# Patient Record
Sex: MALE | Race: BLACK OR AFRICAN AMERICAN | Employment: OTHER | URBAN - NONMETROPOLITAN AREA
[De-identification: names, ages, dates, MRNs, and addresses within clinical notes are randomized per-mention and may not be internally consistent; named-entity substitution may affect disease eponyms.]

---

## 2020-07-31 ENCOUNTER — VIRTUAL VISIT (OUTPATIENT)
Dept: FAMILY MEDICINE CLINIC | Age: 75
End: 2020-07-31
Payer: MEDICARE

## 2020-07-31 DIAGNOSIS — E11.9 TYPE 2 DIABETES MELLITUS WITHOUT COMPLICATION, WITH LONG-TERM CURRENT USE OF INSULIN (HCC): ICD-10-CM

## 2020-07-31 DIAGNOSIS — I50.22 CHRONIC SYSTOLIC CONGESTIVE HEART FAILURE (HCC): Primary | ICD-10-CM

## 2020-07-31 DIAGNOSIS — Z79.4 TYPE 2 DIABETES MELLITUS WITHOUT COMPLICATION, WITH LONG-TERM CURRENT USE OF INSULIN (HCC): ICD-10-CM

## 2020-07-31 DIAGNOSIS — C67.9 MALIGNANT NEOPLASM OF URINARY BLADDER, UNSPECIFIED SITE (HCC): ICD-10-CM

## 2020-07-31 DIAGNOSIS — N18.30 STAGE 3 CHRONIC KIDNEY DISEASE (HCC): ICD-10-CM

## 2020-07-31 DIAGNOSIS — I10 ESSENTIAL HYPERTENSION: ICD-10-CM

## 2020-07-31 PROCEDURE — 99442 PR PHYS/QHP TELEPHONE EVALUATION 11-20 MIN: CPT | Performed by: FAMILY MEDICINE

## 2020-07-31 NOTE — PROGRESS NOTES
Jade Leija is a 69 y.o. male, evaluated via audio-only technology on 7/31/2020 for Hospital Follow Up (fluid )  . Assessment & Plan:   Edema, congestive heart failure, diabetes, hypertension, malignant neoplasm of the bladder: I do not really know this gentleman. We had a very long discussion today. He has been in the emergency room on several occasions. They have to get fluid off and whenever that means. He has some chronic renal disease he is scheduled to see cardiology I suspect for congestive heart failure and nephrology for chronic renal disease. We did review his emergency room chart. He has all of his medications he was given in the emergency room we will follow-up on an as-needed basis this was a 15-minute visit. The patient was at his home I was in my office. It was telephone to telephone technology. 12  Subjective: The patient is a 72-year-old male I do not remember seeing in our office. He has had no falls or injuries. He says he has been in the emergency room on several occasions. He thinks he has too much blood in his system there was a Tylenol that was wrong he has appointment with cardiology and nephrology in the near future he is having some issues with his kidneys I suspect they are thinking this is heart failure. No chest congestion or cough. He has diabetes he tells me his blood sugars are less than 150 he does not check his blood pressure regularly but I see he is on clonidine and a multitude of other medicines he has had no falls or issues he is not on a calcium channel blocker however. He has been sleeping well. He has been eating well. Nobody at home has been sick. He tells me he has no significant edema today. His gait is normal.  He is not clinically anxious or depressed. Prior to Admission medications    Medication Sig Start Date End Date Taking?  Authorizing Provider   BD SINGLE USE SWABS REGULAR padm  3/19/18  Yes Provider, Historical   TRUE METRIX GLUCOSE TEST STRIP strip  3/1/18  Yes Provider, Historical   NOVOLOG FLEXPEN U-100 INSULIN 100 unit/mL inpn INJECT 8 UNITS SUBCUTANEOUSLY BEFORE MEALS 12/22/17  Yes Provider, Historical   TRUEPLUS LANCETS 33 gauge misc  3/19/18  Yes Provider, Historical   insulin lispro (HUMALOG) 100 unit/mL kwikpen 8 Units. 8/10/10  Yes Provider, Historical   lisinopril (PRINIVIL, ZESTRIL) 10 mg tablet TK 1 T PO  QD 3/22/18  Yes Provider, Historical   BD INSULIN PEN NEEDLE UF MINI 31 gauge x 3/16\" ndle USE TO INJECT TID 3/12/18  Yes Provider, Historical   cloNIDine HCl (CATAPRES) 0.2 mg tablet Take  by mouth two (2) times a day. Yes Provider, Historical   allopurinol (ZYLOPRIM) 100 mg tablet  9/12/16  Yes Provider, Historical   simvastatin (ZOCOR) 40 mg tablet  10/22/16  Yes Provider, Historical   insulin glargine (LANTUS) 100 unit/mL injection by SubCUTAneous route once. Yes Provider, Historical   Insulin Needles, Disposable, (LITE TOUCH INSULIN PEN NEEDLES) 31 X 5/16 \" Ndle by Does Not Apply route. Yes Provider, Historical     Patient Active Problem List   Diagnosis Code    Bladder cancer ([facility] C67.9    Diabetes mellitus ([facility] E11.9    Hypertension I10    Hematuria R31.9    CKD (chronic kidney disease) N18.9    Chronic systolic congestive heart failure (HCC) I50.22       Review of Systems   Constitutional: Negative. HENT: Negative. Eyes: Negative. Respiratory: Positive for shortness of breath. Cardiovascular: Negative. Gastrointestinal: Negative. Genitourinary: Negative. Musculoskeletal: Positive for joint pain. Skin: Negative. Neurological: Negative. Endo/Heme/Allergies: Negative. Has not checked his blood pressure today. No flowsheet data found.      Madeleine Sun, who was evaluated through a patient-initiated, synchronous (real-time) audio only encounter, and/or her healthcare decision maker, is aware that it is a billable service, with coverage as determined by his insurance carrier. He provided verbal consent to proceed: n/a- consent obtained within past 12 months. He has not had a related appointment within my department in the past 7 days or scheduled within the next 24 hours.       Total Time: minutes: 11-20 minutes    Jose Cruz Headley MD

## 2020-07-31 NOTE — PROGRESS NOTES
Jj Aquino presents today for   Chief Complaint   Patient presents with   [facility] Follow Up     fluid        Depression Screening:  3 most recent PHQ Screens 7/31/2020   Little interest or pleasure in doing things Several days   Feeling down, depressed, irritable, or hopeless Not at all   Total Score PHQ 2 1   Trouble falling or staying asleep, or sleeping too much Not at all   Feeling tired or having little energy Several days   Poor appetite, weight loss, or overeating Not at all   Feeling bad about yourself - or that you are a failure or have let yourself or your family down Not at all   Trouble concentrating on things such as school, work, reading, or watching TV Not at all   Moving or speaking so slowly that other people could have noticed; or the opposite being so fidgety that others notice Not at all   Thoughts of being better off dead, or hurting yourself in some way Not at all   PHQ 9 Score 2       Learning Assessment:  No flowsheet data found. Abuse Screening:  No flowsheet data found. Fall Risk  Fall Risk Assessment, last 12 mths 7/31/2020   Able to walk? Yes   Fall in past 12 months? No       Health Maintenance reviewed and discussed and ordered per Provider. Health Maintenance Due   Topic Date Due    Hepatitis C Screening  1945    Foot Exam Q1  09/12/1957    A1C test (Diabetic or Prediabetic)  09/12/1957    MICROALBUMIN Q1  09/12/1957    Eye Exam Retinal or Dilated  09/12/1957    Lipid Screen  09/12/1957    DTaP/Tdap/Td series (1 - Tdap) 09/12/1968    Shingrix Vaccine Age 50> (1 of 2) 09/12/1997    FOBT Q1Y Age 50-75  09/12/1997    GLAUCOMA SCREENING Q2Y  09/12/2012    AAA Screening 73-69 YO Male Smoking Patients  09/12/2012    Medicare Yearly Exam  05/08/2020   . Coordination of Care:  1. Have you been to the ER, urgent care clinic since your last visit? Hospitalized since your last visit? [facility] - fluid 07/30/20    2.  Have you seen or consulted any other health care providers outside of the [address] [place] [address] since your last visit? Include any pap smears or colon screening.  No

## 2020-08-31 RX ORDER — AMLODIPINE BESYLATE 5 MG/1
TABLET ORAL
Qty: 60 TAB | Refills: 3 | Status: SHIPPED | OUTPATIENT
Start: 2020-08-31 | End: 2020-09-13

## 2020-09-09 ENCOUNTER — NURSE TRIAGE (OUTPATIENT)
Dept: NEPHROLOGY | Age: 75
End: 2020-09-09

## 2020-09-09 ENCOUNTER — OFFICE VISIT (OUTPATIENT)
Dept: NEPHROLOGY | Age: 75
End: 2020-09-09
Payer: MEDICARE

## 2020-09-09 VITALS
HEIGHT: 68 IN | DIASTOLIC BLOOD PRESSURE: 64 MMHG | SYSTOLIC BLOOD PRESSURE: 129 MMHG | BODY MASS INDEX: 32.13 KG/M2 | WEIGHT: 212 LBS | HEART RATE: 67 BPM

## 2020-09-09 DIAGNOSIS — I10 ESSENTIAL HYPERTENSION: ICD-10-CM

## 2020-09-09 DIAGNOSIS — N18.30 STAGE 3 CHRONIC KIDNEY DISEASE (HCC): Primary | ICD-10-CM

## 2020-09-09 DIAGNOSIS — I50.22 CHRONIC SYSTOLIC CONGESTIVE HEART FAILURE (HCC): ICD-10-CM

## 2020-09-09 DIAGNOSIS — Z79.4 TYPE 2 DIABETES MELLITUS WITHOUT COMPLICATION, WITH LONG-TERM CURRENT USE OF INSULIN (HCC): Primary | ICD-10-CM

## 2020-09-09 DIAGNOSIS — C67.9 MALIGNANT NEOPLASM OF URINARY BLADDER, UNSPECIFIED SITE (HCC): ICD-10-CM

## 2020-09-09 DIAGNOSIS — E11.9 TYPE 2 DIABETES MELLITUS WITHOUT COMPLICATION, WITH LONG-TERM CURRENT USE OF INSULIN (HCC): Primary | ICD-10-CM

## 2020-09-09 PROCEDURE — 99214 OFFICE O/P EST MOD 30 MIN: CPT | Performed by: INTERNAL MEDICINE

## 2020-09-09 RX ORDER — HYDRALAZINE HYDROCHLORIDE 100 MG/1
100 TABLET, FILM COATED ORAL 3 TIMES DAILY
COMMUNITY
End: 2020-09-13

## 2020-09-09 RX ORDER — LOSARTAN POTASSIUM 50 MG/1
50 TABLET ORAL DAILY
COMMUNITY
End: 2020-09-19

## 2020-09-09 RX ORDER — LANOLIN ALCOHOL/MO/W.PET/CERES
325 CREAM (GRAM) TOPICAL 2 TIMES DAILY
COMMUNITY
End: 2021-02-23 | Stop reason: ALTCHOICE

## 2020-09-09 RX ORDER — MINOXIDIL 2.5 MG/1
TABLET ORAL DAILY
COMMUNITY
End: 2020-09-13

## 2020-09-09 RX ORDER — SODIUM BICARBONATE 650 MG/1
TABLET ORAL 4 TIMES DAILY
COMMUNITY
End: 2020-09-13

## 2020-09-09 RX ORDER — OMEPRAZOLE 40 MG/1
40 CAPSULE, DELAYED RELEASE ORAL DAILY
COMMUNITY
End: 2020-12-14 | Stop reason: SDUPTHER

## 2020-09-09 RX ORDER — MINOXIDIL 2.5 MG/1
2.5 TABLET ORAL 2 TIMES DAILY
COMMUNITY
End: 2020-09-19

## 2020-09-09 RX ORDER — ISOSORBIDE MONONITRATE 60 MG/1
60 TABLET, EXTENDED RELEASE ORAL
COMMUNITY
End: 2020-09-13

## 2020-09-09 NOTE — PROGRESS NOTES
Reason for f/u:  ARON on CKD III/IV      HPI:  The pt was seen in the ER last night for not feeling well. He said feeling weak. No N/V/D. No urinary sx.  No chest complaints.      ROS:  Constitutional   · Constitutional: no fatigue, no fever, no night sweats, no significant weight gain, no significant weight loss     Eyes   · Eyes: no dry eyes, no vision change     ENMT   · Nose: no frequent nosebleeds, no nose/sinus problems   · Mouth/Throat: no dry mouth, no bleeding gums, no sore throat, no teeth problems, no snoring     Cardiovascular   · Cardiovascular: no swelling in the extremities, no chest pain, no arm pain on exertion, no shortness of breath when walking, no known heart murmur, no shortness of breath when lying down, no palpitations     Respiratory   · Respiratory: no shortness of breath, no cough, no wheezing, no coughing up blood     Gastrointestinal   · Gastrointestinal: no nausea, no abdominal pain, no vomiting, normal appetite, no diarrhea, not vomiting blood     Genitourinary   · Genitourinary: no hematuria, no incontinence, no difficulty urinating, no increased frequency     Musculoskeletal   · Musculoskeletal: no back pain, no arthralgias/joint pain, no muscle aches, no muscle weakness     Integumentary   · Skin: no rashes, no jaundice     Neurologic   · Neurologic: no dizziness, no numbness, no loss of consciousness, no weakness, no seizures, no headaches     Psychiatric   · Psych: no depression, no sleep disturbances     Hematologic/Lymphatic   · Hematologic/Lymphatic no swollen glands, no bruising     Allergic/Immunologic   · Allergy/Immunologic: no runny nose, no hives   ROS as noted in the HPI      PE:  Constitutional   · Level of Distress: NAD, no acute illness, no chronic illness   · General Appearance: healthy-appearing, well-nourished, well-developed   · Ambulation: ambulating normally     Eyes   · Pupils: PERRLA   · EOM: EOMI     Cardiovascular   · Heart Auscultation: RRR, normal S1, normal S2, no murmurs, no rubs, no gallop, no click, no KARI     Lungs   · Auscultation: breath sounds normal, good air movement, CTA except as noted, no wheezing, no rales/crackles, no rhonchi     Abdomen   · Inspection and Palpation: soft, non-distended, no tenderness, no CVA tenderness     Musculoskeletal System   · Extremities: no clubbing, no cyanosis, no edema     Skin   · Inspection and Palpation: no rash, no lesions, no ulcer, no induration, no nodule, turgor normal, no jaundice     Neurologic   · Cranial Nerves: grossly intact   · Gait And Station: normal gait, normal station        A/P:    1. 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  -Last Cr is 2.9   -His urinalysis showed bland sediment and has no proteinuria.  -A kidney ultrasound surgically absent left kidney and right kidney with no hydronephrosis. -He was advised to avoid any kind of NSAIDs. -will hold all above potential nephrotoxins  -I will see him back in 6 weeks. 2. History of kidney cancer:  -Status post left total nephrectomy 8 years ago. 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  -He follow-up with oncologist.    3. Hypertension:  -Blood pressure is elevated with systolic .  -had increased his hydralazine 100 mg tid at last visit   -will keep clonidine 0.2 mg tid and metoprolol ER 50 mg daily  -on amlodipine 5 mg daily   -will add losartan 50 mg daily     4. Anemia:  -Hb is 10.9  -No need for procrit. -TSAT 22%, Ferritin 283  -will put on po FeSO4 325 mg bid. 5. Renal osteodystrophy.  -His Ca, phosphorus and vitamin D25 hydroxy levels are within target limits. -has sec HPT   -iPTH 169, will keep on calcitriol 0.25 mcg daily     6. Gouty arthritis:  -His Uric acid is 5.0  -will keep on Allopurinol 200 mg daily.     7. Metabolic acidosis:  -CO2 02-->62-->08  -will keep on NaHCO3 650 mg 2 tabs tid    8. Proteinuria, nephrotic range:   -has 4-5 g/g per day   -likely from diabetic nephropathy   -Hep B/C, C3/C4, SFL ratio and ANCAs are neg   -will put on losartan 50 mg daily

## 2020-09-13 ENCOUNTER — APPOINTMENT (OUTPATIENT)
Dept: NUCLEAR MEDICINE | Age: 75
End: 2020-09-13
Attending: INTERNAL MEDICINE
Payer: MEDICARE

## 2020-09-13 ENCOUNTER — APPOINTMENT (OUTPATIENT)
Dept: GENERAL RADIOLOGY | Age: 75
End: 2020-09-13
Attending: INTERNAL MEDICINE
Payer: MEDICARE

## 2020-09-13 ENCOUNTER — HOSPITAL ENCOUNTER (OUTPATIENT)
Age: 75
Setting detail: OBSERVATION
Discharge: HOME HEALTH | End: 2020-09-19
Attending: INTERNAL MEDICINE | Admitting: INTERNAL MEDICINE
Payer: MEDICARE

## 2020-09-13 DIAGNOSIS — R06.02 SOB (SHORTNESS OF BREATH): ICD-10-CM

## 2020-09-13 DIAGNOSIS — R06.00 DYSPNEA, UNSPECIFIED TYPE: Primary | ICD-10-CM

## 2020-09-13 PROBLEM — R79.89 POSITIVE D DIMER: Status: ACTIVE | Noted: 2020-09-13

## 2020-09-13 LAB
ALBUMIN SERPL-MCNC: 3.8 G/DL (ref 3.5–4.7)
ALBUMIN/GLOB SERPL: 0.9 {RATIO}
ALP SERPL-CCNC: 74 U/L (ref 38–126)
ALT SERPL-CCNC: 16 U/L (ref 3–72)
ANION GAP SERPL CALC-SCNC: 11 MMOL/L
AST SERPL W P-5'-P-CCNC: 25 U/L (ref 17–74)
BASOPHILS # BLD: 0 K/UL (ref 0–0.1)
BASOPHILS NFR BLD: 0 % (ref 0–2)
BILIRUB DIRECT SERPL-MCNC: <0.1 MG/DL (ref 0–0.3)
BILIRUB SERPL-MCNC: 0.7 MG/DL (ref 0.2–1)
BUN SERPL-MCNC: 43 MG/DL (ref 9–21)
BUN/CREAT SERPL: 15
CA-I BLD-MCNC: 9.2 MG/DL (ref 8.5–10.5)
CHLORIDE SERPL-SCNC: 107 MMOL/L (ref 94–111)
CK SERPL-CCNC: 130 U/L (ref 38–174)
CO2 SERPL-SCNC: 21 MMOL/L (ref 21–33)
CREAT SERPL-MCNC: 2.9 MG/DL (ref 0.8–1.5)
D DIMER PPP FEU-MCNC: 2.1 UG/ML(FEU)
EOSINOPHIL # BLD: 0 K/UL (ref 0–0.4)
EOSINOPHIL NFR BLD: 0 % (ref 0–5)
ERYTHROCYTE [DISTWIDTH] IN BLOOD BY AUTOMATED COUNT: 14.2 %
GLOBULIN SER CALC-MCNC: 4.2 G/DL
GLUCOSE SERPL-MCNC: 75 MG/DL (ref 70–110)
HCT VFR BLD AUTO: 34.4 % (ref 36–48)
HGB BLD-MCNC: 11 % (ref 13–16)
IMM GRANULOCYTES # BLD AUTO: 0 K/UL
IMM GRANULOCYTES NFR BLD AUTO: 0 %
INR PPP: 0.8 (ref 0.8–1.2)
LYMPHOCYTES # BLD: 0.6 K/UL (ref 0.9–3.6)
LYMPHOCYTES NFR BLD: 16 % (ref 21–52)
MCH RBC QN AUTO: 27 PG (ref 24–34)
MCHC RBC AUTO-ENTMCNC: 32 G/DL (ref 31–37)
MCV RBC AUTO: 84.5 FL (ref 74–97)
MONOCYTES # BLD: 0.4 K/UL (ref 0.05–1.2)
MONOCYTES NFR BLD: 9 % (ref 3–10)
NEUTS SEG # BLD: 3 K/UL (ref 1.8–8)
NEUTS SEG NFR BLD: 75 % (ref 40–73)
PLATELET # BLD AUTO: 291 K/UL (ref 135–420)
PMV BLD AUTO: 9.6 FL
POTASSIUM SERPL-SCNC: 4.3 MMOL/L (ref 3.2–5.1)
PROT SERPL-MCNC: 8 G/DL (ref 6.1–8.4)
PROTHROMBIN TIME: 11 SEC (ref 11.5–15.2)
RBC # BLD AUTO: 4.07 M/UL (ref 4.7–5.5)
SODIUM SERPL-SCNC: 139 MMOL/L (ref 135–145)
TROPONIN I SERPL-MCNC: 0.03 NG/ML (ref 0.02–0.05)
WBC # BLD AUTO: 4 K/UL (ref 4.6–13.2)

## 2020-09-13 PROCEDURE — 99219 PR INITIAL OBSERVATION CARE/DAY 50 MINUTES: CPT | Performed by: NURSE PRACTITIONER

## 2020-09-13 PROCEDURE — 96374 THER/PROPH/DIAG INJ IV PUSH: CPT

## 2020-09-13 PROCEDURE — 85610 PROTHROMBIN TIME: CPT

## 2020-09-13 PROCEDURE — 85379 FIBRIN DEGRADATION QUANT: CPT

## 2020-09-13 PROCEDURE — 36415 COLL VENOUS BLD VENIPUNCTURE: CPT

## 2020-09-13 PROCEDURE — 99218 HC RM OBSERVATION: CPT

## 2020-09-13 PROCEDURE — 80048 BASIC METABOLIC PNL TOTAL CA: CPT

## 2020-09-13 PROCEDURE — 80076 HEPATIC FUNCTION PANEL: CPT

## 2020-09-13 PROCEDURE — 96376 TX/PRO/DX INJ SAME DRUG ADON: CPT

## 2020-09-13 PROCEDURE — 74011250636 HC RX REV CODE- 250/636: Performed by: NURSE PRACTITIONER

## 2020-09-13 PROCEDURE — 85025 COMPLETE CBC W/AUTO DIFF WBC: CPT

## 2020-09-13 PROCEDURE — 84484 ASSAY OF TROPONIN QUANT: CPT

## 2020-09-13 PROCEDURE — 93005 ELECTROCARDIOGRAM TRACING: CPT | Performed by: NURSE PRACTITIONER

## 2020-09-13 PROCEDURE — A9540 TC99M MAA: HCPCS

## 2020-09-13 PROCEDURE — 82550 ASSAY OF CK (CPK): CPT

## 2020-09-13 PROCEDURE — 74011250636 HC RX REV CODE- 250/636: Performed by: INTERNAL MEDICINE

## 2020-09-13 PROCEDURE — 83036 HEMOGLOBIN GLYCOSYLATED A1C: CPT

## 2020-09-13 PROCEDURE — 71045 X-RAY EXAM CHEST 1 VIEW: CPT

## 2020-09-13 PROCEDURE — 99285 EMERGENCY DEPT VISIT HI MDM: CPT

## 2020-09-13 PROCEDURE — 96372 THER/PROPH/DIAG INJ SC/IM: CPT

## 2020-09-13 PROCEDURE — 83880 ASSAY OF NATRIURETIC PEPTIDE: CPT

## 2020-09-13 RX ORDER — ISOSORBIDE MONONITRATE 60 MG/1
60 TABLET, EXTENDED RELEASE ORAL DAILY
Status: DISCONTINUED | OUTPATIENT
Start: 2020-09-14 | End: 2020-09-19 | Stop reason: HOSPADM

## 2020-09-13 RX ORDER — INSULIN GLARGINE 100 [IU]/ML
20 INJECTION, SOLUTION SUBCUTANEOUS EVERY MORNING
COMMUNITY
End: 2020-10-16

## 2020-09-13 RX ORDER — SODIUM CHLORIDE 0.9 % (FLUSH) 0.9 %
5-40 SYRINGE (ML) INJECTION AS NEEDED
Status: DISCONTINUED | OUTPATIENT
Start: 2020-09-13 | End: 2020-09-16

## 2020-09-13 RX ORDER — SODIUM CHLORIDE 0.9 % (FLUSH) 0.9 %
5-40 SYRINGE (ML) INJECTION EVERY 8 HOURS
Status: DISCONTINUED | OUTPATIENT
Start: 2020-09-13 | End: 2020-09-13

## 2020-09-13 RX ORDER — MINOXIDIL 2.5 MG/1
2.5 TABLET ORAL 2 TIMES DAILY
Status: DISCONTINUED | OUTPATIENT
Start: 2020-09-13 | End: 2020-09-15

## 2020-09-13 RX ORDER — METOPROLOL SUCCINATE 50 MG/1
50 TABLET, EXTENDED RELEASE ORAL DAILY
Status: DISCONTINUED | OUTPATIENT
Start: 2020-09-14 | End: 2020-09-19 | Stop reason: HOSPADM

## 2020-09-13 RX ORDER — FUROSEMIDE 10 MG/ML
60 INJECTION INTRAMUSCULAR; INTRAVENOUS
Status: COMPLETED | OUTPATIENT
Start: 2020-09-13 | End: 2020-09-13

## 2020-09-13 RX ORDER — POTASSIUM CHLORIDE 1.5 G/1.77G
1 POWDER, FOR SOLUTION ORAL DAILY
Status: DISCONTINUED | OUTPATIENT
Start: 2020-09-14 | End: 2020-09-14

## 2020-09-13 RX ORDER — OMEPRAZOLE 40 MG/1
40 CAPSULE, DELAYED RELEASE ORAL DAILY
Status: DISCONTINUED | OUTPATIENT
Start: 2020-09-14 | End: 2020-09-14

## 2020-09-13 RX ORDER — METOPROLOL SUCCINATE 50 MG/1
50 TABLET, EXTENDED RELEASE ORAL DAILY
COMMUNITY
Start: 2020-07-10 | End: 2020-09-19

## 2020-09-13 RX ORDER — POLYETHYLENE GLYCOL 3350 17 G/17G
17 POWDER, FOR SOLUTION ORAL DAILY PRN
Status: DISCONTINUED | OUTPATIENT
Start: 2020-09-13 | End: 2020-09-19 | Stop reason: HOSPADM

## 2020-09-13 RX ORDER — SODIUM CHLORIDE 0.9 % (FLUSH) 0.9 %
5-40 SYRINGE (ML) INJECTION AS NEEDED
Status: DISCONTINUED | OUTPATIENT
Start: 2020-09-13 | End: 2020-09-13

## 2020-09-13 RX ORDER — ACETAMINOPHEN 325 MG/1
650 TABLET ORAL
Status: DISCONTINUED | OUTPATIENT
Start: 2020-09-13 | End: 2020-09-19 | Stop reason: HOSPADM

## 2020-09-13 RX ORDER — HEPARIN SODIUM 5000 [USP'U]/ML
5000 INJECTION, SOLUTION INTRAVENOUS; SUBCUTANEOUS EVERY 8 HOURS
Status: DISCONTINUED | OUTPATIENT
Start: 2020-09-13 | End: 2020-09-14

## 2020-09-13 RX ORDER — ALLOPURINOL 100 MG/1
200 TABLET ORAL DAILY
Status: DISCONTINUED | OUTPATIENT
Start: 2020-09-14 | End: 2020-09-15

## 2020-09-13 RX ORDER — FUROSEMIDE 40 MG/1
40 TABLET ORAL DAILY
COMMUNITY
Start: 2020-07-30 | End: 2020-09-13 | Stop reason: SDUPTHER

## 2020-09-13 RX ORDER — SODIUM CHLORIDE 0.9 % (FLUSH) 0.9 %
5-40 SYRINGE (ML) INJECTION AS NEEDED
Status: DISCONTINUED | OUTPATIENT
Start: 2020-09-14 | End: 2020-09-16

## 2020-09-13 RX ORDER — LOSARTAN POTASSIUM 25 MG/1
50 TABLET ORAL DAILY
Status: DISCONTINUED | OUTPATIENT
Start: 2020-09-14 | End: 2020-09-15

## 2020-09-13 RX ORDER — ACETAMINOPHEN 650 MG/1
650 SUPPOSITORY RECTAL
Status: DISCONTINUED | OUTPATIENT
Start: 2020-09-13 | End: 2020-09-19 | Stop reason: HOSPADM

## 2020-09-13 RX ORDER — FUROSEMIDE 10 MG/ML
40 INJECTION INTRAMUSCULAR; INTRAVENOUS 2 TIMES DAILY
Status: DISCONTINUED | OUTPATIENT
Start: 2020-09-14 | End: 2020-09-14

## 2020-09-13 RX ORDER — PROMETHAZINE HYDROCHLORIDE 25 MG/1
12.5 TABLET ORAL
Status: DISCONTINUED | OUTPATIENT
Start: 2020-09-13 | End: 2020-09-19 | Stop reason: HOSPADM

## 2020-09-13 RX ORDER — INSULIN LISPRO 100 [IU]/ML
8 INJECTION, SOLUTION INTRAVENOUS; SUBCUTANEOUS
Status: DISCONTINUED | OUTPATIENT
Start: 2020-09-14 | End: 2020-09-19 | Stop reason: HOSPADM

## 2020-09-13 RX ORDER — AMLODIPINE BESYLATE 5 MG/1
10 TABLET ORAL DAILY
COMMUNITY
Start: 2020-03-23 | End: 2020-12-29

## 2020-09-13 RX ORDER — INSULIN GLARGINE 100 [IU]/ML
20 INJECTION, SOLUTION SUBCUTANEOUS DAILY
Status: DISCONTINUED | OUTPATIENT
Start: 2020-09-14 | End: 2020-09-19 | Stop reason: HOSPADM

## 2020-09-13 RX ORDER — ISOSORBIDE MONONITRATE 60 MG/1
60 TABLET, EXTENDED RELEASE ORAL DAILY
COMMUNITY
End: 2021-02-23 | Stop reason: ALTCHOICE

## 2020-09-13 RX ORDER — SODIUM CHLORIDE 0.9 % (FLUSH) 0.9 %
5-40 SYRINGE (ML) INJECTION AS NEEDED
Status: DISCONTINUED | OUTPATIENT
Start: 2020-09-13 | End: 2020-09-19 | Stop reason: HOSPADM

## 2020-09-13 RX ORDER — CLONIDINE HYDROCHLORIDE 0.1 MG/1
0.2 TABLET ORAL
Status: DISCONTINUED | OUTPATIENT
Start: 2020-09-14 | End: 2020-09-15

## 2020-09-13 RX ORDER — LANOLIN ALCOHOL/MO/W.PET/CERES
325 CREAM (GRAM) TOPICAL 2 TIMES DAILY
Status: DISCONTINUED | OUTPATIENT
Start: 2020-09-13 | End: 2020-09-19 | Stop reason: HOSPADM

## 2020-09-13 RX ORDER — POTASSIUM CHLORIDE 1.5 G/1
20 POWDER, FOR SOLUTION ORAL DAILY
COMMUNITY
Start: 2020-07-30 | End: 2021-02-23 | Stop reason: ALTCHOICE

## 2020-09-13 RX ORDER — FUROSEMIDE 10 MG/ML
40 INJECTION INTRAMUSCULAR; INTRAVENOUS ONCE
Status: DISCONTINUED | OUTPATIENT
Start: 2020-09-13 | End: 2020-09-13

## 2020-09-13 RX ORDER — ONDANSETRON 2 MG/ML
4 INJECTION INTRAMUSCULAR; INTRAVENOUS
Status: DISCONTINUED | OUTPATIENT
Start: 2020-09-13 | End: 2020-09-19 | Stop reason: HOSPADM

## 2020-09-13 RX ORDER — FLUTICASONE PROPIONATE 50 MCG
1 SPRAY, SUSPENSION (ML) NASAL DAILY PRN
COMMUNITY
End: 2022-03-30 | Stop reason: SDUPTHER

## 2020-09-13 RX ORDER — FLUTICASONE PROPIONATE 50 MCG
2 SPRAY, SUSPENSION (ML) NASAL DAILY
Status: DISCONTINUED | OUTPATIENT
Start: 2020-09-14 | End: 2020-09-19 | Stop reason: HOSPADM

## 2020-09-13 RX ORDER — AMLODIPINE BESYLATE 5 MG/1
10 TABLET ORAL DAILY
Status: DISCONTINUED | OUTPATIENT
Start: 2020-09-14 | End: 2020-09-17

## 2020-09-13 RX ADMIN — HEPARIN SODIUM 5000 UNITS: 5000 INJECTION INTRAVENOUS; SUBCUTANEOUS at 23:59

## 2020-09-13 RX ADMIN — FUROSEMIDE 60 MG: 10 INJECTION, SOLUTION INTRAMUSCULAR; INTRAVENOUS at 12:07

## 2020-09-13 RX ADMIN — Medication 10 ML: at 12:40

## 2020-09-13 RX ADMIN — FUROSEMIDE 40 MG: 10 INJECTION, SOLUTION INTRAMUSCULAR; INTRAVENOUS at 17:57

## 2020-09-13 NOTE — ED NOTES
7:01 PM  Pt sat here all day waiting to have a VQ scan because they did not have the reagent and pt had to wait until 9pm. Radiology tech now came and told me that because the machine is a  and has to be cleared by Christine; the [facility] are not able to have the study transmitted to be read by the radiologist tonight. It will be tomorrow; maybe Tuesday; After they get the proper codes from [place] that the study will be able to be read. I spoke with ROGERIO Ramirez and she will come to the ER to consult on pt re: admission.

## 2020-09-13 NOTE — ED NOTES
[employer] Access Code: Activation code not generated  Current [employer] Status: Active    [employer]  A secure, online tool to manage your health information     [employer]® is a secure, online tool that connects you to your personalized health information from the privacy of your home -- day or night - making it very easy for you to manage your healthcare. Once the activation process is completed, you can even access your medical information using the Real [employer]ends victoriano, which is available for free in the [employer] Victoriano store or Google Play store.     Real [employer]ends provides the following levels of access (as shown below):   My Chart Features   [facility] Primary Care Doctor [facility]  Specialists [facility]  Urgent  Care Non-[facility]  Primary Care  Doctor   Email your healthcare team securely and privately 24/7 X X X X   Manage appointments: schedule your next appointment; view details of past/upcoming appointments X      Request prescription refills. X      View recent personal medical records, including lab and immunizations X X X X   View health record, including health history, allergies, medications X X X X   Read reports about your outpatient visits, procedures, consult and ER notes X X X X   See your discharge summary, which is a recap of your hospital and/or ER visit that includes your diagnosis, lab results, and care plan. X X       How to register for [employer]ends:  1. Go to  [email or web address].  2. Click on the Sign Up Now box, which takes you to the New Member Sign Up page. You will need to provide the following information:  a. Enter your [employer] Access Code exactly as it appears at the top of this page. (You will not need to use this code after you’ve completed the sign-up process. If you do not sign up before the expiration date, you must request a new code.)   b. Enter your date of birth.   c. Enter your home email address.   d. Click Submit, and follow the next screen’s instructions.  3. Create a [employer] ID. This will  7:38 PM  Case discussed with Dr Sabine Colvin; would prefer pt be admitted since difficulty with follow up once VQ report posted. Discussed with NP Laurier Landau and will admit to obs. be your [employer] login ID and cannot be changed, so think of one that is secure and easy to remember.  4. Create a Baeta password. You can change your password at any time.  5. Enter your Password Reset Question and Answer. This can be used at a later time if you forget your password.   6. Enter your e-mail address. This allows you to receive e-mail notifications when new information is available in Ba[employer].  7. Click Sign Up. You can now view your health information.    For assistance activating your [employer] account, call [phone]

## 2020-09-13 NOTE — ED NOTES
2:33 PM  Pt feeling better; put out over 700 ml and states that he is better. His d dimer is elevated and creatinine high. Pending VQ scan.

## 2020-09-13 NOTE — ED PROVIDER NOTES
EMERGENCY DEPARTMENT HISTORY AND PHYSICAL EXAM      Date: 9/13/2020  Patient Name: Muriel Sy    History of Presenting Illness     Chief Complaint   Patient presents with    Shortness of Breath    Chest Pain       History Provided By: Patient    HPI: Muriel Sy, 70 y.o. male presents to the ED with cc of shortness of breath for the past week. Patient states that he has only 1 kidney and no bladder and is followed by Dr. Mane Frank he saw Dr. Lenora Devine last week and was told that if he developed any buildup of fluid that he should come to the emergency room. He states that in the past he has had problems with buildup of fluid needs to come to the emergency room to be treated. He is post op left nephrectomy for cancer, bladder cancer. No known drug allergies denies chest pain or COVID exposure    There are no other complaints, changes, or physical findings at this time. PCP: Jose Alfonso MD    No current facility-administered medications on file prior to encounter. Current Outpatient Medications on File Prior to Encounter   Medication Sig Dispense Refill    metoprolol succinate (TOPROL-XL) 50 mg XL tablet Take 50 mg by mouth daily.  Klor-Con 20 mEq pack Take 20 mEq by mouth daily.  fluticasone propionate (FLONASE) 50 mcg/actuation nasal spray 1 [place] by Both Nostrils route daily as needed.  amLODIPine (NORVASC) 5 mg tablet Take 10 mg by mouth daily.  insulin glargine (LANTUS,BASAGLAR) 100 unit/mL (3 mL) inpn 20 Units by SubCUTAneous route Every morning.  isosorbide mononitrate ER (IMDUR) 60 mg CR tablet Take 60 mg by mouth daily.  omeprazole (PRILOSEC) 40 mg capsule Take 40 mg by mouth daily.  ferrous sulfate 325 mg (65 mg iron) tablet Take 325 mg by mouth two (2) times a day.  minoxidiL (LONITEN) 2.5 mg tablet Take 2.5 mg by mouth two (2) times a day.  losartan (COZAAR) 50 mg tablet Take 50 mg by mouth daily.       NOVOLOG FLEXPEN U-100 INSULIN 100 unit/mL inpn INJECT 8 UNITS SUBCUTANEOUSLY BEFORE MEALS  4    BD INSULIN PEN NEEDLE UF MINI 31 gauge x 3/16\" ndle USE TO INJECT TID  3    cloNIDine HCl (CATAPRES) 0.2 mg tablet Take 0.2 mg by mouth Before breakfast, lunch, and dinner. Indications: high blood pressure      allopurinol (ZYLOPRIM) 100 mg tablet Take 200 mg by mouth daily. 3       Past History     Past Medical History:  Past Medical History:   Diagnosis Date    Acid reflux     Arthritis     Bladder [facility]     Diabetes mellitus ([facility]     GERD (gastroesophageal reflux disease)     Gout     High cholesterol     Hypertension     Kidney carcinoma, left (HCC)     Kidney disease     Pituitary mass (HCC)     Reflux esophagitis     Unspecified deficiency anemia        Past Surgical History:  Past Surgical History:   Procedure Laterality Date    HX CYSTECTOMY  09    Dr. Cece Nunn HX NEPHRECTOMY Left 2009    Nephroureterectomy by Dr. Wicho FRANCO OTHER SURGICAL      surgery on pituitary gland       Family History:  Family History   Problem Relation Age of Onset    Heart Disease Father     Heart Disease Mother        Social History:  Social History     Tobacco Use    Smoking status: Former Smoker     Packs/day: 0.50     Years: 40.00     Pack years: 20.00     Types: Cigarettes     Last attempt to quit: 1999     Years since quittin.7    Smokeless tobacco: Never Used   Substance Use Topics    Alcohol use: No    Drug use: No       Allergies:  No Known Allergies      Review of Systems   Review of Systems   Constitutional: Negative for appetite change, chills, diaphoresis and fatigue. HENT: Negative for sore throat. Eyes: Negative for visual disturbance. Respiratory: Positive for cough and shortness of breath. Negative for chest tightness and wheezing. Cardiovascular: Negative for chest pain, palpitations and leg swelling. Gastrointestinal: Negative for abdominal pain and vomiting. Genitourinary: Negative for decreased urine volume, dysuria, enuresis, flank pain and hematuria. Neurological: Negative for light-headedness, numbness and headaches. All other systems reviewed and are negative. Physical Exam   Physical Exam  Vitals signs and nursing note reviewed. Constitutional:       Appearance: He is well-developed. He is not diaphoretic. HENT:      Head: Normocephalic. Mouth/Throat:      Pharynx: No oropharyngeal exudate. Eyes:      Pupils: Pupils are equal, round, and reactive to light. Neck:      Musculoskeletal: Normal range of motion and neck supple. Cardiovascular:      Rate and Rhythm: Normal rate and regular rhythm. Heart sounds: Normal heart sounds. No murmur. No friction rub. No gallop. Pulmonary:      Effort: Pulmonary effort is normal. Tachypnea present. No respiratory distress. Breath sounds: Examination of the right-lower field reveals rales. Examination of the left-lower field reveals rales. Rales present. No wheezing. Chest:      Chest wall: No tenderness. Abdominal:      General: Bowel sounds are normal. There is no distension. Palpations: Abdomen is soft. Tenderness: There is no abdominal tenderness. There is no guarding or rebound. Musculoskeletal: Normal range of motion. General: No tenderness. Skin:     General: Skin is warm and dry. Findings: No erythema or rash. Neurological:      Mental Status: He is alert and oriented to person, place, and time.          Diagnostic Study Results     Labs -     Recent Results (from the past 12 hour(s))   CBC WITH AUTOMATED DIFF    Collection Time: 09/14/20  3:15 AM   Result Value Ref Range    WBC 4.0 (L) 4.6 - 13.2 K/uL    RBC 4.15 (L) 4.70 - 5.50 M/uL    HGB 11.0 (L) 13.0 - 16.0 %    HCT 34.7 (L) 36.0 - 48.0 %    MCV 83.6 74.0 - 97.0 FL    MCH 26.5 24.0 - 34.0 PG    MCHC 31.7 31.0 - 37.0 g/dL    RDW 14.3 %    PLATELET 405 802 - 312 K/uL    MPV 10.0 FL    NEUTROPHILS PENDING %    ABS. NEUTROPHILS PENDING K/UL    LYMPHOCYTES 16 (L) 21 - 52 %    MONOCYTES 10 3 - 10 %    EOSINOPHILS 0 0 - 5 %    BASOPHILS 0 0 - 2 %    IMMATURE GRANULOCYTES 0 %    ABS. LYMPHOCYTES 0.6 (L) 0.9 - 3.6 K/UL    ABS. MONOCYTES 0.4 0.05 - 1.2 K/UL    ABS. EOSINOPHILS 0.0 0.0 - 0.4 K/UL    ABS. BASOPHILS 0.0 0.0 - 0.1 K/UL    ABS. IMM. GRANS. 0.0 K/UL   MAGNESIUM    Collection Time: 09/14/20  3:15 AM   Result Value Ref Range    Magnesium 2.1 1.7 - 2.8 mg/dL   RENAL FUNCTION PANEL    Collection Time: 09/14/20  3:15 AM   Result Value Ref Range    Sodium 141 135 - 145 mmol/L    Potassium 4.0 3.2 - 5.1 mmol/L    Chloride 109 94 - 111 mmol/L    CO2 21 21 - 33 mmol/L    Anion gap 11 mmol/L    Glucose 108 70 - 110 mg/dL    BUN 40 (H) 9 - 21 mg/dL    Creatinine 2.90 (H) 0.8 - 1.50 mg/dL    BUN/Creatinine ratio 14      GFR est AA 26 ml/min/1.73m2    GFR est non-AA 21 ml/min/1.73m2    Calcium 9.1 8.5 - 10.5 mg/dL    Phosphorus 3.7 2.5 - 4.5 mg/dL    Albumin 3.6 3.5 - 4.7 g/dL       Radiologic Studies -   XR CHEST PORT   Final Result   IMPRESSION:       1. Patchy atelectasis and/or infiltrate at the right lung base. NM LUNG SCAN PERF    (Results Pending)     CT Results  (Last 48 hours)    None        CXR Results  (Last 48 hours)               09/13/20 0917  XR CHEST PORT Final result    Impression:  IMPRESSION:       1. Patchy atelectasis and/or infiltrate at the right lung base. Narrative:  EXAM: Portable chest radiograph 9/13/2020       CLINICAL INDICATION/HISTORY: Chest pain and shortness of breath. TECHNIQUE: A semierect portable radiograph was obtained. COMPARISON: Prior examinations are not available for comparison. FINDINGS: The cardiac silhouette is enlarged. The cardiomediastinal contours   are otherwise unremarkable. Patchy atelectasis and/or infiltrate is noted at   the right lung base. The lungs are otherwise clear. There is no pneumothorax   or pleural effusion. Medical Decision Making   I am the first provider for this patient. I reviewed the vital signs, available nursing notes, past medical history, past surgical history, family history and social history. Vital Signs-Reviewed the patient's vital signs. Patient Vitals for the past 12 hrs:   Temp Pulse Resp BP SpO2   09/14/20 0445 100 °F (37.8 °C) 91 17 (!) 166/90 --   09/14/20 0030 99.2 °F (37.3 °C) 96 26 (!) 192/100 95 %   09/13/20 2246 99 °F (37.2 °C) 90 18 (!) 179/90 94 %       Records Reviewed: Nursing Notes    Nacho Caraballo MD      Disposition:  Admitted        Diagnosis     Clinical Impression:   1. Dyspnea, unspecified type    2. SOB (shortness of breath)        Attestations:    Nacho Caraballo MD    Please note that this dictation was completed with [employer], the computer voice recognition software. Quite often unanticipated grammatical, syntax, homophones, and other interpretive errors are inadvertently transcribed by the computer software. Please disregard these errors. Please excuse any errors that have escaped final proofreading. Thank you.

## 2020-09-13 NOTE — H&P
GENERAL GENERIC H&P/CONSULT    HPI: Is a 27-year-old -American male with a history of congestive heart failure, chronic kidney disease, retention and bladder cancer who presented to the emergency department with shortness of breath and lower extremity swelling. While in the emergency department, patient had a elevated creatinine, and upon review of further records was found to be at baseline. Patient is followed by nephrology and by Alpa Vences of her cardiology. In addition, patient was found to have an elevated d-dimer and a VQ scan was ordered. Patient waited many hours for VQ scan and theology reported that the VQ scan would be able to be completed around 9 PM this evening, however the read may not be ready for 1 to 2 days. I was called to ED by ED physician, Dr. Karin Gosselin, to consult on patient for possible admission. Spoke with the patient, who states that he is feeling much better now that he has been thoroughly diuresed, and would like to go home if possible. He is tolerating room air with oxygen saturations of 95 to 97%. He denies chest pain, shortness of breath, fevers, chills or cough. States that he has an appointment with [facility] cardiology on the 21st and is also scheduled to have an echocardiogram.  He states that he last saw his nephrologist last week and was given no further instructions or changes in medication. Following conversation with patient spoke with Dr. Karin Gosselin who states that he would feel more comfortable sending patient home on anticoagulation considering the patient is unable to get VQ scan done until later this evening and possibly not read until Tuesday. Spoke with Dr. Corrie Richmond from nephrology who recommended dosing of Eliquis 2.5 mg twice daily for coverage until patient can have VQ scan read.   Patient states he will either stay tonight for VQ scan or return in a.m. ED physician then called back and stated that they would feel better if patient was brought in for Observation. Will bring patient in for observation with an expected length of stay of approximately 24 hours. Subjective:    Past Medical History:   Diagnosis Date    Acid reflux     Arthritis     Bladder cancer (HCC)     Diabetes mellitus ([address]     GERD (gastroesophageal reflux disease)     Gout     High cholesterol     Hypertension     Kidney carcinoma, left (HCC)     Kidney disease     Pituitary mass (HCC)     Reflux esophagitis     Unspecified deficiency anemia       Past Surgical History:   Procedure Laterality Date    HX CYSTECTOMY  12/30/09    Dr. Chris Ken HX NEPHRECTOMY Left 5/2009    Nephroureterectomy by Dr. Ginna Aceves      surgery on pituitary gland      Prior to Admission medications    Medication Sig Start Date End Date Taking? Authorizing Provider   metoprolol succinate (TOPROL-XL) 50 mg XL tablet Take 50 mg by mouth daily. 7/10/20  Yes Other, MD Melba   fluticasone propionate (FLONASE) 50 mcg/actuation nasal spray 1 [place] by Both Nostrils route daily as needed. Yes Other, MD Melba   amLODIPine (NORVASC) 5 mg tablet Take 10 mg by mouth daily. 3/23/20  Yes Other, MD Melba   insulin glargine (LANTUS,BASAGLAR) 100 unit/mL (3 mL) inpn 20 Units by SubCUTAneous route Every morning. Yes Other, MD Melba   isosorbide mononitrate ER (IMDUR) 60 mg CR tablet Take 60 mg by mouth daily. Yes Other, MD Melba   Klor-Con 20 mEq pack Take 20 mEq by mouth daily. 7/30/20   Other, MD Melba   furosemide (LASIX) 40 mg tablet Take 40 mg by mouth daily. 7/30/20   Other, MD Melba   omeprazole (PRILOSEC) 40 mg capsule Take 40 mg by mouth daily. Provider, Historical   ferrous sulfate 325 mg (65 mg iron) tablet Take 325 mg by mouth two (2) times a day. Provider, Historical   minoxidiL (LONITEN) 2.5 mg tablet Take 2.5 mg by mouth two (2) times a day. Provider, Historical   losartan (COZAAR) 50 mg tablet Take 50 mg by mouth daily.     Provider, Historical   BD SINGLE USE SWABS REGULAR padm  3/19/18   Provider, Historical   TRUE METRIX GLUCOSE TEST STRIP strip  3/1/18   Provider, Historical   NOVOLOG FLEXPEN U-100 INSULIN 100 unit/mL inpn INJECT 8 UNITS SUBCUTANEOUSLY BEFORE MEALS 17   Provider, Historical   TRUEPLUS LANCETS 33 gauge misc  3/19/18   Provider, Historical   BD INSULIN PEN NEEDLE UF MINI 31 gauge x 3/16\" ndle USE TO INJECT TID 3/12/18   Provider, Historical   cloNIDine HCl (CATAPRES) 0.2 mg tablet Take 0.2 mg by mouth Before breakfast, lunch, and dinner. Indications: high blood pressure    Provider, Historical   allopurinol (ZYLOPRIM) 100 mg tablet Take 200 mg by mouth daily. 16   Provider, Historical   Insulin Needles, Disposable, (LITE TOUCH INSULIN PEN NEEDLES) 31 X 5/16 \" Ndle by Does Not Apply route. Provider, Historical     No Known Allergies   Social History     Tobacco Use    Smoking status: Former Smoker     Packs/day: 0.50     Years: 40.00     Pack years: 20.00     Types: Cigarettes     Last attempt to quit: 1999     Years since quittin.7    Smokeless tobacco: Never Used   Substance Use Topics    Alcohol use: No      Family History   Problem Relation Age of Onset    Heart Disease Father     Heart Disease Mother       Review of Systems   Cardiovascular: Positive for leg swelling. Neurological: Positive for weakness. All other systems reviewed and are negative. Objective:    No intake/output data recorded.  0701 -  190  In: -   Out: 1000 [Urine:1000]  Patient Vitals for the past 8 hrs:   BP Pulse Resp SpO2   20 1847 (!) 161/86 76 18 100 %   20 1747 (!) 190/88 76 18 100 %   20 1647 (!) 193/87 77 18 100 %   20 1455 (!) 178/75 70 18 100 %   20 1410 (!) 180/86 67 18 100 %   20 1317 (!) 190/78 68 18 100 %   20 1224 (!) 191/87 60 20 99 %   20 1207 (!) 191/87 -- -- --     Physical Exam  Vitals signs reviewed. HENT:      Head: Normocephalic and atraumatic. Mouth/Throat:      Mouth: Mucous membranes are moist.      Pharynx: Oropharynx is clear. Eyes:      Extraocular Movements: Extraocular movements intact. Conjunctiva/sclera: Conjunctivae normal.   Neck:      Musculoskeletal: Normal range of motion and neck supple. Cardiovascular:      Rate and Rhythm: Normal rate and regular rhythm. Pulses: Normal pulses. Heart sounds: Murmur present. Pulmonary:      Effort: Pulmonary effort is normal.      Breath sounds: Normal breath sounds. Abdominal:      General: Bowel sounds are normal.      Palpations: Abdomen is soft. Comments: Urostomy intact with clear yellow urine noted in drainage bag. Genitourinary:     Comments: Urostomy secondary to history of bladder and kidney cancer. Musculoskeletal: Normal range of motion. Comments: Mild pedal edema bilaterally. Skin:     General: Skin is warm and dry. Capillary Refill: Capillary refill takes 2 to 3 seconds. Neurological:      General: No focal deficit present. Mental Status: He is alert and oriented to person, place, and time. Psychiatric:         Mood and Affect: Mood normal.         Behavior: Behavior normal.         Judgment: Judgment normal.          Labs:    Recent Results (from the past 24 hour(s))   CBC WITH AUTOMATED DIFF    Collection Time: 09/13/20  8:00 AM   Result Value Ref Range    WBC 4.0 (L) 4.6 - 13.2 K/uL    RBC 4.07 (L) 4.70 - 5.50 M/uL    HGB 11.0 (L) 13.0 - 16.0 %    HCT 34.4 (L) 36.0 - 48.0 %    MCV 84.5 74.0 - 97.0 FL    MCH 27.0 24.0 - 34.0 PG    MCHC 32.0 31.0 - 37.0 g/dL    RDW 14.2 %    PLATELET 540 250 - 769 K/uL    MPV 9.6 FL    NEUTROPHILS 75 (H) 40 - 73 %    LYMPHOCYTES 16 (L) 21 - 52 %    MONOCYTES 9 3 - 10 %    EOSINOPHILS 0 0 - 5 %    BASOPHILS 0 0 - 2 %    IMMATURE GRANULOCYTES 0 %    ABS. NEUTROPHILS 3.0 1.8 - 8.0 K/UL    ABS. LYMPHOCYTES 0.6 (L) 0.9 - 3.6 K/UL    ABS. MONOCYTES 0.4 0.05 - 1.2 K/UL    ABS. EOSINOPHILS 0.0 0.0 - 0.4 K/UL    ABS. BASOPHILS 0.0 0.0 - 0.1 K/UL    ABS. IMM. GRANS. 0.0 K/UL   CK    Collection Time: 09/13/20  8:00 AM   Result Value Ref Range     38 - 564 U/L   METABOLIC PANEL, BASIC    Collection Time: 09/13/20  8:00 AM   Result Value Ref Range    Sodium 139 135 - 145 mmol/L    Potassium 4.3 3.2 - 5.1 mmol/L    Chloride 107 94 - 111 mmol/L    CO2 21 21 - 33 mmol/L    Anion gap 11 mmol/L    Glucose 75 70 - 110 mg/dL    BUN 43 (H) 9 - 21 mg/dL    Creatinine 2.90 (H) 0.8 - 1.50 mg/dL    BUN/Creatinine ratio 15      GFR est AA 26 ml/min/1.73m2    GFR est non-AA 21 ml/min/1.73m2    Calcium 9.2 8.5 - 10.5 mg/dL   HEPATIC FUNCTION PANEL    Collection Time: 09/13/20  8:00 AM   Result Value Ref Range    Protein, total 8.0 6.1 - 8.4 g/dL    Albumin 3.8 3.5 - 4.7 g/dL    Globulin 4.2 g/dL    A-G Ratio 0.9      Bilirubin, total 0.7 0.2 - 1.0 mg/dL    Bilirubin, direct <0.1 0.0 - 0.3 mg/dL    Alk.  phosphatase 74 38 - 126 U/L    AST (SGOT) 25 17 - 74 U/L    ALT (SGPT) 16 3 - 72 U/L   PROTHROMBIN TIME + INR    Collection Time: 09/13/20  8:00 AM   Result Value Ref Range    Prothrombin time 11.0 (L) 11.5 - 15.2 sec    INR 0.8 0.8 - 1.2     D DIMER    Collection Time: 09/13/20  8:00 AM   Result Value Ref Range    D DIMER 2.10 (H) <0.46 ug/ml(FEU)   TROPONIN I    Collection Time: 09/13/20  8:00 AM   Result Value Ref Range    Troponin-I, Qt. 0.03 0.02 - 0.05 ng/mL   EKG, 12 LEAD, INITIAL    Collection Time: 09/13/20  8:04 AM   Result Value Ref Range    Ventricular Rate 62 BPM    Atrial Rate 62 BPM    P-R Interval 244 ms    QRS Duration 106 ms    Q-T Interval 434 ms    QTC Calculation (Bezet) 441 ms    Calculated P Axis 15 degrees    Calculated R Axis -53 degrees    Calculated T Axis 43 degrees    Diagnosis       Sinus rhythm  Prolonged VT interval  Left anterior fascicular block         ECG:   Chest X-Ray:    Assessment/Plan:  Positive D dimer  -Awaiting VQ scan  -history of heart failure and prior cancer  -We will initiate DVT prophylaxis with heparin 5000 units subcutaneous 3 times daily. Chronic systolic congestive heart failure ([address]  -Patient presented with edema, and complaints of shortness of breath. -Known to Mariza Baez of her cardiology.  -Was given Lasix 40 mg IV in the ED and diuresed over 700 mL's.  -Continue Lasix 40 mg IV twice daily.  -Consult to cardiology.  -Echocardiogram ordered and pending. -EKG in the a.m.  -1.5 L fluid restriction.  -Daily weight.  -Monitor intake and output. -Monitor and replete electrolytes as needed.  -Continuous telemetry. CKD (chronic kidney disease)  -Presented with a creatinine of 2.9, appears to be at baseline.  -Previous cystectomy and nephrectomy.  -Followed by urology and nephrology outpatient.  -Consult to nephrology.     Hypertension  -Monitor blood pressure per unit protocol  -Continue home medications to include amlodipine, clonidine, Imdur, losartan, metoprolol      Diabetes mellitus ([address]  Accu-Cheks before meals and at bedtime  Cover with Humalog per sliding scale  Obtain hemoglobin A1c  Continue Lantus    Bladder cancer [facility])  -Patient has a urostomy  -Followed by urology outpatient  -Patient had a nephrectomy and cystectomy approximately 8 years ago      Signed:  Terri Dong NP 9/13/2020

## 2020-09-13 NOTE — ED TRIAGE NOTES
During further assessment pt states that he was here this week with the same s/s and was tested for covid - tells nurse that he is positive for covid, tells MD that he is negative.

## 2020-09-13 NOTE — ED TRIAGE NOTES
EMS called for pt with CP and SOB since this am.  VSS. Pt given 325 aspirin and EKG shows NSR. On arrival pt states that he is weak, has been coughing, has fever, chest pain and SOB x 1 week.

## 2020-09-14 ENCOUNTER — APPOINTMENT (OUTPATIENT)
Dept: VASCULAR SURGERY | Age: 75
End: 2020-09-14
Attending: INTERNAL MEDICINE
Payer: MEDICARE

## 2020-09-14 ENCOUNTER — APPOINTMENT (OUTPATIENT)
Dept: NON INVASIVE DIAGNOSTICS | Age: 75
End: 2020-09-14
Attending: NURSE PRACTITIONER
Payer: MEDICARE

## 2020-09-14 ENCOUNTER — APPOINTMENT (OUTPATIENT)
Dept: ULTRASOUND IMAGING | Age: 75
End: 2020-09-14
Attending: INTERNAL MEDICINE
Payer: MEDICARE

## 2020-09-14 PROBLEM — I82.4Y3 DEEP VEIN THROMBOSIS (DVT) OF PROXIMAL VEIN OF BOTH LOWER EXTREMITIES (HCC): Chronic | Status: ACTIVE | Noted: 2020-09-14

## 2020-09-14 LAB
ALBUMIN SERPL-MCNC: 3.6 G/DL (ref 3.5–4.7)
ANION GAP SERPL CALC-SCNC: 11 MMOL/L
BASOPHILS # BLD: 0 K/UL (ref 0–0.1)
BASOPHILS NFR BLD: 0 % (ref 0–2)
BUN SERPL-MCNC: 40 MG/DL (ref 9–21)
BUN/CREAT SERPL: 14
CA-I BLD-MCNC: 9.1 MG/DL (ref 8.5–10.5)
CHLORIDE SERPL-SCNC: 109 MMOL/L (ref 94–111)
CO2 SERPL-SCNC: 21 MMOL/L (ref 21–33)
CREAT SERPL-MCNC: 2.9 MG/DL (ref 0.8–1.5)
ECHO AO ROOT DIAM: 3.6 CM
ECHO AV CUSP MM: 2.1 CM
ECHO AV VTI: 12.6 CM
ECHO LA AREA 2C: 21.1 CM2
ECHO LA AREA 4C: 23.4 CM2
ECHO LV EJECTION FRACTION 3D: 62.8 %
ECHO LV INTERNAL DIMENSION DIASTOLIC: 4.7 CM (ref 4.2–5.9)
ECHO LV INTERNAL DIMENSION SYSTOLIC: 3.1 CM
ECHO LV POSTERIOR WALL DIASTOLIC: 1.6 CM (ref 0.6–1)
ECHO LVOT DIAM: 2.2 CM
ECHO MV A VELOCITY: 63.5 CM/S
ECHO MV E VELOCITY: 79.1 CM/S
ECHO MV E/A RATIO: 1.25
ECHO RA AREA 4C: 16.5 CM2
ECHO RIGHT VENTRICULAR SYSTOLIC PRESSURE (RVSP): 25 MMHG
ECHO RV INTERNAL DIMENSION: 2.8 CM
ECHO TV REGURGITANT PEAK GRADIENT: 22 MMHG
EOSINOPHIL # BLD: 0 K/UL (ref 0–0.4)
EOSINOPHIL NFR BLD: 0 % (ref 0–5)
ERYTHROCYTE [DISTWIDTH] IN BLOOD BY AUTOMATED COUNT: 14.3 %
EST. AVERAGE GLUCOSE BLD GHB EST-MCNC: 180 MG/DL
GLUCOSE BLD STRIP.AUTO-MCNC: 74 MG/DL (ref 70–110)
GLUCOSE SERPL-MCNC: 108 MG/DL (ref 70–110)
HBA1C MFR BLD: 7.9 % (ref 4–5.6)
HCT VFR BLD AUTO: 34.7 % (ref 36–48)
HGB BLD-MCNC: 11 % (ref 13–16)
IMM GRANULOCYTES # BLD AUTO: 0 K/UL
IMM GRANULOCYTES NFR BLD AUTO: 0 %
LYMPHOCYTES # BLD: 0.6 K/UL (ref 0.9–3.6)
LYMPHOCYTES NFR BLD: 16 % (ref 21–52)
MAGNESIUM SERPL-MCNC: 2.1 MG/DL (ref 1.7–2.8)
MCH RBC QN AUTO: 26.5 PG (ref 24–34)
MCHC RBC AUTO-ENTMCNC: 31.7 G/DL (ref 31–37)
MCV RBC AUTO: 83.6 FL (ref 74–97)
MONOCYTES # BLD: 0.4 K/UL (ref 0.05–1.2)
MONOCYTES NFR BLD: 10 % (ref 3–10)
NEUTS SEG # BLD: 3 K/UL (ref 1.8–8)
NEUTS SEG NFR BLD: 74 % (ref 40–73)
PERFORMED BY, TECHID: NORMAL
PHOSPHATE SERPL-MCNC: 3.7 MG/DL (ref 2.5–4.5)
PLATELET # BLD AUTO: 292 K/UL (ref 135–420)
PMV BLD AUTO: 10 FL
POTASSIUM SERPL-SCNC: 4 MMOL/L (ref 3.2–5.1)
RBC # BLD AUTO: 4.15 M/UL (ref 4.7–5.5)
SODIUM SERPL-SCNC: 141 MMOL/L (ref 135–145)
WBC # BLD AUTO: 4 K/UL (ref 4.6–13.2)

## 2020-09-14 PROCEDURE — 85025 COMPLETE CBC W/AUTO DIFF WBC: CPT

## 2020-09-14 PROCEDURE — 36415 COLL VENOUS BLD VENIPUNCTURE: CPT

## 2020-09-14 PROCEDURE — 93306 TTE W/DOPPLER COMPLETE: CPT

## 2020-09-14 PROCEDURE — 74011636637 HC RX REV CODE- 636/637: Performed by: NURSE PRACTITIONER

## 2020-09-14 PROCEDURE — 76775 US EXAM ABDO BACK WALL LIM: CPT

## 2020-09-14 PROCEDURE — 83735 ASSAY OF MAGNESIUM: CPT

## 2020-09-14 PROCEDURE — 96376 TX/PRO/DX INJ SAME DRUG ADON: CPT

## 2020-09-14 PROCEDURE — 96372 THER/PROPH/DIAG INJ SC/IM: CPT

## 2020-09-14 PROCEDURE — 74011250637 HC RX REV CODE- 250/637

## 2020-09-14 PROCEDURE — 74011250637 HC RX REV CODE- 250/637: Performed by: NURSE PRACTITIONER

## 2020-09-14 PROCEDURE — 74011250636 HC RX REV CODE- 250/636: Performed by: NURSE PRACTITIONER

## 2020-09-14 PROCEDURE — 82962 GLUCOSE BLOOD TEST: CPT

## 2020-09-14 PROCEDURE — 93005 ELECTROCARDIOGRAM TRACING: CPT

## 2020-09-14 PROCEDURE — 74011250637 HC RX REV CODE- 250/637: Performed by: INTERNAL MEDICINE

## 2020-09-14 PROCEDURE — 80069 RENAL FUNCTION PANEL: CPT

## 2020-09-14 PROCEDURE — 99218 HC RM OBSERVATION: CPT

## 2020-09-14 PROCEDURE — 93970 EXTREMITY STUDY: CPT

## 2020-09-14 RX ORDER — MINOXIDIL 2.5 MG/1
TABLET ORAL
Status: COMPLETED
Start: 2020-09-14 | End: 2020-09-15

## 2020-09-14 RX ORDER — CLONIDINE HYDROCHLORIDE 0.1 MG/1
TABLET ORAL
Status: COMPLETED
Start: 2020-09-14 | End: 2020-09-14

## 2020-09-14 RX ORDER — LANOLIN ALCOHOL/MO/W.PET/CERES
CREAM (GRAM) TOPICAL
Status: COMPLETED
Start: 2020-09-14 | End: 2020-09-14

## 2020-09-14 RX ORDER — FUROSEMIDE 10 MG/ML
40 INJECTION INTRAMUSCULAR; INTRAVENOUS DAILY
Status: DISCONTINUED | OUTPATIENT
Start: 2020-09-15 | End: 2020-09-15

## 2020-09-14 RX ORDER — CLONIDINE HYDROCHLORIDE 0.1 MG/1
TABLET ORAL
Status: DISPENSED
Start: 2020-09-14 | End: 2020-09-15

## 2020-09-14 RX ORDER — POTASSIUM CHLORIDE 750 MG/1
20 TABLET, EXTENDED RELEASE ORAL DAILY
Status: DISCONTINUED | OUTPATIENT
Start: 2020-09-14 | End: 2020-09-15

## 2020-09-14 RX ORDER — PANTOPRAZOLE SODIUM 40 MG/1
40 TABLET, DELAYED RELEASE ORAL
Status: DISCONTINUED | OUTPATIENT
Start: 2020-09-14 | End: 2020-09-19 | Stop reason: HOSPADM

## 2020-09-14 RX ORDER — LANOLIN ALCOHOL/MO/W.PET/CERES
CREAM (GRAM) TOPICAL
Status: DISPENSED
Start: 2020-09-14 | End: 2020-09-15

## 2020-09-14 RX ORDER — MINOXIDIL 2.5 MG/1
TABLET ORAL
Status: DISPENSED
Start: 2020-09-14 | End: 2020-09-15

## 2020-09-14 RX ORDER — INSULIN LISPRO 100 [IU]/ML
INJECTION, SOLUTION INTRAVENOUS; SUBCUTANEOUS
Status: DISPENSED
Start: 2020-09-14 | End: 2020-09-15

## 2020-09-14 RX ADMIN — FERROUS SULFATE TAB 325 MG (65 MG ELEMENTAL FE) 325 MG: 325 (65 FE) TAB at 23:00

## 2020-09-14 RX ADMIN — LOSARTAN POTASSIUM 50 MG: 25 TABLET, FILM COATED ORAL at 12:11

## 2020-09-14 RX ADMIN — CLONIDINE HYDROCHLORIDE 0.2 MG: 0.1 TABLET ORAL at 12:10

## 2020-09-14 RX ADMIN — FUROSEMIDE 40 MG: 10 INJECTION, SOLUTION INTRAMUSCULAR; INTRAVENOUS at 12:12

## 2020-09-14 RX ADMIN — CLONIDINE HYDROCHLORIDE 0.2 MG: 0.1 TABLET ORAL at 18:08

## 2020-09-14 RX ADMIN — INSULIN LISPRO 8 UNITS: 100 INJECTION, SOLUTION INTRAVENOUS; SUBCUTANEOUS at 18:09

## 2020-09-14 RX ADMIN — METOPROLOL SUCCINATE 50 MG: 50 TABLET, EXTENDED RELEASE ORAL at 12:08

## 2020-09-14 RX ADMIN — MINOXIDIL 2.5 MG: 2.5 TABLET ORAL at 18:09

## 2020-09-14 RX ADMIN — CLONIDINE HYDROCHLORIDE 0.2 MG: 0.1 TABLET ORAL at 23:00

## 2020-09-14 RX ADMIN — FLUTICASONE PROPIONATE 2 SPRAY: 50 SPRAY, METERED NASAL at 12:09

## 2020-09-14 RX ADMIN — INSULIN GLARGINE 20 UNITS: 100 INJECTION, SOLUTION SUBCUTANEOUS at 12:13

## 2020-09-14 RX ADMIN — PANTOPRAZOLE SODIUM 40 MG: 40 TABLET, DELAYED RELEASE ORAL at 12:12

## 2020-09-14 RX ADMIN — HEPARIN SODIUM 5000 UNITS: 5000 INJECTION INTRAVENOUS; SUBCUTANEOUS at 06:37

## 2020-09-14 RX ADMIN — ACETAMINOPHEN 650 MG: 325 TABLET ORAL at 12:07

## 2020-09-14 RX ADMIN — ALLOPURINOL 200 MG: 100 TABLET ORAL at 12:11

## 2020-09-14 RX ADMIN — POTASSIUM CHLORIDE 20 MEQ: 750 TABLET, EXTENDED RELEASE ORAL at 12:09

## 2020-09-14 RX ADMIN — AMLODIPINE BESYLATE 10 MG: 5 TABLET ORAL at 12:10

## 2020-09-14 RX ADMIN — ISOSORBIDE MONONITRATE 60 MG: 60 TABLET, EXTENDED RELEASE ORAL at 12:08

## 2020-09-14 RX ADMIN — APIXABAN 10 MG: 5 TABLET, FILM COATED ORAL at 21:06

## 2020-09-14 RX ADMIN — FERROUS SULFATE TAB 325 MG (65 MG ELEMENTAL FE) 325 MG: 325 (65 FE) TAB at 12:09

## 2020-09-14 RX ADMIN — MINOXIDIL 2.5 MG: 2.5 TABLET ORAL at 09:00

## 2020-09-14 NOTE — ASSESSMENT & PLAN NOTE
-Accu-Cheks before meals and at bedtime  -Cover with Humalog per sliding scale  -Continue Lantus  -Renal diet due to renal failure

## 2020-09-14 NOTE — PROGRESS NOTES
Bedside shift change report given to esperanza gaffney (oncoming nurse) by Savannah kovacs rn (offgoing nurse). Report included the following information Kardex, Intake/Output, MAR and Dual Neuro Assessment.

## 2020-09-14 NOTE — ASSESSMENT & PLAN NOTE
-Bilateral PVL's performed  -Confirmed bilateral DVT  -Continue Eliquis 10mg BID for 7 days, until 9/21, then reduce to 5mg BID daily.   -Monitor for signs of bleeding

## 2020-09-14 NOTE — ASSESSMENT & PLAN NOTE
-Patient presented with edema, and complaints of shortness of breath. -Known to Vivian Disla of her cardiology.  -Cardiology following   -Echo reviewed, HFpEF noted  -Lasix helpd secondary to worsening renal failure  -1.5 L fluid restriction.  -Daily weight.  -Monitor intake and output. -Monitor and replete electrolytes as needed.  -Continuous telemetry.

## 2020-09-14 NOTE — ROUTINE PROCESS
RECEIVED CARE OF 70 YR OLD B/M VIA STRETCHER FROM ED. PT AMBULATED TO BED. A&OX4.  RESPIRATIONS EVEN & NON-LABORED. LUNG SOUNDS RALES IN LOWER BASES. SATS 98% ON RA.  SKIN WARM & DRY. # 20 INT TO LEFT AC INTACT. TRACE EDEMA TO BUE& BLE. PT DENIES ANY COMPLAINTS AT PRESENT.   PT ON 1500 ML FLUID RESTRICTIONS

## 2020-09-14 NOTE — PROGRESS NOTES
Pt states he has had 5 bm's while in here ,note in bsc semi liquid to liquid dark brown stool. Pt c/o abd pain states like he has to have another bm.  bp 117/78 lasix was held for now,MADELEINE Fischer NP made aware \of change.

## 2020-09-14 NOTE — CONSULTS
[address]    Name:  Shine Travis  MR#:  [id number]  :  1945  ACCOUNT #:  [de-identified]  DATE OF SERVICE:  2020    REFERRING PHYSICIAN:  Dr. Christa Mansfield. REASON FOR CONSULTATION:  Elevated BUN and creatinine. HISTORY OF PRESENTING ILLNESS:  The patient is a 47-year-old [facility] American male with past medical history of bladder cancer, status post a urostomy bag placement; history of congestive heart failure; chronic kidney disease, presented to the emergency room with complaints of shortness of breath. Initial labs showed creatinine of 2.9 and a nephrology consultation is requested for further evaluation and management of shortness of breath and renal failure. The patient received IV Lasix, claims good urine output through his urostomy bag. No complaints of any acute shortness of breath at this time. He is comfortably sitting in chair. He is aware that he has chronic kidney disease. He is not clear about his baseline renal functions. His repeat creatinine this morning is stable at 2.9 from yesterday. No complaints of any swelling in his lower extremities. Repeat chest x-ray showed right-sided atelectasis, but no definite pulmonary edema or pleural effusions. The patient was on minoxidil and losartan for management of his hypertension in addition to metoprolol and clonidine, amlodipine. The patient had some high blood pressures at the time of admission yesterday, but blood pressure has improved to 122/80 today. No complaints of any chest pain. No complaints of any abdominal or flank pains. PAST MEDICAL HISTORY:  Significant for hypertension, questionable heart disease, history of gastroesophageal reflux disease, history of bladder cancer and status post bladder surgery with urostomy, history of a pituitary mass, history of gout, osteoarthritis.     PAST SURGICAL HISTORY:  Significant for a cystectomy and apparently had a nephrectomy on left side for renal cancer in 2009. HOME MEDICATIONS AND CURRENT MEDICATIONS:  Reviewed. ALLERGIES:  NO KNOWN DRUG ALLERGIES. SOCIAL HISTORY:  No history of any active smoking, EtOH or substance use. The patient has a past history of smoking. He quit about 20 years ago. FAMILY HISTORY:  Positive for hypertension and heart disease in family members. REVIEW OF SYSTEMS:  The patient has complaints of shortness of breath, on and off lower extremity swelling, on and off joint pains with osteoarthritis. He has a urostomy bag post cystectomy, not giving any specific complaints with the urostomy use at this time. No other significant complaints on complete review of systems. PHYSICAL EXAMINATION:  GENERAL:  A 79-year-old male, alert, awake, well-oriented, sitting comfortably in the chair, not in any acute distress. VITAL SIGNS:  Last blood pressure is 122/80, pulse 98, temperature 100. HEENT:  Pupils reactive. Extraocular muscles intact. No pallor. No icterus. Mucosa moist.  NECK:  Supple. No jugular venous distention. LUNGS:  Fair air entry present bilaterally. No significant rales or rhonchi heard. HEART:  Sounds are normal.  No rubs or murmurs heard. ABDOMEN:  Soft, nontender. No palpable organomegaly. Normal bowel sounds present. Urostomy bag in place. Urine is clear. EXTREMITIES:  No edema. No cyanosis. NEUROLOGICAL:  The patient is alert, awake, well-oriented. Moving all his extremities. Speech is normal.  SKIN:  Normal skin turgor. No evidence of any skin rashes. MUSCULOSKELETAL:  No acute joint swelling. LABORATORY DATA:  Labs done on admission showed a BUN of 43, creatinine of 2.9, normal electrolytes. Hemoglobin of 11. Repeat labs done this morning showed a BUN of 40, creatinine of 2.9, and normal electrolytes. ASSESSMENT AND PLAN:  1. Chronic kidney disease.   The patient probably has stage IV chronic kidney disease related to his previous nephrectomy and nephrosclerosis with hypertension. Creatinine is stable, appears to be around baseline. Past labs are not available at this time. I recommend to decrease the Lasix to once daily. We will check urine random protein-creatinine ratio to assess for proteinuria and continue to monitor renal functions and urine outputs. The patient follows with Dr. Shannan Reynolds as outpatient for his renal care. 2.  Acute kidney injury on chronic kidney disease, possible mild prerenal azotemia superimposed on chronic kidney disease secondary to the use of losartan and diuretics. We will continue to monitor clinically. We will continue current management. Check a CPK level to rule out rhabdomyolysis. We will check urine random electrolytes. Recommend to continue to monitor renal functions. 3.  Hypertension. The patient presented with high blood pressures, which is promptly controlled at this time. Continue current antihypertensive regimen and continue to monitor blood pressures. If blood pressure remains stable, we will recommend to discontinue minoxidil for risk of fluid overload. 4.  Complaints of shortness of breath, history of congestive heart failure. The chest x-ray is suggestive of atelectasis. Cardiology following the patient. Workup with echocardiogram is pending. The patient clinically improved, appears stable at this time. Continue to monitor clinically. 5.  Renal osteodystrophy. We will check a phosphorus level tomorrow to assess for hyperphosphatemia. Recommend to monitor intact PTH and vitamin D levels as outpatient and adjust management as needed. 6.  History of gout. The patient is on allopurinol. No complaints of any acute gout at this time. 7.  Type 2 diabetes. Stable blood sugars. Continue to monitor Accu-Cheks and continue current management.       Kristina Culp MD      [id number]  D:  09/14/2020 13:38  T:  09/14/2020 16:12  JOB #:  [id number]

## 2020-09-14 NOTE — PROGRESS NOTES
Pt seen and examined, full consult dictated, appropriate orders placed    Thank you for the consultation

## 2020-09-14 NOTE — PROGRESS NOTES
Progress Note  Date:2020       Room:[facility]  Patient Meagan Saucedo     YOB: 1945     Age:75 y.o. Subjective    Subjective:  Symptoms:  Stable. He reports weakness. Diet:  Adequate intake. Activity level: Returning to normal.    Pain:  He reports no pain. Review of Systems   Neurological: Positive for weakness. All other systems reviewed and are negative. Objective         Vitals Last 24 Hours:  TEMPERATURE:  Temp  Av.4 °F (37.4 °C)  Min: 99 °F (37.2 °C)  Max: 100 °F (37.8 °C)  RESPIRATIONS RANGE: Resp  Av.9  Min: 17  Max: 26  PULSE OXIMETRY RANGE: SpO2  Av.7 %  Min: 94 %  Max: 100 %  PULSE RANGE: Pulse  Av.1  Min: 60  Max: 96  BLOOD PRESSURE RANGE: Systolic (69VNH), YNM:557 , Min:161 , WQS:399   ; Diastolic (74VOW), FMX:14, Min:75, Max:100    I/O (24Hr): Intake/Output Summary (Last 24 hours) at 2020 1120  Last data filed at 2020 0700  Gross per 24 hour   Intake 360 ml   Output 3350 ml   Net -2990 ml     Objective:  General Appearance:  Comfortable, well-appearing and in no acute distress. Vital signs: (most recent): Blood pressure (!) 166/90, pulse 91, temperature 100 °F (37.8 °C), resp. rate 17, height 5' 10\" (1.778 m), weight 99.8 kg (220 lb), SpO2 95 %. (Blood pressure elevated at 166/90. ). Output: Producing urine and producing stool. HEENT: Normal HEENT exam.    Lungs:  Normal effort. Breath sounds clear to auscultation. Heart: Normal rate. Regular rhythm. S1 normal and S2 normal.    Abdomen: Abdomen is soft. Bowel sounds are normal.   There is no abdominal tenderness. Extremities: Normal range of motion. Pulses: Distal pulses are intact. Neurological: Patient is alert and oriented to person, place and time. Normal strength. Pupils:  Pupils are equal, round, and reactive to light. Skin:  Warm and dry.       Labs/Imaging/Diagnostics    Labs:  CBC:  Recent Labs 09/14/20 0315 09/13/20  0800   WBC 4.0* 4.0*   RBC 4.15* 4.07*   HGB 11.0* 11.0*   HCT 34.7* 34.4*   MCV 83.6 84.5   RDW 14.3 14.2    291     CHEMISTRIES:  Recent Labs     09/14/20 0315 09/13/20  0800    139   K 4.0 4.3    107   CO2 21 21   BUN 40* 43*   CA 9.1 9.2   PHOS 3.7  --    MG 2.1  --    PT/INR:  Recent Labs     09/13/20  0800   INR 0.8     APTT:No results for input(s): APTT in the last 72 hours. LIVER PROFILE:  Recent Labs     09/13/20  0800   AST 25   ALT 16     Lab Results   Component Value Date/Time    ALT (SGPT) 16 09/13/2020 08:00 AM    AST (SGOT) 25 09/13/2020 08:00 AM    Alk. phosphatase 74 09/13/2020 08:00 AM    Bilirubin, direct <0.1 09/13/2020 08:00 AM    Bilirubin, total 0.7 09/13/2020 08:00 AM       Imaging Last 24 Hours:  No results found. Assessment//Plan   Active Problems:    Bladder cancer ([facility] [address] (7/5/2012)      Diabetes mellitus ([facility] [address] (7/5/2012)      Hypertension (7/5/2012)      CKD (chronic kidney disease) (9/4/2014)      Chronic systolic congestive heart failure ([facility] [address] (7/31/2020)      Positive D dimer (9/13/2020)    Positive D dimer  - VQ scan awaiting results  -history of heart failure and prior cancer  -We will initiate DVT prophylaxis with heparin 5000 units subcutaneous 3 times daily. -possibly related to bladder cancer  -bilateral PVL's ordered  -echo pending    Chronic systolic congestive heart failure ([facility] [address]  -Patient presented with edema, and complaints of shortness of breath. -Known to Mady Lennox of her cardiology.  -Was given Lasix 40 mg IV in the ED and diuresed over 700 mL's.  -Continue Lasix 40 mg IV twice daily.  -Consult to cardiology.  -Echocardiogram ordered and pending. -EKG completed & results pending.  -1.5 L fluid restriction.  -Daily weight.  -Monitor intake and output. -Monitor and replete electrolytes as needed.  -Continuous telemetry.     CKD (chronic kidney disease)  -Presented with a creatinine of 2.9, appears to be at baseline.  -Previous cystectomy and nephrectomy.  -Followed by urology and nephrology outpatient.  -Consult to nephrology. Hypertension  -Monitor blood pressure per unit protocol  -Continue home medications to include amlodipine, clonidine, Imdur, losartan, metoprolol      Diabetes mellitus ([address]  Accu-Cheks before meals and at bedtime  Cover with Humalog per sliding scale  Obtain hemoglobin A1c  Continue Lantus  Cardiac/Diabetic diet    Bladder cancer [facility])  -Patient has a urostomy  -Followed by urology outpatient  -Patient had a nephrectomy and cystectomy approximately 8 years ago      Assessment:    Condition: In stable condition. Improving. (On assessment pt sitting in chair. Satting 94% on RA. ). Plan:   Encourage ambulation and ad edith activity. Consults: physical therapy. Regular diet (Cardiac). Resume oral medications.          Electronically signed by Natanael Lambert NP on 9/14/2020 at 11:20 AM

## 2020-09-14 NOTE — ED NOTES
Attempted to call report to ICU, nurse not available to take report at this tie, states she will call ED back when available.

## 2020-09-14 NOTE — ASSESSMENT & PLAN NOTE
-Patient has a urostomy  -Followed by urology outpatient  -Patient had a nephrectomy and cystectomy approximately 8 years ago

## 2020-09-14 NOTE — CONSULTS
[facility] CARDIOLOGY  CARDIOLOGY CONSULTATION    REASON FOR CONSULT:  Shortness of breath    REQUESTING PROVIDER:  Gisselle Colindres NP    CHIEF COMPLAINT:  Abdominal pain, loose stools    HISTORY OF PRESENT ILLNESS:  Mr. Syeda Terry is a 70year-old male with past medical history significant for HFpEF, chronic kidney disease, bladder cancer, hypertension, who presented to the ED with complaints of dyspnea worsening over the last several days. No chest pain. Endorses a mild cough but no fever or sick contacts. Reports compliance with furosemide 20mg, however, has underlying CKD. Chest x-ray showing possible RLL infiltrate. Single troponin negative, D-dimer of 2. EKG showing normal sinus rhythm and first degree AV block with no acute ischemic changes. He was given furosemide 40mg IV in the ED, had 700mL output and reported feeling markedly improved. Given elevated d-dimer a V/Q scan was ordered and per nursing resulted as negative. This morning he endorses having had 5 loose stools with associated abdominal discomfort. No further dyspnea. Continues to deny chest pain. Records from hospital admission course thus far reviewed. Telemetry reviewed. Remains in normal sinus rhythm with a first degree AV block and a brief episode of ventricular bigeminy. PAST MEDICAL HISTORY:  Notes above. Relevant cardiac issues include known HFpEF. He recently had a nuclear stress test (8/2020) that was negative for ischemia or previous infarct. HOME MEDICATIONS:  Home medications reviewed, no side effects. ALLERGIES:  Allergies reviewed with the patient. FAMILY HISTORY:  Family history reviewed. SOCIAL HISTORY:  Notable for past tobacco use, no heavy alcohol or illicit drug use. REVIEW OF SYSTEMS:  Complete review of systems performed, pertinents noted above, all other systems are negative.       PHYSICAL EXAMINATION:  Vital sign assessment reveal a blood pressure of 122/80 and pulse rate of 98.  Cardiovascular exam has a heart with a regular rate and rhythm, normal S1 and S2. No murmur is present. There are no rubs or gallops. Good peripheral pulses. No jugular venous distension. No carotid bruits are present. Respiratory exam clear lung fields with inspiration and RLL expiratory rhonci. Gastrointestinal exam has soft, minimally tender abdomen with normal bowel sounds. Lymphatic exam reveals no edema and no varicosities. No notable skin changes. Neurologic exam is nonfocal.      Recent labs results and imaging reviewed. Discussed case with Dr. Madison Delgado and our impression and recommendations are as follows:    1. Shortness of breath:  Possibly related to HFpEF as symptoms improved with IV diuresing. No BNP ordered. Does not appear ischemic in origin with recent stress test showing no ischemia. Plans for discharge today. Would recommend discharge with furosemide 40mg daily (up from 20mg home dose). He has an outpatient echocardiogram, venous duplex, and holter monitor ordered for 9/21. V/Q scan negative. Will follow as an outpatient. 2. Hypertension:  Elevated on admission but has since normalized. Will continue to monitor as an outpatient. 3. Elevated d-dimer: In the setting of bladder CA and CKD. V/S scan negative for PE. Venous duplexes pending. Thank you for involving us in the care of this patient. Please do not hesitate to call if additional questions arise.

## 2020-09-14 NOTE — ASSESSMENT & PLAN NOTE
-Creatinine improving at 3.0 Improvving  -Presented with a creatinine of 2.9, appears to be baseline.  -Was receiving high doses of Lasix, now d/c'd  -Losartan d/c'd by Cardiology   -Allopurinol and Minoxidil also d/c'd   -Previous cystectomy and Left nephrectomy.  -Followed by urology and nephrology outpatient.   -Nephrology following  -Renal panel daily   -Renal USN reviewed   -IVF at 100ml/hr per nephrology

## 2020-09-14 NOTE — ASSESSMENT & PLAN NOTE
- VQ scan low prob for PE  -history of heart failure and prior cancer  -BLE DVTs found on PVLs  -On treatment dose eliquis.

## 2020-09-14 NOTE — PROGRESS NOTES
Nutrition Assessment     Type and Reason for Visit: Initial    Nutrition Recommendations/Plan: 71 yo male   PMH: HTN, CKD, CHF, DM, Bladder Cancer  Glucose currently well controlled albumin is 3.6  Diet currently Cardiac with 1.5 L fluid restriction  Pt with reduced appetite related to abdominal pain and diarrhea. Start glucerna with breakfast trays if BG becomes elevated recommend CCHO with 2G Na restriction. Nutrition Assessment:  morbidly obese Pt reports compliance Low Na diet and reports has colostomy bag. Reporting abdominal pain with diarrhea that started today. Currently on a cardiac diet with 1.5 L fluid restriction    Malnutrition Assessment:  Malnutrition Status: No malnutrition     Estimated Daily Nutrient Needs:  Energy (kcal):  8199-3758 kcal/day  Protein (g):   g/day       Fluid (ml/day):  0981-1545 mL/day(hx CHF is prescribed 1.5 L fluid restriction)    Nutrition Related Findings:  abdominal pain with diarrhea started today. glucose well controlled albumin > 3.5      Current Nutrition Therapies:  DIET CARDIAC Regular    Anthropometric Measures:  · Height:  5' 10\" (177.8 cm)  · Current Body Wt:  99.8 kg (220 lb)  · BMI: 31.6    Nutrition Diagnosis:   · Inadequate oral intake related to other (specify)(reduced appetite/abdominal pain) as evidenced by diarrhea      Nutrition Intervention:  Food and/or Nutrient Delivery: Continue current diet, Start oral nutrition supplement(glucose currently well controlled if becomes elevated recommend CCHO diet with 2 G Na restriction and continue 1.5 L fluid restriciton. add glucerna to breakfast trays)  Nutrition Education and Counseling: Education completed(PMH of CHF admitted with SOB reviewed need for Low Na diet and monitor fluid intake to prevent respiratory distress pt reports he has been compliant with diet orders)  Coordination of Nutrition Care: Continued inpatient monitoring    Goals:  compliance with Low Na diet and fluid restriction.  Pt will eat > 75% of meals, improvement in diarrhea.        Nutrition Monitoring and Evaluation:   Behavioral-Environmental Outcomes:    Food/Nutrient Intake Outcomes: Diet advancement/tolerance, Food and nutrient intake, Supplement intake  Physical Signs/Symptoms Outcomes: Biochemical data, Meal time behavior    Discharge Planning:    Continue current diet     Electronically signed by Eli Martínez on 9/14/2020 at 3:43 PM    Contact Number: [phone]

## 2020-09-14 NOTE — ED NOTES
TRANSFER - OUT REPORT:    Verbal report given to Franko Ta RN on Rosalia Eduardo  being transferred to ICU 8 for routine progression of care       Report consisted of patients Situation, Background, Assessment and   Recommendations(SBAR). Information from the following report(s) SBAR, ED Summary, MAR, Recent Results and Cardiac Rhythm NSR was reviewed with the receiving nurse. Lines:   Peripheral IV 09/13/20 (Active)   Site Assessment Clean, dry, & intact 09/13/20 0856   Phlebitis Assessment 0 09/13/20 0856   Infiltration Assessment 0 09/13/20 0856   Dressing Status Clean, dry, & intact 09/13/20 0856   Dressing Type Transparent 09/13/20 0856   Hub Color/Line Status Pink 09/13/20 0856   Alcohol Cap Used Yes 09/13/20 0856        Opportunity for questions and clarification was provided.       Patient transported with:   Monitor  Registered Nurse

## 2020-09-14 NOTE — PROGRESS NOTES
Problem: Pressure Injury - Risk of  Goal: *Prevention of pressure injury  Description: Document Peng Scale and appropriate interventions in the flowsheet. Outcome: Progressing Towards Goal  Note: Pressure Injury Interventions:       Moisture Interventions: Check for incontinence Q2 hours and as needed    Activity Interventions: Increase time out of bed    Mobility Interventions: Float heels, Turn and reposition approx. every two hours(pillow and wedges)                          Problem: Patient Education: Go to Patient Education Activity  Goal: Patient/Family Education  Outcome: Progressing Towards Goal     Problem: Falls - Risk of  Goal: *Absence of Falls  Description: Document Benny Click Fall Risk and appropriate interventions in the flowsheet.   Outcome: Progressing Towards Goal  Note: Fall Risk Interventions:  Mobility Interventions: Patient to call before getting OOB         Medication Interventions: Teach patient to arise slowly                   Problem: Patient Education: Go to Patient Education Activity  Goal: Patient/Family Education  Outcome: Progressing Towards Goal     Problem: Patient Education: Go to Patient Education Activity  Goal: Patient/Family Education  Outcome: Progressing Towards Goal     Problem: Heart Failure: Day 1  Goal: Consults, if ordered  Outcome: Progressing Towards Goal  Goal: Nutrition/Diet  Outcome: Progressing Towards Goal  Goal: Respiratory  Outcome: Progressing Towards Goal  Goal: Treatments/Interventions/Procedures  Outcome: Progressing Towards Goal  Goal: Psychosocial  Outcome: Progressing Towards Goal     Problem: Heart Failure: Day 2  Goal: Off Pathway (Use only if patient is Off Pathway)  Outcome: Progressing Towards Goal  Goal: Activity/Safety  Outcome: Progressing Towards Goal  Goal: Consults, if ordered  Outcome: Progressing Towards Goal  Goal: Diagnostic Test/Procedures  Outcome: Progressing Towards Goal  Goal: Nutrition/Diet  Outcome: Progressing Towards Goal  Goal: Discharge Planning  Outcome: Progressing Towards Goal  Goal: Medications  Outcome: Progressing Towards Goal  Goal: Respiratory  Outcome: Progressing Towards Goal  Goal: Treatments/Interventions/Procedures  Outcome: Progressing Towards Goal  Goal: Psychosocial  Outcome: Progressing Towards Goal  Goal: *Oxygen saturation within defined limits  Outcome: Progressing Towards Goal  Goal: *Hemodynamically stable  Outcome: Progressing Towards Goal  Goal: *Optimal pain control at patient's stated goal  Outcome: Progressing Towards Goal  Goal: *Anxiety reduced or absent  Outcome: Progressing Towards Goal  Goal: *Demonstrates progressive activity  Outcome: Progressing Towards Goal     Problem: Heart Failure: Day 3  Goal: Off Pathway (Use only if patient is Off Pathway)  Outcome: Progressing Towards Goal  Goal: Activity/Safety  Outcome: Progressing Towards Goal  Goal: Diagnostic Test/Procedures  Outcome: Progressing Towards Goal  Goal: Nutrition/Diet  Outcome: Progressing Towards Goal  Goal: Discharge Planning  Outcome: Progressing Towards Goal  Goal: Medications  Outcome: Progressing Towards Goal  Goal: Respiratory  Outcome: Progressing Towards Goal  Goal: Treatments/Interventions/Procedures  Outcome: Progressing Towards Goal  Goal: Psychosocial  Outcome: Progressing Towards Goal  Goal: *Oxygen saturation within defined limits  Outcome: Progressing Towards Goal  Goal: *Hemodynamically stable  Outcome: Progressing Towards Goal  Goal: *Optimal pain control at patient's stated goal  Outcome: Progressing Towards Goal  Goal: *Anxiety reduced or absent  Outcome: Progressing Towards Goal  Goal: *Demonstrates progressive activity  Outcome: Progressing Towards Goal     Problem: Heart Failure: Day 4  Goal: Off Pathway (Use only if patient is Off Pathway)  Outcome: Progressing Towards Goal  Goal: Activity/Safety  Outcome: Progressing Towards Goal  Goal: Diagnostic Test/Procedures  Outcome: Progressing Towards Goal  Goal: Nutrition/Diet  Outcome: Progressing Towards Goal  Goal: Discharge Planning  Outcome: Progressing Towards Goal  Goal: Medications  Outcome: Progressing Towards Goal  Goal: Respiratory  Outcome: Progressing Towards Goal  Goal: Treatments/Interventions/Procedures  Outcome: Progressing Towards Goal  Goal: Psychosocial  Outcome: Progressing Towards Goal  Goal: *Oxygen saturation within defined limits  Outcome: Progressing Towards Goal  Goal: *Hemodynamically stable  Outcome: Progressing Towards Goal  Goal: *Optimal pain control at patient's stated goal  Outcome: Progressing Towards Goal  Goal: *Anxiety reduced or absent  Outcome: Progressing Towards Goal  Goal: *Demonstrates progressive activity  Outcome: Progressing Towards Goal     Problem: Heart Failure: Day 5  Goal: Off Pathway (Use only if patient is Off Pathway)  Outcome: Progressing Towards Goal  Goal: Activity/Safety  Outcome: Progressing Towards Goal  Goal: Diagnostic Test/Procedures  Outcome: Progressing Towards Goal  Goal: Nutrition/Diet  Outcome: Progressing Towards Goal  Goal: Discharge Planning  Outcome: Progressing Towards Goal  Goal: Medications  Outcome: Progressing Towards Goal  Goal: Respiratory  Outcome: Progressing Towards Goal  Goal: Treatments/Interventions/Procedures  Outcome: Progressing Towards Goal  Goal: Psychosocial  Outcome: Progressing Towards Goal     Problem: Heart Failure: Discharge Outcomes  Goal: *Demonstrates ability to perform prescribed activity without shortness of breath or discomfort  Outcome: Progressing Towards Goal  Goal: *Left ventricular function assessment completed prior to or during stay, or planned for post-discharge  Outcome: Progressing Towards Goal  Goal: *ACEI prescribed if LVEF less than 40% and no contraindications or ARB prescribed  Outcome: Progressing Towards Goal  Goal: *Verbalizes understanding and describes prescribed diet  Outcome: Progressing Towards Goal  Goal: *Verbalizes understanding/describes prescribed medications  Outcome: Progressing Towards Goal  Goal: *Describes available resources and support systems  Description: (eg: Home Health, Palliative Care, Advanced Medical Directive)  Outcome: Progressing Towards Goal  Goal: *Describes smoking cessation resources  Outcome: Progressing Towards Goal  Goal: *Understands and describes signs and symptoms to report to providers(Stroke Metric)  Outcome: Progressing Towards Goal  Goal: *Describes/verbalizes understanding of follow-up/return appt  Description: (eg: to physicians, diabetes treatment coordinator, and other resources  Outcome: Progressing Towards Goal  Goal: *Describes importance of continuing daily weights and changes to report to physician  Outcome: Progressing Towards Goal

## 2020-09-14 NOTE — PROGRESS NOTES
Nutrition Assessment     Type and Reason for Visit: Initial    Nutrition Recommendations/Plan: 69 yo male   PMH: HTN, CKD, CHF, DM, Bladder Cancer  Glucose currently well controlled albumin is 3.6  Diet currently Cardiac with 1.5 L fluid restriction  Pt with reduced appetite related to abdominal pain and diarrhea. Start glucerna with breakfast trays if BG becomes elevated recommend CCHO with 2G Na restriction. Nutrition Assessment:  morbidly obese Pt reports compliance Low Na diet and reports has colostomy bag. Reporting abdominal pain with diarrhea that started today. Currently on a cardiac diet with 1.5 L fluid restriction    Malnutrition Assessment:  Malnutrition Status: No malnutrition     Estimated Daily Nutrient Needs:  Energy (kcal):  5774-5166 kcal/day  Protein (g):   g/day       Fluid (ml/day):  7225-1126 mL/day(hx CHF is prescribed 1.5 L fluid restriction)    Nutrition Related Findings:  abdominal pain with diarrhea started today. glucose well controlled albumin > 3.5      Current Nutrition Therapies:  DIET CARDIAC Regular    Anthropometric Measures:  Height:  5' 10\" (177.8 cm)  Current Body Wt:  99.8 kg (220 lb)  BMI: 31.6    Nutrition Diagnosis:   Inadequate oral intake related to other (specify)(reduced appetite/abdominal pain) as evidenced by diarrhea      Nutrition Intervention:  Food and/or Nutrient Delivery: Continue current diet, Start oral nutrition supplement(glucose currently well controlled if becomes elevated recommend CCHO diet with 2 G Na restriction and continue 1.5 L fluid restriciton. add glucerna to breakfast trays)  Nutrition Education and Counseling: Education completed(PMH of CHF admitted with SOB reviewed need for Low Na diet and monitor fluid intake to prevent respiratory distress pt reports he has been compliant with diet orders)  Coordination of Nutrition Care: Continued inpatient monitoring    Goals:  compliance with Low Na diet and fluid restriction.  Pt will eat > 75% of meals, improvement in diarrhea.        Nutrition Monitoring and Evaluation:   Behavioral-Environmental Outcomes:    Food/Nutrient Intake Outcomes: Diet advancement/tolerance, Food and nutrient intake, Supplement intake  Physical Signs/Symptoms Outcomes: Biochemical data, Meal time behavior    Discharge Planning:    Continue current diet     Electronically signed by Eli Martínez on 9/14/2020 at 3:43 PM    Contact Number: [phone]

## 2020-09-14 NOTE — ASSESSMENT & PLAN NOTE
-188/84  -Clonidine increased to 0.1mg TID  -Hydralazine 20mg PRN for sys greater than 175.   -Monitor blood pressure per unit protocol  -Continue home medications to include amlodipine, clonidine, Imdur, metoprolol

## 2020-09-15 PROBLEM — I50.9 CHF (CONGESTIVE HEART FAILURE) (HCC): Status: ACTIVE | Noted: 2020-09-15

## 2020-09-15 LAB
ALBUMIN SERPL-MCNC: 3.3 G/DL (ref 3.5–4.7)
ANION GAP SERPL CALC-SCNC: 10 MMOL/L
ANION GAP SERPL CALC-SCNC: 11 MMOL/L
BNP SERPL-MCNC: 104 PG/ML (ref 0–100)
BUN SERPL-MCNC: 54 MG/DL (ref 9–21)
BUN SERPL-MCNC: 60 MG/DL (ref 9–21)
BUN/CREAT SERPL: 12
BUN/CREAT SERPL: 14
CA-I BLD-MCNC: 8.4 MG/DL (ref 8.5–10.5)
CA-I BLD-MCNC: 8.4 MG/DL (ref 8.5–10.5)
CHLORIDE SERPL-SCNC: 104 MMOL/L (ref 94–111)
CHLORIDE SERPL-SCNC: 105 MMOL/L (ref 94–111)
CHLORIDE UR-SCNC: <10 MMOL/L (ref 15–250)
CK SERPL-CCNC: 307 U/L (ref 38–174)
CO2 SERPL-SCNC: 21 MMOL/L (ref 21–33)
CO2 SERPL-SCNC: 22 MMOL/L (ref 21–33)
CREAT SERPL-MCNC: 4 MG/DL (ref 0.8–1.5)
CREAT SERPL-MCNC: 5 MG/DL (ref 0.8–1.5)
CREAT UR-MCNC: 151.5 MG/DL (ref 22–392)
ERYTHROCYTE [DISTWIDTH] IN BLOOD BY AUTOMATED COUNT: 14.1 %
GLUCOSE BLD STRIP.AUTO-MCNC: 134 MG/DL (ref 70–110)
GLUCOSE SERPL-MCNC: 120 MG/DL (ref 70–110)
GLUCOSE SERPL-MCNC: 123 MG/DL (ref 70–110)
HCT VFR BLD AUTO: 33.5 % (ref 36–48)
HGB BLD-MCNC: 10.7 % (ref 13–16)
MCH RBC QN AUTO: 26.9 PG (ref 24–34)
MCHC RBC AUTO-ENTMCNC: 31.9 G/DL (ref 31–37)
MCV RBC AUTO: 84.2 FL (ref 74–97)
PERFORMED BY, TECHID: ABNORMAL
PHOSPHATE SERPL-MCNC: 4.2 MG/DL (ref 2.5–4.5)
PLATELET # BLD AUTO: 303 K/UL (ref 135–420)
PMV BLD AUTO: 9.6 FL
POTASSIUM SERPL-SCNC: 4.5 MMOL/L (ref 3.2–5.1)
POTASSIUM SERPL-SCNC: 4.6 MMOL/L (ref 3.2–5.1)
POTASSIUM UR-SCNC: 40 MMOL/L (ref 8–125)
PROT UR-MCNC: 610 MG/DL (ref 6–10)
PROT/CREAT UR-RTO: 4
RBC # BLD AUTO: 3.98 M/UL (ref 4.7–5.5)
SODIUM SERPL-SCNC: 136 MMOL/L (ref 135–145)
SODIUM SERPL-SCNC: 137 MMOL/L (ref 135–145)
SODIUM UR-SCNC: 63 MMOL/L (ref 20–287)
WBC # BLD AUTO: 4.9 K/UL (ref 4.6–13.2)

## 2020-09-15 PROCEDURE — 74011250637 HC RX REV CODE- 250/637: Performed by: INTERNAL MEDICINE

## 2020-09-15 PROCEDURE — 80048 BASIC METABOLIC PNL TOTAL CA: CPT

## 2020-09-15 PROCEDURE — 65610000006 HC RM INTENSIVE CARE

## 2020-09-15 PROCEDURE — 85027 COMPLETE CBC AUTOMATED: CPT

## 2020-09-15 PROCEDURE — 74011250637 HC RX REV CODE- 250/637: Performed by: NURSE PRACTITIONER

## 2020-09-15 PROCEDURE — 36415 COLL VENOUS BLD VENIPUNCTURE: CPT

## 2020-09-15 PROCEDURE — 65270000029 HC RM PRIVATE

## 2020-09-15 PROCEDURE — 84156 ASSAY OF PROTEIN URINE: CPT

## 2020-09-15 PROCEDURE — 82962 GLUCOSE BLOOD TEST: CPT

## 2020-09-15 PROCEDURE — 65660000000 HC RM CCU STEPDOWN

## 2020-09-15 PROCEDURE — 80069 RENAL FUNCTION PANEL: CPT

## 2020-09-15 PROCEDURE — 74011250636 HC RX REV CODE- 250/636: Performed by: INTERNAL MEDICINE

## 2020-09-15 PROCEDURE — 74011250637 HC RX REV CODE- 250/637

## 2020-09-15 PROCEDURE — 84133 ASSAY OF URINE POTASSIUM: CPT

## 2020-09-15 PROCEDURE — 99218 HC RM OBSERVATION: CPT

## 2020-09-15 PROCEDURE — 82550 ASSAY OF CK (CPK): CPT

## 2020-09-15 PROCEDURE — 74011636637 HC RX REV CODE- 636/637: Performed by: NURSE PRACTITIONER

## 2020-09-15 PROCEDURE — 83880 ASSAY OF NATRIURETIC PEPTIDE: CPT

## 2020-09-15 PROCEDURE — 82436 ASSAY OF URINE CHLORIDE: CPT

## 2020-09-15 PROCEDURE — 84300 ASSAY OF URINE SODIUM: CPT

## 2020-09-15 PROCEDURE — 93005 ELECTROCARDIOGRAM TRACING: CPT

## 2020-09-15 PROCEDURE — 96361 HYDRATE IV INFUSION ADD-ON: CPT

## 2020-09-15 RX ORDER — APIXABAN 5 MG (74)
KIT ORAL
Qty: 1 DOSE PACK | Refills: 0 | Status: SHIPPED | OUTPATIENT
Start: 2020-09-15 | End: 2020-09-15 | Stop reason: SDUPTHER

## 2020-09-15 RX ORDER — CLONIDINE HYDROCHLORIDE 0.1 MG/1
0.1 TABLET ORAL 2 TIMES DAILY
Status: DISCONTINUED | OUTPATIENT
Start: 2020-09-16 | End: 2020-09-18

## 2020-09-15 RX ORDER — LANCETS 33 GAUGE
EACH MISCELLANEOUS
Start: 2020-09-15

## 2020-09-15 RX ORDER — CALCIUM CITRATE/VITAMIN D3 200MG-6.25
TABLET ORAL
OUTPATIENT
Start: 2020-09-15

## 2020-09-15 RX ORDER — SODIUM CHLORIDE 9 MG/ML
100 INJECTION, SOLUTION INTRAVENOUS CONTINUOUS
Status: DISCONTINUED | OUTPATIENT
Start: 2020-09-15 | End: 2020-09-18

## 2020-09-15 RX ORDER — APIXABAN 5 MG (74)
KIT ORAL
Qty: 1 DOSE PACK | Refills: 0 | Status: SHIPPED | OUTPATIENT
Start: 2020-09-15 | End: 2021-01-13 | Stop reason: CLARIF

## 2020-09-15 RX ORDER — PANTOPRAZOLE SODIUM 40 MG/1
TABLET, DELAYED RELEASE ORAL
Status: COMPLETED
Start: 2020-09-15 | End: 2020-09-15

## 2020-09-15 RX ORDER — CLONIDINE HYDROCHLORIDE 0.1 MG/1
TABLET ORAL
Status: COMPLETED
Start: 2020-09-15 | End: 2020-09-15

## 2020-09-15 RX ADMIN — MINOXIDIL 2.5 MG: 2.5 TABLET ORAL at 00:20

## 2020-09-15 RX ADMIN — METOPROLOL SUCCINATE 50 MG: 50 TABLET, EXTENDED RELEASE ORAL at 10:24

## 2020-09-15 RX ADMIN — MINOXIDIL 2.5 MG: 2.5 TABLET ORAL at 10:25

## 2020-09-15 RX ADMIN — ALLOPURINOL 200 MG: 100 TABLET ORAL at 10:25

## 2020-09-15 RX ADMIN — FERROUS SULFATE TAB 325 MG (65 MG ELEMENTAL FE) 325 MG: 325 (65 FE) TAB at 17:45

## 2020-09-15 RX ADMIN — PANTOPRAZOLE SODIUM 40 MG: 40 TABLET, DELAYED RELEASE ORAL at 06:24

## 2020-09-15 RX ADMIN — CLONIDINE HYDROCHLORIDE 0.2 MG: 0.1 TABLET ORAL at 06:23

## 2020-09-15 RX ADMIN — POTASSIUM CHLORIDE 20 MEQ: 750 TABLET, EXTENDED RELEASE ORAL at 10:25

## 2020-09-15 RX ADMIN — INSULIN LISPRO 8 UNITS: 100 INJECTION, SOLUTION INTRAVENOUS; SUBCUTANEOUS at 10:24

## 2020-09-15 RX ADMIN — ISOSORBIDE MONONITRATE 60 MG: 60 TABLET, EXTENDED RELEASE ORAL at 10:25

## 2020-09-15 RX ADMIN — APIXABAN 10 MG: 5 TABLET, FILM COATED ORAL at 10:25

## 2020-09-15 RX ADMIN — INSULIN GLARGINE 20 UNITS: 100 INJECTION, SOLUTION SUBCUTANEOUS at 10:23

## 2020-09-15 RX ADMIN — SODIUM CHLORIDE 100 ML/HR: 9 INJECTION, SOLUTION INTRAVENOUS at 17:46

## 2020-09-15 RX ADMIN — INSULIN LISPRO 8 UNITS: 100 INJECTION, SOLUTION INTRAVENOUS; SUBCUTANEOUS at 17:45

## 2020-09-15 RX ADMIN — FERROUS SULFATE TAB 325 MG (65 MG ELEMENTAL FE) 325 MG: 325 (65 FE) TAB at 10:25

## 2020-09-15 RX ADMIN — AMLODIPINE BESYLATE 10 MG: 5 TABLET ORAL at 10:25

## 2020-09-15 RX ADMIN — FLUTICASONE PROPIONATE 2 SPRAY: 50 SPRAY, METERED NASAL at 10:26

## 2020-09-15 RX ADMIN — APIXABAN 10 MG: 5 TABLET, FILM COATED ORAL at 17:45

## 2020-09-15 RX ADMIN — CLONIDINE HYDROCHLORIDE 0.2 MG: 0.1 TABLET ORAL at 10:25

## 2020-09-15 NOTE — PROGRESS NOTES
[facility] CARDIOLOGY   PROGRESS NOTE    Patient seen and examined. This is a patient who is followed for HFpEF. Discharge yesterday postponed due to preliminary venous duplexes showing bilateral, below knee DVTs. V/Q scan has not yet been read. He denies dyspnea or chest pain. No palpitations or dizziness. No leg pain. No other complaints reported. Telemetry reviewed, there were no events noted in the past 24 hours. Remains in normal sinus rhythm. One < 3 second pause yesterday. Pertinent review of system items noted above, all other systems are negative. Current medications reviewed. Physical examination:  Vital signs are stable, Blood pressure 112/68,  Pulse 63  No apparent distress. Heart is regular, rate and rhythm. Normal S1, S2, no murmurs are appreciated. Lungs are clear bilaterally. Abdomen is soft, nontender, normal bowel sounds. Extremities have no edema. Labs reviewed. Creatinine 4 today up from 2.9 yesterday. Discussed case with Dr. Rosa Sofia and our impression and recommendations are as follows:  1. Shortness of breath:  Symptoms have improved. Echocardiogram yesterday showing a preserved EF and no WMA. RVSP only 25mmHg. Does not appear ischemic in origin with recent stress test showing no ischemia. V/Q scan has been done but not read. Holding furosemide for now given acute on chronic kidney injury. Will follow as an outpatient. 2. Hypertension:  Normal today. Will hold furosemide and losartan given worsening renal function. Nephrology is on board. Will continue to monitor as an outpatient. 3. Elevated d-dimer:  Preliminary venous duplex reports showing bilateral, below the knee DVT. Anti-coagulation initiated by primary. In the setting of bladder CA and CKD. V/S scan pending.   4.  Acute on chronic kidney injury:  Possibly due to excessive diuresing. Holding furosemide and losartan as stated. Will await renal recommendations.   He does state that he gets dyspneic when not taking home dose of furosemide 20mg daily. Please do not hesitate to call if additional questions arise.

## 2020-09-15 NOTE — PROGRESS NOTES
Received care of pt sitting up in bed awake. Pt denies pain and SOB. States he is ready togo home.  Call bell is within reach

## 2020-09-15 NOTE — PROGRESS NOTES
Talked with pt at bedside today to discuss discharge planning. Provider thought pt was going to be discharged today but was waiting for sign off by Cardiology and Nephrology. New findings were elevation in creatinine and bilateral DVT's which will delay discharge. Pt is interested in home health at discharge and choice form discussed also. CM following.

## 2020-09-15 NOTE — PROGRESS NOTES
Bedside shift change report given to SHA Juarez LPN (oncoming nurse) by JULIA Alejandro RN (offgoing nurse). Report included the following information SBAR, Kardex, ED Summary, Procedure Summary, Intake/Output, MAR, Accordion, Recent Results, Med Rec Status, Cardiac Rhythm, Alarm Parameters  and Quality Measures.

## 2020-09-15 NOTE — PROGRESS NOTES
Progress Note  Date:9/15/2020       Room:[facility]  Patient Areli Walter     YOB: 1945     Age:75 y.o.    HPI: This is a 70year old AA male who was admitted to us on 20 for suspected PE and heart failure. He has a hx of renal failure, CHF and bladder cancer and thus an elevated d-dimer. He was found to have bilateral LE dvts and was started on tx dose apixiban. An VQ scan was low probability for PE. The patient is tolerating room air with sats greater than 94%. His renal failure is worsening. Allopurinol D/C'd as well as minoxidil. His Lasix is also on hold. Subjective    Subjective:  Symptoms:  Stable. He reports weakness. Diet:  Adequate intake. Activity level: Returning to normal.    Pain:  He reports no pain. Review of Systems   Neurological: Positive for weakness. All other systems reviewed and are negative. Objective         Vitals Last 24 Hours:  TEMPERATURE:  Temp  Av.7 °F (36.5 °C)  Min: 97.6 °F (36.4 °C)  Max: 97.8 °F (36.6 °C)  RESPIRATIONS RANGE: Resp  Av  Min: 17  Max: 19  PULSE OXIMETRY RANGE: SpO2  Av.5 %  Min: 93 %  Max: 100 %  PULSE RANGE: Pulse  Av.4  Min: 63  Max: 80  BLOOD PRESSURE RANGE: Systolic (99BGC), CLL:919 , Min:93 , ZNK:068   ; Diastolic (09DEG), TD, Min:54, Max:68    I/O (24Hr): Intake/Output Summary (Last 24 hours) at 9/15/2020 1502  Last data filed at 9/15/2020 0700  Gross per 24 hour   Intake 720 ml   Output 850 ml   Net -130 ml     Objective:  General Appearance:  Comfortable, well-appearing and in no acute distress. Vital signs: (most recent): Blood pressure 112/68, pulse 63, temperature 97.6 °F (36.4 °C), resp. rate 19, height 5' 10\" (1.778 m), weight 99.8 kg (220 lb), SpO2 100 %. (Blood pressure elevated at 166/90. ). Output: Producing urine and producing stool. HEENT: Normal HEENT exam.    Lungs:  Normal effort. Breath sounds clear to auscultation.     Heart: Normal rate. Regular rhythm. S1 normal and S2 normal.    Abdomen: Abdomen is soft. (Urostomy intact draining clear yellow urine. ). Bowel sounds are normal.   There is no abdominal tenderness. Extremities: Normal range of motion. Pulses: Distal pulses are intact. Neurological: Patient is alert and oriented to person, place and time. Normal strength. Pupils:  Pupils are equal, round, and reactive to light. Skin:  Warm and dry. Labs/Imaging/Diagnostics    Labs:  CBC:  Recent Labs     09/15/20  0130 09/14/20  0315 09/13/20  0800   WBC 4.9 4.0* 4.0*   RBC 3.98* 4.15* 4.07*   HGB 10.7* 11.0* 11.0*   HCT 33.5* 34.7* 34.4*   MCV 84.2 83.6 84.5   RDW 14.1 14.3 14.2    292 291     CHEMISTRIES:  Recent Labs     09/15/20  1050 09/15/20  0130 09/14/20  0315    137 141   K 4.6 4.5 4.0    105 109   CO2 21 22 21   BUN 60* 54* 40*   CA 8.4* 8.4* 9.1   PHOS  --  4.2 3.7   MG  --   --  2.1   PT/INR:  Recent Labs     09/13/20  0800   INR 0.8     APTT:No results for input(s): APTT in the last 72 hours. LIVER PROFILE:  Recent Labs     09/13/20  0800   AST 25   ALT 16     Lab Results   Component Value Date/Time    ALT (SGPT) 16 09/13/2020 08:00 AM    AST (SGOT) 25 09/13/2020 08:00 AM    Alk. phosphatase 74 09/13/2020 08:00 AM    Bilirubin, direct <0.1 09/13/2020 08:00 AM    Bilirubin, total 0.7 09/13/2020 08:00 AM       Imaging Last 24 Hours:  [address]    Result Date: 9/15/2020  EXAM:  RETROPERITONEUM LTD. CLINICAL INDICATION/HISTORY: CKD, s/p nephrectomy and cystectomy. -Additional: None. TECHNIQUE: Grayscale and color Doppler ultrasound evaluation of the kidneys was performed and is reviewed without comparison. _______________ FINDINGS: The left kidney is surgically absent. The right kidney is normal in size, configuration, sonographic echotexture and Doppler vascular pattern and without hydronephrosis measuring 11.3 cm in length.  There is no evidence of renal calculi, or suspicious focal solid or cystic abnormalities. _______________     IMPRESSION: Status post left nephrectomy. Unremarkable right kidney. Due to technical factors, prior studies performed on this patient are unavailable at the time of dictation for comparison. _______________     Assessment//Plan   Principal Problem:    Chronic systolic congestive heart failure ([facility] (7/31/2020)    Active Problems:    Bladder cancer ([facility] (7/5/2012)      Diabetes mellitus ([address] (7/5/2012)      Hypertension (7/5/2012)      CKD (chronic kidney disease) (9/4/2014)      Positive D dimer (9/13/2020)      Deep vein thrombosis (DVT) of proximal vein of both lower extremities ([facility] (9/14/2020)      Overview:                 Deep vein thrombosis (DVT) of proximal vein of both lower extremities (HCC)  -Bilateral PVL's performed  -Confirmed bilateral DVT  -Continue Eliquis 10mg BID for 7 days, until 9/21, then reduce to 5mg BID daily. Positive D dimer  - VQ scan low prob for PE  -history of heart failure and prior cancer  -BLE DVTs found on PVLs  -On treatment dose eliquis. CKD (chronic kidney disease)  -Worsening, now 4.0  -Presented with a creatinine of 2.9, appears to be at baseline.  -Was receiving high doses of Lasix, now d/c'd  -Losartan d/c'd by Cardiology   -Allopurinol and Minoxidil also d/c'd today   -Previous cystectomy and Left nephrectomy.  -Followed by urology and nephrology outpatient.   -Nephrology following  -Renal panel daily   -Renal USN reviewed     Hypertension  -Monitor blood pressure per unit protocol  -Continue home medications to include amlodipine, clonidine, Imdur, metoprolol      Diabetes mellitus ([address]  -Accu-Cheks before meals and at bedtime  -Cover with Humalog per sliding scale  -Continue Lantus  -Cardiac/Diabetic diet    Bladder cancer ([facility] [address]  -Patient has a urostomy  -Followed by urology outpatient  -Patient had a nephrectomy and cystectomy approximately 8 years ago    * Chronic systolic congestive heart failure [facility])  -Patient presented with edema, and complaints of shortness of breath. -Known to Andrew Bhakta of her cardiology.  -Cardiology following   -Echo reviewed, HFpEF noted  -Lasix helpd secondary to worsening renal failure  -1.5 L fluid restriction.  -Daily weight.  -Monitor intake and output. -Monitor and replete electrolytes as needed.  -Continuous telemetry. Assessment:    Condition: In stable condition. Improving. (On assessment pt sitting in chair. Satting 94% on RA. ). Plan:   Encourage ambulation and ad edith activity. Consults: physical therapy. Regular diet (Cardiac). Resume oral medications.          Electronically signed by Nicolasa Ni NP on 9/15/2020 at 11:20 AM

## 2020-09-15 NOTE — PROGRESS NOTES
Received  Report  Via walking rounds ,pt resting in bed has his face mask on at this time. Pt c/o abd .

## 2020-09-16 LAB
ALBUMIN SERPL-MCNC: 3 G/DL (ref 3.5–4.7)
ANION GAP SERPL CALC-SCNC: 10 MMOL/L
ATRIAL RATE: 96 BPM
BASOPHILS # BLD: 0 K/UL (ref 0–0.1)
BASOPHILS NFR BLD: 0 % (ref 0–2)
BUN SERPL-MCNC: 67 MG/DL (ref 9–21)
BUN/CREAT SERPL: 14
CA-I BLD-MCNC: 8.3 MG/DL (ref 8.5–10.5)
CALCULATED P AXIS, ECG09: -55 DEGREES
CALCULATED R AXIS, ECG10: -20 DEGREES
CALCULATED T AXIS, ECG11: 36 DEGREES
CHLORIDE SERPL-SCNC: 107 MMOL/L (ref 94–111)
CO2 SERPL-SCNC: 20 MMOL/L (ref 21–33)
CREAT SERPL-MCNC: 4.9 MG/DL (ref 0.8–1.5)
DIAGNOSIS, 93000: NORMAL
EOSINOPHIL # BLD: 0 K/UL (ref 0–0.4)
EOSINOPHIL NFR BLD: 1 % (ref 0–5)
ERYTHROCYTE [DISTWIDTH] IN BLOOD BY AUTOMATED COUNT: 14 %
GLUCOSE BLD STRIP.AUTO-MCNC: 86 MG/DL (ref 70–110)
GLUCOSE SERPL-MCNC: 104 MG/DL (ref 70–110)
HCT VFR BLD AUTO: 31.5 % (ref 36–48)
HGB BLD-MCNC: 10.3 % (ref 13–16)
IMM GRANULOCYTES # BLD AUTO: 0 K/UL
IMM GRANULOCYTES NFR BLD AUTO: 0 %
LYMPHOCYTES # BLD: 0.8 K/UL (ref 0.9–3.6)
LYMPHOCYTES NFR BLD: 16 % (ref 21–52)
MAGNESIUM SERPL-MCNC: 2 MG/DL (ref 1.7–2.8)
MCH RBC QN AUTO: 27.1 PG (ref 24–34)
MCHC RBC AUTO-ENTMCNC: 32.7 G/DL (ref 31–37)
MCV RBC AUTO: 82.9 FL (ref 74–97)
MONOCYTES # BLD: 0.4 K/UL (ref 0.05–1.2)
MONOCYTES NFR BLD: 9 % (ref 3–10)
NEUTS SEG # BLD: 3.5 K/UL (ref 1.8–8)
NEUTS SEG NFR BLD: 74 % (ref 40–73)
P-R INTERVAL, ECG05: 234 MS
PERFORMED BY, TECHID: NORMAL
PHOSPHATE SERPL-MCNC: 3.7 MG/DL (ref 2.5–4.5)
PLATELET # BLD AUTO: 288 K/UL (ref 135–420)
PMV BLD AUTO: 9.7 FL
POTASSIUM SERPL-SCNC: 4.5 MMOL/L (ref 3.2–5.1)
Q-T INTERVAL, ECG07: 379 MS
QRS DURATION, ECG06: 101 MS
QTC CALCULATION (BEZET), ECG08: 479 MS
RBC # BLD AUTO: 3.8 M/UL (ref 4.7–5.5)
SODIUM SERPL-SCNC: 137 MMOL/L (ref 135–145)
VENTRICULAR RATE, ECG03: 96 BPM
WBC # BLD AUTO: 4.7 K/UL (ref 4.6–13.2)

## 2020-09-16 PROCEDURE — 74011636637 HC RX REV CODE- 636/637: Performed by: NURSE PRACTITIONER

## 2020-09-16 PROCEDURE — 74011250636 HC RX REV CODE- 250/636: Performed by: INTERNAL MEDICINE

## 2020-09-16 PROCEDURE — 74011250637 HC RX REV CODE- 250/637: Performed by: NURSE PRACTITIONER

## 2020-09-16 PROCEDURE — 96361 HYDRATE IV INFUSION ADD-ON: CPT

## 2020-09-16 PROCEDURE — 65270000029 HC RM PRIVATE

## 2020-09-16 PROCEDURE — 65660000000 HC RM CCU STEPDOWN

## 2020-09-16 PROCEDURE — 74011250637 HC RX REV CODE- 250/637: Performed by: INTERNAL MEDICINE

## 2020-09-16 PROCEDURE — 80069 RENAL FUNCTION PANEL: CPT

## 2020-09-16 PROCEDURE — 82962 GLUCOSE BLOOD TEST: CPT

## 2020-09-16 PROCEDURE — 85025 COMPLETE CBC W/AUTO DIFF WBC: CPT

## 2020-09-16 PROCEDURE — 83735 ASSAY OF MAGNESIUM: CPT

## 2020-09-16 PROCEDURE — 99218 HC RM OBSERVATION: CPT

## 2020-09-16 PROCEDURE — 93005 ELECTROCARDIOGRAM TRACING: CPT

## 2020-09-16 PROCEDURE — 36415 COLL VENOUS BLD VENIPUNCTURE: CPT

## 2020-09-16 RX ADMIN — INSULIN LISPRO 8 UNITS: 100 INJECTION, SOLUTION INTRAVENOUS; SUBCUTANEOUS at 11:30

## 2020-09-16 RX ADMIN — CLONIDINE HYDROCHLORIDE 0.1 MG: 0.1 TABLET ORAL at 18:31

## 2020-09-16 RX ADMIN — METOPROLOL SUCCINATE 50 MG: 50 TABLET, EXTENDED RELEASE ORAL at 09:28

## 2020-09-16 RX ADMIN — SODIUM CHLORIDE 100 ML/HR: 9 INJECTION, SOLUTION INTRAVENOUS at 03:25

## 2020-09-16 RX ADMIN — CLONIDINE HYDROCHLORIDE 0.1 MG: 0.1 TABLET ORAL at 09:29

## 2020-09-16 RX ADMIN — FLUTICASONE PROPIONATE 2 SPRAY: 50 SPRAY, METERED NASAL at 09:29

## 2020-09-16 RX ADMIN — INSULIN LISPRO 8 UNITS: 100 INJECTION, SOLUTION INTRAVENOUS; SUBCUTANEOUS at 07:49

## 2020-09-16 RX ADMIN — PANTOPRAZOLE SODIUM 40 MG: 40 TABLET, DELAYED RELEASE ORAL at 07:49

## 2020-09-16 RX ADMIN — INSULIN LISPRO 8 UNITS: 100 INJECTION, SOLUTION INTRAVENOUS; SUBCUTANEOUS at 07:30

## 2020-09-16 RX ADMIN — SODIUM CHLORIDE 100 ML/HR: 9 INJECTION, SOLUTION INTRAVENOUS at 23:09

## 2020-09-16 RX ADMIN — FERROUS SULFATE TAB 325 MG (65 MG ELEMENTAL FE) 325 MG: 325 (65 FE) TAB at 18:30

## 2020-09-16 RX ADMIN — FERROUS SULFATE TAB 325 MG (65 MG ELEMENTAL FE) 325 MG: 325 (65 FE) TAB at 09:28

## 2020-09-16 RX ADMIN — APIXABAN 10 MG: 5 TABLET, FILM COATED ORAL at 18:30

## 2020-09-16 RX ADMIN — AMLODIPINE BESYLATE 10 MG: 5 TABLET ORAL at 09:28

## 2020-09-16 RX ADMIN — SODIUM CHLORIDE 100 ML/HR: 9 INJECTION, SOLUTION INTRAVENOUS at 11:00

## 2020-09-16 RX ADMIN — ISOSORBIDE MONONITRATE 60 MG: 60 TABLET, EXTENDED RELEASE ORAL at 09:29

## 2020-09-16 RX ADMIN — APIXABAN 10 MG: 5 TABLET, FILM COATED ORAL at 09:29

## 2020-09-16 NOTE — ROUTINE PROCESS
RECEIVED CARE OF PT. RESTING IN BED AAOX3. SKIN W/D. RESP EASY AND NONLABORED. SAT'S 92% ON ROOM AIR. NSR. PT. EMPTY HIS OWN URSTOMY IN GRADUATE. PT. VOICES NO COMPLAINTS. CBWR.

## 2020-09-16 NOTE — PROGRESS NOTES
Progress Note  Date:2020       Room:299/  Patient Meagan Saucedo     YOB: 1945     Age:75 y.o. Evaluated bedside. He is alert and oriented x4. Patient currently no acute distress. Patient is being treated for acute heart failure and was on IV diuresis. Patient went into renal failure due to overdiuresis. Creatinine increased to 5. Nephrology consulted for eval and treat. Patient was started on IV fluids. Patient's renal function seems to be improving. Anticipate patient being discharged within the next 1 to 2 days. Subjective    Subjective:  Symptoms:  Stable. No shortness of breath, malaise, cough, chest pain, weakness, headache or chest pressure. Diet:  Adequate intake. No nausea or vomiting. Activity level: Normal.    Pain:  He reports no pain. Review of Systems   Respiratory: Negative for cough and shortness of breath. Cardiovascular: Negative for chest pain. Gastrointestinal: Negative for nausea and vomiting. Neurological: Negative for weakness. All other systems reviewed and are negative. Objective         Vitals Last 24 Hours:  TEMPERATURE:  Temp  Av.2 °F (36.8 °C)  Min: 97.9 °F (36.6 °C)  Max: 98.7 °F (37.1 °C)  RESPIRATIONS RANGE: Resp  Av  Min: 16  Max: 20  PULSE OXIMETRY RANGE: SpO2  Av.5 %  Min: 92 %  Max: 94 %  PULSE RANGE: Pulse  Av.5  Min: 68  Max: 77  BLOOD PRESSURE RANGE: Systolic (17LTL), BEATRIZ:687 , Min:124 , AQC:380   ; Diastolic (96RQT), ENC:66, Min:55, Max:69    I/O (24Hr): Intake/Output Summary (Last 24 hours) at 2020 1431  Last data filed at 2020 0400  Gross per 24 hour   Intake 2592 ml   Output 1300 ml   Net 1292 ml     Objective:  General Appearance:  Comfortable, well-appearing and in no acute distress. Vital signs: (most recent): Blood pressure (!) 154/69, pulse 73, temperature 98 °F (36.7 °C), resp.  rate 18, height 5' 10\" (1.778 m), weight 90.8 kg (200 lb 1.6 oz), SpO2 92 %. Vital signs are normal.    Output: Producing urine (Renal function worsening; Nephrology following). HEENT: Normal HEENT exam.    Lungs:  Normal effort and normal respiratory rate. Breath sounds clear to auscultation. Heart: Normal rate. Regular rhythm. S1 normal and S2 normal.  No murmur, gallop or friction rub. Abdomen: Abdomen is soft. Bowel sounds are normal.     Pulses: Distal pulses are intact. Neurological: Patient is alert and oriented to person, place and time. Pupils:  Pupils are equal, round, and reactive to light. Skin:  Warm and dry. Labs/Imaging/Diagnostics    Labs:  CBC:  Recent Labs     09/16/20  0325 09/15/20  0130 09/14/20  0315   WBC 4.7 4.9 4.0*   RBC 3.80* 3.98* 4.15*   HGB 10.3* 10.7* 11.0*   HCT 31.5* 33.5* 34.7*   MCV 82.9 84.2 83.6   RDW 14.0 14.1 14.3    303 292     CHEMISTRIES:  Recent Labs     09/16/20  0325 09/15/20  1050 09/15/20  0130 09/14/20  0315    136 137 141   K 4.5 4.6 4.5 4.0    104 105 109   CO2 20* 21 22 21   BUN 67* 60* 54* 40*   CA 8.3* 8.4* 8.4* 9.1   PHOS 3.7  --  4.2 3.7   MG 2.0  --   --  2.1   PT/INR:No results for input(s): INR, INREXT in the last 72 hours. No lab exists for component: PROTIME  APTT:No results for input(s): APTT in the last 72 hours. LIVER PROFILE:No results for input(s): AST, ALT in the last 72 hours. No lab exists for component: SYLVESTER Pope  Lab Results   Component Value Date/Time    ALT (SGPT) 16 09/13/2020 08:00 AM    AST (SGOT) 25 09/13/2020 08:00 AM    Alk. phosphatase 74 09/13/2020 08:00 AM    Bilirubin, direct <0.1 09/13/2020 08:00 AM    Bilirubin, total 0.7 09/13/2020 08:00 AM       Imaging Last 24 Hours:  No results found.   Assessment//Plan   Principal Problem:    Chronic systolic congestive heart failure ([facility] (7/31/2020)    Active Problems:    Malignant neoplasm of urinary bladder ([facility] (12/7/2009)      Overview: Overview:       HG papillary urothelial carcinoma. S/p cystoprostatectomy 12/30/09      Type 2 diabetes mellitus with stage 4 chronic kidney disease, with long-term current use of insulin ([facility] (12/7/2009)      Essential hypertension (12/7/2009)      Stage 3 chronic kidney disease ([facility] [address] (9/4/2014)      Positive D dimer (9/13/2020)      Deep vein thrombosis (DVT) of proximal vein of both lower extremities ([facility] (9/14/2020)      Overview:                   CHF (congestive heart failure) ([facility] [address] (9/15/2020)      Deep vein thrombosis (DVT) of proximal vein of both lower extremities (HCC)  -Bilateral PVL's performed  -Confirmed bilateral DVT  -Continue Eliquis 10mg BID for 7 days, until 9/21, then reduce to 5mg BID daily. -Monitor for signs of bleeding    Positive D dimer  - VQ scan low prob for PE  -history of heart failure and prior cancer  -BLE DVTs found on PVLs  -On treatment dose eliquis. Stage 3 chronic kidney disease (HCC)  -Worsening, now 4.9  -Presented with a creatinine of 2.9, appears to be baseline.  -Was receiving high doses of Lasix, now d/c'd  -Losartan d/c'd by Cardiology   -Allopurinol and Minoxidil also d/c'd   -Previous cystectomy and Left nephrectomy.  -Followed by urology and nephrology outpatient.   -Nephrology following  -Renal panel daily   -Renal USN reviewed   -IVF at 100ml/hr per nephrology    Essential hypertension  -Monitor blood pressure per unit protocol  -Continue home medications to include amlodipine, clonidine, Imdur, metoprolol      Type 2 diabetes mellitus with stage 4 chronic kidney disease, with long-term current use of insulin ([facility])  -Accu-Cheks before meals and at bedtime  -Cover with Humalog per sliding scale  -Continue Lantus  -Renal diet due to renal failure    Malignant neoplasm of urinary bladder ([facility] [facility]  -Patient has a urostomy  -Followed by urology outpatient  -Patient had a nephrectomy and cystectomy approximately 8 years ago    * Chronic systolic congestive heart failure ([facility] [address]  -Patient presented with edema, and complaints of shortness of breath. -Known to Kecia Ware of her cardiology.  -Cardiology following   -Echo reviewed, HFpEF noted  -Lasix helpd secondary to worsening renal failure  -1.5 L fluid restriction.  -Daily weight.  -Monitor intake and output. -Monitor and replete electrolytes as needed.  -Continuous telemetry. Assessment:    Condition: In stable condition. Improving. Plan:   Transfer to floor. Encourage ambulation, ad edith activity and per physical therapy. Wean off oxygen. Consults: cardiology. Advance diet as tolerated. Resume oral medications, give fluids, resume home regimen and administer medications as ordered. (Transfer to telemetry. No New medications. ).        Electronically signed by Liliana Julien NP on 9/16/2020 at 2:31 PM

## 2020-09-16 NOTE — PROGRESS NOTES
[facility] CARDIOLOGY   PROGRESS NOTE     Patient seen and examined. This is a patient who is followed for HFpEF. Discharge delayed due to acute on chronic kidney injury. He reports feeling slightly weak. No dyspnea or chest pain. No nausea or vomiting. No palpitations. No leg pain. No other complaints reported. Telemetry reviewed, there were no events noted in the past 24 hours. Remains in normal sinus rhythm. Pertinent review of system items noted above, all other systems are negative. Current medications reviewed. Physical examination:  Vital signs are stable, Blood pressure 154/69,  Pulse 73  No apparent distress. Heart is regular, rate and rhythm. Normal S1, S2, no murmurs are appreciated. Lungs are clear bilaterally. Abdomen is soft, nontender, normal bowel sounds. Extremities have no edema. Labs reviewed. Creatinine 4.9 today, 5.0 yesterday        Discussed case with Dr. Sheila Booth and our impression and recommendations are as follows:  1. Shortness of breath:  Symptoms have improved. Echocardiogram showing a preserved EF and no WMA. RVSP only 25mmHg. Does not appear ischemic in origin with recent stress test showing no ischemia. V/Q scan with low probability for PE. Holding furosemide for now given acute on chronic kidney injury. Will follow as an outpatient. 2. Hypertension:  Elevated this morning but has otherwise been WNL. Will continue to monitor and adjust if remains elevated. Holding diuretic and ARB given ARON. Nephrology is on board. Will continue to monitor as an outpatient. 3. Elevated d-dimer:  Preliminary venous duplex reports showing bilateral, below the knee DVT. Anti-coagulation initiated by primary. In the setting of bladder CA and CKD. V/Q scan low prob for PE. 4.  Acute on chronic kidney injury:  Likely pre-renal.  Holding furosemide and losartan as stated. Renal has initiated IVF at 100/hr. Appears euvolemic.   Recent echo with preserved EF and no notable diastolic dysfunction. Please do not hesitate to call if additional questions arise.

## 2020-09-16 NOTE — CONSULTS
[address]    Name:  Jase Ugarte  MR#:  [id number]  :  1945  ACCOUNT #:  [de-identified]  DATE OF SERVICE:  09/15/2020      SUBJECTIVE:  The patient seen at bedside. He is alert, awake, well-oriented, feeling better. Shortness of breath has improved. No complaints of any swelling in his lower extremities. Labs done this morning showed a creatinine of 5.0, BUN of 60 which has increased from yesterday. PHYSICAL EXAMINATION:  GENERAL:  The patient is alert, awake, well-oriented, not in any acute distress at this time. VITAL SIGNS:  Last blood pressure is 124/55, pulse 68, temperature 98.7. HEENT:  Pupils reactive. Extraocular muscles intact. No pallor. No icterus. Mucosa moist.  NECK:  Supple. No jugular venous distention. No thyromegaly. LUNGS:  Clear to auscultation. No rales or rhonchi heard. HEART:  Sounds are normal.  No rubs or murmurs heard. ABDOMEN:  Soft, nontender. No palpable organomegaly. Normal bowel sounds present. EXTREMITIES:  No edema. No cyanosis. NEUROLOGICAL:  Alert, awake, well-oriented. Moving all his extremities. Speech is normal.  SKIN:  Normal skin turgor. No evidence of any skin rashes. MUSCULOSKELETAL:  No acute joint swellings. LABORATORY DATA:  Labs done this morning showed BUN of 60, creatinine of 5.0. Normal electrolytes. Calcium 8.4. Hemoglobin 11.0.    ASSESSMENT AND PLAN:  1. Acute kidney injury on chronic kidney disease, acute kidney injury is probably prerenal azotemia. The patient received IV Lasix yesterday. Diuretics are discontinued at this time. Recommend to check urine random electrolytes, urine protein-creatinine ratio. We will start on IV fluids with normal saline at 100 mL per hour. We will discontinue fluid restriction and continue to monitor renal functions. Recommend to check a renal ultrasonogram to rule out any obstruction. 2.  Chronic kidney disease.   The patient has some history of chronic kidney disease with history of left nephrectomy. We will continue to monitor renal functions to assess the baseline. 3.  Hypertension, well controlled. Continue current medications and continue to monitor blood pressures. 4.  Complaints of shortness of breath. Chest x-ray showed mild atelectasis. No evidence of any significant fluid overload. An echocardiogram showed preserved ejection fraction. The patient is clinically improved without any significant shortness of breath or fluid overload. 5.  Renal osteodystrophy. Phosphorus is within normal limits at 4.2. The patient has a urine protein-creatinine ratio of 4.0, urine chloride is low at less than 10. Proteinuria is probably related to diabetic nephropathy with chronic kidney disease. We will continue to monitor renal functions at this time. We will plan to resume lisinopril once renal function is stabilized. 6.  Type 2 diabetes. Stable blood sugars. Continue to monitor Accu-Cheks and adjust management as needed.         Lynnette Godoy MD      [id number]  D:  09/15/2020 22:19  T:  09/16/2020 3:24  JOB #:  [id number]

## 2020-09-16 NOTE — PROGRESS NOTES
Patient laying in bed. Patient encouraged to get up to chair per MD order. Vital signs have been stable today. Patient has emptied his own urostomy today.

## 2020-09-16 NOTE — ROUTINE PROCESS
PT. RESTING QUIETLY IN BED WITH EYES CLOSED. RESP EASY AND NONLABORED. PT. AROUSED FOR VITAL SIGNS. PT. VOICES NO COMPLAINTS. CBWR. Sheila Simpler

## 2020-09-17 LAB
ALBUMIN SERPL-MCNC: 2.8 G/DL (ref 3.5–4.7)
ANION GAP SERPL CALC-SCNC: 9 MMOL/L
ATRIAL RATE: 74 BPM
BASOPHILS # BLD: 0 K/UL (ref 0–0.1)
BASOPHILS NFR BLD: 0 % (ref 0–2)
BUN SERPL-MCNC: 62 MG/DL (ref 9–21)
BUN/CREAT SERPL: 17
CA-I BLD-MCNC: 8.2 MG/DL (ref 8.5–10.5)
CALCULATED P AXIS, ECG09: -5 DEGREES
CALCULATED R AXIS, ECG10: -5 DEGREES
CALCULATED T AXIS, ECG11: 24 DEGREES
CHLORIDE SERPL-SCNC: 113 MMOL/L (ref 94–111)
CO2 SERPL-SCNC: 17 MMOL/L (ref 21–33)
CREAT SERPL-MCNC: 3.7 MG/DL (ref 0.8–1.5)
DIAGNOSIS, 93000: NORMAL
EOSINOPHIL # BLD: 0.1 K/UL (ref 0–0.4)
EOSINOPHIL NFR BLD: 1 % (ref 0–5)
ERYTHROCYTE [DISTWIDTH] IN BLOOD BY AUTOMATED COUNT: 14 %
GLUCOSE BLD STRIP.AUTO-MCNC: 141 MG/DL (ref 70–110)
GLUCOSE BLD STRIP.AUTO-MCNC: 189 MG/DL (ref 70–110)
GLUCOSE SERPL-MCNC: 114 MG/DL (ref 70–110)
HCT VFR BLD AUTO: 29.7 % (ref 36–48)
HGB BLD-MCNC: 9.6 % (ref 13–16)
IMM GRANULOCYTES # BLD AUTO: 0 K/UL
IMM GRANULOCYTES NFR BLD AUTO: 0 %
LYMPHOCYTES # BLD: 0.8 K/UL (ref 0.9–3.6)
LYMPHOCYTES NFR BLD: 18 % (ref 21–52)
MAGNESIUM SERPL-MCNC: 1.9 MG/DL (ref 1.7–2.8)
MCH RBC QN AUTO: 27 PG (ref 24–34)
MCHC RBC AUTO-ENTMCNC: 32.3 G/DL (ref 31–37)
MCV RBC AUTO: 83.7 FL (ref 74–97)
MONOCYTES # BLD: 0.5 K/UL (ref 0.05–1.2)
MONOCYTES NFR BLD: 12 % (ref 3–10)
NEUTS SEG # BLD: 2.9 K/UL (ref 1.8–8)
NEUTS SEG NFR BLD: 69 % (ref 40–73)
P-R INTERVAL, ECG05: 225 MS
PERFORMED BY, TECHID: ABNORMAL
PERFORMED BY, TECHID: ABNORMAL
PHOSPHATE SERPL-MCNC: 3.5 MG/DL (ref 2.5–4.5)
PLATELET # BLD AUTO: 274 K/UL (ref 135–420)
PMV BLD AUTO: 9.7 FL
POTASSIUM SERPL-SCNC: 4.5 MMOL/L (ref 3.2–5.1)
Q-T INTERVAL, ECG07: 393 MS
QRS DURATION, ECG06: 108 MS
QTC CALCULATION (BEZET), ECG08: 436 MS
RBC # BLD AUTO: 3.55 M/UL (ref 4.7–5.5)
SODIUM SERPL-SCNC: 139 MMOL/L (ref 135–145)
VENTRICULAR RATE, ECG03: 74 BPM
WBC # BLD AUTO: 4.2 K/UL (ref 4.6–13.2)

## 2020-09-17 PROCEDURE — 74011250637 HC RX REV CODE- 250/637: Performed by: NURSE PRACTITIONER

## 2020-09-17 PROCEDURE — 85025 COMPLETE CBC W/AUTO DIFF WBC: CPT

## 2020-09-17 PROCEDURE — 82962 GLUCOSE BLOOD TEST: CPT

## 2020-09-17 PROCEDURE — 74011250636 HC RX REV CODE- 250/636: Performed by: INTERNAL MEDICINE

## 2020-09-17 PROCEDURE — 96361 HYDRATE IV INFUSION ADD-ON: CPT

## 2020-09-17 PROCEDURE — 93005 ELECTROCARDIOGRAM TRACING: CPT

## 2020-09-17 PROCEDURE — 74011250637 HC RX REV CODE- 250/637: Performed by: INTERNAL MEDICINE

## 2020-09-17 PROCEDURE — 80069 RENAL FUNCTION PANEL: CPT

## 2020-09-17 PROCEDURE — 74011636637 HC RX REV CODE- 636/637: Performed by: NURSE PRACTITIONER

## 2020-09-17 PROCEDURE — 65660000000 HC RM CCU STEPDOWN

## 2020-09-17 PROCEDURE — 83735 ASSAY OF MAGNESIUM: CPT

## 2020-09-17 PROCEDURE — 65270000029 HC RM PRIVATE

## 2020-09-17 PROCEDURE — 99218 HC RM OBSERVATION: CPT

## 2020-09-17 PROCEDURE — 36415 COLL VENOUS BLD VENIPUNCTURE: CPT

## 2020-09-17 RX ADMIN — FLUTICASONE PROPIONATE 2 SPRAY: 50 SPRAY, METERED NASAL at 09:56

## 2020-09-17 RX ADMIN — APIXABAN 10 MG: 5 TABLET, FILM COATED ORAL at 17:12

## 2020-09-17 RX ADMIN — METOPROLOL SUCCINATE 50 MG: 50 TABLET, EXTENDED RELEASE ORAL at 09:45

## 2020-09-17 RX ADMIN — FERROUS SULFATE TAB 325 MG (65 MG ELEMENTAL FE) 325 MG: 325 (65 FE) TAB at 08:46

## 2020-09-17 RX ADMIN — CLONIDINE HYDROCHLORIDE 0.1 MG: 0.1 TABLET ORAL at 08:43

## 2020-09-17 RX ADMIN — FERROUS SULFATE TAB 325 MG (65 MG ELEMENTAL FE) 325 MG: 325 (65 FE) TAB at 17:13

## 2020-09-17 RX ADMIN — ISOSORBIDE MONONITRATE 60 MG: 60 TABLET, EXTENDED RELEASE ORAL at 08:44

## 2020-09-17 RX ADMIN — INSULIN LISPRO 8 UNITS: 100 INJECTION, SOLUTION INTRAVENOUS; SUBCUTANEOUS at 11:09

## 2020-09-17 RX ADMIN — INSULIN GLARGINE 20 UNITS: 100 INJECTION, SOLUTION SUBCUTANEOUS at 08:47

## 2020-09-17 RX ADMIN — CLONIDINE HYDROCHLORIDE 0.1 MG: 0.1 TABLET ORAL at 17:07

## 2020-09-17 RX ADMIN — SODIUM CHLORIDE 100 ML/HR: 9 INJECTION, SOLUTION INTRAVENOUS at 22:05

## 2020-09-17 RX ADMIN — INSULIN LISPRO 8 UNITS: 100 INJECTION, SOLUTION INTRAVENOUS; SUBCUTANEOUS at 17:13

## 2020-09-17 RX ADMIN — SODIUM CHLORIDE 100 ML/HR: 9 INJECTION, SOLUTION INTRAVENOUS at 09:43

## 2020-09-17 RX ADMIN — FERROUS SULFATE TAB 325 MG (65 MG ELEMENTAL FE) 325 MG: 325 (65 FE) TAB at 17:07

## 2020-09-17 RX ADMIN — APIXABAN 10 MG: 5 TABLET, FILM COATED ORAL at 08:46

## 2020-09-17 NOTE — PROGRESS NOTES
OFFERED TO ASSIST PT WITH BATH AND LINEN CHANGE. PT REFUSED AT THIS TIME. STATES HE WANTS TO REST. WILL CONTINUE TO MONITOR.  ADRN

## 2020-09-17 NOTE — PROGRESS NOTES
Problem: Pressure Injury - Risk of  Goal: *Prevention of pressure injury  Description: Document Peng Scale and appropriate interventions in the flowsheet.   9/17/2020 0602 by Digna ALMONTE  Outcome: Progressing Towards Goal  Note: Pressure Injury Interventions:       Moisture Interventions: Offer toileting Q_hr    Activity Interventions: Increase time out of bed    Mobility Interventions: Chair cushion    Nutrition Interventions: Document food/fluid/supplement intake, Offer support with meals,snacks and hydration                  9/17/2020 0601 by Digna ALMONTE  Note: Pressure Injury Interventions:       Moisture Interventions: Offer toileting Q_hr    Activity Interventions: Increase time out of bed    Mobility Interventions: Chair cushion    Nutrition Interventions: Document food/fluid/supplement intake, Offer support with meals,snacks and hydration

## 2020-09-17 NOTE — PROGRESS NOTES
[facility] CARDIOLOGY   PROGRESS NOTE     Patient seen and examined. This is a patient who is followed for HFpEF. Discharge delayed due to acute on chronic kidney injury. He continues to report feeling weak, but has not been out of the bed in several days. No dyspnea or chest pain. No nausea or vomiting. No palpitations. No leg pain. No other complaints reported. Telemetry reviewed, there were no events noted in the past 24 hours. Remains in normal sinus rhythm. Pertinent review of system items noted above, all other systems are negative. Current medications reviewed. Physical examination:  Vital signs are stable, Blood pressure 119/62,  Pulse 74  No apparent distress. Heart is regular, rate and rhythm. Normal S1, S2, no murmurs are appreciated. Lungs are clear bilaterally. Abdomen is soft, nontender, normal bowel sounds. Extremities have trace edema. Labs reviewed. Creatinine 3.7 today,4.9yesterday        Discussed case with Dr. Angelique Morales and our impression and recommendations are as follows:  1. Shortness of breath:  Symptoms have improved. Echocardiogram showing a preserved EF and no WMA. RVSP only 25mmHg. Does not appear ischemic in origin with recent stress test showing no ischemia. V/Q scan with low probability for PE. Holding furosemide for now given acute on chronic kidney injury. Will follow as an outpatient. 2. Hypertension:  Nursing held amlodipine this morning as /62 prior to meds. Will discontinue for now and resume as necessary. Holding diuretic and ARB given ARON. 3. Elevated d-dimer:  Venous duplex reports showing bilateral, below the knee DVT. Continue Eliquis dosed by primary. In the setting of bladder CA and CKD. V/Q scan low prob for PE. 4.  Acute on chronic kidney injury:  Likely pre-renal and improving. Holding furosemide and losartan as stated. Renal has continued IVF at 100/hr. Appears euvolemic.   Recent echo with preserved EF and no notable diastolic dysfunction. Please do not hesitate to call if additional questions arise.

## 2020-09-17 NOTE — PROGRESS NOTES
Progress Note  Date:2020       Room:[facility]/  Patient Meagan Saucedo     YOB: 1945     Age:75 y.o. On assessment pt awake sitting in chair eating breakfast. No signs of discomfort, distress, or pain. Satting 94% on RA. Overnight stay uneventful. Subjective    Subjective:  Symptoms:  Stable. Diet:  Adequate intake. Pain:  He reports no pain. Review of Systems   Cardiovascular: Positive for leg swelling. All other systems reviewed and are negative. Objective         Vitals Last 24 Hours:  TEMPERATURE:  Temp  Av.9 °F (36.6 °C)  Min: 97.1 °F (36.2 °C)  Max: 98.6 °F (37 °C)  RESPIRATIONS RANGE: Resp  Av.8  Min: 16  Max: 20  PULSE OXIMETRY RANGE: SpO2  Av %  Min: 93 %  Max: 95 %  PULSE RANGE: Pulse  Av.3  Min: 68  Max: 79  BLOOD PRESSURE RANGE: Systolic (69LPF), ZNM:601 , Min:121 , UYL:026   ; Diastolic (62EXW), YGV:15, Min:58, Max:71    I/O (24Hr): Intake/Output Summary (Last 24 hours) at 2020 0914  Last data filed at 2020 0451  Gross per 24 hour   Intake 3920 ml   Output 2450 ml   Net 1470 ml     Objective:  General Appearance:  Comfortable. Vital signs: (most recent): Blood pressure (!) 143/70, pulse 79, temperature 97.6 °F (36.4 °C), resp. rate 18, height 5' 10\" (1.778 m), weight 91.7 kg (202 lb 1.6 oz), SpO2 94 %. (/70). Output: Producing urine and producing stool. HEENT: Normal HEENT exam.    Lungs:  Normal effort and normal respiratory rate. Heart: Normal rate. Regular rhythm. S1 normal and S2 normal.    Abdomen: Abdomen is soft and distended. (Obesely distended). Bowel sounds are normal.   There is no abdominal tenderness. Extremities: Normal range of motion. Pulses: Distal pulses are intact. Neurological: Patient is alert and oriented to person, place and time. Pupils:  Pupils are equal, round, and reactive to light. Skin:  Warm and dry. Labs/Imaging/Diagnostics    Labs:  CBC:  Recent Labs     09/17/20  0430 09/16/20  0325 09/15/20  0130   WBC 4.2* 4.7 4.9   RBC 3.55* 3.80* 3.98*   HGB 9.6* 10.3* 10.7*   HCT 29.7* 31.5* 33.5*   MCV 83.7 82.9 84.2   RDW 14.0 14.0 14.1    288 303     CHEMISTRIES:  Recent Labs     09/17/20  0430 09/16/20  0325 09/15/20  1050 09/15/20  0130    137 136 137   K 4.5 4.5 4.6 4.5   * 107 104 105   CO2 17* 20* 21 22   BUN 62* 67* 60* 54*   CA 8.2* 8.3* 8.4* 8.4*   PHOS 3.5 3.7  --  4.2   MG 1.9 2.0  --   --    PT/INR:No results for input(s): INR, INREXT in the last 72 hours. No lab exists for component: PROTIME  APTT:No results for input(s): APTT in the last 72 hours. LIVER PROFILE:No results for input(s): AST, ALT in the last 72 hours. No lab exists for component: SYLVESTER Price  Lab Results   Component Value Date/Time    ALT (SGPT) 16 09/13/2020 08:00 AM    AST (SGOT) 25 09/13/2020 08:00 AM    Alk. phosphatase 74 09/13/2020 08:00 AM    Bilirubin, direct <0.1 09/13/2020 08:00 AM    Bilirubin, total 0.7 09/13/2020 08:00 AM       Imaging Last 24 Hours:  No results found. Assessment//Plan   Principal Problem:    Chronic systolic congestive heart failure ([address] (7/31/2020)    Active Problems:    Malignant neoplasm of urinary bladder ([address] (12/7/2009)      Overview: Overview:       HG papillary urothelial carcinoma. S/p cystoprostatectomy 12/30/09      Type 2 diabetes mellitus with stage 4 chronic kidney disease, with long-term current use of insulin ([address] (12/7/2009)      Essential hypertension (12/7/2009)      Stage 3 chronic kidney disease ([address] (9/4/2014)      Positive D dimer (9/13/2020)      Deep vein thrombosis (DVT) of proximal vein of both lower extremities ([facility] (9/14/2020)      Overview:                   CHF (congestive heart failure) ([facility] (9/15/2020)      Assessment:    Condition: In stable condition. Unchanged.        Plan:   Encourage ambulation, out of bed and up to chair and ad edith activity. Consults: physical therapy and urology. Regular diet (renal diet). Resume oral medications and administer medications as ordered. Deep vein thrombosis (DVT) of proximal vein of both lower extremities (HCC)  -Bilateral PVL's performed  -Confirmed bilateral DVT  -Continue Eliquis 10mg BID for 7 days, until 9/21, then reduce to 5mg BID daily. -Monitor for signs of bleeding    Positive D dimer  - VQ scan low prob for PE  -history of heart failure and prior cancer  -BLE DVTs found on PVLs  -On treatment dose eliquis. Stage 3 chronic kidney disease (HCC)  -Creatinine improving at 3.7  -Presented with a creatinine of 2.9, appears to be baseline.  -Was receiving high doses of Lasix, now d/c'd  -Losartan d/c'd by Cardiology   -Allopurinol and Minoxidil also d/c'd   -Previous cystectomy and Left nephrectomy.  -Followed by urology and nephrology outpatient. -Nephrology following  -Renal panel daily   -Renal USN reviewed   -IVF at 100ml/hr per nephrology    Essential hypertension  143/70  -Monitor blood pressure per unit protocol  -Continue home medications to include amlodipine, clonidine, Imdur, metoprolol      Type 2 diabetes mellitus with stage 4 chronic kidney disease, with long-term current use of insulin ([facility])  -Accu-Cheks before meals and at bedtime  -Cover with Humalog per sliding scale  -Continue Lantus  -Renal diet due to renal failure    Malignant neoplasm of urinary bladder ([facility]  -Patient has a urostomy  -Followed by urology outpatient  -Patient had a nephrectomy and cystectomy approximately 8 years ago    * Chronic systolic congestive heart failure ([address]  -Patient presented with edema, and complaints of shortness of breath. -Known to Susana George of her cardiology.  -Cardiology following   -Echo reviewed, HFpEF noted  -Lasix helpd secondary to worsening renal failure  -1.5 L fluid restriction.  -Daily weight.  -Monitor intake and output.   -Monitor and replete electrolytes as needed.  -Continuous telemetry.       Electronically signed by Mathew Rowe NP on 9/17/2020 at 9:14 AM

## 2020-09-17 NOTE — ROUTINE PROCESS
Verbal shift change report given to AI GREEN RN (oncoming nurse) by Lamonte Her RN (offgoing nurse). Report included the following information Kardex, Intake/Output, MAR, Recent Results, Cardiac Rhythm NSR and Quality Measures.

## 2020-09-17 NOTE — ROUTINE PROCESS
RECEIVED CARE OF PT. RESTING IN BED AAOX3. SKIN W/D. RESP EASY AND NONLABORED. SAT'S 92% ON ROOM AIR. NSR. PT. VOICES NO COMPLAINTS. TO UROSTOMY INYACT RT. SIDE OF ABD. PT. EMPTY HIS OWN URINE INTO GRADUAL ON TABLE. CBWR.

## 2020-09-17 NOTE — ROUTINE PROCESS
PT. RESTING IN BED WITH EYES CLOSED. PT. AROUSED FOR VITAL SIGNS. PT. VOICES NO COMPLAINTS. EMPTIED 400ML OF URINE. CBWR.

## 2020-09-17 NOTE — CONSULTS
[address]    Name:  Christina Shanks  MR#:  [id number]  :  1945  ACCOUNT #:  [de-identified]  DATE OF SERVICE:  2020    SUBJECTIVE:  The patient is seen in the intensive care unit. He is alert, awake, well oriented, not giving any new complaints today. No complaints of any shortness of breath. No complaints of any swelling in his lower extremities. Repeat labs done this morning showed a creatinine of 4.9. PHYSICAL EXAMINATION:  GENERAL:  The patient is alert, awake, well oriented, not in any distress. VITAL SIGNS:  Last blood pressure was 121/58, pulse 73, temperature 97.1. HEENT:  Pupils reactive. Extraocular muscles intact. No pallor. No icterus. Mucosa moist.  NECK:  Supple. LUNGS:  Clear to auscultation. No significant rales or rhonchi heard. HEART:  Sounds are normal.  ABDOMEN:  Soft, nontender. Normal bowel sounds present. Urostomy bag is present. EXTREMITIES:  No edema. No cyanosis. LABORATORY DATA:  Labs done this morning showed BUN of 67, creatinine of 4.9, calcium 8.3, and hemoglobin 10.3. ASSESSMENT AND PLAN:  1. Acute kidney injury on chronic kidney disease and prerenal azotemia. The patient received intravenous Lasix, which was discontinued yesterday. The patient was started on intravenous fluids. Small improvement in creatinine noted from 5 to 4.9. We will continue intravenous fluids at 100 mL/hr and continue to monitor renal functions. Renal ultrasonogram was done, showed single right kidney status post left nephrectomy. No evidence of any obstruction or hydronephrosis. 2.  Chronic kidney disease. The patient has history of chronic kidney disease with left nephrectomy. We will continue to monitor renal function to assess the baseline. 3.  Hypertension, much stable, continue current medications and monitor blood pressure. 4.  Shortness of breath, improved clinically. No evidence of any significant fluid overload. Ejection fraction is normal.  We will continue intravenous fluids and continue to monitor renal function and fluid status. 5.  Proteinuria, probably related to diabetic nephropathy, we will continue to monitor and plan to resume lisinopril once renal function is stabilized. 6.  Type 2 diabetes, stable. Continue to monitor Accu-Cheks and adjust management as needed.       Candy Hagan MD      [id number]  D:  09/16/2020 20:51  T:  09/17/2020 14:28  JOB #:  [id number]

## 2020-09-17 NOTE — PROGRESS NOTES
Nutrition Assessment     Type and Reason for Visit: Reassessment    Nutrition Recommendations/Plan: Continue current diet Renal can discontinue supplement as pt eating adequately    Nutrition Assessment:  71 yo male   PMH: HTN, CKD, CHF, DM, Bladder Cancer  Glucose currently well controlled albumin is 3.6  Diet currently renal due to acute renal failure. K+ WNL  Per NP Patient currently no acute distress. Patient is being treated for acute heart failure and was on IV diuresis. Patient went into renal failure due to overdiuresis. Creatinine increased to 5. Nephrology consulted for eval and treat. Patient was started on IV fluids. Patient's renal function seems to be improving. Anticipate patient being discharged within the next 1 to 2 days          Malnutrition Assessment:  Malnutrition Status: No malnutrition   Recent Results (from the past 24 hour(s))   GLUCOSE, POC    Collection Time: 09/16/20  4:55 PM   Result Value Ref Range    Glucose (POC) 86 70 - 110 mg/dL    Performed by Test Op    CBC WITH AUTOMATED DIFF    Collection Time: 09/17/20  4:30 AM   Result Value Ref Range    WBC 4.2 (L) 4.6 - 13.2 K/uL    RBC 3.55 (L) 4.70 - 5.50 M/uL    HGB 9.6 (L) 13.0 - 16.0 %    HCT 29.7 (L) 36.0 - 48.0 %    MCV 83.7 74.0 - 97.0 FL    MCH 27.0 24.0 - 34.0 PG    MCHC 32.3 31.0 - 37.0 g/dL    RDW 14.0 %    PLATELET 141 943 - 369 K/uL    MPV 9.7 FL    NEUTROPHILS 69 40 - 73 %    LYMPHOCYTES 18 (L) 21 - 52 %    MONOCYTES 12 (H) 3 - 10 %    EOSINOPHILS 1 0 - 5 %    BASOPHILS 0 0 - 2 %    IMMATURE GRANULOCYTES 0 %    ABS. NEUTROPHILS 2.9 1.8 - 8.0 K/UL    ABS. LYMPHOCYTES 0.8 (L) 0.9 - 3.6 K/UL    ABS. MONOCYTES 0.5 0.05 - 1.2 K/UL    ABS. EOSINOPHILS 0.1 0.0 - 0.4 K/UL    ABS. BASOPHILS 0.0 0.0 - 0.1 K/UL    ABS. IMM.  GRANS. 0.0 K/UL   MAGNESIUM    Collection Time: 09/17/20  4:30 AM   Result Value Ref Range    Magnesium 1.9 1.7 - 2.8 mg/dL   RENAL FUNCTION PANEL    Collection Time: 09/17/20  4:30 AM   Result Value Ref Range Sodium 139 135 - 145 mmol/L    Potassium 4.5 3.2 - 5.1 mmol/L    Chloride 113 (H) 94 - 111 mmol/L    CO2 17 (L) 21 - 33 mmol/L    Anion gap 9 mmol/L    Glucose 114 (H) 70 - 110 mg/dL    BUN 62 (H) 9 - 21 mg/dL    Creatinine 3.70 (H) 0.8 - 1.50 mg/dL    BUN/Creatinine ratio 17      GFR est AA 20 ml/min/1.73m2    GFR est non-AA 16 ml/min/1.73m2    Calcium 8.2 (L) 8.5 - 10.5 mg/dL    Phosphorus 3.5 2.5 - 4.5 mg/dL    Albumin 2.8 (L) 3.5 - 4.7 g/dL   EKG, 12 LEAD, INITIAL    Collection Time: 09/17/20  5:47 AM   Result Value Ref Range    Ventricular Rate 74 BPM    Atrial Rate 74 BPM    P-R Interval 225 ms    QRS Duration 108 ms    Q-T Interval 393 ms    QTC Calculation (Bezet) 436 ms    Calculated P Axis -5 degrees    Calculated R Axis -5 degrees    Calculated T Axis 24 degrees    Diagnosis       Sinus rhythm  Prolonged CA interval  Probable left ventricular hypertrophy    Confirmed by PIERO Shaffer ([id number]) on 9/17/2020 6:39:36 AM         Estimated Daily Nutrient Needs:  Energy (kcal):  8146-5192 kcal/day  Protein (g):   g/day       Fluid (ml/day):  6852-3248 ml/day    Nutrition Related Findings:  abdominal pain with diarrhea started today.  glucose well controlled albumin > 3.5    Improved PO intake and decreased diarrhea    Current Nutrition Therapies:  DIET NUTRITIONAL SUPPLEMENTS Breakfast; Glucerna Shake  DIET RENAL Regular    Anthropometric Measures:  · Height:  5' 10\" (177.8 cm)  · Current Body Wt:  99.8 kg (220 lb)  · BMI: 31.6    Nutrition Diagnosis:   · Inadequate oral intake related to other (specify)(reduced appetite/abdominal pain) as evidenced by diarrhea      Nutrition Intervention:  Food and/or Nutrient Delivery: Renal diet stop glucerna   Nutrition Education and Counseling: Education completed(PMH of CHF admitted with SOB reviewed need for Low Na diet and monitor fluid intake to prevent respiratory distress pt reports he has been compliant with diet orders)  Coordination of Nutrition Care: Continued inpatient monitoring    Goals:  compliance with Low Na diet and fluid restriction. Pt will eat > 75% of meals, improvement in diarrhea.        Nutrition Monitoring and Evaluation:   Food/Nutrient Intake Outcomes: Diet advancement/tolerance, Food and nutrient intake, Supplement intake  Physical Signs/Symptoms Outcomes: Biochemical data, Meal time behavior     F/U: 9/24/2020    Discharge Planning:    Continue current diet     Electronically signed by Ronny Booth on 9/17/2020 at 3:43 PM    Contact Number: [phone]

## 2020-09-18 PROBLEM — I50.32 CHRONIC DIASTOLIC CONGESTIVE HEART FAILURE (HCC): Status: ACTIVE | Noted: 2020-07-31

## 2020-09-18 LAB
ALBUMIN SERPL-MCNC: 2.7 G/DL (ref 3.5–4.7)
ANION GAP SERPL CALC-SCNC: 8 MMOL/L
ATRIAL RATE: 72 BPM
BUN SERPL-MCNC: 53 MG/DL (ref 9–21)
BUN/CREAT SERPL: 18
CA-I BLD-MCNC: 8.3 MG/DL (ref 8.5–10.5)
CALCULATED P AXIS, ECG09: -10 DEGREES
CALCULATED R AXIS, ECG10: -16 DEGREES
CALCULATED T AXIS, ECG11: 48 DEGREES
CHLORIDE SERPL-SCNC: 117 MMOL/L (ref 94–111)
CO2 SERPL-SCNC: 16 MMOL/L (ref 21–33)
CREAT SERPL-MCNC: 3 MG/DL (ref 0.8–1.5)
DIAGNOSIS, 93000: NORMAL
GLUCOSE BLD STRIP.AUTO-MCNC: 167 MG/DL (ref 70–110)
GLUCOSE BLD STRIP.AUTO-MCNC: 55 MG/DL (ref 70–110)
GLUCOSE BLD STRIP.AUTO-MCNC: 68 MG/DL (ref 70–110)
GLUCOSE SERPL-MCNC: 65 MG/DL (ref 70–110)
MAGNESIUM SERPL-MCNC: 1.7 MG/DL (ref 1.7–2.8)
P-R INTERVAL, ECG05: 231 MS
PERFORMED BY, TECHID: ABNORMAL
PHOSPHATE SERPL-MCNC: 3.3 MG/DL (ref 2.5–4.5)
POTASSIUM SERPL-SCNC: 4.4 MMOL/L (ref 3.2–5.1)
Q-T INTERVAL, ECG07: 388 MS
QRS DURATION, ECG06: 98 MS
QTC CALCULATION (BEZET), ECG08: 425 MS
SODIUM SERPL-SCNC: 141 MMOL/L (ref 135–145)
VENTRICULAR RATE, ECG03: 72 BPM

## 2020-09-18 PROCEDURE — 83735 ASSAY OF MAGNESIUM: CPT

## 2020-09-18 PROCEDURE — 74011250637 HC RX REV CODE- 250/637: Performed by: INTERNAL MEDICINE

## 2020-09-18 PROCEDURE — 74011250637 HC RX REV CODE- 250/637: Performed by: NURSE PRACTITIONER

## 2020-09-18 PROCEDURE — 36415 COLL VENOUS BLD VENIPUNCTURE: CPT

## 2020-09-18 PROCEDURE — 74011636637 HC RX REV CODE- 636/637: Performed by: NURSE PRACTITIONER

## 2020-09-18 PROCEDURE — 80069 RENAL FUNCTION PANEL: CPT

## 2020-09-18 PROCEDURE — 96361 HYDRATE IV INFUSION ADD-ON: CPT

## 2020-09-18 PROCEDURE — 74011250636 HC RX REV CODE- 250/636: Performed by: INTERNAL MEDICINE

## 2020-09-18 PROCEDURE — 65660000000 HC RM CCU STEPDOWN

## 2020-09-18 PROCEDURE — 82962 GLUCOSE BLOOD TEST: CPT

## 2020-09-18 PROCEDURE — 65270000029 HC RM PRIVATE

## 2020-09-18 PROCEDURE — 99218 HC RM OBSERVATION: CPT

## 2020-09-18 PROCEDURE — 93005 ELECTROCARDIOGRAM TRACING: CPT

## 2020-09-18 RX ORDER — AMLODIPINE BESYLATE 5 MG/1
10 TABLET ORAL DAILY
Status: DISCONTINUED | OUTPATIENT
Start: 2020-09-18 | End: 2020-09-19 | Stop reason: HOSPADM

## 2020-09-18 RX ORDER — CLONIDINE HYDROCHLORIDE 0.1 MG/1
0.2 TABLET ORAL 3 TIMES DAILY
Status: DISCONTINUED | OUTPATIENT
Start: 2020-09-18 | End: 2020-09-19 | Stop reason: HOSPADM

## 2020-09-18 RX ORDER — HYDRALAZINE HYDROCHLORIDE 20 MG/ML
20 INJECTION INTRAMUSCULAR; INTRAVENOUS
Status: DISCONTINUED | OUTPATIENT
Start: 2020-09-18 | End: 2020-09-19 | Stop reason: HOSPADM

## 2020-09-18 RX ORDER — SODIUM BICARBONATE 650 MG/1
650 TABLET ORAL 2 TIMES DAILY
Status: DISCONTINUED | OUTPATIENT
Start: 2020-09-18 | End: 2020-09-19 | Stop reason: HOSPADM

## 2020-09-18 RX ORDER — CLONIDINE HYDROCHLORIDE 0.1 MG/1
0.1 TABLET ORAL 3 TIMES DAILY
Status: DISCONTINUED | OUTPATIENT
Start: 2020-09-18 | End: 2020-09-18

## 2020-09-18 RX ADMIN — APIXABAN 10 MG: 5 TABLET, FILM COATED ORAL at 17:02

## 2020-09-18 RX ADMIN — FERROUS SULFATE TAB 325 MG (65 MG ELEMENTAL FE) 325 MG: 325 (65 FE) TAB at 08:35

## 2020-09-18 RX ADMIN — AMLODIPINE BESYLATE 10 MG: 5 TABLET ORAL at 12:47

## 2020-09-18 RX ADMIN — CLONIDINE HYDROCHLORIDE 0.2 MG: 0.1 TABLET ORAL at 16:38

## 2020-09-18 RX ADMIN — APIXABAN 10 MG: 5 TABLET, FILM COATED ORAL at 08:35

## 2020-09-18 RX ADMIN — METOPROLOL SUCCINATE 50 MG: 50 TABLET, EXTENDED RELEASE ORAL at 08:35

## 2020-09-18 RX ADMIN — INSULIN LISPRO 8 UNITS: 100 INJECTION, SOLUTION INTRAVENOUS; SUBCUTANEOUS at 12:47

## 2020-09-18 RX ADMIN — SODIUM BICARBONATE 650 MG TABLET 650 MG: at 12:47

## 2020-09-18 RX ADMIN — ISOSORBIDE MONONITRATE 60 MG: 60 TABLET, EXTENDED RELEASE ORAL at 08:35

## 2020-09-18 RX ADMIN — CLONIDINE HYDROCHLORIDE 0.2 MG: 0.1 TABLET ORAL at 22:33

## 2020-09-18 RX ADMIN — SODIUM BICARBONATE 650 MG TABLET 650 MG: at 17:02

## 2020-09-18 RX ADMIN — FERROUS SULFATE TAB 325 MG (65 MG ELEMENTAL FE) 325 MG: 325 (65 FE) TAB at 17:02

## 2020-09-18 RX ADMIN — SODIUM CHLORIDE 100 ML/HR: 9 INJECTION, SOLUTION INTRAVENOUS at 08:36

## 2020-09-18 RX ADMIN — FLUTICASONE PROPIONATE 2 SPRAY: 50 SPRAY, METERED NASAL at 08:35

## 2020-09-18 RX ADMIN — PANTOPRAZOLE SODIUM 40 MG: 40 TABLET, DELAYED RELEASE ORAL at 06:56

## 2020-09-18 RX ADMIN — CLONIDINE HYDROCHLORIDE 0.1 MG: 0.1 TABLET ORAL at 08:35

## 2020-09-18 RX ADMIN — INSULIN GLARGINE 20 UNITS: 100 INJECTION, SOLUTION SUBCUTANEOUS at 08:34

## 2020-09-18 NOTE — PROGRESS NOTES
Progress Note    Patient: Heidi Kasper MRN: [id number]  SSN: [id number]    YOB: 1945  Age: 70 y.o. Sex: male      Admit Date: 9/13/2020    LOS: 3 days     70year old male seen for CHF and acute renal failure. On assessment, pt was awake in bed eating breakfast. BP was elevated at 188/84. Clonidine frequency increased to TID and hydralazine prn added. Will continue to monitor. Subjective:     - CONSTITUTIONAL: Denies  fatigue, weight loss, fever and chills. - HEENT: Denies changes in vision and hearing.    - RESPIRATORY: Denies SOB and cough. - CV: Denies palpitations and CP.     - GI: Denies abdominal pain, nausea, vomiting, diarrhea and constipation.    - : Denies dysuria and urinary frequency. - MSK: Denies myalgia and joint pain. - SKIN: Denies rash and pruritus.    - NEUROLOGICAL:  Denies dizziness, weakness, headache and syncope. - PSYCHIATRIC: Denies recent changes in mood. Denies anxiety and depression. Objective:     Vitals:    09/18/20 0000 09/18/20 0400 09/18/20 0800 09/18/20 0813   BP: (!) 173/75 (!) 179/81 (!) 188/84    Pulse: 65 76 79    Resp: 19 18 12    Temp: 98.1 °F (36.7 °C) 98 °F (36.7 °C) 98.1 °F (36.7 °C)    SpO2: 92% 92% 93% 93%   Weight:  90.9 kg (200 lb 8 oz)     Height:            Intake and Output:  Current Shift: No intake/output data recorded. Last three shifts: 09/16 1901 - 09/18 0700  In: 3400 [I.V.:3400]  Out: 2450 [Urine:2450]    Physical Exam:   - GENERAL: Alert and oriented x 3. No acute distress. Well-nourished. ?- HEENT: EOMI. Anicteric. PERRLA,Moist mucous membranes. No scleral icterus. No cervical lymphadenopathy. Oropharynx moist without any lesions    -NECK: Supple, no tracheal deviation, no JVD, no significant lymphadenopathy, no thyromegaly noted. - LUNGS: Clear to auscultation bilaterally. No accessory muscle use. Chest symmetrical, No wheezing, rales, rhonchi noted. Appropriate respiratory effort.      - CARDIOVASCULAR: Regular rate and rhythm. No murmur, rubs, gallops, No edema appreciated. S1 & S2 audible. - ABDOMEN: Soft, obesely distended, non-tender. No palpable masses. , lesions, hepatomegaly. Bowels active X4 quadrants. - SKIN: Warm, dry, intact, no bruising, lesions, or rashes noted. Color appropriate for ethnicity.     - MUSCULOSKELETAL: Ambulates independently, no deformities, Full ROM     - NEUROLOGIC: Alert & Oriented X3. No focal neurological deficits. CN II-XII grossly intact, Muscle strength 5/5, both U & L bilateral DTR in lower extremities 2+. - PSYCHIATRIC: Calm & Cooperative. Appropriate mood and affect.     Lab/Data Review:  Recent Results (from the past 12 hour(s))   RENAL FUNCTION PANEL    Collection Time: 09/18/20  3:40 AM   Result Value Ref Range    Sodium 141 135 - 145 mmol/L    Potassium 4.4 3.2 - 5.1 mmol/L    Chloride 117 (H) 94 - 111 mmol/L    CO2 16 (L) 21 - 33 mmol/L    Anion gap 8 mmol/L    Glucose 65 (L) 70 - 110 mg/dL    BUN 53 (H) 9 - 21 mg/dL    Creatinine 3.00 (H) 0.8 - 1.50 mg/dL    BUN/Creatinine ratio 18      GFR est AA 25 ml/min/1.73m2    GFR est non-AA 21 ml/min/1.73m2    Calcium 8.3 (L) 8.5 - 10.5 mg/dL    Phosphorus 3.3 2.5 - 4.5 mg/dL    Albumin 2.7 (L) 3.5 - 4.7 g/dL   MAGNESIUM    Collection Time: 09/18/20  3:40 AM   Result Value Ref Range    Magnesium 1.7 1.7 - 2.8 mg/dL   GLUCOSE, POC    Collection Time: 09/18/20  5:47 AM   Result Value Ref Range    Glucose (POC) 68 (L) 70 - 110 mg/dL    Performed by Test Op    EKG, 12 LEAD, INITIAL    Collection Time: 09/18/20  6:52 AM   Result Value Ref Range    Ventricular Rate 72 BPM    Atrial Rate 72 BPM    P-R Interval 231 ms    QRS Duration 98 ms    Q-T Interval 388 ms    QTC Calculation (Bezet) 425 ms    Calculated P Axis -10 degrees    Calculated R Axis -16 degrees    Calculated T Axis 48 degrees    Diagnosis       Sinus rhythm  Prolonged [place] interval  Borderline left axis deviation  Baseline wander in lead(s) V1,V2,V3,V4,V6            Assessment/Plan:     Deep vein thrombosis (DVT) of proximal vein of both lower extremities (HCC)  -Bilateral PVL's performed  -Confirmed bilateral DVT  -Continue Eliquis 10mg BID for 7 days, until 9/21, then reduce to 5mg BID daily. -Monitor for signs of bleeding    Positive D dimer  - VQ scan low prob for PE  -history of heart failure and prior cancer  -BLE DVTs found on PVLs  -On treatment dose eliquis. Stage 3 chronic kidney disease (HCC)  -Creatinine improving at 3.0 Improvving  -Presented with a creatinine of 2.9, appears to be baseline.  -Was receiving high doses of Lasix, now d/c'd  -Losartan d/c'd by Cardiology   -Allopurinol and Minoxidil also d/c'd   -Previous cystectomy and Left nephrectomy.  -Followed by urology and nephrology outpatient. -Nephrology following  -Renal panel daily   -Renal USN reviewed   -IVF at 100ml/hr per nephrology    Essential hypertension  -188/84  -Clonidine increased to 0.1mg TID  -Hydralazine 20mg PRN for sys greater than 175.   -Monitor blood pressure per unit protocol  -Continue home medications to include amlodipine, clonidine, Imdur, metoprolol      Type 2 diabetes mellitus with stage 4 chronic kidney disease, with long-term current use of insulin ([facility])  -Accu-Cheks before meals and at bedtime  -Cover with Humalog per sliding scale  -Continue Lantus  -Renal diet due to renal failure    Malignant neoplasm of urinary bladder ([facility]  -Patient has a urostomy  -Followed by urology outpatient  -Patient had a nephrectomy and cystectomy approximately 8 years ago    * Chronic diastolic congestive heart failure ([facility]  -Patient presented with edema, and complaints of shortness of breath. -Known to Delicia Lopez of her cardiology.  -Cardiology following   -Echo reviewed, HFpEF noted  -Lasix helpd secondary to worsening renal failure  -1.5 L fluid restriction.  -Daily weight.  -Monitor intake and output.   -Monitor and replete electrolytes as needed.  -Continuous telemetry.         Signed By: Jose David Durham NP     September 18, 2020

## 2020-09-18 NOTE — PROGRESS NOTES
[facility] CARDIOLOGY   PROGRESS NOTE     Patient seen and examined. This is a patient who is followed for HFpEF. Discharge delayed due to acute on chronic kidney injury. He reports feeling less weak than yesterday despite not ambulating as advised. No dyspnea or chest pain. No nausea or vomiting. No palpitations. No leg pain. No other complaints reported. Telemetry reviewed, there were no events noted in the past 24 hours. Remains in normal sinus rhythm with a first degree AV block. Pertinent review of system items noted above, all other systems are negative. Current medications reviewed. Physical examination:  Vital signs are stable, Blood pressure 188/84,  Pulse 79  No apparent distress. Heart is regular, rate and rhythm. Normal S1, S2, no murmurs are appreciated. Lungs are clear bilaterally. Abdomen is soft, nontender, normal bowel sounds. Extremities have trace edema. Labs reviewed. Creatinine 3.0 today, 3.7 yesterday        Discussed case with Dr. Frances Parker and our impression and recommendations are as follows:  1. Shortness of breath:  Symptoms have improved. Echocardiogram showing a preserved EF and no WMA. RVSP only 25mmHg. Does not appear ischemic in origin with recent stress test showing no ischemia. V/Q scan with low probability for PE. Holding furosemide for now given acute on chronic kidney injury. Will follow as an outpatient. 2. Hypertension:  Resuming amlodipine 10mg daily and primary has increased clonidine frequency to TID. Holding diuretic and ARB given ARON. 3. Elevated d-dimer:  Venous duplex reports showing bilateral, below the knee DVT. Continue Eliquis dosed by primary. In the setting of bladder CA and CKD. V/Q scan low prob for PE. 4.  Acute on chronic kidney injury:  Likely pre-renal and improving. Holding furosemide and losartan as stated. Renal has continued IVF at 100/hr. Appears euvolemic.   Recent echo with preserved EF and no notable diastolic dysfunction. Please do not hesitate to call if additional questions arise.

## 2020-09-18 NOTE — PROGRESS NOTES
RECEIVED CARE OF PT. AAOX3. SKIN W/D. RESP EASY AND NONLABORED. SAT'S 92% ON RAMO AIR. NSR. PT. VOICES NO COMPLAINTS. PT. TAKE CARE OF HIS OWN UROSTOMY. CBWR.

## 2020-09-18 NOTE — PROGRESS NOTES
Renal Progress Note    Patient: Danita Storm MRN: [id number]  SSN: [id number]    YOB: 1945  Age: 70 y.o. Sex: male      Admit Date: 9/13/2020    LOS: 3 days     Subjective:   Patient seen at bedside. Alert and awake, no acute distress. Patient is feeling better,  No complaints of any swelling in lower extremities. No complaints of shortness of breath.          Current Facility-Administered Medications   Medication Dose Route Frequency    hydrALAZINE (APRESOLINE) 20 mg/mL injection 20 mg  20 mg IntraVENous BID PRN    cloNIDine HCL (CATAPRES) tablet 0.1 mg  0.1 mg Oral TID    amLODIPine (NORVASC) tablet 10 mg  10 mg Oral DAILY    0.9% sodium chloride infusion  100 mL/hr IntraVENous CONTINUOUS    pantoprazole (PROTONIX) tablet 40 mg  40 mg Oral ACB    apixaban (ELIQUIS) tablet 10 mg  10 mg Oral BID    sodium chloride (NS) flush 5-40 mL  5-40 mL IntraVENous PRN    acetaminophen (TYLENOL) tablet 650 mg  650 mg Oral Q6H PRN    Or    acetaminophen (TYLENOL) suppository 650 mg  650 mg Rectal Q6H PRN    polyethylene glycol (MIRALAX) packet 17 g  17 g Oral DAILY PRN    promethazine (PHENERGAN) tablet 12.5 mg  12.5 mg Oral Q6H PRN    Or    ondansetron (ZOFRAN) injection 4 mg  4 mg IntraVENous Q6H PRN    ferrous sulfate tablet 325 mg  325 mg Oral BID    fluticasone propionate (FLONASE) 50 mcg/actuation nasal spray 2 Spray  2 Spray Both Nostrils DAILY    isosorbide mononitrate ER (IMDUR) tablet 60 mg  60 mg Oral DAILY    metoprolol succinate (TOPROL-XL) XL tablet 50 mg  50 mg Oral DAILY    insulin lispro (HUMALOG) injection 8 Units  8 Units SubCUTAneous TIDAC    insulin glargine (LANTUS) injection 20 Units  20 Units SubCUTAneous DAILY        Vitals:    09/18/20 0000 09/18/20 0400 09/18/20 0800 09/18/20 0813   BP: (!) 173/75 (!) 179/81 (!) 188/84    Pulse: 65 76 79    Resp: 19 18 12    Temp: 98.1 °F (36.7 °C) 98 °F (36.7 °C) 98.1 °F (36.7 °C)    SpO2: 92% 92% 93% 93%   Weight:  90.9 kg (200 lb 8 oz)     Height:         Objective:   General: alert awake well-oriented, no acute distress. HEENT: EOMI, no Icterus, no Pallor,  mucosa moist.  Neck: Neck is supple, No JVD  Lungs: breathsounds normal, no respiratory distress on inspection, no rhonchi, no rales,   CVS: heart sounds normal, regular rate and rhythm, no murmurs, no rubs. GI: soft, nontender, normal BS. Extremeties: no cyanosis, no edema,   Neuro: Alert, awake, oriented x3, No new focal deficits, moving all extremeties well. Skin: normal skin turgor, no skin rashes. Intake and Output:  Current Shift: No intake/output data recorded. Last three shifts: 09/16 1901 - 09/18 0700  In: 3400 [I.V.:3400]  Out: 2450 [Urine:2450]      Lab/Data Review:  Recent Labs     09/17/20  0430 09/16/20  0325   WBC 4.2* 4.7   HGB 9.6* 10.3*   HCT 29.7* 31.5*    288     Recent Labs     09/18/20  0340 09/17/20  0430 09/16/20  0325    139 137   K 4.4 4.5 4.5   * 113* 107   CO2 16* 17* 20*   GLU 65* 114* 104   BUN 53* 62* 67*   CREA 3.00* 3.70* 4.90*   CA 8.3* 8.2* 8.3*   MG 1.7 1.9 2.0   PHOS 3.3 3.5 3.7   ALB 2.7* 2.8* 3.0*     No results for input(s): PH, PCO2, PO2, HCO3, FIO2 in the last 72 hours.   Recent Results (from the past 24 hour(s))   GLUCOSE, POC    Collection Time: 09/17/20 12:07 PM   Result Value Ref Range    Glucose (POC) 189 (H) 70 - 110 mg/dL    Performed by Test Op    GLUCOSE, POC    Collection Time: 09/17/20  4:40 PM   Result Value Ref Range    Glucose (POC) 141 (H) 70 - 110 mg/dL    Performed by PETER BOYKIN    RENAL FUNCTION PANEL    Collection Time: 09/18/20  3:40 AM   Result Value Ref Range    Sodium 141 135 - 145 mmol/L    Potassium 4.4 3.2 - 5.1 mmol/L    Chloride 117 (H) 94 - 111 mmol/L    CO2 16 (L) 21 - 33 mmol/L    Anion gap 8 mmol/L    Glucose 65 (L) 70 - 110 mg/dL    BUN 53 (H) 9 - 21 mg/dL    Creatinine 3.00 (H) 0.8 - 1.50 mg/dL    BUN/Creatinine ratio 18      GFR est AA 25 ml/min/1.73m2    GFR est non-AA 21 ml/min/1.73m2    Calcium 8.3 (L) 8.5 - 10.5 mg/dL    Phosphorus 3.3 2.5 - 4.5 mg/dL    Albumin 2.7 (L) 3.5 - 4.7 g/dL   MAGNESIUM    Collection Time: 09/18/20  3:40 AM   Result Value Ref Range    Magnesium 1.7 1.7 - 2.8 mg/dL   GLUCOSE, POC    Collection Time: 09/18/20  5:47 AM   Result Value Ref Range    Glucose (POC) 68 (L) 70 - 110 mg/dL    Performed by Test Op    EKG, 12 LEAD, INITIAL    Collection Time: 09/18/20  6:52 AM   Result Value Ref Range    Ventricular Rate 72 BPM    Atrial Rate 72 BPM    P-R Interval 231 ms    QRS Duration 98 ms    Q-T Interval 388 ms    QTC Calculation (Bezet) 425 ms    Calculated P Axis -10 degrees    Calculated R Axis -16 degrees    Calculated T Axis 48 degrees    Diagnosis       Sinus rhythm  Prolonged RI interval  Borderline left axis deviation  Baseline wander in lead(s) V1,V2,V3,V4,V6          Assessment and Plan:     1.  Acute kidney injury on chronic kidney disease secondary to prerenal azotemia with diuretics.    Improving renal functions with IV hydration, creatinine has improved from 5 -- 4.9--3.7--3.0 today. Probably around baseline  will discontinue intravenous fluids    Renal ultrasonogram  showed single right kidney status post left nephrectomy.  No evidence of any obstruction or hydronephrosis.     2.  Chronic kidney disease.  The patient has history of chronic kidney disease with left nephrectomy.  patient to follow with Dr. Paul Flood on discharge     3.  Hypertension, high BP today,  Will dc IVF  Increase clonidine to 0.2 mg daily  continue current medications and monitor blood pressure.     4.  Shortness of breath, improved clinically.  No evidence of any significant fluid overload.  Ejection fraction is normal.    will discontinue intravenous fluids     5.  Proteinuria, probably related to diabetic nephropathy,  will continue to monitor and plan to resume lisinopril once renal function is stabilized as outpatient.     6.  Type 2 diabetes, stable.  Continue to monitor Accu-Cheks and adjust management as needed.     7.  Metabolic acidosis: nongap, secondary to renal failure. will add sodium bicarb 650mg bid  8.   Anemia: with CKD, continue to monitor H&H as outpatient    Stable for discharge from renal standpoint      Signed By: Celia Francis MD     September 18, 2020

## 2020-09-18 NOTE — PROGRESS NOTES
Lunch tray provided. Eating independently. . 8 units lispro administered per order. Denies further needs. CBWR.

## 2020-09-18 NOTE — PROGRESS NOTES
AM medications administered per order. Swallowed pills easily. Insulin administered to MIRANDA. New bag IVF hung. Fresh water provided. Dnies further needs. CBWR.

## 2020-09-18 NOTE — PROGRESS NOTES
Received care of patient. Resting in bed alert and oriented x4. Respirations even and unlabored. Denies SOB. Denies pain. Breakfast tray set-up, eating independently. Denies further needs. CBWR.

## 2020-09-18 NOTE — PROGRESS NOTES
ANNIKA Kim NP,cardiac, in to see pt.made her aware that pt's norvasc  Was held due his bp being low.

## 2020-09-18 NOTE — PROGRESS NOTES
HF education including medications and daily weight competed. Patient remains SOB with high oxygen demand and continues to be on 15 l/m HFNC.

## 2020-09-18 NOTE — PROGRESS NOTES
Evening medications administered per order. BS 55, HELD insulin. Provided patient with peanut butter crackers, Glucerna shake, and fresh ice water. Dinner tray set-up. Eating independently.

## 2020-09-18 NOTE — PROGRESS NOTES
Progress Note    Patient: Sona Mackenzie MRN: [id number]  SSN: [id number]    YOB: 1945  Age: 70 y.o. Sex: male      Admit Date: 9/13/2020    LOS: 2 days     Subjective:   Patient seen in intensive care unit this morning. He is alert and awake, no acute distress. Patient is feeling better, not giving any new complaints today. No complaints of any swelling in lower extremities. No complaints of shortness of breath.        Current Facility-Administered Medications:     cloNIDine HCL (CATAPRES) tablet 0.1 mg, 0.1 mg, Oral, BID, Meliotn Garrett MD, 0.1 mg at 09/17/20 1707    0.9% sodium chloride infusion, 100 mL/hr, IntraVENous, CONTINUOUS, Meliton Garrett MD, Last Rate: 100 mL/hr at 09/17/20 0943, 100 mL/hr at 09/17/20 0943    pantoprazole (PROTONIX) tablet 40 mg, 40 mg, Oral, ACB, Kelby Feldman MD, 40 mg at 09/16/20 0749    apixaban (ELIQUIS) tablet 10 mg, 10 mg, Oral, BID, Susi Reyes NP, 10 mg at 09/17/20 1712    sodium chloride (NS) flush 5-40 mL, 5-40 mL, IntraVENous, PRN, Cyndi Wasserman MD    acetaminophen (TYLENOL) tablet 650 mg, 650 mg, Oral, Q6H PRN, 650 mg at 09/14/20 1207 **OR** acetaminophen (TYLENOL) suppository 650 mg, 650 mg, Rectal, Q6H PRN, Gogol, Racheal L, NP    polyethylene glycol (MIRALAX) packet 17 g, 17 g, Oral, DAILY PRN, Gogol, Racheal L, NP    promethazine (PHENERGAN) tablet 12.5 mg, 12.5 mg, Oral, Q6H PRN **OR** ondansetron (ZOFRAN) injection 4 mg, 4 mg, IntraVENous, Q6H PRN, Gogol, Racheal L, NP    ferrous sulfate tablet 325 mg, 325 mg, Oral, BID, Gogol, Racheal L, NP, 325 mg at 09/17/20 1713    fluticasone propionate (FLONASE) 50 mcg/actuation nasal spray 2 Spray, 2 Spray, Both Nostrils, DAILY, Gogol, Racheal L, NP, 2 Pablo at 09/17/20 0956    isosorbide mononitrate ER (IMDUR) tablet 60 mg, 60 mg, Oral, DAILY, Gogol, Racheal L, NP, 60 mg at 09/17/20 0844    metoprolol succinate (TOPROL-XL) XL tablet 50 mg, 50 mg, Oral, DAILY, Gogol, Racheal L, NP, 50 mg at 09/17/20 0945    insulin lispro (HUMALOG) injection 8 Units, 8 Units, SubCUTAneous, TIDAC, Gogol, Racheal L, NP, 8 Units at 09/17/20 1713    insulin glargine (LANTUS) injection 20 Units, 20 Units, SubCUTAneous, DAILY, Gogol, Racheal L, NP, 20 Units at 09/17/20 0847     Vitals:    09/17/20 0900 09/17/20 0945 09/17/20 1234 09/17/20 1707   BP: 119/62 (!) 143/70 119/62 (!) 164/75   Pulse: 90 74 74 66   Resp:   16    Temp:   97.4 °F (36.3 °C)    SpO2:   94%    Weight:       Height:         Objective:   General: alert awake well-oriented, no acute distress. HEENT: EOMI, no Icterus, no Pallor,  mucosa moist.  Neck: Neck is supple, No JVD  Lungs: breathsounds normal, no respiratory distress on inspection, no rhonchi, no rales,   CVS: heart sounds normal, regular rate and rhythm, no murmurs, no rubs. GI: soft, nontender, normal BS. Extremeties: no cyanosis, no edema,   Neuro: Alert, awake, oriented x3, No new focal deficits, moving all extremeties well. Skin: normal skin turgor, no skin rashes. Intake and Output:  Current Shift: No intake/output data recorded. Last three shifts: 09/16 0701 - 09/17 1900  In: 8125 [P.O.:500; I.V.:3420]  Out: 2450 [Urine:2450]      Lab/Data Review:  Recent Results (from the past 24 hour(s))   CBC WITH AUTOMATED DIFF    Collection Time: 09/17/20  4:30 AM   Result Value Ref Range    WBC 4.2 (L) 4.6 - 13.2 K/uL    RBC 3.55 (L) 4.70 - 5.50 M/uL    HGB 9.6 (L) 13.0 - 16.0 %    HCT 29.7 (L) 36.0 - 48.0 %    MCV 83.7 74.0 - 97.0 FL    MCH 27.0 24.0 - 34.0 PG    MCHC 32.3 31.0 - 37.0 g/dL    RDW 14.0 %    PLATELET 978 970 - 709 K/uL    MPV 9.7 FL    NEUTROPHILS 69 40 - 73 %    LYMPHOCYTES 18 (L) 21 - 52 %    MONOCYTES 12 (H) 3 - 10 %    EOSINOPHILS 1 0 - 5 %    BASOPHILS 0 0 - 2 %    IMMATURE GRANULOCYTES 0 %    ABS. NEUTROPHILS 2.9 1.8 - 8.0 K/UL    ABS. LYMPHOCYTES 0.8 (L) 0.9 - 3.6 K/UL    ABS. MONOCYTES 0.5 0.05 - 1.2 K/UL    ABS. EOSINOPHILS 0.1 0.0 - 0.4 K/UL    ABS. BASOPHILS 0.0 0.0 - 0.1 K/UL    ABS. IMM. GRANS. 0.0 K/UL   MAGNESIUM    Collection Time: 09/17/20  4:30 AM   Result Value Ref Range    Magnesium 1.9 1.7 - 2.8 mg/dL   RENAL FUNCTION PANEL    Collection Time: 09/17/20  4:30 AM   Result Value Ref Range    Sodium 139 135 - 145 mmol/L    Potassium 4.5 3.2 - 5.1 mmol/L    Chloride 113 (H) 94 - 111 mmol/L    CO2 17 (L) 21 - 33 mmol/L    Anion gap 9 mmol/L    Glucose 114 (H) 70 - 110 mg/dL    BUN 62 (H) 9 - 21 mg/dL    Creatinine 3.70 (H) 0.8 - 1.50 mg/dL    BUN/Creatinine ratio 17      GFR est AA 20 ml/min/1.73m2    GFR est non-AA 16 ml/min/1.73m2    Calcium 8.2 (L) 8.5 - 10.5 mg/dL    Phosphorus 3.5 2.5 - 4.5 mg/dL    Albumin 2.8 (L) 3.5 - 4.7 g/dL   EKG, 12 LEAD, INITIAL    Collection Time: 09/17/20  5:47 AM   Result Value Ref Range    Ventricular Rate 74 BPM    Atrial Rate 74 BPM    P-R Interval 225 ms    QRS Duration 108 ms    Q-T Interval 393 ms    QTC Calculation (Bezet) 436 ms    Calculated P Axis -5 degrees    Calculated R Axis -5 degrees    Calculated T Axis 24 degrees    Diagnosis       Sinus rhythm  Prolonged DE interval  Probable left ventricular hypertrophy    Confirmed by PIERO Ballard ([id number]) on 9/17/2020 6:39:36 AM     GLUCOSE, POC    Collection Time: 09/17/20 12:07 PM   Result Value Ref Range    Glucose (POC) 189 (H) 70 - 110 mg/dL    Performed by Test Op    GLUCOSE, POC    Collection Time: 09/17/20  4:40 PM   Result Value Ref Range    Glucose (POC) 141 (H) 70 - 110 mg/dL    Performed by PETER BOYKIN           Assessment and Plan:     1. Acute kidney injury on chronic kidney disease secondary to prerenal azotemia with diuretics. Improving renal functions with IV hydration, creatinine has improved from 5 -- 4.9--3.7 today. will continue intravenous fluids at 100 mL/hr and continue to monitor renal functions. Renal ultrasonogram  showed single right kidney status post left nephrectomy. No evidence of any obstruction or hydronephrosis.     2.  Chronic kidney disease. The patient has history of chronic kidney disease with left nephrectomy. We will continue to monitor renal function to assess the baseline. 3.  Hypertension, better controlled, continue current medications and monitor blood pressure. 4.  Shortness of breath, improved clinically. No evidence of any significant fluid overload. Ejection fraction is normal.    will continue intravenous fluids and continue to monitor renal function and fluid status. 5.  Proteinuria, probably related to diabetic nephropathy,  will continue to monitor and plan to resume lisinopril once renal function is stabilized. Repeat protein creatinine ratio    6. Type 2 diabetes, stable. Continue to monitor Accu-Cheks and adjust management as needed. 7.  Metabolic acidosis: nongap, secondary to renal failure. We will continue to monitor    8. Anemia: Small decrease in hemoglobin is probably related to IV hydration.   We will continue to monitor H&H          Signed By: José Martin MD     September 17, 2020

## 2020-09-19 VITALS
HEART RATE: 63 BPM | HEIGHT: 70 IN | BODY MASS INDEX: 28.71 KG/M2 | OXYGEN SATURATION: 95 % | DIASTOLIC BLOOD PRESSURE: 64 MMHG | TEMPERATURE: 98.7 F | SYSTOLIC BLOOD PRESSURE: 144 MMHG | RESPIRATION RATE: 14 BRPM | WEIGHT: 200.5 LBS

## 2020-09-19 LAB
ALBUMIN SERPL-MCNC: 2.6 G/DL (ref 3.5–4.7)
ANION GAP SERPL CALC-SCNC: 5 MMOL/L
ANION GAP SERPL CALC-SCNC: 5 MMOL/L
BASOPHILS # BLD: 0 K/UL
BASOPHILS NFR BLD: 0 %
BUN SERPL-MCNC: 42 MG/DL (ref 9–21)
BUN SERPL-MCNC: 42 MG/DL (ref 9–21)
BUN/CREAT SERPL: 16
BUN/CREAT SERPL: 18
CA-I BLD-MCNC: 8.4 MG/DL (ref 8.5–10.5)
CA-I BLD-MCNC: 8.5 MG/DL (ref 8.5–10.5)
CHLORIDE SERPL-SCNC: 117 MMOL/L (ref 94–111)
CHLORIDE SERPL-SCNC: 118 MMOL/L (ref 94–111)
CO2 SERPL-SCNC: 19 MMOL/L (ref 21–33)
CO2 SERPL-SCNC: 19 MMOL/L (ref 21–33)
CREAT SERPL-MCNC: 2.4 MG/DL (ref 0.8–1.5)
CREAT SERPL-MCNC: 2.6 MG/DL (ref 0.8–1.5)
DIFFERENTIAL METHOD BLD: ABNORMAL
EOSINOPHIL # BLD: 0.1 K/UL
EOSINOPHIL NFR BLD: 2 %
ERYTHROCYTE [DISTWIDTH] IN BLOOD BY AUTOMATED COUNT: 14.1 %
GLUCOSE BLD STRIP.AUTO-MCNC: 232 MG/DL (ref 70–110)
GLUCOSE BLD STRIP.AUTO-MCNC: 99 MG/DL (ref 70–110)
GLUCOSE SERPL-MCNC: 104 MG/DL (ref 70–110)
GLUCOSE SERPL-MCNC: 105 MG/DL (ref 70–110)
HCT VFR BLD AUTO: 28.5 % (ref 36–48)
HGB BLD-MCNC: 9.2 % (ref 13–16)
IMM GRANULOCYTES # BLD AUTO: 0 K/UL
IMM GRANULOCYTES NFR BLD AUTO: 0 %
LYMPHOCYTES # BLD: 0.6 K/UL
LYMPHOCYTES NFR BLD: 14 %
MAGNESIUM SERPL-MCNC: 1.4 MG/DL (ref 1.7–2.8)
MCH RBC QN AUTO: 26.6 PG (ref 24–34)
MCHC RBC AUTO-ENTMCNC: 32.3 G/DL (ref 31–37)
MCV RBC AUTO: 82.4 FL (ref 74–97)
MONOCYTES # BLD: 0.1 K/UL
MONOCYTES NFR BLD: 2 %
NEUTS BAND NFR BLD MANUAL: 1 %
NEUTS SEG # BLD: 3.2 K/UL
NEUTS SEG NFR BLD: 78 %
OTHER CELLS NFR BLD MANUAL: 3 %
PERFORMED BY, TECHID: ABNORMAL
PERFORMED BY, TECHID: NORMAL
PHOSPHATE SERPL-MCNC: 2.2 MG/DL (ref 2.5–4.5)
PLATELET # BLD AUTO: 318 K/UL (ref 135–420)
PMV BLD AUTO: 9.2 FL
POTASSIUM SERPL-SCNC: 4.6 MMOL/L (ref 3.2–5.1)
POTASSIUM SERPL-SCNC: 4.6 MMOL/L (ref 3.2–5.1)
RBC # BLD AUTO: 3.46 M/UL (ref 4.7–5.5)
RBC MORPH BLD: ABNORMAL
SODIUM SERPL-SCNC: 141 MMOL/L (ref 135–145)
SODIUM SERPL-SCNC: 142 MMOL/L (ref 135–145)
WBC # BLD AUTO: 4 K/UL (ref 4.6–13.2)

## 2020-09-19 PROCEDURE — 74011250637 HC RX REV CODE- 250/637: Performed by: NURSE PRACTITIONER

## 2020-09-19 PROCEDURE — 82962 GLUCOSE BLOOD TEST: CPT

## 2020-09-19 PROCEDURE — 80069 RENAL FUNCTION PANEL: CPT

## 2020-09-19 PROCEDURE — 36415 COLL VENOUS BLD VENIPUNCTURE: CPT

## 2020-09-19 PROCEDURE — 85025 COMPLETE CBC W/AUTO DIFF WBC: CPT

## 2020-09-19 PROCEDURE — 74011250637 HC RX REV CODE- 250/637: Performed by: INTERNAL MEDICINE

## 2020-09-19 PROCEDURE — 93005 ELECTROCARDIOGRAM TRACING: CPT

## 2020-09-19 PROCEDURE — 99218 HC RM OBSERVATION: CPT

## 2020-09-19 PROCEDURE — 80048 BASIC METABOLIC PNL TOTAL CA: CPT

## 2020-09-19 PROCEDURE — 83735 ASSAY OF MAGNESIUM: CPT

## 2020-09-19 PROCEDURE — 74011636637 HC RX REV CODE- 636/637: Performed by: NURSE PRACTITIONER

## 2020-09-19 RX ORDER — CLONIDINE HYDROCHLORIDE 0.2 MG/1
0.1 TABLET ORAL 3 TIMES DAILY
Qty: 90 TAB | Refills: 2 | Status: SHIPPED | OUTPATIENT
Start: 2020-09-19 | End: 2021-02-23 | Stop reason: ALTCHOICE

## 2020-09-19 RX ORDER — METOPROLOL SUCCINATE 25 MG/1
75 TABLET, EXTENDED RELEASE ORAL DAILY
Qty: 90 TAB | Refills: 2 | Status: SHIPPED | OUTPATIENT
Start: 2020-09-19 | End: 2020-12-18

## 2020-09-19 RX ORDER — HYDRALAZINE HYDROCHLORIDE 25 MG/1
12.5 TABLET, FILM COATED ORAL 2 TIMES DAILY
Qty: 30 TAB | Refills: 2 | Status: SHIPPED | OUTPATIENT
Start: 2020-09-19 | End: 2020-12-10 | Stop reason: SDUPTHER

## 2020-09-19 RX ADMIN — ISOSORBIDE MONONITRATE 60 MG: 60 TABLET, EXTENDED RELEASE ORAL at 08:15

## 2020-09-19 RX ADMIN — APIXABAN 10 MG: 5 TABLET, FILM COATED ORAL at 08:15

## 2020-09-19 RX ADMIN — AMLODIPINE BESYLATE 10 MG: 5 TABLET ORAL at 08:13

## 2020-09-19 RX ADMIN — FERROUS SULFATE TAB 325 MG (65 MG ELEMENTAL FE) 325 MG: 325 (65 FE) TAB at 08:15

## 2020-09-19 RX ADMIN — CLONIDINE HYDROCHLORIDE 0.2 MG: 0.1 TABLET ORAL at 08:14

## 2020-09-19 RX ADMIN — INSULIN LISPRO 8 UNITS: 100 INJECTION, SOLUTION INTRAVENOUS; SUBCUTANEOUS at 11:51

## 2020-09-19 RX ADMIN — METOPROLOL SUCCINATE 50 MG: 50 TABLET, EXTENDED RELEASE ORAL at 08:13

## 2020-09-19 RX ADMIN — INSULIN GLARGINE 20 UNITS: 100 INJECTION, SOLUTION SUBCUTANEOUS at 08:12

## 2020-09-19 RX ADMIN — SODIUM BICARBONATE 650 MG TABLET 650 MG: at 08:13

## 2020-09-19 RX ADMIN — PANTOPRAZOLE SODIUM 40 MG: 40 TABLET, DELAYED RELEASE ORAL at 07:45

## 2020-09-19 RX ADMIN — FLUTICASONE PROPIONATE 2 SPRAY: 50 SPRAY, METERED NASAL at 09:00

## 2020-09-19 NOTE — ROUTINE PROCESS
Pt resting quietly with eyes closed. Woke when nurse in to obtain VS.  Denies any complaints or needs at this time. VSS, NAD. Will continue to monitor.

## 2020-09-19 NOTE — DISCHARGE SUMMARY
Admit date: 9/13/2020   Admitting Provider: Ben Mo MD    Discharge date: 9/19/2020  Discharging Provider: Buck Monahan NP      * Admission Diagnoses: Dyspnea and respiratory abnormalities [R06.00, R06.89]  Positive D dimer [R79.89]  CHF (congestive heart failure) ([facility] [I50.9]    * Discharge Diagnoses:    Hospital Problems as of 9/19/2020 Date Reviewed: 7/31/2020          Codes Class Noted - Resolved POA    CHF (congestive heart failure) ([facility] ICD-10-CM: I50.9  ICD-9-CM: 428.0  9/15/2020 - Present Unknown        Deep vein thrombosis (DVT) of proximal vein of both lower extremities (HCC) (Chronic) ICD-10-CM: I82.4Y3  ICD-9-CM: 453.41  9/14/2020 - Present Clinically Undetermined    Overview Signed 9/14/2020  3:43 PM by Shaan Markham NP                      Positive D dimer ICD-10-CM: R79.89  ICD-9-CM: 790.92  9/13/2020 - Present Unknown        * (Principal) Chronic diastolic congestive heart failure ([facility] ICD-10-CM: I50.32  ICD-9-CM: 428.32, 428.0  7/31/2020 - Present Yes        Stage 3 chronic kidney disease ([facility] ICD-10-CM: N18.3  ICD-9-CM: 585.3  9/4/2014 - Present Yes        Malignant neoplasm of urinary bladder ([facility] ICD-10-CM: C67.9  ICD-9-CM: 188.9  12/7/2009 - Present Yes    Overview Signed 9/16/2020  2:28 PM by Charlene Ballard NP     Overview:   HG papillary urothelial carcinoma. S/p cystoprostatectomy 12/30/09             Type 2 diabetes mellitus with stage 4 chronic kidney disease, with long-term current use of insulin (HCC) ICD-10-CM: E11.22, N18.4, Z79.4  ICD-9-CM: 250.40, 585.4, V58.67  12/7/2009 - Present Yes        Essential hypertension ICD-10-CM: I10  ICD-9-CM: 401.9  12/7/2009 - Present Yes              * Hospital Course: Patient was admitted to the hospital for acute heart failure. Patient was managed by cardiology and nephrology while in the hospital.  The following treatment plan was utilized and appropriate adjustments were made.   Patient will need to follow-up with primary care physician and nephrology in 1 week. Deep vein thrombosis (DVT) of proximal vein of both lower extremities (HCC)  -Bilateral PVL's performed  -Confirmed bilateral DVT  -Patient was started on Eliquis 10mg BID for 7 days, until 9/21. Patient should start on the maintenance dose on 9/22  5mg BID daily.  -Primary care physician should repeat PVLs in 6 months.  -Monitor for signs of bleeding     Positive D dimer  - VQ scan low prob for PE  -history of heart failure and prior cancer  -BLE DVTs found on PVLs  -On treatment dose eliquis; per #1     Stage 3 chronic kidney disease ([facility]  -Creatinine baseline is between 2 and 2.4; creatinine currently at baseline.  -Was receiving high doses of Lasix, which caused patient's renal function to worsen  -Nephrology consulted and orders were given  -She was rehydrated and creatinine improved to baseline  -Losartan,Allopurinol and Minoxidil d/c'd by Cardiology; will continue on discharge  -Previous cystectomy and Left nephrectomy.  -Followed by urology and nephrology outpatient.  -Patient will need to follow-up with neurology in 2 to 3 weeks to review renal function for continued improvement     Essential hypertension  -Clonidine increased to 0.1mg TID; losartan discontinued due to stage III renal failure  -Will need to follow nephrology for assistance with blood pressure management to renal function in 2 to 3 weeks.   -Monitor blood pressure per unit protocol  -Continue home medications to include amlodipine, clonidine, Imdur, metoprolol        Type 2 diabetes mellitus with stage 4 chronic kidney disease, with long-term current use of insulin ([facility])  -Encourage Accu-Cheks before meals and at bedtime upon discharge  -Continue Lantus     Malignant neoplasm of urinary bladder ([facility]  -Patient has a urostomy  -Followed by urology outpatient  -Patient had a nephrectomy and cystectomy approximately 8 years ago     * Chronic diastolic congestive heart failure ([facility]  -Patient presented with edema, and complaints of shortness of breath. -Known to Alessandra Raya of her cardiology.  -Cardiology following   -Echo reviewed, HFpEF noted  -Lasix helpd secondary to worsening renal failure  -1.5 L fluid restriction.  -Daily weight.  -Patient will need to follow-up with cardiology in 1 to 2 weeks upon discharge. Patient will also be discharged home with home health for assistance with heart failure. * Procedures: N/A  * No surgery found *      Consults: Cardiology and Nephrology    Significant Diagnostic Studies: labs:   Recent Results (from the past 8 hour(s))   RENAL FUNCTION PANEL    Collection Time: 09/19/20  3:40 AM   Result Value Ref Range    Sodium 141 135 - 145 mmol/L    Potassium 4.6 3.2 - 5.1 mmol/L    Chloride 117 (H) 94 - 111 mmol/L    CO2 19 (L) 21 - 33 mmol/L    Anion gap 5 mmol/L    Glucose 104 70 - 110 mg/dL    BUN 42 (H) 9 - 21 mg/dL    Creatinine 2.60 (H) 0.8 - 1.50 mg/dL    BUN/Creatinine ratio 16      GFR est AA 29 ml/min/1.73m2    GFR est non-AA 24 ml/min/1.73m2    Calcium 8.4 (L) 8.5 - 10.5 mg/dL    Phosphorus 2.2 (L) 2.5 - 4.5 mg/dL    Albumin 2.6 (L) 3.5 - 4.7 g/dL   MAGNESIUM    Collection Time: 09/19/20  3:40 AM   Result Value Ref Range    Magnesium 1.4 (L) 1.7 - 2.8 mg/dL   CBC WITH AUTOMATED DIFF    Collection Time: 09/19/20  3:40 AM   Result Value Ref Range    WBC 4.0 (L) 4.6 - 13.2 K/uL    RBC 3.46 (L) 4.70 - 5.50 M/uL    HGB 9.2 (L) 13.0 - 16.0 %    HCT 28.5 (L) 36.0 - 48.0 %    MCV 82.4 74.0 - 97.0 FL    MCH 26.6 24.0 - 34.0 PG    MCHC 32.3 31.0 - 37.0 g/dL    RDW 14.1 %    PLATELET 482 088 - 508 K/uL    MPV 9.2 FL    NEUTROPHILS 78 %    BAND NEUTROPHILS 1 %    LYMPHOCYTES 14 %    MONOCYTES 2 %    EOSINOPHILS 2 %    BASOPHILS 0 %    OTHER CELL 3 %    IMMATURE GRANULOCYTES 0 %    ABS. NEUTROPHILS 3.2 K/UL    ABS. LYMPHOCYTES 0.6 K/UL    ABS. MONOCYTES 0.1 K/UL    ABS. EOSINOPHILS 0.1 K/UL    ABS. BASOPHILS 0.0 K/UL    ABS. IMM.  GRANS. 0.0 K/UL    DF Manual      RBC COMMENTS Normocytic, Normochromic     METABOLIC PANEL, BASIC    Collection Time: 09/19/20  3:40 AM   Result Value Ref Range    Sodium 142 135 - 145 mmol/L    Potassium 4.6 3.2 - 5.1 mmol/L    Chloride 118 (H) 94 - 111 mmol/L    CO2 19 (L) 21 - 33 mmol/L    Anion gap 5 mmol/L    Glucose 105 70 - 110 mg/dL    BUN 42 (H) 9 - 21 mg/dL    Creatinine 2.40 (H) 0.8 - 1.50 mg/dL    BUN/Creatinine ratio 18      GFR est AA 32 ml/min/1.73m2    GFR est non-AA 27 ml/min/1.73m2    Calcium 8.5 8.5 - 10.5 mg/dL   EKG, 12 LEAD, INITIAL    Collection Time: 09/19/20  4:40 AM   Result Value Ref Range    Ventricular Rate 65 BPM    Atrial Rate 65 BPM    P-R Interval 252 ms    QRS Duration 97 ms    Q-T Interval 439 ms    QTC Calculation (Bezet) 457 ms    Calculated P Axis -32 degrees    Calculated R Axis -2 degrees    Calculated T Axis 17 degrees    Diagnosis       Sinus rhythm  Prolonged PA interval  Borderline T abnormalities, inferior leads     GLUCOSE, POC    Collection Time: 09/19/20  7:39 AM   Result Value Ref Range    Glucose (POC) 99 70 - 110 mg/dL    Performed by Cathy Plane        Discharge Exam:  Visit Vitals  BP (!) 194/89   Pulse 64   Temp 98.4 °F (36.9 °C)   Resp 23   Ht 5' 10\" (1.778 m)   Wt 90.9 kg (200 lb 8 oz)   SpO2 95%   BMI 28.77 kg/m²     General appearance: alert, cooperative, no distress, appears stated age, moderately obese  Head: Normocephalic, without obvious abnormality, atraumatic  Neck: supple, symmetrical, trachea midline, no adenopathy, thyroid: not enlarged, symmetric, no tenderness/mass/nodules, no carotid bruit and no JVD  Lungs: clear to auscultation bilaterally  Chest wall: no tenderness  Heart: regular rate and rhythm, S1, S2 normal, no murmur, click, rub or gallop  Abdomen: soft, non-tender.  Bowel sounds normal. No masses,  no organomegaly  Extremities: extremities normal, atraumatic, no cyanosis or edema  Pulses: 2+ and symmetric  Skin: Skin color, texture, turgor normal. No rashes or lesions  Neurologic: Alert and oriented X 3, normal strength and tone. Normal symmetric reflexes. Normal coordination and gait    * Discharge Condition: good and stable  * Disposition: Home with Home Health    Discharge Medications:  Current Discharge Medication List      START taking these medications    Details   hydrALAZINE (APRESOLINE) 25 mg tablet Take 0.5 Tabs by mouth two (2) times a day for 90 days. Indications: high blood pressure, SBP >180 in the am  Qty: 30 Tab, Refills: 2      apixaban (Eliquis DVT-PE Treat 30D Start) 5 mg (74 tabs) starter pack Take 10 mg (two 5 mg tablets) by mouth twice a day for 7 days   Followed by 5 mg (one 5 mg tablet) by mouth twice a day  Indications: blood clot in a deep vein of the extremities  Qty: 1 Dose Pack, Refills: 0         CONTINUE these medications which have CHANGED    Details   cloNIDine HCL (CATAPRES) 0.2 mg tablet Take 0.5 Tabs by mouth three (3) times daily. Indications: high blood pressure  Qty: 90 Tab, Refills: 2      metoprolol succinate (TOPROL-XL) 25 mg XL tablet Take 3 Tabs by mouth daily for 90 days. Qty: 90 Tab, Refills: 2         CONTINUE these medications which have NOT CHANGED    Details   Klor-Con 20 mEq pack Take 20 mEq by mouth daily. fluticasone propionate (FLONASE) 50 mcg/actuation nasal spray 1 [place] by Both Nostrils route daily as needed. amLODIPine (NORVASC) 5 mg tablet Take 10 mg by mouth daily. insulin glargine (LANTUS,BASAGLAR) 100 unit/mL (3 mL) inpn 20 Units by SubCUTAneous route Every morning. isosorbide mononitrate ER (IMDUR) 60 mg CR tablet Take 60 mg by mouth daily. omeprazole (PRILOSEC) 40 mg capsule Take 40 mg by mouth daily. ferrous sulfate 325 mg (65 mg iron) tablet Take 325 mg by mouth two (2) times a day.       NOVOLOG FLEXPEN U-100 INSULIN 100 unit/mL inpn INJECT 8 UNITS SUBCUTANEOUSLY BEFORE MEALS  Refills: 4      BD INSULIN PEN NEEDLE UF MINI 31 gauge x 3/16\" ndle USE TO INJECT TID  Refills: 3 allopurinol (ZYLOPRIM) 100 mg tablet Take 200 mg by mouth daily. Refills: 3         STOP taking these medications       minoxidiL (LONITEN) 2.5 mg tablet Comments:   Reason for Stopping:         losartan (COZAAR) 50 mg tablet Comments:   Reason for Stopping:               * Follow-up Care/Patient Instructions:   Activity: Activity as tolerated and no driving for today, Ambulate in house, PT/OT Eval and Treat and PT/OT per Home Health  Diet: Cardiac Diet  Wound Care: None needed    Follow-up Information     Follow up With Specialties Details Why Contact Info    Saleem Garcia MD Family Medicine In 1 week As needed [address] [facility]  [phone]      Jackie Thomas NP Nurse Practitioner Schedule an appointment as soon as possible for a visit in 1 week For BP and HF management [address] [place] [address] Drive  [phone]            Signed:  Xavi Blankenship NP  9/19/2020  9:08 AM .

## 2020-09-19 NOTE — PROGRESS NOTES
Pt resting in bed with eyes closed. Denies pain at this time. Vss. Nad. resp even and unlabored.  CB in reach

## 2020-09-19 NOTE — ROUTINE PROCESS
Report rec'd from Othella Homans., DAVID at 1900 change of shift during bedside rounds. Pt sitting on SOB, NAD.   VSS. Denies any complaints or needs at this time. On RA with SaO2 96%. RR 20. States \"my only problem is that I need a bigger bag for my urine cause I have to get up constantly to empty my bag\". Bed in lowest position, call bell within reach, side rails up x2. Pt amb without difficulty. Very self-sufficient.

## 2020-09-19 NOTE — PROGRESS NOTES
Bedside shift change report given to MARGARITA Oliveira RN (oncoming nurse) by TYRON Mohan RN (offgoing nurse). Report included the following information SBAR, Intake/Output and Quality Measures.

## 2020-09-19 NOTE — PROGRESS NOTES
Pt previously screened for services at discharge and know preferences for home health. Will facilitate. Expect safe discharge.

## 2020-09-19 NOTE — DISCHARGE INSTRUCTIONS
Patient Education        High Blood Pressure: Care Instructions  Overview     It's normal for blood pressure to go up and down throughout the day. But if it stays up, you have high blood pressure. Another name for high blood pressure is hypertension. Despite what a lot of people think, high blood pressure usually doesn't cause headaches or make you feel dizzy or lightheaded. It usually has no symptoms. But it does increase your risk of stroke, heart attack, and other problems. You and your doctor will talk about your risks of these problems based on your blood pressure. Your doctor will give you a goal for your blood pressure. Your goal will be based on your health and your age. Lifestyle changes, such as eating healthy and being active, are always important to help lower blood pressure. You might also take medicine to reach your blood pressure goal.  Follow-up care is a key part of your treatment and safety. Be sure to make and go to all appointments, and call your doctor if you are having problems. It's also a good idea to know your test results and keep a list of the medicines you take. How can you care for yourself at home? Medical treatment  · If you stop taking your medicine, your blood pressure will go back up. You may take one or more types of medicine to lower your blood pressure. Be safe with medicines. Take your medicine exactly as prescribed. Call your doctor if you think you are having a problem with your medicine. · Talk to your doctor before you start taking aspirin every day. Aspirin can help certain people lower their risk of a heart attack or stroke. But taking aspirin isn't right for everyone, because it can cause serious bleeding. · See your doctor regularly. You may need to see the doctor more often at first or until your blood pressure comes down.   · If you are taking blood pressure medicine, talk to your doctor before you take decongestants or anti-inflammatory medicine, such as ibuprofen. Some of these medicines can raise blood pressure. · Learn how to check your blood pressure at home. Lifestyle changes  · Stay at a healthy weight. This is especially important if you put on weight around the waist. Losing even 10 pounds can help you lower your blood pressure. · If your doctor recommends it, get more exercise. Walking is a good choice. Bit by bit, increase the amount you walk every day. Try for at least 30 minutes on most days of the week. You also may want to swim, bike, or do other activities. · Avoid or limit alcohol. Talk to your doctor about whether you can drink any alcohol. · Try to limit how much sodium you eat to less than 2,300 milligrams (mg) a day. Your doctor may ask you to try to eat less than 1,500 mg a day. · Eat plenty of fruits (such as bananas and oranges), vegetables, legumes, whole grains, and low-fat dairy products. · Lower the amount of saturated fat in your diet. Saturated fat is found in animal products such as milk, cheese, and meat. Limiting these foods may help you lose weight and also lower your risk for heart disease. · Do not smoke. Smoking increases your risk for heart attack and stroke. If you need help quitting, talk to your doctor about stop-smoking programs and medicines. These can increase your chances of quitting for good. When should you call for help? Call  911 anytime you think you may need emergency care. This may mean having symptoms that suggest that your blood pressure is causing a serious heart or blood vessel problem. Your blood pressure may be over 180/120. For example, call 911 if:    · You have symptoms of a heart attack. These may include:  ? Chest pain or pressure, or a strange feeling in the chest.  ? Sweating. ? Shortness of breath. ? Nausea or vomiting. ? Pain, pressure, or a strange feeling in the back, neck, jaw, or upper belly or in one or both shoulders or arms. ? Lightheadedness or sudden weakness.   ? A fast or irregular heartbeat.     · You have symptoms of a stroke. These may include:  ? Sudden numbness, tingling, weakness, or loss of movement in your face, arm, or leg, especially on only one side of your body. ? Sudden vision changes. ? Sudden trouble speaking. ? Sudden confusion or trouble understanding simple statements. ? Sudden problems with walking or balance. ? A sudden, severe headache that is different from past headaches.     · You have severe back or belly pain. Do not wait until your blood pressure comes down on its own. Get help right away. Call your doctor now or seek immediate care if:    · Your blood pressure is much higher than normal (such as 180/120 or higher), but you don't have symptoms.     · You think high blood pressure is causing symptoms, such as:  ? Severe headache.  ? Blurry vision. Watch closely for changes in your health, and be sure to contact your doctor if:    · Your blood pressure measures higher than your doctor recommends at least 2 times. That means the top number is higher or the bottom number is higher, or both.     · You think you may be having side effects from your blood pressure medicine. Where can you learn more? Go to http://www.gray.com/  Enter [id number] in the search box to learn more about \"High Blood Pressure: Care Instructions. \"  Current as of: December 16, 2019               Content Version: 12.6  © 0396-7080 [employer], Incorporated. Care instructions adapted under license by [employer] (which disclaims liability or warranty for this information). If you have questions about a medical condition or this instruction, always ask your healthcare professional. [place] [address] any warranty or liability for your use of this information. Patient Education        Fluid Restriction: Care Instructions  Your Care Instructions     A buildup of fluid in the body can cause low sodium levels in the blood.  It may also cause symptoms such as swelling and pain. Your doctor may suggest that you limit liquids, including foods that contain a lot of liquid. Limiting liquids is called fluid restriction. Keeping track of the amount of fluids you take in may help you feel better. Your doctor will tell you how much fluid you can have in a day. Follow-up care is a key part of your treatment and safety. Be sure to make and go to all appointments, and call your doctor if you are having problems. It's also a good idea to know your test results and keep a list of the medicines you take. How can you care for yourself at home? · Find a way of tracking the fluids you take in that works for you. Here are two methods you can try:  ? Write down how much you drink throughout the day. ? Keep a container filled with the amount of liquid allowed for the day. As you drink liquids during the day, such as a 6-ounce cup of coffee, pour that same amount out of the container. When the container is empty, you've had your liquid for the day. · Count any foods that will melt (such as ice cream, gelatin, or flavored ice treats) or liquid foods (such as soup) as part of your fluids for the day. Also count the liquid in canned fruits and vegetables as part of your daily intake, or drain them well before serving. · Space your liquids throughout the day. Then you won't be tempted to drink more than the amount your doctor recommends. · To relieve thirst without taking in extra water, try chewing gum, sucking on hard candy (sugarless if you have diabetes), or rinsing your mouth with water and spitting it out. Where can you learn more? Go to http://www.gray.com/  Enter H019 in the search box to learn more about \"Fluid Restriction: Care Instructions. \"  Current as of: December 16, 2019               Content Version: 12.6  © 3591-6575 [employer].    Care instructions adapted under license by [employer] (which disclaims liability or warranty for this information). If you have questions about a medical condition or this instruction, always ask your healthcare professional. [address] any warranty or liability for your use of this information. Learning About Diabetes Food Guidelines  Your Care Instructions     Meal planning is important to manage diabetes. It helps keep your blood sugar at a target level (which you set with your doctor). You don't have to eat special foods. You can eat what your family eats, including sweets once in a while. But you do have to pay attention to how often you eat and how much you eat of certain foods. You may want to work with a dietitian or a certified diabetes educator (CDE) to help you plan meals and snacks. A dietitian or CDE can also help you lose weight if that is one of your goals. What should you know about eating carbs? Managing the amount of carbohydrate (carbs) you eat is an important part of healthy meals when you have diabetes. Carbohydrate is found in many foods. · Learn which foods have carbs. And learn the amounts of carbs in different foods. ? Bread, cereal, pasta, and rice have about 15 grams of carbs in a serving. A serving is 1 slice of bread (1 ounce), ½ cup of cooked cereal, or 1/3 cup of cooked pasta or rice. ? Fruits have 15 grams of carbs in a serving. A serving is 1 small fresh fruit, such as an apple or orange; ½ of a banana; ½ cup of cooked or canned fruit; ½ cup of fruit juice; 1 cup of melon or raspberries; or 2 tablespoons of dried fruit. ? Milk and no-sugar-added yogurt have 15 grams of carbs in a serving. A serving is 1 cup of milk or 2/3 cup of no-sugar-added yogurt. ? Starchy vegetables have 15 grams of carbs in a serving. A serving is ½ cup of mashed potatoes or sweet potato; 1 cup winter squash; ½ of a small baked potato; ½ cup of cooked beans; or ½ cup cooked corn or green peas.   · Learn how much carbs to eat each day and at each meal. A dietitian or CDE can teach you how to keep track of the amount of carbs you eat. This is called carbohydrate counting. · If you are not sure how to count carbohydrate grams, use the Plate Method to plan meals. It is a good, quick way to make sure that you have a balanced meal. It also helps you spread carbs throughout the day. ? Divide your plate by types of foods. Put non-starchy vegetables on half the plate, meat or other protein food on one-quarter of the plate, and a grain or starchy vegetable in the final quarter of the plate. To this you can add a small piece of fruit and 1 cup of milk or yogurt, depending on how many carbs you are supposed to eat at a meal.  · Try to eat about the same amount of carbs at each meal. Do not \"save up\" your daily allowance of carbs to eat at one meal.  · Proteins have very little or no carbs per serving. Examples of proteins are beef, chicken, turkey, fish, eggs, tofu, cheese, cottage cheese, and peanut butter. A serving size of meat is 3 ounces, which is about the size of a deck of cards. Examples of meat substitute serving sizes (equal to 1 ounce of meat) are 1/4 cup of cottage cheese, 1 egg, 1 tablespoon of peanut butter, and ½ cup of tofu. How can you eat out and still eat healthy? · Learn to estimate the serving sizes of foods that have carbohydrate. If you measure food at home, it will be easier to estimate the amount in a serving of restaurant food. · If the meal you order has too much carbohydrate (such as potatoes, corn, or baked beans), ask to have a low-carbohydrate food instead. Ask for a salad or green vegetables. · If you use insulin, check your blood sugar before and after eating out to help you plan how much to eat in the future. · If you eat more carbohydrate at a meal than you had planned, take a walk or do other exercise. This will help lower your blood sugar. What else should you know?   · Limit saturated fat, such as the fat from meat and dairy products. This is a healthy choice because people who have diabetes are at higher risk of heart disease. So choose lean cuts of meat and nonfat or low-fat dairy products. Use olive or canola oil instead of butter or shortening when cooking. · Don't skip meals. Your blood sugar may drop too low if you skip meals and take insulin or certain medicines for diabetes. · Check with your doctor before you drink alcohol. Alcohol can cause your blood sugar to drop too low. Alcohol can also cause a bad reaction if you take certain diabetes medicines. Follow-up care is a key part of your treatment and safety. Be sure to make and go to all appointments, and call your doctor if you are having problems. It's also a good idea to know your test results and keep a list of the medicines you take. Where can you learn more? Go to http://www.castro.com/  Enter I147 in the search box to learn more about \"Learning About Diabetes Food Guidelines. \"  Current as of: December 20, 2019               Content Version: 12.6  © 7444-3526 [employer]. Care instructions adapted under license by [employer] (which disclaims liability or warranty for this information). If you have questions about a medical condition or this instruction, always ask your healthcare professional. [address] any warranty or liability for your use of this information. Patient Education        8 Things You Can Do to Help Prevent Blood Clots  A blood clot is a clump of blood that forms in a blood vessel, such as a vein or an artery. If a clot gets stuck, it can stop blood flow and cause serious health problems, even death. That's why it's important to take steps to prevent a clot. Take a blood-thinning medicine (called an anticoagulant), if prescribed. If your doctor prescribes medicine, take it as directed. Exercise your lower leg muscles.   This helps keep the blood moving through your legs. Point your toes up toward your head so the calves of your legs are stretched, then relax. Repeat. This is a good exercise to do when you are sitting for long periods of time. Get up out of bed as soon as your doctor says it's okay. Being active is one of the best things you can do to prevent clots. Take plenty of breaks when you travel. On long car trips, stop the car and walk around every hour or so. On the bus, plane, or train, get out of your seat and walk up and down the aisle every hour, if you can. Be active. Try to get 30 minutes or more of activity on most days of the week. Don't smoke. Smoking can increase your risk of blood clots. If you need help quitting, talk to your doctor about stop-smoking programs and medicines. Check with your doctor about whether you should use hormone forms of birth control or hormone therapy. These may increase your risk of blood clots. Use compression stockings if your doctor prescribes them. These are specially fitted stockings that may prevent blood clots by keeping blood from pooling in your legs. Current as of: May 27, 2020               Content Version: 12.6  © 8513-5425 [employer]. Care instructions adapted under license by [employer] (which disclaims liability or warranty for this information). If you have questions about a medical condition or this instruction, always ask your healthcare professional. [place] [address] any warranty or liability for your use of this information. Patient Education        Learning About Saving Energy When You Have a Chronic Condition  Introduction    Everyday tasks can be tiring when you have COPD, heart failure, or another long-term (chronic) condition. You may feel at times that you've lost your ability to live your life. But learning to conserve, or save, your energy can help you be less tired.   Conserving your energy means finding ways of doing daily activities with as little effort as possible. With some small changes in the way you do things, you can get your tasks done more easily. Some treatments are available that might help. Pulmonary rehabilitation can teach you ways to breathe easier. Cardiac rehabilitation can help make your heart stronger. You also may want to see an occupational or physical therapist. The therapist can give you more tips on building strength and moving with less effort. What can you do to conserve your energy? Planning  · Make a list of what you have to do every day. Group the tasks by location. · Do all the chores in one part of your house around the same time. · Go out for errands or do chores at the time of day when you have the most energy. · Plan rest periods into your day. Getting things done  · Sit down as often as you can when you get dressed, do chores, or cook. · Use a cart with wheels to roll items, such as laundry, from one room to another. · Push or slide boxes or other large items instead of lifting them. Reaching and bending  · Put things you use the most on shelves that are at the level of your waist or shoulder. · Use long-handled grabbers or other tools to reach items on a high shelf or to  things off the floor. Use long-handled dusters when you clean the house. · Use a raised toilet seat to avoid bending too far to sit or stand up. Eating  · Eat several small meals instead of three larger meals. · If you get too tired to eat much, try to choose healthy foods that have more calories. Have a yogurt-and-fruit smoothie for breakfast. Put avocado on a sandwich. Or add cheese or peanut butter to snacks. · If you don't feel very hungry, try to eat first and drink water or other fluids later, after a meal. This can help keep you from losing weight. Sip small amounts of fluids if you need to drink while you eat. Having sex  · Choose the time of day when you have more energy.   · A [id number] position for sex can be less tiring. Sometimes you may want to focus more on caressing. Watch closely for changes in your health, and be sure to contact your doctor if you have any problems. Where can you learn more? Go to http://www.gray.com/  Enter H190 in the search box to learn more about \"Learning About Saving Energy When You Have a Chronic Condition. \"  Current as of: February 24, 2020               Content Version: 12.6  © 2006-2020 [employer]. Care instructions adapted under license by [employer] (which disclaims liability or warranty for this information). If you have questions about a medical condition or this instruction, always ask your healthcare professional. [place] [address] any warranty or liability for your use of this information. Patient Education        Learning About How to Prevent Blood Clots  What is a blood clot? A blood clot is a clump of blood that forms in a blood vessel, such as a vein or an artery. If a clot gets stuck in a blood vessel, it can cause serious problems like a deep vein thrombosis (DVT) or a pulmonary embolism. A DVT is a blood clot in certain veins of the legs, pelvis, or arms. It most often occurs in the legs. Blood clots in these veins need to be treated, because they can get bigger, break loose, and travel through the bloodstream to the heart and then to the lungs. This causes a pulmonary embolism. A pulmonary embolism is a sudden blockage of an artery in the lung. Blood clots in the deep veins of the leg are the most common cause of a pulmonary embolism. In many cases, the clots are small. They may damage the lung. But if the clot is large and stops blood flow to the lung, it can be deadly. What increases your risk for blood clots?   Some of the things that can increase your risk for a blood clot include:  Slowed blood flow  When blood doesn't flow normally, clots are more likely to develop. Reduced blood flow may result from long-term bed rest, such as after a surgery, injury, or serious illness. Or it may result from sitting for a long time, especially when traveling long distances. Abnormal clotting  Some people have blood that clots too easily or too quickly. Problems that may cause increased clotting include:  · Having certain blood problems that make blood clot too easily. This is a problem that may run in families. · Having certain health problems, such as cancer, heart failure, stroke, or severe infection. · Being pregnant. A woman's risk of getting blood clots increases both during pregnancy and shortly after delivery or after a  section. · Using hormonal forms of birth control or hormone therapy. · Smoking. Injury to the blood vessel wall  Blood is more likely to clot in veins and arteries shortly after they are injured. Injury can be caused by a recent medical procedure or surgery that involved your legs, hips, belly, or brain. Or it can be caused by an injury, such as a broken hip. What can you do to prevent blood clots? After any procedure or event that increases your risk  · Take a blood-thinning medicine (called an anticoagulant) as directed if your doctor prescribes one. · Exercise  your lower leg muscles to help keep the blood moving through your legs. Point your toes up toward your head so the calves of your legs are stretched, then relax. Repeat. This is a good exercise to do when you are sitting for long periods of time. · Get up out of bed as soon as you safely can or as soon as your doctor says it's okay after an illness or surgery. If you can't get out of bed, you can do the leg exercise described above. Try to do this leg exercise every hour when you are awake. This will help keep the blood moving through your legs.  If you are in the hospital and need to stay in bed, your doctor may have you use a special device that inflates and deflates knee-high boots to help keep blood from pooling in your legs. · Use compression stockings if your doctor prescribes them. These are specially fitted stockings that may prevent blood clots by keeping blood from pooling in your legs. When you travel  · Take breaks when you travel. On long car trips, stop the car and walk around every hour or so. On long bus or train rides or plane flights, get out of your seat and walk up and down the aisle every hour or so. · Do leg exercises while you are seated. For example, pump your feet up and down by pulling your toes up toward your knees and then pointing them down. If you already have a risk of blood clots, talk to your doctor before taking a long trip. Your doctor may want you to wear compression stockings or take blood-thinning medicine. Take care of your body  · Be active. Try to get 30 minutes or more of activity on most days of the week. · Don't smoke. Smoking can increase your risk of blood clots. If you need help quitting, talk to your doctor about stop-smoking programs and medicines. · Check with your doctor about whether you should use hormonal forms of birth control or hormone therapy. These may increase your risk of blood clots. When should you call for help? Call 911 anytime you think you may need emergency care. For example, call if:  · You have symptoms of a blood clot in your lung (called a pulmonary embolism). These may include:  ? Sudden chest pain. ? Trouble breathing. ? Coughing up blood. Call your doctor now or seek immediate medical care if:  · You have symptoms of a blood clot in your arm or leg (called a deep vein thrombosis). These may include:  ? Pain in the arm, calf, back of the knee, thigh, or groin. ? Redness and swelling in the arm, leg, or groin. Where can you learn more? Go to http://www.gray.com/  Enter F229 in the search box to learn more about \"Learning About How to Prevent Blood Clots. \"  Current as of: March 4, 2020               Content Version: 12.6  © 2111-0232 [employer]. Care instructions adapted under license by [employer] (which disclaims liability or warranty for this information). If you have questions about a medical condition or this instruction, always ask your healthcare professional. [address] any warranty or liability for your use of this information.

## 2020-09-21 LAB
ATRIAL RATE: 65 BPM
CALCULATED P AXIS, ECG09: -32 DEGREES
CALCULATED R AXIS, ECG10: -2 DEGREES
CALCULATED T AXIS, ECG11: 17 DEGREES
DIAGNOSIS, 93000: NORMAL
P-R INTERVAL, ECG05: 252 MS
Q-T INTERVAL, ECG07: 439 MS
QRS DURATION, ECG06: 97 MS
QTC CALCULATION (BEZET), ECG08: 457 MS
VENTRICULAR RATE, ECG03: 65 BPM

## 2020-09-22 LAB
ATRIAL RATE: 62 BPM
ATRIAL RATE: 69 BPM
ATRIAL RATE: 72 BPM
CALCULATED P AXIS, ECG09: -68 DEGREES
CALCULATED P AXIS, ECG09: 15 DEGREES
CALCULATED P AXIS, ECG09: 15 DEGREES
CALCULATED R AXIS, ECG10: -23 DEGREES
CALCULATED R AXIS, ECG10: -53 DEGREES
CALCULATED R AXIS, ECG10: 3 DEGREES
CALCULATED T AXIS, ECG11: 19 DEGREES
CALCULATED T AXIS, ECG11: 38 DEGREES
CALCULATED T AXIS, ECG11: 43 DEGREES
DIAGNOSIS, 93000: NORMAL
P-R INTERVAL, ECG05: 229 MS
P-R INTERVAL, ECG05: 244 MS
P-R INTERVAL, ECG05: 61 MS
Q-T INTERVAL, ECG07: 412 MS
Q-T INTERVAL, ECG07: 426 MS
Q-T INTERVAL, ECG07: 434 MS
QRS DURATION, ECG06: 106 MS
QRS DURATION, ECG06: 106 MS
QRS DURATION, ECG06: 99 MS
QTC CALCULATION (BEZET), ECG08: 441 MS
QTC CALCULATION (BEZET), ECG08: 451 MS
QTC CALCULATION (BEZET), ECG08: 457 MS
VENTRICULAR RATE, ECG03: 62 BPM
VENTRICULAR RATE, ECG03: 69 BPM
VENTRICULAR RATE, ECG03: 72 BPM

## 2020-09-24 LAB
GLUCOSE BLD STRIP.AUTO-MCNC: 113 MG/DL (ref 70–110)
PERFORMED BY, TECHID: ABNORMAL

## 2020-10-06 ENCOUNTER — VIRTUAL VISIT (OUTPATIENT)
Dept: FAMILY MEDICINE CLINIC | Age: 75
End: 2020-10-06
Payer: MEDICARE

## 2020-10-06 DIAGNOSIS — Z13.228 SCREENING FOR ENDOCRINE, METABOLIC AND IMMUNITY DISORDER: ICD-10-CM

## 2020-10-06 DIAGNOSIS — I10 ESSENTIAL HYPERTENSION: ICD-10-CM

## 2020-10-06 DIAGNOSIS — I82.4Y3 DEEP VEIN THROMBOSIS (DVT) OF PROXIMAL VEIN OF BOTH LOWER EXTREMITIES, UNSPECIFIED CHRONICITY (HCC): ICD-10-CM

## 2020-10-06 DIAGNOSIS — C67.9 MALIGNANT NEOPLASM OF URINARY BLADDER, UNSPECIFIED SITE (HCC): ICD-10-CM

## 2020-10-06 DIAGNOSIS — Z79.4 TYPE 2 DIABETES MELLITUS WITH STAGE 4 CHRONIC KIDNEY DISEASE, WITH LONG-TERM CURRENT USE OF INSULIN (HCC): ICD-10-CM

## 2020-10-06 DIAGNOSIS — Z13.0 SCREENING FOR ENDOCRINE, METABOLIC AND IMMUNITY DISORDER: ICD-10-CM

## 2020-10-06 DIAGNOSIS — I50.22 CHRONIC SYSTOLIC CONGESTIVE HEART FAILURE (HCC): Primary | ICD-10-CM

## 2020-10-06 DIAGNOSIS — I50.9 CONGESTIVE HEART FAILURE, UNSPECIFIED HF CHRONICITY, UNSPECIFIED HEART FAILURE TYPE (HCC): ICD-10-CM

## 2020-10-06 DIAGNOSIS — E11.22 TYPE 2 DIABETES MELLITUS WITH STAGE 4 CHRONIC KIDNEY DISEASE, WITH LONG-TERM CURRENT USE OF INSULIN (HCC): ICD-10-CM

## 2020-10-06 DIAGNOSIS — Z13.29 SCREENING FOR ENDOCRINE, METABOLIC AND IMMUNITY DISORDER: ICD-10-CM

## 2020-10-06 DIAGNOSIS — N18.4 TYPE 2 DIABETES MELLITUS WITH STAGE 4 CHRONIC KIDNEY DISEASE, WITH LONG-TERM CURRENT USE OF INSULIN (HCC): ICD-10-CM

## 2020-10-06 PROCEDURE — 3051F HG A1C>EQUAL 7.0%<8.0%: CPT | Performed by: NURSE PRACTITIONER

## 2020-10-06 PROCEDURE — 99203 OFFICE O/P NEW LOW 30 MIN: CPT | Performed by: NURSE PRACTITIONER

## 2020-10-06 NOTE — PROGRESS NOTES
Monserrat Rm is a 70 y.o. male who was seen by synchronous (real-time) audio-video technology on 10/6/2020 for [facility]:   Diagnoses and all orders for this visit:    1. Chronic systolic congestive heart failure ([address]    2. Congestive heart failure, unspecified HF chronicity, unspecified heart failure type ([address]    3. Type 2 diabetes mellitus with stage 4 chronic kidney disease, with long-term current use of insulin ([address]    4. Essential hypertension    5. Malignant neoplasm of urinary bladder, unspecified site ([address]    6. Deep vein thrombosis (DVT) of proximal vein of both lower extremities, unspecified chronicity (HCC)        I spent at least 30 minutes on this visit with this new patient. 712  Subjective:   Patient was previously admitted to hospital on 9/13/2020 and discharged on 9/19/2020. Patient was admitted for Dyspnea, positive D dimer and Chronic diastolic congestive heart failure. Patient is accompanied with niece for today's visit. Patient had renal cell carcinoma of left kidney that have resolved per patient. Patient states he have not been seen for several years bu the cancer is resolved. Patient have had a left nephrectomy and cystectomy noted in medical information noted in April 2012. According to records patient was seen by Dr. Jordan Velásquez. Patient is also being follwed by Alyson Britton (nephrologist). -Type 2 Diabetes with Stage 4 CKD with long term use of insulin. Patient blood sugar today was 113 at home and currently using Lantus 20 units subcutaneous every morning. CKD- Patient is followed by Alyson Britton (nephrologist). Patient is unsure when his next visit is scheduled. Hypertension/Deep vein thrombosis of proximal vein of both lower extremities -   Patient is seen Lizz Garcia cardiologist. Patient had a Eco done on 9/21/2020 that should EF 50 - 55%.     - Patient have a follow up with Dr. Jordon Howe (vascular) on December 2, 2020. According to patient and niece patient is suppose to be on Eliquis but the pharmacy keep saying they don't have it. Patient states this is a medication he suppose to have started taking after discharge from hospital 2 weeks ago. According to media a prescription of eliquis 5mg (74 tabs) starter pack was given to patient and written by Yariel Quiroz NP at [facility] patient check in his discharge paperwork for the prescription and if unable to locate to contact NP Yariel Quiroz NP being he never started it. According to records patient had a lower extremity venous duplex scan on 9/21/2020 done by [facility]. He was referred by Dr. Augie Jaramillo MD due to bilateral leg pain and edema with varicose veins. Results showed Left Leg - No DVT detected in the left lower extremity. Patient Active Problem List   Diagnosis Code    Malignant neoplasm of urinary bladder ([facility]) C67.9    Type 2 diabetes mellitus with stage 4 chronic kidney disease, with long-term current use of insulin ([facility]) E11.22, N18.4, Z79.4    Essential hypertension I10    Hematuria R31.9    Stage 3 chronic kidney disease N18.30    Chronic diastolic congestive heart failure ([facility]) I50.32    Positive D dimer R79.89    Deep vein thrombosis (DVT) of proximal vein of both lower extremities ([facility]) I82.4Y3    CHF (congestive heart failure) ([facility] I50.9           Prior to Admission medications    Medication Sig Start Date End Date Taking? Authorizing Provider   cloNIDine HCL (CATAPRES) 0.2 mg tablet Take 0.5 Tabs by mouth three (3) times daily. Indications: high blood pressure 9/19/20   Chelly DE SOUZA NP   metoprolol succinate (TOPROL-XL) 25 mg XL tablet Take 3 Tabs by mouth daily for 90 days. 9/19/20 12/18/20  Homa Rolon NP   hydrALAZINE (APRESOLINE) 25 mg tablet Take 0.5 Tabs by mouth two (2) times a day for 90 days.  Indications: high blood pressure, SBP >180 in the am 9/19/20 12/18/20  Stephan Portillo NP   apixaban (Eliquis DVT-PE Treat 30D Start) 5 mg (74 tabs) starter pack Take 10 mg (two 5 mg tablets) by mouth twice a day for 7 days   Followed by 5 mg (one 5 mg tablet) by mouth twice a day  Indications: blood clot in a deep vein of the extremities 9/15/20   Bobo Boo MD   Klor-Con 20 mEq pack Take 20 mEq by mouth daily. 7/30/20   Melba Grace MD   fluticasone propionate (FLONASE) 50 mcg/actuation nasal spray 1 [place] by Both Nostrils route daily as needed. Melba Grace MD   amLODIPine (NORVASC) 5 mg tablet Take 10 mg by mouth daily. 3/23/20   Melba Grace MD   insulin glargine (LANTUS,BASAGLAR) 100 unit/mL (3 mL) inpn 20 Units by SubCUTAneous route Every morning. OtherMelba MD   isosorbide mononitrate ER (IMDUR) 60 mg CR tablet Take 60 mg by mouth daily. Melba Grace MD   omeprazole (PRILOSEC) 40 mg capsule Take 40 mg by mouth daily. Provider, Historical   ferrous sulfate 325 mg (65 mg iron) tablet Take 325 mg by mouth two (2) times a day. Provider, Historical   NOVOLOG FLEXPEN U-100 INSULIN 100 unit/mL inpn INJECT 8 UNITS SUBCUTANEOUSLY BEFORE MEALS 12/22/17   Provider, Historical   BD INSULIN PEN NEEDLE UF MINI 31 gauge x 3/16\" ndle USE TO INJECT TID 3/12/18   Provider, Historical   allopurinol (ZYLOPRIM) 100 mg tablet Take 200 mg by mouth daily.  9/12/16   Provider, Historical     Patient Active Problem List   Diagnosis Code    Malignant neoplasm of urinary bladder (HCC) C67.9    Type 2 diabetes mellitus with stage 4 chronic kidney disease, with long-term current use of insulin ([facility]) E11.22, N18.4, Z79.4    Essential hypertension I10    Hematuria R31.9    Stage 3 chronic kidney disease N18.30    Chronic diastolic congestive heart failure (HCC) I50.32    Positive D dimer R79.89    Deep vein thrombosis (DVT) of proximal vein of both lower extremities ([facility]) I82.4Y3    CHF (congestive heart failure) ([facility] I50.9     Current Outpatient Medications   Medication Sig Dispense Refill    cloNIDine HCL (CATAPRES) 0.2 mg tablet Take 0.5 Tabs by mouth three (3) times daily. Indications: high blood pressure 90 Tab 2    metoprolol succinate (TOPROL-XL) 25 mg XL tablet Take 3 Tabs by mouth daily for 90 days. 90 Tab 2    hydrALAZINE (APRESOLINE) 25 mg tablet Take 0.5 Tabs by mouth two (2) times a day for 90 days. Indications: high blood pressure, SBP >180 in the am 30 Tab 2    apixaban (Eliquis DVT-PE Treat 30D Start) 5 mg (74 tabs) starter pack Take 10 mg (two 5 mg tablets) by mouth twice a day for 7 days   Followed by 5 mg (one 5 mg tablet) by mouth twice a day  Indications: blood clot in a deep vein of the extremities 1 Dose Pack 0    Klor-Con 20 mEq pack Take 20 mEq by mouth daily.  fluticasone propionate (FLONASE) 50 mcg/actuation nasal spray 1 [place] by Both Nostrils route daily as needed.  amLODIPine (NORVASC) 5 mg tablet Take 10 mg by mouth daily.  insulin glargine (LANTUS,BASAGLAR) 100 unit/mL (3 mL) inpn 20 Units by SubCUTAneous route Every morning.  isosorbide mononitrate ER (IMDUR) 60 mg CR tablet Take 60 mg by mouth daily.  omeprazole (PRILOSEC) 40 mg capsule Take 40 mg by mouth daily.  ferrous sulfate 325 mg (65 mg iron) tablet Take 325 mg by mouth two (2) times a day.  NOVOLOG FLEXPEN U-100 INSULIN 100 unit/mL inpn INJECT 8 UNITS SUBCUTANEOUSLY BEFORE MEALS  4    BD INSULIN PEN NEEDLE UF MINI 31 gauge x 3/16\" ndle USE TO INJECT TID  3    allopurinol (ZYLOPRIM) 100 mg tablet Take 200 mg by mouth daily.   3     No Known Allergies  Past Medical History:   Diagnosis Date    Acid reflux     Arthritis     Bladder cancer (HCC)     Deep vein thrombosis (DVT) of proximal vein of both lower extremities ([facility] 9/14/2020    Diabetes mellitus (HCC)     GERD (gastroesophageal reflux disease)     Gout     High cholesterol     Hypertension     Kidney carcinoma, left (HCC)     Kidney disease     [facility]     Reflux esophagitis     Unspecified deficiency anemia      Past Surgical History:   Procedure Laterality Date    HX CYSTECTOMY  09    Dr. Chris Ken HX NEPHRECTOMY Left 2009    Nephroureterectomy by Dr. Barbara Lu HX OTHER SURGICAL      surgery on pituitary gland     Family History   Problem Relation Age of Onset    Heart Disease Father     Heart Disease Mother      Social History     Tobacco Use    Smoking status: Former Smoker     Packs/day: 0.50     Years: 40.00     Pack years: 20.00     Types: Cigarettes     Last attempt to quit: 1999     Years since quittin.7    Smokeless tobacco: Never Used   Substance Use Topics    Alcohol use: No       Review of Systems   Constitutional: Negative. HENT: Negative. Respiratory: Negative. Cardiovascular: Negative. Genitourinary: Negative. Musculoskeletal: Negative. Skin: Negative. Neurological: Negative. Objective:   No flowsheet data found.    General: alert, cooperative, no distress   Mental  status: normal mood, behavior, speech, dress, motor activity, and thought processes, able to follow commands   HENT: NCAT   Neck: no visualized mass   Resp: no respiratory distress   Neuro: no gross deficits   Skin: no discoloration or lesions of concern on visible areas   Psychiatric: normal affect, consistent with stated mood, no evidence of hallucinations       3 most recent PHQ Screens 10/6/2020   Little interest or pleasure in doing things Not at all   Feeling down, depressed, irritable, or hopeless Not at all   Total Score PHQ 2 0   Trouble falling or staying asleep, or sleeping too much Not at all   Feeling tired or having little energy Not at all   Poor appetite, weight loss, or overeating -   Feeling bad about yourself - or that you are a failure or have let yourself or your family down Not at all   Trouble concentrating on things such as school, work, reading, or watching TV -   Moving or speaking so slowly that other people could have noticed; or the opposite being so fidgety that others notice Not at all   Thoughts of being better off dead, or hurting yourself in some way Not at all   PHQ 9 Score -   How difficult have these problems made it for you to do your work, take care of your home and get along with others Not difficult at all     We discussed the expected course, resolution and complications of the diagnosis(es) in detail. Medication risks, benefits, costs, interactions, and alternatives were discussed as indicated. I advised him to contact the office if his condition worsens, changes or fails to improve as anticipated. He expressed understanding with the diagnosis(es) and plan. Robin King, who was evaluated through a patient-initiated, synchronous (real-time) audio-video encounter, and/or his healthcare decision maker, is aware that it is a billable service, with coverage as determined by his insurance carrier. He provided verbal consent to proceed: Yes, and patient identification was verified. It was conducted pursuant to the emergency declaration under the [address] [place] [address] authority and the [employer] Act. A caregiver was present when appropriate. Ability to conduct physical exam was limited. I was in the office. The patient was at home.       Christy Oliva NP

## 2020-10-06 NOTE — Clinical Note
Please have patient call for a 4 week follow up of health condition and a Medicare wellness visit. 30 min. Appt.

## 2020-10-09 ENCOUNTER — TELEPHONE (OUTPATIENT)
Dept: FAMILY MEDICINE CLINIC | Age: 75
End: 2020-10-09

## 2020-10-09 NOTE — TELEPHONE ENCOUNTER
Maggi Enriquez, from [facility], is calling to request order for Urinalysis to test for UTI.    [phone] phone  [phone] fax

## 2020-10-09 NOTE — TELEPHONE ENCOUNTER
No answer left message for [facility] that there was no provider in the office today-that if the patient had signs or symtoms he would need to be evaluated at an ER or Urgent Care.   Advised her that a provider would be back in on monday

## 2020-10-16 RX ORDER — INSULIN GLARGINE 100 [IU]/ML
INJECTION, SOLUTION SUBCUTANEOUS
Qty: 9 ML | Refills: 1 | Status: SHIPPED | OUTPATIENT
Start: 2020-10-16 | End: 2021-05-06

## 2020-10-16 NOTE — TELEPHONE ENCOUNTER
Requested Prescriptions     Pending Prescriptions Disp Refills    Lantus Solostar U-100 Insulin 100 unit/mL (3 mL) inpn [Pharmacy Med Name: LANTUS SOLOSTAR PEN INJ 3ML] 9 mL      Sig: INJECT 25 UNITS SUBCUTANEOUSLY ONCE DAILY     Patient will run out over the weekend.

## 2020-10-27 ENCOUNTER — HOSPITAL ENCOUNTER (OUTPATIENT)
Dept: LAB | Age: 75
Discharge: HOME | End: 2020-10-27
Payer: MEDICARE

## 2020-10-27 ENCOUNTER — OFFICE VISIT (OUTPATIENT)
Dept: FAMILY MEDICINE CLINIC | Age: 75
End: 2020-10-27
Payer: MEDICARE

## 2020-10-27 VITALS
DIASTOLIC BLOOD PRESSURE: 76 MMHG | SYSTOLIC BLOOD PRESSURE: 143 MMHG | WEIGHT: 202 LBS | OXYGEN SATURATION: 100 % | HEART RATE: 68 BPM | BODY MASS INDEX: 30.62 KG/M2 | HEIGHT: 68 IN | TEMPERATURE: 98.6 F | RESPIRATION RATE: 20 BRPM

## 2020-10-27 DIAGNOSIS — Z13.228 SCREENING FOR ENDOCRINE, METABOLIC AND IMMUNITY DISORDER: ICD-10-CM

## 2020-10-27 DIAGNOSIS — N18.4 TYPE 2 DIABETES MELLITUS WITH STAGE 4 CHRONIC KIDNEY DISEASE, WITH LONG-TERM CURRENT USE OF INSULIN (HCC): Primary | ICD-10-CM

## 2020-10-27 DIAGNOSIS — Z13.0 SCREENING FOR ENDOCRINE, METABOLIC AND IMMUNITY DISORDER: ICD-10-CM

## 2020-10-27 DIAGNOSIS — Z13.29 SCREENING FOR ENDOCRINE, METABOLIC AND IMMUNITY DISORDER: ICD-10-CM

## 2020-10-27 DIAGNOSIS — E11.22 TYPE 2 DIABETES MELLITUS WITH STAGE 4 CHRONIC KIDNEY DISEASE, WITH LONG-TERM CURRENT USE OF INSULIN (HCC): Primary | ICD-10-CM

## 2020-10-27 DIAGNOSIS — Z01.89 ENCOUNTER FOR LABORATORY EXAMINATION: ICD-10-CM

## 2020-10-27 DIAGNOSIS — Z12.11 SCREENING FOR COLON CANCER: ICD-10-CM

## 2020-10-27 DIAGNOSIS — Z79.4 TYPE 2 DIABETES MELLITUS WITH STAGE 4 CHRONIC KIDNEY DISEASE, WITH LONG-TERM CURRENT USE OF INSULIN (HCC): Primary | ICD-10-CM

## 2020-10-27 LAB
CREAT UR-MCNC: 91 MG/DL (ref 30–125)
HBA1C MFR BLD HPLC: 8.2 %
MICROALBUMIN UR-MCNC: 164 MG/DL (ref 0–3)
MICROALBUMIN/CREAT UR-RTO: 1802 MG/G (ref 0–30)

## 2020-10-27 PROCEDURE — 3052F HG A1C>EQUAL 8.0%<EQUAL 9.0%: CPT | Performed by: NURSE PRACTITIONER

## 2020-10-27 PROCEDURE — 86803 HEPATITIS C AB TEST: CPT

## 2020-10-27 PROCEDURE — 99213 OFFICE O/P EST LOW 20 MIN: CPT | Performed by: NURSE PRACTITIONER

## 2020-10-27 PROCEDURE — 82043 UR ALBUMIN QUANTITATIVE: CPT

## 2020-10-27 PROCEDURE — 83036 HEMOGLOBIN GLYCOSYLATED A1C: CPT | Performed by: NURSE PRACTITIONER

## 2020-10-27 PROCEDURE — 36415 COLL VENOUS BLD VENIPUNCTURE: CPT | Performed by: NURSE PRACTITIONER

## 2020-10-27 PROCEDURE — 36415 COLL VENOUS BLD VENIPUNCTURE: CPT

## 2020-10-27 NOTE — PATIENT INSTRUCTIONS
Learning About Diabetes Food Guidelines  Your Care Instructions     Meal planning is important to manage diabetes. It helps keep your blood sugar at a target level (which you set with your doctor). You don't have to eat special foods. You can eat what your family eats, including sweets once in a while. But you do have to pay attention to how often you eat and how much you eat of certain foods. You may want to work with a dietitian or a certified diabetes educator (CDE) to help you plan meals and snacks. A dietitian or CDE can also help you lose weight if that is one of your goals. What should you know about eating carbs? Managing the amount of carbohydrate (carbs) you eat is an important part of healthy meals when you have diabetes. Carbohydrate is found in many foods. · Learn which foods have carbs. And learn the amounts of carbs in different foods. ? Bread, cereal, pasta, and rice have about 15 grams of carbs in a serving. A serving is 1 slice of bread (1 ounce), ½ cup of cooked cereal, or 1/3 cup of cooked pasta or rice. ? Fruits have 15 grams of carbs in a serving. A serving is 1 small fresh fruit, such as an apple or orange; ½ of a banana; ½ cup of cooked or canned fruit; ½ cup of fruit juice; 1 cup of melon or raspberries; or 2 tablespoons of dried fruit. ? Milk and no-sugar-added yogurt have 15 grams of carbs in a serving. A serving is 1 cup of milk or 2/3 cup of no-sugar-added yogurt. ? Starchy vegetables have 15 grams of carbs in a serving. A serving is ½ cup of mashed potatoes or sweet potato; 1 cup winter squash; ½ of a small baked potato; ½ cup of cooked beans; or ½ cup cooked corn or green peas. · Learn how much carbs to eat each day and at each meal. A dietitian or CDE can teach you how to keep track of the amount of carbs you eat. This is called carbohydrate counting. · If you are not sure how to count carbohydrate grams, use the Plate Method to plan meals.  It is a good, quick way to make sure that you have a balanced meal. It also helps you spread carbs throughout the day. ? Divide your plate by types of foods. Put non-starchy vegetables on half the plate, meat or other protein food on one-quarter of the plate, and a grain or starchy vegetable in the final quarter of the plate. To this you can add a small piece of fruit and 1 cup of milk or yogurt, depending on how many carbs you are supposed to eat at a meal.  · Try to eat about the same amount of carbs at each meal. Do not \"save up\" your daily allowance of carbs to eat at one meal.  · Proteins have very little or no carbs per serving. Examples of proteins are beef, chicken, turkey, fish, eggs, tofu, cheese, cottage cheese, and peanut butter. A serving size of meat is 3 ounces, which is about the size of a deck of cards. Examples of meat substitute serving sizes (equal to 1 ounce of meat) are 1/4 cup of cottage cheese, 1 egg, 1 tablespoon of peanut butter, and ½ cup of tofu. How can you eat out and still eat healthy? · Learn to estimate the serving sizes of foods that have carbohydrate. If you measure food at home, it will be easier to estimate the amount in a serving of restaurant food. · If the meal you order has too much carbohydrate (such as potatoes, corn, or baked beans), ask to have a low-carbohydrate food instead. Ask for a salad or green vegetables. · If you use insulin, check your blood sugar before and after eating out to help you plan how much to eat in the future. · If you eat more carbohydrate at a meal than you had planned, take a walk or do other exercise. This will help lower your blood sugar. What else should you know? · Limit saturated fat, such as the fat from meat and dairy products. This is a healthy choice because people who have diabetes are at higher risk of heart disease. So choose lean cuts of meat and nonfat or low-fat dairy products.  Use olive or canola oil instead of butter or shortening when cooking. · Don't skip meals. Your blood sugar may drop too low if you skip meals and take insulin or certain medicines for diabetes. · Check with your doctor before you drink alcohol. Alcohol can cause your blood sugar to drop too low. Alcohol can also cause a bad reaction if you take certain diabetes medicines. Follow-up care is a key part of your treatment and safety. Be sure to make and go to all appointments, and call your doctor if you are having problems. It's also a good idea to know your test results and keep a list of the medicines you take. Where can you learn more? Go to http://www.castro.com/  Enter I147 in the search box to learn more about \"Learning About Diabetes Food Guidelines. \"  Current as of: December 20, 2019               Content Version: 12.6  © 9223-8819 [employer]. Care instructions adapted under license by [employer] (which disclaims liability or warranty for this information). If you have questions about a medical condition or this instruction, always ask your healthcare professional. [place] [address] any warranty or liability for your use of this information. Managing Other Conditions When You Have Heart Failure: Care Instructions  Your Care Instructions     All the systems in your body rely on each other to work properly. Heart failure has effects all through your body that can lead to other problems, such as kidney disease. The reverse is also true. A condition like diabetes or lung disease can damage or stress your heart and cause heart failure. Managing any other problems can help reduce your heart's workload and make your heart failure better. Conditions that commonly cause or occur along with heart failure include high blood pressure, diabetes, COPD, high cholesterol, kidney problems, and anemia. Follow-up care is a key part of your treatment and safety.  Be sure to make and go to all appointments, and call your doctor if you are having problems. It's also a good idea to know your test results and keep a list of the medicines you take. How can you care for yourself at home? Steps to help with heart failure and other problems  · Eat less salt (sodium). This helps keep fluid from building up. It may help you feel better. Limiting sodium can also help if you have high blood pressure or kidney disease. · Watch your fluid intake if your doctor tells you to. Reducing fluids can keep your sodium level in balance and may help you feel better. · Get regular exercise. Regular, moderate exercise, such as walking, helps your heart. It can also help lower your blood pressure, lower stress, and help you lose weight. · Lose weight if you are overweight. Losing weight can help you manage diabetes and lower your blood pressure and cholesterol level. · Stop smoking. Smoking stresses your lungs, interferes with healing, and can make heart failure worse. · Limit or avoid alcohol. Excess alcohol can cause health problems such as increased blood pressure. Ask your doctor how much, if any, is safe. · If you think you might have a problem with alcohol or drug use, talk to your doctor. If your doctor has not set you up with a cardiac rehabilitation (rehab) program, talk to him or her about whether that is right for you. Cardiac rehab includes exercise, help with diet and lifestyle changes, and emotional support. To stay as healthy as possible  · Work closely with your doctor. Have all your tests, and go to all your appointments. · Take your medicines exactly as prescribed. You will take medicines to treat the other conditions you have along with heart failure. It can be hard to balance the treatment for all your conditions. You will need to have follow-up tests to make sure that all your medicines are working well together. Talk to your doctor if you have any problems with your medicine.   · Keep all your doctors informed about your health problems and all the medicines you take for them. Medicines that can treat one condition may make another condition worse. · Talk to your doctor before you take any vitamins, over-the-counter drugs, or herbal products. Do not take aspirin, ibuprofen (Advil, Motrin), or naproxen (Aleve) unless you talk to your doctor first. They could make your heart failure and other problems worse. When should you call for help? Call 911 anytime you think you may need emergency care. For example, call if:    · You have symptoms of sudden heart failure, such as:  ? Severe trouble breathing. ? Coughing up pink, foamy mucus. ? A new irregular or rapid heartbeat.     · You have symptoms of a heart attack. These may include:  ? Chest pain or pressure, or a strange feeling in the chest.  ? Sweating. ? Shortness of breath. ? Nausea or vomiting. ? Pain, pressure, or a strange feeling in the back, neck, jaw, or upper belly or in one or both shoulders or arms. ? Lightheadedness or sudden weakness. ? A fast or irregular heartbeat. After you call 911, the  may tell you to chew 1 adult-strength or 2 to 4 low-dose aspirin. Wait for an ambulance. Do not try to drive yourself. Call your doctor now or seek immediate medical care if:    · You have new or increased shortness of breath.     · You are dizzy or lightheaded, or you feel like you may faint.     · You have sudden weight gain, such as more than 2 to 3 pounds in a day or 5 pounds in a week. (Your doctor may suggest a different range of weight gain.)     · You have increased swelling in your legs, ankles, or feet.     · You are suddenly so tired or weak that you cannot do your usual activities. Watch closely for changes in your health, and be sure to contact your doctor if you develop new symptoms. Where can you learn more?   Go to http://www.gray.com/  Enter P052 in the search box to learn more about \"Managing Other Conditions When You Have Heart Failure: Care Instructions. \"  Current as of: December 16, 2019               Content Version: 12.6  © 8387-3219 [employer]. Care instructions adapted under license by [employer] (which disclaims liability or warranty for this information). If you have questions about a medical condition or this instruction, always ask your healthcare professional. [address] any warranty or liability for your use of this information.

## 2020-10-27 NOTE — PROGRESS NOTES
Patient presents for lab draw ordered by Taiwo Alvares  Ordering Department/Practice:  [facility]  Date Ordered:  10-     The following labs were drawn and sent to [place]     Hepatitis C AB    The following tubes were sent:    Gold  ( 1)    Draw site:  RAC  Pain Level:0  Needle Gauge23  Aseptic technique used  Blood thinners:n  Band-Aid applied  Draw fee added   Procedure tolerated well, patient voiced no complaints.

## 2020-10-27 NOTE — PROGRESS NOTES
OFFICE NOTE    Alexandra Mosquera is a 70 y.o. male presenting today for the following:  Chief Complaint   Patient presents with    Follow Up Chronic Condition      Patient was previously admitted to hospital on 9/13/2020 and discharged on 9/19/2020. Patient was admitted for Dyspnea, positive D dimer and Chronic diastolic congestive heart failure. Patient is accompanied with niece for today's visit. 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 Patient states his blood sugar levels was 96 this morning and didn't take his insulin in this morning. Patient denies hyper or hypo glycemic episodes. Deny polydipsia/phagia/uria. CHF/Hypertension  Patient is currently on Norvasc, metoprolol, clonidine, IMDUR. Patient is being followed by [facility] Cardiologist Dr. Andrés Alves. Last visit was a week ago. He is scheduled to have a follow up on November 4th. Last reading at home can't remember. Patient blood pressure slightly elevated today. Denies SOB, headache, dizziness, chest pain or blurred vision. Deep Vein Thrombosis  Patient was seen by vascular Dr. Connie Carrion yesterday and had a ultrasound to check on blood clot. He goes back on Nov 4th [facility] Cardiologist  for results and compare readings from previous ER visit. Patient previously had left flank pain and was given a antibiotic in the ER on 10/13/2020. During the ER visit he was refilled/prescribed Eliquis. Dr. Connie Carrion is managing and aware of the Eliquis. Lab work need to be completed      Review of Systems   Constitutional: Negative for chills, fatigue and fever. HENT: Negative. Respiratory: Negative for cough, chest tightness, shortness of breath and wheezing. Cardiovascular: Negative. Negative for chest pain, palpitations and leg swelling. Genitourinary: Negative. Musculoskeletal: Negative. Skin: Negative.     Neurological: Negative for dizziness, syncope, weakness, light-headedness, numbness and headaches.          History  Past Medical History:   Diagnosis Date    Acid reflux     Arthritis     Bladder cancer (HCC)     Deep vein thrombosis (DVT) of proximal vein of both lower extremities ([facility] [address] 2020    Diabetes mellitus (HCC)     GERD (gastroesophageal reflux disease)     Gout     High cholesterol     Hypertension     Kidney carcinoma, left (HCC)     Kidney disease     Pituitary mass (HCC)     Reflux esophagitis     Unspecified deficiency anemia        Past Surgical History:   Procedure Laterality Date    HX CYSTECTOMY  09    Dr. Arun Mares HX NEPHRECTOMY Left 2009    Nephroureterectomy by Dr. Sheba Caro HX OTHER SURGICAL      surgery on pituitary gland       Social History     Socioeconomic History    Marital status: SINGLE     Spouse name: Not on file    Number of children: Not on file    Years of education: Not on file    Highest education level: Not on file   Occupational History    Not on file   Social Needs    Financial resource strain: Not on file    Food insecurity     Worry: Not on file     Inability: Not on file    Transportation needs     Medical: Not on file     Non-medical: Not on file   Tobacco Use    Smoking status: Former Smoker     Packs/day: 0.50     Years: 40.00     Pack years: 20.00     Types: Cigarettes     Last attempt to quit: 1999     Years since quittin.8    Smokeless tobacco: Never Used   Substance and Sexual Activity    Alcohol use: No    Drug use: No    Sexual activity: Yes   Lifestyle    Physical activity     Days per week: Not on file     Minutes per session: Not on file    Stress: Not on file   Relationships    Social connections     Talks on phone: Not on file     Gets together: Not on file     Attends [employer] service: Not on file     Active member of club or organization: Not on file     Attends meetings of clubs or organizations: Not on file     Relationship status: Not on file    Intimate partner violence     Fear of current or ex partner: Not on file     Emotionally abused: Not on file     Physically abused: Not on file     Forced sexual activity: Not on file   Other Topics Concern    Not on file   Social History Narrative    Not on file       No Known Allergies    Current Outpatient Medications   Medication Sig Dispense Refill    Lantus Solostar U-100 Insulin 100 unit/mL (3 mL) inpn INJECT 25 UNITS SUBCUTANEOUSLY ONCE DAILY 9 mL 1    cloNIDine HCL (CATAPRES) 0.2 mg tablet Take 0.5 Tabs by mouth three (3) times daily. Indications: high blood pressure 90 Tab 2    metoprolol succinate (TOPROL-XL) 25 mg XL tablet Take 3 Tabs by mouth daily for 90 days. 90 Tab 2    hydrALAZINE (APRESOLINE) 25 mg tablet Take 0.5 Tabs by mouth two (2) times a day for 90 days. Indications: high blood pressure, SBP >180 in the am 30 Tab 2    apixaban (Eliquis DVT-PE Treat 30D Start) 5 mg (74 tabs) starter pack Take 10 mg (two 5 mg tablets) by mouth twice a day for 7 days   Followed by 5 mg (one 5 mg tablet) by mouth twice a day  Indications: blood clot in a deep vein of the extremities 1 Dose Pack 0    fluticasone propionate (FLONASE) 50 mcg/actuation nasal spray 1 [place] by Both Nostrils route daily as needed.  amLODIPine (NORVASC) 5 mg tablet Take 10 mg by mouth daily.  isosorbide mononitrate ER (IMDUR) 60 mg CR tablet Take 60 mg by mouth daily.  omeprazole (PRILOSEC) 40 mg capsule Take 40 mg by mouth daily.  ferrous sulfate 325 mg (65 mg iron) tablet Take 325 mg by mouth two (2) times a day.  NOVOLOG FLEXPEN U-100 INSULIN 100 unit/mL inpn INJECT 8 UNITS SUBCUTANEOUSLY BEFORE MEALS  4    BD INSULIN PEN NEEDLE UF MINI 31 gauge x 3/16\" ndle USE TO INJECT TID  3    allopurinol (ZYLOPRIM) 100 mg tablet Take 200 mg by mouth daily. 3    Klor-Con 20 mEq pack Take 20 mEq by mouth daily.            Patient Care Team:  Patient Care Team:  Jonathan Pedraza NP as PCP - General (Nurse Practitioner)  Jethro Horton NP as PCP - [facility] Empaneled Provider  Kee Toure MD as Physician (Nephrology)  Vonnie Montelongo MD as Physician (Vascular Surgery)  Marcell Jay MD as Physician (Cardiology)      LABS:  Results for orders placed or performed during the hospital encounter of 10/27/20   MICROALBUMIN, UR, RAND W/ MICROALB/CREAT RATIO   Result Value Ref Range    Microalbumin,urine random 164.00 (H) 0 - 3.0 MG/DL    Creatinine, urine 91.00 30 - 125 mg/dL    Microalbumin/Creat ratio (mg/g creat) 1,802 (H) 0 - 30 mg/g   HEPATITIS C AB   Result Value Ref Range    Hepatitis C virus Ab 0.05 <0.80 Index    Hep C virus Ab Interp. Negative NEG      Hep C  virus Ab comment         Results for orders placed or performed in visit on 10/27/20   AMB POC HEMOGLOBIN A1C   Result Value Ref Range    Hemoglobin A1c (POC) 8.2 %        RADIOLOGY:  No recent results      Physical Exam  Constitutional:       Appearance: He is well-developed. Neck:      Musculoskeletal: Normal range of motion. Cardiovascular:      Rate and Rhythm: Normal rate and regular rhythm. Heart sounds: Normal heart sounds. Pulmonary:      Effort: Pulmonary effort is normal.      Breath sounds: Normal breath sounds. Abdominal:      General: Bowel sounds are normal.      Palpations: Abdomen is soft. Musculoskeletal: Normal range of motion. Lymphadenopathy:      Cervical: No cervical adenopathy. Skin:     General: Skin is warm and dry. Neurological:      Mental Status: He is alert and oriented to person, place, and time. Psychiatric:         Mood and Affect: Mood normal.           PHQ Screen:  No flowsheet data found. Vitals:    10/27/20 1126   BP: (!) 143/76   Pulse: 68   Resp: 20   Temp: 98.6 °F (37 °C)   TempSrc: Temporal   SpO2: 100%   Weight: 202 lb (91.6 kg)   Height: 5' 8\" (1.727 m)   PainSc:   0 - No pain         Assessment and Plan    1.  Type 2 diabetes mellitus with stage 4 chronic kidney disease, with long-term current use of insulin (HCC)  - AMB POC HEMOGLOBIN A1C  - REFERRAL TO ENDOCRINOLOGY  - REFERRAL TO NUTRITION    2. Screening for colon cancer  - REFERRAL TO GASTROENTEROLOGY    3. Screening for endocrine, metabolic and immunity disorder  - MICROALBUMIN, UR, RAND W/ MICROALB/CREAT RATIO; Future  - HEPATITIS C AB; Future  - COLLECTION VENOUS BLOOD,VENIPUNCTURE    4. Encounter for laboratory examination  - COLLECTION VENOUS BLOOD,VENIPUNCTURE      MDM    Procedures      *Plan of care reviewed with patient. Patient in agreement with plan and expresses understanding. All questions answered and patient encouraged to call or RTO if further questions or concerns. Advised patient if symptoms worsen to go to nearest ER or call 911. AVS and recommendations given to patient upon discharge.

## 2020-10-28 LAB
HCV AB SER IA-ACNC: 0.05 INDEX
HCV AB SERPL QL IA: NEGATIVE
HCV COMMENT,HCGAC: NORMAL

## 2020-11-03 ENCOUNTER — TELEPHONE (OUTPATIENT)
Dept: FAMILY MEDICINE CLINIC | Age: 75
End: 2020-11-03

## 2020-11-04 NOTE — TELEPHONE ENCOUNTER
Please see message-    Patient was last seen on 10-    Not seeing where he has been on this before    Thank you

## 2020-11-13 NOTE — PROGRESS NOTES
Please call patient and inform that his hepatitis C show negative. Patient has CKD but inform him his microalbumin is elevated. I see in our notes he is followed by a nephrologist (Dr. Brandon Villalpando). Please confirm that he still see him and if so he needs to call for a follow up.

## 2020-11-16 ENCOUNTER — TELEPHONE (OUTPATIENT)
Dept: FAMILY MEDICINE CLINIC | Age: 75
End: 2020-11-16

## 2020-11-16 DIAGNOSIS — Z79.4 TYPE 2 DIABETES MELLITUS WITH STAGE 4 CHRONIC KIDNEY DISEASE, WITH LONG-TERM CURRENT USE OF INSULIN (HCC): Primary | ICD-10-CM

## 2020-11-16 DIAGNOSIS — N18.4 TYPE 2 DIABETES MELLITUS WITH STAGE 4 CHRONIC KIDNEY DISEASE, WITH LONG-TERM CURRENT USE OF INSULIN (HCC): Primary | ICD-10-CM

## 2020-11-16 DIAGNOSIS — E11.22 TYPE 2 DIABETES MELLITUS WITH STAGE 4 CHRONIC KIDNEY DISEASE, WITH LONG-TERM CURRENT USE OF INSULIN (HCC): Primary | ICD-10-CM

## 2020-12-01 NOTE — PROGRESS NOTES
Spoke with patient's niece and advised lab results and recommendations.   She did confirm that the patient was still seeing kidney specialist.  She verbalized understanding

## 2020-12-07 ENCOUNTER — HOSPITAL ENCOUNTER (OUTPATIENT)
Dept: LAB | Age: 75
Discharge: HOME | End: 2020-12-07

## 2020-12-10 NOTE — TELEPHONE ENCOUNTER
Pt has been out of this medication all week long. Mr Latham Found stated the pharmacy advised him that hey put in a request for him at the beginning of the week but no one has contacted him about this medication. Requested Prescriptions     Pending Prescriptions Disp Refills    allopurinoL (ZYLOPRIM) 100 mg tablet   3     Sig: Take 2 Tabs by mouth daily.  hydrALAZINE (APRESOLINE) 25 mg tablet 30 Tab 2     Sig: Take 0.5 Tabs by mouth two (2) times a day for 90 days.  Indications: high blood pressure, SBP >180 in the am

## 2020-12-11 NOTE — TELEPHONE ENCOUNTER
Please see refill request    Last seen 10-    Last filled Hydralazine  9-    Alpurinol  9-    Thank you

## 2020-12-14 NOTE — TELEPHONE ENCOUNTER
Requested Prescriptions     Pending Prescriptions Disp Refills    allopurinoL (ZYLOPRIM) 100 mg tablet  3     Sig: Take 2 Tabs by mouth daily.  hydrALAZINE (APRESOLINE) 25 mg tablet 30 Tab 2     Sig: Take 0.5 Tabs by mouth two (2) times a day for 90 days. Indications: high blood pressure, SBP >180 in the am    omeprazole (PRILOSEC) 40 mg capsule        Sig: Take 1 Cap by mouth daily.

## 2020-12-14 NOTE — TELEPHONE ENCOUNTER
Please see refill request-Second request     Last seen 10-     Last filled Hydralazine  9-     Alpurinol  9-    Omeprazole last filled by [facility]     Thank you

## 2020-12-15 RX ORDER — OMEPRAZOLE 40 MG/1
40 CAPSULE, DELAYED RELEASE ORAL DAILY
Qty: 90 CAP | Refills: 0 | Status: SHIPPED | OUTPATIENT
Start: 2020-12-15 | End: 2021-03-09

## 2020-12-15 RX ORDER — HYDRALAZINE HYDROCHLORIDE 25 MG/1
12.5 TABLET, FILM COATED ORAL 2 TIMES DAILY
Qty: 30 TAB | Refills: 2 | Status: SHIPPED | OUTPATIENT
Start: 2020-12-15 | End: 2021-01-13 | Stop reason: CLARIF

## 2020-12-15 RX ORDER — ALLOPURINOL 100 MG/1
200 TABLET ORAL DAILY
Qty: 60 TAB | Refills: 3 | Status: SHIPPED | OUTPATIENT
Start: 2020-12-15 | End: 2021-03-03

## 2020-12-29 RX ORDER — AMLODIPINE BESYLATE 5 MG/1
TABLET ORAL
Qty: 180 TAB | Refills: 1 | Status: SHIPPED | OUTPATIENT
Start: 2020-12-29 | End: 2021-07-30 | Stop reason: SDUPTHER

## 2021-01-11 ENCOUNTER — OFFICE VISIT (OUTPATIENT)
Dept: NEPHROLOGY | Age: 76
End: 2021-01-11
Payer: MEDICARE

## 2021-01-11 VITALS
WEIGHT: 211 LBS | HEART RATE: 74 BPM | BODY MASS INDEX: 31.98 KG/M2 | SYSTOLIC BLOOD PRESSURE: 167 MMHG | DIASTOLIC BLOOD PRESSURE: 78 MMHG | OXYGEN SATURATION: 97 % | TEMPERATURE: 98.7 F | RESPIRATION RATE: 20 BRPM | HEIGHT: 68 IN

## 2021-01-11 DIAGNOSIS — N18.4 CKD (CHRONIC KIDNEY DISEASE) STAGE 4, GFR 15-29 ML/MIN (HCC): ICD-10-CM

## 2021-01-11 DIAGNOSIS — N18.4 CKD (CHRONIC KIDNEY DISEASE) STAGE 4, GFR 15-29 ML/MIN (HCC): Primary | ICD-10-CM

## 2021-01-11 PROCEDURE — G8510 SCR DEP NEG, NO PLAN REQD: HCPCS | Performed by: INTERNAL MEDICINE

## 2021-01-11 PROCEDURE — G8428 CUR MEDS NOT DOCUMENT: HCPCS | Performed by: INTERNAL MEDICINE

## 2021-01-11 PROCEDURE — G8536 NO DOC ELDER MAL SCRN: HCPCS | Performed by: INTERNAL MEDICINE

## 2021-01-11 PROCEDURE — 99215 OFFICE O/P EST HI 40 MIN: CPT | Performed by: INTERNAL MEDICINE

## 2021-01-11 PROCEDURE — G8417 CALC BMI ABV UP PARAM F/U: HCPCS | Performed by: INTERNAL MEDICINE

## 2021-01-11 PROCEDURE — 1101F PT FALLS ASSESS-DOCD LE1/YR: CPT | Performed by: INTERNAL MEDICINE

## 2021-01-11 RX ORDER — HYDRALAZINE HYDROCHLORIDE 50 MG/1
100 TABLET, FILM COATED ORAL 3 TIMES DAILY
Qty: 90 TAB | Refills: 1 | Status: SHIPPED | OUTPATIENT
Start: 2021-01-11 | End: 2021-02-23 | Stop reason: SDUPTHER

## 2021-01-11 RX ORDER — FUROSEMIDE 80 MG/1
80 TABLET ORAL DAILY
Qty: 30 TAB | Refills: 1 | Status: ON HOLD | OUTPATIENT
Start: 2021-01-11 | End: 2021-01-13

## 2021-01-11 RX ORDER — METOPROLOL SUCCINATE 25 MG/1
12.5 TABLET, EXTENDED RELEASE ORAL DAILY
COMMUNITY
End: 2021-01-13 | Stop reason: CLARIF

## 2021-01-11 RX ORDER — MINOXIDIL 2.5 MG/1
2.5 TABLET ORAL 2 TIMES DAILY
COMMUNITY
End: 2021-02-23 | Stop reason: ALTCHOICE

## 2021-01-11 RX ORDER — ERGOCALCIFEROL 1.25 MG/1
50000 CAPSULE ORAL
Qty: 16 CAP | Refills: 0 | Status: SHIPPED | OUTPATIENT
Start: 2021-01-11 | End: 2021-04-14

## 2021-01-11 RX ORDER — METOPROLOL SUCCINATE 25 MG/1
25 TABLET, EXTENDED RELEASE ORAL DAILY
Qty: 30 TAB | Refills: 1 | Status: ON HOLD | OUTPATIENT
Start: 2021-01-11 | End: 2021-01-13

## 2021-01-11 RX ORDER — SODIUM BICARBONATE 650 MG/1
650 TABLET ORAL 2 TIMES DAILY
Qty: 60 TAB | Refills: 1 | Status: SHIPPED | OUTPATIENT
Start: 2021-01-11 | End: 2021-07-26

## 2021-01-11 RX ORDER — SIMVASTATIN 40 MG/1
40 TABLET, FILM COATED ORAL
COMMUNITY
End: 2021-02-02 | Stop reason: SDUPTHER

## 2021-01-11 NOTE — PROGRESS NOTES
Reason for f/u:  ARON on CKD III/IV      HPI:  The pt is seen for f/u for CKD. He is c/o sob arben on exertion. Noticed to have LE swelling also. No N/V/D.  No urinary sx.      ROS:  Constitutional   · Constitutional: no fatigue, no fever, no night sweats, no significant weight gain, no significant weight loss     Eyes   · Eyes: no dry eyes, no vision change     ENMT   · Nose: no frequent nosebleeds, no nose/sinus problems   · Mouth/Throat: no dry mouth, no bleeding gums, no sore throat, no teeth problems, no snoring     Cardiovascular   · Cardiovascular: no swelling in the extremities, no chest pain, no arm pain on exertion, no shortness of breath when walking, no known heart murmur, no shortness of breath when lying down, no palpitations     Respiratory   · Respiratory: shortness of breath+, no cough, no wheezing, no coughing up blood     Gastrointestinal   · Gastrointestinal: no nausea, no abdominal pain, no vomiting, normal appetite, no diarrhea, not vomiting blood     Genitourinary   · Genitourinary: no hematuria, no incontinence, no difficulty urinating, no increased frequency     Musculoskeletal   · Musculoskeletal: no back pain, no arthralgias/joint pain, no muscle aches, no muscle weakness     Integumentary   · Skin: no rashes, no jaundice     Neurologic   · Neurologic: no dizziness, no numbness, no loss of consciousness, no weakness, no seizures, no headaches     Psychiatric   · Psych: no depression, no sleep disturbances     Hematologic/Lymphatic   · Hematologic/Lymphatic no swollen glands, no bruising     Allergic/Immunologic   · Allergy/Immunologic: no runny nose, no hives   ROS as noted in the HPI      PE:  Constitutional   · Level of Distress: NAD, no acute illness, no chronic illness   · General Appearance: healthy-appearing, well-nourished, well-developed   · Ambulation: ambulating normally     Eyes   · Pupils: PERRLA   · EOM: EOMI     Cardiovascular   · Heart Auscultation: RRR, normal S1, normal S2, no murmurs, no rubs, no gallop, no click, no KARI     Lungs   · Auscultation: breath sounds normal, good air movement, CTA except as noted, no wheezing, no rales/crackles, no rhonchi     Abdomen   · Inspection and Palpation: soft, non-distended, no tenderness, no CVA tenderness     Musculoskeletal System   · Extremities: no clubbing, no cyanosis, edema +    Skin   · Inspection and Palpation: no rash, no lesions, no ulcer, no induration, no nodule, turgor normal, no jaundice     Neurologic   · Cranial Nerves: grossly intact   · Gait And Station: normal gait, normal station        A/P:    1. 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  -Last Cr is 2.5 (was 2.9)  -His urinalysis showed bland sediment and has 4-5 proteinuria.  -A kidney ultrasound surgically absent left kidney and right kidney with no hydronephrosis. -He was advised to avoid any kind of NSAIDs.  -I will see him back in 3 weeks. 2. History of kidney cancer:  -Status post left total nephrectomy 8 years ago. 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  -He follow-up with oncologist.    3. Hypertension:  -Blood pressure is elevated with systolic .  -will increase hydralazine 100 mg tid   -will increase metoprolol ER 25 mg daily  -will dc clonidine and minoxidil  -on amlodipine 10 mg daily       4. Anemia:  -Hb is 10.9  -No need for procrit. -TSAT 22%, Ferritin 283  -will keep on po FeSO4 325 mg bid. 5. Renal osteodystrophy.  -His Ca, phosphorus ok  - vitamin D25 hydroxy 18, will order Vit D 50,000 units weekly for 16 weeks.  -has sec HPT   -iPTH 169, will recheck     6. Gouty arthritis:  -His Uric acid is 5.0  -will keep on Allopurinol 200 mg daily. 7. Metabolic acidosis:  -CO2 20  -will keep on NaHCO3 650 mg 1 tabs bid    8.  Proteinuria, nephrotic range:   -has 4-5 g/g per day   -likely from diabetic nephropathy   -Hep B/C, C3/C4, SFL ratio and ANCAs are neg   -unable to add ARB due to # 1 and K 5.0     9.  SOB:  -from fluid overload  -sob on exertion and LE edema  -last Echo 03/2020 LVEF 50-55%  -will add po lasix 80 mg daily

## 2021-01-11 NOTE — PROGRESS NOTES
Loise Phalen presents today for   Chief Complaint   Patient presents with    Routine     lab review        Is someone accompanying this pt? alone    Is the patient using any DME equipment during [address]? no    Depression Screening:  3 most recent PHQ Screens 1/11/2021   Little interest or pleasure in doing things Several days   Feeling down, depressed, irritable, or hopeless Several days   Total Score PHQ 2 2   Trouble falling or staying asleep, or sleeping too much -   Feeling tired or having little energy -   Poor appetite, weight loss, or overeating -   Feeling bad about yourself - or that you are a failure or have let yourself or your family down -   Trouble concentrating on things such as school, work, reading, or watching TV -   Moving or speaking so slowly that other people could have noticed; or the opposite being so fidgety that others notice -   Thoughts of being better off dead, or hurting yourself in some way -   PHQ 9 Score -   How difficult have these problems made it for you to do your work, take care of your home and get along with others -       Learning Assessment:  Learning Assessment 1/11/2021   PRIMARY LEARNER Patient   HIGHEST LEVEL OF EDUCATION - PRIMARY LEARNER  -   PRIMARY LANGUAGE ENGLISH   LEARNER PREFERENCE PRIMARY LISTENING   ANSWERED BY evelyn ROBERTS SELF       Abuse Screening:  No flowsheet data found. Fall Risk  Fall Risk Assessment, last 12 mths 1/11/2021   Able to walk? Yes   Fall in past 12 months? 0   Do you feel unsteady? 0   Are you worried about falling 0   Number of falls in past 12 months -   Fall with injury? -       ADL  No flowsheet data found. Health Maintenance reviewed and discussed and ordered per Provider.     Health Maintenance Due   Topic Date Due    Foot Exam Q1  09/12/1955    Eye Exam Retinal or Dilated  09/12/1955    Lipid Screen  09/12/1955    DTaP/Tdap/Td series (1 - Tdap) 09/12/1968    Shingrix Vaccine Age 50> (1 of 2) 09/12/1997    Colorectal Cancer Screening Combo  09/12/1997    GLAUCOMA SCREENING Q2Y  09/12/2012    AAA Screening 73-69 YO Male Smoking Patients  09/12/2012    Pneumococcal 65+ yrs at Risk Vaccine (2 of 2 - PCV13) 09/12/2012    Medicare Yearly Exam  05/08/2020   . Coordination of Care:  1. Have you been to the ER, urgent care clinic since your last visit? Hospitalized since your last visit? Yes due to increased  Fluid with SOB    2. Have you seen or consulted any other health care providers outside of the [address] [place] [address] since your last visit? Include any pap smears or colon screening.  no

## 2021-01-12 ENCOUNTER — HOSPITAL ENCOUNTER (OUTPATIENT)
Age: 76
Setting detail: OBSERVATION
Discharge: HOME | End: 2021-01-13
Attending: FAMILY MEDICINE | Admitting: INTERNAL MEDICINE
Payer: MEDICARE

## 2021-01-12 ENCOUNTER — APPOINTMENT (OUTPATIENT)
Dept: GENERAL RADIOLOGY | Age: 76
End: 2021-01-12
Attending: FAMILY MEDICINE
Payer: MEDICARE

## 2021-01-12 DIAGNOSIS — I50.23 ACUTE ON CHRONIC SYSTOLIC CONGESTIVE HEART FAILURE (HCC): Primary | ICD-10-CM

## 2021-01-12 LAB
ALBUMIN SERPL-MCNC: 3.6 G/DL (ref 3.5–4.7)
ALBUMIN/GLOB SERPL: 1 {RATIO}
ALP SERPL-CCNC: 59 U/L (ref 38–126)
ALT SERPL-CCNC: 15 U/L (ref 3–72)
ANION GAP SERPL CALC-SCNC: 7 MMOL/L
AST SERPL W P-5'-P-CCNC: 14 U/L (ref 17–74)
BASOPHILS # BLD: 0 K/UL (ref 0–0.1)
BASOPHILS NFR BLD: 0 % (ref 0–2)
BILIRUB SERPL-MCNC: 0.5 MG/DL (ref 0.2–1)
BUN SERPL-MCNC: 50 MG/DL (ref 9–21)
BUN/CREAT SERPL: 19
CA-I BLD-MCNC: 9 MG/DL (ref 8.5–10.5)
CHLORIDE SERPL-SCNC: 117 MMOL/L (ref 94–111)
CO2 SERPL-SCNC: 17 MMOL/L (ref 21–33)
CREAT SERPL-MCNC: 2.6 MG/DL (ref 0.8–1.5)
EOSINOPHIL # BLD: 0.1 K/UL (ref 0–0.4)
EOSINOPHIL NFR BLD: 2 % (ref 0–5)
ERYTHROCYTE [DISTWIDTH] IN BLOOD BY AUTOMATED COUNT: 17.1 % (ref 11.6–14.5)
GLOBULIN SER CALC-MCNC: 3.6 G/DL
GLUCOSE SERPL-MCNC: 185 MG/DL (ref 70–110)
HCT VFR BLD AUTO: 30.5 % (ref 36–48)
HGB BLD-MCNC: 9.4 G/DL (ref 13–16)
IMM GRANULOCYTES # BLD AUTO: 0 K/UL
IMM GRANULOCYTES NFR BLD AUTO: 0 %
LYMPHOCYTES # BLD: 1.1 K/UL (ref 0.9–3.6)
LYMPHOCYTES NFR BLD: 14 % (ref 21–52)
MCH RBC QN AUTO: 26.8 PG (ref 24–34)
MCHC RBC AUTO-ENTMCNC: 30.8 G/DL (ref 31–37)
MCV RBC AUTO: 86.9 FL (ref 74–97)
MONOCYTES # BLD: 0.5 K/UL (ref 0.05–1.2)
MONOCYTES NFR BLD: 7 % (ref 3–10)
NEUTS SEG # BLD: 6 K/UL (ref 1.8–8)
NEUTS SEG NFR BLD: 77 % (ref 40–73)
PLATELET # BLD AUTO: 315 K/UL (ref 135–420)
PMV BLD AUTO: 10.4 FL (ref 9.2–11.8)
POTASSIUM SERPL-SCNC: 4.3 MMOL/L (ref 3.2–5.1)
PROT SERPL-MCNC: 7.2 G/DL (ref 6.1–8.4)
RBC # BLD AUTO: 3.51 M/UL (ref 4.7–5.5)
SODIUM SERPL-SCNC: 141 MMOL/L (ref 135–145)
TROPONIN I SERPL-MCNC: 0.02 NG/ML (ref 0.02–0.05)
WBC # BLD AUTO: 7.8 K/UL (ref 4.6–13.2)

## 2021-01-12 PROCEDURE — 99285 EMERGENCY DEPT VISIT HI MDM: CPT

## 2021-01-12 PROCEDURE — 36415 COLL VENOUS BLD VENIPUNCTURE: CPT

## 2021-01-12 PROCEDURE — 96374 THER/PROPH/DIAG INJ IV PUSH: CPT

## 2021-01-12 PROCEDURE — 71045 X-RAY EXAM CHEST 1 VIEW: CPT

## 2021-01-12 PROCEDURE — 74011250636 HC RX REV CODE- 250/636: Performed by: FAMILY MEDICINE

## 2021-01-12 PROCEDURE — 93005 ELECTROCARDIOGRAM TRACING: CPT

## 2021-01-12 PROCEDURE — 83036 HEMOGLOBIN GLYCOSYLATED A1C: CPT

## 2021-01-12 PROCEDURE — 85025 COMPLETE CBC W/AUTO DIFF WBC: CPT

## 2021-01-12 PROCEDURE — 80053 COMPREHEN METABOLIC PANEL: CPT

## 2021-01-12 PROCEDURE — 84484 ASSAY OF TROPONIN QUANT: CPT

## 2021-01-12 RX ORDER — FUROSEMIDE 10 MG/ML
80 INJECTION INTRAMUSCULAR; INTRAVENOUS ONCE
Status: COMPLETED | OUTPATIENT
Start: 2021-01-12 | End: 2021-01-12

## 2021-01-12 RX ADMIN — FUROSEMIDE 80 MG: 10 INJECTION, SOLUTION INTRAMUSCULAR; INTRAVENOUS at 22:33

## 2021-01-13 VITALS
SYSTOLIC BLOOD PRESSURE: 150 MMHG | BODY MASS INDEX: 31.71 KG/M2 | HEIGHT: 68 IN | WEIGHT: 209.2 LBS | DIASTOLIC BLOOD PRESSURE: 73 MMHG | TEMPERATURE: 97.5 F | RESPIRATION RATE: 22 BRPM | OXYGEN SATURATION: 99 % | HEART RATE: 80 BPM

## 2021-01-13 PROBLEM — I50.9 ACUTE ON CHRONIC HEART FAILURE (HCC): Status: ACTIVE | Noted: 2021-01-13

## 2021-01-13 LAB
COVID-19 RAPID TEST, COVR: NOT DETECTED
GLUCOSE BLD STRIP.AUTO-MCNC: 147 MG/DL (ref 70–110)
GLUCOSE BLD STRIP.AUTO-MCNC: 245 MG/DL (ref 70–110)
PERFORMED BY, TECHID: ABNORMAL
PERFORMED BY, TECHID: ABNORMAL
SARS-COV-2, COV2: NORMAL
SARS-COV-2, COV2: NORMAL
SPECIMEN SOURCE: NORMAL

## 2021-01-13 PROCEDURE — 74011250637 HC RX REV CODE- 250/637: Performed by: NURSE PRACTITIONER

## 2021-01-13 PROCEDURE — 87635 SARS-COV-2 COVID-19 AMP PRB: CPT

## 2021-01-13 PROCEDURE — 74011250636 HC RX REV CODE- 250/636: Performed by: NURSE PRACTITIONER

## 2021-01-13 PROCEDURE — 74011636637 HC RX REV CODE- 636/637: Performed by: NURSE PRACTITIONER

## 2021-01-13 PROCEDURE — 99218 HC RM OBSERVATION: CPT

## 2021-01-13 PROCEDURE — 82962 GLUCOSE BLOOD TEST: CPT

## 2021-01-13 PROCEDURE — 96376 TX/PRO/DX INJ SAME DRUG ADON: CPT

## 2021-01-13 RX ORDER — HYDRALAZINE HYDROCHLORIDE 25 MG/1
100 TABLET, FILM COATED ORAL 3 TIMES DAILY
Status: DISCONTINUED | OUTPATIENT
Start: 2021-01-13 | End: 2021-01-13

## 2021-01-13 RX ORDER — ACETAMINOPHEN 650 MG/1
650 SUPPOSITORY RECTAL
Status: DISCONTINUED | OUTPATIENT
Start: 2021-01-13 | End: 2021-01-13 | Stop reason: HOSPADM

## 2021-01-13 RX ORDER — FLUTICASONE PROPIONATE 50 MCG
2 SPRAY, SUSPENSION (ML) NASAL DAILY PRN
Status: DISCONTINUED | OUTPATIENT
Start: 2021-01-13 | End: 2021-01-13 | Stop reason: HOSPADM

## 2021-01-13 RX ORDER — ACETAMINOPHEN 325 MG/1
650 TABLET ORAL
Status: DISCONTINUED | OUTPATIENT
Start: 2021-01-13 | End: 2021-01-13 | Stop reason: HOSPADM

## 2021-01-13 RX ORDER — CLONIDINE HYDROCHLORIDE 0.1 MG/1
0.1 TABLET ORAL 3 TIMES DAILY
Status: DISCONTINUED | OUTPATIENT
Start: 2021-01-13 | End: 2021-01-13

## 2021-01-13 RX ORDER — MINOXIDIL 2.5 MG/1
2.5 TABLET ORAL 2 TIMES DAILY
Status: DISCONTINUED | OUTPATIENT
Start: 2021-01-13 | End: 2021-01-13

## 2021-01-13 RX ORDER — INSULIN LISPRO 100 [IU]/ML
INJECTION, SOLUTION INTRAVENOUS; SUBCUTANEOUS
Status: DISCONTINUED | OUTPATIENT
Start: 2021-01-13 | End: 2021-01-13 | Stop reason: HOSPADM

## 2021-01-13 RX ORDER — POTASSIUM CHLORIDE 750 MG/1
20 TABLET, EXTENDED RELEASE ORAL DAILY
Status: DISCONTINUED | OUTPATIENT
Start: 2021-01-13 | End: 2021-01-13 | Stop reason: HOSPADM

## 2021-01-13 RX ORDER — AMLODIPINE BESYLATE 5 MG/1
10 TABLET ORAL DAILY
Status: DISCONTINUED | OUTPATIENT
Start: 2021-01-13 | End: 2021-01-13 | Stop reason: HOSPADM

## 2021-01-13 RX ORDER — DEXTROSE 50 % IN WATER (D50W) INTRAVENOUS SYRINGE
25-50 AS NEEDED
Status: DISCONTINUED | OUTPATIENT
Start: 2021-01-13 | End: 2021-01-13 | Stop reason: HOSPADM

## 2021-01-13 RX ORDER — ISOSORBIDE MONONITRATE 60 MG/1
60 TABLET, EXTENDED RELEASE ORAL DAILY
Status: DISCONTINUED | OUTPATIENT
Start: 2021-01-13 | End: 2021-01-13

## 2021-01-13 RX ORDER — LANOLIN ALCOHOL/MO/W.PET/CERES
325 CREAM (GRAM) TOPICAL 2 TIMES DAILY
Status: DISCONTINUED | OUTPATIENT
Start: 2021-01-13 | End: 2021-01-13 | Stop reason: HOSPADM

## 2021-01-13 RX ORDER — SODIUM CHLORIDE 0.9 % (FLUSH) 0.9 %
5-40 SYRINGE (ML) INJECTION EVERY 8 HOURS
Status: DISCONTINUED | OUTPATIENT
Start: 2021-01-13 | End: 2021-01-13 | Stop reason: HOSPADM

## 2021-01-13 RX ORDER — PANTOPRAZOLE SODIUM 40 MG/1
40 TABLET, DELAYED RELEASE ORAL DAILY
Status: DISCONTINUED | OUTPATIENT
Start: 2021-01-13 | End: 2021-01-13 | Stop reason: HOSPADM

## 2021-01-13 RX ORDER — FUROSEMIDE 80 MG/1
TABLET ORAL
Qty: 90 TAB | Refills: 1 | Status: SHIPPED | OUTPATIENT
Start: 2021-01-13 | End: 2021-05-04

## 2021-01-13 RX ORDER — SODIUM BICARBONATE 650 MG/1
TABLET ORAL
Qty: 60 TAB | Refills: 1 | Status: SHIPPED | OUTPATIENT
Start: 2021-01-13 | End: 2021-04-26

## 2021-01-13 RX ORDER — METOPROLOL SUCCINATE 25 MG/1
25 TABLET, EXTENDED RELEASE ORAL DAILY
Status: DISCONTINUED | OUTPATIENT
Start: 2021-01-13 | End: 2021-01-13

## 2021-01-13 RX ORDER — FUROSEMIDE 10 MG/ML
40 INJECTION INTRAMUSCULAR; INTRAVENOUS 2 TIMES DAILY
Status: DISCONTINUED | OUTPATIENT
Start: 2021-01-13 | End: 2021-01-13 | Stop reason: HOSPADM

## 2021-01-13 RX ORDER — SODIUM BICARBONATE 650 MG/1
650 TABLET ORAL 3 TIMES DAILY
Status: DISCONTINUED | OUTPATIENT
Start: 2021-01-13 | End: 2021-01-13 | Stop reason: HOSPADM

## 2021-01-13 RX ORDER — POLYETHYLENE GLYCOL 3350 17 G/17G
17 POWDER, FOR SOLUTION ORAL DAILY PRN
Status: DISCONTINUED | OUTPATIENT
Start: 2021-01-13 | End: 2021-01-13 | Stop reason: HOSPADM

## 2021-01-13 RX ORDER — MAGNESIUM SULFATE 100 %
4 CRYSTALS MISCELLANEOUS AS NEEDED
Status: DISCONTINUED | OUTPATIENT
Start: 2021-01-13 | End: 2021-01-13 | Stop reason: HOSPADM

## 2021-01-13 RX ORDER — ALLOPURINOL 100 MG/1
200 TABLET ORAL DAILY
Status: DISCONTINUED | OUTPATIENT
Start: 2021-01-13 | End: 2021-01-13 | Stop reason: HOSPADM

## 2021-01-13 RX ORDER — METOPROLOL SUCCINATE 25 MG/1
TABLET, EXTENDED RELEASE ORAL
Qty: 90 TAB | Refills: 1 | Status: SHIPPED | OUTPATIENT
Start: 2021-01-13 | End: 2021-02-23 | Stop reason: ALTCHOICE

## 2021-01-13 RX ORDER — SODIUM BICARBONATE 650 MG/1
650 TABLET ORAL 2 TIMES DAILY
Status: DISCONTINUED | OUTPATIENT
Start: 2021-01-13 | End: 2021-01-13

## 2021-01-13 RX ORDER — ONDANSETRON 2 MG/ML
4 INJECTION INTRAMUSCULAR; INTRAVENOUS
Status: DISCONTINUED | OUTPATIENT
Start: 2021-01-13 | End: 2021-01-13 | Stop reason: HOSPADM

## 2021-01-13 RX ORDER — SODIUM CHLORIDE 0.9 % (FLUSH) 0.9 %
5-40 SYRINGE (ML) INJECTION AS NEEDED
Status: DISCONTINUED | OUTPATIENT
Start: 2021-01-13 | End: 2021-01-13 | Stop reason: HOSPADM

## 2021-01-13 RX ORDER — PROMETHAZINE HYDROCHLORIDE 25 MG/1
12.5 TABLET ORAL
Status: DISCONTINUED | OUTPATIENT
Start: 2021-01-13 | End: 2021-01-13 | Stop reason: HOSPADM

## 2021-01-13 RX ORDER — ATORVASTATIN CALCIUM 10 MG/1
20 TABLET, FILM COATED ORAL
Status: DISCONTINUED | OUTPATIENT
Start: 2021-01-13 | End: 2021-01-13 | Stop reason: HOSPADM

## 2021-01-13 RX ADMIN — APIXABAN 2.5 MG: 2.5 TABLET, FILM COATED ORAL at 08:20

## 2021-01-13 RX ADMIN — PANTOPRAZOLE SODIUM 40 MG: 40 TABLET, DELAYED RELEASE ORAL at 08:20

## 2021-01-13 RX ADMIN — POTASSIUM CHLORIDE 20 MEQ: 750 TABLET, EXTENDED RELEASE ORAL at 10:30

## 2021-01-13 RX ADMIN — ISOSORBIDE MONONITRATE 60 MG: 60 TABLET, EXTENDED RELEASE ORAL at 08:20

## 2021-01-13 RX ADMIN — Medication 10 ML: at 08:20

## 2021-01-13 RX ADMIN — FUROSEMIDE 40 MG: 10 INJECTION, SOLUTION INTRAMUSCULAR; INTRAVENOUS at 08:20

## 2021-01-13 RX ADMIN — AMLODIPINE BESYLATE 10 MG: 5 TABLET ORAL at 08:20

## 2021-01-13 RX ADMIN — INSULIN LISPRO 6 UNITS: 100 INJECTION, SOLUTION INTRAVENOUS; SUBCUTANEOUS at 12:04

## 2021-01-13 RX ADMIN — ALLOPURINOL 200 MG: 100 TABLET ORAL at 08:20

## 2021-01-13 RX ADMIN — SODIUM BICARBONATE 650 MG TABLET 650 MG: at 09:00

## 2021-01-13 NOTE — CONSULTS
Nephrology Consult    Patient: Verito Anthony MRN: [id number]  SSN: [id number]    YOB: 1945  Age: 70 y.o. Sex: male    Reason for the consultation. Chronic kidney disease and fluid overload  Subjective:    HPI: Verito Anthony, 70 y.o. male with a past medical history significant diabetes, hypertension, hyperlipidemia, deep vein thrombosis and Bladder cancer, DVT in September 2019, currently taking Eliquis, history of left nephrectomy secondary to renal cell carcinoma presents to the ED with cc of shortness of breath with onset sometime last week, acutely worsening. He was seen by me yesterday and was started on Lasix 80 mg daily for suspected fluid overload, patient is not certain if he has taken that medication, has had no improvement. At about 1130 today, he walked out to his mailbox, became severely short of breath, rested. This evening with light exertion he again became severely short of breath, called EMS for transport to emergency department. He denies chest pain, he has had swelling in both lower extremities, chronic.     Past Medical History:   Diagnosis Date    Acid reflux     Arthritis     Bladder cancer (HCC)     Deep vein thrombosis (DVT) of proximal vein of both lower extremities ([address] 9/14/2020    Diabetes mellitus (HCC)     GERD (gastroesophageal reflux disease)     Gout     High cholesterol     Hypertension     Kidney carcinoma, left (HCC)     Kidney disease     Pituitary mass ([address]     Reflux esophagitis     Unspecified deficiency anemia      Past Surgical History:   Procedure Laterality Date    HX CYSTECTOMY  12/30/09    Dr. Christy Hawkins HX NEPHRECTOMY Left 5/2009    Nephroureterectomy by Dr. Greer Rivera HX OTHER SURGICAL      surgery on pituitary gland      Family History   Problem Relation Age of Onset    Heart Disease Father     Heart Disease Mother      Social History     Tobacco Use    Smoking status: Former Smoker Packs/day: 0.50     Years: 40.00     Pack years: 20.00     Types: Cigarettes     Quit date: 1999     Years since quittin.0    Smokeless tobacco: Never Used   Substance Use Topics    Alcohol use: No      Current Facility-Administered Medications   Medication Dose Route Frequency Provider Last Rate Last Admin    sodium chloride (NS) flush 5-40 mL  5-40 mL IntraVENous Q8H Savana Luabhishek, NP   10 mL at 21 0820    sodium chloride (NS) flush 5-40 mL  5-40 mL IntraVENous PRN Savana Kervin, NP        acetaminophen (TYLENOL) tablet 650 mg  650 mg Oral Q6H PRN Savana Kervin, NP        Or   Dorena Jovan acetaminophen (TYLENOL) suppository 650 mg  650 mg Rectal Q6H PRN Savana Kervin, NP        polyethylene glycol (MIRALAX) packet 17 g  17 g Oral DAILY PRN Savana Kervin, NP        promethazine (PHENERGAN) tablet 12.5 mg  12.5 mg Oral Q6H PRN Savana Kervin NP        Or    ondansetron [facility]) injection 4 mg  4 mg IntraVENous Q6H PRN Savana Kervin, NP        furosemide (LASIX) injection 40 mg  40 mg IntraVENous BID Savana Lulas, NP   40 mg at 21 0820    allopurinoL (ZYLOPRIM) tablet 200 mg  200 mg Oral DAILY Savana Lulas, NP   200 mg at 21 0820    amLODIPine (NORVASC) tablet 10 mg  10 mg Oral DAILY Savana Luabhishek, NP   10 mg at 21 0820    apixaban (ELIQUIS) tablet 2.5 mg  2.5 mg Oral BID Savana Lulas, NP   2.5 mg at 21 0820    ferrous sulfate tablet 325 mg  325 mg Oral BID Savana Lulas, NP        fluticasone propionate (FLONASE) 50 mcg/actuation nasal spray 2 Spray  2 Spray Both Nostrils DAILY PRN Savana Kervin, NP        potassium chloride (KLOR-CON) tablet 20 mEq- MAY NEED TO DISSOLVE IN WATER   20 mEq Oral DAILY Savana Lulas, NP   20 mEq at 21 1030    pantoprazole (PROTONIX) tablet 40 mg  40 mg Oral DAILY Savana Lulas, NP   40 mg at 21 0820    atorvastatin (LIPITOR) tablet 20 mg  20 mg Oral QHS Savana Galeana NP        sodium bicarbonate tablet 650 mg  650 mg Oral BID Lorraine George NP   650 mg at 01/13/21 0900    insulin lispro (HUMALOG) injection   SubCUTAneous AC&HS Lorraine George NP   6 Units at 01/13/21 1204    glucose chewable tablet 16 g  4 Tab Oral PRN Lorraine George NP        glucagon (GLUCAGEN) injection 1 mg  1 mg IntraMUSCular PRN Lorraine George NP        dextrose (D50W) injection syrg 12.5-25 g  25-50 mL IntraVENous PRN Lorraine George NP            No Known Allergies    Review of Systems:  A comprehensive review of systems was negative except for that written in the History of Present Illness.     Objective:     Vitals:    01/13/21 0700 01/13/21 0800 01/13/21 1100 01/13/21 1200   BP: (!) 177/88  (!) 153/75    Pulse: 85 94 95 92   Resp: 22  22    Temp: 97.1 °F (36.2 °C)  97.5 °F (36.4 °C)    SpO2: 98%  98%    Weight:       Height:            Physical Exam:  General: NAD  Eyes: sclera anicteric  Oral Cavity: No thrush or ulcers  Neck: no JVD  Chest: Fair bilateral air entry  Heart: normal sounds  Abdomen: soft and non tender   : no sommers  Lower Extremities: no edema  Skin: no rash  Neuro: intact  Psychiatric: non-depressed            Assessment:     Hospital Problems  Date Reviewed: 10/29/2020          Codes Class Noted POA    Acute on chronic heart failure (HCC) ICD-10-CM: I50.9  ICD-9-CM: 428.0  1/13/2021 Unknown        Deep vein thrombosis (DVT) of proximal vein of both lower extremities (HCC) (Chronic) ICD-10-CM: I82.4Y3  ICD-9-CM: 453.41  9/14/2020 Yes    Overview Signed 9/14/2020  3:43 PM by Alonzo Kohli NP                      Type 2 diabetes mellitus with stage 4 chronic kidney disease, with long-term current use of insulin (HCC) ICD-10-CM: E11.22, N18.4, Z79.4  ICD-9-CM: 250.40, 585.4, V58.67  12/7/2009 Yes        Essential hypertension ICD-10-CM: I10  ICD-9-CM: 401.9  12/7/2009 Yes              Plan:         Signed By: Mauri Mcfadden MD     January 13, 2021               ROS:  Constitutional   · Constitutional: no fatigue, no fever, no night sweats, no significant weight gain, no significant weight loss     Eyes   · Eyes: no dry eyes, no vision change     ENMT   · Nose: no frequent nosebleeds, no nose/sinus problems   · Mouth/Throat: no dry mouth, no bleeding gums, no sore throat, no teeth problems, no snoring     Cardiovascular   · Cardiovascular: no swelling in the extremities, no chest pain, no arm pain on exertion, no shortness of breath when walking, no known heart murmur, no shortness of breath when lying down, no palpitations     Respiratory   · Respiratory: shortness of breath+, no cough, no wheezing, no coughing up blood     Gastrointestinal   · Gastrointestinal: no nausea, no abdominal pain, no vomiting, normal appetite, no diarrhea, not vomiting blood     Genitourinary   · Genitourinary: no hematuria, no incontinence, no difficulty urinating, no increased frequency     Musculoskeletal   · Musculoskeletal: no back pain, no arthralgias/joint pain, no muscle aches, no muscle weakness     Integumentary   · Skin: no rashes, no jaundice     Neurologic   · Neurologic: no dizziness, no numbness, no loss of consciousness, no weakness, no seizures, no headaches     Psychiatric   · Psych: no depression, no sleep disturbances     Hematologic/Lymphatic   · Hematologic/Lymphatic no swollen glands, no bruising     Allergic/Immunologic   · Allergy/Immunologic: no runny nose, no hives   ROS as noted in the HPI      PE:  Constitutional   · Level of Distress: NAD, no acute illness, no chronic illness   · General Appearance: healthy-appearing, well-nourished, well-developed   · Ambulation: ambulating normally     Eyes   · Pupils: PERRLA   · EOM: EOMI     Cardiovascular   · Heart Auscultation: RRR, normal S1, normal S2, no murmurs, no rubs, no gallop, no click, no KARI     Lungs   · Auscultation: breath sounds normal, good air movement, CTA except as noted, no wheezing, no rales/crackles, no rhonchi     Abdomen   · Inspection and Palpation: soft, non-distended, no tenderness, no CVA tenderness     Musculoskeletal System   · Extremities: no clubbing, no cyanosis, edema +    Skin   · Inspection and Palpation: no rash, no lesions, no ulcer, no induration, no nodule, turgor normal, no jaundice     Neurologic   · Cranial Nerves: grossly intact   · Gait And Station: normal gait, normal station        A/P:    1. 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  -Last Cr is 2.6 (was 2.9)  -His urinalysis showed bland sediment and has 4-5 proteinuria.  -A kidney ultrasound surgically absent left kidney and right kidney with no hydronephrosis. -He was advised to avoid any kind of NSAIDs.  -I will keep iv lasix 40 mg bid    2. History of kidney cancer:  -Status post left total nephrectomy 8 years ago. 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  -He follow-up with oncologist.    3. Hypertension:  -Blood pressure is elevated with systolic .  -will increase hydralazine 100 mg tid   -will increase metoprolol ER 25 mg daily  -will dc clonidine and minoxidil  -on amlodipine 10 mg daily       4. Anemia:  -Hb is 10.9  -No need for procrit. -TSAT 22%, Ferritin 283  -will keep on po FeSO4 325 mg bid. 5. Renal osteodystrophy.  -His Ca, phosphorus ok  - vitamin D25 hydroxy 18, will order Vit D 50,000 units weekly for 16 weeks.  -has sec HPT   -iPTH 169, will recheck     6. Gouty arthritis:  -His Uric acid is 5.0  -will keep on Allopurinol 200 mg daily. 7. Metabolic acidosis:  -CO2 20  -will keep on NaHCO3 650 mg 1 tabs bid    8. Proteinuria, nephrotic range:   -has 4-5 g/g per day   -likely from diabetic nephropathy   -Hep B/C, C3/C4, SFL ratio and ANCAs are neg   -unable to add ARB due to # 1 and K 5.0     9.  SOB:  -from fluid overload  -sob on exertion and LE edema  -last Echo 03/2020 LVEF 50-55%  -will keep iv lasix 40 mg bid

## 2021-01-13 NOTE — PROGRESS NOTES
Patient is not available to be assessed at this time.       [address] [facility].   [phone]

## 2021-01-13 NOTE — CONSULTS
[facility] CARDIOLOGY  CARDIOLOGY CONSULTATION    REASON FOR CONSULT: Acute heart failure with preserved ejection fraction    REQUESTING PROVIDER: Brandon Aguilar NP    CHIEF COMPLAINT:  Shortness of breath    HISTORY OF PRESENT ILLNESS:  Beka Melo is a 70y.o. year-old male with past medical history significant for HTN, hyperlipidemia, DVT, bladder cancer, renal cell carcinoma, DVT in 2019, and left nephrectomy who was seen today for evaluation of acute heart failure with preserved ejection fraction. The patient presented to the [facility] ER on 1/12/21 for evaluation of shortness of breath. He states it had been ongoing for several weeks prior to admission with associated leg swelling. He denies chest pain. He saw his nephrologist (Dr. Anastasia Bhatt) on 1/11/21 who added Lasix 80 mg PO daily. The patient states he took 1 or 2 doses as prescribed, but had no improvement, so he came to the ER. Chest xray showed mild vascular congestion. He was given Lasix 80 mg IV in the ER and has been maintained on 40 mg IV BID. He reports resolution of his symptoms at this time. Records from hospital admission course thus far reviewed. Telemetry reviewed. No events overnight. The patient is in sinus rhythm. INPATIENT MEDICATIONS:  Home medications reviewed.     Current Facility-Administered Medications:     sodium chloride (NS) flush 5-40 mL, 5-40 mL, IntraVENous, Q8H, Kevin Haji NP, 10 mL at 01/13/21 0820    sodium chloride (NS) flush 5-40 mL, 5-40 mL, IntraVENous, PRN, Savana Lulas, NP    acetaminophen (TYLENOL) tablet 650 mg, 650 mg, Oral, Q6H PRN **OR** acetaminophen (TYLENOL) suppository 650 mg, 650 mg, Rectal, Q6H PRN, Savana Lulas, NP    polyethylene glycol (MIRALAX) packet 17 g, 17 g, Oral, DAILY PRN, Savana Kervin, NP    promethazine (PHENERGAN) tablet 12.5 mg, 12.5 mg, Oral, Q6H PRN **OR** ondansetron (ZOFRAN) injection 4 mg, 4 mg, IntraVENous, Q6H PRN, Savana Kervin, NP    furosemide (LASIX) injection 40 mg, 40 mg, IntraVENous, BID, Jeremy Rein, NP, 40 mg at 01/13/21 0820    allopurinoL (ZYLOPRIM) tablet 200 mg, 200 mg, Oral, DAILY, Jeremy Rein, NP, 200 mg at 01/13/21 0820    amLODIPine (NORVASC) tablet 10 mg, 10 mg, Oral, DAILY, Jeremy Rein, NP, 10 mg at 01/13/21 0820    apixaban (ELIQUIS) tablet 2.5 mg, 2.5 mg, Oral, BID, Jeremy Rein, NP, 2.5 mg at 01/13/21 0820    ferrous sulfate tablet 325 mg, 325 mg, Oral, BID, Jeremy Rein, NP    fluticasone propionate (FLONASE) 50 mcg/actuation nasal spray 2 Spray, 2 Spray, Both Nostrils, DAILY PRN, Jeremy Rein, NP    isosorbide mononitrate ER (IMDUR) tablet 60 mg, 60 mg, Oral, DAILY, Jeremy Rein, NP, 60 mg at 01/13/21 0820    potassium chloride (KLOR-CON) tablet 20 mEq- MAY NEED TO DISSOLVE IN WATER , 20 mEq, Oral, DAILY, Jeremy Rein, NP, 20 mEq at 01/13/21 1030    pantoprazole (PROTONIX) tablet 40 mg, 40 mg, Oral, DAILY, Jeremy Rein, NP, 40 mg at 01/13/21 0820    atorvastatin (LIPITOR) tablet 20 mg, 20 mg, Oral, QHS, Jeremy Rein, NP    sodium bicarbonate tablet 650 mg, 650 mg, Oral, BID, Jeremy Rein, NP, 650 mg at 01/13/21 0900    insulin lispro (HUMALOG) injection, , SubCUTAneous, AC&HS, Jeremy Rein, NP    glucose chewable tablet 16 g, 4 Tab, Oral, PRN, Jeremy Rein, NP    glucagon (GLUCAGEN) injection 1 mg, 1 mg, IntraMUSCular, PRN, Jeremy Rein, NP    dextrose (D50W) injection syrg 12.5-25 g, 25-50 mL, IntraVENous, PRN, Jeremy Rein, NP     ALLERGIES:  Allergies reviewed with the patient,No Known Allergies . FAMILY HISTORY:  Family history reviewed. SOCIAL HISTORY:  Notable for former tobacco use, no alcohol use, and no illicit drug use. REVIEW OF SYSTEMS:  Complete review of systems performed, pertinents noted above, all other systems are negative. PHYSICAL EXAMINATION:  Vital sign assessment reveal a blood pressure of 177/88 and pulse rate of 85.    Cardiovascular exam has a heart with a regular rate and rhythm, normal S1 and S2. No murmur present. There are no rubs or gallops. Good peripheral pulses. No jugular venous distension. Respiratory exam reveals clear lung fields, no rales or rhonchi. Lymphatic exam reveals no edema. Neurologic exam is nonfocal.      Recent labs results and imaging reviewed. Notable findings include:   Recent Results (from the past 24 hour(s))   CBC WITH AUTOMATED DIFF    Collection Time: 01/12/21  9:55 PM   Result Value Ref Range    WBC 7.8 4.6 - 13.2 K/uL    RBC 3.51 (L) 4.70 - 5.50 M/uL    HGB 9.4 (L) 13.0 - 16.0 g/dL    HCT 30.5 (L) 36.0 - 48.0 %    MCV 86.9 74.0 - 97.0 FL    MCH 26.8 24.0 - 34.0 PG    MCHC 30.8 (L) 31.0 - 37.0 g/dL    RDW 17.1 (H) 11.6 - 14.5 %    PLATELET 924 316 - 269 K/uL    MPV 10.4 9.2 - 11.8 FL    NEUTROPHILS 77 (H) 40 - 73 %    LYMPHOCYTES 14 (L) 21 - 52 %    MONOCYTES 7 3 - 10 %    EOSINOPHILS 2 0 - 5 %    BASOPHILS 0 0 - 2 %    IMMATURE GRANULOCYTES 0 %    ABS. NEUTROPHILS 6.0 1.8 - 8.0 K/UL    ABS. LYMPHOCYTES 1.1 0.9 - 3.6 K/UL    ABS. MONOCYTES 0.5 0.05 - 1.2 K/UL    ABS. EOSINOPHILS 0.1 0.0 - 0.4 K/UL    ABS. BASOPHILS 0.0 0.0 - 0.1 K/UL    ABS. IMM. GRANS. 0.0 K/UL   TROPONIN I    Collection Time: 01/12/21  9:55 PM   Result Value Ref Range    Troponin-I, Qt. 0.02 0.02 - 8.05 ng/mL   METABOLIC PANEL, COMPREHENSIVE    Collection Time: 01/12/21  9:55 PM   Result Value Ref Range    Sodium 141 135 - 145 mmol/L    Potassium 4.3 3.2 - 5.1 mmol/L    Chloride 117 (H) 94 - 111 mmol/L    CO2 17 (L) 21 - 33 mmol/L    Anion gap 7 mmol/L    Glucose 185 (H) 70 - 110 mg/dL    BUN 50 (H) 9 - 21 mg/dL    Creatinine 2.60 (H) 0.8 - 1.50 mg/dL    BUN/Creatinine ratio 19      GFR est AA 29 ml/min/1.73m2    GFR est non-AA 24 ml/min/1.73m2    Calcium 9.0 8.5 - 10.5 mg/dL    Bilirubin, total 0.5 0.2 - 1.0 mg/dL    AST (SGOT) 14 (L) 17 - 74 U/L    ALT (SGPT) 15 3 - 72 U/L    Alk.  phosphatase 59 38 - 126 U/L    Protein, total 7.2 6.1 - 8.4 g/dL    Albumin 3.6 3.5 - 4.7 g/dL    Globulin 3.6 g/dL    A-G Ratio 1.0     EKG, 12 LEAD, INITIAL    Collection Time: 01/12/21  9:55 PM   Result Value Ref Range    Ventricular Rate 94 BPM    Atrial Rate 93 BPM    P-R Interval 232 ms    QRS Duration 103 ms    Q-T Interval 380 ms    QTC Calculation (Bezet) 476 ms    Calculated P Axis -8 degrees    Calculated R Axis -40 degrees    Calculated T Axis 45 degrees    Diagnosis       Sinus rhythm  Prolonged RI interval  Left axis deviation  Probable anteroseptal infarct, old     GLUCOSE, POC    Collection Time: 01/13/21  7:32 AM   Result Value Ref Range    Glucose (POC) 147 (H) 70 - 110 mg/dL    Performed by Tod Ambriz    GLUCOSE, POC    Collection Time: 01/13/21 10:33 AM   Result Value Ref Range    Glucose (POC) 245 (H) 70 - 110 mg/dL    Performed by Meme Willis      Discussed case with Dr. Jasmine Meehan, and our impression and recommendations are as follows:  Acute heart failure with preserved ejection fraction: Single troponin is negative. Initial EKG is nonischemic. BNP not checked. Chest xray completed in the ER indicates mild vascular congestion. Agree with diuresing with Lasix 40 mg IV BID for now, but recommend nephrology consultation for further diuresis recommendations upon discharge. Recommend daily weights, strict I&Os, and 1500 ml fluid restrictions/24 hours. Continue telemetry monitoring. Recommend to keep serum potassium between 4-5 and serum magnesium > 2. Echocardiogram done in September 2020 indicates EF of 50-55%, RV dilation, mild RVH, normal RV systolic function, and severe LA dilation. If discharged today, recommend follow up at [facility] in 2 weeks. 2.   Hypertension: Blood pressures are above goal. Recommend to resume all home medications. Thank you for involving us in the care of this patient. Please do not hesitate to call me or Dr. Jasmine Meehan if additional questions arise.

## 2021-01-13 NOTE — DISCHARGE SUMMARY
HOSPITALIST DISCHARGE NOTE  Deandra Rodriguez MD, [address] [facility]  [address] [employer]       PATIENT ID: Sydnie Santiago  MRN: [id number]   YOB: 1945    DATE OF ADMISSION: 1/12/2021  9:27 PM    DATE OF DISCHARGE: 01/13/21    PRIMARY CARE PROVIDER: Yovani Jamison NP     ATTENDING PHYSICIAN: Deandra Rodriguez MD  DISCHARGING PROVIDER: Deandra Rodriguez MD        CONSULTATIONS: IP CONSULT TO CARDIOLOGY  IP CONSULT TO NEPHROLOGY    PROCEDURES/SURGERIES: * No surgery found *    ADMITTING [address] [place] [address] COURSE:   Sydnie Santiago is a 70 y.o. male a medical history of type 2 diabetes, congestive heart failure, stage IV chronic kidney disease who presented to [facility] ED with complaint of shortness of breath over the past several days. Reports shortness of breath for at least 5 to 7 days that worsened on today. He attempted to walk to his mailbox and had difficulty on the return walk to his home. He complains of severe shortness of breath worse on exertion. Was seen by his nephrologist on yesterday and started on Lasix 80 mg with no improvement noted. He denies chest pain, cough. Endorses bilateral lower extremity swelling. He denies additional concerns. He has been admitted for further evaluation and treatment of acute congestive heart failure. DISCHARGE DIAGNOSES / PLAN:      Mild acute on chronic heart failure (HCC)  Remote tele  Daily weights, strict I/O  Well improved after just 1 dose of IV 40 mg dose of Lasix.   Last echo 9/2020 with severe concentric L ventricular hypertrophy with normal systolic function and EF 45-55%  Per both nephrology and cardiology consultations, patient can be discharged home on Lasix 80 mg daily to resume that dosage along with continuing his minoxidil, Imdur, hydralazine, metoprolol.     Type 2 diabetes mellitus with stage 4 chronic kidney disease, with long-term current use of insulin ([facility]  Continue Lantus and insulin regimen at home. Stage IV chronic renal failure  Continue outpatient follow-up with nephrology, Dr. Cony Daley. Essential hypertension  Resume home regimen accordingly    Bilateral DVT  Continue Eliquis from home. PENDING TEST RESULTS:   At the time of discharge the following test results are still pending: None    FOLLOW UP APPOINTMENTS:    Follow-up Information     Follow up With Specialties Details Why Contact Info    Paddy Smith, NP Nurse Practitioner Schedule an appointment as soon as possible for a visit  After hospital discharge Madie [id number]  [phone]      Raphael Baker NP Nurse Practitioner   [address]adys Pi[address]Shira  [place] [address]  [phone]             DIET: Diabetic Diet    ACTIVITY: Activity as tolerated      DISCHARGE MEDICATIONS:  Current Discharge Medication List      CONTINUE these medications which have NOT CHANGED    Details   apixaban (Eliquis) 2.5 mg tablet Take 2.5 mg by mouth two (2) times a day. simvastatin (ZOCOR) 40 mg tablet Take 40 mg by mouth nightly. !! sodium bicarbonate 650 mg tablet Take 1 Tab by mouth two (2) times a day. Qty: 60 Tab, Refills: 1      amLODIPine (NORVASC) 5 mg tablet TAKE 2 TABLETS BY MOUTH ONCE A DAY  Qty: 180 Tab, Refills: 1      allopurinoL (ZYLOPRIM) 100 mg tablet Take 2 Tabs by mouth daily. Qty: 60 Tab, Refills: 3      omeprazole (PRILOSEC) 40 mg capsule Take 1 Cap by mouth daily.   Qty: 90 Cap, Refills: 0      glucose blood VI test strips (ASCENSIA AUTODISC VI, ONE TOUCH ULTRA TEST VI) strip Check blood glucose 3 times daily, give Accu chek Precious test strips  Qty: 300 Strip, Refills: 3    Associated Diagnoses: Type 2 diabetes mellitus with stage 4 chronic kidney disease, with long-term current use of insulin (HCC)      Lantus Solostar U-100 Insulin 100 unit/mL (3 mL) inpn INJECT 25 UNITS SUBCUTANEOUSLY ONCE DAILY  Qty: 9 mL, Refills: 1      fluticasone propionate (FLONASE) 50 mcg/actuation nasal spray 1 [place] by Both Nostrils route daily as needed. ferrous sulfate 325 mg (65 mg iron) tablet Take 325 mg by mouth two (2) times a day. NOVOLOG FLEXPEN U-100 INSULIN 100 unit/mL inpn INJECT 8 UNITS SUBCUTANEOUSLY BEFORE MEALS  Refills: 4      !! sodium bicarbonate 650 mg tablet TAKE 1 TABLET BY MOUTH TWICE DAILY WITH MEALS  Qty: 60 Tab, Refills: 1      furosemide (LASIX) 80 mg tablet TAKE 1 TABLET BY MOUTH DAILY  Qty: 90 Tab, Refills: 1    Comments: **Patient requests 90 days supply**      metoprolol succinate (TOPROL-XL) 25 mg XL tablet TAKE 1 TABLET BY MOUTH DAILY  Qty: 90 Tab, Refills: 1    Comments: **Patient requests 90 days supply**      minoxidiL (LONITEN) 2.5 mg tablet Take 2.5 mg by mouth two (2) times a day. hydrALAZINE (APRESOLINE) 50 mg tablet Take 2 Tabs by mouth three (3) times daily. Qty: 90 Tab, Refills: 1      ergocalciferol (ERGOCALCIFEROL) 1,250 mcg (50,000 unit) capsule Take 1 Cap by mouth every seven (7) days. Qty: 16 Cap, Refills: 0      cloNIDine HCL (CATAPRES) 0.2 mg tablet Take 0.5 Tabs by mouth three (3) times daily. Indications: high blood pressure  Qty: 90 Tab, Refills: 2      Klor-Con 20 mEq pack Take 20 mEq by mouth daily. isosorbide mononitrate ER (IMDUR) 60 mg CR tablet Take 60 mg by mouth daily. BD INSULIN PEN NEEDLE UF MINI 31 gauge x 3/16\" ndle USE TO INJECT TID  Refills: 3       !! - Potential duplicate medications found. Please discuss with provider. Recent Days:  Recent Labs     01/12/21 2155   WBC 7.8   HGB 9.4*   HCT 30.5*        Recent Labs     01/12/21 2155      K 4.3   *   CO2 17*   *   BUN 50*   CREA 2.60*   CA 9.0   ALB 3.6   TBILI 0.5   ALT 15     No results for input(s): PH, PCO2, PO2, HCO3, FIO2 in the last 72 hours.     24 Hour Results:  Recent Results (from the past 24 hour(s))   CBC WITH AUTOMATED DIFF    Collection Time: 01/12/21  9:55 PM   Result Value Ref Range    WBC 7.8 4.6 - 13.2 K/uL    RBC 3.51 (L) 4.70 - 5.50 M/uL    HGB 9.4 (L) 13.0 - 16.0 g/dL    HCT 30.5 (L) 36.0 - 48.0 %    MCV 86.9 74.0 - 97.0 FL    MCH 26.8 24.0 - 34.0 PG    MCHC 30.8 (L) 31.0 - 37.0 g/dL    RDW 17.1 (H) 11.6 - 14.5 %    PLATELET 326 934 - 182 K/uL    MPV 10.4 9.2 - 11.8 FL    NEUTROPHILS 77 (H) 40 - 73 %    LYMPHOCYTES 14 (L) 21 - 52 %    MONOCYTES 7 3 - 10 %    EOSINOPHILS 2 0 - 5 %    BASOPHILS 0 0 - 2 %    IMMATURE GRANULOCYTES 0 %    ABS. NEUTROPHILS 6.0 1.8 - 8.0 K/UL    ABS. LYMPHOCYTES 1.1 0.9 - 3.6 K/UL    ABS. MONOCYTES 0.5 0.05 - 1.2 K/UL    ABS. EOSINOPHILS 0.1 0.0 - 0.4 K/UL    ABS. BASOPHILS 0.0 0.0 - 0.1 K/UL    ABS. IMM. GRANS. 0.0 K/UL   TROPONIN I    Collection Time: 01/12/21  9:55 PM   Result Value Ref Range    Troponin-I, Qt. 0.02 0.02 - 1.77 ng/mL   METABOLIC PANEL, COMPREHENSIVE    Collection Time: 01/12/21  9:55 PM   Result Value Ref Range    Sodium 141 135 - 145 mmol/L    Potassium 4.3 3.2 - 5.1 mmol/L    Chloride 117 (H) 94 - 111 mmol/L    CO2 17 (L) 21 - 33 mmol/L    Anion gap 7 mmol/L    Glucose 185 (H) 70 - 110 mg/dL    BUN 50 (H) 9 - 21 mg/dL    Creatinine 2.60 (H) 0.8 - 1.50 mg/dL    BUN/Creatinine ratio 19      GFR est AA 29 ml/min/1.73m2    GFR est non-AA 24 ml/min/1.73m2    Calcium 9.0 8.5 - 10.5 mg/dL    Bilirubin, total 0.5 0.2 - 1.0 mg/dL    AST (SGOT) 14 (L) 17 - 74 U/L    ALT (SGPT) 15 3 - 72 U/L    Alk.  phosphatase 59 38 - 126 U/L    Protein, total 7.2 6.1 - 8.4 g/dL    Albumin 3.6 3.5 - 4.7 g/dL    Globulin 3.6 g/dL    A-G Ratio 1.0     EKG, 12 LEAD, INITIAL    Collection Time: 01/12/21  9:55 PM   Result Value Ref Range    Ventricular Rate 94 BPM    Atrial Rate 93 BPM    P-R Interval 232 ms    QRS Duration 103 ms    Q-T Interval 380 ms    QTC Calculation (Bezet) 476 ms    Calculated P Axis -8 degrees    Calculated R Axis -40 degrees    Calculated T Axis 45 degrees    Diagnosis       Sinus rhythm  Prolonged ME interval  Left axis deviation  Probable anteroseptal infarct, old     GLUCOSE, POC    Collection Time: 01/13/21  7:32 AM   Result Value Ref Range    Glucose (POC) 147 (H) 70 - 110 mg/dL    Performed by Ryan Souza    SARS-COV-2    Collection Time: 01/13/21 10:30 AM   Result Value Ref Range    SARS-CoV-2 Please find results under separate order     COVID-19 RAPID TEST    Collection Time: 01/13/21 10:30 AM   Result Value Ref Range    Specimen source Nasopharyngeal      COVID-19 rapid test Not Detected Not Detected     SARS-COV-2    Collection Time: 01/13/21 10:30 AM   Result Value Ref Range    SARS-CoV-2 Please find results under separate order     GLUCOSE, POC    Collection Time: 01/13/21 10:33 AM   Result Value Ref Range    Glucose (POC) 245 (H) 70 - 110 mg/dL    Performed by Alf Donovan        NOTIFY YOUR PHYSICIAN FOR ANY OF THE FOLLOWING:   Fever over 101 degrees for 24 hours. Chest pain, shortness of breath, fever, chills, nausea, vomiting, diarrhea, change in mentation, falling, weakness, bleeding. Severe pain or pain not relieved by medications. Or, any other signs or symptoms that you may have questions about. DISPOSITION:   x Home With:   OT  PT  HH  RN       Long term SNF/Inpatient Rehab    Independent/assisted living    Hospice    Other:       PATIENT CONDITION AT DISCHARGE:     Functional status    Poor     Deconditioned    x Independent      Cognition    x Lucid     Forgetful     Dementia      Catheters/lines (plus indication)    Snider     PICC     PEG    x None      Code status    x Full code     DNR      PHYSICAL EXAMINATION AT DISCHARGE:  General:          Alert, cooperative, no distress, appears stated age. Neck:               Supple, symmetrical  Lungs:             Clear to auscultation bilaterally. No Wheezing or Rhonchi. No rales. Chest wall:      No tenderness  No Accessory muscle use. Heart:              Regular  rhythm,  No  murmur   No edema  Abdomen:        Soft, non-tender. Not distended. Bowel sounds normal  Extremities:     No cyanosis.  No clubbing,                            Skin turgor normal, Capillary refill normal  Skin:                Not pale.  Not Jaundiced  No rashes   Psych:             Not anxious or agitated.  Neurologic:      Alert, moves all extremities, answers questions appropriately and responds to commands       CHRONIC MEDICAL DIAGNOSES:  Problem List as of 1/13/2021 Date Reviewed: 10/29/2020          Codes Class Noted - Resolved    Acute on chronic heart failure (HCC) ICD-10-CM: I50.9  ICD-9-CM: 428.0  1/13/2021 - Present        CHF (congestive heart failure) (HCC) ICD-10-CM: I50.9  ICD-9-CM: 428.0  9/15/2020 - Present        Deep vein thrombosis (DVT) of proximal vein of both lower extremities (HCC) (Chronic) ICD-10-CM: I82.4Y3  ICD-9-CM: 453.41  9/14/2020 - Present    Overview Signed 9/14/2020  3:43 PM by Susi Reyes NP                      Positive D dimer ICD-10-CM: R79.89  ICD-9-CM: 790.92  9/13/2020 - Present        Chronic diastolic congestive heart failure (HCC) ICD-10-CM: I50.32  ICD-9-CM: 428.32, 428.0  7/31/2020 - Present        Hematuria ICD-10-CM: R31.9  ICD-9-CM: 599.70  9/4/2014 - Present        Stage 3 chronic kidney disease ICD-10-CM: N18.30  ICD-9-CM: 585.3  9/4/2014 - Present        Malignant neoplasm of urinary bladder (HCC) ICD-10-CM: C67.9  ICD-9-CM: 188.9  12/7/2009 - Present    Overview Signed 9/16/2020  2:28 PM by Buck Sears NP     Overview:   HG papillary urothelial carcinoma. S/p cystoprostatectomy 12/30/09             Type 2 diabetes mellitus with stage 4 chronic kidney disease, with long-term current use of insulin (HCC) ICD-10-CM: E11.22, N18.4, Z79.4  ICD-9-CM: 250.40, 585.4, V58.67  12/7/2009 - Present        Essential hypertension ICD-10-CM: I10  ICD-9-CM: 401.9  12/7/2009 - Present              Greater than 35 minutes were spent with the patient on counseling and coordination of care    Signed:   Paul Browne,  MD  1/13/2021  3:58 PM

## 2021-01-13 NOTE — ASSESSMENT & PLAN NOTE
Accuchecks, SSI, A1C  Cr appears at/near baseline.   Will monitor BMP daily  Continue home regimen accordingly

## 2021-01-13 NOTE — PROGRESS NOTES
Patient arrived on floor via stretcher. Patient assessment and vital signs obtained. Emptied the patients' urostomy bag of 350mL of straw colored clear urine.  Patient left resting with call bell in reach and bed in lowest position

## 2021-01-13 NOTE — ASSESSMENT & PLAN NOTE
Remote tele  Daily weights, strict I/O  Lasix 40mg BID  Last echo 9/2020 with severe concentric L ventricular hypertrophy with normal systolic function and EF 50-68%  Cardiology consulted for further  recs  Will monitor closely and modify POC accordingly

## 2021-01-13 NOTE — PROGRESS NOTES
Patient called and said his urostomy bag needed to be emptied again.  It had 500ml of straw clear urine emptied out of it

## 2021-01-13 NOTE — ED NOTES
TRANSFER - OUT REPORT:    Verbal report given to Junior Barreto RN (name) on Margarita Kincaid  being transferred to [facility] for routine progression of care       Report consisted of patients Situation, Background, Assessment and   Recommendations(SBAR). Information from the following report(s) SBAR, Kardex, ED Summary, Procedure Summary, Intake/Output, MAR, Recent Results, Med Rec Status, Cardiac Rhythm NSR, ST, Procedure Verification, Quality Measures and Dual Neuro Assessment was reviewed with the receiving nurse. Lines:   Peripheral IV 01/12/21 Left Antecubital (Active)   Site Assessment Clean, dry, & intact 01/12/21 2145   Phlebitis Assessment 0 01/12/21 2145   Infiltration Assessment 0 01/12/21 2145   Dressing Status Clean, dry, & intact 01/12/21 2145   Dressing Type Transparent;Tape 01/12/21 2145   Hub Color/Line Status Pink;Flushed;Patent; Positional 01/12/21 2145   Action Taken Blood drawn 01/12/21 2145        Opportunity for questions and clarification was provided.       Patient transported with:   Monitor  Registered Nurse

## 2021-01-13 NOTE — ED PROVIDER NOTES
EMERGENCY DEPARTMENT HISTORY AND PHYSICAL EXAM      Date: 1/12/2021  Patient Name: Chapis Ford    History of Presenting Illness     Chief Complaint   Patient presents with    Shortness of Breath       History Provided By: Patient    HPI: Chapis Ford, 70 y.o. male with a past medical history significant diabetes, hypertension, hyperlipidemia, deep vein thrombosis and Bladder cancer, DVT in September 2019, currently taking Eliquis, history of left nephrectomy secondary to renal cell carcinoma presents to the ED with cc of shortness of breath with onset sometime last week, acutely worsening. He was seen yesterday by his nephrologist, was started on Lasix for suspected fluid overload, patient is not certain if he has taken that medication, has had no improvement. At about 1130 today, he walked out to his mailbox, became severely short of breath, rested. This evening with light exertion he again became severely short of breath, called EMS for transport to emergency department. He denies chest pain, he has had swelling in both lower extremities, chronic. There are no other complaints, changes, or physical findings at this time. PCP: Sharon Partida NP    No current facility-administered medications on file prior to encounter. Current Outpatient Medications on File Prior to Encounter   Medication Sig Dispense Refill    apixaban (Eliquis) 2.5 mg tablet Take 2.5 mg by mouth two (2) times a day.  metoprolol succinate (TOPROL-XL) 25 mg XL tablet Take 12.5 mg by mouth daily.  simvastatin (ZOCOR) 40 mg tablet Take 40 mg by mouth nightly.  minoxidiL (LONITEN) 2.5 mg tablet Take 2.5 mg by mouth two (2) times a day.  hydrALAZINE (APRESOLINE) 50 mg tablet Take 2 Tabs by mouth three (3) times daily. 90 Tab 1    metoprolol succinate (TOPROL-XL) 25 mg XL tablet Take 1 Tab by mouth daily.  30 Tab 1    ergocalciferol (ERGOCALCIFEROL) 1,250 mcg (50,000 unit) capsule Take 1 Cap by mouth every seven (7) days. 16 Cap 0    sodium bicarbonate 650 mg tablet Take 1 Tab by mouth two (2) times a day. 60 Tab 1    furosemide (LASIX) 80 mg tablet Take 1 Tab by mouth daily. 30 Tab 1    amLODIPine (NORVASC) 5 mg tablet TAKE 2 TABLETS BY MOUTH ONCE A  Tab 1    allopurinoL (ZYLOPRIM) 100 mg tablet Take 2 Tabs by mouth daily. 60 Tab 3    hydrALAZINE (APRESOLINE) 25 mg tablet Take 0.5 Tabs by mouth two (2) times a day for 90 days. Indications: high blood pressure, SBP >180 in the am 30 Tab 2    omeprazole (PRILOSEC) 40 mg capsule Take 1 Cap by mouth daily. 90 Cap 0    glucose blood VI test strips (ASCENSIA AUTODISC VI, ONE TOUCH ULTRA TEST VI) strip Check blood glucose 3 times daily, give Accu chek Precious test strips 300 Strip 3    Lantus Solostar U-100 Insulin 100 unit/mL (3 mL) inpn INJECT 25 UNITS SUBCUTANEOUSLY ONCE DAILY 9 mL 1    cloNIDine HCL (CATAPRES) 0.2 mg tablet Take 0.5 Tabs by mouth three (3) times daily. Indications: high blood pressure 90 Tab 2    apixaban (Eliquis DVT-PE Treat 30D Start) 5 mg (74 tabs) starter pack Take 10 mg (two 5 mg tablets) by mouth twice a day for 7 days   Followed by 5 mg (one 5 mg tablet) by mouth twice a day  Indications: blood clot in a deep vein of the extremities 1 Dose Pack 0    Klor-Con 20 mEq pack Take 20 mEq by mouth daily.  fluticasone propionate (FLONASE) 50 mcg/actuation nasal spray 1 [place] by Both Nostrils route daily as needed.  isosorbide mononitrate ER (IMDUR) 60 mg CR tablet Take 60 mg by mouth daily.  ferrous sulfate 325 mg (65 mg iron) tablet Take 325 mg by mouth two (2) times a day.       NOVOLOG FLEXPEN U-100 INSULIN 100 unit/mL inpn INJECT 8 UNITS SUBCUTANEOUSLY BEFORE MEALS  4    BD INSULIN PEN NEEDLE UF MINI 31 gauge x 3/16\" ndle USE TO INJECT TID  3       Past History     Past Medical History:  Past Medical History:   Diagnosis Date    Acid reflux     Arthritis     Bladder cancer ([address]     Deep vein thrombosis (DVT) of proximal vein of both lower extremities ([facility] [address] 2020    Diabetes mellitus ([facility] [address]     GERD (gastroesophageal reflux disease)     Gout     High cholesterol     Hypertension     Kidney carcinoma, left (HCC)     Kidney disease     Pituitary mass ([facility] [address]     Reflux esophagitis     Unspecified deficiency anemia        Past Surgical History:  Past Surgical History:   Procedure Laterality Date    HX CYSTECTOMY  09    Dr. Ventura Bragg HX NEPHRECTOMY Left 2009    Nephroureterectomy by Dr. Annmarie Enamorado HX OTHER SURGICAL      surgery on pituitary gland       Family History:  Family History   Problem Relation Age of Onset    Heart Disease Father     Heart Disease Mother        Social History:  Social History     Tobacco Use    Smoking status: Former Smoker     Packs/day: 0.50     Years: 40.00     Pack years: 20.00     Types: Cigarettes     Quit date: 1999     Years since quittin.0    Smokeless tobacco: Never Used   Substance Use Topics    Alcohol use: No    Drug use: No       Allergies:  No Known Allergies      Review of Systems     Review of Systems   Constitutional: Negative for chills and fever. HENT: Negative for ear pain, rhinorrhea, sneezing and sore throat. Eyes: Negative for pain and discharge. Respiratory: Positive for shortness of breath. Negative for cough and wheezing. Cardiovascular: Positive for leg swelling. Negative for chest pain. Gastrointestinal: Negative for abdominal pain, constipation, diarrhea, nausea and vomiting. Endocrine: Negative for polydipsia and polyphagia. Genitourinary: Positive for flank pain. Negative for dysuria, frequency, hematuria and urgency. Musculoskeletal: Negative for arthralgias, back pain, joint swelling and neck pain. Skin: Negative for rash. Neurological: Negative for dizziness, weakness, light-headedness, numbness and headaches. Hematological: Negative for adenopathy.    Psychiatric/Behavioral: Negative for agitation, behavioral problems and self-injury. All other systems reviewed and are negative. Physical Exam     Physical Exam  Vitals signs and nursing note reviewed. Constitutional:       General: He is not in acute distress. Appearance: Normal appearance. He is obese. He is ill-appearing. He is not toxic-appearing. HENT:      Head: Normocephalic and atraumatic. Right Ear: Tympanic membrane normal.      Left Ear: Tympanic membrane normal.      Nose: No congestion or rhinorrhea. Mouth/Throat:      Mouth: Mucous membranes are moist.   Eyes:      Extraocular Movements: Extraocular movements intact. Pupils: Pupils are equal, round, and reactive to light. Neck:      Musculoskeletal: Normal range of motion and neck supple. Cardiovascular:      Rate and Rhythm: Regular rhythm. Tachycardia present. Heart sounds: Normal heart sounds. Pulmonary:      Effort: Pulmonary effort is normal. Tachypnea present. No respiratory distress. Breath sounds: Examination of the right-lower field reveals rhonchi. Examination of the left-lower field reveals rhonchi. Rhonchi present. No wheezing or rales. Abdominal:      General: Bowel sounds are normal. There is no distension. Palpations: Abdomen is soft. Tenderness: There is no abdominal tenderness. Comments: Mid lower abdomen with urostomy bag, functioning, stoma is clean   Musculoskeletal: Normal range of motion. General: No swelling or tenderness. Right lower leg: Edema (Bilateral 2+ pitting, pretibial) present. Left lower leg: Edema present. Skin:     General: Skin is warm and dry. Neurological:      General: No focal deficit present. Mental Status: He is alert and oriented to person, place, and time.    Psychiatric:         Mood and Affect: Mood normal.         Behavior: Behavior normal.         Lab and Diagnostic Study Results     Labs -     Recent Results (from the past 12 hour(s)) CBC WITH AUTOMATED DIFF    Collection Time: 01/12/21  9:55 PM   Result Value Ref Range    WBC 7.8 4.6 - 13.2 K/uL    RBC 3.51 (L) 4.70 - 5.50 M/uL    HGB 9.4 (L) 13.0 - 16.0 g/dL    HCT 30.5 (L) 36.0 - 48.0 %    MCV 86.9 74.0 - 97.0 FL    MCH 26.8 24.0 - 34.0 PG    MCHC 30.8 (L) 31.0 - 37.0 g/dL    RDW 17.1 (H) 11.6 - 14.5 %    PLATELET 741 310 - 636 K/uL    MPV 10.4 9.2 - 11.8 FL    NEUTROPHILS 77 (H) 40 - 73 %    LYMPHOCYTES 14 (L) 21 - 52 %    MONOCYTES 7 3 - 10 %    EOSINOPHILS 2 0 - 5 %    BASOPHILS 0 0 - 2 %    IMMATURE GRANULOCYTES 0 %    ABS. NEUTROPHILS 6.0 1.8 - 8.0 K/UL    ABS. LYMPHOCYTES 1.1 0.9 - 3.6 K/UL    ABS. MONOCYTES 0.5 0.05 - 1.2 K/UL    ABS. EOSINOPHILS 0.1 0.0 - 0.4 K/UL    ABS. BASOPHILS 0.0 0.0 - 0.1 K/UL    ABS. IMM. GRANS. 0.0 K/UL   TROPONIN I    Collection Time: 01/12/21  9:55 PM   Result Value Ref Range    Troponin-I, Qt. 0.02 0.02 - 2.31 ng/mL   METABOLIC PANEL, COMPREHENSIVE    Collection Time: 01/12/21  9:55 PM   Result Value Ref Range    Sodium 141 135 - 145 mmol/L    Potassium 4.3 3.2 - 5.1 mmol/L    Chloride 117 (H) 94 - 111 mmol/L    CO2 17 (L) 21 - 33 mmol/L    Anion gap 7 mmol/L    Glucose 185 (H) 70 - 110 mg/dL    BUN 50 (H) 9 - 21 mg/dL    Creatinine 2.60 (H) 0.8 - 1.50 mg/dL    BUN/Creatinine ratio 19      GFR est AA 29 ml/min/1.73m2    GFR est non-AA 24 ml/min/1.73m2    Calcium 9.0 8.5 - 10.5 mg/dL    Bilirubin, total 0.5 0.2 - 1.0 mg/dL    AST (SGOT) 14 (L) 17 - 74 U/L    ALT (SGPT) 15 3 - 72 U/L    Alk.  phosphatase 59 38 - 126 U/L    Protein, total 7.2 6.1 - 8.4 g/dL    Albumin 3.6 3.5 - 4.7 g/dL    Globulin 3.6 g/dL    A-G Ratio 1.0         Radiologic Studies -   @lastxrresult@  CT Results  (Last 48 hours)    None        CXR Results  (Last 48 hours)    None      EKG: Normal sinus rhythm with left axis deviation no acute ST changes, mildly prolonged NC interval, no ischemia, no arrhythmia, no changes from prior    Chest x-ray with cardiomegaly and increased vascularization consistent with congestive heart failure    Medical Decision Making   - I am the first provider for this patient. - I reviewed the vital signs, available nursing notes, past medical history, past surgical history, family history and social history. - Initial assessment performed. The patients presenting problems have been discussed, and they are in agreement with the care plan formulated and outlined with them. I have encouraged them to ask questions as they arise throughout their visit. Vital Signs-Reviewed the patient's vital signs. Patient Vitals for the past 12 hrs:   Temp Pulse Resp BP SpO2   01/12/21 2324 -- 94 26 (!) 174/73 99 %   01/12/21 2233 -- 91 24 (!) 181/54 100 %   01/12/21 2154 -- 95 26 (!) 180/63 --   01/12/21 2133 98.9 °F (37.2 °C) 96 18 (!) 179/69 99 %       Records Reviewed: Nursing Notes, Old Medical Records and Ambulance Run Sheet    The patient presents with shortness of breath with a differential diagnosis of acute exacerbation of congestive heart failure, pulmonary edema, COPD, infectious process, pneumonia, pulmonary embolus is unlikely as he has been taking Eliquis regularly, pneumothorax also less likely. ED Course: Multiple reassessments, patient has put out greater than 700 cc of urine through his urinary stoma, is symptomatically improved. His heart rate has decreased to the high 80s, he is satting 100% on 2 L of oxygen, feeling comfortable enough to lay back at about 45 degrees. 2350 discussed with Kendra Hayward PA-C, hospitalist, patient will be placed in observation to Dr. Nicole Mccloud.     ED Course as of Jan 13 0007   Tue Jan 12, 2021   2301 Chest x-ray shows cardiomegaly, increased vascular markings consistent with congestive heart failure, fluid overload    [DC]      ED Course User Index  [DC] Shahana Moser DO       Provider Notes (Medical Decision Making):   Patient presented to the emergency department with progressive shortness of breath over the last 5 days or so, seen by his nephrologist yesterday, did not improve with 1 or 2 doses of Lasix as an outpatient, presented here with acutely worsening shortness of breath. Labs are unremarkable with his creatinine of 2.5 about at baseline, hemoglobin 9.5 again, at baseline. He has had symptomatic improvement with greater than 700 cc of urine out after 80 mg of Lasix. Discussed with hospitalist, he will be placed in observation, continue diuresis and further recommendations as indicated. MDM  Number of Diagnoses or Management Options     Amount and/or Complexity of Data Reviewed  Clinical lab tests: ordered and reviewed  Tests in the radiology section of CPT®: ordered  Tests in the medicine section of CPT®: ordered  Decide to obtain previous medical records or to obtain history from someone other than the patient: no  Review and summarize past medical records: yes  Discuss the patient with other providers: yes  Independent visualization of images, tracings, or specimens: yes           Procedures           Disposition   Disposition: Condition stable and improved  Admitted to telemetry the case was discussed with the admitting physician Era Allison PA-C    Admitted    DISCHARGE PLAN:  1. Current Discharge Medication List      CONTINUE these medications which have NOT CHANGED    Details   apixaban (Eliquis) 2.5 mg tablet Take 2.5 mg by mouth two (2) times a day. !! metoprolol succinate (TOPROL-XL) 25 mg XL tablet Take 12.5 mg by mouth daily. simvastatin (ZOCOR) 40 mg tablet Take 40 mg by mouth nightly. minoxidiL (LONITEN) 2.5 mg tablet Take 2.5 mg by mouth two (2) times a day. !! hydrALAZINE (APRESOLINE) 50 mg tablet Take 2 Tabs by mouth three (3) times daily. Qty: 90 Tab, Refills: 1      !! metoprolol succinate (TOPROL-XL) 25 mg XL tablet Take 1 Tab by mouth daily.   Qty: 30 Tab, Refills: 1      ergocalciferol (ERGOCALCIFEROL) 1,250 mcg (50,000 unit) capsule Take 1 Cap by mouth every seven (7) days. Qty: 16 Cap, Refills: 0      sodium bicarbonate 650 mg tablet Take 1 Tab by mouth two (2) times a day. Qty: 60 Tab, Refills: 1      furosemide (LASIX) 80 mg tablet Take 1 Tab by mouth daily. Qty: 30 Tab, Refills: 1      amLODIPine (NORVASC) 5 mg tablet TAKE 2 TABLETS BY MOUTH ONCE A DAY  Qty: 180 Tab, Refills: 1      allopurinoL (ZYLOPRIM) 100 mg tablet Take 2 Tabs by mouth daily. Qty: 60 Tab, Refills: 3      !! hydrALAZINE (APRESOLINE) 25 mg tablet Take 0.5 Tabs by mouth two (2) times a day for 90 days. Indications: high blood pressure, SBP >180 in the am  Qty: 30 Tab, Refills: 2      omeprazole (PRILOSEC) 40 mg capsule Take 1 Cap by mouth daily. Qty: 90 Cap, Refills: 0      glucose blood VI test strips (ASCENSIA AUTODISC VI, ONE TOUCH ULTRA TEST VI) strip Check blood glucose 3 times daily, give Accu chek Precious test strips  Qty: 300 Strip, Refills: 3    Associated Diagnoses: Type 2 diabetes mellitus with stage 4 chronic kidney disease, with long-term current use of insulin (HCC)      Lantus Solostar U-100 Insulin 100 unit/mL (3 mL) inpn INJECT 25 UNITS SUBCUTANEOUSLY ONCE DAILY  Qty: 9 mL, Refills: 1      cloNIDine HCL (CATAPRES) 0.2 mg tablet Take 0.5 Tabs by mouth three (3) times daily. Indications: high blood pressure  Qty: 90 Tab, Refills: 2      apixaban (Eliquis DVT-PE Treat 30D Start) 5 mg (74 tabs) starter pack Take 10 mg (two 5 mg tablets) by mouth twice a day for 7 days   Followed by 5 mg (one 5 mg tablet) by mouth twice a day  Indications: blood clot in a deep vein of the extremities  Qty: 1 Dose Pack, Refills: 0      Klor-Con 20 mEq pack Take 20 mEq by mouth daily. fluticasone propionate (FLONASE) 50 mcg/actuation nasal spray 1 [place] by Both Nostrils route daily as needed. isosorbide mononitrate ER (IMDUR) 60 mg CR tablet Take 60 mg by mouth daily. ferrous sulfate 325 mg (65 mg iron) tablet Take 325 mg by mouth two (2) times a day.       NOVOLOG FLEXPEN U-100 INSULIN 100 unit/mL inpn INJECT 8 UNITS SUBCUTANEOUSLY BEFORE MEALS  Refills: 4      BD INSULIN PEN NEEDLE UF MINI 31 gauge x 3/16\" ndle USE TO INJECT TID  Refills: 3       !! - Potential duplicate medications found. Please discuss with provider. 2.   Follow-up Information     Follow up With Specialties Details Why Contact Info    Bc Smith Code, NP Nurse Practitioner Schedule an appointment as soon as possible for a visit  After hospital discharge [address] [facility] [phone]          3. Return to ED if worse   4. Current Discharge Medication List            Diagnosis     Clinical Impression:   1. Acute on chronic systolic congestive heart failure ([facility] [address]        Attestations:    Nola Wilhelm, DO    Please note that this dictation was completed with [employer], the computer voice recognition software. Quite often unanticipated grammatical, syntax, homophones, and other interpretive errors are inadvertently transcribed by the computer software. Please disregard these errors. Please excuse any errors that have escaped final proofreading. Thank you.

## 2021-01-13 NOTE — PROGRESS NOTES
Comprehensive Nutrition Assessment    Type and Reason for Visit: Initial    Nutrition Recommendations/Plan: Modify to Cardiac with 1800 kcal CCHO due to hx of DM as well  Continue 1500 mL fluid restriction     Nutrition Assessment:  69 yo male PMH: DM, CHF, CKD, DVT, HTN, HLD   Pt with SOB 5-7 days and LE edema. per nephrology due to fluid overload pt started on Lasix. Pt also PUI for COVID-19. Pt provided with Low Na diet handout via nutrition care manual due to LE edema and SOB. Pt with hyperglycemia on cardiac diet only and pt morbdily obese recommend modify to a Diabetic/Cardiac diet    Recent Results (from the past 24 hour(s))   CBC WITH AUTOMATED DIFF    Collection Time: 01/12/21  9:55 PM   Result Value Ref Range    WBC 7.8 4.6 - 13.2 K/uL    RBC 3.51 (L) 4.70 - 5.50 M/uL    HGB 9.4 (L) 13.0 - 16.0 g/dL    HCT 30.5 (L) 36.0 - 48.0 %    MCV 86.9 74.0 - 97.0 FL    MCH 26.8 24.0 - 34.0 PG    MCHC 30.8 (L) 31.0 - 37.0 g/dL    RDW 17.1 (H) 11.6 - 14.5 %    PLATELET 302 611 - 339 K/uL    MPV 10.4 9.2 - 11.8 FL    NEUTROPHILS 77 (H) 40 - 73 %    LYMPHOCYTES 14 (L) 21 - 52 %    MONOCYTES 7 3 - 10 %    EOSINOPHILS 2 0 - 5 %    BASOPHILS 0 0 - 2 %    IMMATURE GRANULOCYTES 0 %    ABS. NEUTROPHILS 6.0 1.8 - 8.0 K/UL    ABS. LYMPHOCYTES 1.1 0.9 - 3.6 K/UL    ABS. MONOCYTES 0.5 0.05 - 1.2 K/UL    ABS. EOSINOPHILS 0.1 0.0 - 0.4 K/UL    ABS. BASOPHILS 0.0 0.0 - 0.1 K/UL    ABS. IMM.  GRANS. 0.0 K/UL   TROPONIN I    Collection Time: 01/12/21  9:55 PM   Result Value Ref Range    Troponin-I, Qt. 0.02 0.02 - 3.93 ng/mL   METABOLIC PANEL, COMPREHENSIVE    Collection Time: 01/12/21  9:55 PM   Result Value Ref Range    Sodium 141 135 - 145 mmol/L    Potassium 4.3 3.2 - 5.1 mmol/L    Chloride 117 (H) 94 - 111 mmol/L    CO2 17 (L) 21 - 33 mmol/L    Anion gap 7 mmol/L    Glucose 185 (H) 70 - 110 mg/dL    BUN 50 (H) 9 - 21 mg/dL    Creatinine 2.60 (H) 0.8 - 1.50 mg/dL    BUN/Creatinine ratio 19      GFR est AA 29 ml/min/1.73m2    GFR est non-AA 24 ml/min/1.73m2    Calcium 9.0 8.5 - 10.5 mg/dL    Bilirubin, total 0.5 0.2 - 1.0 mg/dL    AST (SGOT) 14 (L) 17 - 74 U/L    ALT (SGPT) 15 3 - 72 U/L    Alk.  phosphatase 59 38 - 126 U/L    Protein, total 7.2 6.1 - 8.4 g/dL    Albumin 3.6 3.5 - 4.7 g/dL    Globulin 3.6 g/dL    A-G Ratio 1.0     EKG, 12 LEAD, INITIAL    Collection Time: 01/12/21  9:55 PM   Result Value Ref Range    Ventricular Rate 94 BPM    Atrial Rate 93 BPM    P-R Interval 232 ms    QRS Duration 103 ms    Q-T Interval 380 ms    QTC Calculation (Bezet) 476 ms    Calculated P Axis -8 degrees    Calculated R Axis -40 degrees    Calculated T Axis 45 degrees    Diagnosis       Sinus rhythm  Prolonged TX interval  Left axis deviation  Probable anteroseptal infarct, old     GLUCOSE, POC    Collection Time: 01/13/21  7:32 AM   Result Value Ref Range    Glucose (POC) 147 (H) 70 - 110 mg/dL    Performed by Kashif Gomez    SARS-COV-2    Collection Time: 01/13/21 10:30 AM   Result Value Ref Range    SARS-CoV-2 Please find results under separate order     COVID-19 RAPID TEST    Collection Time: 01/13/21 10:30 AM   Result Value Ref Range    Specimen source Nasopharyngeal      COVID-19 rapid test Not Detected Not Detected     SARS-COV-2    Collection Time: 01/13/21 10:30 AM   Result Value Ref Range    SARS-CoV-2 Please find results under separate order     GLUCOSE, POC    Collection Time: 01/13/21 10:33 AM   Result Value Ref Range    Glucose (POC) 245 (H) 70 - 110 mg/dL    Performed by Jcarlos Durham          Malnutrition Assessment:  Malnutrition Status:  Insufficient data    Context:  Acute illness     Findings of the 6 clinical characteristics of malnutrition:   Energy Intake:  Unable to assess  Weight Loss:  Unable to assess     Body Fat Loss:  Unable to assess,     Muscle Mass Loss:  Unable to assess,    Fluid Accumulation:  Unable to assess,     Strength:  Not performed         Estimated Daily Nutrient Needs:  Energy (kcal): 6674-5730 kcal/day; Weight Used for Energy Requirements: Admission(95 kg)  Protein (g): 76-95 g/day; Weight Used for Protein Requirements: Admission(0.8-1 g/kg)  Fluid (ml/day): 8709-3790 mL/day; Method Used for Fluid Requirements: 1 ml/kcal      Nutrition Related Findings:  Pt with SOB 5-7 days and LE edema. per nephrology due to fluid overload pt started on Lasix      Wounds:    None       Current Nutrition Therapies:  DIET CARDIAC Regular 1500 ML fluid restriction     Anthropometric Measures:  · Height:  5' 8\" (172.7 cm)  · Current Body Wt:  94.8 kg (209 lb)   · Admission Body Wt:  209 lb    · Usual Body Wt:        · Ideal Body Wt:  154 lbs:  135.7 %   · Adjusted Body Weight:   ; Weight Adjustment for: No adjustment   · Adjusted BMI:       · BMI Category:  Obese class 1 (BMI 30.0-34. 9)       Nutrition Diagnosis:   · Other (specify)(excessive fluid intake) related to renal dysfunction, cardiac dysfunction as evidenced by localized or generalized fluid accumulation, other (specify)(SOB)      Nutrition Interventions:   Food and/or Nutrient Delivery: Modify current diet  Nutrition Education and Counseling: Education initiated  Coordination of Nutrition Care: Continue to monitor while inpatient    Goals:  Pt to eat >75% of meals, gradual wt loss encouraged, BMI goal 25-29, BM q 1-3 days, glucose , Hgb A1c < 7, compliance with diet order       Nutrition Monitoring and Evaluation:   Behavioral-Environmental Outcomes: Readiness for change  Food/Nutrient Intake Outcomes: Food and nutrient intake  Physical Signs/Symptoms Outcomes: Meal time behavior, Biochemical data, Weight     F/U: 1/17/2021    Discharge Planning:    Continue current diet     Electronically signed by Sondra Srinivasan on 1/13/2021 at 4:04 PM    Contact: [place] [phone]

## 2021-01-13 NOTE — ED NOTES
Denies any further SOB,  Resting on stretcher. S/p Consult hospitlaist Kena Pantoja NP covering. Plan Admit, to inpatient.

## 2021-01-13 NOTE — ED NOTES
Reviewed Med Recon with patient, many changes per PCP, see updated list. Discussed with Yanira Vides NP and receiving nurse Mihai Matos RN

## 2021-01-13 NOTE — PROGRESS NOTES
Problem: Activity Intolerance  Goal: *Oxygen saturation during activity within specified parameters  Outcome: Progressing Towards Goal  Goal: *Able to remain out of bed as prescribed  Outcome: Progressing Towards Goal     Problem: Patient Education: Go to Patient Education Activity  Goal: Patient/Family Education  Outcome: Progressing Towards Goal     Problem: Diabetes Maintenance:Admission  Goal: Activity/Safety  Outcome: Progressing Towards Goal  Goal: Diagnostic Tests/Procedures  Outcome: Progressing Towards Goal  Goal: Nutrition  Outcome: Progressing Towards Goal  Goal: Medications  Outcome: Progressing Towards Goal  Goal: Treatments/Interventions/Procedures  Outcome: Progressing Towards Goal     Problem: Diabetes Maintenance:Ongoing  Goal: Activity/Safety  Outcome: Progressing Towards Goal  Goal: Nutrition  Outcome: Progressing Towards Goal  Goal: Medications  Outcome: Progressing Towards Goal  Goal: Treatments/Interventsions/Procedures  Outcome: Progressing Towards Goal  Goal: *Blood Glucose 80 to 180 md/dl  Outcome: Progressing Towards Goal     Problem: Diabetes Maintenance:Discharge Outcomes  Goal: *Describes follow-up/return visits to physicians  Outcome: Progressing Towards Goal  Goal: *Blood glucose at patient's target range or approaching  Outcome: Progressing Towards Goal  Goal: *Aware of nutrition guidelines  Outcome: Progressing Towards Goal  Goal: *Verbalizes information about medication  Description: Verbalizes name, dosage, time, side effects, and number of days to  continue medications.   Outcome: Progressing Towards Goal  Goal: *Describes goals, rules, symptoms, and treatments  Description: Describes blood glucose goals, monitoring, sick day rules,  hypo/hyperglycemia prevention, symptoms, and treatment  Outcome: Progressing Towards Goal  Goal: *Describes available outpatient diabetes resources and support systems  Outcome: Progressing Towards Goal     Problem: Falls - Risk of  Goal: *Absence of Falls  Description: Document Shlomo Robles Fall Risk and appropriate interventions in the flowsheet.   Outcome: Progressing Towards Goal  Note: Fall Risk Interventions:  Mobility Interventions: Utilize walker, cane, or other assistive device         Medication Interventions: Teach patient to arise slowly                   Problem: Patient Education: Go to Patient Education Activity  Goal: Patient/Family Education  Outcome: Progressing Towards Goal

## 2021-01-13 NOTE — H&P
History and Physical    Patient: Elsie Levine MRN: [id number]      YOB: 1945  Age: 70 y.o. Sex: male      Subjective:      Elsie Levine is a 70 y.o. male a medical history of type 2 diabetes, congestive heart failure, stage IV chronic kidney disease who presented to [facility] ED with complaint of shortness of breath over the past several days. Reports shortness of breath for at least 5 to 7 days that worsened on today. He attempted to walk to his mailbox and had difficulty on the return walk to his home. He complains of severe shortness of breath worse on exertion. Was seen by his nephrologist on yesterday and started on Lasix 80 mg with no improvement noted. He denies chest pain, cough. Endorses bilateral lower extremity swelling. He denies additional concerns. He has been admitted for further evaluation and treatment of acute congestive heart failure.       Past Medical History:   Diagnosis Date    Acid reflux     Arthritis     Bladder cancer (HCC)     Deep vein thrombosis (DVT) of proximal vein of both lower extremities ([address] 2020    Diabetes mellitus (HCC)     GERD (gastroesophageal reflux disease)     Gout     High cholesterol     Hypertension     Kidney carcinoma, left (HCC)     Kidney disease     Pituitary mass ([address]     Reflux esophagitis     Unspecified deficiency anemia      Past Surgical History:   Procedure Laterality Date    HX CYSTECTOMY  09    Dr. Sugar Dominguez HX NEPHRECTOMY Left 2009    Nephroureterectomy by Dr. Robyn Carpenter HX OTHER SURGICAL      surgery on pituitary gland      Family History   Problem Relation Age of Onset    Heart Disease Father     Heart Disease Mother      Social History     Tobacco Use    Smoking status: Former Smoker     Packs/day: 0.50     Years: 40.00     Pack years: 20.00     Types: Cigarettes     Quit date: 1999     Years since quittin.0    Smokeless tobacco: Never Used   Substance Use Topics    Alcohol use: No      No Known Allergies  Immunizations are UTD per pt. Patient will be admitted for Acute on chronic heart failure ([facility] [I50.9]  to hospitalist service as Observation and evaluated daily via physical assessment, diagnostic testing, and laboratory assessment. Review of Systems:  - CONSTITUTIONAL: Denies  fatigue, weight loss, fever and chills. - HEENT: Denies changes in vision and hearing.    - RESPIRATORY: Reports SOB and cough. ?    - CV: Denies palpitations and CP. Reports BLE swelling?    - GI: Denies abdominal pain, nausea, vomiting, diarrhea and constipation.    - : Denies dysuria and urinary frequency. ?    - MSK: Denies myalgia and joint pain. ?    - SKIN: Denies rash and pruritus.    - ?NEUROLOGICAL: Denies dizziness, weakness, headache and syncope. ?    - PSYCHIATRIC: Denies recent changes in mood. Denies anxiety and depression. Objective:     Vitals:    01/12/21 2133 01/12/21 2154 01/12/21 2233 01/12/21 2324   BP: (!) 179/69 (!) 180/63 (!) 181/54 (!) 174/73   Pulse: 96 95 91 94   Resp: 18 26 24 26   Temp: 98.9 °F (37.2 °C)      SpO2: 99%  100% 99%   Weight: 98.9 kg (218 lb)      Height: 5' 8\" (1.727 m)           Physical Exam:  - GENERAL: Alert and oriented x 3. No acute distress. Well-nourished. ?- HEENT: ,Moist mucous membranes. No scleral icterus. No cervical lymphadenopathy. Oropharynx moist without any lesions    -NECK:  no tracheal deviation, no JVD, no significant lymphadenopathy, no thyromegaly noted. ?- LUNGS: Clear to auscultation bilaterally. No accessory muscle use. ? Chest symmetrical, No wheezing noted. Appropriate respiratory effort. Crackles noted bibasilar     - CARDIOVASCULAR: Regular rate and rhythm. No murmur, rubs, gallops. S1 & S2 audible. 1-2 BLE edema noted    - ABDOMEN: Soft, non-tender and non-distended. No palpable masses. , lesions, hepatomegaly. Bowels active X4 quadrants. - SKIN: Warm, dry. Color appropriate for ethnicity. - MUSCULOSKELETAL: no deformities     - NEUROLOGIC: No focal neurological deficits. CN II-XII grossly intact    - PSYCHIATRIC: Calm & Cooperative. Appropriate mood and affect. Recent Results (from the past 12 hour(s))   CBC WITH AUTOMATED DIFF    Collection Time: 01/12/21  9:55 PM   Result Value Ref Range    WBC 7.8 4.6 - 13.2 K/uL    RBC 3.51 (L) 4.70 - 5.50 M/uL    HGB 9.4 (L) 13.0 - 16.0 g/dL    HCT 30.5 (L) 36.0 - 48.0 %    MCV 86.9 74.0 - 97.0 FL    MCH 26.8 24.0 - 34.0 PG    MCHC 30.8 (L) 31.0 - 37.0 g/dL    RDW 17.1 (H) 11.6 - 14.5 %    PLATELET 131 130 - 526 K/uL    MPV 10.4 9.2 - 11.8 FL    NEUTROPHILS 77 (H) 40 - 73 %    LYMPHOCYTES 14 (L) 21 - 52 %    MONOCYTES 7 3 - 10 %    EOSINOPHILS 2 0 - 5 %    BASOPHILS 0 0 - 2 %    IMMATURE GRANULOCYTES 0 %    ABS. NEUTROPHILS 6.0 1.8 - 8.0 K/UL    ABS. LYMPHOCYTES 1.1 0.9 - 3.6 K/UL    ABS. MONOCYTES 0.5 0.05 - 1.2 K/UL    ABS. EOSINOPHILS 0.1 0.0 - 0.4 K/UL    ABS. BASOPHILS 0.0 0.0 - 0.1 K/UL    ABS. IMM. GRANS. 0.0 K/UL   TROPONIN I    Collection Time: 01/12/21  9:55 PM   Result Value Ref Range    Troponin-I, Qt. 0.02 0.02 - 2.03 ng/mL   METABOLIC PANEL, COMPREHENSIVE    Collection Time: 01/12/21  9:55 PM   Result Value Ref Range    Sodium 141 135 - 145 mmol/L    Potassium 4.3 3.2 - 5.1 mmol/L    Chloride 117 (H) 94 - 111 mmol/L    CO2 17 (L) 21 - 33 mmol/L    Anion gap 7 mmol/L    Glucose 185 (H) 70 - 110 mg/dL    BUN 50 (H) 9 - 21 mg/dL    Creatinine 2.60 (H) 0.8 - 1.50 mg/dL    BUN/Creatinine ratio 19      GFR est AA 29 ml/min/1.73m2    GFR est non-AA 24 ml/min/1.73m2    Calcium 9.0 8.5 - 10.5 mg/dL    Bilirubin, total 0.5 0.2 - 1.0 mg/dL    AST (SGOT) 14 (L) 17 - 74 U/L    ALT (SGPT) 15 3 - 72 U/L    Alk.  phosphatase 59 38 - 126 U/L    Protein, total 7.2 6.1 - 8.4 g/dL    Albumin 3.6 3.5 - 4.7 g/dL    Globulin 3.6 g/dL    A-G Ratio 1.0              Assessment/Plan:   Acute on chronic heart failure (HCC)  Remote tele  Daily weights, strict I/O  Lasix 40mg BID  Last echo 9/2020 with severe concentric L ventricular hypertrophy with normal systolic function and EF 58-90%  Cardiology consulted for further  recs  Will monitor closely and modify POC accordingly    Type 2 diabetes mellitus with stage 4 chronic kidney disease, with long-term current use of insulin (HCC)  Accuchecks, SSI, A1C  Cr appears at/near baseline.   Will monitor BMP daily  Continue home regimen accordingly    Essential hypertension  Resume home regimen accordingly    Deep vein thrombosis (DVT) of proximal vein of both lower extremities ([address]  Resume Eliquis accordingly       Signed By: Ruddy Barbosa NP     January 13, 2021

## 2021-01-13 NOTE — ED TRIAGE NOTES
Patient arrived via EMS complaining of shortness of breath since he was at his doctors office today. Patient reports that he tried walking to the mailbox and exacerbated the shortness of breath.  Patient also reports pain in his left side, \"near where he has no kidney\"

## 2021-01-14 LAB
ATRIAL RATE: 93 BPM
CALCULATED P AXIS, ECG09: -8 DEGREES
CALCULATED R AXIS, ECG10: -40 DEGREES
CALCULATED T AXIS, ECG11: 45 DEGREES
DIAGNOSIS, 93000: NORMAL
EST. AVERAGE GLUCOSE BLD GHB EST-MCNC: 180 MG/DL
HBA1C MFR BLD: 7.9 % (ref 4–5.6)
P-R INTERVAL, ECG05: 232 MS
Q-T INTERVAL, ECG07: 380 MS
QRS DURATION, ECG06: 103 MS
QTC CALCULATION (BEZET), ECG08: 476 MS
SARS-COV-2, COV2NT: NOT DETECTED
VENTRICULAR RATE, ECG03: 94 BPM

## 2021-01-25 ENCOUNTER — APPOINTMENT (OUTPATIENT)
Dept: GENERAL RADIOLOGY | Age: 76
End: 2021-01-25
Attending: FAMILY MEDICINE
Payer: MEDICARE

## 2021-01-25 ENCOUNTER — HOSPITAL ENCOUNTER (EMERGENCY)
Age: 76
Discharge: HOME | End: 2021-01-25
Attending: FAMILY MEDICINE
Payer: MEDICARE

## 2021-01-25 VITALS
BODY MASS INDEX: 31.67 KG/M2 | RESPIRATION RATE: 22 BRPM | SYSTOLIC BLOOD PRESSURE: 162 MMHG | HEART RATE: 90 BPM | HEIGHT: 68 IN | WEIGHT: 209 LBS | TEMPERATURE: 98.4 F | DIASTOLIC BLOOD PRESSURE: 82 MMHG | OXYGEN SATURATION: 100 %

## 2021-01-25 DIAGNOSIS — K56.41 FECAL IMPACTION (HCC): Primary | ICD-10-CM

## 2021-01-25 DIAGNOSIS — R06.00 DYSPNEA, UNSPECIFIED TYPE: ICD-10-CM

## 2021-01-25 LAB
ALBUMIN SERPL-MCNC: 4.1 G/DL (ref 3.5–4.7)
ALBUMIN/GLOB SERPL: 1.1 {RATIO}
ALP SERPL-CCNC: 65 U/L (ref 38–126)
ALT SERPL-CCNC: 19 U/L (ref 3–72)
ANION GAP SERPL CALC-SCNC: 13 MMOL/L
AST SERPL W P-5'-P-CCNC: 23 U/L (ref 17–74)
ATRIAL RATE: 303 BPM
BASOPHILS # BLD: 0 K/UL (ref 0–0.1)
BASOPHILS NFR BLD: 0 % (ref 0–2)
BILIRUB SERPL-MCNC: 0.6 MG/DL (ref 0.2–1)
BNP SERPL-MCNC: 73 PG/ML (ref 0–100)
BUN SERPL-MCNC: 60 MG/DL (ref 9–21)
BUN/CREAT SERPL: 22
CA-I BLD-MCNC: 9.7 MG/DL (ref 8.5–10.5)
CALCULATED P AXIS, ECG09: 31 DEGREES
CALCULATED R AXIS, ECG10: -34 DEGREES
CALCULATED T AXIS, ECG11: 67 DEGREES
CHLORIDE SERPL-SCNC: 103 MMOL/L (ref 94–111)
CO2 SERPL-SCNC: 21 MMOL/L (ref 21–33)
CREAT SERPL-MCNC: 2.7 MG/DL (ref 0.8–1.5)
DIAGNOSIS, 93000: NORMAL
EOSINOPHIL # BLD: 0 K/UL (ref 0–0.4)
EOSINOPHIL NFR BLD: 0 % (ref 0–5)
ERYTHROCYTE [DISTWIDTH] IN BLOOD BY AUTOMATED COUNT: 15.1 % (ref 11.6–14.5)
GLOBULIN SER CALC-MCNC: 3.9 G/DL
GLUCOSE SERPL-MCNC: 227 MG/DL (ref 70–110)
HCT VFR BLD AUTO: 36.1 % (ref 36–48)
HGB BLD-MCNC: 11.3 G/DL (ref 13–16)
IMM GRANULOCYTES # BLD AUTO: 0 K/UL
IMM GRANULOCYTES NFR BLD AUTO: 0 %
LYMPHOCYTES # BLD: 0.8 K/UL (ref 0.9–3.6)
LYMPHOCYTES NFR BLD: 9 % (ref 21–52)
MCH RBC QN AUTO: 26.7 PG (ref 24–34)
MCHC RBC AUTO-ENTMCNC: 31.3 G/DL (ref 31–37)
MCV RBC AUTO: 85.1 FL (ref 74–97)
MONOCYTES # BLD: 0.5 K/UL (ref 0.05–1.2)
MONOCYTES NFR BLD: 6 % (ref 3–10)
NEUTS SEG # BLD: 7.8 K/UL (ref 1.8–8)
NEUTS SEG NFR BLD: 85 % (ref 40–73)
P-R INTERVAL, ECG05: 205 MS
PLATELET # BLD AUTO: 382 K/UL (ref 135–420)
PMV BLD AUTO: 9.8 FL (ref 9.2–11.8)
POTASSIUM SERPL-SCNC: 3.8 MMOL/L (ref 3.2–5.1)
PROT SERPL-MCNC: 8 G/DL (ref 6.1–8.4)
Q-T INTERVAL, ECG07: 405 MS
QRS DURATION, ECG06: 110 MS
QTC CALCULATION (BEZET), ECG08: 482 MS
RBC # BLD AUTO: 4.24 M/UL (ref 4.7–5.5)
SODIUM SERPL-SCNC: 137 MMOL/L (ref 135–145)
TROPONIN I SERPL-MCNC: 0.03 NG/ML (ref 0.02–0.05)
VENTRICULAR RATE, ECG03: 85 BPM
WBC # BLD AUTO: 9.2 K/UL (ref 4.6–13.2)

## 2021-01-25 PROCEDURE — 85025 COMPLETE CBC W/AUTO DIFF WBC: CPT

## 2021-01-25 PROCEDURE — 99283 EMERGENCY DEPT VISIT LOW MDM: CPT

## 2021-01-25 PROCEDURE — 84484 ASSAY OF TROPONIN QUANT: CPT

## 2021-01-25 PROCEDURE — 71045 X-RAY EXAM CHEST 1 VIEW: CPT

## 2021-01-25 PROCEDURE — 80053 COMPREHEN METABOLIC PANEL: CPT

## 2021-01-25 PROCEDURE — 83880 ASSAY OF NATRIURETIC PEPTIDE: CPT

## 2021-01-25 PROCEDURE — 93005 ELECTROCARDIOGRAM TRACING: CPT

## 2021-01-25 NOTE — ED TRIAGE NOTES
Patient states he recently started back taking an iron supplement. C/O constipation and left lower abdominal pain for 2 days. States he has increased shortness of breath due to the pain.

## 2021-01-25 NOTE — ED PROVIDER NOTES
EMERGENCY DEPARTMENT HISTORY AND PHYSICAL EXAM      Date: 1/25/2021  Patient Name: Wiliam Reyez    History of Presenting Illness     Chief Complaint   Patient presents with    Constipation    Shortness of Breath       History Provided By: Patient    HPI: Wiliam Reyez, 70 y.o. male with PMHx of bladder CA, DVT, DM, GERD, gout, and CHF presents to the ED with complaints of constipation and SOB. Pt states he started taking an Iron pill that he notes has made him constipated x 2 days. Pt advises the constipation has made him SOB as well. Pt also notes of associated chest tightness and LLQ abdominal and rectal pain. Pt denies any radiation of pain or any alleviating or aggravating factors. Pt indicates that the stool feels as if it is at his rectum. Pt notes he has a urostomy bag, having both nephrectomy and cystectomy in 2009. There are no other complaints, changes, or physical findings at this time. PCP: Jorge Luis Acosta NP    Current Outpatient Medications   Medication Sig Dispense Refill    sodium bicarbonate 650 mg tablet TAKE 1 TABLET BY MOUTH TWICE DAILY WITH MEALS 60 Tab 1    furosemide (LASIX) 80 mg tablet TAKE 1 TABLET BY MOUTH DAILY 90 Tab 1    metoprolol succinate (TOPROL-XL) 25 mg XL tablet TAKE 1 TABLET BY MOUTH DAILY 90 Tab 1    apixaban (Eliquis) 2.5 mg tablet Take 2.5 mg by mouth two (2) times a day.  simvastatin (ZOCOR) 40 mg tablet Take 40 mg by mouth nightly.  minoxidiL (LONITEN) 2.5 mg tablet Take 2.5 mg by mouth two (2) times a day.  hydrALAZINE (APRESOLINE) 50 mg tablet Take 2 Tabs by mouth three (3) times daily. 90 Tab 1    ergocalciferol (ERGOCALCIFEROL) 1,250 mcg (50,000 unit) capsule Take 1 Cap by mouth every seven (7) days. 16 Cap 0    sodium bicarbonate 650 mg tablet Take 1 Tab by mouth two (2) times a day.  60 Tab 1    amLODIPine (NORVASC) 5 mg tablet TAKE 2 TABLETS BY MOUTH ONCE A  Tab 1    allopurinoL (ZYLOPRIM) 100 mg tablet Take 2 Tabs by mouth daily. 60 Tab 3    omeprazole (PRILOSEC) 40 mg capsule Take 1 Cap by mouth daily. 90 Cap 0    glucose blood VI test strips (ASCENSIA AUTODISC VI, ONE TOUCH ULTRA TEST VI) strip Check blood glucose 3 times daily, give Accu chek Precious test strips 300 Strip 3    Lantus Solostar U-100 Insulin 100 unit/mL (3 mL) inpn INJECT 25 UNITS SUBCUTANEOUSLY ONCE DAILY 9 mL 1    cloNIDine HCL (CATAPRES) 0.2 mg tablet Take 0.5 Tabs by mouth three (3) times daily. Indications: high blood pressure 90 Tab 2    Klor-Con 20 mEq pack Take 20 mEq by mouth daily.  fluticasone propionate (FLONASE) 50 mcg/actuation nasal spray 1 [place] by Both Nostrils route daily as needed.  isosorbide mononitrate ER (IMDUR) 60 mg CR tablet Take 60 mg by mouth daily.  ferrous sulfate 325 mg (65 mg iron) tablet Take 325 mg by mouth two (2) times a day.       NOVOLOG FLEXPEN U-100 INSULIN 100 unit/mL inpn INJECT 8 UNITS SUBCUTANEOUSLY BEFORE MEALS  4    BD INSULIN PEN NEEDLE UF MINI 31 gauge x 3/16\" ndle USE TO INJECT TID  3       Past History     Past Medical History:  Past Medical History:   Diagnosis Date    Acid reflux     Arthritis     Bladder cancer (HCC)     Deep vein thrombosis (DVT) of proximal vein of both lower extremities ([address] 9/14/2020    Diabetes mellitus (HCC)     GERD (gastroesophageal reflux disease)     Gout     High cholesterol     Hypertension     Kidney carcinoma, left (HCC)     Kidney disease     Pituitary mass ([address]     Reflux esophagitis     Unspecified deficiency anemia        Past Surgical History:  Past Surgical History:   Procedure Laterality Date    HX CYSTECTOMY  12/30/09    Dr. Tavia Arcos HX NEPHRECTOMY Left 5/2009    Nephroureterectomy by Dr. Pascale Alfaro OTHER SURGICAL      HX OTHER SURGICAL      surgery on pituitary gland       Family History:  Family History   Problem Relation Age of Onset    Heart Disease Father     Heart Disease Mother        Social History:  Social History     Tobacco Use    Smoking status: Former Smoker     Packs/day: 0.50     Years: 40.00     Pack years: 20.00     Types: Cigarettes     Quit date: 1999     Years since quittin.0    Smokeless tobacco: Never Used   Substance Use Topics    Alcohol use: No    Drug use: No       Allergies:  No Known Allergies      Review of Systems   Review of Systems   Constitutional: Negative for chills and fever. HENT: Negative for congestion and sore throat. Respiratory: Positive for chest tightness and shortness of breath. Cardiovascular: Negative for chest pain. Gastrointestinal: Positive for abdominal pain, constipation and rectal pain. Genitourinary: Negative. Urostomy bag present but no complications noted. Musculoskeletal: Negative for back pain and neck pain. Skin: Negative for rash and wound. Psychiatric/Behavioral: Negative for confusion. Physical Exam   Physical Exam  Vitals signs and nursing note reviewed. Exam conducted with a chaperone present. Constitutional:       General: He is awake. Appearance: Normal appearance. He is well-developed and well-groomed. Interventions: Face mask in place. Comments: Elderly, in moderate pain. HENT:      Head: Normocephalic and atraumatic. Eyes:      Extraocular Movements: Extraocular movements intact. Pupils: Pupils are equal, round, and reactive to light. Neck:      Musculoskeletal: Full passive range of motion without pain, normal range of motion and neck supple. Cardiovascular:      Rate and Rhythm: Normal rate and regular rhythm. Heart sounds: Normal heart sounds. Pulmonary:      Effort: Tachypnea present. Breath sounds: Normal breath sounds and air entry. Abdominal:      General: Abdomen is flat. Palpations: Abdomen is soft. Genitourinary:     Rectum: No mass. Comments: Urostomy bag present in RLQ of abdomen. Fecal impaction present in rectum.   Skin:     General: Skin is warm and dry. Neurological:      General: No focal deficit present. Mental Status: He is alert and oriented to person, place, and time. Psychiatric:         Attention and Perception: Attention and perception normal.         Mood and Affect: Mood and affect normal.         Speech: Speech normal.         Behavior: Behavior normal. Behavior is cooperative. Thought Content: Thought content normal.         Cognition and Memory: Cognition and memory normal.         Judgment: Judgment normal.         Diagnostic Study Results     Labs -     Recent Results (from the past 12 hour(s))   CBC WITH AUTOMATED DIFF    Collection Time: 01/25/21  3:10 PM   Result Value Ref Range    WBC 9.2 4.6 - 13.2 K/uL    RBC 4.24 (L) 4.70 - 5.50 M/uL    HGB 11.3 (L) 13.0 - 16.0 g/dL    HCT 36.1 36.0 - 48.0 %    MCV 85.1 74.0 - 97.0 FL    MCH 26.7 24.0 - 34.0 PG    MCHC 31.3 31.0 - 37.0 g/dL    RDW 15.1 (H) 11.6 - 14.5 %    PLATELET 719 234 - 378 K/uL    MPV 9.8 9.2 - 11.8 FL    NEUTROPHILS 85 (H) 40 - 73 %    LYMPHOCYTES 9 (L) 21 - 52 %    MONOCYTES 6 3 - 10 %    EOSINOPHILS 0 0 - 5 %    BASOPHILS 0 0 - 2 %    IMMATURE GRANULOCYTES 0 %    ABS. NEUTROPHILS 7.8 1.8 - 8.0 K/UL    ABS. LYMPHOCYTES 0.8 (L) 0.9 - 3.6 K/UL    ABS. MONOCYTES 0.5 0.05 - 1.2 K/UL    ABS. EOSINOPHILS 0.0 0.0 - 0.4 K/UL    ABS. BASOPHILS 0.0 0.0 - 0.1 K/UL    ABS. IMM. GRANS. 0.0 K/UL   METABOLIC PANEL, COMPREHENSIVE    Collection Time: 01/25/21  3:10 PM   Result Value Ref Range    Sodium 137 135 - 145 mmol/L    Potassium 3.8 3.2 - 5.1 mmol/L    Chloride 103 94 - 111 mmol/L    CO2 21 21 - 33 mmol/L    Anion gap 13 mmol/L    Glucose 227 (H) 70 - 110 mg/dL    BUN 60 (H) 9 - 21 mg/dL    Creatinine 2.70 (H) 0.8 - 1.50 mg/dL    BUN/Creatinine ratio 22      GFR est AA 28 ml/min/1.73m2    GFR est non-AA 23 ml/min/1.73m2    Calcium 9.7 8.5 - 10.5 mg/dL    Bilirubin, total 0.6 0.2 - 1.0 mg/dL    AST (SGOT) 23 17 - 74 U/L    ALT (SGPT) 19 3 - 72 U/L    Alk.  phosphatase 65 38 - 126 U/L    Protein, total 8.0 6.1 - 8.4 g/dL    Albumin 4.1 3.5 - 4.7 g/dL    Globulin 3.9 g/dL    A-G Ratio 1.1     TROPONIN I    Collection Time: 01/25/21  3:10 PM   Result Value Ref Range    Troponin-I, Qt. 0.03 0.02 - 0.05 ng/mL   BNP    Collection Time: 01/25/21  3:10 PM   Result Value Ref Range    BNP 73 0 - 100 pg/mL       Radiologic Studies -   XR CHEST PORT   Final Result      No acute findings in the chest.      * ** *    *         CT Results  (Last 48 hours)    None        CXR Results  (Last 48 hours)               01/25/21 1458  XR CHEST PORT Final result    Impression:      No acute findings in the chest.       * ** *    *        Narrative:  EXAM: PORTABLE  FRONTAL CHEST RADIOGRAPH       CLINICAL INDICATION/HISTORY: Shortness of breath       COMPARISON: Chest radiograph 1/12/2021       TECHNIQUE: Portable frontal view of the chest       _______________       FINDINGS:       SUPPORT DEVICES: None. HEART AND MEDIASTINUM: Mild cardiac delay. Mildly tortuous aorta. No pulmonary   vascular congestion. LUNGS: Linear opacities in the peripheral right lower lung favored to represent   atelectasis. No lobar consolidation or new suspicious airspace opacities. PLEURAL SPACES:No large pneumothorax. No large pleural effusion. BONY THORAX AND SOFT TISSUES: No acute abnormality. Bones appear diffusely   demineralized. Mild multilevel degenerative changes throughout the spine.       _______________                 Medical Decision Making and ED Course   I am the first provider for this patient. I reviewed the vital signs, available nursing notes, past medical history, past surgical history, family history and social history. Vital Signs-Reviewed the patient's vital signs.   Patient Vitals for the past 12 hrs:   Temp Pulse Resp BP SpO2   01/25/21 1420 98.4 °F (36.9 °C) 90 22 (!) 162/82 100 %       EKG interpretation: (Preliminary): performed at 3:24 pm  Rhythm: normal sinus rhythm; Rate (approx.): 85; Axis: normal; NC interval: prolonged (205); QRS interval: prolonged (110); ST/T wave: ST elevation, consider inferior injury; Other findings: left ventricular hypertrophy and incomplete LBBB. Records Reviewed: Nursing Notes      ED Course:   Initial assessment performed. The patients presenting problems have been discussed, and they are in agreement with the care plan formulated and outlined with them. I have encouraged them to ask questions as they arise throughout their visit. 3:30 pm:  Pt was able to have a large bowel movement and has great relief. Pt notes his SOB and abdominal pain has improved. Provider Notes (Medical Decision Making):     2463 -patient is a 68-year-old male who presented with complaint of constipation. He is also feeling short of breath because he could not take a deep breath due to the abdominal discomfort. Patient had a fecal impaction which was disimpacted in the department. I did chest x-rays and labs and these were all stable. Patient is asymptomatic at this time his vitals are stable he is can be discharged home. Procedures     Digital Disimpaction    Date/Time: 1/25/2021 2:35 PM  Performed by: Sania Corona MD  Authorized by: Sania Corona MD     Consent:     Consent obtained:  Emergent situation    Consent given by:  Patient    Risks discussed:  Infection and pain  Indications:     Indications: Bowel impaction in rectum. Pre-procedure details:     Preparation: Patient was prepped and draped in the usual sterile fashion    Post-procedure details:     Patient tolerance of procedure: Tolerated well, no immediate complications  Comments:      I digitally disimpacted bowel in rectum. Pt had good relief. Disposition         DISCHARGE PLAN:  1.    Current Discharge Medication List      CONTINUE these medications which have NOT CHANGED    Details   !! sodium bicarbonate 650 mg tablet TAKE 1 TABLET BY MOUTH TWICE DAILY WITH MEALS  Qty: 60 Tab, Refills: 1      furosemide (LASIX) 80 mg tablet TAKE 1 TABLET BY MOUTH DAILY  Qty: 90 Tab, Refills: 1    Comments: **Patient requests 90 days supply**      metoprolol succinate (TOPROL-XL) 25 mg XL tablet TAKE 1 TABLET BY MOUTH DAILY  Qty: 90 Tab, Refills: 1    Comments: **Patient requests 90 days supply**      apixaban (Eliquis) 2.5 mg tablet Take 2.5 mg by mouth two (2) times a day. simvastatin (ZOCOR) 40 mg tablet Take 40 mg by mouth nightly. minoxidiL (LONITEN) 2.5 mg tablet Take 2.5 mg by mouth two (2) times a day. hydrALAZINE (APRESOLINE) 50 mg tablet Take 2 Tabs by mouth three (3) times daily. Qty: 90 Tab, Refills: 1      ergocalciferol (ERGOCALCIFEROL) 1,250 mcg (50,000 unit) capsule Take 1 Cap by mouth every seven (7) days. Qty: 16 Cap, Refills: 0      !! sodium bicarbonate 650 mg tablet Take 1 Tab by mouth two (2) times a day. Qty: 60 Tab, Refills: 1      amLODIPine (NORVASC) 5 mg tablet TAKE 2 TABLETS BY MOUTH ONCE A DAY  Qty: 180 Tab, Refills: 1      allopurinoL (ZYLOPRIM) 100 mg tablet Take 2 Tabs by mouth daily. Qty: 60 Tab, Refills: 3      omeprazole (PRILOSEC) 40 mg capsule Take 1 Cap by mouth daily. Qty: 90 Cap, Refills: 0      glucose blood VI test strips (ASCENSIA AUTODISC VI, ONE TOUCH ULTRA TEST VI) strip Check blood glucose 3 times daily, give Accu chek Precious test strips  Qty: 300 Strip, Refills: 3    Associated Diagnoses: Type 2 diabetes mellitus with stage 4 chronic kidney disease, with long-term current use of insulin ([facility])      Lantus Solostar U-100 Insulin 100 unit/mL (3 mL) inpn INJECT 25 UNITS SUBCUTANEOUSLY ONCE DAILY  Qty: 9 mL, Refills: 1      cloNIDine HCL (CATAPRES) 0.2 mg tablet Take 0.5 Tabs by mouth three (3) times daily. Indications: high blood pressure  Qty: 90 Tab, Refills: 2      Klor-Con 20 mEq pack Take 20 mEq by mouth daily.       fluticasone propionate (FLONASE) 50 mcg/actuation nasal spray 1 [place] by Both Nostrils route daily as needed. isosorbide mononitrate ER (IMDUR) 60 mg CR tablet Take 60 mg by mouth daily. ferrous sulfate 325 mg (65 mg iron) tablet Take 325 mg by mouth two (2) times a day. NOVOLOG FLEXPEN U-100 INSULIN 100 unit/mL inpn INJECT 8 UNITS SUBCUTANEOUSLY BEFORE MEALS  Refills: 4      BD INSULIN PEN NEEDLE UF MINI 31 gauge x 3/16\" ndle USE TO INJECT TID  Refills: 3       !! - Potential duplicate medications found. Please discuss with provider. 2.   Follow-up Information     Follow up With Specialties Details Why Contact Info    Ivonne Smith, NP Nurse Practitioner  As needed [address] [facility] [id number] [phone]          3. Return to ED if worse     Diagnosis     Clinical Impression:   1. Fecal impaction ([address]    2. Dyspnea, unspecified type        Attestations:    By signing my name below, I, Apoorva Wall, attest that this documentation has been prepared under the direction and in presence of Dr. Bryn Salguero on 01/25/21. Electronically signed: Apoorva Wall, 01/25/21, 2:30 PM        Please note that this dictation was completed with [employer], the [employer] voice recognition software. Quite often unanticipated grammatical, syntax, homophones, and other interpretive errors are inadvertently transcribed by the computer software. Please disregard these errors. Please excuse any errors that have escaped final proofreading. Thank you.

## 2021-01-25 NOTE — ED NOTES
Patient had large bowel movement and states he feels relief. Shortness of breath and abdominal pain improved.

## 2021-01-26 RX ORDER — PEN NEEDLE, DIABETIC 31 GX5/16"
NEEDLE, DISPOSABLE MISCELLANEOUS
Qty: 100 PEN NEEDLE | Refills: 3 | Status: SHIPPED | OUTPATIENT
Start: 2021-01-26 | End: 2022-08-11 | Stop reason: SDUPTHER

## 2021-01-26 NOTE — TELEPHONE ENCOUNTER
Requested Prescriptions     Pending Prescriptions Disp Refills   • BD Ultra-Fine Mini Pen Needle 31 gauge x 3/16\" ndle   3   Pt is completely out of this medical suppy

## 2021-01-26 NOTE — TELEPHONE ENCOUNTER
Please see refill request    Patient was last seen on 10-    Last filled by historic provider    Upcoming appointment on 1-    Thank you

## 2021-01-27 ENCOUNTER — OFFICE VISIT (OUTPATIENT)
Dept: FAMILY MEDICINE CLINIC | Age: 76
End: 2021-01-27
Payer: MEDICARE

## 2021-01-27 VITALS
WEIGHT: 209 LBS | DIASTOLIC BLOOD PRESSURE: 69 MMHG | BODY MASS INDEX: 31.67 KG/M2 | RESPIRATION RATE: 24 BRPM | HEART RATE: 89 BPM | OXYGEN SATURATION: 99 % | HEIGHT: 68 IN | TEMPERATURE: 98.9 F | SYSTOLIC BLOOD PRESSURE: 130 MMHG

## 2021-01-27 DIAGNOSIS — Z13.31 SCREENING FOR DEPRESSION: ICD-10-CM

## 2021-01-27 DIAGNOSIS — Z00.00 MEDICARE ANNUAL WELLNESS VISIT, SUBSEQUENT: ICD-10-CM

## 2021-01-27 DIAGNOSIS — Z71.89 ADVANCED DIRECTIVES, COUNSELING/DISCUSSION: ICD-10-CM

## 2021-01-27 DIAGNOSIS — Z71.89 ACP (ADVANCE CARE PLANNING): ICD-10-CM

## 2021-01-27 PROCEDURE — 1101F PT FALLS ASSESS-DOCD LE1/YR: CPT | Performed by: NURSE PRACTITIONER

## 2021-01-27 PROCEDURE — G8536 NO DOC ELDER MAL SCRN: HCPCS | Performed by: NURSE PRACTITIONER

## 2021-01-27 PROCEDURE — G0444 DEPRESSION SCREEN ANNUAL: HCPCS | Performed by: NURSE PRACTITIONER

## 2021-01-27 PROCEDURE — 3017F COLORECTAL CA SCREEN DOC REV: CPT | Performed by: NURSE PRACTITIONER

## 2021-01-27 PROCEDURE — G8752 SYS BP LESS 140: HCPCS | Performed by: NURSE PRACTITIONER

## 2021-01-27 PROCEDURE — G8427 DOCREV CUR MEDS BY ELIG CLIN: HCPCS | Performed by: NURSE PRACTITIONER

## 2021-01-27 PROCEDURE — G8417 CALC BMI ABV UP PARAM F/U: HCPCS | Performed by: NURSE PRACTITIONER

## 2021-01-27 PROCEDURE — G8432 DEP SCR NOT DOC, RNG: HCPCS | Performed by: NURSE PRACTITIONER

## 2021-01-27 PROCEDURE — G8754 DIAS BP LESS 90: HCPCS | Performed by: NURSE PRACTITIONER

## 2021-01-27 PROCEDURE — G0439 PPPS, SUBSEQ VISIT: HCPCS | Performed by: NURSE PRACTITIONER

## 2021-01-27 NOTE — PROGRESS NOTES
This is the Subsequent Medicare Annual Wellness Exam, performed 12 months or more after the Initial AWV or the last Subsequent AWV    I have reviewed the patient's medical history in detail and updated the computerized patient record. Depression Risk Factor Screening:     3 most recent PHQ Screens 1/27/2021   Little interest or pleasure in doing things Not at all   Feeling down, depressed, irritable, or hopeless Not at all   Total Score PHQ 2 0   Trouble falling or staying asleep, or sleeping too much Not at all   Feeling tired or having little energy Not at all   Poor appetite, weight loss, or overeating Not at all   Feeling bad about yourself - or that you are a failure or have let yourself or your family down Not at all   Trouble concentrating on things such as school, work, reading, or watching TV Not at all   Moving or speaking so slowly that other people could have noticed; or the opposite being so fidgety that others notice Not at all   Thoughts of being better off dead, or hurting yourself in some way Not at all   PHQ 9 Score 0   How difficult have these problems made it for you to do your work, take care of your home and get along with others Not difficult at all     Educated patient for 8 minutes on depression  Alcohol Risk Screen    Do you average more than 1 drink per night or more than 7 drinks a week: No    In the past three months have you have had more than 4 drinks containing alcohol on one occasion: No        Functional Ability and Level of Safety:    Hearing: Hearing is good. Activities of Daily Living: The home contains: handrails  Patient does total self care      Ambulation: with no difficulty     Fall Risk:  Fall Risk Assessment, last 12 mths 1/27/2021   Able to walk? Yes   Fall in past 12 months? 0   Do you feel unsteady? 1   Are you worried about falling 0   Is TUG test greater than 12 seconds? 0   Is the gait abnormal? 0   Number of falls in past 12 months -   Fall with injury?  - Abuse Screen:  Patient is not abused       Cognitive Screening    Has your family/caregiver stated any concerns about your memory: no     Cognitive Screening: Normal - MMSE (Mini Mental Status Exam)    Assessment/Plan   Education and counseling provided:  Are appropriate based on today's review and evaluation    Diagnoses and all orders for this visit:    1. Medicare annual wellness visit, subsequent    2. Screening for depression  -     DEPRESSION SCREEN ANNUAL    3. ACP (advance care planning)    4.  Advanced directives, counseling/discussion        Health Maintenance Due     Health Maintenance Due   Topic Date Due    Foot Exam Q1  09/12/1955    Eye Exam Retinal or Dilated  09/12/1957    Lipid Screen  09/12/1957    COVID-19 Vaccine (1 of 2) 09/12/1963    DTaP/Tdap/Td series (1 - Tdap) 09/12/1968    Shingrix Vaccine Age 50> (1 of 2) 09/12/1997    Colorectal Cancer Screening Combo  09/12/1997    GLAUCOMA SCREENING Q2Y  09/12/2012    AAA Screening 73-69 YO Male Smoking Patients  09/12/2012    Pneumococcal 65+ yrs at Risk Vaccine (2 of 2 - PCV13) 09/12/2012       Patient Care Team   Patient Care Team:  Erich Mensah NP as PCP - General (Nurse Practitioner)  Erich Mensah NP as PCP - [address] [place] [address] Provider  Linus Camacho MD as Physician (Nephrology)  Balbina Morelos MD as Physician (Vascular Surgery)  Zoë Corona MD as Physician (Cardiology)    History     Patient Active Problem List   Diagnosis Code    Malignant neoplasm of urinary bladder ([facility] C67.9    Type 2 diabetes mellitus with stage 4 chronic kidney disease, with long-term current use of insulin ([facility]) E11.22, N18.4, Z79.4    Essential hypertension I10    Hematuria R31.9    Stage 3 chronic kidney disease N18.30    Chronic diastolic congestive heart failure (HCC) I50.32    Positive D dimer R79.89    Deep vein thrombosis (DVT) of proximal vein of both lower extremities ([facility]) I82.4Y3    CHF (congestive heart failure) (HCC) I50.9    Acute on chronic heart failure (HCC) I50.9     Past Medical History:   Diagnosis Date    Acid reflux     Arthritis     Bladder cancer (HCC)     Deep vein thrombosis (DVT) of proximal vein of both lower extremities ([facility] 9/14/2020    Diabetes mellitus ([facility]     GERD (gastroesophageal reflux disease)     Gout     High cholesterol     Hypertension     Kidney carcinoma, left (HCC)     Kidney disease     Pituitary mass (HCC)     Reflux esophagitis     Unspecified deficiency anemia       Past Surgical History:   Procedure Laterality Date    HX CYSTECTOMY  12/30/09    Dr. Stephany Lloyd HX NEPHRECTOMY Left 5/2009    Nephroureterectomy by Dr. Hi Chase HX OTHER SURGICAL      surgery on pituitary gland     Current Outpatient Medications   Medication Sig Dispense Refill    BD Ultra-Fine Mini Pen Needle 31 gauge x 3/16\" ndle USE TO INJECT  Pen Needle 3    sodium bicarbonate 650 mg tablet TAKE 1 TABLET BY MOUTH TWICE DAILY WITH MEALS 60 Tab 1    furosemide (LASIX) 80 mg tablet TAKE 1 TABLET BY MOUTH DAILY 90 Tab 1    apixaban (Eliquis) 2.5 mg tablet Take 2.5 mg by mouth two (2) times a day.  simvastatin (ZOCOR) 40 mg tablet Take 40 mg by mouth nightly.  hydrALAZINE (APRESOLINE) 50 mg tablet Take 2 Tabs by mouth three (3) times daily. 90 Tab 1    amLODIPine (NORVASC) 5 mg tablet TAKE 2 TABLETS BY MOUTH ONCE A  Tab 1    allopurinoL (ZYLOPRIM) 100 mg tablet Take 2 Tabs by mouth daily. 60 Tab 3    omeprazole (PRILOSEC) 40 mg capsule Take 1 Cap by mouth daily. 90 Cap 0    glucose blood VI test strips (ASCENSIA AUTODISC VI, ONE TOUCH ULTRA TEST VI) strip Check blood glucose 3 times daily, give Accu chek Precious test strips 300 Strip 3    Lantus Solostar U-100 Insulin 100 unit/mL (3 mL) inpn INJECT 25 UNITS SUBCUTANEOUSLY ONCE DAILY 9 mL 1    ferrous sulfate 325 mg (65 mg iron) tablet Take 325 mg by mouth two (2) times a day.      Ugo Jain FLEXPEN U-100 INSULIN 100 unit/mL inpn INJECT 8 UNITS SUBCUTANEOUSLY BEFORE MEALS  4    metoprolol succinate (TOPROL-XL) 25 mg XL tablet TAKE 1 TABLET BY MOUTH DAILY 90 Tab 1    minoxidiL (LONITEN) 2.5 mg tablet Take 2.5 mg by mouth two (2) times a day.  ergocalciferol (ERGOCALCIFEROL) 1,250 mcg (50,000 unit) capsule Take 1 Cap by mouth every seven (7) days. 16 Cap 0    sodium bicarbonate 650 mg tablet Take 1 Tab by mouth two (2) times a day. 60 Tab 1    cloNIDine HCL (CATAPRES) 0.2 mg tablet Take 0.5 Tabs by mouth three (3) times daily. Indications: high blood pressure 90 Tab 2    Klor-Con 20 mEq pack Take 20 mEq by mouth daily.  fluticasone propionate (FLONASE) 50 mcg/actuation nasal spray 1 [place] by Both Nostrils route daily as needed.  isosorbide mononitrate ER (IMDUR) 60 mg CR tablet Take 60 mg by mouth daily. Not on File    Family History   Problem Relation Age of Onset    Heart Disease Father     Heart Disease Mother      Social History     Tobacco Use    Smoking status: Former Smoker     Packs/day: 0.50     Years: 40.00     Pack years: 20.00     Types: Cigarettes     Quit date: 1999     Years since quittin.0    Smokeless tobacco: Never Used   Substance Use Topics    Alcohol use:  No

## 2021-01-27 NOTE — ACP (ADVANCE CARE PLANNING)
Advance Care Planning     Advance Care Planning (ACP) Physician/NP/PA Conversation      Date of Conversation: 1/27/2021  Conducted with: Patient with Decision Making Capacity    Healthcare Decision Maker:     Click here to complete [place] including [address] Relationship (ie \"Primary\")  Today we documented Decision Maker(s) consistent with ACP documents on file. Care Preferences:    Hospitalization: \"If your health worsens and it becomes clear that your chance of recovery is unlikely, what would be your preference regarding hospitalization? \"  The patient would prefer hospitalization. Ventilation: \"If you were unable to breathe on your own and your chance of recovery was unlikely, what would be your preference about the use of a ventilator (breathing machine) if it was available to you? \"   The patient is unsure. Resuscitation: \"In the event your heart stopped as a result of an underlying serious health condition, would you want attempts to be made to restart your heart, or would you prefer a natural death? \"   The patient is unsure.     Additional topics discussed: treatment goals    Conversation Outcomes / Follow-Up Plan:   ACP complete - no further action today  Reviewed DNR/DNI and patient elects Full Code (Attempt Resuscitation)     Length of Voluntary ACP Conversation in minutes:  <16 minutes (Non-Billable)    Annie Mueller NP

## 2021-01-27 NOTE — PATIENT INSTRUCTIONS
Medicare Wellness Visit, Male    The best way to live healthy is to have a lifestyle where you eat a well-balanced diet, exercise regularly, limit alcohol use, and quit all forms of tobacco/nicotine, if applicable. Regular preventive services are another way to keep healthy. Preventive services (vaccines, screening tests, monitoring & exams) can help personalize your care plan, which helps you manage your own care. Screening tests can find health problems at the earliest stages, when they are easiest to treat. [facility] follows the current, evidence-based guidelines published by the [facility] Marcell Manoj ([facility]) when recommending preventive services for our patients. Because we follow these guidelines, sometimes recommendations change over time as research supports it. (For example, a prostate screening blood test is no longer routinely recommended for men with no symptoms). Of course, you and your doctor may decide to screen more often for some diseases, based on your risk and co-morbidities (chronic disease you are already diagnosed with). Preventive services for you include:  - Medicare offers their members a free annual wellness visit, which is time for you and your primary care provider to discuss and plan for your preventive service needs. Take advantage of this benefit every year!  -All adults over age 72 should receive the recommended pneumonia vaccines. Current USPSTF guidelines recommend a series of two vaccines for the best pneumonia protection.   -All adults should have a flu vaccine yearly and tetanus vaccine every 10 years.  -All adults age 48 and older should receive the shingles vaccines (series of two vaccines).        -All adults age 38-68 who are overweight should have a diabetes screening test once every three years.   -Other screening tests & preventive services for persons with diabetes include: an eye exam to screen for diabetic retinopathy, a kidney function test, a foot exam, and stricter control over your cholesterol.   -Cardiovascular screening for adults with routine risk involves an electrocardiogram (ECG) at intervals determined by the provider.   -Colorectal cancer screening should be done for adults age 54-65 with no increased risk factors for colorectal cancer. There are a number of acceptable methods of screening for this type of cancer. Each test has its own benefits and drawbacks. Discuss with your provider what is most appropriate for you during your annual wellness visit. The different tests include: colonoscopy (considered the best screening method), a fecal occult blood test, a fecal DNA test, and sigmoidoscopy.  -All adults born between [facility] should be screened once for Hepatitis C.  -An Abdominal Aortic Aneurysm (AAA) Screening is recommended for men age 73-68 who has ever smoked in their lifetime. Here is a list of your current Health Maintenance items (your personalized list of preventive services) with a due date:  Health Maintenance Due   Topic Date Due    Diabetic Foot Care  09/12/1957    Eye Exam  09/12/1957    Cholesterol Test   09/12/1957    COVID-19 Vaccine (1 of 2) 09/12/1963    DTaP/Tdap/Td  (1 - Tdap) 09/12/1968    Shingles Vaccine (1 of 2) 09/12/1997    Colorectal Screening  09/12/1997    Glaucoma Screening   09/12/2012    AAA Screening  09/12/2012    Pneumococcal Vaccine 65+ years at Risk (2 of 2 - PCV13) 09/12/2012    Annual Well Visit  05/08/2020     Medicare Wellness Visit, Male    The best way to live healthy is to have a lifestyle where you eat a well-balanced diet, exercise regularly, limit alcohol use, and quit all forms of tobacco/nicotine, if applicable. Regular preventive services are another way to keep healthy. Preventive services (vaccines, screening tests, monitoring & exams) can help personalize your care plan, which helps you manage your own care.  Screening tests can find health problems at the earliest stages, when they are easiest to treat. [facility] follows the current, evidence-based guidelines published by the [facility] Marcell Aranda ([facility]) when recommending preventive services for our patients. Because we follow these guidelines, sometimes recommendations change over time as research supports it. (For example, a prostate screening blood test is no longer routinely recommended for men with no symptoms). Of course, you and your doctor may decide to screen more often for some diseases, based on your risk and co-morbidities (chronic disease you are already diagnosed with). Preventive services for you include:  - Medicare offers their members a free annual wellness visit, which is time for you and your primary care provider to discuss and plan for your preventive service needs. Take advantage of this benefit every year!  -All adults over age 72 should receive the recommended pneumonia vaccines. Current USPSTF guidelines recommend a series of two vaccines for the best pneumonia protection.   -All adults should have a flu vaccine yearly and tetanus vaccine every 10 years.  -All adults age 48 and older should receive the shingles vaccines (series of two vaccines). -All adults age 38-68 who are overweight should have a diabetes screening test once every three years.   -Other screening tests & preventive services for persons with diabetes include: an eye exam to screen for diabetic retinopathy, a kidney function test, a foot exam, and stricter control over your cholesterol.   -Cardiovascular screening for adults with routine risk involves an electrocardiogram (ECG) at intervals determined by the provider.   -Colorectal cancer screening should be done for adults age 54-65 with no increased risk factors for colorectal cancer. There are a number of acceptable methods of screening for this type of cancer.  Each test has its own benefits and drawbacks. Discuss with your provider what is most appropriate for you during your annual wellness visit. The different tests include: colonoscopy (considered the best screening method), a fecal occult blood test, a fecal DNA test, and sigmoidoscopy.  -All adults born between [facility] should be screened once for Hepatitis C.  -An Abdominal Aortic Aneurysm (AAA) Screening is recommended for men age 73-68 who has ever smoked in their lifetime.      Here is a list of your current Health Maintenance items (your personalized list of preventive services) with a due date:  Health Maintenance Due   Topic Date Due    Diabetic Foot Care  09/12/1957    Eye Exam  09/12/1957    Cholesterol Test   09/12/1957    COVID-19 Vaccine (1 of 2) 09/12/1963    DTaP/Tdap/Td  (1 - Tdap) 09/12/1968    Shingles Vaccine (1 of 2) 09/12/1997    Colorectal Screening  09/12/1997    Glaucoma Screening   09/12/2012    AAA Screening  09/12/2012    Pneumococcal Vaccine 65+ years at Risk (2 of 2 - PCV13) 09/12/2012

## 2021-01-27 NOTE — PROGRESS NOTES
Colby Leon presents today for   Chief Complaint   Patient presents with    Annual Wellness Visit    Follow Up Chronic Condition       Is someone accompanying this pt? Yes  Niece    Is the patient using any DME equipment during [address]? no    Depression Screening:  3 most recent PHQ Screens 1/27/2021   Little interest or pleasure in doing things Not at all   Feeling down, depressed, irritable, or hopeless Not at all   Total Score PHQ 2 0   Trouble falling or staying asleep, or sleeping too much Not at all   Feeling tired or having little energy Not at all   Poor appetite, weight loss, or overeating Not at all   Feeling bad about yourself - or that you are a failure or have let yourself or your family down Not at all   Trouble concentrating on things such as school, work, reading, or watching TV Not at all   Moving or speaking so slowly that other people could have noticed; or the opposite being so fidgety that others notice Not at all   Thoughts of being better off dead, or hurting yourself in some way Not at all   PHQ 9 Score 0   How difficult have these problems made it for you to do your work, take care of your home and get along with others Not difficult at all       Learning Assessment:  Learning Assessment 1/11/2021   PRIMARY LEARNER Patient   HIGHEST LEVEL OF EDUCATION - PRIMARY LEARNER  -   PRIMARY LANGUAGE ENGLISH   LEARNER PREFERENCE PRIMARY LISTENING   ANSWERED BY evelyn [place] SELF       Abuse Screening:  No flowsheet data found. Fall Risk  Fall Risk Assessment, last 12 mths 1/27/2021   Able to walk? Yes   Fall in past 12 months? 0   Do you feel unsteady? 1   Are you worried about falling 0   Is TUG test greater than 12 seconds? 0   Is the gait abnormal? 0   Number of falls in past 12 months -   Fall with injury? -       Health Maintenance reviewed and discussed and ordered per Provider.     Health Maintenance Due   Topic Date Due    Foot Exam Q1  09/12/1957    Eye Exam Retinal or Dilated  09/12/1957    Lipid Screen  09/12/1957    COVID-19 Vaccine (1 of 2) 09/12/1963    DTaP/Tdap/Td series (1 - Tdap) 09/12/1968    Shingrix Vaccine Age 50> (1 of 2) 09/12/1997    Colorectal Cancer Screening Combo  09/12/1997    GLAUCOMA SCREENING Q2Y  09/12/2012    AAA Screening 73-69 YO Male Smoking Patients  09/12/2012    Pneumococcal 65+ yrs at Risk Vaccine (2 of 2 - PCV13) 09/12/2012    Medicare Yearly Exam  05/08/2020   . Coordination of Care:  1. Have you been to the ER, urgent care clinic since your last visit? [facility] last [facility]? [facility]    2. Have you seen or consulted any other health care providers outside of the [address] [place] [address] since your last visit? Include any pap smears or colon screening.  no

## 2021-01-29 NOTE — TELEPHONE ENCOUNTER
Spoke with patient cari, [address] [place] [address] Abril Childress. She stated that he picked his prescription up yesterday. Patient cari had no other questions or responds.

## 2021-02-02 RX ORDER — SIMVASTATIN 40 MG/1
40 TABLET, FILM COATED ORAL
Qty: 90 TAB | Refills: 1 | Status: SHIPPED | OUTPATIENT
Start: 2021-02-02 | End: 2021-09-08

## 2021-02-02 NOTE — TELEPHONE ENCOUNTER
Requested Prescriptions     Pending Prescriptions Disp Refills    simvastatin (ZOCOR) 40 mg tablet        Sig: Take 1 Tab by mouth nightly.

## 2021-02-02 NOTE — TELEPHONE ENCOUNTER
Please see refill request    Patient was last seen 1-    Last filled by historic provider    Thank you

## 2021-02-04 ENCOUNTER — HOSPITAL ENCOUNTER (OUTPATIENT)
Dept: LAB | Age: 76
Discharge: HOME | End: 2021-02-04

## 2021-02-23 ENCOUNTER — OFFICE VISIT (OUTPATIENT)
Dept: FAMILY MEDICINE CLINIC | Age: 76
End: 2021-02-23
Payer: MEDICARE

## 2021-02-23 VITALS
RESPIRATION RATE: 24 BRPM | HEIGHT: 68 IN | DIASTOLIC BLOOD PRESSURE: 81 MMHG | OXYGEN SATURATION: 98 % | WEIGHT: 210 LBS | BODY MASS INDEX: 31.83 KG/M2 | SYSTOLIC BLOOD PRESSURE: 151 MMHG | TEMPERATURE: 97.9 F | HEART RATE: 72 BPM

## 2021-02-23 DIAGNOSIS — K59.09 OTHER CONSTIPATION: ICD-10-CM

## 2021-02-23 DIAGNOSIS — Z13.228 SCREENING FOR ENDOCRINE, METABOLIC AND IMMUNITY DISORDER: ICD-10-CM

## 2021-02-23 DIAGNOSIS — N18.4 TYPE 2 DIABETES MELLITUS WITH STAGE 4 CHRONIC KIDNEY DISEASE, WITH LONG-TERM CURRENT USE OF INSULIN (HCC): ICD-10-CM

## 2021-02-23 DIAGNOSIS — Z13.29 SCREENING FOR ENDOCRINE, METABOLIC AND IMMUNITY DISORDER: ICD-10-CM

## 2021-02-23 DIAGNOSIS — Z76.0 MEDICATION REFILL: ICD-10-CM

## 2021-02-23 DIAGNOSIS — I50.22 CHRONIC SYSTOLIC CONGESTIVE HEART FAILURE (HCC): ICD-10-CM

## 2021-02-23 DIAGNOSIS — E11.22 TYPE 2 DIABETES MELLITUS WITH STAGE 4 CHRONIC KIDNEY DISEASE, WITH LONG-TERM CURRENT USE OF INSULIN (HCC): ICD-10-CM

## 2021-02-23 DIAGNOSIS — Z13.0 SCREENING FOR ENDOCRINE, METABOLIC AND IMMUNITY DISORDER: ICD-10-CM

## 2021-02-23 DIAGNOSIS — E11.9 ENCOUNTER FOR DIABETIC FOOT EXAM (HCC): ICD-10-CM

## 2021-02-23 DIAGNOSIS — Z79.4 TYPE 2 DIABETES MELLITUS WITH STAGE 4 CHRONIC KIDNEY DISEASE, WITH LONG-TERM CURRENT USE OF INSULIN (HCC): ICD-10-CM

## 2021-02-23 DIAGNOSIS — I10 ESSENTIAL HYPERTENSION: ICD-10-CM

## 2021-02-23 PROCEDURE — 2022F DILAT RTA XM EVC RTNOPTHY: CPT | Performed by: NURSE PRACTITIONER

## 2021-02-23 PROCEDURE — G8753 SYS BP > OR = 140: HCPCS | Performed by: NURSE PRACTITIONER

## 2021-02-23 PROCEDURE — 99213 OFFICE O/P EST LOW 20 MIN: CPT | Performed by: NURSE PRACTITIONER

## 2021-02-23 PROCEDURE — 1101F PT FALLS ASSESS-DOCD LE1/YR: CPT | Performed by: NURSE PRACTITIONER

## 2021-02-23 PROCEDURE — G8536 NO DOC ELDER MAL SCRN: HCPCS | Performed by: NURSE PRACTITIONER

## 2021-02-23 PROCEDURE — 3017F COLORECTAL CA SCREEN DOC REV: CPT | Performed by: NURSE PRACTITIONER

## 2021-02-23 PROCEDURE — 3051F HG A1C>EQUAL 7.0%<8.0%: CPT | Performed by: NURSE PRACTITIONER

## 2021-02-23 PROCEDURE — G8427 DOCREV CUR MEDS BY ELIG CLIN: HCPCS | Performed by: NURSE PRACTITIONER

## 2021-02-23 PROCEDURE — G8432 DEP SCR NOT DOC, RNG: HCPCS | Performed by: NURSE PRACTITIONER

## 2021-02-23 PROCEDURE — G8417 CALC BMI ABV UP PARAM F/U: HCPCS | Performed by: NURSE PRACTITIONER

## 2021-02-23 PROCEDURE — G8754 DIAS BP LESS 90: HCPCS | Performed by: NURSE PRACTITIONER

## 2021-02-23 RX ORDER — HYDRALAZINE HYDROCHLORIDE 50 MG/1
100 TABLET, FILM COATED ORAL 3 TIMES DAILY
Qty: 30 TAB | Refills: 0 | Status: SHIPPED | OUTPATIENT
Start: 2021-02-23 | End: 2021-03-01 | Stop reason: SDUPTHER

## 2021-02-23 NOTE — PROGRESS NOTES
Belén Muñoz presents today for   Chief Complaint   Patient presents with    Follow Up Chronic Condition    Medication Refill       Is someone accompanying this pt? no    Is the patient using any DME equipment during OV? no    Depression Screening:  3 most recent PHQ Screens 2/23/2021   Little interest or pleasure in doing things Not at all   Feeling down, depressed, irritable, or hopeless Not at all   Total Score PHQ 2 0   Trouble falling or staying asleep, or sleeping too much Not at all   Feeling tired or having little energy Not at all   Poor appetite, weight loss, or overeating Not at all   Feeling bad about yourself - or that you are a failure or have let yourself or your family down Not at all   Trouble concentrating on things such as school, work, reading, or watching TV Not at all   Moving or speaking so slowly that other people could have noticed; or the opposite being so fidgety that others notice Not at all   Thoughts of being better off dead, or hurting yourself in some way Not at all   PHQ 9 Score 0   How difficult have these problems made it for you to do your work, take care of your home and get along with others Not difficult at all       Learning Assessment:  Learning Assessment 2/23/2021   PRIMARY LEARNER Patient   HIGHEST LEVEL OF EDUCATION - PRIMARY LEARNER  DID NOT [address] PRIMARY LEARNER COGNITIVE   CO-LEARNER CAREGIVER No   PRIMARY LANGUAGE ENGLISH   LEARNER PREFERENCE PRIMARY DEMONSTRATION   ANSWERED BY patient   RELATIONSHIP SELF       Abuse Screening:  Abuse Screening Questionnaire 1/27/2021   Are you in a relationship with someone who physically or mentally threatens you? N   Is it safe for you to go home? Y       Fall Risk  Fall Risk Assessment, last 12 mths 2/23/2021   Able to walk? Yes   Fall in past 12 months? 0   Do you feel unsteady? 0   Are you worried about falling 0   Is TUG test greater than 12 seconds?  -   Is the gait abnormal? -   Number of falls in past 12 months -   Fall with injury? -       Health Maintenance reviewed and discussed and ordered per Provider. Health Maintenance Due   Topic Date Due    Foot Exam Q1  09/12/1957    Eye Exam Retinal or Dilated  09/12/1957    Lipid Screen  09/12/1957    COVID-19 Vaccine (1 of 2) 09/12/1963    DTaP/Tdap/Td series (1 - Tdap) 09/12/1968    Shingrix Vaccine Age 50> (1 of 2) 09/12/1997    Colorectal Cancer Screening Combo  09/12/1997    GLAUCOMA SCREENING Q2Y  09/12/2012    AAA Screening 73-67 YO Male Smoking Patients  09/12/2012    Pneumococcal 65+ yrs at Risk Vaccine (2 of 2 - PCV13) 09/12/2012   . Coordination of Care:  1. Have you been to the ER, urgent care clinic since your last visit? Hospitalized since your last visit? no    2. Have you seen or consulted any other health care providers outside of the [address] [place] [address] since your last visit? Include any pap smears or colon screening.  no

## 2021-02-23 NOTE — PROGRESS NOTES
OFFICE NOTE    Dallas Hodgson is a 75 y.o. male presenting today for the following:  Chief Complaint   Patient presents with   • Follow Up Chronic Condition   • Medication Refill      HPI     Constipation/SOB  Patient was seen in the ER on January 25, 2021 for constipation with shortness of breath. According to records he was short of breath because he cannot take a deep breath due to the abdominal discomfort from the constipation. It appears patient had a large bowel movement in the ER and he stated he had relief.. Chest x-rays was completed and was stable. He was discharged home the same day. Patient was currently on an iron supplement 325 mg daily.      Hypertension/CHF, Chronic diastolic congestive heart failure/HLD/DVY  Patient is currently on Norvasc, metoprolol, clonidine, and Imdur. He is being followed by [facility] cardiologist Dr. Potter.  He is also taking simvastatin for cholesterol.  Today blood pressure is elevated at 151/81. He is currently on Eliquis. He states he is suppose to have a upcoming visit with Dr. Potter.    CKD  Patient is being followed by Dr. Finley (nephrologist).  Patient is scheduled to see him on Monday.      Diabetes  Patient is currently on Lantus and NovoLog.  Patient states his reading this morning is 129.  He states he is doing well.  Denies polyphagia, polydipsia, uria.      Diabetic Foot Exam: DP pulses 2+ symmetric, no open areas or skin breakdown noted, sensory intact to filament and positioning. Vibratory sensation not tested. Nails in good condition.    Refilled hydralazine - This is managed by Dr. Finley but patient was totally out of medication.  Dr. Finley just went up to 50 mg.  Refilled enough until he see Dr. Finley on Monday.        Review of Systems   Constitutional: Negative for chills, fatigue and fever.   HENT: Negative.    Eyes: Negative.    Respiratory: Negative for cough, chest tightness, shortness of breath and wheezing.    Cardiovascular: Negative for  chest pain, palpitations and leg swelling. Gastrointestinal: Negative. Musculoskeletal: Negative. Neurological: Negative. Psychiatric/Behavioral: Negative.           History  Past Medical History:   Diagnosis Date    Acid reflux     Arthritis     Bladder cancer (HCC)     Deep vein thrombosis (DVT) of proximal vein of both lower extremities ([facility] [address] 2020    Diabetes mellitus (HCC)     GERD (gastroesophageal reflux disease)     Gout     High cholesterol     Hypertension     Kidney carcinoma, left (HCC)     Kidney disease     Pituitary mass (HCC)     Reflux esophagitis     Unspecified deficiency anemia        Past Surgical History:   Procedure Laterality Date    HX CYSTECTOMY  09    Dr. Kristin Medellin HX NEPHRECTOMY Left 2009    Nephroureterectomy by Dr. Tamara Duenas HX OTHER SURGICAL      surgery on pituitary gland       Social History     Socioeconomic History    Marital status: SINGLE     Spouse name: Not on file    Number of children: Not on file    Years of education: Not on file    Highest education level: Not on file   Occupational History    Not on file   Social Needs    Financial resource strain: Not on file    Food insecurity     Worry: Not on file     Inability: Not on file    Transportation needs     Medical: Not on file     Non-medical: Not on file   Tobacco Use    Smoking status: Former Smoker     Packs/day: 0.50     Years: 40.00     Pack years: 20.00     Types: Cigarettes     Quit date: 1999     Years since quittin.1    Smokeless tobacco: Never Used   Substance and Sexual Activity    Alcohol use: No    Drug use: No    Sexual activity: Yes   Lifestyle    Physical activity     Days per week: Not on file     Minutes per session: Not on file    Stress: Not on file   Relationships    Social connections     Talks on phone: Not on file     Gets together: Not on file     Attends Mandaen service: Not on file     Active member of club or organization: Not on file     Attends meetings of clubs or organizations: Not on file     Relationship status: Not on file    Intimate partner violence     Fear of current or ex partner: Not on file     Emotionally abused: Not on file     Physically abused: Not on file     Forced sexual activity: Not on file   Other Topics Concern    Not on file   Social History Narrative    Not on file       No Known Allergies    Current Outpatient Medications   Medication Sig Dispense Refill    hydrALAZINE (APRESOLINE) 50 mg tablet Take 2 Tabs by mouth three (3) times daily. 30 Tab 0    simvastatin (ZOCOR) 40 mg tablet Take 1 Tab by mouth nightly. 90 Tab 1    BD Ultra-Fine Mini Pen Needle 31 gauge x 3/16\" ndle USE TO INJECT  Pen Needle 3    sodium bicarbonate 650 mg tablet TAKE 1 TABLET BY MOUTH TWICE DAILY WITH MEALS 60 Tab 1    furosemide (LASIX) 80 mg tablet TAKE 1 TABLET BY MOUTH DAILY 90 Tab 1    apixaban (Eliquis) 2.5 mg tablet Take 2.5 mg by mouth two (2) times a day.  ergocalciferol (ERGOCALCIFEROL) 1,250 mcg (50,000 unit) capsule Take 1 Cap by mouth every seven (7) days. 16 Cap 0    amLODIPine (NORVASC) 5 mg tablet TAKE 2 TABLETS BY MOUTH ONCE A  Tab 1    allopurinoL (ZYLOPRIM) 100 mg tablet Take 2 Tabs by mouth daily. 60 Tab 3    omeprazole (PRILOSEC) 40 mg capsule Take 1 Cap by mouth daily. 90 Cap 0    glucose blood VI test strips (ASCENSIA AUTODISC VI, ONE TOUCH ULTRA TEST VI) strip Check blood glucose 3 times daily, give Accu chek Precious test strips 300 Strip 3    Lantus Solostar U-100 Insulin 100 unit/mL (3 mL) inpn INJECT 25 UNITS SUBCUTANEOUSLY ONCE DAILY 9 mL 1    fluticasone propionate (FLONASE) 50 mcg/actuation nasal spray 1 [place] by Both Nostrils route daily as needed.  NOVOLOG FLEXPEN U-100 INSULIN 100 unit/mL inpn INJECT 8 UNITS SUBCUTANEOUSLY BEFORE MEALS  4    sodium bicarbonate 650 mg tablet Take 1 Tab by mouth two (2) times a day.  60 Tab 1         Patient Care Team:  Patient Care Team:  Leila Babb NP as PCP - General (Nurse Practitioner)  Leila Babb NP as PCP - [facility]  Susan Villegas MD as Physician (Nephrology)  Torsten Lopez MD as Physician (Vascular Surgery)  Anahy Gregg MD as Physician (Cardiology)      LABS:  No results found for any visits on 02/23/21. RADIOLOGY:  No recent results      Physical Exam  Vitals signs and nursing note reviewed. Constitutional:       Appearance: He is well-developed. Neck:      Musculoskeletal: Normal range of motion and neck supple. Cardiovascular:      Rate and Rhythm: Normal rate and regular rhythm. Heart sounds: Normal heart sounds. Pulmonary:      Effort: Pulmonary effort is normal. No respiratory distress. Breath sounds: Normal breath sounds. No wheezing or rales. Musculoskeletal: Normal range of motion. Lymphadenopathy:      Cervical: No cervical adenopathy. Skin:     General: Skin is warm and dry. Neurological:      Mental Status: He is alert and oriented to person, place, and time. Deep Tendon Reflexes: Reflexes are normal and symmetric.    Psychiatric:         Mood and Affect: Mood normal.           3 most recent PHQ Screens 2/23/2021   Little interest or pleasure in doing things Not at all   Feeling down, depressed, irritable, or hopeless Not at all   Total Score PHQ 2 0   Trouble falling or staying asleep, or sleeping too much Not at all   Feeling tired or having little energy Not at all   Poor appetite, weight loss, or overeating Not at all   Feeling bad about yourself - or that you are a failure or have let yourself or your family down Not at all   Trouble concentrating on things such as school, work, reading, or watching TV Not at all   Moving or speaking so slowly that other people could have noticed; or the opposite being so fidgety that others notice Not at all   Thoughts of being better off dead, or hurting yourself in some way Not at all   PHQ 9 Score 0   How difficult have these problems made it for you to do your work, take care of your home and get along with others Not difficult at all             Vitals:    02/23/21 1133 02/23/21 1141   BP: (!) 150/83 (!) 151/81   Pulse: 72    Resp: 24    Temp: 97.9 °F (36.6 °C)    TempSrc: Temporal    SpO2: 98%    Weight: 210 lb (95.3 kg)    Height: 5' 8\" (1.727 m)    PainSc:   0 - No pain          Assessment and Plan    1. Encounter for diabetic foot exam ([facility]    -  DIABETES FOOT EXAM    2. Medication refill    - hydrALAZINE (APRESOLINE) 50 mg tablet; Take 2 Tabs by mouth three (3) times daily. Dispense: 30 Tab; Refill: 0    3. Essential hypertension      4. Chronic systolic congestive heart failure ([facility]    5. Type 2 diabetes mellitus with stage 4 chronic kidney disease, with long-term current use of insulin (HCC)      6. Other constipation      7. Screening for endocrine, metabolic and immunity disorder    - CBC W/O DIFF; Future  - METABOLIC PANEL, COMPREHENSIVE; Future  - LIPID PANEL; Future  - HEMOGLOBIN A1C WITH EAG; Future      MDM    Procedures      *Plan of care reviewed with patient. Patient in agreement with plan and expresses understanding. All questions answered and patient encouraged to call or RTO if further questions or concerns. Advised patient if symptoms worsen to go to nearest ER or call 911. AVS and recommendations given to patient upon discharge.

## 2021-02-23 NOTE — PROGRESS NOTES
Patient presents today for follow up care-patient went to eye dr this morning and we will attempt to obtain records.   Patient will also have foot exam but denies wanting any vaccines

## 2021-02-24 ENCOUNTER — OFFICE VISIT (OUTPATIENT)
Dept: NEPHROLOGY | Age: 76
End: 2021-02-24
Payer: MEDICARE

## 2021-02-24 VITALS
DIASTOLIC BLOOD PRESSURE: 88 MMHG | BODY MASS INDEX: 33.15 KG/M2 | WEIGHT: 218 LBS | HEART RATE: 70 BPM | SYSTOLIC BLOOD PRESSURE: 182 MMHG

## 2021-02-24 DIAGNOSIS — N18.4 CKD (CHRONIC KIDNEY DISEASE), STAGE IV (HCC): Primary | ICD-10-CM

## 2021-02-24 PROCEDURE — 99215 OFFICE O/P EST HI 40 MIN: CPT | Performed by: INTERNAL MEDICINE

## 2021-02-24 PROCEDURE — G8754 DIAS BP LESS 90: HCPCS | Performed by: INTERNAL MEDICINE

## 2021-02-24 PROCEDURE — G8427 DOCREV CUR MEDS BY ELIG CLIN: HCPCS | Performed by: INTERNAL MEDICINE

## 2021-02-24 PROCEDURE — G8536 NO DOC ELDER MAL SCRN: HCPCS | Performed by: INTERNAL MEDICINE

## 2021-02-24 PROCEDURE — 3017F COLORECTAL CA SCREEN DOC REV: CPT | Performed by: INTERNAL MEDICINE

## 2021-02-24 PROCEDURE — G8753 SYS BP > OR = 140: HCPCS | Performed by: INTERNAL MEDICINE

## 2021-02-24 PROCEDURE — G8417 CALC BMI ABV UP PARAM F/U: HCPCS | Performed by: INTERNAL MEDICINE

## 2021-02-24 PROCEDURE — 1101F PT FALLS ASSESS-DOCD LE1/YR: CPT | Performed by: INTERNAL MEDICINE

## 2021-02-24 PROCEDURE — G8510 SCR DEP NEG, NO PLAN REQD: HCPCS | Performed by: INTERNAL MEDICINE

## 2021-02-24 RX ORDER — METOPROLOL SUCCINATE 50 MG/1
50 TABLET, EXTENDED RELEASE ORAL DAILY
Qty: 30 TAB | Refills: 2 | Status: SHIPPED | OUTPATIENT
Start: 2021-02-24 | End: 2021-06-10

## 2021-02-24 NOTE — PROGRESS NOTES
Reason for f/u:  ARON on CKD III/IV      HPI:  The pt is seen for f/u for CKD. He is c/o sob arben on exertion. Noticed to have LE swelling also. No N/V/D.  No urinary sx.      ROS:  Constitutional   · Constitutional: no fatigue, no fever, no night sweats, no significant weight gain, no significant weight loss     Eyes   · Eyes: no dry eyes, no vision change     ENMT   · Nose: no frequent nosebleeds, no nose/sinus problems   · Mouth/Throat: no dry mouth, no bleeding gums, no sore throat, no teeth problems, no snoring     Cardiovascular   · Cardiovascular: no swelling in the extremities, no chest pain, no arm pain on exertion, no shortness of breath when walking, no known heart murmur, no shortness of breath when lying down, no palpitations     Respiratory   · Respiratory: shortness of breath+, no cough, no wheezing, no coughing up blood     Gastrointestinal   · Gastrointestinal: no nausea, no abdominal pain, no vomiting, normal appetite, no diarrhea, not vomiting blood     Genitourinary   · Genitourinary: no hematuria, no incontinence, no difficulty urinating, no increased frequency     Musculoskeletal   · Musculoskeletal: no back pain, no arthralgias/joint pain, no muscle aches, no muscle weakness     Integumentary   · Skin: no rashes, no jaundice     Neurologic   · Neurologic: no dizziness, no numbness, no loss of consciousness, no weakness, no seizures, no headaches     Psychiatric   · Psych: no depression, no sleep disturbances     Hematologic/Lymphatic   · Hematologic/Lymphatic no swollen glands, no bruising     Allergic/Immunologic   · Allergy/Immunologic: no runny nose, no hives   ROS as noted in the HPI      PE:  Constitutional   · Level of Distress: NAD, no acute illness, no chronic illness   · General Appearance: healthy-appearing, well-nourished, well-developed   · Ambulation: ambulating normally     Eyes   · Pupils: PERRLA   · EOM: EOMI     Cardiovascular   · Heart Auscultation: RRR, normal S1, normal S2, no murmurs, no rubs, no gallop, no click, no KARI     Lungs   · Auscultation: breath sounds normal, good air movement, CTA except as noted, no wheezing, no rales/crackles, no rhonchi     Abdomen   · Inspection and Palpation: soft, non-distended, no tenderness, no CVA tenderness     Musculoskeletal System   · Extremities: no clubbing, no cyanosis, edema +    Skin   · Inspection and Palpation: no rash, no lesions, no ulcer, no induration, no nodule, turgor normal, no jaundice     Neurologic   · Cranial Nerves: grossly intact   · Gait And Station: normal gait, normal station        A/P:    1. 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  -Last Cr is 2.5 (was 2.9), Renal panel not doen at this visit  -His urinalysis showed bland sediment and has 4-5 proteinuria.  -A kidney ultrasound surgically absent left kidney and right kidney with no hydronephrosis.  -He was advised to avoid any kind of NSAIDs.  -I will see him back in 3 weeks.    2. History of kidney cancer:  -Status post left total nephrectomy 8 years ago.  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  -He follow-up with oncologist.    3. Hypertension:  -Blood pressure is elevated with systolic .  -will increase hydralazine 100 mg tid   -will increase metoprolol ER 50 mg daily  -off of clonidine and minoxidil  -on amlodipine 10 mg daily       4. Anemia:  -Hb is 10.9  -No need for procrit.  -TSAT 22%, Ferritin 283  -will keep on po FeSO4 325 mg bid.    5. Renal osteodystrophy.  -His Ca, phosphorus ok  - vitamin D25 hydroxy 18, will order Vit D 50,000 units weekly for 16 weeks.  -has sec HPT   -iPTH 169, will recheck     6. Gouty arthritis:  -His Uric acid is 5.0  -will keep on Allopurinol 200 mg daily.    7. Metabolic acidosis:  -CO2 20  -will keep on NaHCO3 650 mg 1 tabs bid    8. Proteinuria, nephrotic range:   -has 4-5 g/g  per day   -likely from diabetic nephropathy   -Hep B/C, C3/C4, SFL ratio and ANCAs are neg   -unable to add ARB due to # 1 and K 5.0     9.  SOB:  -from fluid overload  -sob on exertion and LE edema  -last Echo 03/2020 LVEF 50-55%  -will add po lasix 80 mg daily

## 2021-02-24 NOTE — PROGRESS NOTES
Kevon Jackie presents today for   Chief Complaint   Patient presents with    Follow-up     labwork       Is someone accompanying this pt? No    Is the patient using any DME equipment during OV? NO    Depression Screening:  3 most recent PHQ Screens 2/24/2021   Little interest or pleasure in doing things Not at all   Feeling down, depressed, irritable, or hopeless Not at all   Total Score PHQ 2 0   Trouble falling or staying asleep, or sleeping too much -   Feeling tired or having little energy -   Poor appetite, weight loss, or overeating -   Feeling bad about yourself - or that you are a failure or have let yourself or your family down -   Trouble concentrating on things such as school, work, reading, or watching TV -   Moving or speaking so slowly that other people could have noticed; or the opposite being so fidgety that others notice -   Thoughts of being better off dead, or hurting yourself in some way -   PHQ 9 Score -   How difficult have these problems made it for you to do your work, take care of your home and get along with others -       Learning Assessment:  Learning Assessment 2/23/2021   PRIMARY LEARNER Patient   HIGHEST LEVEL OF EDUCATION - PRIMARY LEARNER  DID NOT GRADUATE HIGH SCHOOL   BARRIERS PRIMARY LEARNER COGNITIVE   CO-LEARNER CAREGIVER No   PRIMARY LANGUAGE ENGLISH   LEARNER PREFERENCE PRIMARY DEMONSTRATION   ANSWERED BY patient   RELATIONSHIP SELF       Fall Risk  Fall Risk Assessment, last 12 mths 2/24/2021   Able to walk? Yes   Fall in past 12 months? 0   Do you feel unsteady? 0   Are you worried about falling 0   Is TUG test greater than 12 seconds? -   Is the gait abnormal? -   Number of falls in past 12 months -   Fall with injury? -       Coordination of Care:  1. Have you been to the ER, urgent care clinic since your last visit? Hospitalized since your last visit? Yes, [facility] 2 weeks ago for consitpation    2.  Have you seen or consulted any other health care providers outside of the [address] [place] [address] since your last visit? Include any pap smears or colon screening.  Yes, PCP [facility]

## 2021-03-01 DIAGNOSIS — Z76.0 MEDICATION REFILL: ICD-10-CM

## 2021-03-01 NOTE — TELEPHONE ENCOUNTER
Requested Prescriptions     Pending Prescriptions Disp Refills    hydrALAZINE (APRESOLINE) 50 mg tablet 30 Tab 0     Sig: Take 2 Tabs by mouth three (3) times daily.

## 2021-03-02 NOTE — TELEPHONE ENCOUNTER
Spoke to the pharmacy and they advised that he did not want to  just 30 pills he is asking for 90 supply    Thank you

## 2021-03-03 RX ORDER — ALLOPURINOL 100 MG/1
TABLET ORAL
Qty: 60 TAB | Refills: 3 | Status: SHIPPED | OUTPATIENT
Start: 2021-03-03 | End: 2021-05-04

## 2021-03-03 RX ORDER — ALLOPURINOL 100 MG/1
TABLET ORAL
Qty: 60 TAB | Refills: 3 | Status: SHIPPED | OUTPATIENT
Start: 2021-03-03 | End: 2021-07-04

## 2021-03-04 RX ORDER — HYDRALAZINE HYDROCHLORIDE 50 MG/1
100 TABLET, FILM COATED ORAL 3 TIMES DAILY
Qty: 90 TAB | Refills: 0 | Status: SHIPPED | OUTPATIENT
Start: 2021-03-04 | End: 2021-05-21 | Stop reason: SDUPTHER

## 2021-03-09 RX ORDER — OMEPRAZOLE 40 MG/1
CAPSULE, DELAYED RELEASE ORAL
Qty: 90 CAP | Refills: 0 | Status: SHIPPED | OUTPATIENT
Start: 2021-03-09 | End: 2021-04-06

## 2021-03-11 ENCOUNTER — HOSPITAL ENCOUNTER (OUTPATIENT)
Dept: LAB | Age: 76
Discharge: HOME | End: 2021-03-11

## 2021-03-11 DIAGNOSIS — Z13.0 SCREENING FOR ENDOCRINE, METABOLIC AND IMMUNITY DISORDER: ICD-10-CM

## 2021-03-11 DIAGNOSIS — Z13.29 SCREENING FOR ENDOCRINE, METABOLIC AND IMMUNITY DISORDER: ICD-10-CM

## 2021-03-11 DIAGNOSIS — Z13.228 SCREENING FOR ENDOCRINE, METABOLIC AND IMMUNITY DISORDER: ICD-10-CM

## 2021-03-17 ENCOUNTER — OFFICE VISIT (OUTPATIENT)
Dept: NEPHROLOGY | Age: 76
End: 2021-03-17
Payer: MEDICARE

## 2021-03-17 VITALS
WEIGHT: 218 LBS | SYSTOLIC BLOOD PRESSURE: 145 MMHG | DIASTOLIC BLOOD PRESSURE: 75 MMHG | TEMPERATURE: 98.2 F | HEART RATE: 56 BPM | BODY MASS INDEX: 33.15 KG/M2

## 2021-03-17 DIAGNOSIS — N18.4 CKD (CHRONIC KIDNEY DISEASE), STAGE IV (HCC): Primary | ICD-10-CM

## 2021-03-17 PROCEDURE — 99215 OFFICE O/P EST HI 40 MIN: CPT | Performed by: INTERNAL MEDICINE

## 2021-03-17 PROCEDURE — G8753 SYS BP > OR = 140: HCPCS | Performed by: INTERNAL MEDICINE

## 2021-03-17 PROCEDURE — G8427 DOCREV CUR MEDS BY ELIG CLIN: HCPCS | Performed by: INTERNAL MEDICINE

## 2021-03-17 PROCEDURE — G8754 DIAS BP LESS 90: HCPCS | Performed by: INTERNAL MEDICINE

## 2021-03-17 PROCEDURE — G8510 SCR DEP NEG, NO PLAN REQD: HCPCS | Performed by: INTERNAL MEDICINE

## 2021-03-17 PROCEDURE — 1101F PT FALLS ASSESS-DOCD LE1/YR: CPT | Performed by: INTERNAL MEDICINE

## 2021-03-17 PROCEDURE — 3017F COLORECTAL CA SCREEN DOC REV: CPT | Performed by: INTERNAL MEDICINE

## 2021-03-17 PROCEDURE — G8536 NO DOC ELDER MAL SCRN: HCPCS | Performed by: INTERNAL MEDICINE

## 2021-03-17 PROCEDURE — G8417 CALC BMI ABV UP PARAM F/U: HCPCS | Performed by: INTERNAL MEDICINE

## 2021-03-17 RX ORDER — FUROSEMIDE 40 MG/1
80 TABLET ORAL DAILY
Qty: 30 TAB | Refills: 1 | Status: SHIPPED | OUTPATIENT
Start: 2021-03-17 | End: 2021-06-01

## 2021-03-17 RX ORDER — HYDRALAZINE HYDROCHLORIDE 50 MG/1
100 TABLET, FILM COATED ORAL 3 TIMES DAILY
Qty: 90 TAB | Refills: 1 | Status: SHIPPED | OUTPATIENT
Start: 2021-03-17 | End: 2021-04-28

## 2021-03-17 NOTE — PROGRESS NOTES
Joyce Mares presents today for   Chief Complaint   Patient presents with    Follow-up     CKD       Is someone accompanying this pt?no    Is the patient using any DME equipment during [address]? no    Depression Screening:  3 most recent PHQ Screens 3/17/2021   Little interest or pleasure in doing things Not at all   Feeling down, depressed, irritable, or hopeless Not at all   Total Score PHQ 2 0   Trouble falling or staying asleep, or sleeping too much -   Feeling tired or having little energy -   Poor appetite, weight loss, or overeating -   Feeling bad about yourself - or that you are a failure or have let yourself or your family down -   Trouble concentrating on things such as school, work, reading, or watching TV -   Moving or speaking so slowly that other people could have noticed; or the opposite being so fidgety that others notice -   Thoughts of being better off dead, or hurting yourself in some way -   PHQ 9 Score -   How difficult have these problems made it for you to do your work, take care of your home and get along with others -       Learning Assessment:  Learning Assessment 2/23/2021   PRIMARY LEARNER Patient   HIGHEST LEVEL OF EDUCATION - PRIMARY LEARNER  DID NOT GRADUATE HIGH SCHOOL   BARRIERS PRIMARY LEARNER COGNITIVE   CO-LEARNER CAREGIVER No   PRIMARY LANGUAGE ENGLISH   LEARNER PREFERENCE PRIMARY DEMONSTRATION   ANSWERED BY patient   RELATIONSHIP SELF       Fall Risk  Fall Risk Assessment, last 12 mths 3/17/2021   Able to walk? Yes   Fall in past 12 months? 0   Do you feel unsteady? 0   Are you worried about falling 0   Is TUG test greater than 12 seconds? -   Is the gait abnormal? -   Number of falls in past 12 months -   Fall with injury? -       Coordination of Care:  1. Have you been to the ER, urgent care clinic since your last visit? Hospitalized since your last visit? no    2.  Have you seen or consulted any other health care providers outside of the [address] [place] [address] [place] since your last visit? Include any pap smears or colon screening.  Yes, PCP [facility]

## 2021-03-17 NOTE — PROGRESS NOTES
Reason for f/u:  ARON on CKD III/IV      HPI:  The pt is seen for f/u for CKD. He is c/o sob arben on exertion. Noticed to have LE swelling also. No N/V/D.  No urinary sx.      ROS:  Constitutional   · Constitutional: no fatigue, no fever, no night sweats, no significant weight gain, no significant weight loss     Eyes   · Eyes: no dry eyes, no vision change     ENMT   · Nose: no frequent nosebleeds, no nose/sinus problems   · Mouth/Throat: no dry mouth, no bleeding gums, no sore throat, no teeth problems, no snoring     Cardiovascular   · Cardiovascular: no swelling in the extremities, no chest pain, no arm pain on exertion, no shortness of breath when walking, no known heart murmur, no shortness of breath when lying down, no palpitations     Respiratory   · Respiratory: shortness of breath+, no cough, no wheezing, no coughing up blood     Gastrointestinal   · Gastrointestinal: no nausea, no abdominal pain, no vomiting, normal appetite, no diarrhea, not vomiting blood     Genitourinary   · Genitourinary: no hematuria, no incontinence, no difficulty urinating, no increased frequency     Musculoskeletal   · Musculoskeletal: no back pain, no arthralgias/joint pain, no muscle aches, no muscle weakness     Integumentary   · Skin: no rashes, no jaundice     Neurologic   · Neurologic: no dizziness, no numbness, no loss of consciousness, no weakness, no seizures, no headaches     Psychiatric   · Psych: no depression, no sleep disturbances     Hematologic/Lymphatic   · Hematologic/Lymphatic no swollen glands, no bruising     Allergic/Immunologic   · Allergy/Immunologic: no runny nose, no hives   ROS as noted in the HPI      PE:  Constitutional   · Level of Distress: NAD, no acute illness, no chronic illness   · General Appearance: healthy-appearing, well-nourished, well-developed   · Ambulation: ambulating normally     Eyes   · Pupils: PERRLA   · EOM: EOMI     Cardiovascular   · Heart Auscultation: RRR, normal S1, normal S2, no murmurs, no rubs, no gallop, no click, no KARI     Lungs   · Auscultation: breath sounds normal, good air movement, CTA except as noted, no wheezing, no rales/crackles, no rhonchi     Abdomen   · Inspection and Palpation: soft, non-distended, no tenderness, no CVA tenderness     Musculoskeletal System   · Extremities: no clubbing, no cyanosis, edema +    Skin   · Inspection and Palpation: no rash, no lesions, no ulcer, no induration, no nodule, turgor normal, no jaundice     Neurologic   · Cranial Nerves: grossly intact   · Gait And Station: normal gait, normal station        A/P:    1. 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  -His Cr ranges between 2.5-2.9   -His urinalysis showed bland sediment and has 4-5 proteinuria.  -A kidney ultrasound surgically absent left kidney and right kidney with no hydronephrosis. -He was advised to avoid any kind of NSAIDs.  -I will see him back in 6 weeks. 2. History of kidney cancer:  -Status post left total nephrectomy 8 years ago. 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  -He follow-up with oncologist.    3. Hypertension:  -Blood pressure is elevated with systolic .  -will increase hydralazine 100 mg tid   -will increase metoprolol ER 50 mg daily  -off of clonidine and minoxidil  -on amlodipine 10 mg daily       4. Anemia:  -Hb is 10.9  -No need for procrit. -TSAT 22%, Ferritin 283  -will keep on po FeSO4 325 mg bid. 5. Renal osteodystrophy.  -His Ca, phosphorus ok  - vitamin D25 hydroxy 18, will order Vit D 50,000 units weekly for 16 weeks.  -has sec HPT   -iPTH 169, will recheck     6. Gouty arthritis:  -His Uric acid is 5.0  -will keep on Allopurinol 200 mg daily. 7. Metabolic acidosis:  -CO2 17  -will increase NaHCO3 650 mg 1 tabs tid    8.  Proteinuria, nephrotic range:   -has 4-5 g/g per day   -likely from diabetic nephropathy   -Hep B/C, C3/C4, SFL ratio and ANCAs are neg   -unable to add ARB due to # 1 and K 5.0     9.  SOB:  -from fluid overload  -sob on exertion and LE edema (better)  -last Echo 03/2020 LVEF 50-55%  -will keep on  po lasix 80 mg daily

## 2021-04-05 DIAGNOSIS — N18.30 STAGE 3 CHRONIC KIDNEY DISEASE, UNSPECIFIED WHETHER STAGE 3A OR 3B CKD (HCC): Primary | ICD-10-CM

## 2021-04-06 RX ORDER — OMEPRAZOLE 40 MG/1
CAPSULE, DELAYED RELEASE ORAL
Qty: 90 CAP | Refills: 0 | Status: SHIPPED | OUTPATIENT
Start: 2021-04-06 | End: 2021-06-03 | Stop reason: SDUPTHER

## 2021-04-14 RX ORDER — ERGOCALCIFEROL 1.25 MG/1
CAPSULE ORAL
Qty: 16 CAP | Refills: 1 | Status: SHIPPED | OUTPATIENT
Start: 2021-04-14 | End: 2021-04-26

## 2021-04-16 ENCOUNTER — HOSPITAL ENCOUNTER (OUTPATIENT)
Dept: LAB | Age: 76
Discharge: HOME | End: 2021-04-16
Payer: MEDICARE

## 2021-04-16 LAB
ALBUMIN SERPL-MCNC: 3.4 G/DL (ref 3.5–4.7)
ANION GAP SERPL CALC-SCNC: 9 MMOL/L
BUN SERPL-MCNC: 50 MG/DL (ref 9–21)
BUN/CREAT SERPL: 19
CA-I BLD-MCNC: 9.1 MG/DL (ref 8.5–10.5)
CHLORIDE SERPL-SCNC: 108 MMOL/L (ref 94–111)
CO2 SERPL-SCNC: 20 MMOL/L (ref 21–33)
CREAT SERPL-MCNC: 2.6 MG/DL (ref 0.8–1.5)
ERYTHROCYTE [DISTWIDTH] IN BLOOD BY AUTOMATED COUNT: 14.6 % (ref 11.6–14.5)
GLUCOSE SERPL-MCNC: 321 MG/DL (ref 70–110)
HCT VFR BLD AUTO: 35.8 % (ref 36–48)
HGB BLD-MCNC: 11.4 G/DL (ref 13–16)
MCH RBC QN AUTO: 26.4 PG (ref 24–34)
MCHC RBC AUTO-ENTMCNC: 31.8 G/DL (ref 31–37)
MCV RBC AUTO: 82.9 FL (ref 74–97)
PHOSPHATE SERPL-MCNC: 3.8 MG/DL (ref 2.5–4.5)
PLATELET # BLD AUTO: 328 K/UL (ref 135–420)
PMV BLD AUTO: 10 FL (ref 9.2–11.8)
POTASSIUM SERPL-SCNC: 4.5 MMOL/L (ref 3.2–5.1)
RBC # BLD AUTO: 4.32 M/UL (ref 4.7–5.5)
SODIUM SERPL-SCNC: 137 MMOL/L (ref 135–145)
WBC # BLD AUTO: 8.5 K/UL (ref 4.6–13.2)

## 2021-04-16 PROCEDURE — 80069 RENAL FUNCTION PANEL: CPT

## 2021-04-16 PROCEDURE — 36415 COLL VENOUS BLD VENIPUNCTURE: CPT

## 2021-04-16 PROCEDURE — 85027 COMPLETE CBC AUTOMATED: CPT

## 2021-04-16 PROCEDURE — 84156 ASSAY OF PROTEIN URINE: CPT

## 2021-04-19 DIAGNOSIS — N18.30 STAGE 3 CHRONIC KIDNEY DISEASE, UNSPECIFIED WHETHER STAGE 3A OR 3B CKD (HCC): ICD-10-CM

## 2021-04-19 LAB
ALBUMIN MFR UR ELPH: 77 %
ALPHA1 GLOB MFR UR ELPH: 4.1 %
ALPHA2 GLOB 24H MFR UR ELPH: 3.9 %
B-GLOBULIN 24H MFR UR ELPH: 8.5 %
CREAT UR-MCNC: 114.2 MG/DL
GAMMA GLOB 24H MFR UR ELPH: 6.5 %
M PROTEIN MFR UR ELPH: NORMAL %
PLEASE NOTE:, 133800: NORMAL
PROT UR-MCNC: 409.8 MG/DL

## 2021-04-26 RX ORDER — ERGOCALCIFEROL 1.25 MG/1
CAPSULE ORAL
Qty: 16 CAP | Refills: 1 | Status: SHIPPED | OUTPATIENT
Start: 2021-04-26 | End: 2021-12-06

## 2021-04-26 RX ORDER — SODIUM BICARBONATE 650 MG/1
TABLET ORAL
Qty: 60 TAB | Refills: 1 | Status: SHIPPED | OUTPATIENT
Start: 2021-04-26 | End: 2021-06-03 | Stop reason: SDUPTHER

## 2021-04-28 ENCOUNTER — OFFICE VISIT (OUTPATIENT)
Dept: NEPHROLOGY | Age: 76
End: 2021-04-28
Payer: MEDICARE

## 2021-04-28 VITALS
WEIGHT: 215 LBS | DIASTOLIC BLOOD PRESSURE: 86 MMHG | SYSTOLIC BLOOD PRESSURE: 167 MMHG | HEART RATE: 68 BPM | BODY MASS INDEX: 32.69 KG/M2

## 2021-04-28 DIAGNOSIS — N18.4 CKD (CHRONIC KIDNEY DISEASE), STAGE IV (HCC): Primary | ICD-10-CM

## 2021-04-28 PROCEDURE — G8753 SYS BP > OR = 140: HCPCS | Performed by: INTERNAL MEDICINE

## 2021-04-28 PROCEDURE — G8754 DIAS BP LESS 90: HCPCS | Performed by: INTERNAL MEDICINE

## 2021-04-28 PROCEDURE — 1101F PT FALLS ASSESS-DOCD LE1/YR: CPT | Performed by: INTERNAL MEDICINE

## 2021-04-28 PROCEDURE — G8432 DEP SCR NOT DOC, RNG: HCPCS | Performed by: INTERNAL MEDICINE

## 2021-04-28 PROCEDURE — G8427 DOCREV CUR MEDS BY ELIG CLIN: HCPCS | Performed by: INTERNAL MEDICINE

## 2021-04-28 PROCEDURE — G8417 CALC BMI ABV UP PARAM F/U: HCPCS | Performed by: INTERNAL MEDICINE

## 2021-04-28 PROCEDURE — G8536 NO DOC ELDER MAL SCRN: HCPCS | Performed by: INTERNAL MEDICINE

## 2021-04-28 PROCEDURE — 3017F COLORECTAL CA SCREEN DOC REV: CPT | Performed by: INTERNAL MEDICINE

## 2021-04-28 PROCEDURE — 99214 OFFICE O/P EST MOD 30 MIN: CPT | Performed by: INTERNAL MEDICINE

## 2021-04-28 RX ORDER — HYDRALAZINE HYDROCHLORIDE 50 MG/1
TABLET, FILM COATED ORAL
Qty: 90 TAB | Refills: 1 | Status: SHIPPED | OUTPATIENT
Start: 2021-04-28 | End: 2021-05-06 | Stop reason: SDUPTHER

## 2021-04-28 NOTE — PROGRESS NOTES
Austin Bui presents today for   Chief Complaint   Patient presents with    Follow-up     CKD       Is someone accompanying this pt? no    Is the patient using any DME equipment during OV? no    Depression Screening:  3 most recent PHQ Screens 4/28/2021   Little interest or pleasure in doing things Not at all   Feeling down, depressed, irritable, or hopeless Not at all   Total Score PHQ 2 0   Trouble falling or staying asleep, or sleeping too much -   Feeling tired or having little energy -   Poor appetite, weight loss, or overeating -   Feeling bad about yourself - or that you are a failure or have let yourself or your family down -   Trouble concentrating on things such as school, work, reading, or watching TV -   Moving or speaking so slowly that other people could have noticed; or the opposite being so fidgety that others notice -   Thoughts of being better off dead, or hurting yourself in some way -   PHQ 9 Score -   How difficult have these problems made it for you to do your work, take care of your home and get along with others -       Learning Assessment:  Learning Assessment 2/23/2021   PRIMARY LEARNER Patient   HIGHEST LEVEL OF EDUCATION - PRIMARY LEARNER  DID NOT GRADUATE HIGH SCHOOL   BARRIERS PRIMARY LEARNER COGNITIVE   CO-LEARNER CAREGIVER No   PRIMARY LANGUAGE ENGLISH   LEARNER PREFERENCE PRIMARY DEMONSTRATION   ANSWERED BY patient   RELATIONSHIP SELF       Fall Risk  Fall Risk Assessment, last 12 mths 4/28/2021   Able to walk? Yes   Fall in past 12 months? 0   Do you feel unsteady? 0   Are you worried about falling 0   Is TUG test greater than 12 seconds? -   Is the gait abnormal? -   Number of falls in past 12 months -   Fall with injury? -       Coordination of Care:  1. Have you been to the ER, urgent care clinic since your last visit? Hospitalized since your last visit? no  2.  Have you seen or consulted any other health care providers outside of the [address] [place] [address] [place] since your last visit? Include any pap smears or colon screening.  Yes, PCP [facility]

## 2021-04-28 NOTE — PROGRESS NOTES
Reason for f/u:  ARON on CKD III/IV      HPI:  The pt is seen for f/u for CKD. He is c/o sob arben on exertion. Noticed to have LE swelling also. No N/V/D.  No urinary sx.      ROS:  Constitutional   · Constitutional: no fatigue, no fever, no night sweats, no significant weight gain, no significant weight loss     Eyes   · Eyes: no dry eyes, no vision change     ENMT   · Nose: no frequent nosebleeds, no nose/sinus problems   · Mouth/Throat: no dry mouth, no bleeding gums, no sore throat, no teeth problems, no snoring     Cardiovascular   · Cardiovascular: no swelling in the extremities, no chest pain, no arm pain on exertion, no shortness of breath when walking, no known heart murmur, no shortness of breath when lying down, no palpitations     Respiratory   · Respiratory: shortness of breath+, no cough, no wheezing, no coughing up blood     Gastrointestinal   · Gastrointestinal: no nausea, no abdominal pain, no vomiting, normal appetite, no diarrhea, not vomiting blood     Genitourinary   · Genitourinary: no hematuria, no incontinence, no difficulty urinating, no increased frequency     Musculoskeletal   · Musculoskeletal: no back pain, no arthralgias/joint pain, no muscle aches, no muscle weakness     Integumentary   · Skin: no rashes, no jaundice     Neurologic   · Neurologic: no dizziness, no numbness, no loss of consciousness, no weakness, no seizures, no headaches     Psychiatric   · Psych: no depression, no sleep disturbances     Hematologic/Lymphatic   · Hematologic/Lymphatic no swollen glands, no bruising     Allergic/Immunologic   · Allergy/Immunologic: no runny nose, no hives   ROS as noted in the HPI      PE:  Constitutional   · Level of Distress: NAD, no acute illness, no chronic illness   · General Appearance: healthy-appearing, well-nourished, well-developed   · Ambulation: ambulating normally     Eyes   · Pupils: PERRLA   · EOM: EOMI     Cardiovascular   · Heart Auscultation: RRR, normal S1, normal S2, no murmurs, no rubs, no gallop, no click, no KARI     Lungs   · Auscultation: breath sounds normal, good air movement, CTA except as noted, no wheezing, no rales/crackles, no rhonchi     Abdomen   · Inspection and Palpation: soft, non-distended, no tenderness, no CVA tenderness     Musculoskeletal System   · Extremities: no clubbing, no cyanosis, edema +    Skin   · Inspection and Palpation: no rash, no lesions, no ulcer, no induration, no nodule, turgor normal, no jaundice     Neurologic   · Cranial Nerves: grossly intact   · Gait And Station: normal gait, normal station        A/P:    1. 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  -His Cr ranges between 2.5-2.9   -His urinalysis showed bland sediment and has 4-5 proteinuria.  -A kidney ultrasound surgically absent left kidney and right kidney with no hydronephrosis. -He was advised to avoid any kind of NSAIDs.  -I will see him back in 6 weeks. 2. History of kidney cancer:  -Status post left total nephrectomy 8 years ago. 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  -He follow-up with oncologist.    3. Hypertension:  -Blood pressure is elevated with systolic .  -will increase hydralazine 100 mg tid   -will increase metoprolol ER 50 mg daily  -off of clonidine and minoxidil  -on amlodipine 10 mg daily       4. Anemia:  -Hb is 10.9-->11.4  -No need for procrit. -TSAT 22%, Ferritin 283  -will keep on po FeSO4 325 mg bid. 5. Renal osteodystrophy.  -His Ca, phosphorus ok  - vitamin D25 hydroxy 18, will order Vit D 50,000 units weekly for 16 weeks.  -has sec HPT   -iPTH 169, will recheck     6. Gouty arthritis:  -His Uric acid is 5.0  -will keep on Allopurinol 200 mg daily. 7. Metabolic acidosis:  -CO2 53-->65  -will keep NaHCO3 650 mg 1 tabs tid    8.  Proteinuria, nephrotic range:   -has 4-5 g/g per day   -likely from diabetic nephropathy   -Hep B/C, C3/C4, SFL ratio and ANCAs are neg   -unable to add ARB due to # 1 and K 5.0     9.  SOB:  -from fluid overload  -sob on exertion and LE edema (better)  -last Echo 03/2020 LVEF 50-55%  -will keep on  po lasix 80 mg daily

## 2021-05-04 ENCOUNTER — HOSPITAL ENCOUNTER (EMERGENCY)
Age: 76
Discharge: HOME | End: 2021-05-04
Attending: EMERGENCY MEDICINE
Payer: MEDICARE

## 2021-05-04 VITALS
HEIGHT: 68 IN | RESPIRATION RATE: 18 BRPM | WEIGHT: 218 LBS | HEART RATE: 79 BPM | DIASTOLIC BLOOD PRESSURE: 73 MMHG | BODY MASS INDEX: 33.04 KG/M2 | TEMPERATURE: 98.1 F | SYSTOLIC BLOOD PRESSURE: 143 MMHG | OXYGEN SATURATION: 100 %

## 2021-05-04 DIAGNOSIS — K59.00 CONSTIPATION, UNSPECIFIED CONSTIPATION TYPE: Primary | ICD-10-CM

## 2021-05-04 PROCEDURE — 74011250637 HC RX REV CODE- 250/637: Performed by: EMERGENCY MEDICINE

## 2021-05-04 PROCEDURE — 99283 EMERGENCY DEPT VISIT LOW MDM: CPT

## 2021-05-04 RX ORDER — MAGNESIUM CITRATE
296 SOLUTION, ORAL ORAL
Status: COMPLETED | OUTPATIENT
Start: 2021-05-04 | End: 2021-05-04

## 2021-05-04 RX ORDER — FACIAL-BODY WIPES
10 EACH TOPICAL
Status: COMPLETED | OUTPATIENT
Start: 2021-05-04 | End: 2021-05-04

## 2021-05-04 RX ORDER — POLYETHYLENE GLYCOL 3350 17 G/17G
17 POWDER, FOR SOLUTION ORAL DAILY
Qty: 10 PACKET | Refills: 0 | Status: SHIPPED | OUTPATIENT
Start: 2021-05-04 | End: 2021-06-03 | Stop reason: SDUPTHER

## 2021-05-04 RX ADMIN — BISACODYL 10 MG: 10 SUPPOSITORY RECTAL at 14:33

## 2021-05-04 RX ADMIN — MAGNESIUM CITRATE 296 ML: 1.75 LIQUID ORAL at 14:33

## 2021-05-04 NOTE — ED NOTES
I have reviewed discharge instructions with the patient. The patient verbalized understanding. PFT Completed today.

## 2021-05-04 NOTE — ED PROVIDER NOTES
EMERGENCY DEPARTMENT HISTORY AND PHYSICAL EXAM      Date: 5/4/2021  Patient Name: Brunilda Boyd    History of Presenting Illness     Chief Complaint   Patient presents with    Constipation       History Provided By: Patient    HPI: Brunilda Boyd, 70 y.o. male with a past medical history significant diabetes, hypertension, hyperlipidemia and GERD, bladder cancer, arthritis, gout, history of DVTs and patient is on Eliquis presents to the ED with cc of constipation. Patient reports he has not had a bowel movement in about 3 to 4 days. This is unusual for him. He feels like stool is impacted as he has pain when he tries to defecate. Also has abdominal pain which he describes as fullness, \"gas\". He has no nausea or vomiting. There are no other complaints, changes, or physical findings at this time. PCP: Eva Stephens NP    No current facility-administered medications on file prior to encounter. Current Outpatient Medications on File Prior to Encounter   Medication Sig Dispense Refill    hydrALAZINE (APRESOLINE) 50 mg tablet TAKE 2 TABLETS BY MOUTH THREE TIMES DAILY 90 Tab 1    sodium bicarbonate 650 mg tablet TAKE 1 TABLET BY MOUTH TWICE DAILY WITH MEALS 60 Tab 1    ergocalciferol (ERGOCALCIFEROL) 1,250 mcg (50,000 unit) capsule TAKE ONE CAPSULE BY MOUTH EVERY 7 DAYS 16 Cap 1    omeprazole (PRILOSEC) 40 mg capsule TAKE 1 CAPSULE BY MOUTH DAILY 90 Cap 0    furosemide (LASIX) 40 mg tablet Take 2 Tabs by mouth daily. (Patient taking differently: Take 20 mg by mouth daily.) 30 Tab 1    hydrALAZINE (APRESOLINE) 50 mg tablet Take 2 Tabs by mouth three (3) times daily. 90 Tab 0    allopurinoL (ZYLOPRIM) 100 mg tablet TAKE 2 TABLETS BY MOUTH DAILY 60 Tab 3    [DISCONTINUED] allopurinoL (ZYLOPRIM) 100 mg tablet TAKE 2 TABLETS BY MOUTH DAILY 60 Tab 3    metoprolol succinate (TOPROL-XL) 50 mg XL tablet Take 1 Tab by mouth daily.  30 Tab 2    simvastatin (ZOCOR) 40 mg tablet Take 1 Tab by mouth nightly. 90 Tab 1    BD Ultra-Fine Mini Pen Needle 31 gauge x 3/16\" ndle USE TO INJECT  Pen Needle 3    [DISCONTINUED] furosemide (LASIX) 80 mg tablet TAKE 1 TABLET BY MOUTH DAILY 90 Tab 1    apixaban (Eliquis) 2.5 mg tablet Take 2.5 mg by mouth two (2) times a day.  sodium bicarbonate 650 mg tablet Take 1 Tab by mouth two (2) times a day. 60 Tab 1    amLODIPine (NORVASC) 5 mg tablet TAKE 2 TABLETS BY MOUTH ONCE A  Tab 1    glucose blood VI test strips (ASCENSIA AUTODISC VI, ONE TOUCH ULTRA TEST VI) strip Check blood glucose 3 times daily, give Accu chek Precious test strips 300 Strip 3    Lantus Solostar U-100 Insulin 100 unit/mL (3 mL) inpn INJECT 25 UNITS SUBCUTANEOUSLY ONCE DAILY 9 mL 1    fluticasone propionate (FLONASE) 50 mcg/actuation nasal spray 1 [place] by Both Nostrils route daily as needed.       NOVOLOG FLEXPEN U-100 INSULIN 100 unit/mL inpn INJECT 8 UNITS SUBCUTANEOUSLY BEFORE MEALS  4       Past History     Past Medical History:  Past Medical History:   Diagnosis Date    Acid reflux     Arthritis     [facility]     Deep vein thrombosis (DVT) of proximal vein of both lower extremities ([facility] [address] 9/14/2020    Diabetes mellitus (HCC)     GERD (gastroesophageal reflux disease)     Gout     High cholesterol     Hypertension     Kidney carcinoma, left (HCC)     Kidney disease     Pituitary mass ([address]     Reflux esophagitis     Unspecified deficiency anemia        Past Surgical History:  Past Surgical History:   Procedure Laterality Date    HX CYSTECTOMY  12/30/09    Dr. Kaylyn Koehler HX NEPHRECTOMY Left 5/2009    Nephroureterectomy by Dr. Kaylee Newton HX OTHER SURGICAL      surgery on pituitary gland       Family History:  Family History   Problem Relation Age of Onset    Heart Disease Father     Heart Disease Mother        Social History:  Social History     Tobacco Use    Smoking status: Former Smoker     Packs/day: 0.50     Years: 40.00 Pack years: 20.00     Types: Cigarettes     Quit date: 1999     Years since quittin.3    Smokeless tobacco: Never Used   Substance Use Topics    Alcohol use: No    Drug use: No       Allergies:  No Known Allergies      Review of Systems     Review of Systems   All other systems reviewed and are negative. Physical Exam     Physical Exam  Vitals signs and nursing note reviewed. Constitutional:       General: He is not in acute distress. Appearance: He is well-developed. He is obese. He is not diaphoretic. Comments: Obese male in no distress. HENT:      Head: Normocephalic and atraumatic. No right periorbital erythema or left periorbital erythema. Jaw: No trismus. Right Ear: External ear normal. No drainage or swelling. Tympanic membrane is not perforated, erythematous or bulging. Left Ear: External ear normal. No drainage or swelling. Tympanic membrane is not perforated, erythematous or bulging. Nose: Nose normal. No mucosal edema or rhinorrhea. Right Sinus: No maxillary sinus tenderness or frontal sinus tenderness. Left Sinus: No maxillary sinus tenderness or frontal sinus tenderness. Mouth/Throat:      Mouth: No oral lesions. Dentition: No dental abscesses. Pharynx: Uvula midline. No oropharyngeal exudate, posterior oropharyngeal erythema or uvula swelling. Tonsils: No tonsillar abscesses. Eyes:      General: No scleral icterus. Right eye: No discharge. Left eye: No discharge. Conjunctiva/sclera: Conjunctivae normal.   Neck:      Musculoskeletal: Normal range of motion and neck supple. Cardiovascular:      Rate and Rhythm: Normal rate and regular rhythm. Heart sounds: Normal heart sounds. No murmur. No friction rub. No gallop. Pulmonary:      Effort: Pulmonary effort is normal. No tachypnea, accessory muscle usage or respiratory distress. Breath sounds: Normal breath sounds.  No decreased breath sounds, wheezing, rhonchi or rales. Abdominal:      General: Bowel sounds are normal. There is no distension. Palpations: Abdomen is soft. Tenderness: There is no abdominal tenderness. Comments: Abdomen is obese soft and nontender, bowel sounds present. Genitourinary:     Rectum: Normal.      Comments: Increased but soft stool palpable at the end of my digit. Musculoskeletal: Normal range of motion. General: No tenderness. Lymphadenopathy:      Cervical: No cervical adenopathy. Skin:     General: Skin is warm and dry. Neurological:      Mental Status: He is alert and oriented to person, place, and time. Psychiatric:         Judgment: Judgment normal.         Lab and Diagnostic Study Results     Labs -   No results found for this or any previous visit (from the past 12 hour(s)). Radiologic Studies -   @lastxrresult@  CT Results  (Last 48 hours)    None        CXR Results  (Last 48 hours)    None            Medical Decision Making   - I am the first provider for this patient. - I reviewed the vital signs, available nursing notes, past medical history, past surgical history, family history and social history. - Initial assessment performed. The patients presenting problems have been discussed, and they are in agreement with the care plan formulated and outlined with them. I have encouraged them to ask questions as they arise throughout their visit. Vital Signs-Reviewed the patient's vital signs. Patient Vitals for the past 12 hrs:   Temp Pulse Resp BP SpO2   05/04/21 1355 98.1 °F (36.7 °C) 80 18 (!) 143/73 100 %       Records Reviewed: Nursing Notes and Old Medical Records    The patient presents with constipation with a differential diagnosis of fecal impaction, bowel obstruction, IBS      ED Course:      Patient with increased but soft stool. Does not appear like he has stool impaction. He was given a Dulcolax suppository by MD rectally.   He was also given mag citrate in ED 1 bowel prior to discharge. I also gave him a prescription for MiraLAX and advised him to return if symptoms worsening. Provider Notes (Medical Decision Making):            Procedures   Medical Decision Makingedical Decision Making      Disposition   Disposition: Condition stable    Diagnosis:   1. Constipation, unspecified constipation type          Disposition:     Follow-up Information     Follow up With Specialties Details Why Contact Info    Christina Smith, NP Nurse Practitioner In 1 day  [address] [facility] [address] [phone]      [facility] EMERGENCY DEPT Emergency Medicine  If symptoms worsen [place] [id number] [phone]          Patient's Medications   Start Taking    POLYETHYLENE GLYCOL (MIRALAX) 17 GRAM PACKET    Take 1 Packet by mouth daily. Continue Taking    ALLOPURINOL (ZYLOPRIM) 100 MG TABLET    TAKE 2 TABLETS BY MOUTH DAILY    AMLODIPINE (NORVASC) 5 MG TABLET    TAKE 2 TABLETS BY MOUTH ONCE A DAY    APIXABAN (ELIQUIS) 2.5 MG TABLET    Take 2.5 mg by mouth two (2) times a day. BD ULTRA-FINE MINI PEN NEEDLE 31 GAUGE X 3/16\" NDLE    USE TO INJECT TID    ERGOCALCIFEROL (ERGOCALCIFEROL) 1,250 MCG (50,000 UNIT) CAPSULE    TAKE ONE CAPSULE BY MOUTH EVERY 7 DAYS    FLUTICASONE PROPIONATE (FLONASE) 50 MCG/ACTUATION NASAL SPRAY    1 [place] by Both Nostrils route daily as needed. FUROSEMIDE (LASIX) 40 MG TABLET    Take 2 Tabs by mouth daily. GLUCOSE BLOOD VI TEST STRIPS (ASCENSIA AUTODISC VI, ONE TOUCH ULTRA TEST VI) STRIP    Check blood glucose 3 times daily, give Accu chek Precious test strips    HYDRALAZINE (APRESOLINE) 50 MG TABLET    Take 2 Tabs by mouth three (3) times daily.     HYDRALAZINE (APRESOLINE) 50 MG TABLET    TAKE 2 TABLETS BY MOUTH THREE TIMES DAILY    LANTUS SOLOSTAR U-100 INSULIN 100 UNIT/ML (3 ML) INPN    INJECT 25 UNITS SUBCUTANEOUSLY ONCE DAILY    METOPROLOL SUCCINATE (TOPROL-XL) 50 MG XL TABLET    Take 1 Tab by mouth daily.    NOVOLOG FLEXPEN U-100 INSULIN 100 UNIT/ML INPN    INJECT 8 UNITS SUBCUTANEOUSLY BEFORE MEALS    OMEPRAZOLE (PRILOSEC) 40 MG CAPSULE    TAKE 1 CAPSULE BY MOUTH DAILY    SIMVASTATIN (ZOCOR) 40 MG TABLET    Take 1 Tab by mouth nightly. SODIUM BICARBONATE 650 MG TABLET    Take 1 Tab by mouth two (2) times a day. SODIUM BICARBONATE 650 MG TABLET    TAKE 1 TABLET BY MOUTH TWICE DAILY WITH MEALS   These Medications have changed    No medications on file   Stop Taking    ALLOPURINOL (ZYLOPRIM) 100 MG TABLET    TAKE 2 TABLETS BY MOUTH DAILY    FUROSEMIDE (LASIX) 80 MG TABLET    TAKE 1 TABLET BY MOUTH DAILY       DISCHARGE PLAN:  1. Current Discharge Medication List      START taking these medications    Details   polyethylene glycol (Miralax) 17 gram packet Take 1 Packet by mouth daily. Qty: 10 Packet, Refills: 0         CONTINUE these medications which have NOT CHANGED    Details   !! hydrALAZINE (APRESOLINE) 50 mg tablet TAKE 2 TABLETS BY MOUTH THREE TIMES DAILY  Qty: 90 Tab, Refills: 1      !! sodium bicarbonate 650 mg tablet TAKE 1 TABLET BY MOUTH TWICE DAILY WITH MEALS  Qty: 60 Tab, Refills: 1      ergocalciferol (ERGOCALCIFEROL) 1,250 mcg (50,000 unit) capsule TAKE ONE CAPSULE BY MOUTH EVERY 7 DAYS  Qty: 16 Cap, Refills: 1    Associated Diagnoses: Stage 3 chronic kidney disease, unspecified whether stage 3a or 3b CKD (HCC)      omeprazole (PRILOSEC) 40 mg capsule TAKE 1 CAPSULE BY MOUTH DAILY  Qty: 90 Cap, Refills: 0      furosemide (LASIX) 40 mg tablet Take 2 Tabs by mouth daily. Qty: 30 Tab, Refills: 1      !! hydrALAZINE (APRESOLINE) 50 mg tablet Take 2 Tabs by mouth three (3) times daily. Qty: 90 Tab, Refills: 0    Associated Diagnoses: Medication refill      allopurinoL (ZYLOPRIM) 100 mg tablet TAKE 2 TABLETS BY MOUTH DAILY  Qty: 60 Tab, Refills: 3      metoprolol succinate (TOPROL-XL) 50 mg XL tablet Take 1 Tab by mouth daily.   Qty: 30 Tab, Refills: 2      simvastatin (ZOCOR) 40 mg tablet Take 1 Tab by mouth nightly. Qty: 90 Tab, Refills: 1      BD Ultra-Fine Mini Pen Needle 31 gauge x 3/16\" ndle USE TO INJECT TID  Qty: 100 Pen Needle, Refills: 3      apixaban (Eliquis) 2.5 mg tablet Take 2.5 mg by mouth two (2) times a day. !! sodium bicarbonate 650 mg tablet Take 1 Tab by mouth two (2) times a day. Qty: 60 Tab, Refills: 1      amLODIPine (NORVASC) 5 mg tablet TAKE 2 TABLETS BY MOUTH ONCE A DAY  Qty: 180 Tab, Refills: 1      glucose blood VI test strips (ASCENSIA AUTODISC VI, ONE TOUCH ULTRA TEST VI) strip Check blood glucose 3 times daily, give Accu chek Precious test strips  Qty: 300 Strip, Refills: 3    Associated Diagnoses: Type 2 diabetes mellitus with stage 4 chronic kidney disease, with long-term current use of insulin (HCC)      Lantus Solostar U-100 Insulin 100 unit/mL (3 mL) inpn INJECT 25 UNITS SUBCUTANEOUSLY ONCE DAILY  Qty: 9 mL, Refills: 1      fluticasone propionate (FLONASE) 50 mcg/actuation nasal spray 1 [place] by Both Nostrils route daily as needed. NOVOLOG FLEXPEN U-100 INSULIN 100 unit/mL inpn INJECT 8 UNITS SUBCUTANEOUSLY BEFORE MEALS  Refills: 4       !! - Potential duplicate medications found. Please discuss with provider. 2.   Follow-up Information     Follow up With Specialties Details Why Contact Mira Gregorio NP Nurse Practitioner In 1 day  [address] [phone]      [facility] EMERGENCY DEPT Emergency Medicine  If symptoms worsen [facility] [phone] [id number] [phone]        3. Return to ED if worse   4. Current Discharge Medication List      START taking these medications    Details   polyethylene glycol (Miralax) 17 gram packet Take 1 Packet by mouth daily. Qty: 10 Packet, Refills: 0               Diagnosis     Clinical Impression:   1.  Constipation, unspecified constipation type        Attestations:    Elbert Yoder MD    Please note that this dictation was completed with [employer] computer voice recognition software. Quite often unanticipated grammatical, syntax, homophones, and other interpretive errors are inadvertently transcribed by the computer software. Please disregard these errors. Please excuse any errors that have escaped final proofreading. Thank you.

## 2021-05-04 NOTE — ED TRIAGE NOTES
Pt. States he has not had a bowel movement in four days. Pt. Complains of abdominal pain and also rectal pain with defecation.

## 2021-05-06 ENCOUNTER — OFFICE VISIT (OUTPATIENT)
Dept: FAMILY MEDICINE CLINIC | Age: 76
End: 2021-05-06
Payer: MEDICARE

## 2021-05-06 VITALS
OXYGEN SATURATION: 99 % | SYSTOLIC BLOOD PRESSURE: 148 MMHG | TEMPERATURE: 96.7 F | DIASTOLIC BLOOD PRESSURE: 68 MMHG | RESPIRATION RATE: 20 BRPM | BODY MASS INDEX: 34.06 KG/M2 | HEART RATE: 71 BPM | WEIGHT: 217 LBS | HEIGHT: 67 IN

## 2021-05-06 DIAGNOSIS — Z79.4 TYPE 2 DIABETES MELLITUS WITH STAGE 4 CHRONIC KIDNEY DISEASE, WITH LONG-TERM CURRENT USE OF INSULIN (HCC): ICD-10-CM

## 2021-05-06 DIAGNOSIS — N18.4 TYPE 2 DIABETES MELLITUS WITH STAGE 4 CHRONIC KIDNEY DISEASE, WITH LONG-TERM CURRENT USE OF INSULIN (HCC): ICD-10-CM

## 2021-05-06 DIAGNOSIS — Z01.89 ENCOUNTER FOR LABORATORY EXAMINATION: Primary | ICD-10-CM

## 2021-05-06 DIAGNOSIS — E11.22 TYPE 2 DIABETES MELLITUS WITH STAGE 4 CHRONIC KIDNEY DISEASE, WITH LONG-TERM CURRENT USE OF INSULIN (HCC): ICD-10-CM

## 2021-05-06 PROBLEM — E78.5 HLD (HYPERLIPIDEMIA): Status: ACTIVE | Noted: 2017-11-23

## 2021-05-06 PROBLEM — N18.9 ACUTE RENAL FAILURE SUPERIMPOSED ON CHRONIC KIDNEY DISEASE (HCC): Status: ACTIVE | Noted: 2017-11-23

## 2021-05-06 PROBLEM — C64.2 RENAL CELL CARCINOMA OF LEFT KIDNEY (HCC): Status: ACTIVE | Noted: 2017-11-23

## 2021-05-06 PROBLEM — K21.9 GASTROESOPHAGEAL REFLUX DISEASE WITHOUT ESOPHAGITIS: Status: ACTIVE | Noted: 2017-11-23

## 2021-05-06 PROBLEM — Z90.5 H/O LEFT NEPHRECTOMY: Status: ACTIVE | Noted: 2020-03-20

## 2021-05-06 PROBLEM — N17.9 ACUTE RENAL FAILURE SUPERIMPOSED ON CHRONIC KIDNEY DISEASE (HCC): Status: ACTIVE | Noted: 2017-11-23

## 2021-05-06 PROBLEM — I25.118 CORONARY ARTERY DISEASE OF NATIVE ARTERY OF NATIVE HEART WITH STABLE ANGINA PECTORIS (HCC): Status: ACTIVE | Noted: 2020-03-20

## 2021-05-06 PROBLEM — I82.4Z3 ACUTE DEEP VEIN THROMBOSIS (DVT) OF DISTAL VEIN OF BOTH LOWER EXTREMITIES (HCC): Status: ACTIVE | Noted: 2020-10-13

## 2021-05-06 PROBLEM — Z85.51 HISTORY OF BLADDER CANCER: Status: ACTIVE | Noted: 2020-03-20

## 2021-05-06 PROBLEM — R53.1 GENERAL WEAKNESS: Status: ACTIVE | Noted: 2020-08-01

## 2021-05-06 PROBLEM — J81.0 PULMONARY EDEMA, ACUTE (HCC): Status: ACTIVE | Noted: 2020-03-20

## 2021-05-06 LAB — HBA1C MFR BLD HPLC: 10.2 %

## 2021-05-06 PROCEDURE — G8536 NO DOC ELDER MAL SCRN: HCPCS | Performed by: NURSE PRACTITIONER

## 2021-05-06 PROCEDURE — 3017F COLORECTAL CA SCREEN DOC REV: CPT | Performed by: NURSE PRACTITIONER

## 2021-05-06 PROCEDURE — 99213 OFFICE O/P EST LOW 20 MIN: CPT | Performed by: NURSE PRACTITIONER

## 2021-05-06 PROCEDURE — G8427 DOCREV CUR MEDS BY ELIG CLIN: HCPCS | Performed by: NURSE PRACTITIONER

## 2021-05-06 PROCEDURE — G8432 DEP SCR NOT DOC, RNG: HCPCS | Performed by: NURSE PRACTITIONER

## 2021-05-06 PROCEDURE — 2022F DILAT RTA XM EVC RTNOPTHY: CPT | Performed by: NURSE PRACTITIONER

## 2021-05-06 PROCEDURE — G8417 CALC BMI ABV UP PARAM F/U: HCPCS | Performed by: NURSE PRACTITIONER

## 2021-05-06 PROCEDURE — G8754 DIAS BP LESS 90: HCPCS | Performed by: NURSE PRACTITIONER

## 2021-05-06 PROCEDURE — 1101F PT FALLS ASSESS-DOCD LE1/YR: CPT | Performed by: NURSE PRACTITIONER

## 2021-05-06 PROCEDURE — G8753 SYS BP > OR = 140: HCPCS | Performed by: NURSE PRACTITIONER

## 2021-05-06 PROCEDURE — 3046F HEMOGLOBIN A1C LEVEL >9.0%: CPT | Performed by: NURSE PRACTITIONER

## 2021-05-06 PROCEDURE — 83036 HEMOGLOBIN GLYCOSYLATED A1C: CPT | Performed by: NURSE PRACTITIONER

## 2021-05-06 RX ORDER — MINOXIDIL 2.5 MG/1
TABLET ORAL
COMMUNITY
Start: 2021-03-27 | End: 2021-08-10

## 2021-05-06 RX ORDER — GLIPIZIDE 10 MG/1
10 TABLET ORAL 2 TIMES DAILY
Qty: 60 TAB | Refills: 2 | Status: SHIPPED | OUTPATIENT
Start: 2021-05-06 | End: 2021-07-30 | Stop reason: SDUPTHER

## 2021-05-06 RX ORDER — INSULIN GLARGINE 100 [IU]/ML
INJECTION, SOLUTION SUBCUTANEOUS
Qty: 9 ML | Refills: 1 | Status: SHIPPED | OUTPATIENT
Start: 2021-05-06 | End: 2021-10-11

## 2021-05-06 NOTE — PROGRESS NOTES
Health maintenance issues were addressed patient was advised that he is over due for eye exam, colonoscopy, tetanus and pneumonia vaccines.

## 2021-05-06 NOTE — PROGRESS NOTES
OFFICE NOTE    Elizabeth Grajeda is a 70 y.o. male presenting today for the following:  Chief Complaint   Patient presents with   [facility]  [address]   5-4-2021      HPI     Patient is being seen today for a follow up from the ER at [facility] on 5/4/2021 for constipation. He was prescribed Mirlax and have had a bowel movement. Patient states he no longer has the constipation and doing well. Heartburn  Patient was started on omeprazole on 4/6/2021 due to calling in reference of symptoms. Patient describes symptoms chest burning, feeling like he have food regurgitation in the throat after eating certain foods. Patient was unaware he was prescribed medication but did  from the pharmacy. He will start medication. Educated patient on foods that will cause his symptoms. Patient expressed understanding. Diabetes  Patient A1C in Jan 2021 was 7.9 today his A1C is 10.2. Patient is currently on Lantus 25 units nightly. Patient stated his glucose was 80 today. Will start patient on glipizide 10 mg daily. Will follow up with endocrinology. Patient denies any side effect from Lantus and takes as prescribed. Inform patient he needed to incorporate healthier lifestyle changes. Will follow back up in 4 weeks to check A1C. Patient denies polydipsia, polyphagia and polyuria. Hypertension  Patient blood pressure today is 155/70. Patient states he had not taken his medication as of yet. Advised patient to take medication as prescribed. Patient denies chest pain, SOB or vision changes. Will reassess in 4 weeks. Review of Systems   Constitutional: Negative for fatigue and fever. HENT: Negative. Eyes: Negative. Respiratory: Negative for cough, chest tightness, shortness of breath and wheezing. Cardiovascular: Negative for chest pain and palpitations. Neurological: Negative for dizziness, light-headedness and headaches.          History  Past Medical History:   Diagnosis Date    Acid reflux     Arthritis     Bladder cancer (HCC)     [facility]    Diabetes mellitus (HCC)     GERD (gastroesophageal reflux disease)     Gout     High cholesterol     Hypertension     Kidney carcinoma, left (HCC)     Kidney disease     Pituitary mass (HCC)     Reflux esophagitis     Unspecified deficiency anemia        Past Surgical History:   Procedure Laterality Date    HX CYSTECTOMY  09    Dr. Paul Elder HX NEPHRECTOMY Left 2009    Nephroureterectomy by Dr. Jarred Chen HX OTHER SURGICAL      surgery on pituitary gland       Social History     Socioeconomic History    Marital status: SINGLE     Spouse name: Not on file    Number of children: Not on file    Years of education: Not on file    Highest education level: Not on file   Occupational History    Not on file   Social Needs    Financial resource strain: Not on file    Food insecurity     Worry: Not on file     Inability: Not on file    Transportation needs     Medical: Not on file     Non-medical: Not on file   Tobacco Use    Smoking status: Former Smoker     Packs/day: 0.50     Years: 40.00     Pack years: 20.00     Types: Cigarettes     Quit date: 1999     Years since quittin.3    Smokeless tobacco: Never Used   Substance and Sexual Activity    Alcohol use: No    Drug use: No    Sexual activity: Yes   Lifestyle    Physical activity     Days per week: Not on file     Minutes per session: Not on file    Stress: Not on file   Relationships    Social connections     Talks on phone: Not on file     Gets together: Not on file     Attends Gnosticism service: Not on file     Active member of club or organization: Not on file     Attends meetings of clubs or organizations: Not on file     Relationship status: Not on file    Intimate partner violence     Fear of current or ex partner: Not on file     Emotionally abused: Not on file     Physically abused: Not on file     Forced sexual activity: Not on file   Other Topics Concern    Not on file   Social History Narrative    Not on file       No Known Allergies    Current Outpatient Medications   Medication Sig Dispense Refill    insulin glargine (Lantus Solostar U-100 Insulin) 100 unit/mL (3 mL) inpn INJECT 25 UNITS SUBCUTANEOUSLY ONCE nightly 9 mL 1    glipiZIDE (GLUCOTROL) 10 mg tablet Take 1 Tab by mouth two (2) times a day. 60 Tab 2    polyethylene glycol (Miralax) 17 gram packet Take 1 Packet by mouth daily. 10 Packet 0    sodium bicarbonate 650 mg tablet TAKE 1 TABLET BY MOUTH TWICE DAILY WITH MEALS 60 Tab 1    ergocalciferol (ERGOCALCIFEROL) 1,250 mcg (50,000 unit) capsule TAKE ONE CAPSULE BY MOUTH EVERY 7 DAYS 16 Cap 1    omeprazole (PRILOSEC) 40 mg capsule TAKE 1 CAPSULE BY MOUTH DAILY 90 Cap 0    furosemide (LASIX) 40 mg tablet Take 2 Tabs by mouth daily. (Patient taking differently: Take 20 mg by mouth daily.) 30 Tab 1    hydrALAZINE (APRESOLINE) 50 mg tablet Take 2 Tabs by mouth three (3) times daily. 90 Tab 0    allopurinoL (ZYLOPRIM) 100 mg tablet TAKE 2 TABLETS BY MOUTH DAILY 60 Tab 3    metoprolol succinate (TOPROL-XL) 50 mg XL tablet Take 1 Tab by mouth daily. 30 Tab 2    simvastatin (ZOCOR) 40 mg tablet Take 1 Tab by mouth nightly. 90 Tab 1    BD Ultra-Fine Mini Pen Needle 31 gauge x 3/16\" ndle USE TO INJECT  Pen Needle 3    apixaban (Eliquis) 2.5 mg tablet Take 2.5 mg by mouth two (2) times a day.  sodium bicarbonate 650 mg tablet Take 1 Tab by mouth two (2) times a day.  60 Tab 1    amLODIPine (NORVASC) 5 mg tablet TAKE 2 TABLETS BY MOUTH ONCE A  Tab 1    glucose blood VI test strips (ASCENSIA AUTODISC VI, ONE TOUCH ULTRA TEST VI) strip Check blood glucose 3 times daily, give Accu chek Precious test strips 300 Strip 3    fluticasone propionate (FLONASE) 50 mcg/actuation nasal spray 1 [place] by Both Nostrils route daily as needed.  minoxidiL (LONITEN) 2.5 mg tablet TAKE 1 TABLET BY MOUTH TWICE DAILY           Patient Care Team:  Patient Care Team:  Eva Stephens NP as PCP - General (Nurse Practitioner)  [facility] Stephens [facility]  Rand Amato MD as Physician (Nephrology)  Brittany Jose MD as Physician (Vascular Surgery)  Kalyani Hannon MD as Physician (Cardiology)      LABS:  Results for orders placed or performed in visit on 05/06/21   AMB POC HEMOGLOBIN A1C   Result Value Ref Range    Hemoglobin A1c (POC) 10.2 %        RADIOLOGY:  No recent results      Physical Exam  Vitals signs and nursing note reviewed. Constitutional:       Appearance: Normal appearance. He is well-developed. Neck:      Musculoskeletal: Normal range of motion and neck supple. Cardiovascular:      Rate and Rhythm: Normal rate and regular rhythm. Heart sounds: Normal heart sounds. Pulmonary:      Effort: Pulmonary effort is normal. No respiratory distress. Breath sounds: Normal breath sounds. No wheezing or rales. Abdominal:      General: Bowel sounds are normal. There is no distension. Palpations: Abdomen is soft. Tenderness: There is no abdominal tenderness. There is no rebound. Musculoskeletal: Normal range of motion. Lymphadenopathy:      Cervical: No cervical adenopathy. Skin:     General: Skin is warm and dry. Neurological:      Mental Status: He is alert and oriented to person, place, and time. Deep Tendon Reflexes: Reflexes are normal and symmetric. Psychiatric:         Mood and Affect: Mood normal.           Vitals:    05/06/21 1154 05/06/21 1315   BP: (!) 155/70 (!) 148/68   Pulse: 71    Resp: 20    Temp: (!) 96.7 °F (35.9 °C)    TempSrc: Oral    SpO2: 99%    Weight: 217 lb (98.4 kg)    Height: 5' 7\" (1.702 m)    PainSc:   0 - No pain          Assessment and Plan    1.  Encounter for laboratory examination    - AMB POC HEMOGLOBIN A1C    2. Type 2 diabetes mellitus with stage 4 chronic kidney disease, with long-term current use of insulin (HCC)    - AMB POC HEMOGLOBIN A1C  - glipiZIDE (GLUCOTROL) 10 mg tablet; Take 1 Tab by mouth two (2) times a day. Dispense: 60 Tab; Refill: 2      MDM    Procedures      *Plan of care reviewed with patient. Patient in agreement with plan and expresses understanding. All questions answered and patient encouraged to call or RTO if further questions or concerns. Advised patient if symptoms worsen to go to nearest ER or call 911. AVS and recommendations given to patient upon discharge.

## 2021-05-06 NOTE — PROGRESS NOTES
Chitra Skelton presents today for   Chief Complaint   Patient presents with   Rommel Olivas ED Follow-up     [facility]   5-4-2021       Is someone accompanying this pt? Yes   niece    Is the patient using any DME equipment during [address]? no    Depression Screening:  3 most recent PHQ Screens 5/6/2021   Little interest or pleasure in doing things Not at all   Feeling down, depressed, irritable, or hopeless Not at all   Total Score PHQ 2 0   Trouble falling or staying asleep, or sleeping too much Not at all   Feeling tired or having little energy Not at all   Poor appetite, weight loss, or overeating Not at all   Feeling bad about yourself - or that you are a failure or have let yourself or your family down Not at all   Trouble concentrating on things such as school, work, reading, or watching TV Not at all   Moving or speaking so slowly that other people could have noticed; or the opposite being so fidgety that others notice Not at all   Thoughts of being better off dead, or hurting yourself in some way Not at all   PHQ 9 Score 0   How difficult have these problems made it for you to do your work, take care of your home and get along with others Not difficult at all       Learning Assessment:  Learning Assessment 2/23/2021   PRIMARY LEARNER Patient   HIGHEST LEVEL OF EDUCATION - PRIMARY LEARNER  DID NOT [facility] Kyler Duran PRIMARY LEARNER COGNITIVE   CO-LEARNER CAREGIVER No   PRIMARY LANGUAGE ENGLISH   LEARNER PREFERENCE PRIMARY DEMONSTRATION   ANSWERED BY patient   RELATIONSHIP SELF       Abuse Screening:  Abuse Screening Questionnaire 1/27/2021   Are you in a relationship with someone who physically or mentally threatens you? N   Is it safe for you to go home? Y       Fall Risk  Fall Risk Assessment, last 12 mths 5/6/2021   Able to walk? Yes   Fall in past 12 months? 0   Do you feel unsteady? 1   Are you worried about falling 0   Is TUG test greater than 12 seconds?  0   Is the gait abnormal? 1 Number of falls in past 12 months 0   Fall with injury? -       Health Maintenance reviewed and discussed and ordered per Provider. Health Maintenance Due   Topic Date Due    Eye Exam Retinal or Dilated  Never done    Lipid Screen  Never done    COVID-19 Vaccine (1) Never done    DTaP/Tdap/Td series (1 - Tdap) Never done    Shingrix Vaccine Age 50> (1 of 2) Never done    Colorectal Cancer Screening Combo  Never done    AAA Screening 73-67 YO Male Smoking Patients  Never done    Pneumococcal 65+ yrs at Risk Vaccine (2 of 2 - PCV13) 09/12/2012   . Coordination of Care:  1. Have you been to the ER, urgent care clinic since your last visit? Hospitalized since your last visit? Yes  [facility] 5-4-2021    2. Have you seen or consulted any other health care providers outside of the [address] [place] [address] since your last visit? Include any pap smears or colon screening.  no      Last  Checked no  Last UDS Checked no  Last Pain contract signed: no

## 2021-05-06 NOTE — PATIENT INSTRUCTIONS

## 2021-05-17 DIAGNOSIS — I10 ESSENTIAL HYPERTENSION: Primary | ICD-10-CM

## 2021-05-17 NOTE — PROGRESS NOTES
Dr Stormy Norton in on rounds Evans Acevedo presents today for   Chief Complaint   Patient presents with    Back Pain       Is someone accompanying this pt? no    Is the patient using any DME equipment during OV? no    Depression Screening:  3 most recent PHQ Screens 4/28/2021   Little interest or pleasure in doing things Not at all   Feeling down, depressed, irritable, or hopeless Not at all   Total Score PHQ 2 0       Learning Assessment:  Learning Assessment 3/1/2021   PRIMARY LEARNER Patient   HIGHEST LEVEL OF EDUCATION - PRIMARY LEARNER  GRADUATED HIGH SCHOOL OR GED   BARRIERS PRIMARY LEARNER NONE   CO-LEARNER CAREGIVER No   PRIMARY LANGUAGE ENGLISH   LEARNER PREFERENCE PRIMARY VIDEOS   ANSWERED BY patient    RELATIONSHIP SELF       Abuse Screening:  Abuse Screening Questionnaire 10/28/2020   Do you ever feel afraid of your partner? N   Are you in a relationship with someone who physically or mentally threatens you? N   Is it safe for you to go home? Y       Fall Risk  Fall Risk Assessment, last 12 mths 4/29/2021   Able to walk? Yes   Fall in past 12 months? 0   Do you feel unsteady? -   Are you worried about falling -   Number of falls in past 12 months -   Fall with injury? -       Coordination of Care:  1. Have you been to the ER, urgent care clinic since your last visit? Yes, pt went to the ER at [facility] for bladder issues after surgery. Notes in care everywhere. Hospitalized since your last visit? no    2. Have you seen or consulted any other health care providers outside of the [address] [place] [address] [facility] since your last visit? Yes, urology Include any pap smears or colon screening.  no

## 2021-05-21 DIAGNOSIS — Z76.0 MEDICATION REFILL: ICD-10-CM

## 2021-05-21 RX ORDER — HYDRALAZINE HYDROCHLORIDE 50 MG/1
100 TABLET, FILM COATED ORAL 3 TIMES DAILY
Qty: 90 TABLET | Refills: 0 | Status: SHIPPED | OUTPATIENT
Start: 2021-05-21 | End: 2021-06-23 | Stop reason: SDUPTHER

## 2021-05-21 NOTE — TELEPHONE ENCOUNTER
Please see refill request    Patient was seen on 5-6-2021    Lasix last filled 3- by Nephrology    Hydralazine last filled on 3-4-2021   #90 X 0    Thank you

## 2021-05-21 NOTE — TELEPHONE ENCOUNTER
Requested Prescriptions     Pending Prescriptions Disp Refills    hydrALAZINE (APRESOLINE) 50 mg tablet 90 Tablet 0     Sig: Take 2 Tablets by mouth three (3) times daily.  furosemide (LASIX) 40 mg tablet 30 Tablet 1     Sig: Take 2 Tablets by mouth daily.

## 2021-05-26 RX ORDER — FUROSEMIDE 40 MG/1
80 TABLET ORAL DAILY
Qty: 30 TABLET | Refills: 1 | OUTPATIENT
Start: 2021-05-26

## 2021-05-28 NOTE — TELEPHONE ENCOUNTER
When I put in the furosemide like that it comes back that he is taking 20mg daily. Please confirm with patient.  Thanks

## 2021-05-28 NOTE — TELEPHONE ENCOUNTER
Patient threw the bottle away so unsure how to take it. Per Dr. Khadra Parra note from 4/28 patient is supposed to be taking 80 mg daily.

## 2021-05-28 NOTE — TELEPHONE ENCOUNTER
Please find out how the patient is taking the medication because how its reported and prescribed is different.  Thanks

## 2021-06-01 DIAGNOSIS — E11.22 TYPE 2 DIABETES MELLITUS WITH STAGE 4 CHRONIC KIDNEY DISEASE, WITH LONG-TERM CURRENT USE OF INSULIN (HCC): ICD-10-CM

## 2021-06-01 DIAGNOSIS — N18.4 TYPE 2 DIABETES MELLITUS WITH STAGE 4 CHRONIC KIDNEY DISEASE, WITH LONG-TERM CURRENT USE OF INSULIN (HCC): ICD-10-CM

## 2021-06-01 DIAGNOSIS — Z79.4 TYPE 2 DIABETES MELLITUS WITH STAGE 4 CHRONIC KIDNEY DISEASE, WITH LONG-TERM CURRENT USE OF INSULIN (HCC): ICD-10-CM

## 2021-06-01 RX ORDER — FUROSEMIDE 40 MG/1
TABLET ORAL
Qty: 30 TABLET | Refills: 1 | Status: SHIPPED | OUTPATIENT
Start: 2021-06-01 | End: 2021-07-26

## 2021-06-01 NOTE — TELEPHONE ENCOUNTER
Requested Prescriptions     Pending Prescriptions Disp Refills    glucose blood VI test strips (ASCENSIA AUTODISC VI, ONE TOUCH ULTRA TEST VI) strip 300 Strip 3     Sig: Check blood glucose 3 times daily, give Accu chek Precious test strips

## 2021-06-01 NOTE — TELEPHONE ENCOUNTER
Please see refill request    Patient was last seen on 5-6-2021    Last prescribed on 11-  #300 X 3    Scheduled appt on 6-3-2021    Thank you

## 2021-06-03 ENCOUNTER — OFFICE VISIT (OUTPATIENT)
Dept: FAMILY MEDICINE CLINIC | Age: 76
End: 2021-06-03
Payer: MEDICARE

## 2021-06-03 VITALS
HEART RATE: 89 BPM | BODY MASS INDEX: 32.74 KG/M2 | WEIGHT: 216 LBS | DIASTOLIC BLOOD PRESSURE: 62 MMHG | TEMPERATURE: 97.8 F | HEIGHT: 68 IN | RESPIRATION RATE: 20 BRPM | SYSTOLIC BLOOD PRESSURE: 140 MMHG | OXYGEN SATURATION: 98 %

## 2021-06-03 DIAGNOSIS — Z13.6 SCREENING FOR CARDIOVASCULAR CONDITION: ICD-10-CM

## 2021-06-03 DIAGNOSIS — Z76.0 MEDICATION REFILL: ICD-10-CM

## 2021-06-03 DIAGNOSIS — Z12.11 COLON CANCER SCREENING: ICD-10-CM

## 2021-06-03 DIAGNOSIS — E11.22 TYPE 2 DIABETES MELLITUS WITH STAGE 4 CHRONIC KIDNEY DISEASE, WITH LONG-TERM CURRENT USE OF INSULIN (HCC): ICD-10-CM

## 2021-06-03 DIAGNOSIS — Z13.0 SCREENING FOR ENDOCRINE, METABOLIC AND IMMUNITY DISORDER: ICD-10-CM

## 2021-06-03 DIAGNOSIS — Z13.29 SCREENING FOR ENDOCRINE, METABOLIC AND IMMUNITY DISORDER: ICD-10-CM

## 2021-06-03 DIAGNOSIS — N18.4 TYPE 2 DIABETES MELLITUS WITH STAGE 4 CHRONIC KIDNEY DISEASE, WITH LONG-TERM CURRENT USE OF INSULIN (HCC): ICD-10-CM

## 2021-06-03 DIAGNOSIS — Z79.4 TYPE 2 DIABETES MELLITUS WITH STAGE 4 CHRONIC KIDNEY DISEASE, WITH LONG-TERM CURRENT USE OF INSULIN (HCC): ICD-10-CM

## 2021-06-03 DIAGNOSIS — Z13.228 SCREENING FOR ENDOCRINE, METABOLIC AND IMMUNITY DISORDER: ICD-10-CM

## 2021-06-03 LAB — HBA1C MFR BLD HPLC: 8.2 %

## 2021-06-03 PROCEDURE — G8536 NO DOC ELDER MAL SCRN: HCPCS | Performed by: NURSE PRACTITIONER

## 2021-06-03 PROCEDURE — G8753 SYS BP > OR = 140: HCPCS | Performed by: NURSE PRACTITIONER

## 2021-06-03 PROCEDURE — 2022F DILAT RTA XM EVC RTNOPTHY: CPT | Performed by: NURSE PRACTITIONER

## 2021-06-03 PROCEDURE — G8432 DEP SCR NOT DOC, RNG: HCPCS | Performed by: NURSE PRACTITIONER

## 2021-06-03 PROCEDURE — G8427 DOCREV CUR MEDS BY ELIG CLIN: HCPCS | Performed by: NURSE PRACTITIONER

## 2021-06-03 PROCEDURE — G8754 DIAS BP LESS 90: HCPCS | Performed by: NURSE PRACTITIONER

## 2021-06-03 PROCEDURE — 3017F COLORECTAL CA SCREEN DOC REV: CPT | Performed by: NURSE PRACTITIONER

## 2021-06-03 PROCEDURE — 99214 OFFICE O/P EST MOD 30 MIN: CPT | Performed by: NURSE PRACTITIONER

## 2021-06-03 PROCEDURE — 1101F PT FALLS ASSESS-DOCD LE1/YR: CPT | Performed by: NURSE PRACTITIONER

## 2021-06-03 PROCEDURE — 3052F HG A1C>EQUAL 8.0%<EQUAL 9.0%: CPT | Performed by: NURSE PRACTITIONER

## 2021-06-03 PROCEDURE — 83036 HEMOGLOBIN GLYCOSYLATED A1C: CPT | Performed by: NURSE PRACTITIONER

## 2021-06-03 PROCEDURE — G8417 CALC BMI ABV UP PARAM F/U: HCPCS | Performed by: NURSE PRACTITIONER

## 2021-06-03 RX ORDER — OMEPRAZOLE 40 MG/1
CAPSULE, DELAYED RELEASE ORAL
Qty: 90 CAPSULE | Refills: 1 | Status: SHIPPED | OUTPATIENT
Start: 2021-06-03 | End: 2022-03-24 | Stop reason: SDUPTHER

## 2021-06-03 RX ORDER — SIMVASTATIN 40 MG/1
40 TABLET, FILM COATED ORAL
Qty: 90 TABLET | Refills: 1 | Status: CANCELLED | OUTPATIENT
Start: 2021-06-03

## 2021-06-03 RX ORDER — POLYETHYLENE GLYCOL 3350 17 G/17G
17 POWDER, FOR SOLUTION ORAL DAILY
Qty: 10 PACKET | Refills: 0 | Status: SHIPPED | OUTPATIENT
Start: 2021-06-03 | End: 2021-11-12 | Stop reason: ALTCHOICE

## 2021-06-03 NOTE — PROGRESS NOTES
Lizbet Annabella presents today for   Chief Complaint   Patient presents with    Follow Up Chronic Condition     diabetes       Is someone accompanying this pt? no    Is the patient using any DME equipment during OV? no    Depression Screening:  3 most recent PHQ Screens 6/3/2021   Little interest or pleasure in doing things Not at all   Feeling down, depressed, irritable, or hopeless Not at all   Total Score PHQ 2 0   Trouble falling or staying asleep, or sleeping too much Not at all   Feeling tired or having little energy Not at all   Poor appetite, weight loss, or overeating Not at all   Feeling bad about yourself - or that you are a failure or have let yourself or your family down Not at all   Trouble concentrating on things such as school, work, reading, or watching TV Not at all   Moving or speaking so slowly that other people could have noticed; or the opposite being so fidgety that others notice Not at all   Thoughts of being better off dead, or hurting yourself in some way Not at all   PHQ 9 Score 0   How difficult have these problems made it for you to do your work, take care of your home and get along with others Not difficult at all       Learning Assessment:  Learning Assessment 2/23/2021   PRIMARY LEARNER Patient   HIGHEST LEVEL OF EDUCATION - PRIMARY LEARNER  DID NOT [address] PRIMARY LEARNER COGNITIVE   CO-LEARNER CAREGIVER No   PRIMARY LANGUAGE ENGLISH   LEARNER PREFERENCE PRIMARY DEMONSTRATION   ANSWERED BY patient   RELATIONSHIP SELF       Abuse Screening:  Abuse Screening Questionnaire 1/27/2021   Are you in a relationship with someone who physically or mentally threatens you? N   Is it safe for you to go home? Y       Fall Risk  Fall Risk Assessment, last 12 mths 6/3/2021   Able to walk? Yes   Fall in past 12 months? 0   Do you feel unsteady? 0   Are you worried about falling 0   Is TUG test greater than 12 seconds?  -   Is the gait abnormal? -   Number of falls in past 12 months -   Fall with injury? -       Health Maintenance reviewed and discussed and ordered per Provider. Health Maintenance Due   Topic Date Due    Eye Exam Retinal or Dilated  Never done    Lipid Screen  Never done    COVID-19 Vaccine (1) Never done    DTaP/Tdap/Td series (1 - Tdap) Never done    Shingrix Vaccine Age 50> (1 of 2) Never done    Colorectal Cancer Screening Combo  Never done    AAA Screening 73-69 YO Male Smoking Patients  Never done    Pneumococcal 65+ yrs at Risk Vaccine (2 of 2 - PCV13) 12/02/2020   . Coordination of Care:  1. Have you been to the ER, urgent care clinic since your last visit? Hospitalized since your last visit? no    2. Have you seen or consulted any other health care providers outside of the [address] [place] [address] since your last visit? Include any pap smears or colon screening.  no

## 2021-06-03 NOTE — PROGRESS NOTES
OFFICE NOTE    Lashae Hinton is a 70 y.o. male presenting today for the following:  Chief Complaint   Patient presents with    Follow Up Chronic Condition     diabetes      HPI   Patient is here today to follow-up on diabetes. Diabetes  Patient is currently on Lantus 25 units nightly and glipizide 10 mg daily. Glipizide was started on last visit. Previous visit patient's A1c was 10.2. Today's A1C is 8.2. Patient is following a endocrinologist (Dr. Roya Olivera)  but states they only seen them once since October 2021. BS at home run around 110. Deny polypaghia, polyuria, polydipia. Patient is taking medication as prescribed and denies any side effects. Will reassess in 3 months. Malignant neoplasm of urinary bladder/Coronary artery disease of native artery of native heart with stable angina pectoris  Patient is being followed by Dr. Sam Castillo for his urinary bladder/CKD. Patient last appointment was 4/28/21. Patient had a status post left total nephrectomy 8 years ago. He had a total cystectomy also due to cancer in his bladder and has a urostomy bag when placement according to notes. Dr. Sam Castillo also follow him for CAD and increased his hydralazine to 100 mg TID and metoprolol ER to 50 mg daily on 4/28/2021 visit. He d/c the clonidine. Patient is currently on hydralazine, metoprolol, minoxidil and amlodipine for his hypertension. Patient will be returning to office to complete lab work I ordered today and Dr. Sam Castillo ordered on 4/28/2021. HM  Eye exam - will schedule with   COVID - declined vaccine  Tdap - will get on next lab draw per patient  Colorectal Cancer screening - ordered today  AAA Screening for male smokers - Haven't smoked in over 25 years. Pneumococcal - not today  Shingrix - will schedule      Review of Systems   Constitutional: Negative for fatigue and fever. HENT: Negative. Eyes: Negative.     Respiratory: Negative for cough, chest tightness, shortness of breath and wheezing. Cardiovascular: Negative for chest pain and palpitations. Neurological: Negative for dizziness, light-headedness and headaches. History  Past Medical History:   Diagnosis Date    Acid reflux     Arthritis     Bladder cancer (HCC)     Deep vein thrombosis (DVT) of proximal vein of both lower extremities ([facility] 2020    Diabetes mellitus (HCC)     GERD (gastroesophageal reflux disease)     Gout     High cholesterol     Hypertension     Kidney carcinoma, left (HCC)     Kidney disease     Pituitary mass ([facility]     Reflux esophagitis     Unspecified deficiency anemia        Past Surgical History:   Procedure Laterality Date    HX CYSTECTOMY  09    Dr. Perry Doll HX NEPHRECTOMY Left 2009    Nephroureterectomy by Dr. Emeterio De Anda HX OTHER SURGICAL      surgery on pituitary gland       Social History     Socioeconomic History    Marital status: SINGLE     Spouse name: Not on file    Number of children: Not on file    Years of education: Not on file    Highest education level: Not on file   Occupational History    Not on file   Tobacco Use    Smoking status: Former Smoker     Packs/day: 0.50     Years: 40.00     Pack years: 20.00     Types: Cigarettes     Quit date: 1999     Years since quittin.4    Smokeless tobacco: Never Used   Vaping Use    Vaping Use: Never used   Substance and Sexual Activity    Alcohol use: No    Drug use: No    Sexual activity: Yes   Other Topics Concern    Not on file   Social History Narrative    Not on file     Social Determinants of Health     Financial Resource Strain:     Difficulty of Paying Living Expenses:    Food Insecurity:     Worried About Running Out of Food in the Last Year:     [address] in the Last Year:    Transportation Needs:     Lack of Transportation (Medical):      Lack of Transportation (Non-Medical):    Physical Activity:     Days of Exercise per Week:     Minutes of Exercise per Session:    Stress:     Feeling of Stress :    Social Connections:     Frequency of Communication with Friends and Family:     Frequency of Social Gatherings with Friends and Family:     Attends [employer] Services:     Active Member of Clubs or Organizations:     Attends Club or Organization Meetings:     Marital Status:    Intimate Partner Violence:     Fear of Current or Ex-Partner:     Emotionally Abused:     Physically Abused:     Sexually Abused:        No Known Allergies    Current Outpatient Medications   Medication Sig Dispense Refill    omeprazole (PRILOSEC) 40 mg capsule TAKE 1 CAPSULE BY MOUTH DAILY 90 Capsule 1    polyethylene glycol (Miralax) 17 gram packet Take 1 Packet by mouth daily. 10 Packet 0    apixaban (Eliquis) 2.5 mg tablet Take 1 Tablet by mouth two (2) times a day. 180 Tablet 1    furosemide (LASIX) 40 mg tablet TAKE 2 TABLETS BY MOUTH DAILY 30 Tablet 1    glucose blood VI test strips (ASCENSIA AUTODISC VI, ONE TOUCH ULTRA TEST VI) strip Check blood glucose 3 times daily, give Accu chek Precious test strips 300 Strip 3    hydrALAZINE (APRESOLINE) 50 mg tablet Take 2 Tablets by mouth three (3) times daily. 90 Tablet 0    minoxidiL (LONITEN) 2.5 mg tablet TAKE 1 TABLET BY MOUTH TWICE DAILY      insulin glargine (Lantus Solostar U-100 Insulin) 100 unit/mL (3 mL) inpn INJECT 25 UNITS SUBCUTANEOUSLY ONCE nightly 9 mL 1    glipiZIDE (GLUCOTROL) 10 mg tablet Take 1 Tab by mouth two (2) times a day. 60 Tab 2    ergocalciferol (ERGOCALCIFEROL) 1,250 mcg (50,000 unit) capsule TAKE ONE CAPSULE BY MOUTH EVERY 7 DAYS 16 Cap 1    allopurinoL (ZYLOPRIM) 100 mg tablet TAKE 2 TABLETS BY MOUTH DAILY 60 Tab 3    metoprolol succinate (TOPROL-XL) 50 mg XL tablet Take 1 Tab by mouth daily. 30 Tab 2    simvastatin (ZOCOR) 40 mg tablet Take 1 Tab by mouth nightly.  90 Tab 1    BD Ultra-Fine Mini Pen Needle 31 gauge x 3/16\" ndle USE TO INJECT  Pen Needle 3    sodium bicarbonate 650 mg tablet Take 1 Tab by mouth two (2) times a day. 60 Tab 1    amLODIPine (NORVASC) 5 mg tablet TAKE 2 TABLETS BY MOUTH ONCE A  Tab 1    fluticasone propionate (FLONASE) 50 mcg/actuation nasal spray 1 [place] by Both Nostrils route daily as needed. Patient Care Team:  Patient Care Team:  Leopold Li, NP as PCP - General (Nurse Practitioner)  Leopold Li, NP as PCP - [facility]  Andrew Mansfield MD as Physician (Nephrology)  Rolando Haywood MD as Physician (Vascular Surgery)  Vick Ta MD as Physician (Cardiology)      LABS:  Results for orders placed or performed in visit on 06/03/21   AMB POC HEMOGLOBIN A1C   Result Value Ref Range    Hemoglobin A1c (POC) 8.2 %        RADIOLOGY:  No recent results      Physical Exam  Vitals and nursing note reviewed. Constitutional:       Appearance: Normal appearance. He is well-developed. Cardiovascular:      Rate and Rhythm: Normal rate and regular rhythm. Heart sounds: Normal heart sounds. Pulmonary:      Effort: Pulmonary effort is normal. No respiratory distress. Breath sounds: Normal breath sounds. No wheezing or rales. Musculoskeletal:         General: Normal range of motion. Cervical back: Normal range of motion and neck supple. Lymphadenopathy:      Cervical: No cervical adenopathy. Skin:     General: Skin is warm and dry. Neurological:      Mental Status: He is alert and oriented to person, place, and time. Deep Tendon Reflexes: Reflexes are normal and symmetric. Psychiatric:         Mood and Affect: Mood normal.           Vitals:    06/03/21 1221   BP: (!) 140/62   Pulse: 89   Resp: 20   Temp: 97.8 °F (36.6 °C)   TempSrc: Oral   SpO2: 98%   Weight: 216 lb (98 kg)   Height: 5' 8\" (1.727 m)   PainSc:   0 - No pain         Assessment and Plan    1.  Type 2 diabetes mellitus with stage 4 chronic kidney disease, with long-term current use of insulin (HCC)    - AMB POC HEMOGLOBIN A1C    2. Medication refill    - omeprazole (PRILOSEC) 40 mg capsule; TAKE 1 CAPSULE BY MOUTH DAILY  Dispense: 90 Capsule; Refill: 1  - polyethylene glycol (Miralax) 17 gram packet; Take 1 Packet by mouth daily. Dispense: 10 Packet; Refill: 0  - apixaban (Eliquis) 2.5 mg tablet; Take 1 Tablet by mouth two (2) times a day. Dispense: 180 Tablet; Refill: 1    3. Colon cancer screening    - COLOGUARD TEST (FECAL DNA COLORECTAL CANCER SCREENING)    4. Screening for endocrine, metabolic and immunity disorder    - METABOLIC PANEL, COMPREHENSIVE; Future  - LIPID PANEL; Future    5. Screening for cardiovascular condition    - LIPID PANEL; Future      MDM    Procedures      *Plan of care reviewed with patient. Patient in agreement with plan and expresses understanding. All questions answered and patient encouraged to call or RTO if further questions or concerns. Advised patient if symptoms worsen to go to nearest ER or call 911. AVS and recommendations given to patient upon discharge.

## 2021-06-07 ENCOUNTER — LAB ONLY (OUTPATIENT)
Dept: FAMILY MEDICINE CLINIC | Age: 76
End: 2021-06-07
Payer: MEDICARE

## 2021-06-07 DIAGNOSIS — Z01.89 ENCOUNTER FOR LABORATORY TEST: Primary | ICD-10-CM

## 2021-06-07 PROCEDURE — 36415 COLL VENOUS BLD VENIPUNCTURE: CPT | Performed by: FAMILY MEDICINE

## 2021-06-07 NOTE — PROGRESS NOTES
Patient came into lab for venipuncture. Patient labs ordered by PCP. Patient tolerated well. Patient used left arm.

## 2021-06-08 LAB
A-G RATIO,AGRAT: 1.3 RATIO (ref 1.1–2.6)
ALBUMIN SERPL-MCNC: 3.7 G/DL (ref 3.5–5)
ALP SERPL-CCNC: 70 U/L (ref 40–125)
ALT SERPL-CCNC: 9 U/L (ref 5–40)
ANION GAP SERPL CALC-SCNC: 14 MMOL/L (ref 3–15)
AST SERPL W P-5'-P-CCNC: 14 U/L (ref 10–37)
BILIRUB SERPL-MCNC: 0.4 MG/DL (ref 0.2–1.2)
BUN SERPL-MCNC: 55 MG/DL (ref 6–22)
CALCIUM SERPL-MCNC: 9.3 MG/DL (ref 8.4–10.5)
CHLORIDE SERPL-SCNC: 107 MMOL/L (ref 98–110)
CHOLEST SERPL-MCNC: 113 MG/DL (ref 110–200)
CO2 SERPL-SCNC: 20 MMOL/L (ref 20–32)
CREAT SERPL-MCNC: 3.2 MG/DL (ref 0.8–1.6)
GFRAA, 66117: 23.4
GFRNA, 66118: 19.3
GLOBULIN,GLOB: 2.9 G/DL (ref 2–4)
GLUCOSE SERPL-MCNC: 183 MG/DL (ref 70–99)
HDLC SERPL-MCNC: 3.3 MG/DL (ref 0–5)
HDLC SERPL-MCNC: 34 MG/DL
LDL/HDL RATIO,LDHD: 1.8
LDLC SERPL CALC-MCNC: 62 MG/DL (ref 50–99)
NON-HDL CHOLESTEROL, 011976: 79 MG/DL
POTASSIUM SERPL-SCNC: 4.1 MMOL/L (ref 3.5–5.5)
PROT SERPL-MCNC: 6.6 G/DL (ref 6.2–8.1)
SODIUM SERPL-SCNC: 141 MMOL/L (ref 133–145)
TRIGL SERPL-MCNC: 83 MG/DL (ref 40–149)
VLDLC SERPL CALC-MCNC: 17 MG/DL (ref 8–30)

## 2021-06-10 RX ORDER — METOPROLOL SUCCINATE 50 MG/1
TABLET, EXTENDED RELEASE ORAL
Qty: 30 TABLET | Refills: 2 | Status: SHIPPED | OUTPATIENT
Start: 2021-06-10 | End: 2021-08-12

## 2021-06-15 DIAGNOSIS — Z76.0 MEDICATION REFILL: ICD-10-CM

## 2021-06-15 NOTE — PROGRESS NOTES
Please call the patient with the following results    Inform patient that his glucose level was 183 and his A1c is 8.2. Patient is already following Dr. Bauer Roles in endocrinology please advised the patient to update Dr. Bauer Roles with these numbers. Patient is already seeing Dr. Kelechi Moss for chronic kidney disease inform him that his creatinine level was 3.2 and his glomerular filtration rate is 23.4 however Dr. Kelechi Moss also ordered labs for this patient. Informed patient to follow-up with Dr. Kelechi Moss due to his creatinine level being 3.2 and his glomerular filtration rate at 23. 4.

## 2021-06-18 ENCOUNTER — CLINICAL SUPPORT (OUTPATIENT)
Dept: SURGERY | Age: 76
End: 2021-06-18

## 2021-06-18 VITALS
HEART RATE: 67 BPM | BODY MASS INDEX: 31.98 KG/M2 | TEMPERATURE: 97.4 F | RESPIRATION RATE: 18 BRPM | OXYGEN SATURATION: 97 % | WEIGHT: 211 LBS | HEIGHT: 68 IN

## 2021-06-18 DIAGNOSIS — Z01.818 PRE-OP TESTING: Primary | ICD-10-CM

## 2021-06-18 DIAGNOSIS — Z12.11 COLON CANCER SCREENING: ICD-10-CM

## 2021-06-18 NOTE — PROGRESS NOTES
Review of Systems   Constitutional: Positive for weight loss. HENT: Negative. Eyes: Positive for blurred vision and pain. Negative for double vision, photophobia, discharge and redness. Respiratory: Positive for shortness of breath. Negative for cough, hemoptysis, sputum production and wheezing. Cardiovascular: Positive for leg swelling. Negative for chest pain, palpitations, orthopnea, claudication and PND. Gastrointestinal: Positive for constipation and diarrhea. Negative for abdominal pain, blood in stool, heartburn, melena, nausea and vomiting. Genitourinary: Negative. Musculoskeletal: Negative. Skin: Negative. Neurological: Positive for weakness and headaches. Negative for dizziness, tingling, tremors, sensory change, speech change, focal weakness, seizures and loss of consciousness. Endo/Heme/Allergies: Negative. Psychiatric/Behavioral: Negative. Colon Screen    Patient: Joshua King MRN: [id number]  SSN: [id number]    YOB: 1945  Age: 70 y.o. Sex: male        Subjective:   Joshua King was referred by his PCP, Mira Smith, NP. Patient referred for colonoscopy for   Screening colonoscopy. Patient denies rectal pain or bleeding. Abdominal surgeries as described below, specifically nephroureterectomy by Dr. Julian Hunt   Family history as described below, specifically none. Patient has never had a colonoscopy. Patient will be scheduled at [facility] due to medical conditions.     No Known Allergies    Past Medical History:   Diagnosis Date    Acid reflux     Arthritis     Bladder cancer (HCC)     Congestive heart failure (HCC)     Deep vein thrombosis (DVT) of proximal vein of both lower extremities ([facility] [address] 9/14/2020    Diabetes mellitus ([facility] [address]     GERD (gastroesophageal reflux disease)     Gout     High cholesterol     Hypertension     Kidney carcinoma, left (HCC)     Kidney disease     Pituitary mass ([facility] [address]     Reflux esophagitis  Unspecified deficiency anemia      Past Surgical History:   Procedure Laterality Date    HX CYSTECTOMY  09    Dr. Prema Dai HX NEPHRECTOMY Left 2009    Nephroureterectomy by Dr. Israel Hart HX OTHER SURGICAL      surgery on pituitary gland      Family History   Problem Relation Age of Onset    Heart Disease Father     Heart Disease Mother      Social History     Tobacco Use    Smoking status: Former Smoker     Packs/day: 0.50     Years: 40.00     Pack years: 20.00     Types: Cigarettes     Quit date: 1999     Years since quittin.4    Smokeless tobacco: Never Used   Substance Use Topics    Alcohol use: No      Prior to Admission medications    Medication Sig Start Date End Date Taking? Authorizing Provider   metoprolol succinate (TOPROL-XL) 50 mg XL tablet TAKE 1 TABLET BY MOUTH DAILY 6/10/21  Yes Jude Taylor MD   omeprazole (PRILOSEC) 40 mg capsule TAKE 1 CAPSULE BY MOUTH DAILY 6/3/21  Yes Ghazala Smith NP   polyethylene glycol (Miralax) 17 gram packet Take 1 Packet by mouth daily. 6/3/21  Yes Ghazala Smith NP   apixaban (Eliquis) 2.5 mg tablet Take 1 Tablet by mouth two (2) times a day. 6/3/21  Yes Ghazala Smith NP   furosemide (LASIX) 40 mg tablet TAKE 2 TABLETS BY MOUTH DAILY 21  Yes Jude Taylor MD   glucose blood VI test strips (ASCENSIA AUTODISC VI, ONE TOUCH ULTRA TEST VI) strip Check blood glucose 3 times daily, give Accu chek Precious test strips 21  Yes Ghazala Smith NP   hydrALAZINE (APRESOLINE) 50 mg tablet Take 2 Tablets by mouth three (3) times daily.  21  Yes Ghazala Smith NP   minoxidiL (LONITEN) 2.5 mg tablet TAKE 1 TABLET BY MOUTH TWICE DAILY 3/27/21  Yes Provider, Historical   insulin glargine (Lantus Solostar U-100 Insulin) 100 unit/mL (3 mL) inpn INJECT 25 UNITS SUBCUTANEOUSLY ONCE nightly 21  Yes Ghazala Smith NP   glipiZIDE (GLUCOTROL) 10 mg tablet Take 1 Tab by mouth two (2) times a day. 5/6/21  Yes Ghazala Smith NP   ergocalciferol (ERGOCALCIFEROL) 1,250 mcg (50,000 unit) capsule TAKE ONE CAPSULE BY MOUTH EVERY 7 DAYS 4/26/21  Yes Rand Amato MD   allopurinoL (ZYLOPRIM) 100 mg tablet TAKE 2 TABLETS BY MOUTH DAILY 3/3/21  Yes Ghazala Smith NP   simvastatin (ZOCOR) 40 mg tablet Take 1 Tab by mouth nightly. 2/2/21  Yes Murali Smith NP   BD Ultra-Fine Mini Pen Needle 31 gauge x 3/16\" ndle USE TO INJECT TID 1/26/21  Yes Ghazala Smith NP   sodium bicarbonate 650 mg tablet Take 1 Tab by mouth two (2) times a day. 1/11/21  Yes Rand Amato MD   amLODIPine (NORVASC) 5 mg tablet TAKE 2 TABLETS BY MOUTH ONCE A DAY 12/29/20  Yes Coreen Early NP   fluticasone propionate (FLONASE) 50 mcg/actuation nasal spray 1 [place] by Both Nostrils route daily as needed. Yes Other, MD Melba        Risks colonoscopy described- colon injury, missed lesion, anesthesia problems, bleeding       Velia Pardo, NICKOLAS  June 18, 2347  41:99 AM

## 2021-06-23 DIAGNOSIS — Z76.0 MEDICATION REFILL: ICD-10-CM

## 2021-06-25 ENCOUNTER — HOSPITAL ENCOUNTER (OUTPATIENT)
Dept: LAB | Age: 76
Discharge: HOME | End: 2021-06-25

## 2021-06-25 PROCEDURE — 99001 SPECIMEN HANDLING PT-LAB: CPT

## 2021-06-28 RX ORDER — HYDRALAZINE HYDROCHLORIDE 50 MG/1
TABLET, FILM COATED ORAL
Qty: 90 TABLET | Refills: 1 | Status: SHIPPED | OUTPATIENT
Start: 2021-06-28 | End: 2021-10-11

## 2021-06-28 RX ORDER — HYDRALAZINE HYDROCHLORIDE 50 MG/1
100 TABLET, FILM COATED ORAL 3 TIMES DAILY
Qty: 90 TABLET | Refills: 0 | Status: SHIPPED | OUTPATIENT
Start: 2021-06-28 | End: 2021-06-30 | Stop reason: SDUPTHER

## 2021-06-30 ENCOUNTER — OFFICE VISIT (OUTPATIENT)
Dept: NEPHROLOGY | Age: 76
End: 2021-06-30
Payer: MEDICARE

## 2021-06-30 VITALS
DIASTOLIC BLOOD PRESSURE: 71 MMHG | BODY MASS INDEX: 31.78 KG/M2 | HEART RATE: 68 BPM | WEIGHT: 209 LBS | SYSTOLIC BLOOD PRESSURE: 131 MMHG

## 2021-06-30 DIAGNOSIS — N18.4 CKD (CHRONIC KIDNEY DISEASE), STAGE IV (HCC): Primary | ICD-10-CM

## 2021-06-30 PROCEDURE — G8754 DIAS BP LESS 90: HCPCS | Performed by: INTERNAL MEDICINE

## 2021-06-30 PROCEDURE — 99214 OFFICE O/P EST MOD 30 MIN: CPT | Performed by: INTERNAL MEDICINE

## 2021-06-30 PROCEDURE — G8427 DOCREV CUR MEDS BY ELIG CLIN: HCPCS | Performed by: INTERNAL MEDICINE

## 2021-06-30 PROCEDURE — G8752 SYS BP LESS 140: HCPCS | Performed by: INTERNAL MEDICINE

## 2021-06-30 PROCEDURE — G8536 NO DOC ELDER MAL SCRN: HCPCS | Performed by: INTERNAL MEDICINE

## 2021-06-30 PROCEDURE — G8510 SCR DEP NEG, NO PLAN REQD: HCPCS | Performed by: INTERNAL MEDICINE

## 2021-06-30 PROCEDURE — G8417 CALC BMI ABV UP PARAM F/U: HCPCS | Performed by: INTERNAL MEDICINE

## 2021-06-30 RX ORDER — HYDRALAZINE HYDROCHLORIDE 50 MG/1
100 TABLET, FILM COATED ORAL 3 TIMES DAILY
Qty: 180 TABLET | Refills: 2 | Status: SHIPPED | OUTPATIENT
Start: 2021-06-30 | End: 2021-07-30

## 2021-06-30 NOTE — PROGRESS NOTES
Reason for f/u:  ARON on CKD III/IV      HPI:  The pt is seen for f/u for CKD. He is c/o sob arben on exertion. Noticed to have LE swelling also. No N/V/D.  No urinary sx.      ROS:  Constitutional   · Constitutional: no fatigue, no fever, no night sweats, no significant weight gain, no significant weight loss     Eyes   · Eyes: no dry eyes, no vision change     ENMT   · Nose: no frequent nosebleeds, no nose/sinus problems   · Mouth/Throat: no dry mouth, no bleeding gums, no sore throat, no teeth problems, no snoring     Cardiovascular   · Cardiovascular: no swelling in the extremities, no chest pain, no arm pain on exertion, no shortness of breath when walking, no known heart murmur, no shortness of breath when lying down, no palpitations     Respiratory   · Respiratory: shortness of breath+, no cough, no wheezing, no coughing up blood     Gastrointestinal   · Gastrointestinal: no nausea, no abdominal pain, no vomiting, normal appetite, no diarrhea, not vomiting blood     Genitourinary   · Genitourinary: no hematuria, no incontinence, no difficulty urinating, no increased frequency     Musculoskeletal   · Musculoskeletal: no back pain, no arthralgias/joint pain, no muscle aches, no muscle weakness     Integumentary   · Skin: no rashes, no jaundice     Neurologic   · Neurologic: no dizziness, no numbness, no loss of consciousness, no weakness, no seizures, no headaches     Psychiatric   · Psych: no depression, no sleep disturbances     Hematologic/Lymphatic   · Hematologic/Lymphatic no swollen glands, no bruising     Allergic/Immunologic   · Allergy/Immunologic: no runny nose, no hives   ROS as noted in the HPI      PE:  Constitutional   · Level of Distress: NAD, no acute illness, no chronic illness   · General Appearance: healthy-appearing, well-nourished, well-developed   · Ambulation: ambulating normally     Eyes   · Pupils: PERRLA   · EOM: EOMI     Cardiovascular   · Heart Auscultation: RRR, normal S1, normal S2, no murmurs, no rubs, no gallop, no click, no KARI     Lungs   · Auscultation: breath sounds normal, good air movement, CTA except as noted, no wheezing, no rales/crackles, no rhonchi     Abdomen   · Inspection and Palpation: soft, non-distended, no tenderness, no CVA tenderness     Musculoskeletal System   · Extremities: no clubbing, no cyanosis, edema +    Skin   · Inspection and Palpation: no rash, no lesions, no ulcer, no induration, no nodule, turgor normal, no jaundice     Neurologic   · Cranial Nerves: grossly intact   · Gait And Station: normal gait, normal station        A/P:    1. 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  -His Cr ranges between 2.5-2.9   -His urinalysis showed bland sediment and has 4-5 proteinuria.  -A kidney ultrasound surgically absent left kidney and right kidney with no hydronephrosis. -He was advised to avoid any kind of NSAIDs.  -I will see him back in 12 weeks. 2. History of kidney cancer:  -Status post left total nephrectomy 8 years ago. 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  -He follow-up with oncologist.    3. Hypertension:  -Blood pressure is better now.  -will keep hydralazine 100 mg tid   -will keep metoprolol ER 50 mg daily  -off of clonidine and minoxidil  -on amlodipine 10 mg daily       4. Anemia:  -Hb is 8.8  -TSAT 22%, Ferritin 283  -will keep on po FeSO4 325 mg bid. 5. Renal osteodystrophy.  -His Ca, phosphorus ok  - vitamin D25 hydroxy 18, will order Vit D 50,000 units weekly for 16 weeks.  -has sec HPT   -iPTH 169, will recheck     6. Gouty arthritis:  -His Uric acid is 5.0  -will keep on Allopurinol 200 mg daily. 7. Metabolic acidosis:  -CO2 02-->03->77  -will keep NaHCO3 650 mg 1 tabs tid    8.  Proteinuria, nephrotic range:   -has 4-5 g/g per day   -likely from diabetic nephropathy   -Hep B/C, C3/C4, SFL ratio and ANCAs are neg   -unable to add ARB due to # 1 and K 5.0     9.  SOB:  -from fluid overload  -sob on exertion and LE edema (better)  -last Echo 03/2020 LVEF 50-55%  -will keep on  po lasix 80 mg daily

## 2021-06-30 NOTE — PROGRESS NOTES
Bryce Obregon presents today for   Chief Complaint   Patient presents with    Follow-up     CKD       Is someone accompanying this pt? no    Is the patient using any DME equipment during OV? no    Depression Screening:  3 most recent PHQ Screens 6/30/2021   Little interest or pleasure in doing things Not at all   Feeling down, depressed, irritable, or hopeless Not at all   Total Score PHQ 2 0   Trouble falling or staying asleep, or sleeping too much -   Feeling tired or having little energy -   Poor appetite, weight loss, or overeating -   Feeling bad about yourself - or that you are a failure or have let yourself or your family down -   Trouble concentrating on things such as school, work, reading, or watching TV -   Moving or speaking so slowly that other people could have noticed; or the opposite being so fidgety that others notice -   Thoughts of being better off dead, or hurting yourself in some way -   PHQ 9 Score -   How difficult have these problems made it for you to do your work, take care of your home and get along with others -       Learning Assessment:  Learning Assessment 2/23/2021   PRIMARY LEARNER Patient   HIGHEST LEVEL OF EDUCATION - PRIMARY LEARNER  DID NOT GRADUATE HIGH SCHOOL   BARRIERS PRIMARY LEARNER COGNITIVE   CO-LEARNER CAREGIVER No   PRIMARY LANGUAGE ENGLISH   LEARNER PREFERENCE PRIMARY DEMONSTRATION   ANSWERED BY patient   RELATIONSHIP SELF       Fall Risk  Fall Risk Assessment, last 12 mths 6/30/2021   Able to walk? Yes   Fall in past 12 months? 0   Do you feel unsteady? 0   Are you worried about falling 0   Is TUG test greater than 12 seconds? -   Is the gait abnormal? -   Number of falls in past 12 months -   Fall with injury? -       Coordination of Care:  1. Have you been to the ER, urgent care clinic since your last visit? Hospitalized since your last visit? no    2.  Have you seen or consulted any other health care providers outside of the [address] [place] [address] since your last visit? Include any pap smears or colon screening.  Yes PCP [facility]

## 2021-07-04 RX ORDER — ALLOPURINOL 100 MG/1
TABLET ORAL
Qty: 60 TABLET | Refills: 3 | Status: SHIPPED | OUTPATIENT
Start: 2021-07-04 | End: 2021-10-29

## 2021-07-25 DIAGNOSIS — I10 ESSENTIAL HYPERTENSION: ICD-10-CM

## 2021-07-26 DIAGNOSIS — E11.22 TYPE 2 DIABETES MELLITUS WITH STAGE 4 CHRONIC KIDNEY DISEASE, WITH LONG-TERM CURRENT USE OF INSULIN (HCC): ICD-10-CM

## 2021-07-26 DIAGNOSIS — Z79.4 TYPE 2 DIABETES MELLITUS WITH STAGE 4 CHRONIC KIDNEY DISEASE, WITH LONG-TERM CURRENT USE OF INSULIN (HCC): ICD-10-CM

## 2021-07-26 DIAGNOSIS — I10 ESSENTIAL HYPERTENSION: ICD-10-CM

## 2021-07-26 DIAGNOSIS — N18.4 TYPE 2 DIABETES MELLITUS WITH STAGE 4 CHRONIC KIDNEY DISEASE, WITH LONG-TERM CURRENT USE OF INSULIN (HCC): ICD-10-CM

## 2021-07-26 RX ORDER — FUROSEMIDE 40 MG/1
TABLET ORAL
Qty: 30 TABLET | Refills: 1 | Status: SHIPPED | OUTPATIENT
Start: 2021-07-26 | End: 2021-10-11

## 2021-07-26 RX ORDER — GLIPIZIDE 10 MG/1
10 TABLET ORAL 2 TIMES DAILY
Qty: 60 TABLET | Refills: 2 | Status: CANCELLED | OUTPATIENT
Start: 2021-07-26

## 2021-07-26 RX ORDER — AMLODIPINE BESYLATE 5 MG/1
TABLET ORAL
Qty: 180 TABLET | Refills: 1 | Status: CANCELLED | OUTPATIENT
Start: 2021-07-26

## 2021-07-26 RX ORDER — SODIUM BICARBONATE 650 MG/1
TABLET ORAL
Qty: 60 TABLET | Refills: 1 | Status: SHIPPED | OUTPATIENT
Start: 2021-07-26 | End: 2021-09-21

## 2021-07-26 NOTE — TELEPHONE ENCOUNTER
Requested Prescriptions     Pending Prescriptions Disp Refills    furosemide (LASIX) 40 mg tablet 30 Tablet 1    amLODIPine (NORVASC) 5 mg tablet 180 Tablet 1    glipiZIDE (GLUCOTROL) 10 mg tablet 60 Tablet 2     Sig: Take 1 Tablet by mouth two (2) times a day.     allopurinoL (ZYLOPRIM) 100 mg tablet 60 Tablet 3

## 2021-07-27 RX ORDER — SODIUM BICARBONATE 650 MG/1
650 TABLET ORAL 4 TIMES DAILY
OUTPATIENT
Start: 2021-07-27

## 2021-07-27 NOTE — TELEPHONE ENCOUNTER
Please see refill request    Patient was last seen on 6-3-2021    According to records all requests have refills with the exception of the Amlodipine-last prescribed on 12- by Farrukh Luna  #[phone] X 2    Upcoming appt scheduled for 9-2-2021    Thank you

## 2021-07-30 DIAGNOSIS — N18.4 TYPE 2 DIABETES MELLITUS WITH STAGE 4 CHRONIC KIDNEY DISEASE, WITH LONG-TERM CURRENT USE OF INSULIN (HCC): ICD-10-CM

## 2021-07-30 DIAGNOSIS — Z79.4 TYPE 2 DIABETES MELLITUS WITH STAGE 4 CHRONIC KIDNEY DISEASE, WITH LONG-TERM CURRENT USE OF INSULIN (HCC): ICD-10-CM

## 2021-07-30 DIAGNOSIS — E11.22 TYPE 2 DIABETES MELLITUS WITH STAGE 4 CHRONIC KIDNEY DISEASE, WITH LONG-TERM CURRENT USE OF INSULIN (HCC): ICD-10-CM

## 2021-07-30 DIAGNOSIS — I10 ESSENTIAL HYPERTENSION: Primary | ICD-10-CM

## 2021-07-30 RX ORDER — ALLOPURINOL 100 MG/1
TABLET ORAL
Qty: 60 TABLET | Refills: 3 | OUTPATIENT
Start: 2021-07-30

## 2021-07-30 RX ORDER — GLIPIZIDE 10 MG/1
10 TABLET ORAL 2 TIMES DAILY
Qty: 180 TABLET | Refills: 0 | Status: SHIPPED | OUTPATIENT
Start: 2021-07-30 | End: 2021-10-21

## 2021-07-30 RX ORDER — AMLODIPINE BESYLATE 5 MG/1
TABLET ORAL
Qty: 180 TABLET | Refills: 0 | Status: SHIPPED | OUTPATIENT
Start: 2021-07-30 | End: 2022-03-30

## 2021-07-30 RX ORDER — FUROSEMIDE 40 MG/1
TABLET ORAL
Qty: 30 TABLET | Refills: 1 | OUTPATIENT
Start: 2021-07-30

## 2021-08-04 ENCOUNTER — TELEPHONE (OUTPATIENT)
Dept: FAMILY MEDICINE CLINIC | Age: 76
End: 2021-08-04

## 2021-08-04 NOTE — TELEPHONE ENCOUNTER
Spoke with caregiver in ref to whether the colorgaurd test has been done-will check and advise office

## 2021-08-05 DIAGNOSIS — I10 ESSENTIAL HYPERTENSION: Primary | ICD-10-CM

## 2021-08-10 RX ORDER — MINOXIDIL 2.5 MG/1
TABLET ORAL
Qty: 180 TABLET | Refills: 0 | Status: SHIPPED | OUTPATIENT
Start: 2021-08-10 | End: 2021-10-21

## 2021-08-12 ENCOUNTER — HOSPITAL ENCOUNTER (EMERGENCY)
Age: 76
Discharge: HOME | End: 2021-08-12
Attending: FAMILY MEDICINE
Payer: MEDICARE

## 2021-08-12 ENCOUNTER — APPOINTMENT (OUTPATIENT)
Dept: CT IMAGING | Age: 76
End: 2021-08-12
Attending: FAMILY MEDICINE
Payer: MEDICARE

## 2021-08-12 VITALS
BODY MASS INDEX: 31.67 KG/M2 | WEIGHT: 209 LBS | HEART RATE: 6 BPM | TEMPERATURE: 98.3 F | RESPIRATION RATE: 17 BRPM | OXYGEN SATURATION: 100 % | DIASTOLIC BLOOD PRESSURE: 61 MMHG | SYSTOLIC BLOOD PRESSURE: 120 MMHG | HEIGHT: 68 IN

## 2021-08-12 DIAGNOSIS — R51.9 ACUTE NONINTRACTABLE HEADACHE, UNSPECIFIED HEADACHE TYPE: Primary | ICD-10-CM

## 2021-08-12 LAB
ANION GAP SERPL CALC-SCNC: 10 MMOL/L
BASOPHILS # BLD: 0.1 K/UL (ref 0–0.1)
BASOPHILS NFR BLD: 1 % (ref 0–2)
BUN SERPL-MCNC: 60 MG/DL (ref 9–21)
BUN/CREAT SERPL: 18
CA-I BLD-MCNC: 9.4 MG/DL (ref 8.5–10.5)
CHLORIDE SERPL-SCNC: 104 MMOL/L (ref 94–111)
CO2 SERPL-SCNC: 23 MMOL/L (ref 21–33)
CREAT SERPL-MCNC: 3.3 MG/DL (ref 0.8–1.5)
EOSINOPHIL # BLD: 0.1 K/UL (ref 0–0.4)
EOSINOPHIL NFR BLD: 1 % (ref 0–5)
ERYTHROCYTE [DISTWIDTH] IN BLOOD BY AUTOMATED COUNT: 15.5 % (ref 11.6–14.5)
GLUCOSE SERPL-MCNC: 188 MG/DL (ref 70–110)
HCT VFR BLD AUTO: 34.8 % (ref 36–48)
HGB BLD-MCNC: 10.9 G/DL (ref 13–16)
IMM GRANULOCYTES # BLD AUTO: 0 K/UL
IMM GRANULOCYTES NFR BLD AUTO: 0 %
LYMPHOCYTES # BLD: 1 K/UL (ref 0.9–3.6)
LYMPHOCYTES NFR BLD: 12 % (ref 21–52)
MCH RBC QN AUTO: 26.2 PG (ref 24–34)
MCHC RBC AUTO-ENTMCNC: 31.3 G/DL (ref 31–37)
MCV RBC AUTO: 83.7 FL (ref 74–97)
MONOCYTES # BLD: 0.6 K/UL (ref 0.05–1.2)
MONOCYTES NFR BLD: 6 % (ref 3–10)
NEUTS SEG # BLD: 6.9 K/UL (ref 1.8–8)
NEUTS SEG NFR BLD: 80 % (ref 40–73)
PLATELET # BLD AUTO: 424 K/UL (ref 135–420)
PMV BLD AUTO: 9.5 FL (ref 9.2–11.8)
POTASSIUM SERPL-SCNC: 4.4 MMOL/L (ref 3.2–5.1)
RBC # BLD AUTO: 4.16 M/UL (ref 4.7–5.5)
SODIUM SERPL-SCNC: 137 MMOL/L (ref 135–145)
WBC # BLD AUTO: 8.6 K/UL (ref 4.6–13.2)

## 2021-08-12 PROCEDURE — 74011250636 HC RX REV CODE- 250/636: Performed by: FAMILY MEDICINE

## 2021-08-12 PROCEDURE — 80048 BASIC METABOLIC PNL TOTAL CA: CPT

## 2021-08-12 PROCEDURE — 96374 THER/PROPH/DIAG INJ IV PUSH: CPT

## 2021-08-12 PROCEDURE — 85025 COMPLETE CBC W/AUTO DIFF WBC: CPT

## 2021-08-12 PROCEDURE — 96375 TX/PRO/DX INJ NEW DRUG ADDON: CPT

## 2021-08-12 PROCEDURE — 70450 CT HEAD/BRAIN W/O DYE: CPT

## 2021-08-12 PROCEDURE — 99283 EMERGENCY DEPT VISIT LOW MDM: CPT

## 2021-08-12 RX ORDER — MORPHINE SULFATE 4 MG/ML
4 INJECTION, SOLUTION INTRAMUSCULAR; INTRAVENOUS
Status: COMPLETED | OUTPATIENT
Start: 2021-08-12 | End: 2021-08-12

## 2021-08-12 RX ORDER — KETOROLAC TROMETHAMINE 30 MG/ML
15 INJECTION, SOLUTION INTRAMUSCULAR; INTRAVENOUS
Status: COMPLETED | OUTPATIENT
Start: 2021-08-12 | End: 2021-08-12

## 2021-08-12 RX ORDER — METOPROLOL SUCCINATE 25 MG/1
1 TABLET, EXTENDED RELEASE ORAL DAILY
COMMUNITY
Start: 2021-07-13 | End: 2021-12-16

## 2021-08-12 RX ORDER — ONDANSETRON 2 MG/ML
4 INJECTION INTRAMUSCULAR; INTRAVENOUS ONCE
Status: COMPLETED | OUTPATIENT
Start: 2021-08-12 | End: 2021-08-12

## 2021-08-12 RX ADMIN — ONDANSETRON 4 MG: 2 INJECTION INTRAMUSCULAR; INTRAVENOUS at 08:29

## 2021-08-12 RX ADMIN — MORPHINE SULFATE 4 MG: 4 INJECTION, SOLUTION INTRAMUSCULAR; INTRAVENOUS at 08:31

## 2021-08-12 RX ADMIN — KETOROLAC TROMETHAMINE 15 MG: 30 INJECTION, SOLUTION INTRAMUSCULAR; INTRAVENOUS at 08:30

## 2021-08-23 ENCOUNTER — TELEPHONE (OUTPATIENT)
Dept: FAMILY MEDICINE CLINIC | Age: 76
End: 2021-08-23

## 2021-08-24 NOTE — TELEPHONE ENCOUNTER
Please se message-    I don't see where he has been on an inhaler-looking back to at least 2017    Thank you

## 2021-08-24 NOTE — TELEPHONE ENCOUNTER
Called and left message for caller to call back with name and date of last written along with who prescribed it.   Advised that the provider was out of the office until Friday this week

## 2021-09-08 RX ORDER — SIMVASTATIN 40 MG/1
TABLET, FILM COATED ORAL
Qty: 90 TABLET | Refills: 1 | Status: SHIPPED | OUTPATIENT
Start: 2021-09-08 | End: 2022-03-30

## 2021-09-16 ENCOUNTER — HOSPITAL ENCOUNTER (OUTPATIENT)
Dept: LAB | Age: 76
Discharge: HOME | End: 2021-09-16

## 2021-09-16 PROCEDURE — 99001 SPECIMEN HANDLING PT-LAB: CPT

## 2021-09-21 RX ORDER — SODIUM BICARBONATE 650 MG/1
TABLET ORAL
Qty: 60 TABLET | Refills: 1 | Status: SHIPPED | OUTPATIENT
Start: 2021-09-21 | End: 2021-09-30 | Stop reason: SDUPTHER

## 2021-09-22 ENCOUNTER — OFFICE VISIT (OUTPATIENT)
Dept: NEPHROLOGY | Age: 76
End: 2021-09-22
Payer: MEDICARE

## 2021-09-22 VITALS
WEIGHT: 209 LBS | SYSTOLIC BLOOD PRESSURE: 115 MMHG | BODY MASS INDEX: 31.78 KG/M2 | DIASTOLIC BLOOD PRESSURE: 52 MMHG | HEART RATE: 72 BPM

## 2021-09-22 DIAGNOSIS — E21.0 PRIMARY HYPERPARATHYROIDISM (HCC): Primary | ICD-10-CM

## 2021-09-22 PROCEDURE — G8752 SYS BP LESS 140: HCPCS | Performed by: INTERNAL MEDICINE

## 2021-09-22 PROCEDURE — G8510 SCR DEP NEG, NO PLAN REQD: HCPCS | Performed by: INTERNAL MEDICINE

## 2021-09-22 PROCEDURE — 99214 OFFICE O/P EST MOD 30 MIN: CPT | Performed by: INTERNAL MEDICINE

## 2021-09-22 PROCEDURE — G8427 DOCREV CUR MEDS BY ELIG CLIN: HCPCS | Performed by: INTERNAL MEDICINE

## 2021-09-22 PROCEDURE — G8754 DIAS BP LESS 90: HCPCS | Performed by: INTERNAL MEDICINE

## 2021-09-22 PROCEDURE — G8536 NO DOC ELDER MAL SCRN: HCPCS | Performed by: INTERNAL MEDICINE

## 2021-09-22 PROCEDURE — G8417 CALC BMI ABV UP PARAM F/U: HCPCS | Performed by: INTERNAL MEDICINE

## 2021-09-22 RX ORDER — CINACALCET 30 MG/1
30 TABLET, FILM COATED ORAL DAILY
Qty: 30 TABLET | Refills: 1 | Status: SHIPPED | OUTPATIENT
Start: 2021-09-22 | End: 2021-10-22

## 2021-09-22 RX ORDER — FERROUS SULFATE 325(65) MG
325 TABLET, DELAYED RELEASE (ENTERIC COATED) ORAL
Qty: 90 TABLET | Refills: 2 | Status: SHIPPED | OUTPATIENT
Start: 2021-09-22 | End: 2022-01-24

## 2021-09-22 RX ORDER — SODIUM BICARBONATE 650 MG/1
650 TABLET ORAL 3 TIMES DAILY
Qty: 90 TABLET | Refills: 1 | Status: SHIPPED | OUTPATIENT
Start: 2021-09-22 | End: 2021-10-22

## 2021-09-22 NOTE — PROGRESS NOTES
Reason for f/u:  ARON on CKD III/IV      HPI:  The pt is seen for f/u for CKD. He is c/o sob arben on exertion. Noticed to have LE swelling also. No N/V/D.  No urinary sx.      ROS:  Constitutional   · Constitutional: no fatigue, no fever, no night sweats, no significant weight gain, no significant weight loss     Eyes   · Eyes: no dry eyes, no vision change     ENMT   · Nose: no frequent nosebleeds, no nose/sinus problems   · Mouth/Throat: no dry mouth, no bleeding gums, no sore throat, no teeth problems, no snoring     Cardiovascular   · Cardiovascular: no swelling in the extremities, no chest pain, no arm pain on exertion, no shortness of breath when walking, no known heart murmur, no shortness of breath when lying down, no palpitations     Respiratory   · Respiratory: shortness of breath+, no cough, no wheezing, no coughing up blood     Gastrointestinal   · Gastrointestinal: no nausea, no abdominal pain, no vomiting, normal appetite, no diarrhea, not vomiting blood     Genitourinary   · Genitourinary: no hematuria, no incontinence, no difficulty urinating, no increased frequency     Musculoskeletal   · Musculoskeletal: no back pain, no arthralgias/joint pain, no muscle aches, no muscle weakness     Integumentary   · Skin: no rashes, no jaundice     Neurologic   · Neurologic: no dizziness, no numbness, no loss of consciousness, no weakness, no seizures, no headaches     Psychiatric   · Psych: no depression, no sleep disturbances     Hematologic/Lymphatic   · Hematologic/Lymphatic no swollen glands, no bruising     Allergic/Immunologic   · Allergy/Immunologic: no runny nose, no hives   ROS as noted in the HPI      PE:  Constitutional   · Level of Distress: NAD, no acute illness, no chronic illness   · General Appearance: healthy-appearing, well-nourished, well-developed   · Ambulation: ambulating normally     Eyes   · Pupils: PERRLA   · EOM: EOMI     Cardiovascular   · Heart Auscultation: RRR, normal S1, normal S2, no murmurs, no rubs, no gallop, no click, no KARI     Lungs   · Auscultation: breath sounds normal, good air movement, CTA except as noted, no wheezing, no rales/crackles, no rhonchi     Abdomen   · Inspection and Palpation: soft, non-distended, no tenderness, no CVA tenderness     Musculoskeletal System   · Extremities: no clubbing, no cyanosis, edema +    Skin   · Inspection and Palpation: no rash, no lesions, no ulcer, no induration, no nodule, turgor normal, no jaundice     Neurologic   · Cranial Nerves: grossly intact   · Gait And Station: normal gait, normal station        A/P:    1. 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 -he has unilateral functioning kidney due to left total nephrectomy.  -His Cr ranges between 2.5-2.9   -His urinalysis showed bland sediment and has 4-5 proteinuria.  -A kidney ultrasound surgically absent left kidney and right kidney with no hydronephrosis. -He was advised to avoid any kind of NSAIDs.  -I will see him back in 3 weeks. 2. History of kidney cancer:  -Status post left total nephrectomy 8 years ago. 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  -He follow-up with oncologist.    3. Hypertension:  -Blood pressure is better now.  -will keep hydralazine 100 mg tid   -will keep metoprolol ER 50 mg daily  -off of clonidine and minoxidil  -on amlodipine 10 mg daily       4. Anemia:  -Hb is 8.8-->9.1  -TSAT 22->10%, Ferritin 283-->124  -advised to increase po FeSO4 325 mg bid->tid. 5. Renal osteodystrophy.  -His Ca, phosphorus ok  - vitamin D25 hydroxy 18, will order Vit D 50,000 units weekly for 16 weeks.  -has sec HPT   -iPTH 169-->248, will order parathyroid nuclear scan  -will put on sensipar 30 mg daily. -will check SFL ratio    6. Gouty arthritis:  -His Uric acid is 5.0  -will keep on Allopurinol 200 mg daily.     7. Metabolic acidosis:  -CO2  22-->19  -will increase NaHCO3 650 mg 1 tabs bid-->tid    8. Proteinuria, nephrotic range:   -has 4-5 g/g per day   -likely from diabetic nephropathy   -Hep B/C, C3/C4, SFL ratio and ANCAs are neg   -unable to add ARB due to # 1 and K 5.0     9.  SOB:  -from fluid overload  -sob on exertion and LE edema (better)  -last Echo 03/2020 LVEF 50-55%  -will decrease  po lasix 80 mg to 40 mg daily due to # 1

## 2021-09-22 NOTE — PROGRESS NOTES
Marine Torres presents today for   Chief Complaint   Patient presents with    Follow-up     CKD       Is someone accompanying this pt? NO    Is the patient using any DME equipment during OV? NO    Depression Screening:  3 most recent PHQ Screens 9/22/2021   Little interest or pleasure in doing things Not at all   Feeling down, depressed, irritable, or hopeless Not at all   Total Score PHQ 2 0   Trouble falling or staying asleep, or sleeping too much -   Feeling tired or having little energy -   Poor appetite, weight loss, or overeating -   Feeling bad about yourself - or that you are a failure or have let yourself or your family down -   Trouble concentrating on things such as school, work, reading, or watching TV -   Moving or speaking so slowly that other people could have noticed; or the opposite being so fidgety that others notice -   Thoughts of being better off dead, or hurting yourself in some way -   PHQ 9 Score -   How difficult have these problems made it for you to do your work, take care of your home and get along with others -       Learning Assessment:  Learning Assessment 2/23/2021   PRIMARY LEARNER Patient   HIGHEST LEVEL OF EDUCATION - PRIMARY LEARNER  DID NOT GRADUATE HIGH SCHOOL   BARRIERS PRIMARY LEARNER COGNITIVE   CO-LEARNER CAREGIVER No   PRIMARY LANGUAGE ENGLISH   LEARNER PREFERENCE PRIMARY DEMONSTRATION   ANSWERED BY patient   RELATIONSHIP SELF       Fall Risk  Fall Risk Assessment, last 12 mths 9/22/2021   Able to walk? Yes   Fall in past 12 months? 0   Do you feel unsteady? 0   Are you worried about falling 0   Is TUG test greater than 12 seconds? -   Is the gait abnormal? -   Number of falls in past 12 months -   Fall with injury? -       Coordination of Care:  1. Have you been to the ER, urgent care clinic since your last visit? Hospitalized since your last visit? NO    2.  Have you seen or consulted any other health care providers outside of the [address] [place] [address] since your last visit? Include any pap smears or colon screening.  Yes, PCp [facility]

## 2021-09-30 ENCOUNTER — TELEPHONE (OUTPATIENT)
Dept: FAMILY MEDICINE CLINIC | Age: 76
End: 2021-09-30

## 2021-09-30 ENCOUNTER — OFFICE VISIT (OUTPATIENT)
Dept: FAMILY MEDICINE CLINIC | Age: 76
End: 2021-09-30
Payer: MEDICARE

## 2021-09-30 VITALS
HEART RATE: 62 BPM | DIASTOLIC BLOOD PRESSURE: 76 MMHG | WEIGHT: 207 LBS | OXYGEN SATURATION: 96 % | BODY MASS INDEX: 31.37 KG/M2 | RESPIRATION RATE: 24 BRPM | HEIGHT: 68 IN | TEMPERATURE: 97.6 F | SYSTOLIC BLOOD PRESSURE: 167 MMHG

## 2021-09-30 DIAGNOSIS — E11.22 TYPE 2 DIABETES MELLITUS WITH STAGE 4 CHRONIC KIDNEY DISEASE, WITH LONG-TERM CURRENT USE OF INSULIN (HCC): ICD-10-CM

## 2021-09-30 DIAGNOSIS — I10 ESSENTIAL HYPERTENSION: ICD-10-CM

## 2021-09-30 DIAGNOSIS — N18.4 TYPE 2 DIABETES MELLITUS WITH STAGE 4 CHRONIC KIDNEY DISEASE, WITH LONG-TERM CURRENT USE OF INSULIN (HCC): ICD-10-CM

## 2021-09-30 DIAGNOSIS — Z79.4 TYPE 2 DIABETES MELLITUS WITH STAGE 4 CHRONIC KIDNEY DISEASE, WITH LONG-TERM CURRENT USE OF INSULIN (HCC): ICD-10-CM

## 2021-09-30 DIAGNOSIS — E87.20 METABOLIC ACIDOSIS: ICD-10-CM

## 2021-09-30 DIAGNOSIS — E78.2 MIXED HYPERLIPIDEMIA: ICD-10-CM

## 2021-09-30 DIAGNOSIS — D64.9 ANEMIA, UNSPECIFIED TYPE: ICD-10-CM

## 2021-09-30 DIAGNOSIS — R06.02 SOB (SHORTNESS OF BREATH): ICD-10-CM

## 2021-09-30 DIAGNOSIS — I25.118 CORONARY ARTERY DISEASE OF NATIVE ARTERY OF NATIVE HEART WITH STABLE ANGINA PECTORIS (HCC): ICD-10-CM

## 2021-09-30 DIAGNOSIS — C67.9 MALIGNANT NEOPLASM OF URINARY BLADDER, UNSPECIFIED SITE (HCC): ICD-10-CM

## 2021-09-30 DIAGNOSIS — N18.30 STAGE 3 CHRONIC KIDNEY DISEASE, UNSPECIFIED WHETHER STAGE 3A OR 3B CKD (HCC): ICD-10-CM

## 2021-09-30 DIAGNOSIS — R06.02 SOB (SHORTNESS OF BREATH) ON EXERTION: Primary | ICD-10-CM

## 2021-09-30 DIAGNOSIS — M10.9 GOUTY ARTHRITIS: ICD-10-CM

## 2021-09-30 LAB — HBA1C MFR BLD HPLC: 7 %

## 2021-09-30 PROCEDURE — G8752 SYS BP LESS 140: HCPCS | Performed by: NURSE PRACTITIONER

## 2021-09-30 PROCEDURE — 3017F COLORECTAL CA SCREEN DOC REV: CPT | Performed by: NURSE PRACTITIONER

## 2021-09-30 PROCEDURE — 2022F DILAT RTA XM EVC RTNOPTHY: CPT | Performed by: NURSE PRACTITIONER

## 2021-09-30 PROCEDURE — G8417 CALC BMI ABV UP PARAM F/U: HCPCS | Performed by: NURSE PRACTITIONER

## 2021-09-30 PROCEDURE — 83036 HEMOGLOBIN GLYCOSYLATED A1C: CPT | Performed by: NURSE PRACTITIONER

## 2021-09-30 PROCEDURE — 99214 OFFICE O/P EST MOD 30 MIN: CPT | Performed by: NURSE PRACTITIONER

## 2021-09-30 PROCEDURE — G8536 NO DOC ELDER MAL SCRN: HCPCS | Performed by: NURSE PRACTITIONER

## 2021-09-30 PROCEDURE — 1101F PT FALLS ASSESS-DOCD LE1/YR: CPT | Performed by: NURSE PRACTITIONER

## 2021-09-30 PROCEDURE — G8754 DIAS BP LESS 90: HCPCS | Performed by: NURSE PRACTITIONER

## 2021-09-30 PROCEDURE — 3051F HG A1C>EQUAL 7.0%<8.0%: CPT | Performed by: NURSE PRACTITIONER

## 2021-09-30 PROCEDURE — G8432 DEP SCR NOT DOC, RNG: HCPCS | Performed by: NURSE PRACTITIONER

## 2021-09-30 PROCEDURE — G8427 DOCREV CUR MEDS BY ELIG CLIN: HCPCS | Performed by: NURSE PRACTITIONER

## 2021-09-30 RX ORDER — ALBUTEROL SULFATE 90 UG/1
2 AEROSOL, METERED RESPIRATORY (INHALATION)
Qty: 1 EACH | Refills: 1 | Status: SHIPPED | OUTPATIENT
Start: 2021-09-30

## 2021-09-30 NOTE — PROGRESS NOTES
OFFICE NOTE    Mervat Veliz is a 78 y.o. male presenting today for the following:  Chief Complaint   Patient presents with    Follow Up Chronic Condition      HPI     CKD/Malignant neoplasm of urinary bladder  Patient is followed by nephrology Dr. Bouchra Costello. Most recent visit was 9/22/2021. Patient had kidney cancer in the past and post left total nephrectomy 8 years ago. Patient had a total cystectomy due to cancer in his bladder and now has a urostomy bag when placement according to notes. Patient is doing well overall. Hypertension/Elevated Cholesterol/CAD  Patient take hydralazine, metoprolol and amlodipine. Patient is on simvastatin. Patient denies SOB, chest pain, vision changes. Patient admits to not taking medication today. Patient denies any side effects from medication. Patient blood pressure is slightly elevated today. Advised patient to take medications as prescribed. Anemia   Currently on Ferrous Sulfate 325 mg TID (increased by nephrologist on previous visit he was BID). He is also on vitamin D 50,000 weekly for 16 weeks prescribed by nephrologist on last visit. Denies any side effects from medication and take as prescribed. Gouty Arthritis  Currently take allopurinol 200 mg daily. Denies any side effects from medication and take as prescribed. Metabolic acidosis  Patient is currently on Sodium bicarbonate 650 mg TID. Denies any side effects from medication and take as prescribed    Diabetes  Patient is currently on Lantus and glipizide. He is followed by Dr. Pablo Gruber. Patient A1C is 7.0 today. BS today was 100. Patient denies polyphagia, polydipsia or polyuria. Denies any side effects from medication and take as prescribed    SOB  Patient has this from fluid overload and currently on lasix 40 mg daily. LVEF 50 - 55% last echo 3/2020. Patient states he was using a sample of albuterol by his previous PCP due to SOB from exertion as well.   He have a history of pulmonary embolism in the past.  Patient is requesting a inhaler today intermittently when he get out of breath during exertion. Patient is unsure what inhaler he was actually using but will call the office so it can be filled. Review of Systems   Constitutional: Negative for fatigue and fever. HENT: Negative. Eyes: Negative. Respiratory: Negative for cough, chest tightness, shortness of breath and wheezing. Cardiovascular: Negative for chest pain and palpitations. Neurological: Negative for dizziness, light-headedness and headaches.          History  Past Medical History:   Diagnosis Date    Acid reflux     Arthritis     Bladder cancer (HCC)     Congestive heart failure (HCC)     Deep vein thrombosis (DVT) of proximal vein of both lower extremities ([facility] [address] 2020    Diabetes mellitus (HCC)     GERD (gastroesophageal reflux disease)     Gout     High cholesterol     Hypertension     Kidney carcinoma, left (HCC)     Kidney disease     Pituitary mass ([facility] [address]     Reflux esophagitis     Unspecified deficiency anemia        Past Surgical History:   Procedure Laterality Date    HX CYSTECTOMY  09    Dr. Chadwick Gonzales HX NEPHRECTOMY Left 2009    Nephroureterectomy by Dr. Connie Perez HX OTHER SURGICAL      surgery on pituitary gland       Social History     Socioeconomic History    Marital status: SINGLE     Spouse name: Not on file    Number of children: Not on file    Years of education: Not on file    Highest education level: Not on file   Occupational History    Not on file   Tobacco Use    Smoking status: Former Smoker     Packs/day: 0.50     Years: 40.00     Pack years: 20.00     Types: Cigarettes     Quit date: 1999     Years since quittin.7    Smokeless tobacco: Never Used   Vaping Use    Vaping Use: Never used   Substance and Sexual Activity    Alcohol use: No    Drug use: No    Sexual activity: Yes   Other Topics Concern    Not on file   Social History Narrative    Not on file     Social Determinants of Health     Financial Resource Strain:     Difficulty of Paying Living Expenses:    Food Insecurity:     Worried About Running Out of Food in the Last Year:     [address] in the Last Year:    Transportation Needs:     Lack of Transportation (Medical):  Lack of Transportation (Non-Medical):    Physical Activity:     Days of Exercise per Week:     Minutes of Exercise per Session:    Stress:     Feeling of Stress :    Social Connections:     Frequency of Communication with Friends and Family:     Frequency of Social Gatherings with Friends and Family:     Attends [employer] Services:     Active Member of Clubs or Organizations:     Attends Club or Organization Meetings:     Marital Status:    Intimate Partner Violence:     Fear of Current or Ex-Partner:     Emotionally Abused:     Physically Abused:     Sexually Abused:        No Known Allergies    Current Outpatient Medications   Medication Sig Dispense Refill    cinacalcet (Sensipar) 30 mg tablet Take 1 Tablet by mouth daily for 30 days. 30 Tablet 1    ferrous sulfate (IRON) 325 mg (65 mg iron) EC tablet Take 1 Tablet by mouth three (3) times daily (with meals). 90 Tablet 2    sodium bicarbonate 650 mg tablet Take 1 Tablet by mouth three (3) times daily for 30 days. 90 Tablet 1    simvastatin (ZOCOR) 40 mg tablet TAKE 1 TABLET BY MOUTH EVERY NIGHT 90 Tablet 1    metoprolol succinate (TOPROL-XL) 25 mg XL tablet Take 1 Tablet by mouth daily.  minoxidiL (LONITEN) 2.5 mg tablet TAKE 1 TABLET BY MOUTH TWICE DAILY 180 Tablet 0    amLODIPine (NORVASC) 5 mg tablet TAKE 2 TABLETS BY MOUTH ONCE A  Tablet 0    glipiZIDE (GLUCOTROL) 10 mg tablet Take 1 Tablet by mouth two (2) times a day.  180 Tablet 0    furosemide (LASIX) 40 mg tablet TAKE 2 TABLETS BY MOUTH DAILY 30 Tablet 1    allopurinoL (ZYLOPRIM) 100 mg tablet TAKE 2 TABLETS BY MOUTH DAILY 60 Tablet 3  hydrALAZINE (APRESOLINE) 50 mg tablet TAKE 2 TABLETS BY MOUTH THREE TIMES DAILY 90 Tablet 1    omeprazole (PRILOSEC) 40 mg capsule TAKE 1 CAPSULE BY MOUTH DAILY 90 Capsule 1    polyethylene glycol (Miralax) 17 gram packet Take 1 Packet by mouth daily. 10 Packet 0    apixaban (Eliquis) 2.5 mg tablet Take 1 Tablet by mouth two (2) times a day. 180 Tablet 1    glucose blood VI test strips (ASCENSIA AUTODISC VI, ONE TOUCH ULTRA TEST VI) strip Check blood glucose 3 times daily, give Accu chek Precious test strips 300 Strip 3    insulin glargine (Lantus Solostar U-100 Insulin) 100 unit/mL (3 mL) inpn INJECT 25 UNITS SUBCUTANEOUSLY ONCE nightly 9 mL 1    ergocalciferol (ERGOCALCIFEROL) 1,250 mcg (50,000 unit) capsule TAKE ONE CAPSULE BY MOUTH EVERY 7 DAYS 16 Cap 1    BD Ultra-Fine Mini Pen Needle 31 gauge x 3/16\" ndle USE TO INJECT  Pen Needle 3    fluticasone propionate (FLONASE) 50 mcg/actuation nasal spray 1 Stanley by Both Nostrils route daily as needed. Patient Care Team:  Patient Care Team:  Arminda Sommers NP as PCP - General (Nurse Practitioner)  Arminda Sommers NP as PCP - [facility]  Albaro Castillo MD as Physician (Nephrology)  Lisa Ortega MD as Physician (Vascular Surgery)  Tanisha Márquez MD as Physician (Cardiology)  Reina Espino MD as Physician (Ophthalmology)  Rnye Mayorga MD as Surgeon (Colon and Rectal Surgery)      LABS:  Results for orders placed or performed in visit on 09/30/21   AMB POC HEMOGLOBIN A1C   Result Value Ref Range    Hemoglobin A1c (POC) 7.0 %        RADIOLOGY:  No recent results      Physical Exam  Vitals and nursing note reviewed. Constitutional:       Appearance: Normal appearance. He is well-developed. Eyes:      Pupils: Pupils are equal, round, and reactive to light. Cardiovascular:      Rate and Rhythm: Normal rate and regular rhythm. Heart sounds: Normal heart sounds.    Pulmonary:      Effort: Pulmonary effort is normal. No respiratory distress. Breath sounds: Normal breath sounds. No wheezing or rales. Abdominal:      General: Bowel sounds are normal. There is no distension. Palpations: Abdomen is soft. Tenderness: There is no abdominal tenderness. There is no rebound. Musculoskeletal:         General: Normal range of motion. Cervical back: Normal range of motion and neck supple. Lymphadenopathy:      Cervical: No cervical adenopathy. Skin:     General: Skin is warm and dry. Neurological:      Mental Status: He is alert and oriented to person, place, and time. Deep Tendon Reflexes: Reflexes are normal and symmetric. Psychiatric:         Mood and Affect: Mood normal.           Vitals:    09/30/21 1430 09/30/21 1439   BP: (!) 132/58 (!) 167/76   Pulse: 62    Resp: 24    Temp: 97.6 °F (36.4 °C)    TempSrc: Oral    SpO2: 96%    Weight: 207 lb (93.9 kg)    Height: 5' 8\" (1.727 m)    PainSc:   0 - No pain          Assessment and Plan    1. Type 2 diabetes mellitus with stage 4 chronic kidney disease, with long-term current use of insulin ([facility] [address]  Patient will continue on same regimen  - AMB POC HEMOGLOBIN A1C    2. Malignant neoplasm of urinary bladder, unspecified site [facility])  Patient will continue on same regimen    3. Coronary artery disease of native artery of native heart with stable angina pectoris [facility])  Patient will continue on same regimen    4. Stage 3 chronic kidney disease, unspecified whether stage 3a or 3b CKD ([facility] [address]  Patient will continue on same regimen    5. Essential hypertension  Patient will continue on same regimen    6. Mixed hyperlipidemia  Patient will continue on same regimen    7. Gouty arthritis  Patient will continue on same regimen    8. Anemia, unspecified type  Patient will continue on same regimen    9. Metabolic acidosis    Patient will continue on same regimen  10.  SOB (shortness of breath)  Patient will continue on same regimen      MDM    Procedures      *Plan of care reviewed with patient. Patient in agreement with plan and expresses understanding. All questions answered and patient encouraged to call or RTO if further questions or concerns. Advised patient if symptoms worsen to go to nearest ER or call 911. AVS and recommendations given to patient upon discharge.

## 2021-09-30 NOTE — PROGRESS NOTES
Patient was previously on Symbicort but it was used as a sample by his previous PCP years ago. Patient have not been diagnosed with COPD or Asthma but states sometimes when he is playing with his niece or doing things he will get winded. Will order patient a Albuterol inhaler to use.

## 2021-09-30 NOTE — PROGRESS NOTES
1. Have you been to the ER, urgent care clinic since your last visit? Hospitalized since your last visit?no    2. Have you seen or consulted any other health care providers outside of the [address] [place] [address] since your last visit? Include any pap smears or colon screening.  no

## 2021-10-01 ENCOUNTER — TRANSCRIBE ORDER (OUTPATIENT)
Dept: SCHEDULING | Age: 76
End: 2021-10-01

## 2021-10-01 DIAGNOSIS — J32.2 CHRONIC ETHMOIDAL SINUSITIS: Primary | ICD-10-CM

## 2021-10-06 ENCOUNTER — HOSPITAL ENCOUNTER (INPATIENT)
Age: 76
LOS: 5 days | Discharge: HOME | DRG: 291 | End: 2021-10-11
Attending: FAMILY MEDICINE | Admitting: INTERNAL MEDICINE
Payer: MEDICARE

## 2021-10-06 ENCOUNTER — APPOINTMENT (OUTPATIENT)
Dept: GENERAL RADIOLOGY | Age: 76
DRG: 291 | End: 2021-10-06
Attending: FAMILY MEDICINE
Payer: MEDICARE

## 2021-10-06 DIAGNOSIS — E83.42 HYPOMAGNESEMIA: ICD-10-CM

## 2021-10-06 DIAGNOSIS — I50.23 ACUTE ON CHRONIC SYSTOLIC CONGESTIVE HEART FAILURE (HCC): Primary | ICD-10-CM

## 2021-10-06 DIAGNOSIS — J18.9 COMMUNITY ACQUIRED PNEUMONIA, UNSPECIFIED LATERALITY: ICD-10-CM

## 2021-10-06 PROBLEM — I50.9 CHF EXACERBATION (HCC): Status: ACTIVE | Noted: 2021-10-06

## 2021-10-06 LAB
ALBUMIN SERPL-MCNC: 3.6 G/DL (ref 3.5–4.7)
ALBUMIN/GLOB SERPL: 1 {RATIO}
ALP SERPL-CCNC: 57 U/L (ref 38–126)
ALT SERPL-CCNC: 17 U/L (ref 3–72)
ANION GAP SERPL CALC-SCNC: 12 MMOL/L
ARTERIAL PATENCY WRIST A: ABNORMAL
AST SERPL W P-5'-P-CCNC: 18 U/L (ref 17–74)
BASE DEFICIT BLDA-SCNC: 0.6 MMOL/L (ref 0–2.5)
BASOPHILS # BLD: 0.1 K/UL (ref 0–0.1)
BASOPHILS NFR BLD: 1 % (ref 0–2)
BDY SITE: ABNORMAL
BILIRUB SERPL-MCNC: 0.5 MG/DL (ref 0.2–1)
BNP SERPL-MCNC: 272 PG/ML (ref 0–100)
BUN SERPL-MCNC: 62 MG/DL (ref 9–21)
BUN/CREAT SERPL: 18
CA-I BLD-MCNC: 8.4 MG/DL (ref 8.5–10.5)
CHLORIDE SERPL-SCNC: 106 MMOL/L (ref 94–111)
CO2 SERPL-SCNC: 22 MMOL/L (ref 21–33)
COHGB MFR BLD: 0.3 % (ref 0–3)
CREAT SERPL-MCNC: 3.5 MG/DL (ref 0.8–1.5)
DIFFERENTIAL METHOD BLD: ABNORMAL
EOSINOPHIL # BLD: 0.1 K/UL (ref 0–0.4)
EOSINOPHIL NFR BLD: 2 % (ref 0–5)
ERYTHROCYTE [DISTWIDTH] IN BLOOD BY AUTOMATED COUNT: 17.2 % (ref 11.6–14.5)
FIO2 ON VENT: 32 %
GAS FLOW.O2 O2 DELIVERY SYS: 3 L/MIN
GLOBULIN SER CALC-MCNC: 3.7 G/DL
GLUCOSE SERPL-MCNC: 170 MG/DL (ref 70–110)
HCO3 BLDA-SCNC: 24 MMOL/L (ref 23–27)
HCT VFR BLD AUTO: 25.1 % (ref 36–48)
HGB BLD OXIMETRY-MCNC: 10.3 G/DL (ref 12–16)
HGB BLD-MCNC: 7.9 G/DL (ref 13–16)
IMM GRANULOCYTES # BLD AUTO: 0 K/UL (ref 0–0.04)
IMM GRANULOCYTES NFR BLD AUTO: 0 % (ref 0–0.5)
INR PPP: 1.2 (ref 0.8–1.2)
LACTATE SERPL-SCNC: 1.1 MMOL/L (ref 0.5–2)
LIPASE SERPL-CCNC: 61 U/L (ref 10–57)
LYMPHOCYTES # BLD: 0.9 K/UL (ref 0.9–3.6)
LYMPHOCYTES NFR BLD: 15 % (ref 21–52)
MAGNESIUM SERPL-MCNC: 1.6 MG/DL (ref 1.7–2.8)
MCH RBC QN AUTO: 26.4 PG (ref 24–34)
MCHC RBC AUTO-ENTMCNC: 31.5 G/DL (ref 31–37)
MCV RBC AUTO: 83.9 FL (ref 78–100)
METHGB MFR BLD: 0.4 % (ref 0–3)
MONOCYTES # BLD: 0.6 K/UL (ref 0.05–1.2)
MONOCYTES NFR BLD: 10 % (ref 3–10)
NEUTS SEG # BLD: 4.1 K/UL (ref 1.8–8)
NEUTS SEG NFR BLD: 72 % (ref 40–73)
NRBC # BLD: 0 K/UL (ref 0–0.01)
NRBC BLD-RTO: 0 PER 100 WBC
OXYHGB MFR BLD: 94 % (ref 90–100)
PCO2 BLDA: 37 MMHG (ref 35–45)
PH BLDA: 7.43 [PH] (ref 7.37–7.43)
PLATELET # BLD AUTO: 257 K/UL (ref 135–420)
PMV BLD AUTO: 9.2 FL (ref 9.2–11.8)
PO2 BLDA: 76 MMHG (ref 84–98)
POTASSIUM SERPL-SCNC: 3.7 MMOL/L (ref 3.2–5.1)
PROT SERPL-MCNC: 7.3 G/DL (ref 6.1–8.4)
PROTHROMBIN TIME: 14.2 SEC (ref 11.5–15.2)
RBC # BLD AUTO: 2.99 M/UL (ref 4.35–5.65)
RBC MORPH BLD: ABNORMAL
SAO2 % BLD: 95 % (ref 94–100)
SAO2% DEVICE SAO2% SENSOR NAME: ABNORMAL
SODIUM SERPL-SCNC: 140 MMOL/L (ref 135–145)
SPECIMEN SITE: ABNORMAL
TROPONIN I SERPL-MCNC: 0.03 NG/ML (ref 0.02–0.05)
WBC # BLD AUTO: 5.8 K/UL (ref 4.6–13.2)

## 2021-10-06 PROCEDURE — 82803 BLOOD GASES ANY COMBINATION: CPT

## 2021-10-06 PROCEDURE — 81003 URINALYSIS AUTO W/O SCOPE: CPT

## 2021-10-06 PROCEDURE — 65270000029 HC RM PRIVATE

## 2021-10-06 PROCEDURE — 83690 ASSAY OF LIPASE: CPT

## 2021-10-06 PROCEDURE — 83735 ASSAY OF MAGNESIUM: CPT

## 2021-10-06 PROCEDURE — 93005 ELECTROCARDIOGRAM TRACING: CPT

## 2021-10-06 PROCEDURE — 84484 ASSAY OF TROPONIN QUANT: CPT

## 2021-10-06 PROCEDURE — 81015 MICROSCOPIC EXAM OF URINE: CPT

## 2021-10-06 PROCEDURE — 83605 ASSAY OF LACTIC ACID: CPT

## 2021-10-06 PROCEDURE — 80053 COMPREHEN METABOLIC PANEL: CPT

## 2021-10-06 PROCEDURE — 71045 X-RAY EXAM CHEST 1 VIEW: CPT

## 2021-10-06 PROCEDURE — 36600 WITHDRAWAL OF ARTERIAL BLOOD: CPT

## 2021-10-06 PROCEDURE — 82272 OCCULT BLD FECES 1-3 TESTS: CPT

## 2021-10-06 PROCEDURE — 99285 EMERGENCY DEPT VISIT HI MDM: CPT

## 2021-10-06 PROCEDURE — 36415 COLL VENOUS BLD VENIPUNCTURE: CPT

## 2021-10-06 PROCEDURE — 80061 LIPID PANEL: CPT

## 2021-10-06 PROCEDURE — 74011250636 HC RX REV CODE- 250/636: Performed by: FAMILY MEDICINE

## 2021-10-06 PROCEDURE — 83880 ASSAY OF NATRIURETIC PEPTIDE: CPT

## 2021-10-06 PROCEDURE — 85025 COMPLETE CBC W/AUTO DIFF WBC: CPT

## 2021-10-06 PROCEDURE — 83036 HEMOGLOBIN GLYCOSYLATED A1C: CPT

## 2021-10-06 PROCEDURE — 85610 PROTHROMBIN TIME: CPT

## 2021-10-06 RX ORDER — ONDANSETRON 4 MG/1
4 TABLET, ORALLY DISINTEGRATING ORAL
Status: DISCONTINUED | OUTPATIENT
Start: 2021-10-06 | End: 2021-10-11 | Stop reason: HOSPADM

## 2021-10-06 RX ORDER — ONDANSETRON 2 MG/ML
4 INJECTION INTRAMUSCULAR; INTRAVENOUS
Status: DISCONTINUED | OUTPATIENT
Start: 2021-10-06 | End: 2021-10-11 | Stop reason: HOSPADM

## 2021-10-06 RX ORDER — FUROSEMIDE 10 MG/ML
40 INJECTION INTRAMUSCULAR; INTRAVENOUS
Status: COMPLETED | OUTPATIENT
Start: 2021-10-06 | End: 2021-10-06

## 2021-10-06 RX ORDER — SODIUM CHLORIDE 0.9 % (FLUSH) 0.9 %
5-40 SYRINGE (ML) INJECTION AS NEEDED
Status: DISCONTINUED | OUTPATIENT
Start: 2021-10-06 | End: 2021-10-11 | Stop reason: HOSPADM

## 2021-10-06 RX ORDER — FUROSEMIDE 10 MG/ML
40 INJECTION INTRAMUSCULAR; INTRAVENOUS 2 TIMES DAILY
Status: DISCONTINUED | OUTPATIENT
Start: 2021-10-07 | End: 2021-10-07

## 2021-10-06 RX ORDER — AMOXICILLIN AND CLAVULANATE POTASSIUM 875; 125 MG/1; MG/1
TABLET, FILM COATED ORAL
COMMUNITY
Start: 2021-09-30 | End: 2021-10-11

## 2021-10-06 RX ORDER — LORATADINE 10 MG/1
10 TABLET ORAL DAILY
COMMUNITY
Start: 2021-09-09 | End: 2022-03-30 | Stop reason: SDUPTHER

## 2021-10-06 RX ORDER — POLYETHYLENE GLYCOL 3350 17 G/17G
17 POWDER, FOR SOLUTION ORAL DAILY PRN
Status: DISCONTINUED | OUTPATIENT
Start: 2021-10-06 | End: 2021-10-11 | Stop reason: HOSPADM

## 2021-10-06 RX ORDER — ACETAMINOPHEN 325 MG/1
650 TABLET ORAL
Status: DISCONTINUED | OUTPATIENT
Start: 2021-10-06 | End: 2021-10-11 | Stop reason: HOSPADM

## 2021-10-06 RX ORDER — ACETAMINOPHEN 650 MG/1
650 SUPPOSITORY RECTAL
Status: DISCONTINUED | OUTPATIENT
Start: 2021-10-06 | End: 2021-10-11 | Stop reason: HOSPADM

## 2021-10-06 RX ORDER — SODIUM CHLORIDE 0.9 % (FLUSH) 0.9 %
5-40 SYRINGE (ML) INJECTION EVERY 8 HOURS
Status: DISCONTINUED | OUTPATIENT
Start: 2021-10-06 | End: 2021-10-11 | Stop reason: HOSPADM

## 2021-10-06 RX ORDER — MAGNESIUM SULFATE 1 G/100ML
1 INJECTION INTRAVENOUS ONCE
Status: COMPLETED | OUTPATIENT
Start: 2021-10-06 | End: 2021-10-07

## 2021-10-06 RX ADMIN — FUROSEMIDE 40 MG: 10 INJECTION, SOLUTION INTRAMUSCULAR; INTRAVENOUS at 23:44

## 2021-10-06 RX ADMIN — MAGNESIUM SULFATE HEPTAHYDRATE 1 G: 1 INJECTION, SOLUTION INTRAVENOUS at 23:29

## 2021-10-06 NOTE — Clinical Note
Status[de-identified] INPATIENT [101]   Type of Bed: Medical [8]   Cardiac Monitoring Required?: Yes   Inpatient Hospitalization Certified Necessary for the Following Reasons: 3.  Patient receiving treatment that can only be provided in an inpatient setting (further clarification in H&P documentation)   Admitting Diagnosis: CHF exacerbation [facility]) [[id number]]   Admitting Physician: Mendel Luna [[id number]]   Attending Physician: Mendel Luna [[id number]]   Estimated Length of Stay: 2 Midnights   Discharge Plan[de-identified] Home with Office Follow-up

## 2021-10-07 ENCOUNTER — APPOINTMENT (OUTPATIENT)
Dept: NON INVASIVE DIAGNOSTICS | Age: 76
DRG: 291 | End: 2021-10-07
Attending: NURSE PRACTITIONER
Payer: MEDICARE

## 2021-10-07 PROBLEM — I50.43 ACUTE ON CHRONIC COMBINED SYSTOLIC AND DIASTOLIC ACC/AHA STAGE C CONGESTIVE HEART FAILURE (HCC): Status: ACTIVE | Noted: 2021-10-07

## 2021-10-07 LAB
APPEARANCE UR: CLEAR
BACTERIA URNS QL MICRO: NEGATIVE /HPF
BILIRUB UR QL: NEGATIVE
CHOLEST SERPL-MCNC: 97 MG/DL
COLLECT DATE STL: NORMAL
COLOR UR: YELLOW
ECHO AO ASC DIAM: 3.9 CM
ECHO AO ROOT DIAM: 3.7 CM
ECHO AR MAX VEL PISA: 430 CM/S
ECHO AV AREA PEAK VELOCITY: 3.22 CM2
ECHO AV AREA PLAN/BSA: 1.49
ECHO AV AREA VTI: 3.15 CM2
ECHO AV AREA/BSA PEAK VELOCITY: 1.5 CM2/M2
ECHO AV AREA/BSA VTI: 1.5 CM2/M2
ECHO AV CUSP MM: 2.2 CM
ECHO AV MEAN GRADIENT: 7 MMHG
ECHO AV MEAN VELOCITY: 123 CM/S
ECHO AV PEAK GRADIENT: 12 MMHG
ECHO AV PEAK VELOCITY: 170 CM/S
ECHO AV REGURGITANT PHT: 446 MS
ECHO AV VTI: 38.7 CM
ECHO EST RA PRESSURE: 8 MMHG
ECHO LA AREA 2C: 28.5 CM2
ECHO LA AREA 4C: 28.7 CM2
ECHO LA MAJOR AXIS: 4.4 CM
ECHO LA MAJOR AXIS: 6.88 CM
ECHO LA TO AORTIC ROOT RATIO: 1.19
ECHO LV E' LATERAL VELOCITY: 7.72 CM/S
ECHO LV E' SEPTAL VELOCITY: 4.13 CM/S
ECHO LV EDV A2C: 166 CM3
ECHO LV EJECTION FRACTION BIPLANE: 60.5 % (ref 55–100)
ECHO LV ESV A2C: 50.7 CM3
ECHO LV INTERNAL DIMENSION DIASTOLIC: 5.5 CM (ref 4.2–5.9)
ECHO LV INTERNAL DIMENSION SYSTOLIC: 3.7 CM
ECHO LV IVSD: 1.2 CM (ref 0.6–1)
ECHO LV MASS 2D: 288.2 G (ref 88–224)
ECHO LV MASS INDEX 2D: 135.9 G/M2 (ref 49–115)
ECHO LV POSTERIOR WALL DIASTOLIC: 1.3 CM (ref 0.6–1)
ECHO LVOT DIAM: 2.2 CM
ECHO LVOT PEAK GRADIENT: 8 MMHG
ECHO LVOT PEAK VELOCITY: 144 CM/S
ECHO LVOT SV: 122 CM3
ECHO LVOT VTI: 30.7 CM
ECHO LVOT VTI: 32.1 CM
ECHO LVOT VTI: 33.4 CM
ECHO MV A VELOCITY: 89.7 CM/S
ECHO MV AREA PHT: 3.93 CM2
ECHO MV E DECELERATION TIME (DT): 190 MS
ECHO MV E VELOCITY: 124 CM/S
ECHO MV E/A RATIO: 1.38
ECHO MV E/E' LATERAL: 16.06
ECHO MV E/E' RATIO (AVERAGED): 23.04
ECHO MV E/E' SEPTAL: 30.02
ECHO MV PRESSURE HALF TIME (PHT): 56 MS
ECHO RA AREA 4C: 24.9 CM2
ECHO RA MAJOR AXIS: 6.4 CM
ECHO RA MINOR AXIS: 4.7 CM
ECHO RIGHT VENTRICULAR SYSTOLIC PRESSURE (RVSP): 77 MMHG
ECHO RV INTERNAL DIMENSION: 3.5 CM
ECHO RV TAPSE: 1.89 CM (ref 1.5–2)
ECHO TV MEAN GRADIENT: 69 MMHG
ECHO TV REGURGITANT MAX VELOCITY: 416 CM/S
EPITH CASTS URNS QL MICRO: ABNORMAL /LPF (ref 0–20)
EST. AVERAGE GLUCOSE BLD GHB EST-MCNC: 146 MG/DL
GLUCOSE UR STRIP.AUTO-MCNC: NEGATIVE MG/DL
HBA1C MFR BLD: 6.7 % (ref 4.2–5.6)
HDLC SERPL-MCNC: 31 MG/DL (ref 40–60)
HDLC SERPL: 3.1 {RATIO} (ref 0–5)
HEMOCCULT SP1 STL QL: NEGATIVE
HGB UR QL STRIP: NEGATIVE
KETONES UR QL STRIP.AUTO: NEGATIVE MG/DL
LA VOL DISK BP: 94.9 CM3 (ref 18–58)
LDLC SERPL CALC-MCNC: 53.8 MG/DL (ref 0–100)
LEUKOCYTE ESTERASE UR QL STRIP.AUTO: ABNORMAL
LIPID PROFILE,FLP: ABNORMAL
LVOT MG: 4 MMHG
MAGNESIUM SERPL-MCNC: 2 MG/DL (ref 1.7–2.8)
MV DEC SLOPE: 6530 MM/S2
MV DEC SLOPE: 6530 MM/S2
NITRITE UR QL STRIP.AUTO: NEGATIVE
PH UR STRIP: 7 [PH] (ref 5–8)
PROCALCITONIN SERPL-MCNC: 0.37 NG/ML (ref 0.5–2)
PROT UR STRIP-MCNC: 100 MG/DL
RBC #/AREA URNS HPF: ABNORMAL /HPF (ref 0–2)
SP GR UR REFRACTOMETRY: 1.01 (ref 1–1.03)
TRIGL SERPL-MCNC: 61 MG/DL (ref ?–150)
TROPONIN I SERPL-MCNC: 0.02 NG/ML (ref 0.02–0.05)
TROPONIN I SERPL-MCNC: 0.03 NG/ML (ref 0.02–0.05)
UROBILINOGEN UR QL STRIP.AUTO: 0.2 EU/DL (ref 0.2–1)
VLDLC SERPL CALC-MCNC: 12.2 MG/DL
WBC URNS QL MICRO: ABNORMAL /HPF (ref 0–4)

## 2021-10-07 PROCEDURE — 36415 COLL VENOUS BLD VENIPUNCTURE: CPT

## 2021-10-07 PROCEDURE — 74011250636 HC RX REV CODE- 250/636: Performed by: NURSE PRACTITIONER

## 2021-10-07 PROCEDURE — 84145 PROCALCITONIN (PCT): CPT

## 2021-10-07 PROCEDURE — 74011250637 HC RX REV CODE- 250/637: Performed by: NURSE PRACTITIONER

## 2021-10-07 PROCEDURE — 74011250637 HC RX REV CODE- 250/637: Performed by: INTERNAL MEDICINE

## 2021-10-07 PROCEDURE — 93306 TTE W/DOPPLER COMPLETE: CPT

## 2021-10-07 PROCEDURE — 74011250636 HC RX REV CODE- 250/636: Performed by: INTERNAL MEDICINE

## 2021-10-07 PROCEDURE — 65270000029 HC RM PRIVATE

## 2021-10-07 PROCEDURE — 93005 ELECTROCARDIOGRAM TRACING: CPT

## 2021-10-07 PROCEDURE — 84484 ASSAY OF TROPONIN QUANT: CPT

## 2021-10-07 PROCEDURE — 83735 ASSAY OF MAGNESIUM: CPT

## 2021-10-07 PROCEDURE — 74011250636 HC RX REV CODE- 250/636: Performed by: FAMILY MEDICINE

## 2021-10-07 PROCEDURE — 74011636637 HC RX REV CODE- 636/637: Performed by: NURSE PRACTITIONER

## 2021-10-07 PROCEDURE — 77010033678 HC OXYGEN DAILY

## 2021-10-07 PROCEDURE — 94761 N-INVAS EAR/PLS OXIMETRY MLT: CPT

## 2021-10-07 PROCEDURE — 74011000258 HC RX REV CODE- 258: Performed by: FAMILY MEDICINE

## 2021-10-07 PROCEDURE — 87040 BLOOD CULTURE FOR BACTERIA: CPT

## 2021-10-07 RX ORDER — FLUTICASONE PROPIONATE 50 MCG
1 SPRAY, SUSPENSION (ML) NASAL DAILY PRN
Status: DISCONTINUED | OUTPATIENT
Start: 2021-10-07 | End: 2021-10-11 | Stop reason: HOSPADM

## 2021-10-07 RX ORDER — AMLODIPINE BESYLATE 5 MG/1
5 TABLET ORAL 2 TIMES DAILY
Status: DISCONTINUED | OUTPATIENT
Start: 2021-10-07 | End: 2021-10-11 | Stop reason: HOSPADM

## 2021-10-07 RX ORDER — MINOXIDIL 2.5 MG/1
2.5 TABLET ORAL 2 TIMES DAILY
Status: DISCONTINUED | OUTPATIENT
Start: 2021-10-07 | End: 2021-10-07

## 2021-10-07 RX ORDER — ALBUTEROL SULFATE 90 UG/1
2 AEROSOL, METERED RESPIRATORY (INHALATION)
Status: DISCONTINUED | OUTPATIENT
Start: 2021-10-07 | End: 2021-10-11 | Stop reason: HOSPADM

## 2021-10-07 RX ORDER — LORATADINE 10 MG/1
10 TABLET ORAL DAILY
Status: DISCONTINUED | OUTPATIENT
Start: 2021-10-07 | End: 2021-10-07

## 2021-10-07 RX ORDER — PANTOPRAZOLE SODIUM 40 MG/1
40 TABLET, DELAYED RELEASE ORAL
Status: DISCONTINUED | OUTPATIENT
Start: 2021-10-07 | End: 2021-10-11 | Stop reason: HOSPADM

## 2021-10-07 RX ORDER — ATORVASTATIN CALCIUM 10 MG/1
20 TABLET, FILM COATED ORAL
Status: DISCONTINUED | OUTPATIENT
Start: 2021-10-07 | End: 2021-10-11 | Stop reason: HOSPADM

## 2021-10-07 RX ORDER — METOPROLOL SUCCINATE 25 MG/1
25 TABLET, EXTENDED RELEASE ORAL DAILY
Status: DISCONTINUED | OUTPATIENT
Start: 2021-10-07 | End: 2021-10-11 | Stop reason: HOSPADM

## 2021-10-07 RX ORDER — CINACALCET 30 MG/1
30 TABLET, FILM COATED ORAL DAILY
Status: DISCONTINUED | OUTPATIENT
Start: 2021-10-07 | End: 2021-10-11 | Stop reason: HOSPADM

## 2021-10-07 RX ORDER — FUROSEMIDE 10 MG/ML
40 INJECTION INTRAMUSCULAR; INTRAVENOUS DAILY
Status: DISCONTINUED | OUTPATIENT
Start: 2021-10-08 | End: 2021-10-08

## 2021-10-07 RX ORDER — SODIUM BICARBONATE 650 MG/1
650 TABLET ORAL 3 TIMES DAILY
Status: DISCONTINUED | OUTPATIENT
Start: 2021-10-07 | End: 2021-10-08

## 2021-10-07 RX ORDER — LANOLIN ALCOHOL/MO/W.PET/CERES
1 CREAM (GRAM) TOPICAL
Status: DISCONTINUED | OUTPATIENT
Start: 2021-10-07 | End: 2021-10-11 | Stop reason: HOSPADM

## 2021-10-07 RX ORDER — ALLOPURINOL 100 MG/1
200 TABLET ORAL DAILY
Status: DISCONTINUED | OUTPATIENT
Start: 2021-10-07 | End: 2021-10-11 | Stop reason: HOSPADM

## 2021-10-07 RX ORDER — FERROUS SULFATE 325(65) MG
325 TABLET, DELAYED RELEASE (ENTERIC COATED) ORAL
Status: DISCONTINUED | OUTPATIENT
Start: 2021-10-07 | End: 2021-10-07

## 2021-10-07 RX ORDER — CETIRIZINE HCL 10 MG
10 TABLET ORAL DAILY
Status: DISCONTINUED | OUTPATIENT
Start: 2021-10-07 | End: 2021-10-11 | Stop reason: HOSPADM

## 2021-10-07 RX ORDER — INSULIN GLARGINE 100 [IU]/ML
20 INJECTION, SOLUTION SUBCUTANEOUS
Status: DISCONTINUED | OUTPATIENT
Start: 2021-10-07 | End: 2021-10-09

## 2021-10-07 RX ORDER — HYDRALAZINE HYDROCHLORIDE 25 MG/1
100 TABLET, FILM COATED ORAL 3 TIMES DAILY
Status: DISCONTINUED | OUTPATIENT
Start: 2021-10-07 | End: 2021-10-11 | Stop reason: HOSPADM

## 2021-10-07 RX ADMIN — APIXABAN 2.5 MG: 2.5 TABLET, FILM COATED ORAL at 09:31

## 2021-10-07 RX ADMIN — Medication 10 ML: at 05:28

## 2021-10-07 RX ADMIN — HYDRALAZINE HYDROCHLORIDE 100 MG: 25 TABLET, FILM COATED ORAL at 15:43

## 2021-10-07 RX ADMIN — ALLOPURINOL 200 MG: 100 TABLET ORAL at 09:31

## 2021-10-07 RX ADMIN — FERROUS SULFATE TAB 325 MG (65 MG ELEMENTAL FE) 325 MG: 325 (65 FE) TAB at 16:29

## 2021-10-07 RX ADMIN — MINOXIDIL 2.5 MG: 2.5 TABLET ORAL at 09:31

## 2021-10-07 RX ADMIN — APIXABAN 2.5 MG: 2.5 TABLET, FILM COATED ORAL at 17:27

## 2021-10-07 RX ADMIN — Medication 10 ML: at 13:48

## 2021-10-07 RX ADMIN — HYDRALAZINE HYDROCHLORIDE 100 MG: 25 TABLET, FILM COATED ORAL at 22:44

## 2021-10-07 RX ADMIN — FERROUS SULFATE TAB 325 MG (65 MG ELEMENTAL FE) 325 MG: 325 (65 FE) TAB at 11:35

## 2021-10-07 RX ADMIN — Medication 10 ML: at 22:46

## 2021-10-07 RX ADMIN — INSULIN GLARGINE 20 UNITS: 100 INJECTION, SOLUTION SUBCUTANEOUS at 22:44

## 2021-10-07 RX ADMIN — CINACALCET HYDROCHLORIDE 30 MG: 30 TABLET, FILM COATED ORAL at 09:30

## 2021-10-07 RX ADMIN — EPOETIN ALFA-EPBX 10000 UNITS: 10000 INJECTION, SOLUTION INTRAVENOUS; SUBCUTANEOUS at 15:44

## 2021-10-07 RX ADMIN — AMLODIPINE BESYLATE 5 MG: 5 TABLET ORAL at 09:31

## 2021-10-07 RX ADMIN — HYDRALAZINE HYDROCHLORIDE 100 MG: 25 TABLET, FILM COATED ORAL at 09:30

## 2021-10-07 RX ADMIN — FUROSEMIDE 40 MG: 10 INJECTION, SOLUTION INTRAMUSCULAR; INTRAVENOUS at 09:31

## 2021-10-07 RX ADMIN — SODIUM BICARBONATE 650 MG: 650 TABLET ORAL at 09:31

## 2021-10-07 RX ADMIN — CEFTRIAXONE 1 G: 1 INJECTION, POWDER, FOR SOLUTION INTRAMUSCULAR; INTRAVENOUS at 00:51

## 2021-10-07 RX ADMIN — SODIUM BICARBONATE 650 MG: 650 TABLET ORAL at 22:44

## 2021-10-07 RX ADMIN — METOPROLOL SUCCINATE 25 MG: 25 TABLET, EXTENDED RELEASE ORAL at 09:31

## 2021-10-07 RX ADMIN — PANTOPRAZOLE SODIUM 40 MG: 40 TABLET, DELAYED RELEASE ORAL at 09:31

## 2021-10-07 RX ADMIN — ATORVASTATIN CALCIUM 20 MG: 10 TABLET, FILM COATED ORAL at 22:44

## 2021-10-07 RX ADMIN — SODIUM BICARBONATE 650 MG: 650 TABLET ORAL at 15:43

## 2021-10-07 RX ADMIN — AMLODIPINE BESYLATE 5 MG: 5 TABLET ORAL at 17:27

## 2021-10-07 RX ADMIN — CETIRIZINE HYDROCHLORIDE 10 MG: 10 TABLET, FILM COATED ORAL at 10:43

## 2021-10-07 NOTE — CONSULTS
Renal Consultation Note    NAME:  Elvi Pinon   :   1945   MRN:   [id number]     ATTENDING: Camelia Elder MD  PCP:  Colby Maurer NP    Date/Time:  10/7/2021 2:20 PM      Subjective:   REQUESTING PHYSICIAN:  REASON FOR CONSULT:    ARON  Patient is a 59-year-old Afro-American gentleman with history of chronic kidney disease stage IV who follows with Dr. Tamar Angulo outpatient with baseline creatinine around 2.5-2.9, with nephrotic range proteinuria, unilateral kidney secondary to nephrectomy from renal cancer, diastolic heart failure and severe pulmonary hypertension, DVT on chronic anticoagulation, history of bladder cancer who presented for shortness of breath. According to records patient started feeling short of breath last night and reported abdominal bloating and leg swelling for which she came in. His creatinine was elevated at 3.5 on admission which prompted a renal consultation. Patient seen in the room. He is lying comfortably without any complaints currently. He denies any shortness of breath at this time which he states has now resolved. He is on Lasix 40 IV twice daily.   Has a Snider catheter with about 700 mils of urine in the Snider bag  proBNP was normal and chest x-ray did not show any pulmonary edema  Echo shows left EF with diastolic dysfunction and severe pulmonary hypertension  Blood pressure has been labile since admission but mostly well controlled  He was on Lasix 80 mg daily at home  Currently on hydralazine 100, amlodipine 10, minoxidil and metoprolol at home  Not noted to be on any ACE inhibitor/ARB  He denies any NSAID intake at home but not a very good historian      Past Medical History:   Diagnosis Date    Acid reflux     Arthritis     [facility]adder [facility]     Congestive heart failure (HCC)     Deep vein thrombosis (DVT) of proximal vein of both lower extremities ([facility] 2020    Diabetes mellitus (HCC)     GERD (gastroesophageal reflux disease)     Gout     High cholesterol     Hypertension     Kidney carcinoma, left (HCC)     Kidney disease     Pituitary mass (HCC)     Reflux esophagitis     Unspecified deficiency anemia       Past Surgical History:   Procedure Laterality Date    HX CYSTECTOMY  09    Dr. Valentino Canard HX NEPHRECTOMY Left 2009    Nephroureterectomy by Dr. Leeann Blanchard    [id number] [address] OTHER SURGICAL      surgery on pituitary gland     Social History     Tobacco Use    Smoking status: Former Smoker     Packs/day: 0.50     Years: 40.00     Pack years: 20.00     Types: Cigarettes     Quit date: 1999     Years since quittin.7    Smokeless tobacco: Never Used   Substance Use Topics    Alcohol use: No      Family History   Problem Relation Age of Onset    Heart Disease Father     Heart Disease Mother        No Known Allergies   Prior to Admission medications    Medication Sig Start Date End Date Taking? Authorizing Provider   albuterol (PROVENTIL HFA, VENTOLIN HFA, PROAIR HFA) 90 mcg/actuation inhaler Take 2 Puffs by inhalation every four (4) hours as needed for Wheezing, Shortness of Breath or Respiratory Distress. 21  Yes Ghazala Smith NP   cinacalcet (Sensipar) 30 mg tablet Take 1 Tablet by mouth daily for 30 days. 9/22/21 10/22/21 Yes Nelson Ramos MD   ferrous sulfate (IRON) 325 mg (65 mg iron) EC tablet Take 1 Tablet by mouth three (3) times daily (with meals). 21  Yes Nelson Ramos MD   sodium bicarbonate 650 mg tablet Take 1 Tablet by mouth three (3) times daily for 30 days. 9/22/21 10/22/21 Yes Nelson Ramos MD   simvastatin (ZOCOR) 40 mg tablet TAKE 1 TABLET BY MOUTH EVERY NIGHT 21  Yes Ghazala Smith NP   metoprolol succinate (TOPROL-XL) 25 mg XL tablet Take 1 Tablet by mouth daily.  21  Yes Lesly, MD Melba   minoxidiL (LONITEN) 2.5 mg tablet TAKE 1 TABLET BY MOUTH TWICE DAILY 8/10/21  Yes Nelson Ramos MD   amLODIPine (NORVASC) 5 mg tablet TAKE 2 TABLETS BY MOUTH ONCE A DAY 7/30/21  Yes Ghazala Smith NP   glipiZIDE (GLUCOTROL) 10 mg tablet Take 1 Tablet by mouth two (2) times a day. 7/30/21  Yes Ghazala Smith NP   furosemide (LASIX) 40 mg tablet TAKE 2 TABLETS BY MOUTH DAILY 7/26/21  Yes Andrea Portillo MD   allopurinoL (ZYLOPRIM) 100 mg tablet TAKE 2 TABLETS BY MOUTH DAILY 7/4/21  Yes Ghazala Smith NP   hydrALAZINE (APRESOLINE) 50 mg tablet TAKE 2 TABLETS BY MOUTH THREE TIMES DAILY 6/28/21  Yes Andrea Portillo MD   omeprazole (PRILOSEC) 40 mg capsule TAKE 1 CAPSULE BY MOUTH DAILY 6/3/21  Yes Ghazala Smith NP   polyethylene glycol (Miralax) 17 gram packet Take 1 Packet by mouth daily. 6/3/21  Yes Ghazala Smith NP   apixaban (Eliquis) 2.5 mg tablet Take 1 Tablet by mouth two (2) times a day. 6/3/21  Yes Ghazala Smith NP   glucose blood VI test strips (ASCENSIA AUTODISC VI, ONE TOUCH ULTRA TEST VI) strip Check blood glucose 3 times daily, give Accu chek Precious test strips 6/1/21  Yes Ghazala Smith NP   insulin glargine (Lantus Solostar U-100 Insulin) 100 unit/mL (3 mL) inpn INJECT 25 UNITS SUBCUTANEOUSLY ONCE nightly 5/6/21  Yes Ghazala Smith NP   ergocalciferol (ERGOCALCIFEROL) 1,250 mcg (50,000 unit) capsule TAKE ONE CAPSULE BY MOUTH EVERY 7 DAYS 4/26/21  Yes Andrea Portillo MD   BD Ultra-Fine Mini Pen Needle 31 gauge x 3/16\" ndle USE TO INJECT TID 1/26/21  Yes Ghazala Smith NP   fluticasone propionate (FLONASE) 50 mcg/actuation nasal spray 1 [place] by Both Nostrils route daily as needed. Yes Other, Phys, MD   amoxicillin-clavulanate (AUGMENTIN) 875-125 mg per tablet TAKE 1 TABLET BY MOUTH EVERY 12 HOURS FOR 14 DAYS 9/30/21   Other, MD Melba   loratadine (CLARITIN) 10 mg tablet Take 10 mg by mouth daily. 9/9/21   Other, MD Melba       REVIEW OF SYSTEMS:       Constitutional: Negative for chills and fever. HENT: Negative. Eyes: Negative.     Respiratory: as above  Cardiovascular: Negative. Gastrointestinal: Negative for abdominal pain and nausea. Skin: Negative. Neurological: Negative. Objective:   VITALS:    Visit Vitals  BP (!) 151/70   Pulse 75   Temp 97.3 °F (36.3 °C)   Resp 19   Ht 5' 8\" (1.727 m)   Wt 91.1 kg (200 lb 14.4 oz)   SpO2 99%   BMI 30.55 kg/m²     Temp (24hrs), Av.1 °F (36.7 °C), Min:97.3 °F (36.3 °C), Max:99.2 °F (37.3 °C)      PHYSICAL EXAM:     General: NAD, alert, cooperative. Pain comfortably in bed  Eyes: sclera anicteric  Oral Cavity: No thrush or ulcers  Neck: no JVD  Chest: Fair bilateral air entry. No Wheezing or Rhonchi. No rales. Heart: normal sounds  Abdomen: soft and non tender   :  sommers  Lower Extremities: Trace bilateral edema   skin: no rash  Neuro: intact, no focals  Psychiatric: non-depressed          LAB DATA REVIEWED:    Recent Results (from the past 24 hour(s))   CBC WITH AUTOMATED DIFF    Collection Time: 10/06/21  9:24 PM   Result Value Ref Range    WBC 5.8 4.6 - 13.2 K/uL    RBC 2.99 (L) 4.35 - 5.65 M/uL    HGB 7.9 (L) 13.0 - 16.0 g/dL    HCT 25.1 (L) 36.0 - 48.0 %    MCV 83.9 78.0 - 100.0 FL    MCH 26.4 24.0 - 34.0 PG    MCHC 31.5 31.0 - 37.0 g/dL    RDW 17.2 (H) 11.6 - 14.5 %    PLATELET 710 362 - 998 K/uL    MPV 9.2 9.2 - 11.8 FL    NRBC 0.0 0.0  WBC    ABSOLUTE NRBC 0.00 0.00 - 0.01 K/uL    NEUTROPHILS 72 40 - 73 %    LYMPHOCYTES 15 (L) 21 - 52 %    MONOCYTES 10 3 - 10 %    EOSINOPHILS 2 0 - 5 %    BASOPHILS 1 0 - 2 %    IMMATURE GRANULOCYTES 0 0 - 0.5 %    ABS. NEUTROPHILS 4.1 1.8 - 8.0 K/UL    ABS. LYMPHOCYTES 0.9 0.9 - 3.6 K/UL    ABS. MONOCYTES 0.6 0.05 - 1.2 K/UL    ABS. EOSINOPHILS 0.1 0.0 - 0.4 K/UL    ABS. BASOPHILS 0.1 0.0 - 0.1 K/UL    ABS. IMM.  GRANS. 0.0 0.00 - 0.04 K/UL    DF Manual      RBC COMMENTS Anisocytosis  1+        RBC COMMENTS Hypochromia  1+        RBC COMMENTS Microcytosis  1+       PROTHROMBIN TIME + INR    Collection Time: 10/06/21  9:24 PM   Result Value Ref Range    Prothrombin time 14.2 11.5 - 15.2 sec    INR 1.2 0.8 - 1.2     METABOLIC PANEL, COMPREHENSIVE    Collection Time: 10/06/21  9:24 PM   Result Value Ref Range    Sodium 140 135 - 145 mmol/L    Potassium 3.7 3.2 - 5.1 mmol/L    Chloride 106 94 - 111 mmol/L    CO2 22 21 - 33 mmol/L    Anion gap 12 mmol/L    Glucose 170 (H) 70 - 110 mg/dL    BUN 62 (H) 9 - 21 mg/dL    Creatinine 3.50 (H) 0.8 - 1.50 mg/dL    BUN/Creatinine ratio 18      GFR est AA 21 ml/min/1.73m2    GFR est non-AA 17 ml/min/1.73m2    Calcium 8.4 (L) 8.5 - 10.5 mg/dL    Bilirubin, total 0.5 0.2 - 1.0 mg/dL    AST (SGOT) 18 17 - 74 U/L    ALT (SGPT) 17 3 - 72 U/L    Alk.  phosphatase 57 38 - 126 U/L    Protein, total 7.3 6.1 - 8.4 g/dL    Albumin 3.6 3.5 - 4.7 g/dL    Globulin 3.7 g/dL    A-G Ratio 1.0     TROPONIN I    Collection Time: 10/06/21  9:24 PM   Result Value Ref Range    Troponin-I, Qt. 0.03 0.02 - 0.05 ng/mL   LIPASE    Collection Time: 10/06/21  9:24 PM   Result Value Ref Range    Lipase 61 (H) 10 - 57 U/L   LACTIC ACID    Collection Time: 10/06/21  9:24 PM   Result Value Ref Range    Lactic acid 1.1 0.5 - 2.0 mmol/L   MAGNESIUM    Collection Time: 10/06/21  9:24 PM   Result Value Ref Range    Magnesium 1.6 (L) 1.7 - 2.8 mg/dL   BNP    Collection Time: 10/06/21  9:24 PM   Result Value Ref Range     (H) 0 - 100 pg/mL   EKG, 12 LEAD, INITIAL    Collection Time: 10/06/21  9:28 PM   Result Value Ref Range    Ventricular Rate 79 BPM    Atrial Rate 79 BPM    P-R Interval 277 ms    QRS Duration 113 ms    Q-T Interval 427 ms    QTC Calculation (Bezet) 490 ms    Calculated P Axis -5 degrees    Calculated R Axis 22 degrees    Calculated T Axis -15 degrees    Diagnosis       Sinus rhythm  Prolonged WA interval  Borderline low voltage, extremity leads  Probable left ventricular hypertrophy  Borderline prolonged QT interval     BLOOD GAS, ARTERIAL    Collection Time: 10/06/21 10:10 PM   Result Value Ref Range    pH 7.43 7.37 - 7.43      PCO2 37 35.0 - 45.0 mmHg    PO2 76 (L) 84 - 98 mmHg    O2 SAT 95 94 - 100 %    BICARBONATE 24 23.0 - 27.0 mmol/L    BASE DEFICIT 0.6 0.0 - 2.5 mmol/L    O2 METHOD Nasal Cannula      O2 FLOW RATE 3 L/min    FIO2 32.0 %    Sample source Arterial      SITE Right Radial      JAMES'S TEST PASS      Carboxy-Hgb 0.3 0 - 3 %    Methemoglobin 0.4 0 - 3 %    tHb 10.3 (L) 12.0 - 16.0 g/dL    Oxyhemoglobin 94.0 90 - 100 %   TROPONIN I    Collection Time: 10/06/21 11:25 PM   Result Value Ref Range    Troponin-I, Qt. 0.03 0.02 - 0.05 ng/mL   HEMOGLOBIN A1C WITH EAG    Collection Time: 10/06/21 11:25 PM   Result Value Ref Range    Hemoglobin A1c 6.7 (H) 4.2 - 5.6 %    Est. average glucose 146 mg/dL   LIPID PANEL    Collection Time: 10/06/21 11:25 PM   Result Value Ref Range    LIPID PROFILE        Cholesterol, total 97 <200 mg/dL    Triglyceride 61 <150 mg/dL    HDL Cholesterol 31 (L) 40 - 60 mg/dL    LDL, calculated 53.8 0 - 100 mg/dL    VLDL, calculated 12.2 mg/dL    CHOL/HDL Ratio 3.1 0 - 5.0     URINALYSIS W/ RFLX MICROSCOPIC    Collection Time: 10/06/21 11:30 PM   Result Value Ref Range    Color Yellow      Appearance Clear      Specific gravity 1.009 1.005 - 1.030      pH (UA) 7.0 5.0 - 8.0      Protein 100 (A) Negative mg/dL    Glucose Negative Negative mg/dL    Ketone Negative Negative mg/dL    Bilirubin Negative Negative      Blood Negative Negative      Urobilinogen 0.2 0.2 - 1.0 EU/dL    Nitrites Negative Negative      Leukocyte Esterase Trace (A) Negative     URINE MICROSCOPIC    Collection Time: 10/06/21 11:30 PM   Result Value Ref Range    WBC 5-10 0 - 4 /hpf    RBC 0-5 0 - 2 /hpf    Epithelial cells Few 0 - 20 /lpf    Bacteria Negative (A) None /hpf   OCCULT BLOOD, STOOL    Collection Time: 10/06/21 11:55 PM   Result Value Ref Range    Occult Blood,day 1 Negative      Day 1 date: 100,721     TROPONIN I    Collection Time: 10/07/21  5:17 AM   Result Value Ref Range    Troponin-I, Qt. 0.03 0.02 - 0.05 ng/mL   MAGNESIUM    Collection Time: 10/07/21 5:38 AM   Result Value Ref Range    Magnesium 2.0 1.7 - 2.8 mg/dL   ECHO ADULT COMPLETE    Collection Time: 10/07/21  8:27 AM   Result Value Ref Range    Left Atrium Major Axis 6.88 cm    LA Area 2C 28.50 cm2    LA Area 4C 28.70 cm2    LA Volume DISK BP 94.90 18.0 - 58.0 cm3    Left Atrium Major Axis 4.40 cm    Left Atrium to Aortic Root Ratio 1.19     Right Atrial Area 4C 24.90 cm2    Right Atrium Major Axis 6.40 cm    Est. RA Pressure 8.00 mmHg    Right Atrium Minor Axis 4.70 cm    Aortic Regurgitant Pressure Half-time 446.00 ms    AR Max Ishmael 430.00 cm/s    Aortic Valve Systolic Peak Velocity 775.50 cm/s    AoV PG 12.00 mmHg    AV Cusp 2.20 cm    Aortic Valve Systolic Mean Gradient 3.25 mmHg    AoV VTI 38.70 cm    Aortic valve mean velocity 123.00 cm/s    Aortic Valve Area by Continuity of VTI 3.15 cm2    Aortic Valve Area by Continuity of Peak Velocity 3.22 cm2    RALF/BSA 1.49     Ao Root D 3.70 cm    AO ASC D 3.90 cm    IVSd 1.20 (A) 0.60 - 1.00 cm    LVIDd 5.50 4. 20 - 5.90 cm    LVIDs 3.70 cm    LVOT d 2.20 cm    Left Ventricular Outflow Tract Mean Gradient 4.00 mmHg    LVOT VTI 32.10 cm    LVOT VTI 30.70 cm    LVOT VTI 33.40 cm    LVOT Peak Velocity 144.00 cm/s    LVOT Peak Gradient 8.00 mmHg    LVPWd 1.30 (A) 0.60 - 1.00 cm    LV E' Lateral Velocity 7.72 cm/s    LV E' Septal Velocity 4.13 cm/s    LV ED Vol A2C 166.00 cm3    LV ES Vol A2C 50.70 cm3    E/E' lateral 16.06     E/E' septal 30.02     LVOT .0 cm3    Mitral Valve Deceleration [place] 6,530.00 mm/s2    Mitral Valve Deceleration [place] 6,530.00 mm/s2    Mitral Valve E Wave Deceleration Time 190.00 ms    Mitral Valve Pressure Half-time 56.00 ms    MV A Ishmael 89.70 cm/s    MV E Ishmael 124.00 cm/s    MVA (PHT) 3.93 cm2    MV E/A 1.38     RVIDd 3.50 cm    RVSP 77.00 mmHg    Tapse 1.89 1.50 - 2.00 cm    TR Max Velocity 416.00 cm/s    TV MG 69.00 mmHg    BP EF 60.5 55.0 - 100.0 %    LV Mass .2 88.0 - 224.0 g    LV Mass AL Index 135.9 49.0 - 115.0 g/m2 E/E' ratio (averaged) 23.04     RALF/BSA Pk Ishmael 1.5 cm2/m2    RALF/BSA VTI 1.5 cm2/m2   EKG, 12 LEAD, SUBSEQUENT    Collection Time: 10/07/21  9:11 AM   Result Value Ref Range    Ventricular Rate 71 BPM    Atrial Rate 73 BPM    P-R Interval 309 ms    QRS Duration 119 ms    Q-T Interval 442 ms    QTC Calculation (Bezet) 481 ms    Calculated P Axis 32 degrees    Calculated R Axis -4 degrees    Calculated T Axis -25 degrees    Diagnosis       Sinus rhythm  Sinus pause  Prolonged NM interval  Incomplete left bundle branch block  Borderline low voltage, extremity leads  Probable left ventricular hypertrophy  Borderline prolonged QT interval     PROCALCITONIN    Collection Time: 10/07/21 10:00 AM   Result Value Ref Range    Procalcitonin 0.37 (L) 0.5 - 2.0 ng/mL   TROPONIN I    Collection Time: 10/07/21 10:00 AM   Result Value Ref Range    Troponin-I, Qt. 0.03 0.02 - 0.05 ng/mL       Recommendations/Plan:   1. Acute kidney injury on chronic kidney disease stage IV in a single kidney:   -The abdomen is elevated at 3.5 on admission  -ARON likely prerenal secondary to cardiorenal syndrome/diuretics at home  -presented with shortness of breath/leg edema. Both have now resolved  -On Lasix 40 IV twice daily which I will decrease to 40 mg daily  -he has unilateral functioning kidney due to left total nephrectomy.  -His Cr ranges between 2.5-2.9   -His urinalysis showed. Pyuria 4-5 proteinuria, last checked in April 2021. I will recheck it  -A kidney ultrasound surgically absent left kidney and right kidney with no hydronephrosis. -Follow-up on renal function in a.m.     2. History of kidney cancer:  -Status post left total nephrectomy 8 years ago. -He had total cystectomy also due to cancer in his bladder and now has urostomy bag      3.  Hypertension:  -Blood pressure is  valve is mostly well controlled  -He is on hydralazine 100 mg tid ,metoprolol ER 25 mg daily, minoxidil 2.5 twice daily, amlodipine 10 mg daily  -Discontinue minoxidil because of his history of CHF and tendency for fluid overload  -Increase metoprolol if needed      4. Anemia:  -Hb is 7.9  -He had functional iron deficiency in the past.  I will repeat iron studies. Will start him on weekly Procrit  -anemia of chronic kidney disease     5. Renal osteodystrophy.  -His Ca 8.4. Corrected calcium 8.6  - vitamin D25 hydroxy low in the past.  I will repeat  -iPTH 169 the past.  He is on Sensipar 30 which I will continue for now unless his calcium is low or PTH is normal     6. Gouty arthritis:  -His Uric acid is ok  -will keep on Allopurinol 200 mg daily.     7. Metabolic acidosis:  -Bicarb is 22. He is on sodium bicarb 650 mg tid  -Continue same dose     8. Proteinuria, nephrotic range:   -has 4-5 g/g per day   -likely from diabetic nephropathy   -Hep B/C, C3/C4, SFL ratio and ANCAs been negative in the past  -unable to add ARB due to # 1     9. SOB:  -from fluid overload seems on admission severe pulmonary hypertension. Currently he is euvolemic  -Echo today shows preserved EF, diastolic dysfunction and severe pulmonary hypertension  -volume overload has improved clinically.   Will decrease Lasix to 40 IV daily    Thank you for the consultation   ___________________  Signed: Daniela Johnston MD

## 2021-10-07 NOTE — ED NOTES
Nursing supervisor, Godfrey Fitzgerald RN in to attach a Snider bag to patient urostomy. Patient concerned for ostomy leaking since receiving Lasix and he reports he connects his ostomy to a bag every night at home.

## 2021-10-07 NOTE — PROGRESS NOTES
0100   Received care of pt via stretcher from ER. Pt transferred from stretcher to bed without difficulty. Routine assessment and vs completed. O2 at 3L in place and sats ranging 98-99%. Call bell in reach and bed in lowest position. 0300   Sleeping with no distress noted. 0545   Pt has slept at long intervals. Pt has experienced no shortness of breath since admission.

## 2021-10-07 NOTE — ROUTINE PROCESS
TRANSFER - OUT REPORT:    Verbal report given to Anival Fuller RN(name) on [facility]  being transferred to [address] [place] [address](unit) for routine progression of care       Report consisted of patients Situation, Background, Assessment and   Recommendations(SBAR). Information from the following report(s) ED Summary was reviewed with the receiving nurse. Lines:   Peripheral IV 10/06/21 Left Antecubital (Active)        Opportunity for questions and clarification was provided.       Patient transported with:   Monitor  O2 @ 3 liters  Registered Nurse

## 2021-10-07 NOTE — PROGRESS NOTES
1315- Patient had 5 beats Vtach on telemetry, /70 mag 2.0 as of this morning, Ace Brumfield NP notified

## 2021-10-07 NOTE — ED PROVIDER NOTES
EMERGENCY DEPARTMENT HISTORY AND PHYSICAL EXAM      Date: 10/6/2021  Patient Name: Brijesh Vo    History of Presenting Illness     Chief Complaint   Patient presents with    Shortness of Breath       History Provided By: Patient    HPI: Brijesh Vo, 78 y.o. male with a past medical history significant diabetes, hypertension, hyperlipidemia, deep vein thrombosis and CHF (last echo 2020 with severe concentric LV hypertrophy, EF 50-55%), renal and bladder cancer s/p nephrectomy, stage IV CKD, DVT on Eliquis, anemia with hx of transfusion x 2, on iron supplementation, nephrectomy tube presents to the ED with cc of SOB. EMS was dispatched for SOB, found patient at 86% on room air. Mediately placed him on nonrebreather mask and his oxygen went back to 100% immediately. In route, day placed him on 3 L by nasal cannula and he maintained 100% oxygen saturation. In the ER, patient was maintained on 3 L O2 by nasal cannula monitor closely on the cardiac monitor. O2 sats remained 98 to 100%. Patient states that he has had shortness of breath for the past few days but no fever, chills, chest pain, rashes, abdominal pain, nausea, vomiting, diarrhea. No injuries or falls. No numbness or tingling. No changes in diet or recent travel. He states that oxygen has made him feel much better. Denies rectal bleeding or hemoptysis. There are no other complaints, changes, or physical findings at this time. PCP: Max Ervin NP    No current facility-administered medications on file prior to encounter. Current Outpatient Medications on File Prior to Encounter   Medication Sig Dispense Refill    albuterol (PROVENTIL HFA, VENTOLIN HFA, PROAIR HFA) 90 mcg/actuation inhaler Take 2 Puffs by inhalation every four (4) hours as needed for Wheezing, Shortness of Breath or Respiratory Distress. 1 Each 1    cinacalcet (Sensipar) 30 mg tablet Take 1 Tablet by mouth daily for 30 days.  30 Tablet 1    ferrous sulfate (IRON) 325 mg (65 mg iron) EC tablet Take 1 Tablet by mouth three (3) times daily (with meals). 90 Tablet 2    sodium bicarbonate 650 mg tablet Take 1 Tablet by mouth three (3) times daily for 30 days. 90 Tablet 1    simvastatin (ZOCOR) 40 mg tablet TAKE 1 TABLET BY MOUTH EVERY NIGHT 90 Tablet 1    metoprolol succinate (TOPROL-XL) 25 mg XL tablet Take 1 Tablet by mouth daily.  minoxidiL (LONITEN) 2.5 mg tablet TAKE 1 TABLET BY MOUTH TWICE DAILY 180 Tablet 0    amLODIPine (NORVASC) 5 mg tablet TAKE 2 TABLETS BY MOUTH ONCE A  Tablet 0    glipiZIDE (GLUCOTROL) 10 mg tablet Take 1 Tablet by mouth two (2) times a day. 180 Tablet 0    furosemide (LASIX) 40 mg tablet TAKE 2 TABLETS BY MOUTH DAILY 30 Tablet 1    allopurinoL (ZYLOPRIM) 100 mg tablet TAKE 2 TABLETS BY MOUTH DAILY 60 Tablet 3    hydrALAZINE (APRESOLINE) 50 mg tablet TAKE 2 TABLETS BY MOUTH THREE TIMES DAILY 90 Tablet 1    omeprazole (PRILOSEC) 40 mg capsule TAKE 1 CAPSULE BY MOUTH DAILY 90 Capsule 1    polyethylene glycol (Miralax) 17 gram packet Take 1 Packet by mouth daily. 10 Packet 0    apixaban (Eliquis) 2.5 mg tablet Take 1 Tablet by mouth two (2) times a day. 180 Tablet 1    glucose blood VI test strips (ASCENSIA AUTODISC VI, ONE TOUCH ULTRA TEST VI) strip Check blood glucose 3 times daily, give Accu chek Precious test strips 300 Strip 3    insulin glargine (Lantus Solostar U-100 Insulin) 100 unit/mL (3 mL) inpn INJECT 25 UNITS SUBCUTANEOUSLY ONCE nightly 9 mL 1    ergocalciferol (ERGOCALCIFEROL) 1,250 mcg (50,000 unit) capsule TAKE ONE CAPSULE BY MOUTH EVERY 7 DAYS 16 Cap 1    BD Ultra-Fine Mini Pen Needle 31 gauge x 3/16\" ndle USE TO INJECT  Pen Needle 3    fluticasone propionate (FLONASE) 50 mcg/actuation nasal spray 1 [place] by Both Nostrils route daily as needed.       amoxicillin-clavulanate (AUGMENTIN) 875-125 mg per tablet TAKE 1 TABLET BY MOUTH EVERY 12 HOURS FOR 14 DAYS      loratadine (CLARITIN) 10 mg tablet Take 10 mg by mouth daily. Past History     Past Medical History:  Past Medical History:   Diagnosis Date    Acid reflux     Arthritis     Bladder cancer (HCC)     Congestive heart failure (HCC)     Deep vein thrombosis (DVT) of proximal vein of both lower extremities ([facility] [address] 2020    Diabetes mellitus (HCC)     GERD (gastroesophageal reflux disease)     Gout     High cholesterol     Hypertension     Kidney carcinoma, left (HCC)     Kidney disease     Pituitary mass ([facility] [address]     Reflux esophagitis     Unspecified deficiency anemia        Past Surgical History:  Past Surgical History:   Procedure Laterality Date    HX CYSTECTOMY  09    Dr. Deborah Borrero HX NEPHRECTOMY Left 2009    Nephroureterectomy by Dr. Anders Shelton HX OTHER SURGICAL      surgery on pituitary gland       Family History:  Family History   Problem Relation Age of Onset    Heart Disease Father     Heart Disease Mother        Social History:  Social History     Tobacco Use    Smoking status: Former Smoker     Packs/day: 0.50     Years: 40.00     Pack years: 20.00     Types: Cigarettes     Quit date: 1999     Years since quittin.7    Smokeless tobacco: Never Used   Vaping Use    Vaping Use: Never used   Substance Use Topics    Alcohol use: No    Drug use: No       Allergies:  No Known Allergies      Review of Systems   Review of Systems All other systems negative as reviewed. Constitutional: No fever  Eyes: No change in vision  ENT: No sore throat  CVS: No chest pain  Respiratory: +dyspnea  GI: No nausea, vomiting. + Acid reflux  : No dysuria  Musculoskeletal: No extremity pain  Skin: No rash  Allergic/Immunologic: No adenopathy. No urticaria. Heme/Lymph: No ankle swelling, no abnormal bruising  Endocrine: No heat/cold intolerance; no increased thirst.  Neuro: No headache  Psych: No depression, no hallucination.   Review of Systems    Physical Exam   Physical Exam  GENERAL APPEARANCE: Vital signs reviewed, Patient appears comfortable, Alert and oriented X 3, Normal stature. HEAD: Atraumatic, Normocephalic. EYES: PERRLA, EOMI, No discharge from eyes, Sclera are normal, Conjunctiva are normal.  ENT: External auditory canals and tympanic membranes clear, hearing grossly intact. No nasal discharge. Oral cavity and pharynx normal. No swelling, exudate, or lesions. NECK: Normal ROM, No jugular venous distention, Non-tender without lymphadenopathy or masses. CARDIAC: Normal S1 and S2. No murmurs, gallops, or rubs. Rhythm is regular. LUNGS: Diminished aeration with diffuse rales. Dullness in the bases. Saturation 99% on 3L NC>  ABDOMEN: Soft, obese, non-tender, non-distended. No rebound or guarding. No masses palpated. : Nephrostomy tube present. Rectal exam performed with normal sphincter tone. Small amount of soft stool present in the rectal vault. No hemorrhoids or rectal fissures. MUSKULOSKELETAL: No joint effusion, erythema, or tenderness. Normal muscular development. BACK: No significant scoliosis or kyphosis. No tenderness to palpation, No CVA tenderness  EXTREMITIES: No significant deformity. 2+ radial pulses. 2+ femoral pulses. No peripheral edema, cyanosis or clubbing. Extremities are warm. NEUROLOGICAL: CN II-XII intact. Strength and sensation symmetric and intact throughout. Normal speech. SKIN: Skin normal color, texture, and turgor with no lesions or eruptions. Heme/lymphatic/immunologic: No abnormal bruising, petechia, or adenopathy.   PSYCHIATRIC: The patient was able to demonstrate good judgement and reason, without hallucinations, abnormal affect or abnormal behaviors during the exam    Physical Exam    Lab and Diagnostic Study Results     Labs -     Recent Results (from the past 12 hour(s))   CBC WITH AUTOMATED DIFF    Collection Time: 10/06/21  9:24 PM   Result Value Ref Range    WBC 5.8 4.6 - 13.2 K/uL    RBC 2.99 (L) 4.35 - 5.65 M/uL    HGB 7.9 (L) 13.0 - 16.0 g/dL    HCT 25.1 (L) 36.0 - 48.0 %    MCV 83.9 78.0 - 100.0 FL    MCH 26.4 24.0 - 34.0 PG    MCHC 31.5 31.0 - 37.0 g/dL    RDW 17.2 (H) 11.6 - 14.5 %    PLATELET 441 876 - 305 K/uL    MPV 9.2 9.2 - 11.8 FL    NRBC 0.0 0.0  WBC    ABSOLUTE NRBC 0.00 0.00 - 0.01 K/uL    NEUTROPHILS 72 40 - 73 %    LYMPHOCYTES 15 (L) 21 - 52 %    MONOCYTES 10 3 - 10 %    EOSINOPHILS 2 0 - 5 %    BASOPHILS 1 0 - 2 %    IMMATURE GRANULOCYTES 0 0 - 0.5 %    ABS. NEUTROPHILS 4.1 1.8 - 8.0 K/UL    ABS. LYMPHOCYTES 0.9 0.9 - 3.6 K/UL    ABS. MONOCYTES 0.6 0.05 - 1.2 K/UL    ABS. EOSINOPHILS 0.1 0.0 - 0.4 K/UL    ABS. BASOPHILS 0.1 0.0 - 0.1 K/UL    ABS. IMM. GRANS. 0.0 0.00 - 0.04 K/UL    DF Manual      RBC COMMENTS Anisocytosis  1+        RBC COMMENTS Hypochromia  1+        RBC COMMENTS Microcytosis  1+       PROTHROMBIN TIME + INR    Collection Time: 10/06/21  9:24 PM   Result Value Ref Range    Prothrombin time 14.2 11.5 - 15.2 sec    INR 1.2 0.8 - 1.2     METABOLIC PANEL, COMPREHENSIVE    Collection Time: 10/06/21  9:24 PM   Result Value Ref Range    Sodium 140 135 - 145 mmol/L    Potassium 3.7 3.2 - 5.1 mmol/L    Chloride 106 94 - 111 mmol/L    CO2 22 21 - 33 mmol/L    Anion gap 12 mmol/L    Glucose 170 (H) 70 - 110 mg/dL    BUN 62 (H) 9 - 21 mg/dL    Creatinine 3.50 (H) 0.8 - 1.50 mg/dL    BUN/Creatinine ratio 18      GFR est AA 21 ml/min/1.73m2    GFR est non-AA 17 ml/min/1.73m2    Calcium 8.4 (L) 8.5 - 10.5 mg/dL    Bilirubin, total 0.5 0.2 - 1.0 mg/dL    AST (SGOT) 18 17 - 74 U/L    ALT (SGPT) 17 3 - 72 U/L    Alk.  phosphatase 57 38 - 126 U/L    Protein, total 7.3 6.1 - 8.4 g/dL    Albumin 3.6 3.5 - 4.7 g/dL    Globulin 3.7 g/dL    A-G Ratio 1.0     TROPONIN I    Collection Time: 10/06/21  9:24 PM   Result Value Ref Range    Troponin-I, Qt. 0.03 0.02 - 0.05 ng/mL   LIPASE    Collection Time: 10/06/21  9:24 PM   Result Value Ref Range    Lipase 61 (H) 10 - 57 U/L   LACTIC ACID    Collection Time: 10/06/21  9:24 PM Result Value Ref Range    Lactic acid 1.1 0.5 - 2.0 mmol/L   MAGNESIUM    Collection Time: 10/06/21  9:24 PM   Result Value Ref Range    Magnesium 1.6 (L) 1.7 - 2.8 mg/dL   BNP    Collection Time: 10/06/21  9:24 PM   Result Value Ref Range     (H) 0 - 100 pg/mL   EKG, 12 LEAD, INITIAL    Collection Time: 10/06/21  9:28 PM   Result Value Ref Range    Ventricular Rate 79 BPM    Atrial Rate 79 BPM    P-R Interval 277 ms    QRS Duration 113 ms    Q-T Interval 427 ms    QTC Calculation (Bezet) 490 ms    Calculated P Axis -5 degrees    Calculated R Axis 22 degrees    Calculated T Axis -15 degrees    Diagnosis       Sinus rhythm  Prolonged VT interval  Borderline low voltage, extremity leads  Probable left ventricular hypertrophy  Borderline prolonged QT interval     BLOOD GAS, ARTERIAL    Collection Time: 10/06/21 10:10 PM   Result Value Ref Range    pH 7.43 7.37 - 7.43      PCO2 37 35.0 - 45.0 mmHg    PO2 76 (L) 84 - 98 mmHg    O2 SAT 95 94 - 100 %    BICARBONATE 24 23.0 - 27.0 mmol/L    BASE DEFICIT 0.6 0.0 - 2.5 mmol/L    O2 METHOD Nasal Cannula      O2 FLOW RATE 3 L/min    FIO2 32.0 %    Sample source Arterial      SITE Right Radial      JAMES'S TEST PASS      Carboxy-Hgb 0.3 0 - 3 %    Methemoglobin 0.4 0 - 3 %    tHb 10.3 (L) 12.0 - 16.0 g/dL    Oxyhemoglobin 94.0 90 - 100 %   TROPONIN I    Collection Time: 10/06/21 11:25 PM   Result Value Ref Range    Troponin-I, Qt. 0.03 0.02 - 0.05 ng/mL   URINALYSIS W/ RFLX MICROSCOPIC    Collection Time: 10/06/21 11:30 PM   Result Value Ref Range    Color Yellow      Appearance Clear      Specific gravity 1.009 1.005 - 1.030      pH (UA) 7.0 5.0 - 8.0      Protein 100 (A) Negative mg/dL    Glucose Negative Negative mg/dL    Ketone Negative Negative mg/dL    Bilirubin Negative Negative      Blood Negative Negative      Urobilinogen 0.2 0.2 - 1.0 EU/dL    Nitrites Negative Negative      Leukocyte Esterase Trace (A) Negative     URINE MICROSCOPIC    Collection Time: 10/06/21 11:30 PM   Result Value Ref Range    WBC 5-10 0 - 4 /hpf    RBC 0-5 0 - 2 /hpf    Epithelial cells Few 0 - 20 /lpf    Bacteria Negative (A) None /hpf   OCCULT BLOOD, STOOL    Collection Time: 10/06/21 11:55 PM   Result Value Ref Range    Occult Blood,day 1 Negative      Day 1 date: 100,721         Radiologic Studies -   @[email or web address]@  CT Results  (Last 48 hours)    None        CXR Results  (Last 48 hours)               10/06/21 2155  XR CHEST PORT Final result    Impression:      1. Patchy left perihilar densities and right basilar densities which could   represent changes of atelectasis or pneumonia. This report has been generated using voice recognition software. Narrative:  MEDICAL RECORDS NUMBER: [id number]       PROCEDURE:  Single view of the chest       DATE: 10/6/2021 9:55 PM       HISTORY: 78years old Male.  sob       Comparison: 1/25/2021       FINDINGS:       There is no significant effusion. There is no significant pneumothorax. Stable   cardiomegaly. Patchy left perihilar densities and right basilar densities which   could represent changes of atelectasis or pneumonia. TODAY I, DELMI REYES D.O., PERSONALLY VISUALIZED THE PATIENT'S DIAGNOSTIC IMAGING STUDIES. DEFER TO THE RADIOLOGIST'S REPORT FOR THE FINAL DEFINITIVE RADIOLOGY READING. MY INITIAL INDEPENDENT INTERPRETATION IS AS FOLLOWS, BUT NOT LIMITED TO: Review of portable chest x-ray shows vascular congestion and interstitial edema. TODAY I, Hi Abraham D.O., PERSONALLY VISUALIZED THE PATIENT'S EKG TRACING. I AM THE PRIMARY . Rate 79, , , QTc 490. Sinus rhythm with prolonged CO interval and borderline prolonged QT interval.  Likely LVH. No ST elevation. No STEMI. Medical Decision Making   - I am the first provider for this patient.     - I reviewed the vital signs, available nursing notes, past medical history, past surgical history, family history and social history. - Initial assessment performed. The patients presenting problems have been discussed, and they are in agreement with the care plan formulated and outlined with them. I have encouraged them to ask questions as they arise throughout their visit. Vital Signs-Reviewed the patient's vital signs. Patient Vitals for the past 12 hrs:   Temp Pulse Resp BP SpO2   10/07/21 0400 98.1 °F (36.7 °C) 68 18 132/65 99 %   10/07/21 0115 98.1 °F (36.7 °C) 65 20 134/64 100 %   10/07/21 0053 -- 63 21 (!) 139/56 98 %   10/07/21 0000 -- 68 -- -- --   10/06/21 2327 -- 69 18 139/70 98 %   10/06/21 2251 -- 70 18 (!) 144/62 98 %   10/06/21 2221 -- 73 18 (!) 125/53 98 %   10/06/21 2151 -- 75 22 (!) 143/60 96 %   10/06/21 2138 -- 79 22 135/66 98 %   10/06/21 2136 -- -- -- -- 91 %   10/06/21 2122 99.2 °F (37.3 °C) 85 28 (!) 146/69 91 %       Records Reviewed: Nursing Notes, Old Medical Records, Previous Radiology Studies and Previous Laboratory Studies    The patient presents with shortness of breath with a differential diagnosis of COPD, CHF, pneumonia, pneumothorax, [place], pleural effusion, bronchitis, PE.      ED Course:     ED Course as of Oct 07 0546   Wed Oct 06, 2021   2247 Discussed patient with Umm Quevedo NP, hospitalist, regarding admission.    [OM]   2247 Creatinine(!): 3.50 [OM]   Thu Oct 07, 2021   0544 RBC(!): 2.99 [OM]   0545 HGB(!): 7.9 [OM]   0545 HCT(!): 25.1 [OM]   0545 Magnesium(!): 1.6 [OM]      ED Course User Index  [OM] Jaylon Bonilla DO       Provider Notes (Medical Decision Making):   61-year-old male with extensive medical history including renal/bladder cancer, CHF, CKD, diabetes presents to the ER with chief complaint of shortness of breath found to be hypoxic upon EMS arrival to his home presents the ER with shortness of breath. Chest x-ray and physical exam are consistent with CHF exacerbation. Patient is given a dose of IV Lasix 40 mg, caution given CKD.   It is also noted that patient has hemoglobin of 7.9, down from 10 roughly 2 months ago. He does take iron daily and states that he has had to have transfusions before. He also has only one kidney. Denies any melena or hematochezia. Does have GERD. Hemoccult was done. Will admit, monitor hemoglobin. Rocephin administered after BCs obtained due to CXR with signs of pneumonia. MG2+ replenished. MDM       Procedures   Medical Decision Makingedical Decision Making  Performed by: Rowan Nash DO  PROCEDURES:  Other Procedure    Date/Time: 10/7/2021 12:05 AM  Performed by: Leana Nova DO  Authorized by: Leana Nova DO     Consent:     Consent obtained:  Verbal    Consent given by:  Patient  Comments:      Procedure Note - Rectal Exam:   12:04 AM  Performed by: Abigail Mcgregor DO   Chaperoned by: Marshall Suazo RN  Rectal exam performed. Indication: Hemoglobin 7.9 dropped from 10 2 months ago. Stool was collected and sent to the lab for Hemoccult testing. Other findings: Sphincter tone normal.  The procedure took 1-15 minutes, and pt tolerated well. Disposition   Disposition: Admitted to Floor Medical Floor the case was discussed with the admitting hospitalist Vangie Duran NP. Admitted        Diagnosis     Clinical Impression:   1. Acute on chronic systolic congestive heart failure ([address]    2. Hypomagnesemia    3. Community acquired pneumonia, unspecified laterality        Attestations:    Rowan Nash DO    Please note that this dictation was completed with [employer], the computer voice recognition software. Quite often unanticipated grammatical, syntax, homophones, and other interpretive errors are inadvertently transcribed by the computer software. Please disregard these errors. Please excuse any errors that have escaped final proofreading. Thank you.

## 2021-10-07 NOTE — ED TRIAGE NOTES
Patient states he started having shortness of breath this evening around 4 pm. Patient has history of CHF.  Per EMS patient sats were 86% on room air when they arrived and placed him on 4 L NC 02 with increase of sat to 96%

## 2021-10-07 NOTE — H&P
History and Physical    Subjective:     Kishan Lino is a 78 y.o. male  has a past medical history of Acid reflux, Arthritis, Bladder cancer (HCC), Congestive heart failure (HCC), Deep vein thrombosis (DVT) of proximal vein of both lower extremities ([address] (9/14/2020), Diabetes mellitus ([address] GERD (gastroesophageal reflux disease), Gout, High cholesterol, Hypertension, Kidney carcinoma, left ([address] Kidney disease, Pituitary mass ([address] Reflux esophagitis, and Unspecified deficiency anemia. Patient seen at bedside emergency department. Patient came by EMS to the hospital he was hypoxic at 85% when EMS arrived at his home he is not oxygen dependent at home he was placed on oxygen and immediately return to the high 90s. Patient has a history of congestive heart failure. Patient is on multiple hypertensive meds patient is diabetic he does not comply with his diabetic diet. Drinks sugary drinks and states he does not eat much. When coming to the emergency room patient denied any chest pain was short of breath. Received Lasix 80 mg in the emergency department. Given patient's chronic renal failure not on dialysis at this time and patient is receiving Lasix in the hospital for diuresing renal consult will be placed. Cardiology consult will be placed patient's last echo was in 2020 EF of 50 to 55%. Nutritional consult will also be placed for patient's dietary indiscretion and lack of knowledge for a diabetic diet.       Past Medical History:   Diagnosis Date    Acid reflux     Arthritis     Bladder cancer (HCC)     Congestive heart failure (HCC)     Deep vein thrombosis (DVT) of proximal vein of both lower extremities ([address] 9/14/2020    Diabetes mellitus ([address]     GERD (gastroesophageal reflux disease)     Gout     High cholesterol     Hypertension     Kidney carcinoma, left (HCC)     Kidney disease     Pituitary mass (HCC)     Reflux esophagitis     Unspecified deficiency anemia Past Surgical History:   Procedure Laterality Date    HX CYSTECTOMY  09    Dr. Deborah Borrero HX NEPHRECTOMY Left 2009    Nephroureterectomy by Dr. Anders Shelton HX OTHER SURGICAL      surgery on pituitary gland     Family History   Problem Relation Age of Onset    Heart Disease Father     Heart Disease Mother       Social History     Tobacco Use    Smoking status: Former Smoker     Packs/day: 0.50     Years: 40.00     Pack years: 20.00     Types: Cigarettes     Quit date: 1999     Years since quittin.7    Smokeless tobacco: Never Used   Substance Use Topics    Alcohol use: No       Prior to Admission medications    Medication Sig Start Date End Date Taking? Authorizing Provider   albuterol (PROVENTIL HFA, VENTOLIN HFA, PROAIR HFA) 90 mcg/actuation inhaler Take 2 Puffs by inhalation every four (4) hours as needed for Wheezing, Shortness of Breath or Respiratory Distress. 21  Yes Ghazala Smith NP   cinacalcet (Sensipar) 30 mg tablet Take 1 Tablet by mouth daily for 30 days. 9/22/21 10/22/21 Yes Pérez Villafana MD   ferrous sulfate (IRON) 325 mg (65 mg iron) EC tablet Take 1 Tablet by mouth three (3) times daily (with meals). 21  Yes Pérez Villafana MD   sodium bicarbonate 650 mg tablet Take 1 Tablet by mouth three (3) times daily for 30 days. 9/22/21 10/22/21 Yes Pérez Villafana MD   simvastatin (ZOCOR) 40 mg tablet TAKE 1 TABLET BY MOUTH EVERY NIGHT 21  Yes Ghazala Smith NP   metoprolol succinate (TOPROL-XL) 25 mg XL tablet Take 1 Tablet by mouth daily. 21  Yes Other, MD Melba   minoxidiL (LONITEN) 2.5 mg tablet TAKE 1 TABLET BY MOUTH TWICE DAILY 8/10/21  Yes Pérez Villafana MD   amLODIPine (NORVASC) 5 mg tablet TAKE 2 TABLETS BY MOUTH ONCE A DAY 21  Yes Ghazala Smith NP   glipiZIDE (GLUCOTROL) 10 mg tablet Take 1 Tablet by mouth two (2) times a day.  21  Yes Ghazala Smith NP   furosemide (LASIX) 40 mg tablet TAKE 2 TABLETS BY MOUTH DAILY 7/26/21  Yes Edwin Mora MD   allopurinoL (ZYLOPRIM) 100 mg tablet TAKE 2 TABLETS BY MOUTH DAILY 7/4/21  Yes Ghazala Smith NP   hydrALAZINE (APRESOLINE) 50 mg tablet TAKE 2 TABLETS BY MOUTH THREE TIMES DAILY 6/28/21  Yes Edwin Mora MD   omeprazole (PRILOSEC) 40 mg capsule TAKE 1 CAPSULE BY MOUTH DAILY 6/3/21  Yes Ghazala Smith NP   polyethylene glycol (Miralax) 17 gram packet Take 1 Packet by mouth daily. 6/3/21  Yes Ghazala Smith NP   apixaban (Eliquis) 2.5 mg tablet Take 1 Tablet by mouth two (2) times a day. 6/3/21  Yes Ghazala Smith NP   glucose blood VI test strips (ASCENSIA AUTODISC VI, ONE TOUCH ULTRA TEST VI) strip Check blood glucose 3 times daily, give Accu chek Precious test strips 6/1/21  Yes Ghazala Smith NP   insulin glargine (Lantus Solostar U-100 Insulin) 100 unit/mL (3 mL) inpn INJECT 25 UNITS SUBCUTANEOUSLY ONCE nightly 5/6/21  Yes Ghazala Smith NP   ergocalciferol (ERGOCALCIFEROL) 1,250 mcg (50,000 unit) capsule TAKE ONE CAPSULE BY MOUTH EVERY 7 DAYS 4/26/21  Yes Edwin Mora MD   BD Ultra-Fine Mini Pen Needle 31 gauge x 3/16\" ndle USE TO INJECT TID 1/26/21  Yes Ghazala Smith NP   fluticasone propionate (FLONASE) 50 mcg/actuation nasal spray 1 [place] by Both Nostrils route daily as needed. Yes Other, MD Melba   amoxicillin-clavulanate (AUGMENTIN) 875-125 mg per tablet TAKE 1 TABLET BY MOUTH EVERY 12 HOURS FOR 14 DAYS 9/30/21   Melba Grace MD   loratadine (CLARITIN) 10 mg tablet Take 10 mg by mouth daily. 9/9/21   Other, MD Melba     No Known Allergies      Review of Systems   Constitutional: Negative for fever. Respiratory: Positive for shortness of breath. Negative for cough. Cardiovascular: Positive for leg swelling. Negative for chest pain and palpitations. Gastrointestinal: Negative for nausea and vomiting. All other systems reviewed and are negative.          Objective:   VITALS: Visit Vitals  /70 (BP 1 Location: Right upper arm, BP Patient Position: At rest)   Pulse 69   Temp 99.2 °F (37.3 °C)   Resp 18   Ht 5' 8\" (1.727 m)   Wt 98.9 kg (218 lb)   SpO2 98%   BMI 33.15 kg/m²       Physical Exam  Vitals and nursing note reviewed. Constitutional:       General: He is not in acute distress. Appearance: He is well-developed. He is ill-appearing. He is not toxic-appearing or diaphoretic. HENT:      Head: Normocephalic and atraumatic. Mouth/Throat:      Mouth: Mucous membranes are moist.   Eyes:      Conjunctiva/sclera: Conjunctivae normal.      Pupils: Pupils are equal, round, and reactive to light. Cardiovascular:      Rate and Rhythm: Normal rate and regular rhythm. Pulses: Normal pulses. Heart sounds: Normal heart sounds. Pulmonary:      Effort: Pulmonary effort is normal. No respiratory distress. Breath sounds: No stridor. Rales present. Comments: Rales to bases bilaterally  Abdominal:      General: Bowel sounds are normal. There is no distension. Palpations: Abdomen is soft. Tenderness: There is no abdominal tenderness. Comments: Urostomy noted. Patient states he has had it for several years due to cancer also had a left nephrectomy. Musculoskeletal:         General: Normal range of motion. Cervical back: Normal range of motion and neck supple. No rigidity. Right lower leg: Edema present. Left lower leg: Edema present. Comments: Minor nonpitting bilateral lower leg edema no tenderness no Homans' sign normal distal circulation and sensation     Skin:     General: Skin is warm and dry. Capillary Refill: Capillary refill takes less than 2 seconds. Neurological:      General: No focal deficit present. Mental Status: He is alert and oriented to person, place, and time. Deep Tendon Reflexes: Reflexes are normal and symmetric.    Psychiatric:         Mood and Affect: Mood normal.         Behavior: Behavior normal.         Thought Content: Thought content normal.         Judgment: Judgment normal.         _______________________________________________________________________  Care Plan discussed with:    Comments   Patient X    Family      RN X    Care Manager                    Consultant:      _______________________________________________________________________  Expected  Disposition:   Home with Family X   HH/PT/OT/RN    SNF/LTC    SHERLYN    ________________________________________________________________________  TOTAL TIME:  48 Minutes    Critical Care Provided     Minutes non procedure based      Comments    X Reviewed previous records   >50% of visit spent in counseling and coordination of care X Discussion with patient and/or family and questions answered       ________________________________________________________________________    Labs:  Recent Results (from the past 24 hour(s))   CBC WITH AUTOMATED DIFF    Collection Time: 10/06/21  9:24 PM   Result Value Ref Range    WBC 5.8 4.6 - 13.2 K/uL    RBC 2.99 (L) 4.35 - 5.65 M/uL    HGB 7.9 (L) 13.0 - 16.0 g/dL    HCT 25.1 (L) 36.0 - 48.0 %    MCV 83.9 78.0 - 100.0 FL    MCH 26.4 24.0 - 34.0 PG    MCHC 31.5 31.0 - 37.0 g/dL    RDW 17.2 (H) 11.6 - 14.5 %    PLATELET 261 981 - 550 K/uL    MPV 9.2 9.2 - 11.8 FL    NRBC 0.0 0.0  WBC    ABSOLUTE NRBC 0.00 0.00 - 0.01 K/uL    NEUTROPHILS 72 40 - 73 %    LYMPHOCYTES 15 (L) 21 - 52 %    MONOCYTES 10 3 - 10 %    EOSINOPHILS 2 0 - 5 %    BASOPHILS 1 0 - 2 %    IMMATURE GRANULOCYTES 0 0 - 0.5 %    ABS. NEUTROPHILS 4.1 1.8 - 8.0 K/UL    ABS. LYMPHOCYTES 0.9 0.9 - 3.6 K/UL    ABS. MONOCYTES 0.6 0.05 - 1.2 K/UL    ABS. EOSINOPHILS 0.1 0.0 - 0.4 K/UL    ABS. BASOPHILS 0.1 0.0 - 0.1 K/UL    ABS. IMM.  GRANS. 0.0 0.00 - 0.04 K/UL    DF Manual      RBC COMMENTS Anisocytosis  1+        RBC COMMENTS Hypochromia  1+        RBC COMMENTS Microcytosis  1+       PROTHROMBIN TIME + INR    Collection Time: 10/06/21  9:24 PM   Result Value Ref Range    Prothrombin time 14.2 11.5 - 15.2 sec    INR 1.2 0.8 - 1.2     METABOLIC PANEL, COMPREHENSIVE    Collection Time: 10/06/21  9:24 PM   Result Value Ref Range    Sodium 140 135 - 145 mmol/L    Potassium 3.7 3.2 - 5.1 mmol/L    Chloride 106 94 - 111 mmol/L    CO2 22 21 - 33 mmol/L    Anion gap 12 mmol/L    Glucose 170 (H) 70 - 110 mg/dL    BUN 62 (H) 9 - 21 mg/dL    Creatinine 3.50 (H) 0.8 - 1.50 mg/dL    BUN/Creatinine ratio 18      GFR est AA 21 ml/min/1.73m2    GFR est non-AA 17 ml/min/1.73m2    Calcium 8.4 (L) 8.5 - 10.5 mg/dL    Bilirubin, total 0.5 0.2 - 1.0 mg/dL    AST (SGOT) 18 17 - 74 U/L    ALT (SGPT) 17 3 - 72 U/L    Alk.  phosphatase 57 38 - 126 U/L    Protein, total 7.3 6.1 - 8.4 g/dL    Albumin 3.6 3.5 - 4.7 g/dL    Globulin 3.7 g/dL    A-G Ratio 1.0     TROPONIN I    Collection Time: 10/06/21  9:24 PM   Result Value Ref Range    Troponin-I, Qt. 0.03 0.02 - 0.05 ng/mL   LIPASE    Collection Time: 10/06/21  9:24 PM   Result Value Ref Range    Lipase 61 (H) 10 - 57 U/L   LACTIC ACID    Collection Time: 10/06/21  9:24 PM   Result Value Ref Range    Lactic acid 1.1 0.5 - 2.0 mmol/L   MAGNESIUM    Collection Time: 10/06/21  9:24 PM   Result Value Ref Range    Magnesium 1.6 (L) 1.7 - 2.8 mg/dL   BNP    Collection Time: 10/06/21  9:24 PM   Result Value Ref Range     (H) 0 - 100 pg/mL   EKG, 12 LEAD, INITIAL    Collection Time: 10/06/21  9:28 PM   Result Value Ref Range    Ventricular Rate 79 BPM    Atrial Rate 79 BPM    P-R Interval 277 ms    QRS Duration 113 ms    Q-T Interval 427 ms    QTC Calculation (Bezet) 490 ms    Calculated P Axis -5 degrees    Calculated R Axis 22 degrees    Calculated T Axis -15 degrees    Diagnosis       Sinus rhythm  Prolonged [place] interval  Borderline low voltage, extremity leads  Probable left ventricular hypertrophy  Borderline prolonged QT interval     BLOOD GAS, ARTERIAL    Collection Time: 10/06/21 10:10 PM   Result Value Ref Range    pH 7.43 7.37 - 7.43      PCO2 37 35.0 - 45.0 mmHg    PO2 76 (L) 84 - 98 mmHg    O2 SAT 95 94 - 100 %    BICARBONATE 24 23.0 - 27.0 mmol/L    BASE DEFICIT 0.6 0.0 - 2.5 mmol/L    O2 METHOD Nasal Cannula      O2 FLOW RATE 3 L/min    FIO2 32.0 %    Sample source Arterial      SITE Right Radial      JAMES'S TEST PASS      Carboxy-Hgb 0.3 0 - 3 %    Methemoglobin 0.4 0 - 3 %    tHb 10.3 (L) 12.0 - 16.0 g/dL    Oxyhemoglobin 94.0 90 - 100 %   URINALYSIS W/ RFLX MICROSCOPIC    Collection Time: 10/06/21 11:30 PM   Result Value Ref Range    Color Yellow      Appearance Clear      Specific gravity 1.009 1.005 - 1.030      pH (UA) 7.0 5.0 - 8.0      Protein 100 (A) Negative mg/dL    Glucose Negative Negative mg/dL    Ketone Negative Negative mg/dL    Bilirubin Negative Negative      Blood Negative Negative      Urobilinogen 0.2 0.2 - 1.0 EU/dL    Nitrites Negative Negative      Leukocyte Esterase Trace (A) Negative     URINE MICROSCOPIC    Collection Time: 10/06/21 11:30 PM   Result Value Ref Range    WBC 5-10 0 - 4 /hpf    RBC 0-5 0 - 2 /hpf    Epithelial cells Few 0 - 20 /lpf    Bacteria Negative (A) None /hpf       Imaging:  XR CHEST PORT    Result Date: 10/6/2021  MEDICAL RECORDS NUMBER: [id number] PROCEDURE:  Single view of the chest DATE: 10/6/2021 9:55 PM HISTORY: 78years old Male.  sob Comparison: 1/25/2021 FINDINGS: There is no significant effusion. There is no significant pneumothorax. Stable cardiomegaly. Patchy left perihilar densities and right basilar densities which could represent changes of atelectasis or pneumonia. 1. Patchy left perihilar densities and right basilar densities which could represent changes of atelectasis or pneumonia. This report has been generated using voice recognition software.         Assessment & Plan:       Acute deep vein thrombosis (DVT) of distal vein of both lower extremities (HCC)  This is a chronic problem  Continue Eliquis    Acute on chronic combined systolic and diastolic ACC/AHA stage C congestive heart failure (HCC)  Acute exacerbation of chronic CHF  Lasix bolus given in emergency room patient will be continued on 40 IV twice daily  Cardiology consult  Echocardiogram ordered last one was in 2020 with an EF of 50 to 55%  Fluid restriction 1500 mL/day  A.m. labs    Primary hypertension  This is a chronic problem  Continue minoxidil, Toprol, hydralazine, Norvasc  Cardiology consult    Type 2 diabetes mellitus with stage 4 chronic kidney disease, with long-term current use of insulin ([facility]  This is a chronic problem  A.m. labs  Sliding scale insulin AC at bedtime  Continue Lantus  Stop oral hypoglycemics while in hospital        Stage 3 chronic kidney disease ([facility]  Is a chronic problem  A.m. labs  Nephrology consult due to Lasix use and chronic kidney disease        Code Status: Full    Prophylaxis: Patient on Eliquis      Electronically Signed : Bj Dinh ENP-C, FNP-C, [address] voice recognition was used to generate this report, which may have resulted in some phonetic based errors in grammar and contents.  Even though attempts were made to correct all the mistakes, some may have been missed, and remained in the body of the document

## 2021-10-07 NOTE — ASSESSMENT & PLAN NOTE
This is a chronic problem  A.m. labs  Sliding scale insulin AC at bedtime  Continue Lantus  Stop oral hypoglycemics while in hospital

## 2021-10-07 NOTE — PROGRESS NOTES
Hospitalist Progress Note             Date of Service:  10/7/2021  NAME:  Janna Mancilla  :  1945  MRN:  [id number]    Assessment & Plan:         Acute deep vein thrombosis (DVT) of distal vein of both lower extremities (HCC)  Continue Eliquis     Acute on chronic combined systolic and diastolic ACC/AHA stage C congestive heart failure (HCC)  Acute exacerbation of chronic CHF   continued on 40 IV twice daily  Cardiology consult  Echocardiogram ordered last one was in  with an EF of 50 to 55%  Fluid restriction 1500 mL/day  - final result of new echo pending, prelim echo 60%, with severe pulm htn  - cont aggressive diuresis, but watch cr closely     Primary hypertension  This is a chronic problem  Continue minoxidil, Toprol, hydralazine, Norvasc  Cardiology consult     Type 2 diabetes mellitus with stage 4 chronic kidney disease, with long-term current use of insulin ([address]  This is a chronic problem  A.m. labs  Sliding scale insulin AC at bedtime  Continue Lantus  hold oral hypoglycemics      Stage 3 chronic kidney disease ([address]  Is a chronic problem  A.m. labs  Nephrology consult due to Lasix use and chronic kidney disease        Hospital Problems  Date Reviewed: 10/29/2020        Codes Class Noted POA    Acute on chronic combined systolic and diastolic ACC/AHA stage C congestive heart failure (HCC) ICD-10-CM: I50.43  ICD-9-CM: 428.43, 428.0  10/7/2021 Unknown        Acute deep vein thrombosis (DVT) of distal vein of both lower extremities ([address] ICD-10-CM: Z95.0O9  ICD-9-CM: 453.42  10/13/2020 Yes        Stage 3 chronic kidney disease ([address] ICD-10-CM: N18.30  ICD-9-CM: 585.3  2014 Yes        Type 2 diabetes mellitus with stage 4 chronic kidney disease, with long-term current use of insulin (HCC) ICD-10-CM: E11.22, N18.4, Z79.4  ICD-9-CM: 250.40, 585.4, V58.67  2009 Yes        Primary hypertension ICD-10-CM: I10  ICD-9-CM: 401.9  12/7/2009 Unknown                Review of Systems:   A comprehensive review of systems was negative except for that written in the HPI. Vital Signs:    Last 24hrs VS reviewed since prior progress note. Most recent are:  Visit Vitals  BP (!) 151/70   Pulse 75   Temp 97.3 °F (36.3 °C)   Resp 19   Ht 5' 8\" (1.727 m)   Wt 91.1 kg (200 lb 14.4 oz)   SpO2 99%   BMI 30.55 kg/m²         Intake/Output Summary (Last 24 hours) at 10/7/2021 1405  Last data filed at 10/7/2021 0604  Gross per 24 hour   Intake --   Output 850 ml   Net -850 ml        Physical Examination:             General:          Alert, cooperative, no distress, appears stated age. HEENT:           Atraumatic, anicteric sclerae, pink conjunctivae                          No oral ulcers, mucosa moist, throat clear, dentition fair  Neck:               Supple, symmetrical  Lungs:             basilar crackles  Chest wall:      No tenderness  No Accessory muscle use. Heart:              Regular  rhythm,  No  murmur   No edema, No JVD  Abdomen:        Soft, non-tender. Not distended. Bowel sounds normal  Extremities:     No cyanosis. No clubbing,                            Skin turgor normal, Capillary refill normal  Skin:                Not pale. Not Jaundiced  No rashes   Psych:             Not anxious or agitated.   Neurologic:      Alert, moves all extremities, answers questions appropriately and responds to commands        Data Review:    Review and/or order of clinical lab test  Review and/or order of tests in the radiology section of CPT  Review and/or order of tests in the medicine section of CPT      Labs:     Recent Labs     10/06/21  2124   WBC 5.8   HGB 7.9*   HCT 25.1*        Recent Labs     10/07/21  0538 10/06/21  2124   NA  --  140   K  --  3.7   CL  --  106   CO2  --  22   BUN  --  62*   CREA  --  3.50*   GLU  --  170*   CA  --  8.4*   MG 2.0 1.6*     Recent Labs     10/06/21  2124   ALT 17   AP 57   TBILI 0.5   TP 7.3   ALB 3.6   GLOB 3.7   LPSE 61*     Recent Labs     10/06/21  2124   INR 1.2   PTP 14.2      No results for input(s): FE, TIBC, PSAT, FERR in the last 72 hours.    No results found for: FOL, RBCF   Recent Labs     10/06/21  2210   PH 7.43   PCO2 37   PO2 76*     Recent Labs     10/07/21  1000 10/07/21  0517 10/06/21  2325   TROIQ 0.03 0.03 0.03     Lab Results   Component Value Date/Time    Cholesterol, total 97 10/06/2021 11:25 PM    HDL Cholesterol 31 (L) 10/06/2021 11:25 PM    LDL, calculated 53.8 10/06/2021 11:25 PM    Triglyceride 61 10/06/2021 11:25 PM    CHOL/HDL Ratio 3.1 10/06/2021 11:25 PM     Lab Results   Component Value Date/Time    Glucose (POC) 245 (H) 01/13/2021 10:33 AM    Glucose (POC) 147 (H) 01/13/2021 07:32 AM    Glucose (POC) 232 (H) 09/19/2020 11:48 AM    Glucose (POC) 99 09/19/2020 07:39 AM    Glucose (POC) 55 (L) 09/18/2020 04:29 PM     Lab Results   Component Value Date/Time    Color Yellow 10/06/2021 11:30 PM    Appearance Clear 10/06/2021 11:30 PM    Specific gravity 1.009 10/06/2021 11:30 PM    pH (UA) 7.0 10/06/2021 11:30 PM    Protein 100 (A) 10/06/2021 11:30 PM    Glucose Negative 10/06/2021 11:30 PM    Ketone Negative 10/06/2021 11:30 PM    Bilirubin Negative 10/06/2021 11:30 PM    Urobilinogen 0.2 10/06/2021 11:30 PM    Nitrites Negative 10/06/2021 11:30 PM    Leukocyte Esterase Trace (A) 10/06/2021 11:30 PM    Epithelial cells Few 10/06/2021 11:30 PM    Bacteria Negative (A) 10/06/2021 11:30 PM    WBC 5-10 10/06/2021 11:30 PM    RBC 0-5 10/06/2021 11:30 PM         Medications Reviewed:     Current Facility-Administered Medications   Medication Dose Route Frequency    albuterol (PROVENTIL HFA, VENTOLIN HFA, PROAIR HFA) inhaler 2 Puff  2 Puff Inhalation Q4H PRN    allopurinoL (ZYLOPRIM) tablet 200 mg  200 mg Oral DAILY    amLODIPine (NORVASC) tablet 5 mg  5 mg Oral BID    apixaban (ELIQUIS) tablet 2.5 mg  2.5 mg Oral BID    cinacalcet (SENSIPAR) tablet 30 mg  30 mg Oral DAILY    fluticasone propionate (FLONASE) 50 mcg/actuation nasal spray 1 Spray  1 Spray Both Nostrils DAILY PRN    hydrALAZINE (APRESOLINE) tablet 100 mg  100 mg Oral TID    insulin glargine (LANTUS) injection 20 Units  20 Units SubCUTAneous QHS    metoprolol succinate (TOPROL-XL) XL tablet 25 mg  25 mg Oral DAILY    minoxidiL (LONITEN) tablet 2.5 mg  2.5 mg Oral BID    pantoprazole (PROTONIX) tablet 40 mg  40 mg Oral ACB    atorvastatin (LIPITOR) tablet 20 mg  20 mg Oral QHS    sodium bicarbonate tablet 650 mg  650 mg Oral TID    ferrous sulfate tablet 325 mg  1 Tablet Oral TID WITH MEALS    cetirizine (ZYRTEC) tablet 10 mg  10 mg Oral DAILY    sodium chloride (NS) flush 5-40 mL  5-40 mL IntraVENous Q8H    sodium chloride (NS) flush 5-40 mL  5-40 mL IntraVENous PRN    acetaminophen (TYLENOL) tablet 650 mg  650 mg Oral Q6H PRN    Or    acetaminophen (TYLENOL) suppository 650 mg  650 mg Rectal Q6H PRN    polyethylene glycol (MIRALAX) packet 17 g  17 g Oral DAILY PRN    ondansetron (ZOFRAN ODT) tablet 4 mg  4 mg Oral Q8H PRN    Or    ondansetron (ZOFRAN) injection 4 mg  4 mg IntraVENous Q6H PRN    furosemide (LASIX) injection 40 mg  40 mg IntraVENous BID     ______________________________________________________________________  EXPECTED LENGTH OF STAY: 4d 0h  ACTUAL LENGTH OF STAY:          1                 Fei Porras MD

## 2021-10-07 NOTE — ASSESSMENT & PLAN NOTE
Acute exacerbation of chronic CHF  Lasix bolus given in emergency room patient will be continued on 40 IV twice daily  Cardiology consult  Echocardiogram ordered last one was in 2020 with an EF of 50 to 55%  Fluid restriction 1500 mL/day  A.m. labs

## 2021-10-07 NOTE — CONSULTS
[facility] CARDIOLOGY  CARDIOLOGY CONSULTATION    REASON FOR CONSULT: shortness of breath    REQUESTING PROVIDER: VALDO Núñez NP    CHIEF COMPLAINT: Shortness of breath    HISTORY OF PRESENT ILLNESS:  Ti Turner is a 78y.o. year-old male with past medical history significant for DM, HTN, hyperlipidemia, DVT (on Eliquis), diastolic heart failure, renal cancer, bladder cancer, stage 4 CKD, and anemia who was seen today for evaluation of shortness of breath. The patient presented to the [facility] ER on 10/721 for evaluation of his symptoms. He reports feeling short of breath for weeks and states it worsened last night. He reports abdominal bloating and leg swelling. He denies chest pain. He reports compliance with all medications at home. He states he does not have a cardiologist. He states the shortness of breath has resolved now. Records from hospital admission course thus far reviewed. Telemetry reviewed. No events overnight. The patient is in sinus bradycardia/sinus rhythm with first degree AV block. INPATIENT MEDICATIONS:  Home medications reviewed.     Current Facility-Administered Medications:     albuterol (PROVENTIL HFA, VENTOLIN HFA, PROAIR HFA) inhaler 2 Puff, 2 Puff, Inhalation, Q4H PRN, Lizzy Place A, NP    allopurinoL (ZYLOPRIM) tablet 200 mg, 200 mg, Oral, DAILY, Annabel Sears A, NP, 200 mg at 10/07/21 0931    amLODIPine (NORVASC) tablet 5 mg, 5 mg, Oral, BID, [place] A, NP, 5 mg at 10/07/21 0931    apixaban (ELIQUIS) tablet 2.5 mg, 2.5 mg, Oral, BID, [place] A, NP, 2.5 mg at 10/07/21 0931    cinacalcet (SENSIPAR) tablet 30 mg, 30 mg, Oral, DAILY, Lizzy Place A, NP, 30 mg at 10/07/21 0930    fluticasone propionate (FLONASE) 50 mcg/actuation nasal spray 1 Spray, 1 Spray, Both Nostrils, DAILY PRN, Lizzy Place A, NP    hydrALAZINE (APRESOLINE) tablet 100 mg, 100 mg, Oral, TID, [place] A, NP, 100 mg at 10/07/21 0930   insulin glargine (LANTUS) injection 20 Units, 20 Units, SubCUTAneous, QHS, Annabel Sears NP    metoprolol succinate (TOPROL-XL) XL tablet 25 mg, 25 mg, Oral, DAILY, Camille YOUNGER, ROGERIO, 25 mg at 10/07/21 0931    minoxidiL (LONITEN) tablet 2.5 mg, 2.5 mg, Oral, BID, Camille YOUNGER, ROGERIO, 2.5 mg at 10/07/21 0931    pantoprazole (PROTONIX) tablet 40 mg, 40 mg, Oral, ACB, Camille YOUNGER, ROGERIO, 40 mg at 10/07/21 0931    atorvastatin (LIPITOR) tablet 20 mg, 20 mg, Oral, QHS, Camille YOUNGER NP    sodium bicarbonate tablet 650 mg, 650 mg, Oral, TID, Camille YOUNGER NP, 650 mg at 10/07/21 1765    ferrous sulfate tablet 325 mg, 1 Tablet, Oral, TID WITH MEALS, Shaniqua Barreto MD, 325 mg at 10/07/21 1135    cetirizine (ZYRTEC) tablet 10 mg, 10 mg, Oral, DAILY, Shaniqua Barreto MD, 10 mg at 10/07/21 1043    sodium chloride (NS) flush 5-40 mL, 5-40 mL, IntraVENous, Q8H, Annabel Sears NP, 10 mL at 10/07/21 0528    sodium chloride (NS) flush 5-40 mL, 5-40 mL, IntraVENous, PRN, Jovan Head, NP    acetaminophen (TYLENOL) tablet 650 mg, 650 mg, Oral, Q6H PRN **OR** acetaminophen (TYLENOL) suppository 650 mg, 650 mg, Rectal, Q6H PRN, Jovan Head, NP    polyethylene glycol (MIRALAX) packet 17 g, 17 g, Oral, DAILY PRN, Camille YOUNGER NP    ondansetron (ZOFRAN ODT) tablet 4 mg, 4 mg, Oral, Q8H PRN **OR** ondansetron (ZOFRAN) injection 4 mg, 4 mg, IntraVENous, Q6H PRN, Camille YOUNGER NP    furosemide (LASIX) injection 40 mg, 40 mg, IntraVENous, BID, Camille YOUNGER NP, 40 mg at 10/07/21 0466     ALLERGIES:  Allergies reviewed with the patient,No Known Allergies . FAMILY HISTORY:  Family history reviewed. SOCIAL HISTORY:  Notable for former tobacco use, no alcohol use, and no illicit drug use. REVIEW OF SYSTEMS:  Complete review of systems performed, pertinents noted above, all other systems are negative.     PHYSICAL EXAMINATION:  Vital sign assessment reveal a blood pressure of 141/64 and pulse rate of 69. Cardiovascular exam has a heart with a regular rate and rhythm, normal S1 and S2. No murmur present. There are no rubs or gallops. Good peripheral pulses. No jugular venous distension. Respiratory exam reveals clear lung fields, no rales or rhonchi. Lymphatic exam reveals no edema. Neurologic exam is nonfocal.      Recent labs results and imaging reviewed. Notable findings include:   Recent Results (from the past 24 hour(s))   CBC WITH AUTOMATED DIFF    Collection Time: 10/06/21  9:24 PM   Result Value Ref Range    WBC 5.8 4.6 - 13.2 K/uL    RBC 2.99 (L) 4.35 - 5.65 M/uL    HGB 7.9 (L) 13.0 - 16.0 g/dL    HCT 25.1 (L) 36.0 - 48.0 %    MCV 83.9 78.0 - 100.0 FL    MCH 26.4 24.0 - 34.0 PG    MCHC 31.5 31.0 - 37.0 g/dL    RDW 17.2 (H) 11.6 - 14.5 %    PLATELET 259 304 - 240 K/uL    MPV 9.2 9.2 - 11.8 FL    NRBC 0.0 0.0  WBC    ABSOLUTE NRBC 0.00 0.00 - 0.01 K/uL    NEUTROPHILS 72 40 - 73 %    LYMPHOCYTES 15 (L) 21 - 52 %    MONOCYTES 10 3 - 10 %    EOSINOPHILS 2 0 - 5 %    BASOPHILS 1 0 - 2 %    IMMATURE GRANULOCYTES 0 0 - 0.5 %    ABS. NEUTROPHILS 4.1 1.8 - 8.0 K/UL    ABS. LYMPHOCYTES 0.9 0.9 - 3.6 K/UL    ABS. MONOCYTES 0.6 0.05 - 1.2 K/UL    ABS. EOSINOPHILS 0.1 0.0 - 0.4 K/UL    ABS. BASOPHILS 0.1 0.0 - 0.1 K/UL    ABS. IMM.  GRANS. 0.0 0.00 - 0.04 K/UL    DF Manual      RBC COMMENTS Anisocytosis  1+        RBC COMMENTS Hypochromia  1+        RBC COMMENTS Microcytosis  1+       PROTHROMBIN TIME + INR    Collection Time: 10/06/21  9:24 PM   Result Value Ref Range    Prothrombin time 14.2 11.5 - 15.2 sec    INR 1.2 0.8 - 1.2     METABOLIC PANEL, COMPREHENSIVE    Collection Time: 10/06/21  9:24 PM   Result Value Ref Range    Sodium 140 135 - 145 mmol/L    Potassium 3.7 3.2 - 5.1 mmol/L    Chloride 106 94 - 111 mmol/L    CO2 22 21 - 33 mmol/L    Anion gap 12 mmol/L    Glucose 170 (H) 70 - 110 mg/dL    BUN 62 (H) 9 - 21 mg/dL Creatinine 3.50 (H) 0.8 - 1.50 mg/dL    BUN/Creatinine ratio 18      GFR est AA 21 ml/min/1.73m2    GFR est non-AA 17 ml/min/1.73m2    Calcium 8.4 (L) 8.5 - 10.5 mg/dL    Bilirubin, total 0.5 0.2 - 1.0 mg/dL    AST (SGOT) 18 17 - 74 U/L    ALT (SGPT) 17 3 - 72 U/L    Alk.  phosphatase 57 38 - 126 U/L    Protein, total 7.3 6.1 - 8.4 g/dL    Albumin 3.6 3.5 - 4.7 g/dL    Globulin 3.7 g/dL    A-G Ratio 1.0     TROPONIN I    Collection Time: 10/06/21  9:24 PM   Result Value Ref Range    Troponin-I, Qt. 0.03 0.02 - 0.05 ng/mL   LIPASE    Collection Time: 10/06/21  9:24 PM   Result Value Ref Range    Lipase 61 (H) 10 - 57 U/L   LACTIC ACID    Collection Time: 10/06/21  9:24 PM   Result Value Ref Range    Lactic acid 1.1 0.5 - 2.0 mmol/L   MAGNESIUM    Collection Time: 10/06/21  9:24 PM   Result Value Ref Range    Magnesium 1.6 (L) 1.7 - 2.8 mg/dL   BNP    Collection Time: 10/06/21  9:24 PM   Result Value Ref Range     (H) 0 - 100 pg/mL   EKG, 12 LEAD, INITIAL    Collection Time: 10/06/21  9:28 PM   Result Value Ref Range    Ventricular Rate 79 BPM    Atrial Rate 79 BPM    P-R Interval 277 ms    QRS Duration 113 ms    Q-T Interval 427 ms    QTC Calculation (Bezet) 490 ms    Calculated P Axis -5 degrees    Calculated R Axis 22 degrees    Calculated T Axis -15 degrees    Diagnosis       Sinus rhythm  Prolonged IL interval  Borderline low voltage, extremity leads  Probable left ventricular hypertrophy  Borderline prolonged QT interval     BLOOD GAS, ARTERIAL    Collection Time: 10/06/21 10:10 PM   Result Value Ref Range    pH 7.43 7.37 - 7.43      PCO2 37 35.0 - 45.0 mmHg    PO2 76 (L) 84 - 98 mmHg    O2 SAT 95 94 - 100 %    BICARBONATE 24 23.0 - 27.0 mmol/L    BASE DEFICIT 0.6 0.0 - 2.5 mmol/L    O2 METHOD Nasal Cannula      O2 FLOW RATE 3 L/min    FIO2 32.0 %    Sample source Arterial      SITE Right Radial      JAMES'S TEST PASS      Carboxy-Hgb 0.3 0 - 3 %    Methemoglobin 0.4 0 - 3 %    tHb 10.3 (L) 12.0 - 16.0 g/dL    Oxyhemoglobin 94.0 90 - 100 %   TROPONIN I    Collection Time: 10/06/21 11:25 PM   Result Value Ref Range    Troponin-I, Qt. 0.03 0.02 - 0.05 ng/mL   HEMOGLOBIN A1C WITH EAG    Collection Time: 10/06/21 11:25 PM   Result Value Ref Range    Hemoglobin A1c 6.7 (H) 4.2 - 5.6 %    Est. average glucose 146 mg/dL   LIPID PANEL    Collection Time: 10/06/21 11:25 PM   Result Value Ref Range    LIPID PROFILE        Cholesterol, total 97 <200 mg/dL    Triglyceride 61 <150 mg/dL    HDL Cholesterol 31 (L) 40 - 60 mg/dL    LDL, calculated 53.8 0 - 100 mg/dL    VLDL, calculated 12.2 mg/dL    CHOL/HDL Ratio 3.1 0 - 5.0     URINALYSIS W/ RFLX MICROSCOPIC    Collection Time: 10/06/21 11:30 PM   Result Value Ref Range    Color Yellow      Appearance Clear      Specific gravity 1.009 1.005 - 1.030      pH (UA) 7.0 5.0 - 8.0      Protein 100 (A) Negative mg/dL    Glucose Negative Negative mg/dL    Ketone Negative Negative mg/dL    Bilirubin Negative Negative      Blood Negative Negative      Urobilinogen 0.2 0.2 - 1.0 EU/dL    Nitrites Negative Negative      Leukocyte Esterase Trace (A) Negative     URINE MICROSCOPIC    Collection Time: 10/06/21 11:30 PM   Result Value Ref Range    WBC 5-10 0 - 4 /hpf    RBC 0-5 0 - 2 /hpf    Epithelial cells Few 0 - 20 /lpf    Bacteria Negative (A) None /hpf   OCCULT BLOOD, STOOL    Collection Time: 10/06/21 11:55 PM   Result Value Ref Range    Occult Blood,day 1 Negative      Day 1 date: 100,721     TROPONIN I    Collection Time: 10/07/21  5:17 AM   Result Value Ref Range    Troponin-I, Qt. 0.03 0.02 - 0.05 ng/mL   MAGNESIUM    Collection Time: 10/07/21  5:38 AM   Result Value Ref Range    Magnesium 2.0 1.7 - 2.8 mg/dL   ECHO ADULT COMPLETE    Collection Time: 10/07/21  8:27 AM   Result Value Ref Range    Left Atrium Minor Axis 6.02 cm    Left Atrium Major Axis 6.88 cm    LA Area 2C 28.50 cm2    LA Area 4C 28.70 cm2    LA Volume DISK BP 94.90 18.0 - 58.0 cm3    Left Atrium Major Axis 4.40 cm LA Vol Index 44.80 (A) 16.00 - 28.00 ml/m2    Left Atrium to Aortic Root Ratio 1.19     Right Atrial Area 4C 24.90 cm2    Right Atrium Major Axis 6.40 cm    Est. RA Pressure 8.00 mmHg    Right Atrium Minor Axis 4.70 cm    Aortic Regurgitant Pressure Half-time 446.00 ms    AR Max Ishmael 430.00 cm/s    Aortic Valve Systolic Peak Velocity 168.26 cm/s    AoV PG 12.00 mmHg    AV Cusp 2.20 cm    Aortic Valve Systolic Mean Gradient 2.56 mmHg    AoV VTI 38.70 cm    Aortic valve mean velocity 123.00 cm/s    Aortic Valve Area by Continuity of VTI 3.15 cm2    Aortic Valve Area by Continuity of Peak Velocity 3.22 cm2    RALF/BSA 1.49     Ao Root D 3.70 cm    AO ASC D 3.90 cm    IVSd 1.20 (A) 0.60 - 1.00 cm    LVIDd 5.50 4. 20 - 5.90 cm    LVIDs 3.70 cm    LVOT d 2.20 cm    Left Ventricular Outflow Tract Mean Gradient 4.00 mmHg    LVOT VTI 32.10 cm    LVOT VTI 30.70 cm    LVOT VTI 33.40 cm    LVOT Peak Velocity 144.00 cm/s    LVOT Peak Gradient 8.00 mmHg    LVPWd 1.30 (A) 0.60 - 1.00 cm    LV E' Lateral Velocity 7.72 cm/s    LV E' Septal Velocity 4.13 cm/s    LV ED Vol A2C 166.00 cm3    LV ES Vol A2C 50.70 cm3    E/E' lateral 16.10     E/E' septal 30.00     Left Ventricular Outflow Tract Cross Section Area 3.80 cm2    LVOT .0 cm3    Mitral Valve Deceleration Jasper 6,530.00 mm/s2    Mitral Valve Deceleration Jasper 6,530.00 mm/s2    Mitral Valve E Wave Deceleration Time 190.00 ms    Mitral Valve Pressure Half-time 56.00 ms    MV A Ishmael 89.70 cm/s    MV E Ishmael 124.00 cm/s    MVA (PHT) 3.93 cm2    MV E/A 1.40     RVIDd 3.50 cm    RVSP 77.00 mmHg    Tapse 1.89 1.50 - 2.00 cm    TR Max Velocity 416.00 cm/s    TV MG 69.00 mmHg   EKG, 12 LEAD, SUBSEQUENT    Collection Time: 10/07/21  9:11 AM   Result Value Ref Range    Ventricular Rate 71 BPM    Atrial Rate 73 BPM    P-R Interval 309 ms    QRS Duration 119 ms    Q-T Interval 442 ms    QTC Calculation (Bezet) 481 ms    Calculated P Axis 32 degrees    Calculated R Axis -4 degrees Calculated T Axis -25 degrees    Diagnosis       Sinus rhythm  Sinus pause  Prolonged LA interval  Incomplete left bundle branch block  Borderline low voltage, extremity leads  Probable left ventricular hypertrophy  Borderline prolonged QT interval     PROCALCITONIN    Collection Time: 10/07/21 10:00 AM   Result Value Ref Range    Procalcitonin 0.37 (L) 0.5 - 2.0 ng/mL   TROPONIN I    Collection Time: 10/07/21 10:00 AM   Result Value Ref Range    Troponin-I, Qt. 0.03 0.02 - 0.05 ng/mL     Discussed case with Dr. Tiffany Wall, and our impression and recommendations are as follows:  1. Shortness of breath: Troponins negative x 3 thus far; 4th troponin is pending. EKG is nonischemic. BNP is 272. Chest xray indicates atelectasis vs pneumonia. He was treated with Lasix 40 mg IV in the ER and this has been continued BID. His symptoms are significantly improved. Will continue Lasix for now, but nephrology consultation is pending; will defer further dosage adjustments to their discretion. Will obtain echocardiogram to assess overall cardiac structure and function. Continue telemetry monitoring. Keep serum potassium between 4-5 and serum magnesium > 2.  2. HTN: Blood pressures are at goal. Continue current therapy. 3. Hyperlipidemia: Continue statin therapy. 4. CKD: Creatinine today 3.5. Will avoid nephrotoxins. Continue diuresis. Nephrology consultation is pending. Thank you for involving us in the care of this patient. Please do not hesitate to call me or Dr. Tiffany Wall if additional questions arise. If assistance is needed after hours or on weekends, please call the answering service at [phone].

## 2021-10-07 NOTE — PROGRESS NOTES
Comprehensive Nutrition Assessment    Type and Reason for Visit: Initial    Nutrition Recommendations/Plan: 3 CHO choice cardiac restricted diet with 1500 mL FR    Nutrition Assessment:  79 yo male pmh: CHF, bladder cancer, DVT, DM, GERD, Gout, High Cholesterol, HTN, kidney carcinoma   morbidly obese male BMI 30.6. consulted for Diabietic education Hgb A1c actually 6.7 as of 10/6/2021 but pt does endorse BG levels > 200 and < 90 at times. Pt likes to drink sugary drinks and not eat solid foods often unless its convenience foods. uncontrolled BG likely causing excessive thirst leading to this CHF exacerbation admission. Pt reports lives with family members but no one likes to cook including himself as to why he drinks sugary drinks for taste and eats convenience items he describes as junk foods which more than likely high in sugar and sodium. Asked pt if he is willing to change lifestyle who reports yes he is. So provided pt with diabetic diet handout via nutrition care manual showing sample meal plan for entire day. Emphasizing need to eat 3 balanced meals/day spread out and use snacks only if BG is dropping low. Each meals will consist of a HBV protein source, non starchy vegetable and 3 CHO choices. Pt will also stop sodas and will drink tea or water but use splenda instead of sugar. Pt also encouraged to not add salt to meals at table and use very little salt during cooking again due to CHF. Pt reports he understands, but suspect due to family members in the household all eating the same way pt high risk of being non compliant.      Recent Results (from the past 24 hour(s))   CBC WITH AUTOMATED DIFF    Collection Time: 10/06/21  9:24 PM   Result Value Ref Range    WBC 5.8 4.6 - 13.2 K/uL    RBC 2.99 (L) 4.35 - 5.65 M/uL    HGB 7.9 (L) 13.0 - 16.0 g/dL    HCT 25.1 (L) 36.0 - 48.0 %    MCV 83.9 78.0 - 100.0 FL    MCH 26.4 24.0 - 34.0 PG    MCHC 31.5 31.0 - 37.0 g/dL    RDW 17.2 (H) 11.6 - 14.5 %    PLATELET 944 540 - 420 K/uL    MPV 9.2 9.2 - 11.8 FL    NRBC 0.0 0.0  WBC    ABSOLUTE NRBC 0.00 0.00 - 0.01 K/uL    NEUTROPHILS 72 40 - 73 %    LYMPHOCYTES 15 (L) 21 - 52 %    MONOCYTES 10 3 - 10 %    EOSINOPHILS 2 0 - 5 %    BASOPHILS 1 0 - 2 %    IMMATURE GRANULOCYTES 0 0 - 0.5 %    ABS. NEUTROPHILS 4.1 1.8 - 8.0 K/UL    ABS. LYMPHOCYTES 0.9 0.9 - 3.6 K/UL    ABS. MONOCYTES 0.6 0.05 - 1.2 K/UL    ABS. EOSINOPHILS 0.1 0.0 - 0.4 K/UL    ABS. BASOPHILS 0.1 0.0 - 0.1 K/UL    ABS. IMM. GRANS. 0.0 0.00 - 0.04 K/UL    DF Manual      RBC COMMENTS Anisocytosis  1+        RBC COMMENTS Hypochromia  1+        RBC COMMENTS Microcytosis  1+       PROTHROMBIN TIME + INR    Collection Time: 10/06/21  9:24 PM   Result Value Ref Range    Prothrombin time 14.2 11.5 - 15.2 sec    INR 1.2 0.8 - 1.2     METABOLIC PANEL, COMPREHENSIVE    Collection Time: 10/06/21  9:24 PM   Result Value Ref Range    Sodium 140 135 - 145 mmol/L    Potassium 3.7 3.2 - 5.1 mmol/L    Chloride 106 94 - 111 mmol/L    CO2 22 21 - 33 mmol/L    Anion gap 12 mmol/L    Glucose 170 (H) 70 - 110 mg/dL    BUN 62 (H) 9 - 21 mg/dL    Creatinine 3.50 (H) 0.8 - 1.50 mg/dL    BUN/Creatinine ratio 18      GFR est AA 21 ml/min/1.73m2    GFR est non-AA 17 ml/min/1.73m2    Calcium 8.4 (L) 8.5 - 10.5 mg/dL    Bilirubin, total 0.5 0.2 - 1.0 mg/dL    AST (SGOT) 18 17 - 74 U/L    ALT (SGPT) 17 3 - 72 U/L    Alk.  phosphatase 57 38 - 126 U/L    Protein, total 7.3 6.1 - 8.4 g/dL    Albumin 3.6 3.5 - 4.7 g/dL    Globulin 3.7 g/dL    A-G Ratio 1.0     TROPONIN I    Collection Time: 10/06/21  9:24 PM   Result Value Ref Range    Troponin-I, Qt. 0.03 0.02 - 0.05 ng/mL   LIPASE    Collection Time: 10/06/21  9:24 PM   Result Value Ref Range    Lipase 61 (H) 10 - 57 U/L   LACTIC ACID    Collection Time: 10/06/21  9:24 PM   Result Value Ref Range    Lactic acid 1.1 0.5 - 2.0 mmol/L   MAGNESIUM    Collection Time: 10/06/21  9:24 PM   Result Value Ref Range    Magnesium 1.6 (L) 1.7 - 2.8 mg/dL   BNP Collection Time: 10/06/21  9:24 PM   Result Value Ref Range     (H) 0 - 100 pg/mL   EKG, 12 LEAD, INITIAL    Collection Time: 10/06/21  9:28 PM   Result Value Ref Range    Ventricular Rate 79 BPM    Atrial Rate 79 BPM    P-R Interval 277 ms    QRS Duration 113 ms    Q-T Interval 427 ms    QTC Calculation (Bezet) 490 ms    Calculated P Axis -5 degrees    Calculated R Axis 22 degrees    Calculated T Axis -15 degrees    Diagnosis       Sinus rhythm  Prolonged [place] interval  Borderline low voltage, extremity leads  Probable left ventricular hypertrophy  Borderline prolonged QT interval     BLOOD GAS, ARTERIAL    Collection Time: 10/06/21 10:10 PM   Result Value Ref Range    pH 7.43 7.37 - 7.43      PCO2 37 35.0 - 45.0 mmHg    PO2 76 (L) 84 - 98 mmHg    O2 SAT 95 94 - 100 %    BICARBONATE 24 23.0 - 27.0 mmol/L    BASE DEFICIT 0.6 0.0 - 2.5 mmol/L    O2 METHOD Nasal Cannula      O2 FLOW RATE 3 L/min    FIO2 32.0 %    Sample source Arterial      SITE Right Radial      JAMES'S TEST PASS      Carboxy-Hgb 0.3 0 - 3 %    Methemoglobin 0.4 0 - 3 %    tHb 10.3 (L) 12.0 - 16.0 g/dL    Oxyhemoglobin 94.0 90 - 100 %   TROPONIN I    Collection Time: 10/06/21 11:25 PM   Result Value Ref Range    Troponin-I, Qt. 0.03 0.02 - 0.05 ng/mL   HEMOGLOBIN A1C WITH EAG    Collection Time: 10/06/21 11:25 PM   Result Value Ref Range    Hemoglobin A1c 6.7 (H) 4.2 - 5.6 %    Est. average glucose 146 mg/dL   LIPID PANEL    Collection Time: 10/06/21 11:25 PM   Result Value Ref Range    LIPID PROFILE        Cholesterol, total 97 <200 mg/dL    Triglyceride 61 <150 mg/dL    HDL Cholesterol 31 (L) 40 - 60 mg/dL    LDL, calculated 53.8 0 - 100 mg/dL    VLDL, calculated 12.2 mg/dL    CHOL/HDL Ratio 3.1 0 - 5.0     URINALYSIS W/ RFLX MICROSCOPIC    Collection Time: 10/06/21 11:30 PM   Result Value Ref Range    Color Yellow      Appearance Clear      Specific gravity 1.009 1.005 - 1.030      pH (UA) 7.0 5.0 - 8.0      Protein 100 (A) Negative mg/dL Glucose Negative Negative mg/dL    Ketone Negative Negative mg/dL    Bilirubin Negative Negative      Blood Negative Negative      Urobilinogen 0.2 0.2 - 1.0 EU/dL    Nitrites Negative Negative      Leukocyte Esterase Trace (A) Negative     URINE MICROSCOPIC    Collection Time: 10/06/21 11:30 PM   Result Value Ref Range    WBC 5-10 0 - 4 /hpf    RBC 0-5 0 - 2 /hpf    Epithelial cells Few 0 - 20 /lpf    Bacteria Negative (A) None /hpf   OCCULT BLOOD, STOOL    Collection Time: 10/06/21 11:55 PM   Result Value Ref Range    Occult Blood,day 1 Negative      Day 1 date: 100,721     TROPONIN I    Collection Time: 10/07/21  5:17 AM   Result Value Ref Range    Troponin-I, Qt. 0.03 0.02 - 0.05 ng/mL   MAGNESIUM    Collection Time: 10/07/21  5:38 AM   Result Value Ref Range    Magnesium 2.0 1.7 - 2.8 mg/dL   ECHO ADULT COMPLETE    Collection Time: 10/07/21  8:27 AM   Result Value Ref Range    Left Atrium Major Axis 6.88 cm    LA Area 2C 28.50 cm2    LA Area 4C 28.70 cm2    LA Volume DISK BP 94.90 18.0 - 58.0 cm3    Left Atrium Major Axis 4.40 cm    Left Atrium to Aortic Root Ratio 1.19     Right Atrial Area 4C 24.90 cm2    Right Atrium Major Axis 6.40 cm    Est. RA Pressure 8.00 mmHg    Right Atrium Minor Axis 4.70 cm    Aortic Regurgitant Pressure Half-time 446.00 ms    AR Max Ishmael 430.00 cm/s    Aortic Valve Systolic Peak Velocity 438.97 cm/s    AoV PG 12.00 mmHg    AV Cusp 2.20 cm    Aortic Valve Systolic Mean Gradient 6.94 mmHg    AoV VTI 38.70 cm    Aortic valve mean velocity 123.00 cm/s    Aortic Valve Area by Continuity of VTI 3.15 cm2    Aortic Valve Area by Continuity of Peak Velocity 3.22 cm2    RALF/BSA 1.49     Ao Root D 3.70 cm    AO ASC D 3.90 cm    IVSd 1.20 (A) 0.60 - 1.00 cm    LVIDd 5.50 4. 20 - 5.90 cm    LVIDs 3.70 cm    LVOT d 2.20 cm    Left Ventricular Outflow Tract Mean Gradient 4.00 mmHg    LVOT VTI 32.10 cm    LVOT VTI 30.70 cm    LVOT VTI 33.40 cm    LVOT Peak Velocity 144.00 cm/s    LVOT Peak Gradient 8.00 mmHg    LVPWd 1.30 (A) 0.60 - 1.00 cm    LV E' Lateral Velocity 7.72 cm/s    LV E' Septal Velocity 4.13 cm/s    LV ED Vol A2C 166.00 cm3    LV ES Vol A2C 50.70 cm3    E/E' lateral 16.06     E/E' septal 30.02     LVOT .0 cm3    Mitral Valve Deceleration [place] 6,530.00 mm/s2    Mitral Valve Deceleration [place] 6,530.00 mm/s2    Mitral Valve E Wave Deceleration Time 190.00 ms    Mitral Valve Pressure Half-time 56.00 ms    MV A Ishmael 89.70 cm/s    MV E Ishmael 124.00 cm/s    MVA (PHT) 3.93 cm2    MV E/A 1.38     RVIDd 3.50 cm    RVSP 77.00 mmHg    Tapse 1.89 1.50 - 2.00 cm    TR Max Velocity 416.00 cm/s    TV MG 69.00 mmHg    BP EF 60.5 55.0 - 100.0 %    LV Mass .2 88.0 - 224.0 g    LV Mass AL Index 135.9 49.0 - 115.0 g/m2    E/E' ratio (averaged) 23.04     RALF/BSA Pk Ishmael 1.5 cm2/m2    RALF/BSA VTI 1.5 cm2/m2   EKG, 12 LEAD, SUBSEQUENT    Collection Time: 10/07/21  9:11 AM   Result Value Ref Range    Ventricular Rate 71 BPM    Atrial Rate 73 BPM    P-R Interval 309 ms    QRS Duration 119 ms    Q-T Interval 442 ms    QTC Calculation (Bezet) 481 ms    Calculated P Axis 32 degrees    Calculated R Axis -4 degrees    Calculated T Axis -25 degrees    Diagnosis       Sinus rhythm  Sinus pause  Prolonged [place] interval  Incomplete left bundle branch block  Borderline low voltage, extremity leads  Probable left ventricular hypertrophy  Borderline prolonged QT interval     PROCALCITONIN    Collection Time: 10/07/21 10:00 AM   Result Value Ref Range    Procalcitonin 0.37 (L) 0.5 - 2.0 ng/mL   TROPONIN I    Collection Time: 10/07/21 10:00 AM   Result Value Ref Range    Troponin-I, Qt. 0.03 0.02 - 0.05 ng/mL       Malnutrition Assessment:  Malnutrition Status:  Mild malnutrition    Context:  Acute illness     Findings of the 6 clinical characteristics of malnutrition:   Energy Intake:  No significant decrease in energy intake  Weight Loss:  No significant weight loss     Body Fat Loss:  No significant body fat loss,     Muscle Mass Loss:  No significant muscle mass loss,    Fluid Accumulation:  1 - Mild, Extremities   Strength:  Normal  strength         Estimated Daily Nutrient Needs:  Energy (kcal): 6872-4108 kcal/day; Weight Used for Energy Requirements: Admission (91 kg)  Protein (g): 73-91 g/day; Weight Used for Protein Requirements: Admission (0.8-1 g/kg)  Fluid (ml/day): 1500 mL/day; Method Used for Fluid Requirements: Other (comment) (1500 mL FR)      Nutrition Related Findings:  morbidly obese male BMI 30.6. consulted for Diabietic education Hgb A1c actually 6.7 as of 10/6/2021 but pt does endorse BG levels > 200 and < 90 at times. Pt likes to drink sugary drinks and not eat solid foods often unless its convenience foods. uncontrolled BG likely causing excessive thirst leading to this CHF exacerbation admission. Wounds:    None       Current Nutrition Therapies:  ADULT DIET Regular; 3 carb choices (45 gm/meal); Low Fat/Low Chol/High Fiber/2 gm Na; 1500 ml    Anthropometric Measures:  · Height:  5' 8\" (172.7 cm)  · Current Body Wt:  90.7 kg (200 lb)   · Admission Body Wt:  200 lb    · Usual Body Wt:        · Ideal Body Wt:  154 lbs:  129.9 %   · Adjusted Body Weight:   ; Weight Adjustment for: No adjustment   · Adjusted BMI:       · BMI Category:  Obese class 1 (BMI 30.0-34. 9)       Nutrition Diagnosis:   · Limited adherence to nutrition-related recommendations related to endocrine dysfunction, cardiac dysfunction as evidenced by lab values, other (specify) (diet hx)      Nutrition Interventions:   Food and/or Nutrient Delivery: Continue current diet  Nutrition Education and Counseling: Education initiated, Education completed  Coordination of Nutrition Care: Continue to monitor while inpatient    Goals:  Hgb A1c < 7, BG , compliance with diet order, pt to eat > 75% of meals, BM q 1-3 days       Nutrition Monitoring and Evaluation:   Behavioral-Environmental Outcomes: Readiness for change  Food/Nutrient Intake Outcomes: Food and nutrient intake  Physical Signs/Symptoms Outcomes: Biochemical data, Meal time behavior, Weight     F/u: 10/11/2021    Discharge Planning:    Continue current diet, Recommend pursue outpatient diabetes education     Electronically signed by Thang Hatch on 10/7/2021 at 4:42 PM    Contact: [place] [phone]

## 2021-10-08 LAB
ALBUMIN SERPL-MCNC: 3.3 G/DL (ref 3.5–4.7)
ANION GAP SERPL CALC-SCNC: 11 MMOL/L
ANION GAP SERPL CALC-SCNC: 13 MMOL/L
ATRIAL RATE: 73 BPM
ATRIAL RATE: 79 BPM
BASOPHILS # BLD: 0 K/UL (ref 0–0.1)
BASOPHILS NFR BLD: 1 % (ref 0–2)
BUN SERPL-MCNC: 65 MG/DL (ref 9–21)
BUN SERPL-MCNC: 66 MG/DL (ref 9–21)
BUN/CREAT SERPL: 17
BUN/CREAT SERPL: 17
CA-I BLD-MCNC: 8 MG/DL (ref 8.5–10.1)
CA-I BLD-MCNC: 8.4 MG/DL (ref 8.5–10.5)
CA-I BLD-MCNC: 8.7 MG/DL (ref 8.5–10.5)
CALCULATED P AXIS, ECG09: -5 DEGREES
CALCULATED P AXIS, ECG09: 32 DEGREES
CALCULATED R AXIS, ECG10: -4 DEGREES
CALCULATED R AXIS, ECG10: 22 DEGREES
CALCULATED T AXIS, ECG11: -15 DEGREES
CALCULATED T AXIS, ECG11: -25 DEGREES
CHLORIDE SERPL-SCNC: 104 MMOL/L (ref 94–111)
CHLORIDE SERPL-SCNC: 107 MMOL/L (ref 94–111)
CO2 SERPL-SCNC: 26 MMOL/L (ref 21–33)
CO2 SERPL-SCNC: 26 MMOL/L (ref 21–33)
CREAT SERPL-MCNC: 3.9 MG/DL (ref 0.8–1.5)
CREAT SERPL-MCNC: 3.9 MG/DL (ref 0.8–1.5)
DIAGNOSIS, 93000: NORMAL
DIAGNOSIS, 93000: NORMAL
DIFFERENTIAL METHOD BLD: ABNORMAL
EOSINOPHIL # BLD: 0.2 K/UL (ref 0–0.4)
EOSINOPHIL NFR BLD: 3 % (ref 0–5)
ERYTHROCYTE [DISTWIDTH] IN BLOOD BY AUTOMATED COUNT: 17.2 % (ref 11.6–14.5)
FERRITIN SERPL-MCNC: 155 NG/ML (ref 8–388)
GLUCOSE SERPL-MCNC: 142 MG/DL (ref 70–110)
GLUCOSE SERPL-MCNC: 67 MG/DL (ref 70–110)
HCT VFR BLD AUTO: 27.1 % (ref 36–48)
HGB BLD-MCNC: 8.2 G/DL (ref 13–16)
IMM GRANULOCYTES # BLD AUTO: 0 K/UL (ref 0–0.04)
IMM GRANULOCYTES NFR BLD AUTO: 0 % (ref 0–0.5)
IRON SATN MFR SERPL: 10 % (ref 20–50)
IRON SERPL-MCNC: 24 UG/DL (ref 50–175)
LYMPHOCYTES # BLD: 0.8 K/UL (ref 0.9–3.6)
LYMPHOCYTES NFR BLD: 13 % (ref 21–52)
MAGNESIUM SERPL-MCNC: 1.9 MG/DL (ref 1.7–2.8)
MCH RBC QN AUTO: 25.7 PG (ref 24–34)
MCHC RBC AUTO-ENTMCNC: 30.3 G/DL (ref 31–37)
MCV RBC AUTO: 85 FL (ref 78–100)
MONOCYTES # BLD: 0.6 K/UL (ref 0.05–1.2)
MONOCYTES NFR BLD: 10 % (ref 3–10)
NEUTS SEG # BLD: 4.4 K/UL (ref 1.8–8)
NEUTS SEG NFR BLD: 73 % (ref 40–73)
NRBC # BLD: 0 K/UL (ref 0–0.01)
NRBC BLD-RTO: 0 PER 100 WBC
P-R INTERVAL, ECG05: 277 MS
P-R INTERVAL, ECG05: 309 MS
PHOSPHATE SERPL-MCNC: 4.6 MG/DL (ref 2.5–4.5)
PHOSPHATE SERPL-MCNC: 4.7 MG/DL (ref 2.5–4.5)
PLATELET # BLD AUTO: 295 K/UL (ref 135–420)
PMV BLD AUTO: 10 FL (ref 9.2–11.8)
POTASSIUM SERPL-SCNC: 3.7 MMOL/L (ref 3.2–5.1)
POTASSIUM SERPL-SCNC: 3.8 MMOL/L (ref 3.2–5.1)
PTH-INTACT SERPL-MCNC: 174.2 PG/ML (ref 18.4–88)
Q-T INTERVAL, ECG07: 427 MS
Q-T INTERVAL, ECG07: 442 MS
QRS DURATION, ECG06: 113 MS
QRS DURATION, ECG06: 119 MS
QTC CALCULATION (BEZET), ECG08: 481 MS
QTC CALCULATION (BEZET), ECG08: 490 MS
RBC # BLD AUTO: 3.19 M/UL (ref 4.35–5.65)
SODIUM SERPL-SCNC: 141 MMOL/L (ref 135–145)
SODIUM SERPL-SCNC: 146 MMOL/L (ref 135–145)
TIBC SERPL-MCNC: 249 UG/DL (ref 250–450)
VENTRICULAR RATE, ECG03: 71 BPM
VENTRICULAR RATE, ECG03: 79 BPM
WBC # BLD AUTO: 6.1 K/UL (ref 4.6–13.2)

## 2021-10-08 PROCEDURE — 83970 ASSAY OF PARATHORMONE: CPT

## 2021-10-08 PROCEDURE — 80048 BASIC METABOLIC PNL TOTAL CA: CPT

## 2021-10-08 PROCEDURE — 83540 ASSAY OF IRON: CPT

## 2021-10-08 PROCEDURE — 83735 ASSAY OF MAGNESIUM: CPT

## 2021-10-08 PROCEDURE — 82728 ASSAY OF FERRITIN: CPT

## 2021-10-08 PROCEDURE — 74011000250 HC RX REV CODE- 250: Performed by: INTERNAL MEDICINE

## 2021-10-08 PROCEDURE — 94761 N-INVAS EAR/PLS OXIMETRY MLT: CPT

## 2021-10-08 PROCEDURE — 36415 COLL VENOUS BLD VENIPUNCTURE: CPT

## 2021-10-08 PROCEDURE — 74011636637 HC RX REV CODE- 636/637: Performed by: NURSE PRACTITIONER

## 2021-10-08 PROCEDURE — 74011250637 HC RX REV CODE- 250/637: Performed by: INTERNAL MEDICINE

## 2021-10-08 PROCEDURE — 80069 RENAL FUNCTION PANEL: CPT

## 2021-10-08 PROCEDURE — 74011250637 HC RX REV CODE- 250/637: Performed by: NURSE PRACTITIONER

## 2021-10-08 PROCEDURE — 84100 ASSAY OF PHOSPHORUS: CPT

## 2021-10-08 PROCEDURE — 77010033678 HC OXYGEN DAILY

## 2021-10-08 PROCEDURE — 65270000029 HC RM PRIVATE

## 2021-10-08 PROCEDURE — 85025 COMPLETE CBC W/AUTO DIFF WBC: CPT

## 2021-10-08 RX ORDER — SODIUM CHLORIDE 450 MG/100ML
75 INJECTION, SOLUTION INTRAVENOUS CONTINUOUS
Status: DISPENSED | OUTPATIENT
Start: 2021-10-08 | End: 2021-10-09

## 2021-10-08 RX ADMIN — CINACALCET HYDROCHLORIDE 30 MG: 30 TABLET, FILM COATED ORAL at 09:26

## 2021-10-08 RX ADMIN — PANTOPRAZOLE SODIUM 40 MG: 40 TABLET, DELAYED RELEASE ORAL at 09:26

## 2021-10-08 RX ADMIN — FERROUS SULFATE TAB 325 MG (65 MG ELEMENTAL FE) 325 MG: 325 (65 FE) TAB at 09:26

## 2021-10-08 RX ADMIN — SODIUM CHLORIDE 75 ML/HR: 4.5 INJECTION, SOLUTION INTRAVENOUS at 13:41

## 2021-10-08 RX ADMIN — CETIRIZINE HYDROCHLORIDE 10 MG: 10 TABLET, FILM COATED ORAL at 09:26

## 2021-10-08 RX ADMIN — HYDRALAZINE HYDROCHLORIDE 100 MG: 25 TABLET, FILM COATED ORAL at 17:25

## 2021-10-08 RX ADMIN — SODIUM BICARBONATE 650 MG: 650 TABLET ORAL at 09:26

## 2021-10-08 RX ADMIN — HYDRALAZINE HYDROCHLORIDE 100 MG: 25 TABLET, FILM COATED ORAL at 09:26

## 2021-10-08 RX ADMIN — ALLOPURINOL 200 MG: 100 TABLET ORAL at 09:26

## 2021-10-08 RX ADMIN — ATORVASTATIN CALCIUM 20 MG: 10 TABLET, FILM COATED ORAL at 21:33

## 2021-10-08 RX ADMIN — AMLODIPINE BESYLATE 5 MG: 5 TABLET ORAL at 17:25

## 2021-10-08 RX ADMIN — FERROUS SULFATE TAB 325 MG (65 MG ELEMENTAL FE) 325 MG: 325 (65 FE) TAB at 17:25

## 2021-10-08 RX ADMIN — HYDRALAZINE HYDROCHLORIDE 100 MG: 25 TABLET, FILM COATED ORAL at 21:33

## 2021-10-08 RX ADMIN — FERROUS SULFATE TAB 325 MG (65 MG ELEMENTAL FE) 325 MG: 325 (65 FE) TAB at 13:40

## 2021-10-08 RX ADMIN — Medication 10 ML: at 21:34

## 2021-10-08 RX ADMIN — METOPROLOL SUCCINATE 25 MG: 25 TABLET, EXTENDED RELEASE ORAL at 09:26

## 2021-10-08 RX ADMIN — APIXABAN 2.5 MG: 2.5 TABLET, FILM COATED ORAL at 17:25

## 2021-10-08 RX ADMIN — APIXABAN 2.5 MG: 2.5 TABLET, FILM COATED ORAL at 09:27

## 2021-10-08 RX ADMIN — INSULIN GLARGINE 20 UNITS: 100 INJECTION, SOLUTION SUBCUTANEOUS at 21:34

## 2021-10-08 RX ADMIN — AMLODIPINE BESYLATE 5 MG: 5 TABLET ORAL at 09:27

## 2021-10-08 RX ADMIN — Medication 10 ML: at 13:47

## 2021-10-08 RX ADMIN — Medication 10 ML: at 05:06

## 2021-10-08 NOTE — PROGRESS NOTES
Hospitalist Progress Note             Date of Service:  10/8/2021  NAME:  Hannah Oliva  :  1945  MRN:  [id number]    Assessment & Plan:         Acute deep vein thrombosis (DVT) of distal vein of both lower extremities (HCC)  Continue Eliquis     Acute on chronic combined systolic and diastolic ACC/AHA stage C congestive heart failure (HCC)  Acute exacerbation of chronic CHF   continued on 40 IV twice daily  Cardiology consult  Echocardiogram ordered last one was in  with an EF of 50 to 55%  Fluid restriction 1500 mL/day  - echo 60%, with severe pulm htn  - cont aggressive diuresis, but watch cr closely     Primary hypertension  This is a chronic problem  Continue minoxidil, Toprol, hydralazine, Norvasc  Cardiology consult     Type 2 diabetes mellitus with stage 4 chronic kidney disease, with long-term current use of insulin ([address]  This is a chronic problem  A.m. labs  Sliding scale insulin AC at bedtime  Continue Lantus  hold oral hypoglycemics     ARON on  Stage 3 chronic kidney disease ([address]  - worsening due to diuresis  - stop lasix, give some ivf back today per nephrology  - trend, can dc home once cr improving      I suspect pt problem more due ot pulm htn than chf, will likely need dc home on 02, 6 min walk prior to dc, and follow up with pulm htn specialist, and cardiology, will likely need RHC.           Hospital Problems  Date Reviewed: 10/29/2020        Codes Class Noted POA    Acute on chronic combined systolic and diastolic ACC/AHA stage C congestive heart failure (HCC) ICD-10-CM: I50.43  ICD-9-CM: 428.43, 428.0  10/7/2021 Unknown        Acute deep vein thrombosis (DVT) of distal vein of both lower extremities (HCC) ICD-10-CM: T24.0C9  ICD-9-CM: 453.42  10/13/2020 Yes        Stage 3 chronic kidney disease ([address] ICD-10-CM: N18.30  ICD-9-CM: 585.3  2014 Yes        Type 2 diabetes mellitus with stage 4 chronic kidney disease, with long-term current use of insulin (HCC) ICD-10-CM: E11.22, N18.4, Z79.4  ICD-9-CM: 250.40, 585.4, V58.67  12/7/2009 Yes        Primary hypertension ICD-10-CM: I10  ICD-9-CM: 401.9  12/7/2009 Unknown                Review of Systems:   A comprehensive review of systems was negative except for that written in the HPI. Vital Signs:    Last 24hrs VS reviewed since prior progress note. Most recent are:  Visit Vitals  BP (!) 140/61   Pulse 73   Temp 96.9 °F (36.1 °C)   Resp 20   Ht 5' 8\" (1.727 m)   Wt 92.5 kg (204 lb)   SpO2 97%   BMI 31.02 kg/m²         Intake/Output Summary (Last 24 hours) at 10/8/2021 1340  Last data filed at 10/8/2021 0720  Gross per 24 hour   Intake 240 ml   Output 3100 ml   Net -2860 ml        Physical Examination:             General:          Alert, cooperative, no distress, appears stated age. comfortable on 02 per nasal cannula   HEENT:           Atraumatic, anicteric sclerae, pink conjunctivae                          No oral ulcers, mucosa moist, throat clear, dentition fair  Neck:               Supple, symmetrical  Lungs:             Clear to auscultation bilaterally. No Wheezing or Rhonchi. No rales. Chest wall:      No tenderness  No Accessory muscle use. Heart:              Regular  rhythm,  No  murmur   No edema  Abdomen:        Soft, non-tender. Not distended. Bowel sounds normal  Extremities:     No cyanosis. No clubbing,                            Skin turgor normal, Capillary refill normal  Skin:                Not pale. Not Jaundiced  No rashes   Psych:             Not anxious or agitated.   Neurologic:      Alert, moves all extremities, answers questions appropriately and responds to commands        Data Review:    Review and/or order of clinical lab test  Review and/or order of tests in the radiology section of CPT  Review and/or order of tests in the medicine section of CPT      Labs:     Recent Labs     10/08/21  0500 10/06/21  2124   WBC 6.1 5.8   HGB 8.2* 7.9*   HCT 27.1* 25.1*    257     Recent Labs     10/08/21  1115 10/08/21  0500 10/07/21  0538 10/06/21  2124    146*  --  140   K 3.8 3.7  --  3.7    107  --  106   CO2 26 26  --  22   BUN 65* 66*  --  62*   CREA 3.90* 3.90*  --  3.50*   * 67*  --  170*   CA 8.4* 8.7  --  8.4*   MG  --  1.9 2.0 1.6*   PHOS 4.6* 4.7*  --   --      Recent Labs     10/08/21  1115 10/06/21  2124   ALT  --  17   AP  --  57   TBILI  --  0.5   TP  --  7.3   ALB 3.3* 3.6   GLOB  --  3.7   LPSE  --  61*     Recent Labs     10/06/21  2124   INR 1.2   PTP 14.2      No results for input(s): FE, TIBC, PSAT, FERR in the last 72 hours.    No results found for: FOL, RBCF   Recent Labs     10/06/21  2210   PH 7.43   PCO2 37   PO2 76*     Recent Labs     10/07/21  1635 10/07/21  1000 10/07/21  0517   TROIQ 0.02 0.03 0.03     Lab Results   Component Value Date/Time    Cholesterol, total 97 10/06/2021 11:25 PM    HDL Cholesterol 31 (L) 10/06/2021 11:25 PM    LDL, calculated 53.8 10/06/2021 11:25 PM    Triglyceride 61 10/06/2021 11:25 PM    CHOL/HDL Ratio 3.1 10/06/2021 11:25 PM     Lab Results   Component Value Date/Time    Glucose (POC) 245 (H) 01/13/2021 10:33 AM    Glucose (POC) 147 (H) 01/13/2021 07:32 AM    Glucose (POC) 232 (H) 09/19/2020 11:48 AM    Glucose (POC) 99 09/19/2020 07:39 AM    Glucose (POC) 55 (L) 09/18/2020 04:29 PM     Lab Results   Component Value Date/Time    Color Yellow 10/06/2021 11:30 PM    Appearance Clear 10/06/2021 11:30 PM    Specific gravity 1.009 10/06/2021 11:30 PM    pH (UA) 7.0 10/06/2021 11:30 PM    Protein 100 (A) 10/06/2021 11:30 PM    Glucose Negative 10/06/2021 11:30 PM    Ketone Negative 10/06/2021 11:30 PM    Bilirubin Negative 10/06/2021 11:30 PM    Urobilinogen 0.2 10/06/2021 11:30 PM    Nitrites Negative 10/06/2021 11:30 PM    Leukocyte Esterase Trace (A) 10/06/2021 11:30 PM    Epithelial cells Few 10/06/2021 11:30 PM    Bacteria Negative (A) 10/06/2021 11:30 PM    WBC 5-10 10/06/2021 11:30 PM    RBC 0-5 10/06/2021 11:30 PM         Medications Reviewed:     Current Facility-Administered Medications   Medication Dose Route Frequency    0.45% sodium chloride infusion  75 mL/hr IntraVENous CONTINUOUS    albuterol (PROVENTIL HFA, VENTOLIN HFA, PROAIR HFA) inhaler 2 Puff  2 Puff Inhalation Q4H PRN    allopurinoL (ZYLOPRIM) tablet 200 mg  200 mg Oral DAILY    amLODIPine (NORVASC) tablet 5 mg  5 mg Oral BID    apixaban (ELIQUIS) tablet 2.5 mg  2.5 mg Oral BID    cinacalcet (SENSIPAR) tablet 30 mg  30 mg Oral DAILY    fluticasone propionate (FLONASE) 50 mcg/actuation nasal spray 1 Spray  1 Spray Both Nostrils DAILY PRN    hydrALAZINE (APRESOLINE) tablet 100 mg  100 mg Oral TID    insulin glargine (LANTUS) injection 20 Units  20 Units SubCUTAneous QHS    metoprolol succinate (TOPROL-XL) XL tablet 25 mg  25 mg Oral DAILY    pantoprazole (PROTONIX) tablet 40 mg  40 mg Oral ACB    atorvastatin (LIPITOR) tablet 20 mg  20 mg Oral QHS    ferrous sulfate tablet 325 mg  1 Tablet Oral TID WITH MEALS    cetirizine (ZYRTEC) tablet 10 mg  10 mg Oral DAILY    epoetin sadi-epbx (RETACRIT) injection 10,000 Units  10,000 Units SubCUTAneous Q7D    sodium chloride (NS) flush 5-40 mL  5-40 mL IntraVENous Q8H    sodium chloride (NS) flush 5-40 mL  5-40 mL IntraVENous PRN    acetaminophen (TYLENOL) tablet 650 mg  650 mg Oral Q6H PRN    Or    acetaminophen (TYLENOL) suppository 650 mg  650 mg Rectal Q6H PRN    polyethylene glycol (MIRALAX) packet 17 g  17 g Oral DAILY PRN    ondansetron (ZOFRAN ODT) tablet 4 mg  4 mg Oral Q8H PRN    Or    ondansetron (ZOFRAN) injection 4 mg  4 mg IntraVENous Q6H PRN     ______________________________________________________________________  EXPECTED LENGTH OF STAY: 4d 0h  ACTUAL LENGTH OF STAY:          2                 Allyson Caro MD

## 2021-10-08 NOTE — PROGRESS NOTES
Patient assessment completed, no pain or distress noted, Patient rested quietly in bed for most of shift.  CBWR

## 2021-10-08 NOTE — PROGRESS NOTES
Renal Note    NAME:  [place]   :   1945   MRN:   [id number]     ATTENDING: Obed Kumari MD  PCP:  Romana Boyden, NP    Date/Time:  10/8/2021 2:20 PM      Subjective:     Patient seen in the room. He is sitting comfortably in bed. Denies any shortness of breath. Excellent urine output of 2.8 L in the past 24 hours despite decreasing Lasix to 40 mg IV daily yesterday. Creatinine has further increased to 3.9 today. BUN 66    Past Medical History:   Diagnosis Date    Acid reflux     Arthritis     Bladder cancer (HCC)     Congestive heart failure (HCC)     Deep vein thrombosis (DVT) of proximal vein of both lower extremities ([facility] [address] 2020    Diabetes mellitus (HCC)     GERD (gastroesophageal reflux disease)     Gout     High cholesterol     Hypertension     Kidney carcinoma, left (HCC)     Kidney disease     Pituitary mass ([facility] [address]     Reflux esophagitis     Unspecified deficiency anemia       Past Surgical History:   Procedure Laterality Date    HX CYSTECTOMY  09    Dr. Domenico Tovar HX NEPHRECTOMY Left 2009    Nephroureterectomy by Dr. Fatoumata Otero HX OTHER SURGICAL      surgery on pituitary gland     Social History     Tobacco Use    Smoking status: Former Smoker     Packs/day: 0.50     Years: 40.00     Pack years: 20.00     Types: Cigarettes     Quit date: 1999     Years since quittin.7    Smokeless tobacco: Never Used   Substance Use Topics    Alcohol use: No      Family History   Problem Relation Age of Onset    Heart Disease Father     Heart Disease Mother        No Known Allergies   Prior to Admission medications    Medication Sig Start Date End Date Taking? Authorizing Provider   albuterol (PROVENTIL HFA, VENTOLIN HFA, PROAIR HFA) 90 mcg/actuation inhaler Take 2 Puffs by inhalation every four (4) hours as needed for Wheezing, Shortness of Breath or Respiratory Distress.  21  Yes Berry Smith NP   cinacalcet (Sensipar) 30 mg tablet Take 1 Tablet by mouth daily for 30 days. 9/22/21 10/22/21 Yes Cally Rawls MD   ferrous sulfate (IRON) 325 mg (65 mg iron) EC tablet Take 1 Tablet by mouth three (3) times daily (with meals). 9/22/21  Yes Cally Rawls MD   sodium bicarbonate 650 mg tablet Take 1 Tablet by mouth three (3) times daily for 30 days. 9/22/21 10/22/21 Yes Cally Rawls MD   simvastatin (ZOCOR) 40 mg tablet TAKE 1 TABLET BY MOUTH EVERY NIGHT 9/8/21  Yes Ghazala Smith NP   metoprolol succinate (TOPROL-XL) 25 mg XL tablet Take 1 Tablet by mouth daily. 7/13/21  Yes Melba Grace MD   minoxidiL (LONITEN) 2.5 mg tablet TAKE 1 TABLET BY MOUTH TWICE DAILY 8/10/21  Yes Cally Rawls MD   amLODIPine (NORVASC) 5 mg tablet TAKE 2 TABLETS BY MOUTH ONCE A DAY 7/30/21  Yes Ghazala Smith NP   glipiZIDE (GLUCOTROL) 10 mg tablet Take 1 Tablet by mouth two (2) times a day. 7/30/21  Yes Ghazala Smith NP   furosemide (LASIX) 40 mg tablet TAKE 2 TABLETS BY MOUTH DAILY 7/26/21  Yes Cally Rawls MD   allopurinoL (ZYLOPRIM) 100 mg tablet TAKE 2 TABLETS BY MOUTH DAILY 7/4/21  Yes Ghazala Smith NP   hydrALAZINE (APRESOLINE) 50 mg tablet TAKE 2 TABLETS BY MOUTH THREE TIMES DAILY 6/28/21  Yes Cally Rawls MD   omeprazole (PRILOSEC) 40 mg capsule TAKE 1 CAPSULE BY MOUTH DAILY 6/3/21  Yes Ghazala Smith NP   polyethylene glycol (Miralax) 17 gram packet Take 1 Packet by mouth daily. 6/3/21  Yes Ghazala Smith NP   apixaban (Eliquis) 2.5 mg tablet Take 1 Tablet by mouth two (2) times a day.  6/3/21  Yes Ghazala Smith NP   glucose blood VI test strips (ASCENSIA AUTODISC VI, ONE TOUCH ULTRA TEST VI) strip Check blood glucose 3 times daily, give Accu chek Precious test strips 6/1/21  Yes Ghazala Smith, NP   insulin glargine (Lantus Solostar U-100 Insulin) 100 unit/mL (3 mL) inpn INJECT 25 UNITS SUBCUTANEOUSLY ONCE nightly 5/6/21  Yes Jhonny Smith, NP ergocalciferol (ERGOCALCIFEROL) 1,250 mcg (50,000 unit) capsule TAKE ONE CAPSULE BY MOUTH EVERY 7 DAYS 21  Yes Samson Amezquita MD   BD Ultra-Fine Mini Pen Needle 31 gauge x 316\" ndle USE TO INJECT TID 21  Yes Ghazala Smith, ROGERIO   fluticasone propionate (FLONASE) 50 mcg/actuation nasal spray 1 [place] by Both Nostrils route daily as needed. Yes Other, MD Melba   amoxicillin-clavulanate (AUGMENTIN) 875-125 mg per tablet TAKE 1 TABLET BY MOUTH EVERY 12 HOURS FOR 14 DAYS 21   Other, MD Melba   loratadine (CLARITIN) 10 mg tablet Take 10 mg by mouth daily. 21   Other, MD Melba       REVIEW OF SYSTEMS:       Constitutional: Negative for chills and fever. HENT: Negative. Eyes: Negative. Respiratory: as above  Cardiovascular: Negative. Gastrointestinal: Negative for abdominal pain and nausea. Skin: Negative. Neurological: Negative. Objective:   VITALS:    Visit Vitals  BP (!) 140/61   Pulse 78   Temp 96.9 °F (36.1 °C)   Resp 20   Ht 5' 8\" (1.727 m)   Wt 92.5 kg (204 lb)   SpO2 97%   BMI 31.02 kg/m²     Temp (24hrs), Av.6 °F (36.4 °C), Min:96.9 °F (36.1 °C), Max:98.2 °F (36.8 °C)      PHYSICAL EXAM:     General: NAD, alert, cooperative. Pain comfortably in bed  Eyes: sclera anicteric  Oral Cavity: No thrush or ulcers  Neck: no JVD  Chest: Fair bilateral air entry. No Wheezing or Rhonchi. No rales.   Heart: normal sounds  Abdomen: soft and non tender   :  sommers  Lower Extremities: Trace bilateral edema   skin: no rash  Neuro: intact, no focals  Psychiatric: non-depressed          LAB DATA REVIEWED:    Recent Results (from the past 24 hour(s))   TROPONIN I    Collection Time: 10/07/21  4:35 PM   Result Value Ref Range    Troponin-I, Qt. 0.02 0.02 - 0.05 ng/mL   CBC WITH AUTOMATED DIFF    Collection Time: 10/08/21  5:00 AM   Result Value Ref Range    WBC 6.1 4.6 - 13.2 K/uL    RBC 3.19 (L) 4.35 - 5.65 M/uL    HGB 8.2 (L) 13.0 - 16.0 g/dL    HCT 27.1 (L) 36.0 - 48.0 %    MCV 85.0 78.0 - 100.0 FL    MCH 25.7 24.0 - 34.0 PG    MCHC 30.3 (L) 31.0 - 37.0 g/dL    RDW 17.2 (H) 11.6 - 14.5 %    PLATELET 259 457 - 543 K/uL    MPV 10.0 9.2 - 11.8 FL    NRBC 0.0 0.0  WBC    ABSOLUTE NRBC 0.00 0.00 - 0.01 K/uL    NEUTROPHILS 73 40 - 73 %    LYMPHOCYTES 13 (L) 21 - 52 %    MONOCYTES 10 3 - 10 %    EOSINOPHILS 3 0 - 5 %    BASOPHILS 1 0 - 2 %    IMMATURE GRANULOCYTES 0 0 - 0.5 %    ABS. NEUTROPHILS 4.4 1.8 - 8.0 K/UL    ABS. LYMPHOCYTES 0.8 (L) 0.9 - 3.6 K/UL    ABS. MONOCYTES 0.6 0.05 - 1.2 K/UL    ABS. EOSINOPHILS 0.2 0.0 - 0.4 K/UL    ABS. BASOPHILS 0.0 0.0 - 0.1 K/UL    ABS. IMM. GRANS. 0.0 0.00 - 0.04 K/UL    DF AUTOMATED     MAGNESIUM    Collection Time: 10/08/21  5:00 AM   Result Value Ref Range    Magnesium 1.9 1.7 - 2.8 mg/dL   METABOLIC PANEL, BASIC    Collection Time: 10/08/21  5:00 AM   Result Value Ref Range    Sodium 146 (H) 135 - 145 mmol/L    Potassium 3.7 3.2 - 5.1 mmol/L    Chloride 107 94 - 111 mmol/L    CO2 26 21 - 33 mmol/L    Anion gap 13 mmol/L    Glucose 67 (L) 70 - 110 mg/dL    BUN 66 (H) 9 - 21 mg/dL    Creatinine 3.90 (H) 0.8 - 1.50 mg/dL    BUN/Creatinine ratio 17      GFR est AA 18 ml/min/1.73m2    GFR est non-AA 15 ml/min/1.73m2    Calcium 8.7 8.5 - 10.5 mg/dL   PHOSPHORUS    Collection Time: 10/08/21  5:00 AM   Result Value Ref Range    Phosphorus 4.7 (H) 2.5 - 4.5 mg/dL       Recommendations/Plan:   1. Acute kidney injury on chronic kidney disease stage IV in a single kidney:   -Creatinine was 3.5 on admission. Creased to 3.9 today  -ARON likely prerenal secondary to aggressive diuresis  -presented with shortness of breath/leg edema. Both have now resolved  -was On Lasix 40 IV twice daily which decreased yesterday to 40 mg IV daily. I will discontinue diuretics today.   He is likely volume contracted as evidenced from hypernatremia also developing   -Give trial of IV fluids X 1L  -he has unilateral functioning kidney due to left total nephrectomy.  -His Cr ranges between 2.5-2.9   -His urinalysis showed. Pyuria 4-5 proteinuria, last checked in April 2021. It is pending  -A kidney ultrasound surgically absent left kidney and right kidney with no hydronephrosis. -Follow-up on renal function in a.m.     2. History of kidney cancer:  -Status post left total nephrectomy 8 years ago. -He had total cystectomy also due to cancer in his bladder and now has urostomy bag      3. Hypertension:  -Blood pressure is  mostly well controlled  -He is on hydralazine 100 mg tid ,metoprolol ER 25 mg daily, minoxidil 2.5 twice daily, amlodipine 10 mg daily  -Discontinued minoxidil because of his history of CHF and tendency for fluid overload  -Increase metoprolol if needed      4. Anemia:  -Hb is 8.2  -He had functional iron deficiency in the past.  I will repeat iron studies. Will start him on weekly Procrit  -anemia of chronic kidney disease     5. Renal osteodystrophy.  -His Ca 8.7.    -vitamin D25 hydroxy low in the past.  I will repeat  -iPTH 169 the past.  He is on Sensipar 30 which I will continue for now unless his calcium is low or PTH is normal     6. Gouty arthritis:  -His Uric acid is ok  -will keep on Allopurinol 200 mg daily.     7. Metabolic acidosis:  -Bicarb is 27. Because of sodium bicarb tablets and volume contraction diuretics  -He is on sodium bicarb 650 mg tid which I will discontinue for now     8. Proteinuria, nephrotic range:   -has 4-5 g/g per day   -likely from diabetic nephropathy   -Hep B/C, C3/C4, SFL ratio and ANCAs been negative in the past  -unable to add ARB due to # 1     9. SOB:  -from fluid overload seems on admission severe pulmonary hypertension. Currently he is euvolemic  -Echo today shows preserved EF, diastolic dysfunction and severe pulmonary hypertension  -volume overload has improved clinically.   We will hold Lasix and give him a trial of IV fluids because of worsening renal function       ___________________  Signed: Irena Lynn MD

## 2021-10-08 NOTE — PROGRESS NOTES
CARDIOLOGY PROGRESS NOTE - NP    Patient seen and examined. This is a patient who is followed for shortness of breath. He denies chest pain or shortness of breath. No other complaints reported. Telemetry reviewed, there were no events noted in the past 24 hours. He is in sinus rhythm. Pertinent review of systems items noted above, all other systems are negative. Current medications reviewed. PHYSICAL EXAMINATION:  Vital sign assessment reveal a blood pressure of 140/64 and pulse rate of 78. Cardiovascular exam has a heart with a regular rate and rhythm, normal S1 and S2. No murmur present. There are no rubs or gallops. Good peripheral pulses. No jugular venous distension. Respiratory exam reveals clear lung fields, no rales or rhonchi. Lymphatic exam reveals no edema. Neurologic exam is nonfocal.      Recent labs results and imaging reviewed. Notable findings include:   Recent Results (from the past 24 hour(s))   TROPONIN I    Collection Time: 10/07/21  4:35 PM   Result Value Ref Range    Troponin-I, Qt. 0.02 0.02 - 0.05 ng/mL   CBC WITH AUTOMATED DIFF    Collection Time: 10/08/21  5:00 AM   Result Value Ref Range    WBC 6.1 4.6 - 13.2 K/uL    RBC 3.19 (L) 4.35 - 5.65 M/uL    HGB 8.2 (L) 13.0 - 16.0 g/dL    HCT 27.1 (L) 36.0 - 48.0 %    MCV 85.0 78.0 - 100.0 FL    MCH 25.7 24.0 - 34.0 PG    MCHC 30.3 (L) 31.0 - 37.0 g/dL    RDW 17.2 (H) 11.6 - 14.5 %    PLATELET 165 600 - 508 K/uL    MPV 10.0 9.2 - 11.8 FL    NRBC 0.0 0.0  WBC    ABSOLUTE NRBC 0.00 0.00 - 0.01 K/uL    NEUTROPHILS 73 40 - 73 %    LYMPHOCYTES 13 (L) 21 - 52 %    MONOCYTES 10 3 - 10 %    EOSINOPHILS 3 0 - 5 %    BASOPHILS 1 0 - 2 %    IMMATURE GRANULOCYTES 0 0 - 0.5 %    ABS. NEUTROPHILS 4.4 1.8 - 8.0 K/UL    ABS. LYMPHOCYTES 0.8 (L) 0.9 - 3.6 K/UL    ABS. MONOCYTES 0.6 0.05 - 1.2 K/UL    ABS. EOSINOPHILS 0.2 0.0 - 0.4 K/UL    ABS. BASOPHILS 0.0 0.0 - 0.1 K/UL    ABS. IMM.  GRANS. 0.0 0.00 - 0.04 K/UL    DF AUTOMATED MAGNESIUM    Collection Time: 10/08/21  5:00 AM   Result Value Ref Range    Magnesium 1.9 1.7 - 2.8 mg/dL   METABOLIC PANEL, BASIC    Collection Time: 10/08/21  5:00 AM   Result Value Ref Range    Sodium 146 (H) 135 - 145 mmol/L    Potassium 3.7 3.2 - 5.1 mmol/L    Chloride 107 94 - 111 mmol/L    CO2 26 21 - 33 mmol/L    Anion gap 13 mmol/L    Glucose 67 (L) 70 - 110 mg/dL    BUN 66 (H) 9 - 21 mg/dL    Creatinine 3.90 (H) 0.8 - 1.50 mg/dL    BUN/Creatinine ratio 17      GFR est AA 18 ml/min/1.73m2    GFR est non-AA 15 ml/min/1.73m2    Calcium 8.7 8.5 - 10.5 mg/dL   PHOSPHORUS    Collection Time: 10/08/21  5:00 AM   Result Value Ref Range    Phosphorus 4.7 (H) 2.5 - 4.5 mg/dL     Discussed case with Dr. Toni Fairchild, and our impression and recommendations are as follows:  1. Shortness of breath: Troponins negative x 5. EKG is nonischemic. His symptoms are improved. Echocardiogram indicates severe pulmonary hypertension with RVSP of 77. Lasix is on hold due to worsened renal function. Continue telemetry monitoring. Keep serum potassium between 4-5 and serum magnesium > 2.  2. HTN: Blood pressures are at goal. Continue current therapy. 3. Hyperlipidemia: Continue statin therapy. 4. CKD: Creatinine today 3.9. Will avoid nephrotoxins. Lasix is on hold. Nephrology is following. 5. Pulmonary hypertension: Severe with RVSP of 77. Recommend outpatient pulmonary evaluation. Recommend follow up at [facility] for right heart cath if recommended by pulmonology. Thank you for involving us in the care of this patient. Please do not hesitate to call me or Dr. Toni Fairchild if additional questions arise. If assistance is needed after hours or on weekends, please call the answering service at [phone].

## 2021-10-09 LAB
ANION GAP SERPL CALC-SCNC: 10 MMOL/L
BASOPHILS # BLD: 0 K/UL (ref 0–0.1)
BASOPHILS NFR BLD: 1 % (ref 0–2)
BUN SERPL-MCNC: 62 MG/DL (ref 9–21)
BUN/CREAT SERPL: 18
CA-I BLD-MCNC: 8.2 MG/DL (ref 8.5–10.5)
CHLORIDE SERPL-SCNC: 106 MMOL/L (ref 94–111)
CO2 SERPL-SCNC: 26 MMOL/L (ref 21–33)
CREAT SERPL-MCNC: 3.5 MG/DL (ref 0.8–1.5)
DIFFERENTIAL METHOD BLD: ABNORMAL
EOSINOPHIL # BLD: 0.2 K/UL (ref 0–0.4)
EOSINOPHIL NFR BLD: 4 % (ref 0–5)
ERYTHROCYTE [DISTWIDTH] IN BLOOD BY AUTOMATED COUNT: 17 % (ref 11.6–14.5)
GLUCOSE BLD STRIP.AUTO-MCNC: 102 MG/DL (ref 70–110)
GLUCOSE BLD STRIP.AUTO-MCNC: 117 MG/DL (ref 70–110)
GLUCOSE BLD STRIP.AUTO-MCNC: 136 MG/DL (ref 70–110)
GLUCOSE BLD STRIP.AUTO-MCNC: 94 MG/DL (ref 70–110)
GLUCOSE SERPL-MCNC: 55 MG/DL (ref 70–110)
HCT VFR BLD AUTO: 24.1 % (ref 36–48)
HGB BLD-MCNC: 7.4 G/DL (ref 13–16)
IMM GRANULOCYTES # BLD AUTO: 0 K/UL (ref 0–0.04)
IMM GRANULOCYTES NFR BLD AUTO: 0 % (ref 0–0.5)
LYMPHOCYTES # BLD: 0.9 K/UL (ref 0.9–3.6)
LYMPHOCYTES NFR BLD: 16 % (ref 21–52)
MAGNESIUM SERPL-MCNC: 2 MG/DL (ref 1.7–2.8)
MCH RBC QN AUTO: 25.8 PG (ref 24–34)
MCHC RBC AUTO-ENTMCNC: 30.7 G/DL (ref 31–37)
MCV RBC AUTO: 84 FL (ref 78–100)
MONOCYTES # BLD: 0.6 K/UL (ref 0.05–1.2)
MONOCYTES NFR BLD: 11 % (ref 3–10)
NEUTS SEG # BLD: 3.7 K/UL (ref 1.8–8)
NEUTS SEG NFR BLD: 68 % (ref 40–73)
NRBC # BLD: 0 K/UL (ref 0–0.01)
NRBC BLD-RTO: 0 PER 100 WBC
PERFORMED BY, TECHID: ABNORMAL
PERFORMED BY, TECHID: ABNORMAL
PERFORMED BY, TECHID: NORMAL
PERFORMED BY, TECHID: NORMAL
PHOSPHATE SERPL-MCNC: 4.3 MG/DL (ref 2.5–4.5)
PLATELET # BLD AUTO: 263 K/UL (ref 135–420)
PMV BLD AUTO: 9.5 FL (ref 9.2–11.8)
POTASSIUM SERPL-SCNC: 3.7 MMOL/L (ref 3.2–5.1)
RBC # BLD AUTO: 2.87 M/UL (ref 4.35–5.65)
SODIUM SERPL-SCNC: 142 MMOL/L (ref 135–145)
WBC # BLD AUTO: 5.4 K/UL (ref 4.6–13.2)

## 2021-10-09 PROCEDURE — 85025 COMPLETE CBC W/AUTO DIFF WBC: CPT

## 2021-10-09 PROCEDURE — 82962 GLUCOSE BLOOD TEST: CPT

## 2021-10-09 PROCEDURE — 83735 ASSAY OF MAGNESIUM: CPT

## 2021-10-09 PROCEDURE — 74011250637 HC RX REV CODE- 250/637: Performed by: INTERNAL MEDICINE

## 2021-10-09 PROCEDURE — 65270000029 HC RM PRIVATE

## 2021-10-09 PROCEDURE — 74011636637 HC RX REV CODE- 636/637: Performed by: INTERNAL MEDICINE

## 2021-10-09 PROCEDURE — 74011000250 HC RX REV CODE- 250: Performed by: INTERNAL MEDICINE

## 2021-10-09 PROCEDURE — 84100 ASSAY OF PHOSPHORUS: CPT

## 2021-10-09 PROCEDURE — 80048 BASIC METABOLIC PNL TOTAL CA: CPT

## 2021-10-09 PROCEDURE — 94761 N-INVAS EAR/PLS OXIMETRY MLT: CPT

## 2021-10-09 PROCEDURE — 74011250637 HC RX REV CODE- 250/637: Performed by: NURSE PRACTITIONER

## 2021-10-09 PROCEDURE — 77010033678 HC OXYGEN DAILY

## 2021-10-09 PROCEDURE — 36415 COLL VENOUS BLD VENIPUNCTURE: CPT

## 2021-10-09 RX ORDER — INSULIN GLARGINE 100 [IU]/ML
16 INJECTION, SOLUTION SUBCUTANEOUS
Status: DISCONTINUED | OUTPATIENT
Start: 2021-10-09 | End: 2021-10-10

## 2021-10-09 RX ADMIN — ATORVASTATIN CALCIUM 20 MG: 10 TABLET, FILM COATED ORAL at 23:12

## 2021-10-09 RX ADMIN — SODIUM CHLORIDE 75 ML/HR: 4.5 INJECTION, SOLUTION INTRAVENOUS at 05:15

## 2021-10-09 RX ADMIN — PANTOPRAZOLE SODIUM 40 MG: 40 TABLET, DELAYED RELEASE ORAL at 09:02

## 2021-10-09 RX ADMIN — CINACALCET HYDROCHLORIDE 30 MG: 30 TABLET, FILM COATED ORAL at 08:58

## 2021-10-09 RX ADMIN — Medication 10 ML: at 23:11

## 2021-10-09 RX ADMIN — FERROUS SULFATE TAB 325 MG (65 MG ELEMENTAL FE) 325 MG: 325 (65 FE) TAB at 13:03

## 2021-10-09 RX ADMIN — CETIRIZINE HYDROCHLORIDE 10 MG: 10 TABLET, FILM COATED ORAL at 08:58

## 2021-10-09 RX ADMIN — HYDRALAZINE HYDROCHLORIDE 100 MG: 25 TABLET, FILM COATED ORAL at 23:11

## 2021-10-09 RX ADMIN — APIXABAN 2.5 MG: 2.5 TABLET, FILM COATED ORAL at 17:15

## 2021-10-09 RX ADMIN — HYDRALAZINE HYDROCHLORIDE 100 MG: 25 TABLET, FILM COATED ORAL at 08:58

## 2021-10-09 RX ADMIN — Medication 10 ML: at 13:38

## 2021-10-09 RX ADMIN — FERROUS SULFATE TAB 325 MG (65 MG ELEMENTAL FE) 325 MG: 325 (65 FE) TAB at 17:15

## 2021-10-09 RX ADMIN — AMLODIPINE BESYLATE 5 MG: 5 TABLET ORAL at 17:15

## 2021-10-09 RX ADMIN — FERROUS SULFATE TAB 325 MG (65 MG ELEMENTAL FE) 325 MG: 325 (65 FE) TAB at 08:58

## 2021-10-09 RX ADMIN — Medication 10 ML: at 05:14

## 2021-10-09 RX ADMIN — ALLOPURINOL 200 MG: 100 TABLET ORAL at 08:58

## 2021-10-09 RX ADMIN — INSULIN GLARGINE 16 UNITS: 100 INJECTION, SOLUTION SUBCUTANEOUS at 23:11

## 2021-10-09 RX ADMIN — APIXABAN 2.5 MG: 2.5 TABLET, FILM COATED ORAL at 08:58

## 2021-10-09 RX ADMIN — AMLODIPINE BESYLATE 5 MG: 5 TABLET ORAL at 08:58

## 2021-10-09 RX ADMIN — METOPROLOL SUCCINATE 25 MG: 25 TABLET, EXTENDED RELEASE ORAL at 08:58

## 2021-10-09 RX ADMIN — HYDRALAZINE HYDROCHLORIDE 100 MG: 25 TABLET, FILM COATED ORAL at 17:15

## 2021-10-09 NOTE — PROGRESS NOTES
Progress Note    Patient: Otilio Trujillo MRN: [id number]  SSN: [id number]    YOB: 1945  Age: 78 y.o. Sex: male      Admit Date: 10/6/2021    LOS: 3 days     Assessment and Plan:     Acute deep vein thrombosis (DVT) of distal vein of both lower extremities (HCC)  Continue Eliquis renally dosed     Acute on chronic combined systolic and diastolic ACC/AHA stage C congestive heart failure (HCC)  Fluid restriction 1500 mL/day  - echo 60%, with severe pulm htn  - hold diuresis for now  - gentle IVF  - cr has improved. No s/o fluid overload today     Primary hypertension  This is a chronic problem  Continue minoxidil, Toprol, hydralazine, Norvasc       Type 2 diabetes mellitus with stage 4 chronic kidney disease, with long-term current use of insulin ([facility])  - bg low this am  - adjust lantus downward to 16 units  - hold orals     ARON on  Stage 3 chronic kidney disease (HCC)  - worsening due to diuresis  - repeat bmp tomorrow    Anemia  - hgb trending down  - recheck in am  - guaic stools    Debility  PT/OT consults            Subjective: The patient's creatinine has dropped a little bit. He reports he is weak. He has no headache vision changes chest pain palpitations or shortness of breath.  He reports he is tolerating a diet         Current Facility-Administered Medications   Medication Dose Route Frequency    insulin glargine (LANTUS) injection 16 Units  16 Units SubCUTAneous QHS    0.45% sodium chloride infusion  75 mL/hr IntraVENous CONTINUOUS    albuterol (PROVENTIL HFA, VENTOLIN HFA, PROAIR HFA) inhaler 2 Puff  2 Puff Inhalation Q4H PRN    allopurinoL (ZYLOPRIM) tablet 200 mg  200 mg Oral DAILY    amLODIPine (NORVASC) tablet 5 mg  5 mg Oral BID    apixaban (ELIQUIS) tablet 2.5 mg  2.5 mg Oral BID    cinacalcet (SENSIPAR) tablet 30 mg  30 mg Oral DAILY    fluticasone propionate (FLONASE) 50 mcg/actuation nasal spray 1 Spray  1 Spray Both Nostrils DAILY PRN    hydrALAZINE (APRESOLINE) tablet 100 mg  100 mg Oral TID    metoprolol succinate (TOPROL-XL) XL tablet 25 mg  25 mg Oral DAILY    pantoprazole (PROTONIX) tablet 40 mg  40 mg Oral ACB    atorvastatin (LIPITOR) tablet 20 mg  20 mg Oral QHS    ferrous sulfate tablet 325 mg  1 Tablet Oral TID WITH MEALS    cetirizine (ZYRTEC) tablet 10 mg  10 mg Oral DAILY    epoetin sadi-epbx (RETACRIT) injection 10,000 Units  10,000 Units SubCUTAneous Q7D    sodium chloride (NS) flush 5-40 mL  5-40 mL IntraVENous Q8H    sodium chloride (NS) flush 5-40 mL  5-40 mL IntraVENous PRN    acetaminophen (TYLENOL) tablet 650 mg  650 mg Oral Q6H PRN    Or    acetaminophen (TYLENOL) suppository 650 mg  650 mg Rectal Q6H PRN    polyethylene glycol (MIRALAX) packet 17 g  17 g Oral DAILY PRN    ondansetron (ZOFRAN ODT) tablet 4 mg  4 mg Oral Q8H PRN    Or    ondansetron (ZOFRAN) injection 4 mg  4 mg IntraVENous Q6H PRN        Vitals:    10/09/21 0000 10/09/21 0108 10/09/21 0358 10/09/21 0400   BP:  128/60 130/77    Pulse: 69 70 72    Resp:  18 18    Temp:  97 °F (36.1 °C) 97 °F (36.1 °C)    SpO2:  97% 97%    Weight:    93.4 kg (205 lb 12.8 oz)   Height:         Objective:   General: alert awake well-oriented, no acute distress. HEENT: EOMI, no Icterus, no Pallor,  mucosa moist.  Neck: Neck is supple, No JVD  Lungs: breathsounds normal, no respiratory distress on inspection, no rhonchi, no rales,   CVS: heart sounds normal, regular rate and rhythm, no murmurs, no rubs. GI: soft, nontender, normal BS. Extremeties: no cyanosis,trace edema,   Neuro: Alert, awake,, No new focal deficits, moving all extremeties well. Skin: normal skin turgor, no skin rashes.         Intake and Output:  Current Shift: 10/09 0701 - 10/09 1900  In: -   Out: 900 [Urine:900]  Last three shifts: 10/07 1901 - 10/09 0700  In: -   Out: 9064 [Urine:2825]      Lab/Data Review:  Recent Results (from the past 24 hour(s))   RENAL FUNCTION PANEL    Collection Time: 10/08/21 11:15 AM   Result Value Ref Range    Sodium 141 135 - 145 mmol/L    Potassium 3.8 3.2 - 5.1 mmol/L    Chloride 104 94 - 111 mmol/L    CO2 26 21 - 33 mmol/L    Anion gap 11 mmol/L    Glucose 142 (H) 70 - 110 mg/dL    BUN 65 (H) 9 - 21 mg/dL    Creatinine 3.90 (H) 0.8 - 1.50 mg/dL    BUN/Creatinine ratio 17      GFR est AA 18 ml/min/1.73m2    GFR est non-AA 15 ml/min/1.73m2    Calcium 8.4 (L) 8.5 - 10.5 mg/dL    Phosphorus 4.6 (H) 2.5 - 4.5 mg/dL    Albumin 3.3 (L) 3.5 - 4.7 g/dL   CBC WITH AUTOMATED DIFF    Collection Time: 10/09/21  5:00 AM   Result Value Ref Range    WBC 5.4 4.6 - 13.2 K/uL    RBC 2.87 (L) 4.35 - 5.65 M/uL    HGB 7.4 (L) 13.0 - 16.0 g/dL    HCT 24.1 (L) 36.0 - 48.0 %    MCV 84.0 78.0 - 100.0 FL    MCH 25.8 24.0 - 34.0 PG    MCHC 30.7 (L) 31.0 - 37.0 g/dL    RDW 17.0 (H) 11.6 - 14.5 %    PLATELET 030 956 - 520 K/uL    MPV 9.5 9.2 - 11.8 FL    NRBC 0.0 0.0  WBC    ABSOLUTE NRBC 0.00 0.00 - 0.01 K/uL    NEUTROPHILS 68 40 - 73 %    LYMPHOCYTES 16 (L) 21 - 52 %    MONOCYTES 11 (H) 3 - 10 %    EOSINOPHILS 4 0 - 5 %    BASOPHILS 1 0 - 2 %    IMMATURE GRANULOCYTES 0 0 - 0.5 %    ABS. NEUTROPHILS 3.7 1.8 - 8.0 K/UL    ABS. LYMPHOCYTES 0.9 0.9 - 3.6 K/UL    ABS. MONOCYTES 0.6 0.05 - 1.2 K/UL    ABS. EOSINOPHILS 0.2 0.0 - 0.4 K/UL    ABS. BASOPHILS 0.0 0.0 - 0.1 K/UL    ABS. IMM.  GRANS. 0.0 0.00 - 0.04 K/UL    DF AUTOMATED     MAGNESIUM    Collection Time: 10/09/21  5:00 AM   Result Value Ref Range    Magnesium 2.0 1.7 - 2.8 mg/dL   METABOLIC PANEL, BASIC    Collection Time: 10/09/21  5:00 AM   Result Value Ref Range    Sodium 142 135 - 145 mmol/L    Potassium 3.7 3.2 - 5.1 mmol/L    Chloride 106 94 - 111 mmol/L    CO2 26 21 - 33 mmol/L    Anion gap 10 mmol/L    Glucose 55 (L) 70 - 110 mg/dL    BUN 62 (H) 9 - 21 mg/dL    Creatinine 3.50 (H) 0.8 - 1.50 mg/dL    BUN/Creatinine ratio 18      GFR est AA 21 ml/min/1.73m2    GFR est non-AA 17 ml/min/1.73m2    Calcium 8.2 (L) 8.5 - 10.5 mg/dL   PHOSPHORUS    Collection Time: 10/09/21  5:00 AM   Result Value Ref Range    Phosphorus 4.3 2.5 - 4.5 mg/dL   GLUCOSE, POC    Collection Time: 10/09/21  8:50 AM   Result Value Ref Range    Glucose (POC) 94 70 - 110 mg/dL    Performed by Francesca Pelletier           Signed By: Arturo Mercer MD     October 9, 2021

## 2021-10-09 NOTE — PROGRESS NOTES
Patient fluids running, denies any pain or distress, resting quietly in bed. Medications administered.  Will continue to monitor, CBWR

## 2021-10-09 NOTE — PROGRESS NOTES
Problem: Falls - Risk of  Goal: *Absence of Falls  Description: Document Stephenie Johnson Fall Risk and appropriate interventions in the flowsheet.   Outcome: Progressing Towards Goal  Note: Fall Risk Interventions:  Mobility Interventions: Utilize walker, cane, or other assistive device         Medication Interventions: Patient to call before getting OOB

## 2021-10-10 LAB
ALBUMIN SERPL-MCNC: 3.2 G/DL (ref 3.5–4.7)
ALBUMIN/GLOB SERPL: 0.9 {RATIO}
ALP SERPL-CCNC: 52 U/L (ref 38–126)
ALT SERPL-CCNC: 13 U/L (ref 3–72)
ANION GAP SERPL CALC-SCNC: 10 MMOL/L
AST SERPL W P-5'-P-CCNC: 16 U/L (ref 17–74)
BASOPHILS # BLD: 0.1 K/UL (ref 0–0.1)
BASOPHILS NFR BLD: 1 % (ref 0–2)
BILIRUB SERPL-MCNC: 0.4 MG/DL (ref 0.2–1)
BUN SERPL-MCNC: 61 MG/DL (ref 9–21)
BUN/CREAT SERPL: 18
CA-I BLD-MCNC: 8.2 MG/DL (ref 8.5–10.5)
CHLORIDE SERPL-SCNC: 109 MMOL/L (ref 94–111)
CO2 SERPL-SCNC: 23 MMOL/L (ref 21–33)
CREAT SERPL-MCNC: 3.4 MG/DL (ref 0.8–1.5)
DIFFERENTIAL METHOD BLD: ABNORMAL
EOSINOPHIL # BLD: 0.1 K/UL (ref 0–0.4)
EOSINOPHIL NFR BLD: 2 % (ref 0–5)
ERYTHROCYTE [DISTWIDTH] IN BLOOD BY AUTOMATED COUNT: 17 % (ref 11.6–14.5)
GLOBULIN SER CALC-MCNC: 3.5 G/DL
GLUCOSE BLD STRIP.AUTO-MCNC: 107 MG/DL (ref 70–110)
GLUCOSE BLD STRIP.AUTO-MCNC: 141 MG/DL (ref 70–110)
GLUCOSE BLD STRIP.AUTO-MCNC: 185 MG/DL (ref 70–110)
GLUCOSE BLD STRIP.AUTO-MCNC: 227 MG/DL (ref 70–110)
GLUCOSE BLD STRIP.AUTO-MCNC: 53 MG/DL (ref 70–110)
GLUCOSE BLD STRIP.AUTO-MCNC: 62 MG/DL (ref 70–110)
GLUCOSE SERPL-MCNC: 41 MG/DL (ref 70–110)
HCT VFR BLD AUTO: 24.6 % (ref 36–48)
HGB BLD-MCNC: 7.6 G/DL (ref 13–16)
IMM GRANULOCYTES # BLD AUTO: 0 K/UL (ref 0–0.04)
IMM GRANULOCYTES NFR BLD AUTO: 0 % (ref 0–0.5)
LYMPHOCYTES # BLD: 0.6 K/UL (ref 0.9–3.6)
LYMPHOCYTES NFR BLD: 11 % (ref 21–52)
MCH RBC QN AUTO: 26 PG (ref 24–34)
MCHC RBC AUTO-ENTMCNC: 30.9 G/DL (ref 31–37)
MCV RBC AUTO: 84.2 FL (ref 78–100)
MONOCYTES # BLD: 0.5 K/UL (ref 0.05–1.2)
MONOCYTES NFR BLD: 10 % (ref 3–10)
NEUTS SEG # BLD: 4.1 K/UL (ref 1.8–8)
NEUTS SEG NFR BLD: 76 % (ref 40–73)
NRBC # BLD: 0 K/UL (ref 0–0.01)
NRBC BLD-RTO: 0 PER 100 WBC
PERFORMED BY, TECHID: ABNORMAL
PERFORMED BY, TECHID: NORMAL
PLATELET # BLD AUTO: 274 K/UL (ref 135–420)
PMV BLD AUTO: 9.4 FL (ref 9.2–11.8)
POTASSIUM SERPL-SCNC: 3.6 MMOL/L (ref 3.2–5.1)
PROT SERPL-MCNC: 6.7 G/DL (ref 6.1–8.4)
RBC # BLD AUTO: 2.92 M/UL (ref 4.35–5.65)
RBC MORPH BLD: ABNORMAL
RBC MORPH BLD: ABNORMAL
SODIUM SERPL-SCNC: 142 MMOL/L (ref 135–145)
WBC # BLD AUTO: 5.4 K/UL (ref 4.6–13.2)

## 2021-10-10 PROCEDURE — 74011250637 HC RX REV CODE- 250/637: Performed by: INTERNAL MEDICINE

## 2021-10-10 PROCEDURE — 85025 COMPLETE CBC W/AUTO DIFF WBC: CPT

## 2021-10-10 PROCEDURE — 80053 COMPREHEN METABOLIC PANEL: CPT

## 2021-10-10 PROCEDURE — 36415 COLL VENOUS BLD VENIPUNCTURE: CPT

## 2021-10-10 PROCEDURE — 74011636637 HC RX REV CODE- 636/637: Performed by: INTERNAL MEDICINE

## 2021-10-10 PROCEDURE — 82962 GLUCOSE BLOOD TEST: CPT

## 2021-10-10 PROCEDURE — 65270000029 HC RM PRIVATE

## 2021-10-10 PROCEDURE — 74011250637 HC RX REV CODE- 250/637: Performed by: NURSE PRACTITIONER

## 2021-10-10 RX ORDER — DEXTROSE 50 % IN WATER (D50W) INTRAVENOUS SYRINGE
Status: DISPENSED
Start: 2021-10-10 | End: 2021-10-10

## 2021-10-10 RX ORDER — INSULIN LISPRO 100 [IU]/ML
INJECTION, SOLUTION INTRAVENOUS; SUBCUTANEOUS
Status: DISCONTINUED | OUTPATIENT
Start: 2021-10-10 | End: 2021-10-11 | Stop reason: HOSPADM

## 2021-10-10 RX ORDER — INSULIN GLARGINE 100 [IU]/ML
8 INJECTION, SOLUTION SUBCUTANEOUS
Status: DISCONTINUED | OUTPATIENT
Start: 2021-10-10 | End: 2021-10-11 | Stop reason: HOSPADM

## 2021-10-10 RX ORDER — DEXTROSE 50 % IN WATER (D50W) INTRAVENOUS SYRINGE
25 AS NEEDED
Status: DISCONTINUED | OUTPATIENT
Start: 2021-10-10 | End: 2021-10-11 | Stop reason: HOSPADM

## 2021-10-10 RX ADMIN — APIXABAN 2.5 MG: 2.5 TABLET, FILM COATED ORAL at 17:25

## 2021-10-10 RX ADMIN — FERROUS SULFATE TAB 325 MG (65 MG ELEMENTAL FE) 325 MG: 325 (65 FE) TAB at 12:04

## 2021-10-10 RX ADMIN — CINACALCET HYDROCHLORIDE 30 MG: 30 TABLET, FILM COATED ORAL at 09:54

## 2021-10-10 RX ADMIN — AMLODIPINE BESYLATE 5 MG: 5 TABLET ORAL at 17:25

## 2021-10-10 RX ADMIN — Medication 10 ML: at 22:02

## 2021-10-10 RX ADMIN — HYDRALAZINE HYDROCHLORIDE 100 MG: 25 TABLET, FILM COATED ORAL at 22:01

## 2021-10-10 RX ADMIN — FERROUS SULFATE TAB 325 MG (65 MG ELEMENTAL FE) 325 MG: 325 (65 FE) TAB at 09:54

## 2021-10-10 RX ADMIN — INSULIN LISPRO 4 UNITS: 100 INJECTION, SOLUTION INTRAVENOUS; SUBCUTANEOUS at 12:04

## 2021-10-10 RX ADMIN — ALLOPURINOL 200 MG: 100 TABLET ORAL at 09:54

## 2021-10-10 RX ADMIN — Medication 10 ML: at 06:32

## 2021-10-10 RX ADMIN — HYDRALAZINE HYDROCHLORIDE 100 MG: 25 TABLET, FILM COATED ORAL at 17:25

## 2021-10-10 RX ADMIN — CETIRIZINE HYDROCHLORIDE 10 MG: 10 TABLET, FILM COATED ORAL at 09:54

## 2021-10-10 RX ADMIN — ATORVASTATIN CALCIUM 20 MG: 10 TABLET, FILM COATED ORAL at 22:01

## 2021-10-10 RX ADMIN — APIXABAN 2.5 MG: 2.5 TABLET, FILM COATED ORAL at 09:53

## 2021-10-10 RX ADMIN — PANTOPRAZOLE SODIUM 40 MG: 40 TABLET, DELAYED RELEASE ORAL at 09:54

## 2021-10-10 RX ADMIN — METOPROLOL SUCCINATE 25 MG: 25 TABLET, EXTENDED RELEASE ORAL at 09:54

## 2021-10-10 RX ADMIN — Medication 10 ML: at 13:22

## 2021-10-10 RX ADMIN — FERROUS SULFATE TAB 325 MG (65 MG ELEMENTAL FE) 325 MG: 325 (65 FE) TAB at 17:25

## 2021-10-10 RX ADMIN — AMLODIPINE BESYLATE 5 MG: 5 TABLET ORAL at 09:53

## 2021-10-10 RX ADMIN — HYDRALAZINE HYDROCHLORIDE 100 MG: 25 TABLET, FILM COATED ORAL at 09:54

## 2021-10-10 RX ADMIN — INSULIN GLARGINE 8 UNITS: 100 INJECTION, SOLUTION SUBCUTANEOUS at 22:00

## 2021-10-10 NOTE — PROGRESS NOTES
Problem: Falls - Risk of  Goal: *Absence of Falls  Description: Document Norris Dunbar Fall Risk and appropriate interventions in the flowsheet.   Outcome: Progressing Towards Goal  Note: Fall Risk Interventions:  Mobility Interventions: Utilize walker, cane, or other assistive device         Medication Interventions: Patient to call before getting OOB

## 2021-10-10 NOTE — PROGRESS NOTES
Progress Note    Patient: Lubna Bustillo MRN: [id number]  SSN: [id number]    YOB: 1945  Age: 78 y.o. Sex: male      Admit Date: 10/6/2021    LOS: 4 days     Assessment and Plan:   Acute deep vein thrombosis (DVT) of distal vein of both lower extremities (HCC)  Continue Eliquis renally dosed     Acute on chronic combined systolic and diastolic ACC/AHA stage C congestive heart failure (HCC)  Fluid restriction 1500 mL/day  - echo 60%, with severe pulm htn  - hold diuresis for now  - gentle IVF  - cr has improved. No s/o fluid overload today     Primary hypertension  This is a chronic problem  Continue minoxidil, Toprol, hydralazine, Norvasc        Type 2 diabetes mellitus with stage 4 chronic kidney disease, with long-term current use of insulin ([facility])  - bg low this am  - adjust lantus downward to 8 units  - hold orals     ARON on  Stage 3 chronic kidney disease (HCC)  - slight improvement  - repeat bmp tomorrow     Anemia  - hgb stable  - recheck in am  - guaic stools     Debility  PT/OT consults                Subjective:   Patient reports no change in breathing. He did have a blood pressure spike though that was before his morning meds. He had a slightly low blood sugar this morning that was asymptomatic. He denies any chest pain palpitation shortness of breath nausea or vomiting.   He is tolerating a diet        Current Facility-Administered Medications   Medication Dose Route Frequency    dextrose (D50W) 50% injection syrg        dextrose (D50W) injection syrg 25 g  25 g IntraVENous PRN    insulin glargine (LANTUS) injection 16 Units  16 Units SubCUTAneous QHS    albuterol (PROVENTIL HFA, VENTOLIN HFA, PROAIR HFA) inhaler 2 Puff  2 Puff Inhalation Q4H PRN    allopurinoL (ZYLOPRIM) tablet 200 mg  200 mg Oral DAILY    amLODIPine (NORVASC) tablet 5 mg  5 mg Oral BID    apixaban (ELIQUIS) tablet 2.5 mg  2.5 mg Oral BID    cinacalcet (SENSIPAR) tablet 30 mg  30 mg Oral DAILY    fluticasone propionate (FLONASE) 50 mcg/actuation nasal spray 1 Spray  1 Spray Both Nostrils DAILY PRN    hydrALAZINE (APRESOLINE) tablet 100 mg  100 mg Oral TID    metoprolol succinate (TOPROL-XL) XL tablet 25 mg  25 mg Oral DAILY    pantoprazole (PROTONIX) tablet 40 mg  40 mg Oral ACB    atorvastatin (LIPITOR) tablet 20 mg  20 mg Oral QHS    ferrous sulfate tablet 325 mg  1 Tablet Oral TID WITH MEALS    cetirizine (ZYRTEC) tablet 10 mg  10 mg Oral DAILY    epoetin sadi-epbx (RETACRIT) injection 10,000 Units  10,000 Units SubCUTAneous Q7D    sodium chloride (NS) flush 5-40 mL  5-40 mL IntraVENous Q8H    sodium chloride (NS) flush 5-40 mL  5-40 mL IntraVENous PRN    acetaminophen (TYLENOL) tablet 650 mg  650 mg Oral Q6H PRN    Or    acetaminophen (TYLENOL) suppository 650 mg  650 mg Rectal Q6H PRN    polyethylene glycol (MIRALAX) packet 17 g  17 g Oral DAILY PRN    ondansetron (ZOFRAN ODT) tablet 4 mg  4 mg Oral Q8H PRN    Or    ondansetron (ZOFRAN) injection 4 mg  4 mg IntraVENous Q6H PRN        Vitals:    10/10/21 0040 10/10/21 0400 10/10/21 0403 10/10/21 0902   BP: 127/89  128/77 (!) 157/71   Pulse: 70  70 70   Resp: 16  16 17   Temp: 97 °F (36.1 °C)  97 °F (36.1 °C) 97.4 °F (36.3 °C)   SpO2: 96%  96% 97%   Weight:  92.2 kg (203 lb 3.2 oz)     Height:         Objective:   General: alert awake well-oriented, no acute distress. HEENT: EOMI, no Icterus, no Pallor,  mucosa moist.  Neck: Neck is supple, No JVD  Lungs: breathsounds normal, no respiratory distress on inspection, no rhonchi, no rales,   CVS: heart sounds normal, regular rate and rhythm, 2/6 lsb ?s3. GI: soft, nontender, normal BS. Extremeties: no cyanosis, ,   Neuro: Alert, awake, oriented x3, No new focal deficits, moving all extremeties well. Skin: normal skin turgor, no skin rashes. Intake and Output:  Current Shift: No intake/output data recorded.   Last three shifts: 10/08 1901 - 10/10 0700  In: 500 [P.O.:500]  Out: 1900 [[id number]]      Lab/Data Review:  Recent Results (from the past 24 hour(s))   GLUCOSE, POC    Collection Time: 10/09/21 11:24 AM   Result Value Ref Range    Glucose (POC) 102 70 - 110 mg/dL    Performed by Ahmet Monsivais Kingsley, POC    Collection Time: 10/09/21  3:36 PM   Result Value Ref Range    Glucose (POC) 136 (H) 70 - 110 mg/dL    Performed by Mary Kingsley, POC    Collection Time: 10/09/21  9:05 PM   Result Value Ref Range    Glucose (POC) 117 (H) 70 - 110 mg/dL    Performed by Simon Nguyen    CBC WITH AUTOMATED DIFF    Collection Time: 10/10/21  3:27 AM   Result Value Ref Range    WBC 5.4 4.6 - 13.2 K/uL    RBC 2.92 (L) 4.35 - 5.65 M/uL    HGB 7.6 (L) 13.0 - 16.0 g/dL    HCT 24.6 (L) 36.0 - 48.0 %    MCV 84.2 78.0 - 100.0 FL    MCH 26.0 24.0 - 34.0 PG    MCHC 30.9 (L) 31.0 - 37.0 g/dL    RDW 17.0 (H) 11.6 - 14.5 %    PLATELET 217 197 - 752 K/uL    MPV 9.4 9.2 - 11.8 FL    NRBC 0.0 0.0  WBC    ABSOLUTE NRBC 0.00 0.00 - 0.01 K/uL    NEUTROPHILS 76 (H) 40 - 73 %    LYMPHOCYTES 11 (L) 21 - 52 %    MONOCYTES 10 3 - 10 %    EOSINOPHILS 2 0 - 5 %    BASOPHILS 1 0 - 2 %    IMMATURE GRANULOCYTES 0 0 - 0.5 %    ABS. NEUTROPHILS 4.1 1.8 - 8.0 K/UL    ABS. LYMPHOCYTES 0.6 (L) 0.9 - 3.6 K/UL    ABS. MONOCYTES 0.5 0.05 - 1.2 K/UL    ABS. EOSINOPHILS 0.1 0.0 - 0.4 K/UL    ABS. BASOPHILS 0.1 0.0 - 0.1 K/UL    ABS. IMM.  GRANS. 0.0 0.00 - 0.04 K/UL    DF AUTOMATED      RBC COMMENTS Microcytosis  1+        RBC COMMENTS Hypochromia  1+       METABOLIC PANEL, COMPREHENSIVE    Collection Time: 10/10/21  3:27 AM   Result Value Ref Range    Sodium 142 135 - 145 mmol/L    Potassium 3.6 3.2 - 5.1 mmol/L    Chloride 109 94 - 111 mmol/L    CO2 23 21 - 33 mmol/L    Anion gap 10 mmol/L    Glucose 41 (LL) 70 - 110 mg/dL    BUN 61 (H) 9 - 21 mg/dL    Creatinine 3.40 (H) 0.8 - 1.50 mg/dL    BUN/Creatinine ratio 18      GFR est AA 22 ml/min/1.73m2    GFR est non-AA 18 ml/min/1.73m2    Calcium 8.2 (L) 8.5 - 10.5 mg/dL    Bilirubin, total 0.4 0.2 - 1.0 mg/dL    AST (SGOT) 16 (L) 17 - 74 U/L    ALT (SGPT) 13 3 - 72 U/L    Alk.  phosphatase 52 38 - 126 U/L    Protein, total 6.7 6.1 - 8.4 g/dL    Albumin 3.2 (L) 3.5 - 4.7 g/dL    Globulin 3.5 g/dL    A-G Ratio 0.9     GLUCOSE, POC    Collection Time: 10/10/21  7:05 AM   Result Value Ref Range    Glucose (POC) 62 (L) 70 - 110 mg/dL    Performed by Daryl Ovalle           Signed By: Jose L Newell MD     October 10, 2021

## 2021-10-10 NOTE — PROGRESS NOTES
0700-Report received from off going nurse. Assumed care of patient. 0954-Scheduled meds given. Patient tolerated well. 1126-Patient resting with eyes closed. NAD. CBWR.     1605-Patients blood glucose 54. 4 oz of apple juice given. NP notified. 1640-Rechecked blood glucose 107.

## 2021-10-10 NOTE — PROGRESS NOTES
Patient resting quietly in bed. Urostomy bag emptied and medications administered. Patient states no pain or distress.  Will continue to monitor, CBWR

## 2021-10-11 VITALS
OXYGEN SATURATION: 96 % | HEIGHT: 68 IN | HEART RATE: 64 BPM | RESPIRATION RATE: 18 BRPM | TEMPERATURE: 97 F | DIASTOLIC BLOOD PRESSURE: 75 MMHG | BODY MASS INDEX: 31.42 KG/M2 | SYSTOLIC BLOOD PRESSURE: 164 MMHG | WEIGHT: 207.3 LBS

## 2021-10-11 LAB
ALBUMIN SERPL-MCNC: 3 G/DL (ref 3.5–4.7)
ALBUMIN/GLOB SERPL: 0.9 {RATIO}
ALP SERPL-CCNC: 51 U/L (ref 38–126)
ALT SERPL-CCNC: 15 U/L (ref 3–72)
ANION GAP SERPL CALC-SCNC: 12 MMOL/L
AST SERPL W P-5'-P-CCNC: 17 U/L (ref 17–74)
BASOPHILS # BLD: 0 K/UL (ref 0–0.1)
BASOPHILS NFR BLD: 0 % (ref 0–2)
BILIRUB SERPL-MCNC: 0.6 MG/DL (ref 0.2–1)
BUN SERPL-MCNC: 61 MG/DL (ref 9–21)
BUN/CREAT SERPL: 18
CA-I BLD-MCNC: 8 MG/DL (ref 8.5–10.5)
CHLORIDE SERPL-SCNC: 107 MMOL/L (ref 94–111)
CO2 SERPL-SCNC: 22 MMOL/L (ref 21–33)
CREAT SERPL-MCNC: 3.3 MG/DL (ref 0.8–1.5)
DIFFERENTIAL METHOD BLD: ABNORMAL
EOSINOPHIL # BLD: 0.2 K/UL (ref 0–0.4)
EOSINOPHIL NFR BLD: 3 % (ref 0–5)
ERYTHROCYTE [DISTWIDTH] IN BLOOD BY AUTOMATED COUNT: 16.8 % (ref 11.6–14.5)
GLOBULIN SER CALC-MCNC: 3.4 G/DL
GLUCOSE BLD STRIP.AUTO-MCNC: 141 MG/DL (ref 70–110)
GLUCOSE BLD STRIP.AUTO-MCNC: 92 MG/DL (ref 70–110)
GLUCOSE BLD STRIP.AUTO-MCNC: 94 MG/DL (ref 70–110)
GLUCOSE SERPL-MCNC: 152 MG/DL (ref 70–110)
HCT VFR BLD AUTO: 24.3 % (ref 36–48)
HGB BLD-MCNC: 7.4 G/DL (ref 13–16)
IMM GRANULOCYTES # BLD AUTO: 0 K/UL (ref 0–0.04)
IMM GRANULOCYTES NFR BLD AUTO: 1 % (ref 0–0.5)
LYMPHOCYTES # BLD: 1 K/UL (ref 0.9–3.6)
LYMPHOCYTES NFR BLD: 17 % (ref 21–52)
MCH RBC QN AUTO: 25.4 PG (ref 24–34)
MCHC RBC AUTO-ENTMCNC: 30.5 G/DL (ref 31–37)
MCV RBC AUTO: 83.5 FL (ref 78–100)
MONOCYTES # BLD: 0.6 K/UL (ref 0.05–1.2)
MONOCYTES NFR BLD: 10 % (ref 3–10)
NEUTS SEG # BLD: 3.9 K/UL (ref 1.8–8)
NEUTS SEG NFR BLD: 69 % (ref 40–73)
NRBC # BLD: 0 K/UL (ref 0–0.01)
NRBC BLD-RTO: 0 PER 100 WBC
PERFORMED BY, TECHID: ABNORMAL
PERFORMED BY, TECHID: NORMAL
PERFORMED BY, TECHID: NORMAL
PLATELET # BLD AUTO: 264 K/UL (ref 135–420)
PMV BLD AUTO: 9.2 FL (ref 9.2–11.8)
POTASSIUM SERPL-SCNC: 3.9 MMOL/L (ref 3.2–5.1)
PROT SERPL-MCNC: 6.4 G/DL (ref 6.1–8.4)
RBC # BLD AUTO: 2.91 M/UL (ref 4.35–5.65)
SODIUM SERPL-SCNC: 141 MMOL/L (ref 135–145)
WBC # BLD AUTO: 5.7 K/UL (ref 4.6–13.2)

## 2021-10-11 PROCEDURE — 36415 COLL VENOUS BLD VENIPUNCTURE: CPT

## 2021-10-11 PROCEDURE — 80053 COMPREHEN METABOLIC PANEL: CPT

## 2021-10-11 PROCEDURE — 74011250637 HC RX REV CODE- 250/637: Performed by: INTERNAL MEDICINE

## 2021-10-11 PROCEDURE — 85025 COMPLETE CBC W/AUTO DIFF WBC: CPT

## 2021-10-11 PROCEDURE — 82962 GLUCOSE BLOOD TEST: CPT

## 2021-10-11 PROCEDURE — 74011250637 HC RX REV CODE- 250/637: Performed by: NURSE PRACTITIONER

## 2021-10-11 RX ORDER — INSULIN GLARGINE 100 [IU]/ML
8 INJECTION, SOLUTION SUBCUTANEOUS DAILY
Qty: 2.4 ML | Refills: 0 | Status: SHIPPED | OUTPATIENT
Start: 2021-10-11 | End: 2021-10-21

## 2021-10-11 RX ORDER — HYDRALAZINE HYDROCHLORIDE 100 MG/1
100 TABLET, FILM COATED ORAL 3 TIMES DAILY
Qty: 90 TABLET | Refills: 0 | Status: SHIPPED | OUTPATIENT
Start: 2021-10-11 | End: 2021-11-10

## 2021-10-11 RX ADMIN — CETIRIZINE HYDROCHLORIDE 10 MG: 10 TABLET, FILM COATED ORAL at 08:33

## 2021-10-11 RX ADMIN — PANTOPRAZOLE SODIUM 40 MG: 40 TABLET, DELAYED RELEASE ORAL at 08:33

## 2021-10-11 RX ADMIN — FERROUS SULFATE TAB 325 MG (65 MG ELEMENTAL FE) 325 MG: 325 (65 FE) TAB at 12:24

## 2021-10-11 RX ADMIN — FERROUS SULFATE TAB 325 MG (65 MG ELEMENTAL FE) 325 MG: 325 (65 FE) TAB at 16:49

## 2021-10-11 RX ADMIN — HYDRALAZINE HYDROCHLORIDE 100 MG: 25 TABLET, FILM COATED ORAL at 16:49

## 2021-10-11 RX ADMIN — FERROUS SULFATE TAB 325 MG (65 MG ELEMENTAL FE) 325 MG: 325 (65 FE) TAB at 08:33

## 2021-10-11 RX ADMIN — ALLOPURINOL 200 MG: 100 TABLET ORAL at 08:33

## 2021-10-11 RX ADMIN — FLUTICASONE PROPIONATE 1 SPRAY: 50 SPRAY, METERED NASAL at 08:33

## 2021-10-11 RX ADMIN — CINACALCET HYDROCHLORIDE 30 MG: 30 TABLET, FILM COATED ORAL at 08:33

## 2021-10-11 RX ADMIN — Medication 10 ML: at 05:38

## 2021-10-11 RX ADMIN — METOPROLOL SUCCINATE 25 MG: 25 TABLET, EXTENDED RELEASE ORAL at 08:33

## 2021-10-11 RX ADMIN — AMLODIPINE BESYLATE 5 MG: 5 TABLET ORAL at 08:33

## 2021-10-11 RX ADMIN — APIXABAN 2.5 MG: 2.5 TABLET, FILM COATED ORAL at 08:33

## 2021-10-11 RX ADMIN — HYDRALAZINE HYDROCHLORIDE 100 MG: 25 TABLET, FILM COATED ORAL at 08:33

## 2021-10-11 NOTE — PROGRESS NOTES
Vitals obtained, POC , Scheduled 8 units of insulin administered with two apple juices and a pack of nabs. Patient denies any pain or discomfort. Urostomy draining well. Scheduled medications given. Will continue to monitor.

## 2021-10-11 NOTE — PROGRESS NOTES
Comprehensive Nutrition Assessment    Type and Reason for Visit: reassessment    Nutrition Recommendations/Plan: 3 CHO choice cardiac restricted diet with 1500 mL FR    Nutrition Assessment:  77 yo male pmh: CHF, bladder cancer, DVT, DM, GERD, Gout, High Cholesterol, HTN, kidney carcinoma   morbidly obese male BMI 30.6. consulted for Diabietic education Hgb A1c actually 6.7 as of 10/6/2021 but pt does endorse BG levels > 200 and < 90 at times. Pt likes to drink sugary drinks and not eat solid foods often unless its convenience foods. uncontrolled BG likely causing excessive thirst leading to this CHF exacerbation admission. Pt reports lives with family members but no one likes to cook including himself as to why he drinks sugary drinks for taste and eats convenience items he describes as junk foods which more than likely high in sugar and sodium. Asked pt if he is willing to change lifestyle who reports yes he is. So provided pt with diabetic diet handout via nutrition care manual showing sample meal plan for entire day. Emphasizing need to eat 3 balanced meals/day spread out and use snacks only if BG is dropping low. Each meals will consist of a HBV protein source, non starchy vegetable and 3 CHO choices. Pt will also stop sodas and will drink tea or water but use splenda instead of sugar. Pt also encouraged to not add salt to meals at table and use very little salt during cooking again due to CHF. Pt reports he understands, but suspect due to family members in the household all eating the same way pt high risk of being non compliant. 10/11/2021: episode of hyperglycemia yesterday. Spoke with pt who reports eating almost 100% of meals no intake is not cause of hypoglycemia. No signs of fluid overload but pt reports to MD that there is no change in breathing. Continues on diabetic/cardiac diet with 1500 mL FR. No plans for discharge yet.  No issues with constipation    Recent Results (from the past 24 hour(s)) GLUCOSE, POC    Collection Time: 10/10/21  4:04 PM   Result Value Ref Range    Glucose (POC) 53 (LL) 70 - 110 mg/dL    Performed by Allyson Varghese    GLUCOSE, POC    Collection Time: 10/10/21  4:38 PM   Result Value Ref Range    Glucose (POC) 107 70 - 110 mg/dL    Performed by Coy Jose    GLUCOSE, POC    Collection Time: 10/10/21  7:50 PM   Result Value Ref Range    Glucose (POC) 141 (H) 70 - 110 mg/dL    Performed by Sosa Ewing    METABOLIC PANEL, COMPREHENSIVE    Collection Time: 10/11/21  3:30 AM   Result Value Ref Range    Sodium 141 135 - 145 mmol/L    Potassium 3.9 3.2 - 5.1 mmol/L    Chloride 107 94 - 111 mmol/L    CO2 22 21 - 33 mmol/L    Anion gap 12 mmol/L    Glucose 152 (H) 70 - 110 mg/dL    BUN 61 (H) 9 - 21 mg/dL    Creatinine 3.30 (H) 0.8 - 1.50 mg/dL    BUN/Creatinine ratio 18      GFR est AA 22 ml/min/1.73m2    GFR est non-AA 18 ml/min/1.73m2    Calcium 8.0 (L) 8.5 - 10.5 mg/dL    Bilirubin, total 0.6 0.2 - 1.0 mg/dL    AST (SGOT) 17 17 - 74 U/L    ALT (SGPT) 15 3 - 72 U/L    Alk. phosphatase 51 38 - 126 U/L    Protein, total 6.4 6.1 - 8.4 g/dL    Albumin 3.0 (L) 3.5 - 4.7 g/dL    Globulin 3.4 g/dL    A-G Ratio 0.9     CBC WITH AUTOMATED DIFF    Collection Time: 10/11/21  3:30 AM   Result Value Ref Range    WBC 5.7 4.6 - 13.2 K/uL    RBC 2.91 (L) 4.35 - 5.65 M/uL    HGB 7.4 (L) 13.0 - 16.0 g/dL    HCT 24.3 (L) 36.0 - 48.0 %    MCV 83.5 78.0 - 100.0 FL    MCH 25.4 24.0 - 34.0 PG    MCHC 30.5 (L) 31.0 - 37.0 g/dL    RDW 16.8 (H) 11.6 - 14.5 %    PLATELET 705 649 - 561 K/uL    MPV 9.2 9.2 - 11.8 FL    NRBC 0.0 0.0  WBC    ABSOLUTE NRBC 0.00 0.00 - 0.01 K/uL    NEUTROPHILS 69 40 - 73 %    LYMPHOCYTES 17 (L) 21 - 52 %    MONOCYTES 10 3 - 10 %    EOSINOPHILS 3 0 - 5 %    BASOPHILS 0 0 - 2 %    IMMATURE GRANULOCYTES 1 (H) 0 - 0.5 %    ABS. NEUTROPHILS 3.9 1.8 - 8.0 K/UL    ABS. LYMPHOCYTES 1.0 0.9 - 3.6 K/UL    ABS. MONOCYTES 0.6 0.05 - 1.2 K/UL    ABS.  EOSINOPHILS 0.2 0.0 - 0.4 K/UL ABS. BASOPHILS 0.0 0.0 - 0.1 K/UL    ABS. IMM. GRANS. 0.0 0.00 - 0.04 K/UL    DF AUTOMATED     GLUCOSE, POC    Collection Time: 10/11/21  8:01 AM   Result Value Ref Range    Glucose (POC) 92 70 - 110 mg/dL    Performed by Ramona Mondragon, POC    Collection Time: 10/11/21 11:52 AM   Result Value Ref Range    Glucose (POC) 94 70 - 110 mg/dL    Performed by Sona Monahan        Malnutrition Assessment:  Malnutrition Status:  Mild malnutrition    Context:  Acute illness     Findings of the 6 clinical characteristics of malnutrition:   Energy Intake:  No significant decrease in energy intake  Weight Loss:  No significant weight loss     Body Fat Loss:  No significant body fat loss,     Muscle Mass Loss:  No significant muscle mass loss,    Fluid Accumulation:  1 - Mild, Extremities   Strength:  Normal  strength         Estimated Daily Nutrient Needs:  Energy (kcal): 6114-0196 kcal/day; Weight Used for Energy Requirements: Admission (91 kg)  Protein (g): 73-91 g/day; Weight Used for Protein Requirements: Admission (0.8-1 g/kg)  Fluid (ml/day): 1500 mL/day; Method Used for Fluid Requirements: Other (comment) (1500 mL FR)      Nutrition Related Findings:  morbidly obese male BMI 30.6. consulted for Diabietic education Hgb A1c actually 6.7 as of 10/6/2021 but pt does endorse BG levels > 200 and < 90 at times. Pt likes to drink sugary drinks and not eat solid foods often unless its convenience foods. uncontrolled BG likely causing excessive thirst leading to this CHF exacerbation admission. Wounds:    None       Current Nutrition Therapies:  ADULT DIET Regular; 3 carb choices (45 gm/meal);  Low Fat/Low Chol/High Fiber/2 gm Na; 1500 ml    Anthropometric Measures:  · Height:  5' 8\" (172.7 cm)  · Current Body Wt:  90.7 kg (200 lb)   · Admission Body Wt:  200 lb    · Usual Body Wt:        · Ideal Body Wt:  154 lbs:  129.9 %   · Adjusted Body Weight:   ; Weight Adjustment for: No adjustment   · Adjusted BMI: · BMI Category:  Obese class 1 (BMI 30.0-34. 9)       Nutrition Diagnosis:   · Limited adherence to nutrition-related recommendations related to endocrine dysfunction, cardiac dysfunction as evidenced by lab values, other (specify) (diet hx)      Nutrition Interventions:   Food and/or Nutrient Delivery: Continue current diet  Nutrition Education and Counseling: Education initiated, Education completed  Coordination of Nutrition Care: Continue to monitor while inpatient    Goals:  Hgb A1c < 7, BG , compliance with diet order, pt to eat > 75% of meals, BM q 1-3 days       Nutrition Monitoring and Evaluation:   Behavioral-Environmental Outcomes: Readiness for change  Food/Nutrient Intake Outcomes: Food and nutrient intake  Physical Signs/Symptoms Outcomes: Biochemical data, Meal time behavior, Weight     F/u: 10/15/2021    Discharge Planning:    Continue current diet, Recommend pursue outpatient diabetes education     Electronically signed by Joanne Tripp on 10/11/2021 at 4:42 PM    Contact: [place] [phone]

## 2021-10-11 NOTE — PROGRESS NOTES
Assumed care of pt.      0800- assessment completed     0830- scheduled medications given per [facility]     1225- scheduled medications given per [facility]     1649- scheduled medications given per [facility]. Prepping pt for discharge.

## 2021-10-11 NOTE — PROGRESS NOTES
Orders received for P.T., upon entering room, pt states he has been up to the bathroom without difficulty and he is at his PLOF. He does not have any P.T. needs at this time.   Claudette Barr, PT, DPT

## 2021-10-11 NOTE — DISCHARGE SUMMARY
HOSPITALIST DISCHARGE NOTE  Gwinda Apgar MD, [address]  [address]       PATIENT ID: Yoly Herring  MRN: [id number]   YOB: 1945    DATE OF ADMISSION: 10/6/2021  9:26 PM    DATE OF DISCHARGE: 10/11/21    PRIMARY CARE PROVIDER: Yuliya Cao NP     ATTENDING PHYSICIAN: Gwinda Apgar, MD  DISCHARGING PROVIDER: Gwinda Apgar, MD        CONSULTATIONS: IP CONSULT TO CARDIOLOGY  IP CONSULT TO NEPHROLOGY  IP CONSULT TO NEPHROLOGY    PROCEDURES/SURGERIES: * No surgery found *    ADMITTING [address] [place] [address] COURSE:     Yoly Herring is a 78 y.o. male  has a past medical history of Acid reflux, Arthritis, Bladder cancer (HCC), Congestive heart failure (HCC), Deep vein thrombosis (DVT) of proximal vein of both lower extremities ([address] (2020), Diabetes mellitus ([address] GERD (gastroesophageal reflux disease), Gout, High cholesterol, Hypertension, Kidney carcinoma, left ([address] Kidney disease, Pituitary mass ([address] Reflux esophagitis, and Unspecified deficiency anemia. Patient seen at bedside emergency department. Patient came by EMS to the hospital he was hypoxic at 85% when EMS arrived at his home he is not oxygen dependent at home he was placed on oxygen and immediately return to the high 90s. Patient has a history of congestive heart failure. Patient is on multiple hypertensive meds patient is diabetic he does not comply with his diabetic diet. Drinks sugary drinks and states he does not eat much. When coming to the emergency room patient denied any chest pain was short of breath. Received Lasix 80 mg in the emergency department. Given patient's chronic renal failure not on dialysis at this time and patient is receiving Lasix in the hospital for diuresing renal consult will be placed. Cardiology consult will be placed patient's last echo was in  EF of 50 to 55%.   Nutritional consult will also be placed for patient's dietary indiscretion and lack of knowledge for a diabetic diet. DISCHARGE DIAGNOSES / PLAN:      Acute deep vein thrombosis (DVT) of distal vein of both lower extremities (HCC)  Continue Eliquis renally dosed     Acute on chronic combined systolic and diastolic ACC/AHA stage C congestive heart failure (HCC)  Fluid restriction 1500 mL/day  - echo 60%, with severe pulm htn  Holding Lasix secondary to acute on chronic renal failure stage III. Patient's baseline creatinine between 2.5 and 2.9. Patient will need follow-up with outpatient nephrology to restart Lasix dosing. Follow-up with nephrology and Dr. Janusz Ramos over the next week.     Primary hypertension  Continue Toprol, hydralazine, Norvasc    Severe pulmonary hypertension  Minoxidil     Type 2 diabetes mellitus with stage 4 chronic kidney disease, with long-term current use of insulin ([address]  Patient requiring less long-acting insulin therefore decrease Lantus to 8 units daily.     Acute on stage 3 chronic kidney disease ([address]  Patient with significant proteinuria noted. Followed by nephrology in outpatient basis with baseline creatinine being 2.5-2.9.   Currently creatinine is 3.3 upon discharge and downtrending.     Anemia of chronic disease    PENDING TEST RESULTS:   At the time of discharge the following test results are still pending: None    FOLLOW UP APPOINTMENTS:    Follow-up Information     Follow up With Specialties Details Why Contact Info    Star Esquivel NP Nurse Practitioner   [address]  [phone]      Star Esquivel NP Nurse Practitioner   Gladys Rouse  [place] [address]  [phone]             DIET: Diabetic Diet    ACTIVITY: Activity as tolerated      DISCHARGE MEDICATIONS:  Current Discharge Medication List      START taking these medications    Details   insulin glargine (LANTUS) 100 unit/mL injection 8 Units by SubCUTAneous route daily for 30 days. Qty: 2.4 mL, Refills: 0  Start date: 10/11/2021, End date: 11/10/2021         CONTINUE these medications which have CHANGED    Details   hydrALAZINE (APRESOLINE) 100 mg tablet Take 1 Tablet by mouth three (3) times daily for 30 days. Qty: 90 Tablet, Refills: 0  Start date: 10/11/2021, End date: 11/10/2021         CONTINUE these medications which have NOT CHANGED    Details   albuterol (PROVENTIL HFA, VENTOLIN HFA, PROAIR HFA) 90 mcg/actuation inhaler Take 2 Puffs by inhalation every four (4) hours as needed for Wheezing, Shortness of Breath or Respiratory Distress. Qty: 1 Each, Refills: 1    Associated Diagnoses: SOB (shortness of breath) on exertion      cinacalcet (Sensipar) 30 mg tablet Take 1 Tablet by mouth daily for 30 days. Qty: 30 Tablet, Refills: 1      ferrous sulfate (IRON) 325 mg (65 mg iron) EC tablet Take 1 Tablet by mouth three (3) times daily (with meals). Qty: 90 Tablet, Refills: 2      sodium bicarbonate 650 mg tablet Take 1 Tablet by mouth three (3) times daily for 30 days. Qty: 90 Tablet, Refills: 1      simvastatin (ZOCOR) 40 mg tablet TAKE 1 TABLET BY MOUTH EVERY NIGHT  Qty: 90 Tablet, Refills: 1      metoprolol succinate (TOPROL-XL) 25 mg XL tablet Take 1 Tablet by mouth daily. minoxidiL (LONITEN) 2.5 mg tablet TAKE 1 TABLET BY MOUTH TWICE DAILY  Qty: 180 Tablet, Refills: 0    Associated Diagnoses: Essential hypertension      amLODIPine (NORVASC) 5 mg tablet TAKE 2 TABLETS BY MOUTH ONCE A DAY  Qty: 180 Tablet, Refills: 0    Associated Diagnoses: Essential hypertension      glipiZIDE (GLUCOTROL) 10 mg tablet Take 1 Tablet by mouth two (2) times a day.   Qty: 180 Tablet, Refills: 0    Associated Diagnoses: Type 2 diabetes mellitus with stage 4 chronic kidney disease, with long-term current use of insulin ([facility])      allopurinoL (ZYLOPRIM) 100 mg tablet TAKE 2 TABLETS BY MOUTH DAILY  Qty: 60 Tablet, Refills: 3      omeprazole (PRILOSEC) 40 mg capsule TAKE 1 CAPSULE BY MOUTH DAILY  Qty: 90 Capsule, Refills: 1    Associated Diagnoses: Medication refill      polyethylene glycol (Miralax) 17 gram packet Take 1 Packet by mouth daily. Qty: 10 Packet, Refills: 0    Associated Diagnoses: Medication refill      apixaban (Eliquis) 2.5 mg tablet Take 1 Tablet by mouth two (2) times a day. Qty: 180 Tablet, Refills: 1    Associated Diagnoses: Medication refill      glucose blood VI test strips (ASCENSIA AUTODISC VI, ONE TOUCH ULTRA TEST VI) strip Check blood glucose 3 times daily, give Accu chek Precious test strips  Qty: 300 Strip, Refills: 3    Associated Diagnoses: Type 2 diabetes mellitus with stage 4 chronic kidney disease, with long-term current use of insulin ([facility])      ergocalciferol (ERGOCALCIFEROL) 1,250 mcg (50,000 unit) capsule TAKE ONE CAPSULE BY MOUTH EVERY 7 DAYS  Qty: 16 Cap, Refills: 1    Associated Diagnoses: Stage 3 chronic kidney disease, unspecified whether stage 3a or 3b CKD ([facility])      BD Ultra-Fine Mini Pen Needle 31 gauge x 3/16\" ndle USE TO INJECT TID  Qty: 100 Pen Needle, Refills: 3      fluticasone propionate (FLONASE) 50 mcg/actuation nasal spray 1 [place] by Both Nostrils route daily as needed. loratadine (CLARITIN) 10 mg tablet Take 10 mg by mouth daily.          STOP taking these medications       furosemide (LASIX) 40 mg tablet Comments:   Reason for Stopping:         insulin glargine (Lantus Solostar U-100 Insulin) 100 unit/mL (3 mL) inpn Comments:   Reason for Stopping:         amoxicillin-clavulanate (AUGMENTIN) 875-125 mg per tablet Comments:   Reason for Stopping:                 Recent Days:  Recent Labs     10/11/21  0330 10/10/21  0327 10/09/21  0500   WBC 5.7 5.4 5.4   HGB 7.4* 7.6* 7.4*   HCT 24.3* 24.6* 24.1*    274 263     Recent Labs     10/11/21  0330 10/10/21  0327 10/09/21  0500    142 142   K 3.9 3.6 3.7    109 106   CO2 22 23 26   * 41* 55*   BUN 61* 61* 62*   CREA 3.30* 3.40* 3.50*   CA 8.0* 8.2* 8.2*   MG  --   --  2.0 PHOS  --   --  4.3   ALB 3.0* 3.2*  --    TBILI 0.6 0.4  --    ALT 15 13  --      No results for input(s): PH, PCO2, PO2, HCO3, FIO2 in the last 72 hours. Recent Results (from the past 336 hour(s))   AMB POC HEMOGLOBIN A1C    Collection Time: 09/30/21  2:55 PM   Result Value Ref Range    Hemoglobin A1c (POC) 7.0 %   CBC WITH AUTOMATED DIFF    Collection Time: 10/06/21  9:24 PM   Result Value Ref Range    WBC 5.8 4.6 - 13.2 K/uL    RBC 2.99 (L) 4.35 - 5.65 M/uL    HGB 7.9 (L) 13.0 - 16.0 g/dL    HCT 25.1 (L) 36.0 - 48.0 %    MCV 83.9 78.0 - 100.0 FL    MCH 26.4 24.0 - 34.0 PG    MCHC 31.5 31.0 - 37.0 g/dL    RDW 17.2 (H) 11.6 - 14.5 %    PLATELET 611 359 - 424 K/uL    MPV 9.2 9.2 - 11.8 FL    NRBC 0.0 0.0  WBC    ABSOLUTE NRBC 0.00 0.00 - 0.01 K/uL    NEUTROPHILS 72 40 - 73 %    LYMPHOCYTES 15 (L) 21 - 52 %    MONOCYTES 10 3 - 10 %    EOSINOPHILS 2 0 - 5 %    BASOPHILS 1 0 - 2 %    IMMATURE GRANULOCYTES 0 0 - 0.5 %    ABS. NEUTROPHILS 4.1 1.8 - 8.0 K/UL    ABS. LYMPHOCYTES 0.9 0.9 - 3.6 K/UL    ABS. MONOCYTES 0.6 0.05 - 1.2 K/UL    ABS. EOSINOPHILS 0.1 0.0 - 0.4 K/UL    ABS. BASOPHILS 0.1 0.0 - 0.1 K/UL    ABS. IMM.  GRANS. 0.0 0.00 - 0.04 K/UL    DF Manual      RBC COMMENTS Anisocytosis  1+        RBC COMMENTS Hypochromia  1+        RBC COMMENTS Microcytosis  1+       PROTHROMBIN TIME + INR    Collection Time: 10/06/21  9:24 PM   Result Value Ref Range    Prothrombin time 14.2 11.5 - 15.2 sec    INR 1.2 0.8 - 1.2     METABOLIC PANEL, COMPREHENSIVE    Collection Time: 10/06/21  9:24 PM   Result Value Ref Range    Sodium 140 135 - 145 mmol/L    Potassium 3.7 3.2 - 5.1 mmol/L    Chloride 106 94 - 111 mmol/L    CO2 22 21 - 33 mmol/L    Anion gap 12 mmol/L    Glucose 170 (H) 70 - 110 mg/dL    BUN 62 (H) 9 - 21 mg/dL    Creatinine 3.50 (H) 0.8 - 1.50 mg/dL    BUN/Creatinine ratio 18      GFR est AA 21 ml/min/1.73m2    GFR est non-AA 17 ml/min/1.73m2    Calcium 8.4 (L) 8.5 - 10.5 mg/dL    Bilirubin, total 0.5 0.2 - 1.0 mg/dL    AST (SGOT) 18 17 - 74 U/L    ALT (SGPT) 17 3 - 72 U/L    Alk.  phosphatase 57 38 - 126 U/L    Protein, total 7.3 6.1 - 8.4 g/dL    Albumin 3.6 3.5 - 4.7 g/dL    Globulin 3.7 g/dL    A-G Ratio 1.0     TROPONIN I    Collection Time: 10/06/21  9:24 PM   Result Value Ref Range    Troponin-I, Qt. 0.03 0.02 - 0.05 ng/mL   LIPASE    Collection Time: 10/06/21  9:24 PM   Result Value Ref Range    Lipase 61 (H) 10 - 57 U/L   LACTIC ACID    Collection Time: 10/06/21  9:24 PM   Result Value Ref Range    Lactic acid 1.1 0.5 - 2.0 mmol/L   MAGNESIUM    Collection Time: 10/06/21  9:24 PM   Result Value Ref Range    Magnesium 1.6 (L) 1.7 - 2.8 mg/dL   BNP    Collection Time: 10/06/21  9:24 PM   Result Value Ref Range     (H) 0 - 100 pg/mL   EKG, 12 LEAD, INITIAL    Collection Time: 10/06/21  9:28 PM   Result Value Ref Range    Ventricular Rate 79 BPM    Atrial Rate 79 BPM    P-R Interval 277 ms    QRS Duration 113 ms    Q-T Interval 427 ms    QTC Calculation (Bezet) 490 ms    Calculated P Axis -5 degrees    Calculated R Axis 22 degrees    Calculated T Axis -15 degrees    Diagnosis       Sinus rhythm  Prolonged [place] interval  Borderline low voltage, extremity leads  Probable left ventricular hypertrophy  Borderline prolonged QT interval    Confirmed by Luis Armando Curry ([id number]) on 10/8/2021 9:22:40 AM     BLOOD GAS, ARTERIAL    Collection Time: 10/06/21 10:10 PM   Result Value Ref Range    pH 7.43 7.37 - 7.43      PCO2 37 35.0 - 45.0 mmHg    PO2 76 (L) 84 - 98 mmHg    O2 SAT 95 94 - 100 %    BICARBONATE 24 23.0 - 27.0 mmol/L    BASE DEFICIT 0.6 0.0 - 2.5 mmol/L    O2 METHOD Nasal Cannula      O2 FLOW RATE 3 L/min    FIO2 32.0 %    Sample source Arterial      SITE Right Radial      JAMES'S TEST PASS      Carboxy-Hgb 0.3 0 - 3 %    Methemoglobin 0.4 0 - 3 %    tHb 10.3 (L) 12.0 - 16.0 g/dL    Oxyhemoglobin 94.0 90 - 100 %   TROPONIN I    Collection Time: 10/06/21 11:25 PM   Result Value Ref Range    Troponin-I, Qt. 0.03 0.02 - 0.05 ng/mL   HEMOGLOBIN A1C WITH EAG    Collection Time: 10/06/21 11:25 PM   Result Value Ref Range    Hemoglobin A1c 6.7 (H) 4.2 - 5.6 %    Est. average glucose 146 mg/dL   LIPID PANEL    Collection Time: 10/06/21 11:25 PM   Result Value Ref Range    LIPID PROFILE        Cholesterol, total 97 <200 mg/dL    Triglyceride 61 <150 mg/dL    HDL Cholesterol 31 (L) 40 - 60 mg/dL    LDL, calculated 53.8 0 - 100 mg/dL    VLDL, calculated 12.2 mg/dL    CHOL/HDL Ratio 3.1 0 - 5.0     URINALYSIS W/ RFLX MICROSCOPIC    Collection Time: 10/06/21 11:30 PM   Result Value Ref Range    Color Yellow      Appearance Clear      Specific gravity 1.009 1.005 - 1.030      pH (UA) 7.0 5.0 - 8.0      Protein 100 (A) Negative mg/dL    Glucose Negative Negative mg/dL    Ketone Negative Negative mg/dL    Bilirubin Negative Negative      Blood Negative Negative      Urobilinogen 0.2 0.2 - 1.0 EU/dL    Nitrites Negative Negative      Leukocyte Esterase Trace (A) Negative     URINE MICROSCOPIC    Collection Time: 10/06/21 11:30 PM   Result Value Ref Range    WBC 5-10 0 - 4 /hpf    RBC 0-5 0 - 2 /hpf    Epithelial cells Few 0 - 20 /lpf    Bacteria Negative (A) None /hpf   OCCULT BLOOD, STOOL    Collection Time: 10/06/21 11:55 PM   Result Value Ref Range    Occult Blood,day 1 Negative      Day 1 date: 100,721     CULTURE, BLOOD    Collection Time: 10/07/21 12:35 AM    Specimen: Blood   Result Value Ref Range    Special Requests: No Special Requests      Culture result: No growth 4 days     CULTURE, BLOOD    Collection Time: 10/07/21 12:40 AM    Specimen: Blood   Result Value Ref Range    Special Requests: No Special Requests      Culture result: No growth 4 days     TROPONIN I    Collection Time: 10/07/21  5:17 AM   Result Value Ref Range    Troponin-I, Qt. 0.03 0.02 - 0.05 ng/mL   MAGNESIUM    Collection Time: 10/07/21  5:38 AM   Result Value Ref Range    Magnesium 2.0 1.7 - 2.8 mg/dL   ECHO ADULT COMPLETE    Collection Time: 10/07/21  8:27 AM   Result Value Ref Range    Left Atrium Major Axis 6.88 cm    LA Area 2C 28.50 cm2    LA Area 4C 28.70 cm2    LA Volume DISK BP 94.90 18.0 - 58.0 cm3    Left Atrium Major Axis 4.40 cm    Left Atrium to Aortic Root Ratio 1.19     Right Atrial Area 4C 24.90 cm2    Right Atrium Major Axis 6.40 cm    Est. RA Pressure 8.00 mmHg    Right Atrium Minor Axis 4.70 cm    Aortic Regurgitant Pressure Half-time 446.00 ms    AR Max Ishmael 430.00 cm/s    Aortic Valve Systolic Peak Velocity 376.82 cm/s    AoV PG 12.00 mmHg    AV Cusp 2.20 cm    Aortic Valve Systolic Mean Gradient 3.51 mmHg    AoV VTI 38.70 cm    Aortic valve mean velocity 123.00 cm/s    Aortic Valve Area by Continuity of VTI 3.15 cm2    Aortic Valve Area by Continuity of Peak Velocity 3.22 cm2    RALF/BSA 1.49     Ao Root D 3.70 cm    AO ASC D 3.90 cm    IVSd 1.20 (A) 0.60 - 1.00 cm    LVIDd 5.50 4. 20 - 5.90 cm    LVIDs 3.70 cm    LVOT d 2.20 cm    Left Ventricular Outflow Tract Mean Gradient 4.00 mmHg    LVOT VTI 32.10 cm    LVOT VTI 30.70 cm    LVOT VTI 33.40 cm    LVOT Peak Velocity 144.00 cm/s    LVOT Peak Gradient 8.00 mmHg    LVPWd 1.30 (A) 0.60 - 1.00 cm    LV E' Lateral Velocity 7.72 cm/s    LV E' Septal Velocity 4.13 cm/s    LV ED Vol A2C 166.00 cm3    LV ES Vol A2C 50.70 cm3    E/E' lateral 16.06     E/E' septal 30.02     LVOT .0 cm3    Mitral Valve Deceleration [place] 6,530.00 mm/s2    Mitral Valve Deceleration [place] 6,530.00 mm/s2    Mitral Valve E Wave Deceleration Time 190.00 ms    Mitral Valve Pressure Half-time 56.00 ms    MV A Ishmael 89.70 cm/s    MV E Ishmael 124.00 cm/s    MVA (PHT) 3.93 cm2    MV E/A 1.38     RVIDd 3.50 cm    RVSP 77.00 mmHg    Tapse 1.89 1.50 - 2.00 cm    TR Max Velocity 416.00 cm/s    TV MG 69.00 mmHg    BP EF 60.5 55.0 - 100.0 %    LV Mass .2 88.0 - 224.0 g    LV Mass AL Index 135.9 49.0 - 115.0 g/m2    E/E' ratio (averaged) 23.04     RALF/BSA Pk Ishmael 1.5 cm2/m2    RALF/BSA VTI 1.5 cm2/m2   EKG, 12 LEAD, SUBSEQUENT    Collection Time: 10/07/21  9:11 AM   Result Value Ref Range    Ventricular Rate 71 BPM    Atrial Rate 73 BPM    P-R Interval 309 ms    QRS Duration 119 ms    Q-T Interval 442 ms    QTC Calculation (Bezet) 481 ms    Calculated P Axis 32 degrees    Calculated R Axis -4 degrees    Calculated T Axis -25 degrees    Diagnosis       Sinus rhythm  Sinus pause  Prolonged [place] interval  Incomplete left bundle branch block  Borderline low voltage, extremity leads  Probable left ventricular hypertrophy  Borderline prolonged QT interval    Confirmed by Luis Armando Curry ([id number]) on 10/8/2021 9:24:08 AM     PROCALCITONIN    Collection Time: 10/07/21 10:00 AM   Result Value Ref Range    Procalcitonin 0.37 (L) 0.5 - 2.0 ng/mL   TROPONIN I    Collection Time: 10/07/21 10:00 AM   Result Value Ref Range    Troponin-I, Qt. 0.03 0.02 - 0.05 ng/mL   TROPONIN I    Collection Time: 10/07/21  4:35 PM   Result Value Ref Range    Troponin-I, Qt. 0.02 0.02 - 0.05 ng/mL   CBC WITH AUTOMATED DIFF    Collection Time: 10/08/21  5:00 AM   Result Value Ref Range    WBC 6.1 4.6 - 13.2 K/uL    RBC 3.19 (L) 4.35 - 5.65 M/uL    HGB 8.2 (L) 13.0 - 16.0 g/dL    HCT 27.1 (L) 36.0 - 48.0 %    MCV 85.0 78.0 - 100.0 FL    MCH 25.7 24.0 - 34.0 PG    MCHC 30.3 (L) 31.0 - 37.0 g/dL    RDW 17.2 (H) 11.6 - 14.5 %    PLATELET 576 647 - 688 K/uL    MPV 10.0 9.2 - 11.8 FL    NRBC 0.0 0.0  WBC    ABSOLUTE NRBC 0.00 0.00 - 0.01 K/uL    NEUTROPHILS 73 40 - 73 %    LYMPHOCYTES 13 (L) 21 - 52 %    MONOCYTES 10 3 - 10 %    EOSINOPHILS 3 0 - 5 %    BASOPHILS 1 0 - 2 %    IMMATURE GRANULOCYTES 0 0 - 0.5 %    ABS. NEUTROPHILS 4.4 1.8 - 8.0 K/UL    ABS. LYMPHOCYTES 0.8 (L) 0.9 - 3.6 K/UL    ABS. MONOCYTES 0.6 0.05 - 1.2 K/UL    ABS. EOSINOPHILS 0.2 0.0 - 0.4 K/UL    ABS. BASOPHILS 0.0 0.0 - 0.1 K/UL    ABS. IMM.  GRANS. 0.0 0.00 - 0.04 K/UL    DF AUTOMATED     MAGNESIUM    Collection Time: 10/08/21  5:00 AM   Result Value Ref Range    Magnesium 1.9 1.7 - 2.8 mg/dL   METABOLIC PANEL, BASIC    Collection Time: 10/08/21  5:00 AM   Result Value Ref Range    Sodium 146 (H) 135 - 145 mmol/L    Potassium 3.7 3.2 - 5.1 mmol/L    Chloride 107 94 - 111 mmol/L    CO2 26 21 - 33 mmol/L    Anion gap 13 mmol/L    Glucose 67 (L) 70 - 110 mg/dL    BUN 66 (H) 9 - 21 mg/dL    Creatinine 3.90 (H) 0.8 - 1.50 mg/dL    BUN/Creatinine ratio 17      GFR est AA 18 ml/min/1.73m2    GFR est non-AA 15 ml/min/1.73m2    Calcium 8.7 8.5 - 10.5 mg/dL   PHOSPHORUS    Collection Time: 10/08/21  5:00 AM   Result Value Ref Range    Phosphorus 4.7 (H) 2.5 - 4.5 mg/dL   IRON PROFILE    Collection Time: 10/08/21  5:00 AM   Result Value Ref Range    Iron 24 (L) 50 - 175 ug/dL    TIBC 249 (L) 250 - 450 ug/dL    Iron % saturation 10 (L) 20 - 50 %   FERRITIN    Collection Time: 10/08/21  5:00 AM   Result Value Ref Range    Ferritin 155 8 - 388 ng/mL   PTH INTACT    Collection Time: 10/08/21  5:00 AM   Result Value Ref Range    Calcium 8.0 (L) 8.5 - 10.1 mg/dL    PTH, Intact 174.2 (H) 18.4 - 88.0 pg/mL   RENAL FUNCTION PANEL    Collection Time: 10/08/21 11:15 AM   Result Value Ref Range    Sodium 141 135 - 145 mmol/L    Potassium 3.8 3.2 - 5.1 mmol/L    Chloride 104 94 - 111 mmol/L    CO2 26 21 - 33 mmol/L    Anion gap 11 mmol/L    Glucose 142 (H) 70 - 110 mg/dL    BUN 65 (H) 9 - 21 mg/dL    Creatinine 3.90 (H) 0.8 - 1.50 mg/dL    BUN/Creatinine ratio 17      GFR est AA 18 ml/min/1.73m2    GFR est non-AA 15 ml/min/1.73m2    Calcium 8.4 (L) 8.5 - 10.5 mg/dL    Phosphorus 4.6 (H) 2.5 - 4.5 mg/dL    Albumin 3.3 (L) 3.5 - 4.7 g/dL   CBC WITH AUTOMATED DIFF    Collection Time: 10/09/21  5:00 AM   Result Value Ref Range    WBC 5.4 4.6 - 13.2 K/uL    RBC 2.87 (L) 4.35 - 5.65 M/uL    HGB 7.4 (L) 13.0 - 16.0 g/dL    HCT 24.1 (L) 36.0 - 48.0 %    MCV 84.0 78.0 - 100.0 FL    MCH 25.8 24.0 - 34.0 PG    MCHC 30.7 (L) 31.0 - 37.0 g/dL    RDW 17.0 (H) 11.6 - 14.5 %    PLATELET 993 644 - 031 K/uL    MPV 9.5 9.2 - 11.8 FL    NRBC 0.0 0.0  WBC    ABSOLUTE NRBC 0.00 0.00 - 0.01 K/uL    NEUTROPHILS 68 40 - 73 %    LYMPHOCYTES 16 (L) 21 - 52 %    MONOCYTES 11 (H) 3 - 10 %    EOSINOPHILS 4 0 - 5 %    BASOPHILS 1 0 - 2 %    IMMATURE GRANULOCYTES 0 0 - 0.5 %    ABS. NEUTROPHILS 3.7 1.8 - 8.0 K/UL    ABS. LYMPHOCYTES 0.9 0.9 - 3.6 K/UL    ABS. MONOCYTES 0.6 0.05 - 1.2 K/UL    ABS. EOSINOPHILS 0.2 0.0 - 0.4 K/UL    ABS. BASOPHILS 0.0 0.0 - 0.1 K/UL    ABS. IMM.  GRANS. 0.0 0.00 - 0.04 K/UL    DF AUTOMATED     MAGNESIUM    Collection Time: 10/09/21  5:00 AM   Result Value Ref Range    Magnesium 2.0 1.7 - 2.8 mg/dL   METABOLIC PANEL, BASIC    Collection Time: 10/09/21  5:00 AM   Result Value Ref Range    Sodium 142 135 - 145 mmol/L    Potassium 3.7 3.2 - 5.1 mmol/L    Chloride 106 94 - 111 mmol/L    CO2 26 21 - 33 mmol/L    Anion gap 10 mmol/L    Glucose 55 (L) 70 - 110 mg/dL    BUN 62 (H) 9 - 21 mg/dL    Creatinine 3.50 (H) 0.8 - 1.50 mg/dL    BUN/Creatinine ratio 18      GFR est AA 21 ml/min/1.73m2    GFR est non-AA 17 ml/min/1.73m2    Calcium 8.2 (L) 8.5 - 10.5 mg/dL   PHOSPHORUS    Collection Time: 10/09/21  5:00 AM   Result Value Ref Range    Phosphorus 4.3 2.5 - 4.5 mg/dL   GLUCOSE, POC    Collection Time: 10/09/21  8:50 AM   Result Value Ref Range    Glucose (POC) 94 70 - 110 mg/dL    Performed by [address] [place] [address], POC    Collection Time: 10/09/21 11:24 AM   Result Value Ref Range    Glucose (POC) 102 70 - 110 mg/dL    Performed by [address], POC    Collection Time: 10/09/21  3:36 PM   Result Value Ref Range    Glucose (POC) 136 (H) 70 - 110 mg/dL    Performed by [address], POC    Collection Time: 10/09/21  9:05 PM   Result Value Ref Range    Glucose (POC) 117 (H) 70 - 110 mg/dL    Performed by Akira Dhillon    CBC WITH AUTOMATED DIFF    Collection Time: 10/10/21  3:27 AM   Result Value Ref Range    WBC 5.4 4.6 - 13.2 K/uL    RBC 2.92 (L) 4.35 - 5.65 M/uL    HGB 7.6 (L) 13.0 - 16.0 g/dL    HCT 24.6 (L) 36.0 - 48.0 %    MCV 84.2 78.0 - 100.0 FL    MCH 26.0 24.0 - 34.0 PG    MCHC 30.9 (L) 31.0 - 37.0 g/dL    RDW 17.0 (H) 11.6 - 14.5 %    PLATELET 174 950 - 325 K/uL    MPV 9.4 9.2 - 11.8 FL    NRBC 0.0 0.0  WBC    ABSOLUTE NRBC 0.00 0.00 - 0.01 K/uL    NEUTROPHILS 76 (H) 40 - 73 %    LYMPHOCYTES 11 (L) 21 - 52 %    MONOCYTES 10 3 - 10 %    EOSINOPHILS 2 0 - 5 %    BASOPHILS 1 0 - 2 %    IMMATURE GRANULOCYTES 0 0 - 0.5 %    ABS. NEUTROPHILS 4.1 1.8 - 8.0 K/UL    ABS. LYMPHOCYTES 0.6 (L) 0.9 - 3.6 K/UL    ABS. MONOCYTES 0.5 0.05 - 1.2 K/UL    ABS. EOSINOPHILS 0.1 0.0 - 0.4 K/UL    ABS. BASOPHILS 0.1 0.0 - 0.1 K/UL    ABS. IMM. GRANS. 0.0 0.00 - 0.04 K/UL    DF AUTOMATED      RBC COMMENTS Microcytosis  1+        RBC COMMENTS Hypochromia  1+       METABOLIC PANEL, COMPREHENSIVE    Collection Time: 10/10/21  3:27 AM   Result Value Ref Range    Sodium 142 135 - 145 mmol/L    Potassium 3.6 3.2 - 5.1 mmol/L    Chloride 109 94 - 111 mmol/L    CO2 23 21 - 33 mmol/L    Anion gap 10 mmol/L    Glucose 41 (LL) 70 - 110 mg/dL    BUN 61 (H) 9 - 21 mg/dL    Creatinine 3.40 (H) 0.8 - 1.50 mg/dL    BUN/Creatinine ratio 18      GFR est AA 22 ml/min/1.73m2    GFR est non-AA 18 ml/min/1.73m2    Calcium 8.2 (L) 8.5 - 10.5 mg/dL    Bilirubin, total 0.4 0.2 - 1.0 mg/dL    AST (SGOT) 16 (L) 17 - 74 U/L    ALT (SGPT) 13 3 - 72 U/L    Alk.  phosphatase 52 38 - 126 U/L    Protein, total 6.7 6.1 - 8.4 g/dL    Albumin 3.2 (L) 3.5 - 4.7 g/dL    Globulin 3.5 g/dL    A-G Ratio 0.9     GLUCOSE, POC    Collection Time: 10/10/21  7:05 AM   Result Value Ref Range    Glucose (POC) 62 (L) 70 - 110 mg/dL    Performed by [address] [place] [address], POC    Collection Time: 10/10/21 10:12 AM   Result Value Ref Range    Glucose (POC) 185 (H) 70 - 110 mg/dL    Performed by Wallace Hand    GLUCOSE, POC    Collection Time: 10/10/21 11:31 AM   Result Value Ref Range    Glucose (POC) 227 (H) 70 - 110 mg/dL    Performed by [facility], POC    Collection Time: 10/10/21  4:04 PM   Result Value Ref Range    Glucose (POC) 53 (LL) 70 - 110 mg/dL    Performed by Crispin Mahoney    GLUCOSE, POC    Collection Time: 10/10/21  4:38 PM   Result Value Ref Range    Glucose (POC) 107 70 - 110 mg/dL    Performed by Juan Ramon Lopez    GLUCOSE, POC    Collection Time: 10/10/21  7:50 PM   Result Value Ref Range    Glucose (POC) 141 (H) 70 - 110 mg/dL    Performed by Brandy Bergman    METABOLIC PANEL, COMPREHENSIVE    Collection Time: 10/11/21  3:30 AM   Result Value Ref Range    Sodium 141 135 - 145 mmol/L    Potassium 3.9 3.2 - 5.1 mmol/L    Chloride 107 94 - 111 mmol/L    CO2 22 21 - 33 mmol/L    Anion gap 12 mmol/L    Glucose 152 (H) 70 - 110 mg/dL    BUN 61 (H) 9 - 21 mg/dL    Creatinine 3.30 (H) 0.8 - 1.50 mg/dL    BUN/Creatinine ratio 18      GFR est AA 22 ml/min/1.73m2    GFR est non-AA 18 ml/min/1.73m2    Calcium 8.0 (L) 8.5 - 10.5 mg/dL    Bilirubin, total 0.6 0.2 - 1.0 mg/dL    AST (SGOT) 17 17 - 74 U/L    ALT (SGPT) 15 3 - 72 U/L    Alk. phosphatase 51 38 - 126 U/L    Protein, total 6.4 6.1 - 8.4 g/dL    Albumin 3.0 (L) 3.5 - 4.7 g/dL    Globulin 3.4 g/dL    A-G Ratio 0.9     CBC WITH AUTOMATED DIFF    Collection Time: 10/11/21  3:30 AM   Result Value Ref Range    WBC 5.7 4.6 - 13.2 K/uL    RBC 2.91 (L) 4.35 - 5.65 M/uL    HGB 7.4 (L) 13.0 - 16.0 g/dL    HCT 24.3 (L) 36.0 - 48.0 %    MCV 83.5 78.0 - 100.0 FL    MCH 25.4 24.0 - 34.0 PG    MCHC 30.5 (L) 31.0 - 37.0 g/dL    RDW 16.8 (H) 11.6 - 14.5 %    PLATELET 323 539 - 983 K/uL    MPV 9.2 9.2 - 11.8 FL    NRBC 0.0 0.0  WBC    ABSOLUTE NRBC 0.00 0.00 - 0.01 K/uL    NEUTROPHILS 69 40 - 73 %    LYMPHOCYTES 17 (L) 21 - 52 %    MONOCYTES 10 3 - 10 %    EOSINOPHILS 3 0 - 5 %    BASOPHILS 0 0 - 2 %    IMMATURE GRANULOCYTES 1 (H) 0 - 0.5 %    ABS. NEUTROPHILS 3.9 1.8 - 8.0 K/UL    ABS. LYMPHOCYTES 1.0 0.9 - 3.6 K/UL    ABS. MONOCYTES 0.6 0.05 - 1.2 K/UL    ABS. EOSINOPHILS 0.2 0.0 - 0.4 K/UL    ABS. BASOPHILS 0.0 0.0 - 0.1 K/UL    ABS. IMM. GRANS. 0.0 0.00 - 0.04 K/UL    DF AUTOMATED     GLUCOSE, POC    Collection Time: 10/11/21  8:01 AM   Result Value Ref Range    Glucose (POC) 92 70 - 110 mg/dL    Performed by [facility], POC    Collection Time: 10/11/21 11:52 AM   Result Value Ref Range    Glucose (POC) 94 70 - 110 mg/dL    Performed by Nikki Mcghee         NOTIFY YOUR PHYSICIAN FOR ANY OF THE FOLLOWING:   Fever over 101 degrees for 24 hours. Chest pain, shortness of breath, fever, chills, nausea, vomiting, diarrhea, change in mentation, falling, weakness, bleeding. Severe pain or pain not relieved by medications. Or, any other signs or symptoms that you may have questions about. DISPOSITION:  x  Home With:   OT  PT  HH  RN       Long term SNF/Inpatient Rehab    Independent/assisted living    Hospice    Other:       PATIENT CONDITION AT DISCHARGE:     Functional status    Poor     Deconditioned    x Independent      Cognition    x Lucid     Forgetful     Dementia      Catheters/lines (plus indication)    Snider     PICC     PEG    x None      Code status    x Full code     DNR      PHYSICAL EXAMINATION AT DISCHARGE:  General:          Alert, cooperative, no distress, appears stated age. Neck:               Supple, symmetrical  Lungs:             Clear to auscultation bilaterally. No Wheezing or Rhonchi. No rales. Chest wall:      No tenderness  No Accessory muscle use. Heart:              Regular  rhythm,  No  murmur   No edema  Abdomen:        Soft, non-tender. Not distended. Bowel sounds normal  Extremities:     No cyanosis. No clubbing,                            Skin turgor normal, Capillary refill normal  Skin:                Not pale. Not Jaundiced  No rashes   Psych:             Not anxious or agitated.   Neurologic:      Alert, moves all extremities, answers questions appropriately and responds to commands       CHRONIC MEDICAL DIAGNOSES:  Problem List as of 10/11/2021 Date Reviewed: 10/29/2020        Codes Class Noted - Resolved    Acute on chronic combined systolic and diastolic ACC/AHA stage C congestive heart failure ([facility] ICD-10-CM: I50.43  ICD-9-CM: 428.43, 428.0  10/7/2021 - Present        CHF exacerbation ([facility] ICD-10-CM: I50.9  ICD-9-CM: 428.0  10/6/2021 - Present        Acute on chronic heart failure (HCC) ICD-10-CM: I50.9  ICD-9-CM: 428.0  1/13/2021 - Present        Acute deep vein thrombosis (DVT) of distal vein of both lower extremities ([facility] ICD-10-CM: H47.4A7  ICD-9-CM: 453.42  10/13/2020 - Present        CHF (congestive heart failure) (HCC) ICD-10-CM: I50.9  ICD-9-CM: 428.0  9/15/2020 - Present        Deep vein thrombosis (DVT) of proximal vein of both lower extremities (HCC) (Chronic) ICD-10-CM: I82.4Y3  ICD-9-CM: 453.41  9/14/2020 - Present    Overview Signed 9/14/2020  3:43 PM by Henrik Peterson NP                      Positive D dimer ICD-10-CM: R79.89  ICD-9-CM: 790.92  9/13/2020 - Present        General weakness ICD-10-CM: R53.1  ICD-9-CM: 780.79  8/1/2020 - Present        Chronic diastolic congestive heart failure ([facility] ICD-10-CM: I50.32  ICD-9-CM: 428.32, 428.0  7/31/2020 - Present        Coronary artery disease of native artery of native heart with stable angina pectoris ([facility] ICD-10-CM: I25.118  ICD-9-CM: 414.01, 413.9  3/20/2020 - Present        H/O left nephrectomy ICD-10-CM: Z90.5  ICD-9-CM: V45.73  3/20/2020 - Present        History of bladder cancer ICD-10-CM: Z85.51  ICD-9-CM: V10.51  3/20/2020 - Present        Pulmonary edema, acute ([facility] ICD-10-CM: J81.0  ICD-9-CM: 518.4  3/20/2020 - Present        Acute renal failure superimposed on chronic kidney disease ([facility] ICD-10-CM: N17.9, N18.9  ICD-9-CM: 584.9, 585.9  11/23/2017 - Present        Gastroesophageal reflux disease without esophagitis ICD-10-CM: K21.9  ICD-9-CM: 530.81  11/23/2017 - Present        HLD (hyperlipidemia) ICD-10-CM: E66.9  ICD-9-CM: 272.4  11/23/2017 - Present        Renal cell carcinoma of left kidney (HCC) ICD-10-CM: C64.2  ICD-9-CM: 189.0  11/23/2017 - Present        Hematuria ICD-10-CM: R31.9  ICD-9-CM: 599.70  9/4/2014 - Present        Stage 3 chronic kidney disease ([facility] ICD-10-CM: N18.30  ICD-9-CM: 585.3  9/4/2014 - Present        Malignant neoplasm of urinary bladder ([facility] ICD-10-CM: C67.9  ICD-9-CM: 188.9  12/7/2009 - Present    Overview Signed 9/16/2020  2:28 PM by Olga Aparicio NP     Overview:   HG papillary urothelial carcinoma.  S/p cystoprostatectomy 12/30/09             Type 2 diabetes mellitus with stage 4 chronic kidney disease, with long-term current use of insulin (HCC) ICD-10-CM: E11.22, N18.4, Z79.4  ICD-9-CM: 250.40, 585.4, V58.67  12/7/2009 - Present        Primary hypertension ICD-10-CM: I10  ICD-9-CM: 401.9  12/7/2009 - Present              Greater than 35 minutes were spent with the patient on counseling and coordination of care    Signed:   Ken Alcantar MD  10/11/2021  2:49 PM

## 2021-10-11 NOTE — PROGRESS NOTES
CARDIOLOGY PROGRESS NOTE - NP    Patient seen and examined. This is a patient who is followed for shortness of breath. He denies chest pain or shortness of breath. No other complaints reported. No cardiac events noted over the weekend. Telemetry reviewed, there were no events noted in the past 24 hours. He is in sinus rhythm. Pertinent review of systems items noted above, all other systems are negative. Current medications reviewed. PHYSICAL EXAMINATION:  Vital sign assessment reveal a blood pressure of 150/69 and pulse rate of 64. Cardiovascular exam has a heart with a regular rate and rhythm, normal S1 and S2. No murmur present. There are no rubs or gallops. Good peripheral pulses. No jugular venous distension. Respiratory exam reveals clear lung fields, no rales or rhonchi. Lymphatic exam reveals no edema. Neurologic exam is nonfocal.      Recent labs results and imaging reviewed.   Notable findings include:   Recent Results (from the past 24 hour(s))   GLUCOSE, POC    Collection Time: 10/10/21  4:04 PM   Result Value Ref Range    Glucose (POC) 53 (LL) 70 - 110 mg/dL    Performed by Rigoberto Mcgraw    GLUCOSE, POC    Collection Time: 10/10/21  4:38 PM   Result Value Ref Range    Glucose (POC) 107 70 - 110 mg/dL    Performed by Nuria Emerson    GLUCOSE, POC    Collection Time: 10/10/21  7:50 PM   Result Value Ref Range    Glucose (POC) 141 (H) 70 - 110 mg/dL    Performed by Mauricio Cadena COMPREHENSIVE    Collection Time: 10/11/21  3:30 AM   Result Value Ref Range    Sodium 141 135 - 145 mmol/L    Potassium 3.9 3.2 - 5.1 mmol/L    Chloride 107 94 - 111 mmol/L    CO2 22 21 - 33 mmol/L    Anion gap 12 mmol/L    Glucose 152 (H) 70 - 110 mg/dL    BUN 61 (H) 9 - 21 mg/dL    Creatinine 3.30 (H) 0.8 - 1.50 mg/dL    BUN/Creatinine ratio 18      GFR est AA 22 ml/min/1.73m2    GFR est non-AA 18 ml/min/1.73m2    Calcium 8.0 (L) 8.5 - 10.5 mg/dL    Bilirubin, total 0.6 0.2 - 1.0 mg/dL    AST (SGOT) 17 17 - 74 U/L    ALT (SGPT) 15 3 - 72 U/L    Alk. phosphatase 51 38 - 126 U/L    Protein, total 6.4 6.1 - 8.4 g/dL    Albumin 3.0 (L) 3.5 - 4.7 g/dL    Globulin 3.4 g/dL    A-G Ratio 0.9     CBC WITH AUTOMATED DIFF    Collection Time: 10/11/21  3:30 AM   Result Value Ref Range    WBC 5.7 4.6 - 13.2 K/uL    RBC 2.91 (L) 4.35 - 5.65 M/uL    HGB 7.4 (L) 13.0 - 16.0 g/dL    HCT 24.3 (L) 36.0 - 48.0 %    MCV 83.5 78.0 - 100.0 FL    MCH 25.4 24.0 - 34.0 PG    MCHC 30.5 (L) 31.0 - 37.0 g/dL    RDW 16.8 (H) 11.6 - 14.5 %    PLATELET 080 315 - 337 K/uL    MPV 9.2 9.2 - 11.8 FL    NRBC 0.0 0.0  WBC    ABSOLUTE NRBC 0.00 0.00 - 0.01 K/uL    NEUTROPHILS 69 40 - 73 %    LYMPHOCYTES 17 (L) 21 - 52 %    MONOCYTES 10 3 - 10 %    EOSINOPHILS 3 0 - 5 %    BASOPHILS 0 0 - 2 %    IMMATURE GRANULOCYTES 1 (H) 0 - 0.5 %    ABS. NEUTROPHILS 3.9 1.8 - 8.0 K/UL    ABS. LYMPHOCYTES 1.0 0.9 - 3.6 K/UL    ABS. MONOCYTES 0.6 0.05 - 1.2 K/UL    ABS. EOSINOPHILS 0.2 0.0 - 0.4 K/UL    ABS. BASOPHILS 0.0 0.0 - 0.1 K/UL    ABS. IMM. GRANS. 0.0 0.00 - 0.04 K/UL    DF AUTOMATED     GLUCOSE, POC    Collection Time: 10/11/21  8:01 AM   Result Value Ref Range    Glucose (POC) 92 70 - 110 mg/dL    Performed by Regla Baker, POC    Collection Time: 10/11/21 11:52 AM   Result Value Ref Range    Glucose (POC) 94 70 - 110 mg/dL    Performed by Christie Deluna      Discussed case with Dr. Skinny Owens, and our impression and recommendations are as follows:  1. Shortness of breath: Troponins negative x 5. EKG is nonischemic. His symptoms are improved. Echocardiogram indicates severe pulmonary hypertension with RVSP of 77. Lasix is on hold due to worsened renal function. Keep serum potassium between 4-5 and serum magnesium > 2.  2. HTN: Blood pressures are at goal. Continue current therapy. 3. Hyperlipidemia: Continue statin therapy. 4. CKD: Creatinine today 3.3, improved from 3.9. Will avoid nephrotoxins. Lasix is on hold. Nephrology is following. Will defer to nephrology for Lasix dosing to avoid worsening renal function as he has a solitary kidney. 5. Pulmonary hypertension: Severe with RVSP of 77. Recommend outpatient pulmonary evaluation. Recommend follow up at [facility] for right heart cath if recommended by pulmonology. Thank you for involving us in the care of this patient. Please do not hesitate to call me or Dr. Trina Leigh if additional questions arise. If assistance is needed after hours or on weekends, please call the answering service at [phone].

## 2021-10-12 ENCOUNTER — PATIENT OUTREACH (OUTPATIENT)
Dept: CASE MANAGEMENT | Age: 76
End: 2021-10-12

## 2021-10-12 NOTE — PROGRESS NOTES
Care Transitions Initial Call    Call within 2 business days of discharge: Yes     Patient: Ti Turner Patient : 1945 MRN: [id number]    Last Discharge [address]       Complaint Diagnosis Description Type Department Provider    10/6/21 Shortness of Breath Acute on chronic systolic congestive heart failure ([facility] . .. ED to Hosp-Admission (Discharged) (ADMIT) KIRSTIEF2S Nicholas Singh MD; Amber Ballard. .. Was this an external facility discharge? No Discharge Facility: [facility]     Call placed to patient at only number(s). Attempted to reach patient for hospital follow up. No answer and there was no way to leave a message because voicemail full.     [facility] follow up appointment(s):   Future Appointments   Date Time Provider Niya Rai   10/13/2021 11:45 AM MD STEPHEN Jung BS AMB   10/18/2021 12:00 PM SHF CT 1 [facility]   2022 10:45 AM Ghazala Smith NP SMA BS AMB   3/30/2022  2:30 PM Ghazala Smith NP SMA BS AMB

## 2021-10-12 NOTE — PROGRESS NOTES
Reason for Admission: Chart reviewed and noted patient presented via EMS to the ER. Pt was hypoxic at 85% when EMS arrived at his home. He is not oxygen dependent at home. He was placed on oxygen and immediately returned to the 90's. Patient has a history of several co-morbilities. Dx: Acute on Chronic Combined Systolic and Diastole ACC/AHA Stage C Congestive Heart Failure     PMH: Acid Reflux, Arthritis, Bladder Cancer, CHF, DVT, DM, GERD, Gout, High Cholesterol, HTN, Kidney Carcinoma, Kidney Disease, Pituitary Mass, Reflux Esophagitis, Deficiency Anemia                        RUR Score: 30%                  PCP: First and Last name:   Junior Olivia NP     Name of Practice:    Are you a current patient: Yes/No:    Approximate date of last visit:    Can you participate in a virtual visit if needed:     Do you (patient/family) have any concerns for transition/discharge? No                  Plan for utilizing home health: TBD      Current Advanced Directive/Advance Care Plan:  Prior- No ACP      Healthcare Decision Maker: Yudelka Murphy  Click here to [address] including selection of the Healthcare Decision Maker Relationship (ie \"Primary\")            Supplemental (Other) Decision Maker: Ramya Cronin - Other Relative - [phone]    Supplemental (Other) Decision Maker: Viridiana Cowan - Other Relative - [phone]    Transition of Care Plan: TBD    Care Management Interventions  PCP Verified by CM: Yes  Transition of Care Consult (CM Consult (CM Consult):  Discharge Planning  Current Support Network: Atul Mackenzie- [phone] & Yudelka Reynolds- [phone]. Patient will be returning back home at discharge.

## 2021-10-13 ENCOUNTER — TELEPHONE (OUTPATIENT)
Dept: NEPHROLOGY | Age: 76
End: 2021-10-13

## 2021-10-13 ENCOUNTER — OFFICE VISIT (OUTPATIENT)
Dept: NEPHROLOGY | Age: 76
End: 2021-10-13
Payer: MEDICARE

## 2021-10-13 VITALS
SYSTOLIC BLOOD PRESSURE: 135 MMHG | BODY MASS INDEX: 31.47 KG/M2 | WEIGHT: 207 LBS | DIASTOLIC BLOOD PRESSURE: 57 MMHG | HEART RATE: 79 BPM

## 2021-10-13 DIAGNOSIS — N18.4 CKD (CHRONIC KIDNEY DISEASE), STAGE IV (HCC): Primary | ICD-10-CM

## 2021-10-13 DIAGNOSIS — N18.30 STAGE 3 CHRONIC KIDNEY DISEASE, UNSPECIFIED WHETHER STAGE 3A OR 3B CKD (HCC): Primary | ICD-10-CM

## 2021-10-13 PROCEDURE — 1101F PT FALLS ASSESS-DOCD LE1/YR: CPT | Performed by: INTERNAL MEDICINE

## 2021-10-13 PROCEDURE — G8536 NO DOC ELDER MAL SCRN: HCPCS | Performed by: INTERNAL MEDICINE

## 2021-10-13 PROCEDURE — G8510 SCR DEP NEG, NO PLAN REQD: HCPCS | Performed by: INTERNAL MEDICINE

## 2021-10-13 PROCEDURE — 1111F DSCHRG MED/CURRENT MED MERGE: CPT | Performed by: INTERNAL MEDICINE

## 2021-10-13 PROCEDURE — 3017F COLORECTAL CA SCREEN DOC REV: CPT | Performed by: INTERNAL MEDICINE

## 2021-10-13 PROCEDURE — G8427 DOCREV CUR MEDS BY ELIG CLIN: HCPCS | Performed by: INTERNAL MEDICINE

## 2021-10-13 PROCEDURE — G8752 SYS BP LESS 140: HCPCS | Performed by: INTERNAL MEDICINE

## 2021-10-13 PROCEDURE — G8417 CALC BMI ABV UP PARAM F/U: HCPCS | Performed by: INTERNAL MEDICINE

## 2021-10-13 PROCEDURE — G8754 DIAS BP LESS 90: HCPCS | Performed by: INTERNAL MEDICINE

## 2021-10-13 PROCEDURE — 99213 OFFICE O/P EST LOW 20 MIN: CPT | Performed by: INTERNAL MEDICINE

## 2021-10-13 RX ORDER — EPOETIN ALFA 20000 [IU]/ML
SOLUTION INTRAVENOUS; SUBCUTANEOUS
Qty: 1 EACH | Refills: 3 | Status: SHIPPED | OUTPATIENT
Start: 2021-10-13 | End: 2021-11-12

## 2021-10-13 NOTE — PROGRESS NOTES
Sis Swain presents today for   Chief Complaint   Patient presents with    Follow-up     Hyperparathyroidism       Is someone accompanying this pt? no    Is the patient using any DME equipment during OV? no    Depression Screening:  3 most recent PHQ Screens 10/13/2021   Little interest or pleasure in doing things Not at all   Feeling down, depressed, irritable, or hopeless Not at all   Total Score PHQ 2 0   Trouble falling or staying asleep, or sleeping too much -   Feeling tired or having little energy -   Poor appetite, weight loss, or overeating -   Feeling bad about yourself - or that you are a failure or have let yourself or your family down -   Trouble concentrating on things such as school, work, reading, or watching TV -   Moving or speaking so slowly that other people could have noticed; or the opposite being so fidgety that others notice -   Thoughts of being better off dead, or hurting yourself in some way -   PHQ 9 Score -   How difficult have these problems made it for you to do your work, take care of your home and get along with others -       Learning Assessment:  Learning Assessment 2/23/2021   PRIMARY LEARNER Patient   HIGHEST LEVEL OF EDUCATION - PRIMARY LEARNER  DID NOT GRADUATE HIGH SCHOOL   BARRIERS PRIMARY LEARNER COGNITIVE   CO-LEARNER CAREGIVER No   PRIMARY LANGUAGE ENGLISH   LEARNER PREFERENCE PRIMARY DEMONSTRATION   ANSWERED BY patient   RELATIONSHIP SELF       Fall Risk  Fall Risk Assessment, last 12 mths 10/13/2021   Able to walk? Yes   Fall in past 12 months? 0   Do you feel unsteady? 0   Are you worried about falling 0   Is TUG test greater than 12 seconds? -   Is the gait abnormal? -   Number of falls in past 12 months -   Fall with injury? -       Coordination of Care:  1. Have you been to the ER, urgent care clinic since your last visit? Hospitalized since your last visit? Yes, [address] [place] [address] 10/6    2.  Have you seen or consulted any other health care providers outside of the [facility] [address] since your last visit? Include any pap smears or colon screening.  Yes, PCP [facility]

## 2021-10-13 NOTE — PROGRESS NOTES
Reason for f/u:  ARON on CKD IV      HPI:  The pt is seen for f/u for CKD. He is c/o sob arben on exertion. Noticed to have LE swelling also. No N/V/D.  No urinary sx.      ROS:  Constitutional   · Constitutional: no fatigue, no fever, no night sweats, no significant weight gain, no significant weight loss     Eyes   · Eyes: no dry eyes, no vision change     ENMT   · Nose: no frequent nosebleeds, no nose/sinus problems   · Mouth/Throat: no dry mouth, no bleeding gums, no sore throat, no teeth problems, no snoring     Cardiovascular   · Cardiovascular: no swelling in the extremities, no chest pain, no arm pain on exertion, no shortness of breath when walking, no known heart murmur, no shortness of breath when lying down, no palpitations     Respiratory   · Respiratory: shortness of breath+, no cough, no wheezing, no coughing up blood     Gastrointestinal   · Gastrointestinal: no nausea, no abdominal pain, no vomiting, normal appetite, no diarrhea, not vomiting blood     Genitourinary   · Genitourinary: no hematuria, no incontinence, no difficulty urinating, no increased frequency     Musculoskeletal   · Musculoskeletal: no back pain, no arthralgias/joint pain, no muscle aches, no muscle weakness     Integumentary   · Skin: no rashes, no jaundice     Neurologic   · Neurologic: no dizziness, no numbness, no loss of consciousness, no weakness, no seizures, no headaches     Psychiatric   · Psych: no depression, no sleep disturbances     Hematologic/Lymphatic   · Hematologic/Lymphatic no swollen glands, no bruising     Allergic/Immunologic   · Allergy/Immunologic: no runny nose, no hives   ROS as noted in the HPI      PE:  Constitutional   · Level of Distress: NAD, no acute illness, no chronic illness   · General Appearance: healthy-appearing, well-nourished, well-developed   · Ambulation: ambulating normally     Eyes   · Pupils: PERRLA   · EOM: EOMI     Cardiovascular   · Heart Auscultation: RRR, normal S1, normal S2, no murmurs, no rubs, no gallop, no click, no KARI     Lungs   · Auscultation: breath sounds normal, good air movement, CTA except as noted, no wheezing, no rales/crackles, no rhonchi     Abdomen   · Inspection and Palpation: soft, non-distended, no tenderness, no CVA tenderness     Musculoskeletal System   · Extremities: no clubbing, no cyanosis, edema +    Skin   · Inspection and Palpation: no rash, no lesions, no ulcer, no induration, no nodule, turgor normal, no jaundice     Neurologic   · Cranial Nerves: grossly intact   · Gait And Station: normal gait, normal station        A/P:    1. ARON on Chronic kidney disease stage III/IV in a single kidney:   -BUN/Cr was up to 60/3.3 at last visit  -Cr is still at 3.3  -decreased lasix from 80 mg to 40 mg daily and now will hold lasix. -he has unilateral functioning kidney due to left total nephrectomy.  -His Cr ranges between 2.5-2.9   -His urinalysis showed bland sediment and has 4-5 proteinuria.  -A kidney ultrasound surgically absent left kidney and right kidney with no hydronephrosis. -He was advised to avoid any kind of NSAIDs.  -I will see him back in 3 weeks. 2. History of kidney cancer:  -Status post left total nephrectomy 8 years ago. 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  -He follow-up with oncologist.    3. Hypertension:  -Blood pressure is better now.  -will keep hydralazine 100 mg tid   -will keep metoprolol ER 50 mg daily  -off of clonidine and minoxidil  -on amlodipine 10 mg daily       4. Anemia:  -Hb is 8.8-->9.1->7.4  -TSAT 22->10%, Ferritin 283-->124  -advised to increase po FeSO4 325 mg bid->tid. -will start on Epo 20,000 units every 3rd week      5.  Renal osteodystrophy.  -His Ca, phosphorus ok  - vitamin D25 hydroxy 18, will order Vit D 50,000 units weekly for 16 weeks.  -has sec HPT   -iPTH 169-->248, ordered parathyroid nuclear scan (pt could not do it this time)  -will reschedule -put on sensipar 30 mg daily. -will check SFL ratio    6. Gouty arthritis:  -His Uric acid is 5.0  -will keep on Allopurinol 200 mg daily. 7. Metabolic acidosis:  -CO2  22-->19-->22  -will increase NaHCO3 650 mg 1 tabs bid-->tid    8. Proteinuria, nephrotic range:   -has 4-5 g/g per day   -likely from diabetic nephropathy   -Hep B/C, C3/C4, SFL ratio and ANCAs are neg   -unable to add ARB due to # 1 and K 5.0     9.  SOB:  -from fluid overload  -sob on exertion and LE edema (better)  -last Echo 03/2020 LVEF 50-55%  -decreased po lasix 80 mg to 40 mg daily due to # 1  -will hold lasix

## 2021-10-13 NOTE — TELEPHONE ENCOUNTER
Verbal orders from Dr. Anita Lopezix  To send in epo 20,000 units every 3 weeks due to Hb of 7.4. Patient was in office today and aware.

## 2021-10-14 ENCOUNTER — DOCUMENTATION ONLY (OUTPATIENT)
Dept: FAMILY MEDICINE CLINIC | Age: 76
End: 2021-10-14

## 2021-10-14 LAB
BACTERIA SPEC CULT: NORMAL
BACTERIA SPEC CULT: NORMAL
SPECIAL REQUESTS,SREQ: NORMAL
SPECIAL REQUESTS,SREQ: NORMAL

## 2021-10-14 NOTE — PROGRESS NOTES
Called patient to schedule a HF, he was not home. Will check with nurse and call him back to see when we can bring him in for his appt. Vkiki Ahr    Patient was discharged on 10/11/21 and we usually see them within 7 days of discharge, which would mean by Monday and she does not have any available appts. Can you check with her and see what she thinks is best and either call the patient or have the  call and make him a HF for whenever she can see him.   Thanks

## 2021-10-15 ENCOUNTER — VIRTUAL VISIT (OUTPATIENT)
Dept: FAMILY MEDICINE CLINIC | Age: 76
End: 2021-10-15
Payer: MEDICARE

## 2021-10-15 ENCOUNTER — PATIENT OUTREACH (OUTPATIENT)
Dept: CASE MANAGEMENT | Age: 76
End: 2021-10-15

## 2021-10-15 DIAGNOSIS — N18.4 TYPE 2 DIABETES MELLITUS WITH STAGE 4 CHRONIC KIDNEY DISEASE, WITH LONG-TERM CURRENT USE OF INSULIN (HCC): ICD-10-CM

## 2021-10-15 DIAGNOSIS — E87.20 METABOLIC ACIDOSIS: ICD-10-CM

## 2021-10-15 DIAGNOSIS — E11.22 TYPE 2 DIABETES MELLITUS WITH STAGE 4 CHRONIC KIDNEY DISEASE, WITH LONG-TERM CURRENT USE OF INSULIN (HCC): ICD-10-CM

## 2021-10-15 DIAGNOSIS — Z09 HOSPITAL DISCHARGE FOLLOW-UP: ICD-10-CM

## 2021-10-15 DIAGNOSIS — Z79.4 TYPE 2 DIABETES MELLITUS WITH STAGE 4 CHRONIC KIDNEY DISEASE, WITH LONG-TERM CURRENT USE OF INSULIN (HCC): ICD-10-CM

## 2021-10-15 DIAGNOSIS — N18.30 STAGE 3 CHRONIC KIDNEY DISEASE, UNSPECIFIED WHETHER STAGE 3A OR 3B CKD (HCC): ICD-10-CM

## 2021-10-15 DIAGNOSIS — I50.9 ACUTE ON CHRONIC CONGESTIVE HEART FAILURE, UNSPECIFIED HEART FAILURE TYPE (HCC): ICD-10-CM

## 2021-10-15 DIAGNOSIS — D64.9 ANEMIA, UNSPECIFIED TYPE: ICD-10-CM

## 2021-10-15 PROCEDURE — 99496 TRANSJ CARE MGMT HIGH F2F 7D: CPT | Performed by: NURSE PRACTITIONER

## 2021-10-15 PROCEDURE — G8427 DOCREV CUR MEDS BY ELIG CLIN: HCPCS | Performed by: NURSE PRACTITIONER

## 2021-10-15 NOTE — PROGRESS NOTES
Amber Claudio is a 78 y.o. male who was seen by synchronous (real-time) audio-video technology on 10/15/2021 for Hospital Follow Up        [address] [place], [place] [address]way:   Diagnoses and all orders for this visit:    1. Hospital discharge follow-up    2. Stage 3 chronic kidney disease, unspecified whether stage 3a or 3b CKD ([facility]    3. Acute on chronic congestive heart failure, unspecified heart failure type ([facility]    4. Type 2 diabetes mellitus with stage 4 chronic kidney disease, with long-term current use of insulin ([facility]    5. Anemia, unspecified type    6. Metabolic acidosis        I spent at least 20 minutes on this visit with this established patient. 712  Subjective:     Hospital Follow up appointment  Information for Todays visit was gathered by notes and patient. - Patient was called within 2 business days (10/12/21) of discharge by [address], RN. Patient is being seen for a hospital follow up. Patient was admitted to [facility] on 10/6/21 and discharged on 10/11/21. He was presented to ER by EMS. Patient was hypoxic at 85% during arrival to home by EMS. Consults was made to cardiology and nephrology. For DVT patient is to continue on the Eliquis  For the CHF - Patient echo 60% with severe pulmonary HTN  Lasix is being held due to chronic renal failure Stage III, Creatine goes between 2.5 - 2.9. It is suggested that patient follow up with nephrologist and Dr. Elo Marmolejo in the next week. Hypertension/Pulmonary hypertension  Patient is to continue on Toprol, hydralazine and Norvasc. Patient denies any side effects with medication and take as prescribed. Denies chest pain, SOB, vision changes or headaches. Diabetes/CKD   Patient is on Lantus. Lantus was decreased to 8 units daily. Patient is followed by Dr. Marietta Ovalles. Patient a left total nephrectomy 8 years ago and total cystectomy due to bladder cancer and now wears a urostomy bag. He also follows oncology. Patient was followed up by Dr. Anita Lea his nephrologist on 10/13/2021. Dr. Anita Lea is holding lasix and advised to avoid NSAIDS. He will follow up with patient in 3 weeks. He also was started on Epo 20,000 units every 3 weeks due to Anemia by Dr. Anita Lea. Past Medical History:   Diagnosis Date    Acid reflux     Arthritis     Bladder cancer (HCC)     Congestive heart failure (HCC)     Deep vein thrombosis (DVT) of proximal vein of both lower extremities ([facility] 9/14/2020    Diabetes mellitus ([facility]     GERD (gastroesophageal reflux disease)     Gout     High cholesterol     Hypertension     Kidney carcinoma, left (HCC)     Kidney disease     Pituitary mass ([facility]     Reflux esophagitis     Unspecified deficiency anemia         Prior to Admission medications    Medication Sig Start Date End Date Taking? Authorizing Provider   epoetin sadi (Epogen) 20,000 unit/mL injection Inject 20,000 units every 3 weeks subcutaneously 10/13/21   Dario Jose MD   hydrALAZINE (APRESOLINE) 100 mg tablet Take 1 Tablet by mouth three (3) times daily for 30 days. 10/11/21 11/10/21  Alejandra Hilliard MD   insulin glargine (LANTUS) 100 unit/mL injection 8 Units by SubCUTAneous route daily for 30 days. 10/11/21 11/10/21  Alejandra Hilliard MD   loratadine (CLARITIN) 10 mg tablet Take 10 mg by mouth daily. 9/9/21   Melba Grace MD   albuterol (PROVENTIL HFA, VENTOLIN HFA, PROAIR HFA) 90 mcg/actuation inhaler Take 2 Puffs by inhalation every four (4) hours as needed for Wheezing, Shortness of Breath or Respiratory Distress. 9/30/21   Liana Smith, ROGERIO   cinacalcet (Sensipar) 30 mg tablet Take 1 Tablet by mouth daily for 30 days. 9/22/21 10/22/21  Dario Jose MD   ferrous sulfate (IRON) 325 mg (65 mg iron) EC tablet Take 1 Tablet by mouth three (3) times daily (with meals). 9/22/21   Dario Jose MD   sodium bicarbonate 650 mg tablet Take 1 Tablet by mouth three (3) times daily for 30 days.  9/22/21 10/22/21 Andrea Portillo MD   simvastatin (ZOCOR) 40 mg tablet TAKE 1 TABLET BY MOUTH EVERY NIGHT 9/8/21   Gilma Smith NP   metoprolol succinate (TOPROL-XL) 25 mg XL tablet Take 1 Tablet by mouth daily. 7/13/21   Melba Grace MD   minoxidiL (LONITEN) 2.5 mg tablet TAKE 1 TABLET BY MOUTH TWICE DAILY 8/10/21   Andrea Portillo MD   amLODIPine (NORVASC) 5 mg tablet TAKE 2 TABLETS BY MOUTH ONCE A DAY 7/30/21   Ghazala Smith NP   glipiZIDE (GLUCOTROL) 10 mg tablet Take 1 Tablet by mouth two (2) times a day. 7/30/21   Ghazala Smith NP   allopurinoL (ZYLOPRIM) 100 mg tablet TAKE 2 TABLETS BY MOUTH DAILY 7/4/21   Ghazala Smith NP   omeprazole (PRILOSEC) 40 mg capsule TAKE 1 CAPSULE BY MOUTH DAILY 6/3/21   Ghazala Smith NP   polyethylene glycol (Miralax) 17 gram packet Take 1 Packet by mouth daily. 6/3/21   Troy Smith NP   apixaban (Eliquis) 2.5 mg tablet Take 1 Tablet by mouth two (2) times a day. 6/3/21   Ghazala Smith NP   glucose blood VI test strips (ASCENSIA AUTODISC VI, ONE TOUCH ULTRA TEST VI) strip Check blood glucose 3 times daily, give Accu chek Precious test strips 6/1/21   Ghazaal Smith NP   ergocalciferol (ERGOCALCIFEROL) 1,250 mcg (50,000 unit) capsule TAKE ONE CAPSULE BY MOUTH EVERY 7 DAYS 4/26/21   Andrea Portillo MD   BD Ultra-Fine Mini Pen Needle 31 gauge x 3/16\" ndle USE TO INJECT TID 1/26/21   Ghazala Smith NP   fluticasone propionate (FLONASE) 50 mcg/actuation nasal spray 1 [place] by Both Nostrils route daily as needed.     Melba Grace MD     Patient Active Problem List   Diagnosis Code    Malignant neoplasm of urinary bladder (HCC) C67.9    Type 2 diabetes mellitus with stage 4 chronic kidney disease, with long-term current use of insulin (HCC) E11.22, N18.4, Z79.4    Primary hypertension I10    Hematuria R31.9    Stage 3 chronic kidney disease (HCC) N18.30    Chronic diastolic congestive heart failure ([facility] I50.32    Positive D dimer R79.89    Deep vein thrombosis (DVT) of proximal vein of both lower extremities (HCC) I82.4Y3    CHF (congestive heart failure) (HCC) I50.9    Acute on chronic heart failure (HCC) I50.9    Acute deep vein thrombosis (DVT) of distal vein of both lower extremities (HCC) I82.4Z3    Acute renal failure superimposed on chronic kidney disease (HCC) N17.9, N18.9    Coronary artery disease of native artery of native heart with stable angina pectoris (HCC) I25.118    Gastroesophageal reflux disease without esophagitis K21.9    General weakness R53.1    H/O left nephrectomy Z90.5    History of bladder cancer Z85.51    HLD (hyperlipidemia) E78.5    Pulmonary edema, acute (HCC) J81.0    Renal cell carcinoma of left kidney (HCC) C64.2    CHF exacerbation (HCC) I50.9    Acute on chronic combined systolic and diastolic ACC/AHA stage C congestive heart failure ([facility]) I50.43     Patient Active Problem List    Diagnosis Date Noted    Acute on chronic combined systolic and diastolic ACC/AHA stage C congestive heart failure ([address] 10/07/2021    CHF exacerbation ([address] 10/06/2021    Acute on chronic heart failure (HCC) 01/13/2021    Acute deep vein thrombosis (DVT) of distal vein of both lower extremities ([facility]) 10/13/2020    CHF (congestive heart failure) (HCC) 09/15/2020    Deep vein thrombosis (DVT) of proximal vein of both lower extremities ([facility]) 09/14/2020    Positive D dimer 09/13/2020    General weakness 08/01/2020    Chronic diastolic congestive heart failure ([address] 07/31/2020    Coronary artery disease of native artery of native heart with stable angina pectoris ([address] 03/20/2020    H/O left nephrectomy 03/20/2020    History of bladder cancer 03/20/2020    Pulmonary edema, acute ([address] 03/20/2020    Acute renal failure superimposed on chronic kidney disease ([address] 11/23/2017    Gastroesophageal reflux disease without esophagitis 11/23/2017    HLD (hyperlipidemia) 11/23/2017    Renal cell carcinoma of left kidney ([facility] 11/23/2017    Hematuria 09/04/2014    Stage 3 chronic kidney disease ([facility] 09/04/2014    Malignant neoplasm of urinary bladder ([facility] 12/07/2009    Type 2 diabetes mellitus with stage 4 chronic kidney disease, with long-term current use of insulin ([facility] 12/07/2009    Primary hypertension 12/07/2009     Current Outpatient Medications   Medication Sig Dispense Refill    epoetin sdai (Epogen) 20,000 unit/mL injection Inject 20,000 units every 3 weeks subcutaneously 1 Each 3    hydrALAZINE (APRESOLINE) 100 mg tablet Take 1 Tablet by mouth three (3) times daily for 30 days. 90 Tablet 0    insulin glargine (LANTUS) 100 unit/mL injection 8 Units by SubCUTAneous route daily for 30 days. 2.4 mL 0    loratadine (CLARITIN) 10 mg tablet Take 10 mg by mouth daily.  albuterol (PROVENTIL HFA, VENTOLIN HFA, PROAIR HFA) 90 mcg/actuation inhaler Take 2 Puffs by inhalation every four (4) hours as needed for Wheezing, Shortness of Breath or Respiratory Distress. 1 Each 1    cinacalcet (Sensipar) 30 mg tablet Take 1 Tablet by mouth daily for 30 days. 30 Tablet 1    ferrous sulfate (IRON) 325 mg (65 mg iron) EC tablet Take 1 Tablet by mouth three (3) times daily (with meals). 90 Tablet 2    sodium bicarbonate 650 mg tablet Take 1 Tablet by mouth three (3) times daily for 30 days. 90 Tablet 1    simvastatin (ZOCOR) 40 mg tablet TAKE 1 TABLET BY MOUTH EVERY NIGHT 90 Tablet 1    metoprolol succinate (TOPROL-XL) 25 mg XL tablet Take 1 Tablet by mouth daily.  minoxidiL (LONITEN) 2.5 mg tablet TAKE 1 TABLET BY MOUTH TWICE DAILY 180 Tablet 0    amLODIPine (NORVASC) 5 mg tablet TAKE 2 TABLETS BY MOUTH ONCE A  Tablet 0    glipiZIDE (GLUCOTROL) 10 mg tablet Take 1 Tablet by mouth two (2) times a day.  180 Tablet 0    allopurinoL (ZYLOPRIM) 100 mg tablet TAKE 2 TABLETS BY MOUTH DAILY 60 Tablet 3    omeprazole (PRILOSEC) 40 mg capsule TAKE 1 CAPSULE BY MOUTH DAILY 90 Capsule 1    polyethylene glycol (Miralax) 17 gram packet Take 1 Packet by mouth daily. 10 Packet 0    apixaban (Eliquis) 2.5 mg tablet Take 1 Tablet by mouth two (2) times a day. 180 Tablet 1    glucose blood VI test strips (ASCENSIA AUTODISC VI, ONE TOUCH ULTRA TEST VI) strip Check blood glucose 3 times daily, give Accu chek Precious test strips 300 Strip 3    ergocalciferol (ERGOCALCIFEROL) 1,250 mcg (50,000 unit) capsule TAKE ONE CAPSULE BY MOUTH EVERY 7 DAYS 16 Cap 1    BD Ultra-Fine Mini Pen Needle 31 gauge x 3/16\" ndle USE TO INJECT  Pen Needle 3    fluticasone propionate (FLONASE) 50 mcg/actuation nasal spray 1 [place] by Both Nostrils route daily as needed. Past Medical History:   Diagnosis Date    Acid reflux     Arthritis     Bladder cancer (HCC)     Congestive heart failure (HCC)     Deep vein thrombosis (DVT) of proximal vein of both lower extremities ([facility] 9/14/2020    Diabetes mellitus ([facility]     GERD (gastroesophageal reflux disease)     Gout     High cholesterol     Hypertension     Kidney carcinoma, left (HCC)     Kidney disease     Pituitary mass (HCC)     Reflux esophagitis     Unspecified deficiency anemia      Family History   Problem Relation Age of Onset    Heart Disease Father     Heart Disease Mother        Review of Systems   Constitutional: Negative for chills, fever and malaise/fatigue. Eyes: Negative. Respiratory: Negative for cough, shortness of breath and wheezing. Cardiovascular: Negative for chest pain, palpitations and leg swelling. Musculoskeletal: Negative. Skin: Negative. Neurological: Negative.         Objective:     Patient-Reported Vitals 10/15/2021   Patient-Reported Weight 207      General: alert, cooperative, no distress   Mental  status: normal mood, behavior, speech, dress, motor activity, and thought processes, able to follow commands   HENT: NCAT   Neck: no visualized mass   Resp: no respiratory distress   Neuro: no gross deficits   Skin: no discoloration or lesions of concern on visible areas   Psychiatric: normal affect, consistent with stated mood, no evidence of hallucinations     Additional exam findings: We discussed the expected course, resolution and complications of the diagnosis(es) in detail. Medication risks, benefits, costs, interactions, and alternatives were discussed as indicated. I advised him to contact the office if his condition worsens, changes or fails to improve as anticipated. He expressed understanding with the diagnosis(es) and plan. Hannah Oliva, who was evaluated through a patient-initiated, synchronous (real-time) audio-video encounter, and/or his healthcare decision maker, is aware that it is a billable service, with coverage as determined by his insurance carrier. He provided verbal consent to proceed: Yes, and patient identification was verified. It was conducted pursuant to the emergency declaration under the [address] [place] [address], [address] [place] [address] authority and the [employer]. A caregiver was present when appropriate. Ability to conduct physical exam was limited. I was in the office. The patient was at home. Total time:  30 min.     Jack Hudson NP

## 2021-10-15 NOTE — PROGRESS NOTES
1. Have you been to the ER, urgent care clinic since your last visit? Hospitalized since your last visit? Yes Where: [facility]  10-6/10-11    2. Have you seen or consulted any other health care providers outside of the [address] [place] [address] since your last visit? Include any pap smears or colon screening.  No

## 2021-10-15 NOTE — PROGRESS NOTES
Care Transitions Initial Call    Call within 2 business days of discharge: Yes     Patient: Indigo Basilio Patient : 1945 MRN: [id number]    Last Discharge [address]       Complaint Diagnosis Description Type Department Provider    10/6/21 Shortness of Breath Acute on chronic systolic congestive heart failure ([facility] . .. ED to Hosp-Admission (Discharged) (ADMIT) [facility] Ariel Villanueva MD; Luba Da Silva. .. Was this an external facility discharge? No Discharge Facility: [facility]    Challenges to be reviewed by the provider   Additional needs identified to be addressed with provider: yes  medications- epogen - patient's family was informed by pharmacy that medication was not covered by insurance. Method of communication with provider : chart routing    Discussed [id number] related testing which was not done at this time. Test results were not done. Patient informed of results, if available? yes     Advance Care Planning:   Does patient have an Advance Directive:  currently not on file; education provided     Inpatient Readmission Risk score: Unplanned Readmit Risk Score: 27    Was this a readmission? no   Patient stated reason for the admission: increased shortness of breath    Patients top risk factors for readmission: medication management   Interventions to address risk factors: Obtained and reviewed discharge summary and/or continuity of care documents and Communication with specialists who will assume or re-assume care of the patient's system-specific problems-epogen not covered by insurance per pharmacy    Care Transition Nurse (CTN) contacted the patient by telephone to perform post hospital discharge assessment. Verified name and  with patient as identifiers. Provided introduction to self, and explanation of the CTN role. CTN reviewed discharge instructions, medical action plan and red flags with patient who verbalized understanding.  Were discharge instructions available to patient? yes. Reviewed appropriate site of care based on symptoms and resources available to patient including: PCP, Specialist and CTN. Patient given an opportunity to ask questions and does not have any further questions or concerns at this time. The patient agrees to contact the PCP office for questions related to their healthcare. Medication reconciliation was not performed with patient, who verbalizes his niece manages his medications but she is at work. CTN will call on Monday to review medications     Referral to Pharm D needed: no     Home Health/Outpatient orders at discharge: none    Durable Medical Equipment ordered at discharge: None    Covid Risk Education    Educated patient about risk for severe COVID-19 due to risk factors according to CDC guidelines. CTN reviewed discharge instructions, medical action plan and red flag symptoms with the patient who verbalized understanding. Discussed COVID vaccination status: yes. Education provided on COVID-19 vaccination as appropriate. Patient reports that he has had both his Flu and first COVID vaccine. Discussed exposure protocols and quarantine with CDC Guidelines. Patient was given an opportunity to verbalize any questions and concerns and agrees to contact CTN or health care provider for questions related to their healthcare. Was patient discharged with a pulse oximeter? no. Discussed and confirmed pulse oximeter discharge instructions and when to notify provider or seek emergency care. Discussed follow-up appointments. If no appointment was previously scheduled, appointment scheduling offered: yes. Is follow up appointment scheduled within 7 days of discharge? yes.    [facility] follow up appointment(s):   Future Appointments   Date Time Provider Niya Rai   10/18/2021 12:00 PM SHF CT 1 SH[facility]CT [facility]   11/3/2021 12:00 PM MD STEPHEN Mendez BS AMB   1/27/2022 10:45 AM Renu Smith NP SMA BS AMB   3/30/2022  2:30 PM Luis Ghazala DE SOUZA NP [facility] BS [facility]     Non-[facility] follow up appointment(s): none    Plan for follow-up call in 3-5 days based on severity of symptoms and risk factors. Plan for next call: symptom management-SOB, self management-ask about daily weights and medication management-follow up with family and nephrology about epogen  CTN provided contact information for future needs. Goals Addressed                 This Visit's Progress     Prevent complications post hospitalization. 1. CTN will monitor X 4 weeks    2. Ensure provider appt is scheduled within 7 days post-discharge  10/15/2021 PCP follow up 10/15/2021   3. Confirm patient attended post-discharge provider apt  10/15/2021 patient attended virtual appointment today. No new orders placed  4. Complete post-visit call to confirm attendance and update care needs      5. Review/educate common or potential \"red flags\" of condition worsening    6. Evaluate adherence to medications and priority barriers to resolve        7. Instruct on adherence to medications as ordered and assess for therapeutic response and side-effects         8. Discuss and evaluate ADL performance. Provide recommendations on energy conservation, particularly related to transition home from an inpatient admission.

## 2021-10-18 ENCOUNTER — TELEPHONE (OUTPATIENT)
Dept: FAMILY MEDICINE CLINIC | Age: 76
End: 2021-10-18

## 2021-10-18 ENCOUNTER — PATIENT OUTREACH (OUTPATIENT)
Dept: CASE MANAGEMENT | Age: 76
End: 2021-10-18

## 2021-10-18 NOTE — TELEPHONE ENCOUNTER
Pt stated that he feels sob when sitting and walking. Mr Wilner Schmidt want advice on what he should do.

## 2021-10-18 NOTE — PROGRESS NOTES
Care Transitions Follow Up Call    Challenges to be reviewed by the provider   Additional needs identified to be addressed with provider: yes  SOB - family reports that the patient became very short of breath today while out to grocery store. patient came home after outing and was \" not himselff\"   Swelling - patient is having lower leg swelling. Patient is not weighing daily but I talked to both nieces that were there on the phone and encouraged them to weigh him daily and report to PCP or cardiologist weight gain over 3 lb over night or 5 lb in a week. Method of communication with provider : chart routing    Care Transition Nurse (CTN) contacted the family by telephone to follow up after admission on 10/6/21. Verified name and  with family as identifiers. Addressed changes since last contact: medications- still trying to contact the neice that manages his medications  Follow up appointment completed? yes. Was follow up appointment scheduled within 7 days of discharge? yes. Advance Care Planning:   Does patient have an Advance Directive:  currently not on file; education provided     CTN reviewed discharge instructions, medical action plan and red flags with family and discussed any barriers to care and/or understanding of plan of care after discharge. Discussed appropriate site of care based on symptoms and resources available to patient including: PCP, Specialist, When to call 911 and CTN. The patient agrees to contact the PCP office for questions related to their healthcare.      Patients top risk factors for readmission: medical condition-CHF   Interventions to address risk factors: Obtained and reviewed discharge summary and/or continuity of care documents and Education of patient/family/caregiver/guardian to support self-management-[facility] follow up appointment(s):   Future Appointments   Date Time Provider Niya Rai   11/3/2021 12:00 PM MD STEPHEN Lee BS AMB 1/27/2022 10:45 AM Samara Smith, ROGERIO TIERNEY BS AMB   3/30/2022  2:30 PM Ghazala Smith NP SMA BS AMB     Non-[facility] follow up appointment(s): none    CTN provided contact information for future needs. Plan for follow-up call in 7-10 days based on severity of symptoms and risk factors. Plan for next call: symptom management-weight and SOB   Encouraged patient's family to seek medical care if they felt the patient's breathing became worse.

## 2021-10-19 ENCOUNTER — HOSPITAL ENCOUNTER (OUTPATIENT)
Age: 76
Setting detail: OBSERVATION
Discharge: HOME | End: 2021-10-21
Attending: FAMILY MEDICINE | Admitting: INTERNAL MEDICINE
Payer: MEDICARE

## 2021-10-19 ENCOUNTER — APPOINTMENT (OUTPATIENT)
Dept: GENERAL RADIOLOGY | Age: 76
End: 2021-10-19
Attending: FAMILY MEDICINE
Payer: MEDICARE

## 2021-10-19 ENCOUNTER — TELEPHONE (OUTPATIENT)
Dept: FAMILY MEDICINE CLINIC | Age: 76
End: 2021-10-19

## 2021-10-19 DIAGNOSIS — I27.21 PULMONARY ARTERY HYPERTENSION (HCC): ICD-10-CM

## 2021-10-19 DIAGNOSIS — N18.4 CHRONIC KIDNEY DISEASE (CKD), STAGE IV (SEVERE) (HCC): ICD-10-CM

## 2021-10-19 DIAGNOSIS — I50.33 ACUTE ON CHRONIC DIASTOLIC CONGESTIVE HEART FAILURE (HCC): Primary | ICD-10-CM

## 2021-10-19 PROBLEM — I50.9 ACUTE ON CHRONIC CONGESTIVE HEART FAILURE (HCC): Status: ACTIVE | Noted: 2021-10-19

## 2021-10-19 LAB
ALBUMIN SERPL-MCNC: 3.7 G/DL (ref 3.5–4.7)
ALBUMIN/GLOB SERPL: 0.9 {RATIO}
ALP SERPL-CCNC: 72 U/L (ref 38–126)
ALT SERPL-CCNC: 28 U/L (ref 3–72)
ANION GAP SERPL CALC-SCNC: 9 MMOL/L
AST SERPL W P-5'-P-CCNC: 28 U/L (ref 17–74)
BASOPHILS # BLD: 0.1 K/UL (ref 0–0.1)
BASOPHILS NFR BLD: 1 % (ref 0–2)
BILIRUB SERPL-MCNC: 0.5 MG/DL (ref 0.2–1)
BNP SERPL-MCNC: 611 PG/ML (ref 0–100)
BUN SERPL-MCNC: 54 MG/DL (ref 9–21)
BUN/CREAT SERPL: 17
CA-I BLD-MCNC: 7.8 MG/DL (ref 8.5–10.5)
CHLORIDE SERPL-SCNC: 112 MMOL/L (ref 94–111)
CO2 SERPL-SCNC: 20 MMOL/L (ref 21–33)
CREAT SERPL-MCNC: 3.2 MG/DL (ref 0.8–1.5)
DIFFERENTIAL METHOD BLD: ABNORMAL
EOSINOPHIL # BLD: 0.1 K/UL (ref 0–0.4)
EOSINOPHIL NFR BLD: 2 % (ref 0–5)
ERYTHROCYTE [DISTWIDTH] IN BLOOD BY AUTOMATED COUNT: 17.6 % (ref 11.6–14.5)
GLOBULIN SER CALC-MCNC: 4 G/DL
GLUCOSE SERPL-MCNC: 164 MG/DL (ref 70–110)
HCT VFR BLD AUTO: 24.2 % (ref 36–48)
HGB BLD-MCNC: 7.5 G/DL (ref 13–16)
IMM GRANULOCYTES # BLD AUTO: 0.1 K/UL (ref 0–0.04)
IMM GRANULOCYTES NFR BLD AUTO: 1 % (ref 0–0.5)
LYMPHOCYTES # BLD: 1 K/UL (ref 0.9–3.6)
LYMPHOCYTES NFR BLD: 14 % (ref 21–52)
MCH RBC QN AUTO: 26.3 PG (ref 24–34)
MCHC RBC AUTO-ENTMCNC: 31 G/DL (ref 31–37)
MCV RBC AUTO: 84.9 FL (ref 78–100)
MONOCYTES # BLD: 0.5 K/UL (ref 0.05–1.2)
MONOCYTES NFR BLD: 7 % (ref 3–10)
NEUTS SEG # BLD: 5.6 K/UL (ref 1.8–8)
NEUTS SEG NFR BLD: 75 % (ref 40–73)
NRBC # BLD: 0 K/UL (ref 0–0.01)
NRBC BLD-RTO: 0 PER 100 WBC
PLATELET # BLD AUTO: 336 K/UL (ref 135–420)
PMV BLD AUTO: 9.3 FL (ref 9.2–11.8)
POTASSIUM SERPL-SCNC: 4.5 MMOL/L (ref 3.2–5.1)
PROT SERPL-MCNC: 7.7 G/DL (ref 6.1–8.4)
RBC # BLD AUTO: 2.85 M/UL (ref 4.35–5.65)
RBC MORPH BLD: ABNORMAL
RBC MORPH BLD: ABNORMAL
SODIUM SERPL-SCNC: 141 MMOL/L (ref 135–145)
TROPONIN I SERPL-MCNC: 0.02 NG/ML (ref 0.02–0.05)
WBC # BLD AUTO: 7.4 K/UL (ref 4.6–13.2)

## 2021-10-19 PROCEDURE — 71045 X-RAY EXAM CHEST 1 VIEW: CPT

## 2021-10-19 PROCEDURE — 99285 EMERGENCY DEPT VISIT HI MDM: CPT

## 2021-10-19 PROCEDURE — 99218 HC RM OBSERVATION: CPT

## 2021-10-19 PROCEDURE — 85025 COMPLETE CBC W/AUTO DIFF WBC: CPT

## 2021-10-19 PROCEDURE — 74011250636 HC RX REV CODE- 250/636: Performed by: FAMILY MEDICINE

## 2021-10-19 PROCEDURE — 96374 THER/PROPH/DIAG INJ IV PUSH: CPT

## 2021-10-19 PROCEDURE — 93005 ELECTROCARDIOGRAM TRACING: CPT

## 2021-10-19 PROCEDURE — 80053 COMPREHEN METABOLIC PANEL: CPT

## 2021-10-19 PROCEDURE — 84484 ASSAY OF TROPONIN QUANT: CPT

## 2021-10-19 PROCEDURE — 83880 ASSAY OF NATRIURETIC PEPTIDE: CPT

## 2021-10-19 PROCEDURE — 36415 COLL VENOUS BLD VENIPUNCTURE: CPT

## 2021-10-19 RX ORDER — FUROSEMIDE 10 MG/ML
80 INJECTION INTRAMUSCULAR; INTRAVENOUS
Status: COMPLETED | OUTPATIENT
Start: 2021-10-19 | End: 2021-10-19

## 2021-10-19 RX ADMIN — FUROSEMIDE 80 MG: 10 INJECTION, SOLUTION INTRAMUSCULAR; INTRAVENOUS at 22:33

## 2021-10-19 NOTE — TELEPHONE ENCOUNTER
Patient should go to the emergency room for evaluation due to shortness of breath.   Sterling Hines MD

## 2021-10-19 NOTE — TELEPHONE ENCOUNTER
Spoke with patient's daughter and she advised patient was seen by cardiology earlier today and scheduled for some testing next week. Advised the daughter to make sure until the testing he not do a lot of walking and if he needs to let him walk, rest, walk rest.  Recommended that he not over excert imself. If the SOB gets to bad patient should follow up at ER for evaluation and treatment.   The daughter verbalized understanding

## 2021-10-20 LAB
ATRIAL RATE: 74 BPM
CALCULATED P AXIS, ECG09: 18 DEGREES
CALCULATED R AXIS, ECG10: 6 DEGREES
CALCULATED T AXIS, ECG11: 0 DEGREES
DIAGNOSIS, 93000: NORMAL
GLUCOSE BLD STRIP.AUTO-MCNC: 126 MG/DL (ref 70–110)
GLUCOSE BLD STRIP.AUTO-MCNC: 51 MG/DL (ref 70–110)
GLUCOSE BLD STRIP.AUTO-MCNC: 52 MG/DL (ref 70–110)
GLUCOSE BLD STRIP.AUTO-MCNC: 66 MG/DL (ref 70–110)
GLUCOSE BLD STRIP.AUTO-MCNC: 82 MG/DL (ref 70–110)
GLUCOSE BLD STRIP.AUTO-MCNC: 86 MG/DL (ref 70–110)
P-R INTERVAL, ECG05: 252 MS
PERFORMED BY, TECHID: ABNORMAL
PERFORMED BY, TECHID: NORMAL
PERFORMED BY, TECHID: NORMAL
Q-T INTERVAL, ECG07: 434 MS
QRS DURATION, ECG06: 103 MS
QTC CALCULATION (BEZET), ECG08: 482 MS
VENTRICULAR RATE, ECG03: 74 BPM

## 2021-10-20 PROCEDURE — 77010033678 HC OXYGEN DAILY

## 2021-10-20 PROCEDURE — 74011250637 HC RX REV CODE- 250/637: Performed by: INTERNAL MEDICINE

## 2021-10-20 PROCEDURE — 74011250637 HC RX REV CODE- 250/637: Performed by: PHYSICIAN ASSISTANT

## 2021-10-20 PROCEDURE — 97161 PT EVAL LOW COMPLEX 20 MIN: CPT

## 2021-10-20 PROCEDURE — 97166 OT EVAL MOD COMPLEX 45 MIN: CPT

## 2021-10-20 PROCEDURE — 94618 PULMONARY STRESS TESTING: CPT

## 2021-10-20 PROCEDURE — 74011250636 HC RX REV CODE- 250/636: Performed by: INTERNAL MEDICINE

## 2021-10-20 PROCEDURE — 94761 N-INVAS EAR/PLS OXIMETRY MLT: CPT

## 2021-10-20 PROCEDURE — 82962 GLUCOSE BLOOD TEST: CPT

## 2021-10-20 PROCEDURE — 99218 HC RM OBSERVATION: CPT

## 2021-10-20 PROCEDURE — 96372 THER/PROPH/DIAG INJ SC/IM: CPT

## 2021-10-20 RX ORDER — SODIUM BICARBONATE 650 MG/1
650 TABLET ORAL 3 TIMES DAILY
Status: DISCONTINUED | OUTPATIENT
Start: 2021-10-20 | End: 2021-10-21 | Stop reason: HOSPADM

## 2021-10-20 RX ORDER — ACETAMINOPHEN 650 MG/1
650 SUPPOSITORY RECTAL
Status: DISCONTINUED | OUTPATIENT
Start: 2021-10-20 | End: 2021-10-21 | Stop reason: HOSPADM

## 2021-10-20 RX ORDER — MAGNESIUM SULFATE 100 %
4 CRYSTALS MISCELLANEOUS AS NEEDED
Status: DISCONTINUED | OUTPATIENT
Start: 2021-10-20 | End: 2021-10-20 | Stop reason: SDUPTHER

## 2021-10-20 RX ORDER — ALLOPURINOL 100 MG/1
200 TABLET ORAL DAILY
Status: DISCONTINUED | OUTPATIENT
Start: 2021-10-20 | End: 2021-10-21 | Stop reason: HOSPADM

## 2021-10-20 RX ORDER — FUROSEMIDE 40 MG/1
40 TABLET ORAL DAILY
Status: DISCONTINUED | OUTPATIENT
Start: 2021-10-20 | End: 2021-10-21 | Stop reason: HOSPADM

## 2021-10-20 RX ORDER — POLYETHYLENE GLYCOL 3350 17 G/17G
17 POWDER, FOR SOLUTION ORAL DAILY PRN
Status: DISCONTINUED | OUTPATIENT
Start: 2021-10-20 | End: 2021-10-21 | Stop reason: HOSPADM

## 2021-10-20 RX ORDER — ATORVASTATIN CALCIUM 10 MG/1
20 TABLET, FILM COATED ORAL
Status: DISCONTINUED | OUTPATIENT
Start: 2021-10-20 | End: 2021-10-21 | Stop reason: HOSPADM

## 2021-10-20 RX ORDER — HYDRALAZINE HYDROCHLORIDE 25 MG/1
100 TABLET, FILM COATED ORAL 3 TIMES DAILY
Status: DISCONTINUED | OUTPATIENT
Start: 2021-10-20 | End: 2021-10-21 | Stop reason: HOSPADM

## 2021-10-20 RX ORDER — ERGOCALCIFEROL 1.25 MG/1
50000 CAPSULE ORAL
Status: DISCONTINUED | OUTPATIENT
Start: 2021-10-20 | End: 2021-10-21 | Stop reason: HOSPADM

## 2021-10-20 RX ORDER — CETIRIZINE HCL 10 MG
10 TABLET ORAL DAILY
Status: DISCONTINUED | OUTPATIENT
Start: 2021-10-20 | End: 2021-10-21 | Stop reason: HOSPADM

## 2021-10-20 RX ORDER — SODIUM CHLORIDE 0.9 % (FLUSH) 0.9 %
5-40 SYRINGE (ML) INJECTION AS NEEDED
Status: DISCONTINUED | OUTPATIENT
Start: 2021-10-20 | End: 2021-10-21 | Stop reason: HOSPADM

## 2021-10-20 RX ORDER — SODIUM CHLORIDE 0.9 % (FLUSH) 0.9 %
5-40 SYRINGE (ML) INJECTION EVERY 8 HOURS
Status: DISCONTINUED | OUTPATIENT
Start: 2021-10-20 | End: 2021-10-21 | Stop reason: HOSPADM

## 2021-10-20 RX ORDER — INSULIN LISPRO 100 [IU]/ML
INJECTION, SOLUTION INTRAVENOUS; SUBCUTANEOUS
Status: DISCONTINUED | OUTPATIENT
Start: 2021-10-20 | End: 2021-10-21 | Stop reason: HOSPADM

## 2021-10-20 RX ORDER — ALBUTEROL SULFATE 90 UG/1
2 AEROSOL, METERED RESPIRATORY (INHALATION)
Status: DISCONTINUED | OUTPATIENT
Start: 2021-10-20 | End: 2021-10-21 | Stop reason: HOSPADM

## 2021-10-20 RX ORDER — INSULIN LISPRO 100 [IU]/ML
INJECTION, SOLUTION INTRAVENOUS; SUBCUTANEOUS
Status: DISCONTINUED | OUTPATIENT
Start: 2021-10-20 | End: 2021-10-20

## 2021-10-20 RX ORDER — METOPROLOL SUCCINATE 25 MG/1
25 TABLET, EXTENDED RELEASE ORAL DAILY
Status: DISCONTINUED | OUTPATIENT
Start: 2021-10-20 | End: 2021-10-20

## 2021-10-20 RX ORDER — MINOXIDIL 2.5 MG/1
2.5 TABLET ORAL DAILY
Status: DISCONTINUED | OUTPATIENT
Start: 2021-10-21 | End: 2021-10-21 | Stop reason: HOSPADM

## 2021-10-20 RX ORDER — MINOXIDIL 2.5 MG/1
2.5 TABLET ORAL 2 TIMES DAILY
Status: DISCONTINUED | OUTPATIENT
Start: 2021-10-20 | End: 2021-10-20

## 2021-10-20 RX ORDER — DEXTROSE 50 % IN WATER (D50W) INTRAVENOUS SYRINGE
25-50 AS NEEDED
Status: DISCONTINUED | OUTPATIENT
Start: 2021-10-20 | End: 2021-10-21 | Stop reason: HOSPADM

## 2021-10-20 RX ORDER — LANOLIN ALCOHOL/MO/W.PET/CERES
325 CREAM (GRAM) TOPICAL
Status: DISCONTINUED | OUTPATIENT
Start: 2021-10-20 | End: 2021-10-21 | Stop reason: HOSPADM

## 2021-10-20 RX ORDER — INSULIN GLARGINE 100 [IU]/ML
8 INJECTION, SOLUTION SUBCUTANEOUS DAILY
Status: DISCONTINUED | OUTPATIENT
Start: 2021-10-20 | End: 2021-10-20

## 2021-10-20 RX ORDER — ACETAMINOPHEN 325 MG/1
650 TABLET ORAL
Status: DISCONTINUED | OUTPATIENT
Start: 2021-10-20 | End: 2021-10-21 | Stop reason: HOSPADM

## 2021-10-20 RX ORDER — ONDANSETRON 2 MG/ML
4 INJECTION INTRAMUSCULAR; INTRAVENOUS
Status: DISCONTINUED | OUTPATIENT
Start: 2021-10-20 | End: 2021-10-21 | Stop reason: HOSPADM

## 2021-10-20 RX ORDER — PROMETHAZINE HYDROCHLORIDE 25 MG/1
12.5 TABLET ORAL
Status: DISCONTINUED | OUTPATIENT
Start: 2021-10-20 | End: 2021-10-21 | Stop reason: HOSPADM

## 2021-10-20 RX ORDER — POLYETHYLENE GLYCOL 3350 17 G/17G
17 POWDER, FOR SOLUTION ORAL DAILY
Status: DISCONTINUED | OUTPATIENT
Start: 2021-10-20 | End: 2021-10-21 | Stop reason: HOSPADM

## 2021-10-20 RX ORDER — MAGNESIUM SULFATE 100 %
4 CRYSTALS MISCELLANEOUS AS NEEDED
Status: DISCONTINUED | OUTPATIENT
Start: 2021-10-20 | End: 2021-10-21 | Stop reason: HOSPADM

## 2021-10-20 RX ORDER — PANTOPRAZOLE SODIUM 40 MG/1
40 TABLET, DELAYED RELEASE ORAL DAILY
Status: DISCONTINUED | OUTPATIENT
Start: 2021-10-20 | End: 2021-10-21 | Stop reason: HOSPADM

## 2021-10-20 RX ORDER — METOPROLOL TARTRATE 25 MG/1
25 TABLET, FILM COATED ORAL 2 TIMES DAILY
Status: DISCONTINUED | OUTPATIENT
Start: 2021-10-20 | End: 2021-10-21 | Stop reason: HOSPADM

## 2021-10-20 RX ORDER — AMLODIPINE BESYLATE 5 MG/1
5 TABLET ORAL DAILY
Status: DISCONTINUED | OUTPATIENT
Start: 2021-10-20 | End: 2021-10-21

## 2021-10-20 RX ORDER — DEXTROSE 50 % IN WATER (D50W) INTRAVENOUS SYRINGE
25-50 AS NEEDED
Status: DISCONTINUED | OUTPATIENT
Start: 2021-10-20 | End: 2021-10-20 | Stop reason: SDUPTHER

## 2021-10-20 RX ORDER — FLUTICASONE PROPIONATE 50 MCG
1 SPRAY, SUSPENSION (ML) NASAL DAILY
Status: DISCONTINUED | OUTPATIENT
Start: 2021-10-20 | End: 2021-10-21 | Stop reason: HOSPADM

## 2021-10-20 RX ADMIN — SODIUM BICARBONATE 650 MG: 650 TABLET ORAL at 17:55

## 2021-10-20 RX ADMIN — SODIUM BICARBONATE 650 MG: 650 TABLET ORAL at 21:30

## 2021-10-20 RX ADMIN — SODIUM BICARBONATE 650 MG: 650 TABLET ORAL at 08:45

## 2021-10-20 RX ADMIN — CETIRIZINE HYDROCHLORIDE 10 MG: 10 TABLET, FILM COATED ORAL at 08:46

## 2021-10-20 RX ADMIN — EPOETIN ALFA-EPBX 10000 UNITS: 10000 INJECTION, SOLUTION INTRAVENOUS; SUBCUTANEOUS at 13:20

## 2021-10-20 RX ADMIN — METOPROLOL SUCCINATE 25 MG: 25 TABLET, EXTENDED RELEASE ORAL at 08:46

## 2021-10-20 RX ADMIN — ATORVASTATIN CALCIUM 20 MG: 10 TABLET, FILM COATED ORAL at 21:31

## 2021-10-20 RX ADMIN — Medication 10 ML: at 21:31

## 2021-10-20 RX ADMIN — MINOXIDIL 2.5 MG: 2.5 TABLET ORAL at 08:45

## 2021-10-20 RX ADMIN — APIXABAN 2.5 MG: 2.5 TABLET, FILM COATED ORAL at 17:55

## 2021-10-20 RX ADMIN — Medication 10 ML: at 01:00

## 2021-10-20 RX ADMIN — HYDRALAZINE HYDROCHLORIDE 100 MG: 25 TABLET, FILM COATED ORAL at 08:45

## 2021-10-20 RX ADMIN — FERROUS SULFATE TAB 325 MG (65 MG ELEMENTAL FE) 325 MG: 325 (65 FE) TAB at 12:00

## 2021-10-20 RX ADMIN — APIXABAN 2.5 MG: 2.5 TABLET, FILM COATED ORAL at 08:46

## 2021-10-20 RX ADMIN — AMLODIPINE BESYLATE 5 MG: 5 TABLET ORAL at 08:46

## 2021-10-20 RX ADMIN — Medication 10 ML: at 06:10

## 2021-10-20 RX ADMIN — FERROUS SULFATE TAB 325 MG (65 MG ELEMENTAL FE) 325 MG: 325 (65 FE) TAB at 17:56

## 2021-10-20 RX ADMIN — PANTOPRAZOLE SODIUM 40 MG: 40 TABLET, DELAYED RELEASE ORAL at 08:46

## 2021-10-20 RX ADMIN — METOPROLOL TARTRATE 25 MG: 25 TABLET, FILM COATED ORAL at 17:56

## 2021-10-20 RX ADMIN — FUROSEMIDE 40 MG: 40 TABLET ORAL at 12:00

## 2021-10-20 RX ADMIN — POLYETHYLENE GLYCOL (3350) 17 G: 17 POWDER, FOR SOLUTION ORAL at 08:45

## 2021-10-20 RX ADMIN — HYDRALAZINE HYDROCHLORIDE 100 MG: 25 TABLET, FILM COATED ORAL at 21:30

## 2021-10-20 RX ADMIN — FERROUS SULFATE TAB 325 MG (65 MG ELEMENTAL FE) 325 MG: 325 (65 FE) TAB at 08:46

## 2021-10-20 RX ADMIN — ALLOPURINOL 200 MG: 100 TABLET ORAL at 08:46

## 2021-10-20 RX ADMIN — HYDRALAZINE HYDROCHLORIDE 100 MG: 25 TABLET, FILM COATED ORAL at 17:55

## 2021-10-20 NOTE — PROGRESS NOTES
Problem: Diabetes Self-Management  Goal: *Disease process and treatment process  Description: Define diabetes and identify own type of diabetes; list 3 options for treating diabetes. Outcome: Progressing Towards Goal  Goal: *Incorporating nutritional management into lifestyle  Description: Describe effect of type, amount and timing of food on blood glucose; list 3 methods for planning meals. Outcome: Progressing Towards Goal  Goal: *Incorporating physical activity into lifestyle  Description: State effect of exercise on blood glucose levels. Outcome: Progressing Towards Goal  Goal: *Developing strategies to promote health/change behavior  Description: Define the ABC's of diabetes; identify appropriate screenings, schedule and personal plan for screenings. Outcome: Progressing Towards Goal  Goal: *Using medications safely  Description: State effect of diabetes medications on diabetes; name diabetes medication taking, action and side effects. Outcome: Progressing Towards Goal  Goal: *Monitoring blood glucose, interpreting and using results  Description: Identify recommended blood glucose targets  and personal targets. Outcome: Progressing Towards Goal  Goal: *Prevention, detection, treatment of acute complications  Description: List symptoms of hyper- and hypoglycemia; describe how to treat low blood sugar and actions for lowering  high blood glucose level. Outcome: Progressing Towards Goal  Goal: *Prevention, detection and treatment of chronic complications  Description: Define the natural course of diabetes and describe the relationship of blood glucose levels to long term complications of diabetes.   Outcome: Progressing Towards Goal  Goal: *Developing strategies to address psychosocial issues  Description: Describe feelings about living with diabetes; identify support needed and support network  Outcome: Progressing Towards Goal  Goal: *Insulin pump training  Outcome: Progressing Towards Goal  Goal: *Sick day guidelines  Outcome: Progressing Towards Goal  Goal: *Patient Specific Goal (EDIT GOAL, INSERT TEXT)  Outcome: Progressing Towards Goal     Problem: Patient Education: Go to Patient Education Activity  Goal: Patient/Family Education  Outcome: Progressing Towards Goal     Problem: Nutrition Deficit  Goal: *Optimize nutritional status  Outcome: Progressing Towards Goal     Problem: Patient Education: Go to Patient Education Activity  Goal: Patient/Family Education  Outcome: Progressing Towards Goal

## 2021-10-20 NOTE — PROGRESS NOTES
Assumed care of pt, blood glucose is currently 52, repeat test  Completed. Provided orange juice per pt request will retest blood glucose in 15 minutes.

## 2021-10-20 NOTE — H&P
History and Physical    Subjective:     Farheen Garcia is a 78 y.o. male  has a past medical history of Acid reflux, Arthritis, Bladder cancer (HCC), Congestive heart failure (HCC), Deep vein thrombosis (DVT) of proximal vein of both lower extremities ([address] (9/14/2020), Diabetes mellitus ([address] GERD (gastroesophageal reflux disease), Gout, High cholesterol, Hypertension, Kidney carcinoma, left ([address] Kidney disease, Pituitary mass ([address] Reflux esophagitis, and Unspecified deficiency anemia. Presents to the emergency department of progressively worsening shortness of breath that became acute today try to ambulate at the store. She reports that he had to sit down it was so bad he could no longer walk. When EMS arrived on scene oxygen saturation was 86% on room air placed on 4 L by nasal cannula with sats going up to the 90s. Felt significantly improved while reporting worsening lower extremity edema and acute orthopnea. She was recently discharged from our facility on October 11 for acute on chronic heart failure similar to this episode. His creatinine is at baseline presently. Patient evaluated in his room and feeling somewhat better after being diuresed in the emergency department. Patient continues on oxygen by nasal cannula and may require for home discharge. He has now been titrated down to 2 L and feeling much better than when he was admitted. Patient will be admitted for observation status length of stay of 1 midnight.       Past Medical History:   Diagnosis Date    Acid reflux     Arthritis     Bladder cancer (HCC)     Congestive heart failure (HCC)     Deep vein thrombosis (DVT) of proximal vein of both lower extremities ([address] 9/14/2020    Diabetes mellitus (HCC)     GERD (gastroesophageal reflux disease)     Gout     High cholesterol     Hypertension     Kidney carcinoma, left (HCC)     Kidney disease     Pituitary mass ([address]     Reflux esophagitis     Unspecified deficiency anemia       Past Surgical History:   Procedure Laterality Date    HX CYSTECTOMY  09    Dr. Rodriges Giltner HX NEPHRECTOMY Left 2009    Nephroureterectomy by Dr. Don Guillen HX OTHER SURGICAL      surgery on pituitary gland     Family History   Problem Relation Age of Onset    Heart Disease Father     Heart Disease Mother       Social History     Tobacco Use    Smoking status: Former Smoker     Packs/day: 0.50     Years: 40.00     Pack years: 20.00     Types: Cigarettes     Quit date: 1999     Years since quittin.8    Smokeless tobacco: Never Used   Substance Use Topics    Alcohol use: No       Prior to Admission medications    Medication Sig Start Date End Date Taking? Authorizing Provider   hydrALAZINE (APRESOLINE) 100 mg tablet Take 1 Tablet by mouth three (3) times daily for 30 days. 10/11/21 11/10/21 Yes Zuleima Contreras MD   insulin glargine (LANTUS) 100 unit/mL injection 8 Units by SubCUTAneous route daily for 30 days. 10/11/21 11/10/21 Yes Zuleima Contreras MD   loratadine (CLARITIN) 10 mg tablet Take 10 mg by mouth daily. 21  Yes Melba Grace MD   albuterol (PROVENTIL HFA, VENTOLIN HFA, PROAIR HFA) 90 mcg/actuation inhaler Take 2 Puffs by inhalation every four (4) hours as needed for Wheezing, Shortness of Breath or Respiratory Distress. 21  Yes Ghazala Smith NP   cinacalcet (Sensipar) 30 mg tablet Take 1 Tablet by mouth daily for 30 days. 9/22/21 10/22/21 Yes Mahsa Cummins MD   ferrous sulfate (IRON) 325 mg (65 mg iron) EC tablet Take 1 Tablet by mouth three (3) times daily (with meals). 21  Yes Mahsa Cummins MD   sodium bicarbonate 650 mg tablet Take 1 Tablet by mouth three (3) times daily for 30 days. 9/22/21 10/22/21 Yes Mahsa Cummins MD   simvastatin (ZOCOR) 40 mg tablet TAKE 1 TABLET BY MOUTH EVERY NIGHT 21  Yes Ghazala Smith NP   metoprolol succinate (TOPROL-XL) 25 mg XL tablet Take 1 Tablet by mouth daily. 7/13/21  Yes Melba Grace MD   minoxidiL (LONITEN) 2.5 mg tablet TAKE 1 TABLET BY MOUTH TWICE DAILY 8/10/21  Yes Pérez Villafana MD   amLODIPine (NORVASC) 5 mg tablet TAKE 2 TABLETS BY MOUTH ONCE A DAY 7/30/21  Yes Ghazala Smith NP   glipiZIDE (GLUCOTROL) 10 mg tablet Take 1 Tablet by mouth two (2) times a day. 7/30/21  Yes Ghazala Smith NP   allopurinoL (ZYLOPRIM) 100 mg tablet TAKE 2 TABLETS BY MOUTH DAILY 7/4/21  Yes Ghazala Smith NP   omeprazole (PRILOSEC) 40 mg capsule TAKE 1 CAPSULE BY MOUTH DAILY 6/3/21  Yes Ghazala Smith NP   polyethylene glycol (Miralax) 17 gram packet Take 1 Packet by mouth daily. 6/3/21  Yes Ghazala Smith NP   apixaban (Eliquis) 2.5 mg tablet Take 1 Tablet by mouth two (2) times a day. 6/3/21  Yes Ghazala Smith NP   glucose blood VI test strips (ASCENSIA AUTODISC VI, ONE TOUCH ULTRA TEST VI) strip Check blood glucose 3 times daily, give Accu chek Precious test strips 6/1/21  Yes Ghazala Smith NP   ergocalciferol (ERGOCALCIFEROL) 1,250 mcg (50,000 unit) capsule TAKE ONE CAPSULE BY MOUTH EVERY 7 DAYS 4/26/21  Yes Pérez Villafana MD   BD Ultra-Fine Mini Pen Needle 31 gauge x 3/16\" ndle USE TO INJECT TID 1/26/21  Yes Ghazala Smith NP   fluticasone propionate (FLONASE) 50 mcg/actuation nasal spray 1 [place] by Both Nostrils route daily as needed.    Yes Melba Grace MD   epoetin sadi (Epogen) 20,000 unit/mL injection Inject 20,000 units every 3 weeks subcutaneously 10/13/21   Pérez Villafana MD     No Known Allergies       REVIEW OF SYSTEMS:       Total of 12 systems reviewed as follows:       POSITIVE= underlined text  Negative = text not underlined  General:  fever, chills, sweats, generalized weakness, weight loss/gain,      loss of appetite   Eyes:    blurred vision, eye pain, loss of vision, double vision  ENT:    rhinorrhea, pharyngitis   Respiratory:  cough, sputum production, SOB, CHAUDHARI, wheezing, pleuritic pain Cardiology:   chest pain, palpitations, orthopnea, PND, edema, syncope   Gastrointestinal:  abdominal pain , N/V, diarrhea, dysphagia, constipation, bleeding   Genitourinary:  frequency, urgency, dysuria, hematuria, incontinence   Muskuloskeletal :  arthralgia, myalgia, back pain  Hematology: easy bruising, nose or gum bleeding, lymphadenopathy   Dermatological: rash, ulceration, pruritis, color change / jaundice  Endocrine:   hot flashes or polydipsia   Neurological:  headache, dizziness, confusion, focal weakness, paresthesia,     Speech difficulties, memory loss, gait difficulty  Psychological: Feelings of anxiety, depression, agitation       Objective:   VITALS:    Visit Vitals  BP (!) 156/77 (BP 1 Location: Left upper arm, BP Patient Position: At rest)   Pulse 72   Temp 98 °F (36.7 °C)   Resp 20   Ht 5' 8\" (1.727 m)   Wt 94.4 kg (208 lb 3.2 oz)   SpO2 98%   BMI 31.66 kg/m²       PHYSICAL EXAM:    General:    Alert, cooperative, no distress, appears stated age. HEENT: Atraumatic, anicteric sclerae, pink conjunctivae     No oral ulcers, mucosa moist, throat clear, dentition fair  Neck:  Supple, symmetrical,  thyroid: non tender  Lungs:   Clear to auscultation bilaterally. No Wheezing or Rhonchi. No rales. Chest wall:  No tenderness  No Accessory muscle use. Heart:   Regular  rhythm,  No  murmur   No edema  Abdomen:   Soft, non-tender. Not distended. Bowel sounds normal  Extremities: No cyanosis. No clubbing,      Skin turgor normal, Capillary refill normal, Radial dial pulse 2+  Skin:     Not pale. Not Jaundiced  No rashes   Psych:  Good insight. Not depressed. Not anxious or agitated. Neurologic: EOMs intact. No facial asymmetry. No aphasia or slurred speech. Symmetrical strength,   Sensation grossly intact. Alert and oriented X 4. Given the patient's current clinical presentation, I have a high level of concern for decompensation if discharged from the emergency department.   Complex decision making was performed, which includes reviewing the patient's available past medical records, laboratory results, and x-ray films. _______________________________________________________________________  Care Plan discussed with:    Comments   Patient X    Family      RN X    Care Manager                    Consultant:      _______________________________________________________________________  Expected  Disposition:   Home with Family X   HH/PT/OT/RN    SNF/LTC    SHERLYN    ________________________________________________________________________  TOTAL TIME:  48 Minutes    Critical Care Provided     Minutes non procedure based      Comments    X Reviewed previous records   >50% of visit spent in counseling and coordination of care X Discussion with patient and/or family and questions answered       ________________________________________________________________________    Labs:  Recent Results (from the past 24 hour(s))   CBC WITH AUTOMATED DIFF    Collection Time: 10/19/21  9:21 PM   Result Value Ref Range    WBC 7.4 4.6 - 13.2 K/uL    RBC 2.85 (L) 4.35 - 5.65 M/uL    HGB 7.5 (L) 13.0 - 16.0 g/dL    HCT 24.2 (L) 36.0 - 48.0 %    MCV 84.9 78.0 - 100.0 FL    MCH 26.3 24.0 - 34.0 PG    MCHC 31.0 31.0 - 37.0 g/dL    RDW 17.6 (H) 11.6 - 14.5 %    PLATELET 029 106 - 684 K/uL    MPV 9.3 9.2 - 11.8 FL    NRBC 0.0 0.0  WBC    ABSOLUTE NRBC 0.00 0.00 - 0.01 K/uL    NEUTROPHILS 75 (H) 40 - 73 %    LYMPHOCYTES 14 (L) 21 - 52 %    MONOCYTES 7 3 - 10 %    EOSINOPHILS 2 0 - 5 %    BASOPHILS 1 0 - 2 %    IMMATURE GRANULOCYTES 1 (H) 0 - 0.5 %    ABS. NEUTROPHILS 5.6 1.8 - 8.0 K/UL    ABS. LYMPHOCYTES 1.0 0.9 - 3.6 K/UL    ABS. MONOCYTES 0.5 0.05 - 1.2 K/UL    ABS. EOSINOPHILS 0.1 0.0 - 0.4 K/UL    ABS. BASOPHILS 0.1 0.0 - 0.1 K/UL    ABS. IMM.  GRANS. 0.1 (H) 0.00 - 0.04 K/UL    DF Manual      RBC COMMENTS Anisocytosis  1+        RBC COMMENTS Macrocytosis  1+       METABOLIC PANEL, COMPREHENSIVE    Collection Time: 10/19/21  9:21 PM   Result Value Ref Range    Sodium 141 135 - 145 mmol/L    Potassium 4.5 3.2 - 5.1 mmol/L    Chloride 112 (H) 94 - 111 mmol/L    CO2 20 (L) 21 - 33 mmol/L    Anion gap 9 mmol/L    Glucose 164 (H) 70 - 110 mg/dL    BUN 54 (H) 9 - 21 mg/dL    Creatinine 3.20 (H) 0.8 - 1.50 mg/dL    BUN/Creatinine ratio 17      GFR est AA 23 ml/min/1.73m2    GFR est non-AA 19 ml/min/1.73m2    Calcium 7.8 (L) 8.5 - 10.5 mg/dL    Bilirubin, total 0.5 0.2 - 1.0 mg/dL    AST (SGOT) 28 17 - 74 U/L    ALT (SGPT) 28 3 - 72 U/L    Alk. phosphatase 72 38 - 126 U/L    Protein, total 7.7 6.1 - 8.4 g/dL    Albumin 3.7 3.5 - 4.7 g/dL    Globulin 4.0 g/dL    A-G Ratio 0.9     TROPONIN I    Collection Time: 10/19/21  9:21 PM   Result Value Ref Range    Troponin-I, Qt. 0.02 0.02 - 0.05 ng/mL   BNP    Collection Time: 10/19/21  9:21 PM   Result Value Ref Range     (H) 0 - 100 pg/mL       Imaging:  No results found. Assessment & Plan:       Acute on chronic congestive heart failure (HCC)  -Continue diuresis  -Daily weight  -Fluid restriction  -Recent echocardiogram (10/7/21) shows LVEF is 61%. Biplane method used to measure ejection fraction. Normal cavity size and systolic function (ejection fraction normal). Mild concentric hypertrophy. Left ventricular diastolic dysfunction.     Chronic kidney disease, stage IV (severe) ([facility])  -Continue home medications  -Diuresis as a priority  -Nephrology consultation    Primary hypertension  -Mildly elevated on admission  -Continue with minoxidil, metoprolol, hydralazine and amlodipine  -Continue to monitor    Type 2 diabetes mellitus with stage 4 chronic kidney disease, with long-term current use of insulin ([facility])  -Continue with Lantus  -Insulin sliding scale before meals bedtime  -Hold glipizide        Code Status: Full    Prophylaxis: Continue Eliquis    Electronically Signed : Carlene Paris, PhD, PA-C, [facility]

## 2021-10-20 NOTE — PROGRESS NOTES
Received patient to room 243 from [facility] via stretcher. Ambulated to bed independently. Oriented to room, unit and CB/TV system. Urostomy emptied at this time. Denies c/o pain or discomfort. Safety measures in place. Bed low/locked, SRx2, CB and all possessions in reach. Will continue to monitor.

## 2021-10-20 NOTE — ASSESSMENT & PLAN NOTE
-Mildly elevated on admission  -Continue with minoxidil, metoprolol, hydralazine and amlodipine  -Continue to monitor

## 2021-10-20 NOTE — PROGRESS NOTES
OCCUPATIONAL THERAPY EVALUATION/DISCHARGE    Patient: Serenity Kennedy (84 y.o. male)  Date: 10/20/2021  Primary Diagnosis: Acute on chronic congestive heart failure ([facility] [I50.9]  Chronic kidney disease, stage IV (severe) ([facility] [N18.4]       Precautions: fall , O2     PLOF:lives at home with multiple family members and had been I with all ADLs and mobility, including driving     ASSESSMENT AND RECOMMENDATIONS:  Based on the objective data described below, the patient presents with declines in ADLs and mobility. Skilled occupational therapy is not indicated at this time. Discharge Recommendations: None  Further Equipment Recommendations for Discharge: N/A      SUBJECTIVE:   Patient stated I hope I can get out of here soon.     OBJECTIVE DATA SUMMARY:     Past Medical History:   Diagnosis Date    Acid reflux     Arthritis     Bladder cancer (HCC)     Congestive heart failure (HCC)     Deep vein thrombosis (DVT) of proximal vein of both lower extremities ([facility] 9/14/2020    Diabetes mellitus (HCC)     GERD (gastroesophageal reflux disease)     Gout     High cholesterol     Hypertension     Kidney carcinoma, left (HCC)     Kidney disease     Pituitary mass ([facility]     Reflux esophagitis     Unspecified deficiency anemia      Past Surgical History:   Procedure Laterality Date    HX CYSTECTOMY  12/30/09    Dr. Irina Vaca HX NEPHRECTOMY Left 5/2009    Nephroureterectomy by Dr. Castro [place] [id number] [address]      surgery on pituitary gland     Barriers to Learning/Limitations: None  Compensate with: visual, verbal, tactile, kinesthetic cues/model    Home Situation:      [x]     Right hand dominant   []     Left hand dominant    Cognitive/Behavioral Status:       A & O x 3; follows multi step commands           Skin: intact   Edema:none noted   Vision/Perceptual:       WFLs                               Coordination: BUE    WFLs             Balance:    intact     Strength: BUE  4/5 Tone & Sensation: BUE  Intact                           Range of Motion: BUE  WFLs                             Functional Mobility and Transfers for ADLs:  Bed Mobility:    mod I            Transfers:       mod I                                  ADL Assessment:    Pt demonstrating good safety and ability to complete basic ADLs and mobility safely. No skilled OT services indicated at present       Pain:  Pain level pre-treatment: 0/10   Pain level post-treatment: 0/10   Pain Intervention(s): Medication (see MAR); Rest, Ice, Repositioning  Response to intervention: Nurse notified, See doc flow    Activity Tolerance:   Good     Please refer to the flowsheet for vital signs taken during this treatment. After treatment:   []  Patient left in no apparent distress sitting up in chair  [x]  Patient left in no apparent distress in bed  [x]  Call bell left within reach  [x]  Nursing notified  []  Caregiver present  []  Bed alarm activated    COMMUNICATION/EDUCATION:   [x]      Role of Occupational Therapy in the acute care setting  [x]      Home safety education was provided and the patient/caregiver indicated understanding. []      Patient/family have participated as able and agree with findings and recommendations. []      Patient is unable to participate in plan of care at this time. Thank you for this referral.  Greta Silverman MS, OTR/L          [place] Complexity: History: MEDIUM Complexity : Expanded review of history including physical, cognitive and psychosocial  history ; Examination: MEDIUM Complexity : 3-5 performance deficits relating to physical, cognitive , or psychosocial skils that result in activity limitations and / or participation restrictions; Decision Making:MEDIUM Complexity : Patient may present with comorbidities that affect occupational performnce.  Miniml to moderate modification of tasks or assistance (eg, physical or verbal ) with assesment(s) is necessary to enable patient to complete evaluation

## 2021-10-20 NOTE — PROGRESS NOTES
My suspicioun both now and last admission is that his major problem in pulm htn, with very little reserve, and even the slightest amout of fluid overload sets him into resp failure. I also suspect he needs home 02. Will see what nephrology and pulm input is. Possible dc later today, vs tomorrow.

## 2021-10-20 NOTE — PROGRESS NOTES
Rounding and VS obtained. Asleep but easily aroused. Denies c/o pain or discomfort. Urostomy emptied of 800 ml clear yellow urine. Safety measures remain in place. CB in reach. Will continue to monitor.

## 2021-10-20 NOTE — PROGRESS NOTES
Comprehensive Nutrition Assessment    Type and Reason for Visit: Initial    Nutrition Recommendations/Plan: modify to a 4CHO choice diet with cardiac restriction and 1500 mL FR    Nutrition Assessment:  79 yo male PMH: Acid refulx, bladder cancer, CHF, DVT, DM, GERD, gout, high cholesterol, left kidney disease   morbidly obese male BMI 31.7. came in due to CHF exacerbation. Pt previous admission was educated on Diabetic diet due to hyperglycemia. This admission educated on CHF nutriton therapy. Due to pt with increased SOB and increased LE edema. Pt reports he is following a Low Na diet and is having meals delivered to his home as no one cooks at his home but the meals delivered are Low Na diets. Pt does admit he does not measure and track fluids pt endorsing he drinks a lot of milk. Educated pt on his current 1500 mL FR which equals six 8oz glasses daily and what are considered fluids. Pt reports he understands and will make changes.   Pt BG is better controlled since last admission    Recent Results (from the past 24 hour(s))   EKG, 12 LEAD, INITIAL    Collection Time: 10/19/21  9:10 PM   Result Value Ref Range    Ventricular Rate 74 BPM    Atrial Rate 74 BPM    P-R Interval 252 ms    QRS Duration 103 ms    Q-T Interval 434 ms    QTC Calculation (Bezet) 482 ms    Calculated P Axis 18 degrees    Calculated R Axis 6 degrees    Calculated T Axis 0 degrees    Diagnosis       Sinus rhythm  Prolonged IN interval  Borderline low voltage, extremity leads  Abnormal R-wave progression, late transition  Borderline prolonged QT interval     CBC WITH AUTOMATED DIFF    Collection Time: 10/19/21  9:21 PM   Result Value Ref Range    WBC 7.4 4.6 - 13.2 K/uL    RBC 2.85 (L) 4.35 - 5.65 M/uL    HGB 7.5 (L) 13.0 - 16.0 g/dL    HCT 24.2 (L) 36.0 - 48.0 %    MCV 84.9 78.0 - 100.0 FL    MCH 26.3 24.0 - 34.0 PG    MCHC 31.0 31.0 - 37.0 g/dL    RDW 17.6 (H) 11.6 - 14.5 %    PLATELET 016 003 - 384 K/uL    MPV 9.3 9.2 - 11.8 FL    NRBC 0.0 0. 0  WBC    ABSOLUTE NRBC 0.00 0.00 - 0.01 K/uL    NEUTROPHILS 75 (H) 40 - 73 %    LYMPHOCYTES 14 (L) 21 - 52 %    MONOCYTES 7 3 - 10 %    EOSINOPHILS 2 0 - 5 %    BASOPHILS 1 0 - 2 %    IMMATURE GRANULOCYTES 1 (H) 0 - 0.5 %    ABS. NEUTROPHILS 5.6 1.8 - 8.0 K/UL    ABS. LYMPHOCYTES 1.0 0.9 - 3.6 K/UL    ABS. MONOCYTES 0.5 0.05 - 1.2 K/UL    ABS. EOSINOPHILS 0.1 0.0 - 0.4 K/UL    ABS. BASOPHILS 0.1 0.0 - 0.1 K/UL    ABS. IMM. GRANS. 0.1 (H) 0.00 - 0.04 K/UL    DF Manual      RBC COMMENTS Anisocytosis  1+        RBC COMMENTS Macrocytosis  1+       METABOLIC PANEL, COMPREHENSIVE    Collection Time: 10/19/21  9:21 PM   Result Value Ref Range    Sodium 141 135 - 145 mmol/L    Potassium 4.5 3.2 - 5.1 mmol/L    Chloride 112 (H) 94 - 111 mmol/L    CO2 20 (L) 21 - 33 mmol/L    Anion gap 9 mmol/L    Glucose 164 (H) 70 - 110 mg/dL    BUN 54 (H) 9 - 21 mg/dL    Creatinine 3.20 (H) 0.8 - 1.50 mg/dL    BUN/Creatinine ratio 17      GFR est AA 23 ml/min/1.73m2    GFR est non-AA 19 ml/min/1.73m2    Calcium 7.8 (L) 8.5 - 10.5 mg/dL    Bilirubin, total 0.5 0.2 - 1.0 mg/dL    AST (SGOT) 28 17 - 74 U/L    ALT (SGPT) 28 3 - 72 U/L    Alk.  phosphatase 72 38 - 126 U/L    Protein, total 7.7 6.1 - 8.4 g/dL    Albumin 3.7 3.5 - 4.7 g/dL    Globulin 4.0 g/dL    A-G Ratio 0.9     TROPONIN I    Collection Time: 10/19/21  9:21 PM   Result Value Ref Range    Troponin-I, Qt. 0.02 0.02 - 0.05 ng/mL   BNP    Collection Time: 10/19/21  9:21 PM   Result Value Ref Range     (H) 0 - 100 pg/mL   GLUCOSE, POC    Collection Time: 10/20/21  7:20 AM   Result Value Ref Range    Glucose (POC) 51 (LL) 70 - 110 mg/dL    Performed by Sacha Delaney, POC    Collection Time: 10/20/21  7:22 AM   Result Value Ref Range    Glucose (POC) 52 (LL) 70 - 110 mg/dL    Performed by Sacha Delaney, POC    Collection Time: 10/20/21  7:44 AM   Result Value Ref Range    Glucose (POC) 66 (L) 70 - 110 mg/dL    Performed by Sacha Delaney, POC Collection Time: 10/20/21  8:43 AM   Result Value Ref Range    Glucose (POC) 126 (H) 70 - 110 mg/dL    Performed by Regla Baker POC    Collection Time: 10/20/21 11:56 AM   Result Value Ref Range    Glucose (POC) 82 70 - 110 mg/dL    Performed by Mague Bear        Malnutrition Assessment:  Malnutrition Status:  Mild malnutrition    Context:  Acute illness     Findings of the 6 clinical characteristics of malnutrition:   Energy Intake:  No significant decrease in energy intake  Weight Loss:  No significant weight loss     Body Fat Loss:  No significant body fat loss,     Muscle Mass Loss:  No significant muscle mass loss,    Fluid Accumulation:  1 - Mild,  (LE with CHF)   Strength:            Estimated Daily Nutrient Needs:  Energy (kcal): 5309-9288 kcal/day; Weight Used for Energy Requirements: Admission (94 kg)  Protein (g): 75-94 g/day; Weight Used for Protein Requirements: Admission (0.8-1 g/kg)  Fluid (ml/day): 1500 mL/day; Method Used for Fluid Requirements: Other (comment) (1500 mL FR)      Nutrition Related Findings:  morbidly obese male BMI 31.7. came in due to CHF exacerbation. Pt previous admission was educated on Diabetic diet due to hyperglycemia. This admission educated on CHF nutriton therapy. Wounds:    None       Current Nutrition Therapies:  ADULT DIET Regular; 4 carb choices (60 gm/meal)    Anthropometric Measures:  · Height:  5' 8\" (172.7 cm)  · Current Body Wt:  94.3 kg (208 lb)   · Admission Body Wt:  208 lb    · Usual Body Wt:        · Ideal Body Wt:  154 lbs:  135.1 %   · Adjusted Body Weight:   ; Weight Adjustment for: No adjustment   · Adjusted BMI:       · BMI Category:  Obese class 1 (BMI 30.0-34. 9)       Nutrition Diagnosis:   · Limited adherence to nutrition-related recommendations related to cardiac dysfunction as evidenced by localized or generalized fluid accumulation      Nutrition Interventions:   Food and/or Nutrient Delivery: Modify current diet  Nutrition Education and Counseling: Education initiated, Education completed  Coordination of Nutrition Care: Continue to monitor while inpatient    Goals:  Pt to eat > 75% of meals, BM q 1-3 days, BMI 22-25 for adults > 73 yo, BG , Hgb A1c <7       Nutrition Monitoring and Evaluation:   Behavioral-Environmental Outcomes:    Food/Nutrient Intake Outcomes: Food and nutrient intake  Physical Signs/Symptoms Outcomes: Meal time behavior, Biochemical data, Weight, Fluid status or edema     F/u: 10/24/2021    Discharge Planning:    Continue current diet     Electronically signed by Mando Aldana on 10/20/2021 at 3:01 PM    Contact: [facility] [phone]

## 2021-10-20 NOTE — ED PROVIDER NOTES
EMERGENCY DEPARTMENT HISTORY AND PHYSICAL EXAM      Date: 10/19/2021  Patient Name: Hannah Oliva      History of Presenting Illness     Chief Complaint   Patient presents with    Shortness of Breath       History Provided By: Patient and EMS    HPI: Hannah Oliva, 78 y.o. male with a past medical history significant hypertension, renal insufficiency, deep vein thrombosis and Congestive heart failure, DVT on oral anticoagulation, presents to the ED with cc of shortness of breath. Patient was admitted to this facility 10/6-11 for acute on chronic congestive heart failure. During that time his echocardiogram showed 60% ejection fraction with severe pulmonary hypertension. He had progression of chronic kidney disease, acute kidney injury, his Lasix was discontinued at that time. He has had mild and progressive shortness of breath since that time. Became acutely worse today. He was walking at [facility], became severely short of breath, had to sit down, felt like he was going to pass out. He was brought home, had continued shortness of breath, EMS was called. On EMS arrival, his oxygen saturation was 86% on room air, he was placed on 4 L nonrebreather, sats got up to the 90s, he felt symptomatically improved. He has had swelling of his lower extremity, orthopnea. There are no other complaints, changes, or physical findings at this time. PCP: Jacky Giordano NP    Current Outpatient Medications   Medication Sig Dispense Refill    epoetin sadi (Epogen) 20,000 unit/mL injection Inject 20,000 units every 3 weeks subcutaneously 1 Each 3    hydrALAZINE (APRESOLINE) 100 mg tablet Take 1 Tablet by mouth three (3) times daily for 30 days. 90 Tablet 0    insulin glargine (LANTUS) 100 unit/mL injection 8 Units by SubCUTAneous route daily for 30 days. 2.4 mL 0    loratadine (CLARITIN) 10 mg tablet Take 10 mg by mouth daily.       albuterol (PROVENTIL HFA, VENTOLIN HFA, PROAIR HFA) 90 mcg/actuation inhaler Take 2 Puffs by inhalation every four (4) hours as needed for Wheezing, Shortness of Breath or Respiratory Distress. 1 Each 1    cinacalcet (Sensipar) 30 mg tablet Take 1 Tablet by mouth daily for 30 days. 30 Tablet 1    ferrous sulfate (IRON) 325 mg (65 mg iron) EC tablet Take 1 Tablet by mouth three (3) times daily (with meals). 90 Tablet 2    sodium bicarbonate 650 mg tablet Take 1 Tablet by mouth three (3) times daily for 30 days. 90 Tablet 1    simvastatin (ZOCOR) 40 mg tablet TAKE 1 TABLET BY MOUTH EVERY NIGHT 90 Tablet 1    metoprolol succinate (TOPROL-XL) 25 mg XL tablet Take 1 Tablet by mouth daily.  minoxidiL (LONITEN) 2.5 mg tablet TAKE 1 TABLET BY MOUTH TWICE DAILY 180 Tablet 0    amLODIPine (NORVASC) 5 mg tablet TAKE 2 TABLETS BY MOUTH ONCE A  Tablet 0    glipiZIDE (GLUCOTROL) 10 mg tablet Take 1 Tablet by mouth two (2) times a day. 180 Tablet 0    allopurinoL (ZYLOPRIM) 100 mg tablet TAKE 2 TABLETS BY MOUTH DAILY 60 Tablet 3    omeprazole (PRILOSEC) 40 mg capsule TAKE 1 CAPSULE BY MOUTH DAILY 90 Capsule 1    polyethylene glycol (Miralax) 17 gram packet Take 1 Packet by mouth daily. 10 Packet 0    apixaban (Eliquis) 2.5 mg tablet Take 1 Tablet by mouth two (2) times a day. 180 Tablet 1    glucose blood VI test strips (ASCENSIA AUTODISC VI, ONE TOUCH ULTRA TEST VI) strip Check blood glucose 3 times daily, give Accu chek Precious test strips 300 Strip 3    ergocalciferol (ERGOCALCIFEROL) 1,250 mcg (50,000 unit) capsule TAKE ONE CAPSULE BY MOUTH EVERY 7 DAYS 16 Cap 1    BD Ultra-Fine Mini Pen Needle 31 gauge x 3/16\" ndle USE TO INJECT  Pen Needle 3    fluticasone propionate (FLONASE) 50 mcg/actuation nasal spray 1 [place] by Both Nostrils route daily as needed.          Past History     Past Medical History:  Past Medical History:   Diagnosis Date    Acid reflux     Arthritis     [facility]adder [facility]     Congestive heart failure (HCC)     Deep vein thrombosis (DVT) of proximal vein of both lower extremities ([facility] [address] 2020    Diabetes mellitus ([facility] [address]     GERD (gastroesophageal reflux disease)     Gout     High cholesterol     Hypertension     Kidney carcinoma, left (HCC)     Kidney disease     Pituitary mass ([facility] [address]     Reflux esophagitis     Unspecified deficiency anemia        Past Surgical History:  Past Surgical History:   Procedure Laterality Date    HX CYSTECTOMY  09    Dr. Manuel Lee HX NEPHRECTOMY Left 2009    Nephroureterectomy by Dr. Debbie Chua HX OTHER SURGICAL      surgery on pituitary gland       Family History:  Family History   Problem Relation Age of Onset    Heart Disease Father     Heart Disease Mother        Social History:  Social History     Tobacco Use    Smoking status: Former Smoker     Packs/day: 0.50     Years: 40.00     Pack years: 20.00     Types: Cigarettes     Quit date: 1999     Years since quittin.8    Smokeless tobacco: Never Used   Vaping Use    Vaping Use: Never used   Substance Use Topics    Alcohol use: No    Drug use: No       Allergies:  No Known Allergies      Review of Systems     Review of Systems   Constitutional: Positive for fatigue. Negative for chills and fever. HENT: Negative for ear pain, rhinorrhea, sneezing and sore throat. Eyes: Negative for pain and discharge. Respiratory: Positive for shortness of breath. Negative for cough and wheezing. Cardiovascular: Positive for leg swelling. Negative for chest pain and palpitations. Gastrointestinal: Negative for abdominal pain, constipation, diarrhea, nausea and vomiting. Endocrine: Negative for polydipsia and polyphagia. Genitourinary: Negative for dysuria, flank pain, frequency, hematuria and urgency. Musculoskeletal: Negative for arthralgias, back pain, joint swelling and neck pain. Skin: Negative for rash.    Neurological: Negative for dizziness, weakness, light-headedness, numbness and headaches. Hematological: Negative for adenopathy. Psychiatric/Behavioral: Negative for agitation, behavioral problems and self-injury. All other systems reviewed and are negative. Physical Exam     Physical Exam  Vitals and nursing note reviewed. Constitutional:       General: He is not in acute distress. Appearance: Normal appearance. He is ill-appearing. He is not toxic-appearing. HENT:      Head: Normocephalic and atraumatic. Right Ear: Tympanic membrane normal.      Left Ear: Tympanic membrane normal.      Nose: No congestion or rhinorrhea. Mouth/Throat:      Mouth: Mucous membranes are moist.   Eyes:      Extraocular Movements: Extraocular movements intact. Cardiovascular:      Rate and Rhythm: Normal rate and regular rhythm. Heart sounds: Normal heart sounds. Pulmonary:      Effort: Pulmonary effort is normal. Tachypnea present. No respiratory distress. Breath sounds: Examination of the right-lower field reveals rhonchi. Examination of the left-lower field reveals rhonchi. Rhonchi present. No wheezing or rales. Abdominal:      General: Bowel sounds are normal. There is no distension. Palpations: Abdomen is soft. Tenderness: There is no abdominal tenderness. Musculoskeletal:         General: No swelling or tenderness. Normal range of motion. Cervical back: Normal range of motion and neck supple. Right lower leg: Edema (Bilateral lower extremity 1-2+ pitting pretibial edema) present. Left lower leg: Edema present. Skin:     General: Skin is warm and dry. Neurological:      General: No focal deficit present. Mental Status: He is alert and oriented to person, place, and time.    Psychiatric:         Mood and Affect: Mood normal.         Behavior: Behavior normal.         Lab and Diagnostic Study Results     Labs -     Recent Results (from the past 12 hour(s))   CBC WITH AUTOMATED DIFF    Collection Time: 10/19/21  9:21 PM   Result Value Ref Range    WBC 7.4 4.6 - 13.2 K/uL    RBC 2.85 (L) 4.35 - 5.65 M/uL    HGB 7.5 (L) 13.0 - 16.0 g/dL    HCT 24.2 (L) 36.0 - 48.0 %    MCV 84.9 78.0 - 100.0 FL    MCH 26.3 24.0 - 34.0 PG    MCHC 31.0 31.0 - 37.0 g/dL    RDW 17.6 (H) 11.6 - 14.5 %    PLATELET 750 682 - 876 K/uL    MPV 9.3 9.2 - 11.8 FL    NRBC 0.0 0.0  WBC    ABSOLUTE NRBC 0.00 0.00 - 0.01 K/uL    NEUTROPHILS 75 (H) 40 - 73 %    LYMPHOCYTES 14 (L) 21 - 52 %    MONOCYTES 7 3 - 10 %    EOSINOPHILS 2 0 - 5 %    BASOPHILS 1 0 - 2 %    IMMATURE GRANULOCYTES 1 (H) 0 - 0.5 %    ABS. NEUTROPHILS 5.6 1.8 - 8.0 K/UL    ABS. LYMPHOCYTES 1.0 0.9 - 3.6 K/UL    ABS. MONOCYTES 0.5 0.05 - 1.2 K/UL    ABS. EOSINOPHILS 0.1 0.0 - 0.4 K/UL    ABS. BASOPHILS 0.1 0.0 - 0.1 K/UL    ABS. IMM. GRANS. 0.1 (H) 0.00 - 0.04 K/UL    DF Manual      RBC COMMENTS Anisocytosis  1+        RBC COMMENTS Macrocytosis  1+       METABOLIC PANEL, COMPREHENSIVE    Collection Time: 10/19/21  9:21 PM   Result Value Ref Range    Sodium 141 135 - 145 mmol/L    Potassium 4.5 3.2 - 5.1 mmol/L    Chloride 112 (H) 94 - 111 mmol/L    CO2 20 (L) 21 - 33 mmol/L    Anion gap 9 mmol/L    Glucose 164 (H) 70 - 110 mg/dL    BUN 54 (H) 9 - 21 mg/dL    Creatinine 3.20 (H) 0.8 - 1.50 mg/dL    BUN/Creatinine ratio 17      GFR est AA 23 ml/min/1.73m2    GFR est non-AA 19 ml/min/1.73m2    Calcium 7.8 (L) 8.5 - 10.5 mg/dL    Bilirubin, total 0.5 0.2 - 1.0 mg/dL    AST (SGOT) 28 17 - 74 U/L    ALT (SGPT) 28 3 - 72 U/L    Alk.  phosphatase 72 38 - 126 U/L    Protein, total 7.7 6.1 - 8.4 g/dL    Albumin 3.7 3.5 - 4.7 g/dL    Globulin 4.0 g/dL    A-G Ratio 0.9     TROPONIN I    Collection Time: 10/19/21  9:21 PM   Result Value Ref Range    Troponin-I, Qt. 0.02 0.02 - 0.05 ng/mL   BNP    Collection Time: 10/19/21  9:21 PM   Result Value Ref Range     (H) 0 - 100 pg/mL       Radiologic Studies -   [unfilled]  CT Results  (Last 48 hours)    None        CXR Results  (Last 48 hours)    None Medical Decision Making and ED Course   - I am the first and primary provider for this patient AND AM THE PRIMARY PROVIDER OF RECORD. - I reviewed the vital signs, available nursing notes, past medical history, past surgical history, family history and social history. - Initial assessment performed. The patients presenting problems have been discussed, and the staff are in agreement with the care plan formulated and outlined with them. I have encouraged them to ask questions as they arise throughout their visit. Vital Signs-Reviewed the patient's vital signs. Patient Vitals for the past 12 hrs:   Temp Pulse Resp BP SpO2   10/19/21 2233 -- 70 -- (!) 147/72 --   10/19/21 2111 98.3 °F (36.8 °C) 75 20 (!) 172/76 100 %       EKG interpretation: (Preliminary): Performed at 2110, and read at 2117  Rhythm: normal sinus rhythm; and regular . Rate (approx.): 74; Axis: normal; TN interval: prolonged; QRS interval: normal ; ST/T wave: normal; Other findings: Poor R wave progression, late transition, borderline prolonged QT interval.      Records Reviewed: Nursing Notes, Old Medical Records and Ambulance Run Sheet    The patient presents with shortness of breath with a differential diagnosis of acute on chronic congestive heart failure, pneumonia, infectious process, pneumothorax, severe anemia, metabolic disturbance    ED Course:       ED Course as of Oct 19 2259   Tue Oct 19, 2021   2208 CBC WITH AUTOMATED DIFF(!):    WBC 7.4   RBC 2.85(!)   HGB 7.5(!)   HCT 24.2(!)   MCV 84.9   MCH 26.3   MCHC 31.0   RDW 17.6(!)   PLATELET 186   MPV 9.3   NRBC 0.0   ABSOLUTE NRBC 0.00   NEUTROPHILS PENDING   LYMPHOCYTES PENDING   MONOCYTES PENDING   EOSINOPHILS PENDING   BASOPHILS PENDING   IMMATURE GRANULOCYTES PENDING   ABS. NEUTROPHILS PENDING   ABS. LYMPHOCYTES PENDING   ABS. MONOCYTES PENDING   ABS. EOSINOPHILS PENDING   ABS. BASOPHILS PENDING   ABS. IMM. GRANS.  PENDING   DF PENDING [DC]   6128 BNP(!): 611 [DC]   8629 Creatinine(!): 3.20 [DC]   2242 Chest x-ray shows cardiomegaly with increased vascular markings consistent with congestive heart failure   METABOLIC PANEL, COMPREHENSIVE(!):    Sodium 141   Potassium 4.5   Chloride 112(!)   CO2 20(!)   Anion gap 9   Glucose 164(!)   BUN 54(!)   Creatinine 3.20(!)   BUN/Creatinine ratio 17   GFR est AA 23   GFR est non-AA 19   Calcium 7.8(!)   Bilirubin, total 0.5   AST 28   ALT 28   Alk. phosphatase 72   Protein, total 7.7   Albumin 3.7   Globulin 4.0   A-G Ratio 0.9 [DC]      ED Course User Index  [DC] Zoraida Chen DO         Provider Notes (Medical Decision Making):   Patient seen and evaluated shortly after arrival, history and physical exam as noted above. He has had improvement of symptoms with supplemental oxygen via nasal cannula, does still have a little difficulty speaking full sentences. Exam and history are consistent with likely acute on chronic congestive heart failure, will treat presumptively with Lasix while awaiting labs and x-ray. Differential diagnosis reviewed and discussed, treatment options reviewed and discussed, questions answered. Further recommendations pending review of imaging, labs, response to furosemide. MDM           Consultations:       Consultations: -  Hospitalist Consultant: Dr. Bernardo Galdamez, PAMiguel AngelC: We have asked for emergent assistance with regard to this patient. We have discussed the patients HPI, ROS, PE and results this far. They will come and evaluate the patient for admission. Procedures and Critical Care       Performed by: Tamar Aviles DO  PROCEDURES:  Procedures                     Disposition     Disposition: Condition stable  Admitted to Labor and Delivery the case was discussed with the admitting physician telemetry    Admitted     Remove if not discharged  DISCHARGE PLAN:  1.    Current Discharge Medication List      CONTINUE these medications which have NOT CHANGED    Details   epoetin sadi (Epogen) 20,000 unit/mL injection Inject 20,000 units every 3 weeks subcutaneously  Qty: 1 Each, Refills: 3    Comments: Please dispense as a 30 day supply  Associated Diagnoses: Stage 3 chronic kidney disease, unspecified whether stage 3a or 3b CKD (HCC)      hydrALAZINE (APRESOLINE) 100 mg tablet Take 1 Tablet by mouth three (3) times daily for 30 days. Qty: 90 Tablet, Refills: 0      insulin glargine (LANTUS) 100 unit/mL injection 8 Units by SubCUTAneous route daily for 30 days. Qty: 2.4 mL, Refills: 0      loratadine (CLARITIN) 10 mg tablet Take 10 mg by mouth daily. albuterol (PROVENTIL HFA, VENTOLIN HFA, PROAIR HFA) 90 mcg/actuation inhaler Take 2 Puffs by inhalation every four (4) hours as needed for Wheezing, Shortness of Breath or Respiratory Distress. Qty: 1 Each, Refills: 1    Associated Diagnoses: SOB (shortness of breath) on exertion      cinacalcet (Sensipar) 30 mg tablet Take 1 Tablet by mouth daily for 30 days. Qty: 30 Tablet, Refills: 1      ferrous sulfate (IRON) 325 mg (65 mg iron) EC tablet Take 1 Tablet by mouth three (3) times daily (with meals). Qty: 90 Tablet, Refills: 2      sodium bicarbonate 650 mg tablet Take 1 Tablet by mouth three (3) times daily for 30 days. Qty: 90 Tablet, Refills: 1      simvastatin (ZOCOR) 40 mg tablet TAKE 1 TABLET BY MOUTH EVERY NIGHT  Qty: 90 Tablet, Refills: 1      metoprolol succinate (TOPROL-XL) 25 mg XL tablet Take 1 Tablet by mouth daily. minoxidiL (LONITEN) 2.5 mg tablet TAKE 1 TABLET BY MOUTH TWICE DAILY  Qty: 180 Tablet, Refills: 0    Associated Diagnoses: Essential hypertension      amLODIPine (NORVASC) 5 mg tablet TAKE 2 TABLETS BY MOUTH ONCE A DAY  Qty: 180 Tablet, Refills: 0    Associated Diagnoses: Essential hypertension      glipiZIDE (GLUCOTROL) 10 mg tablet Take 1 Tablet by mouth two (2) times a day.   Qty: 180 Tablet, Refills: 0    Associated Diagnoses: Type 2 diabetes mellitus with stage 4 chronic kidney disease, with long-term current use of insulin ([facility] [address]      allopurinoL (ZYLOPRIM) 100 mg tablet TAKE 2 TABLETS BY MOUTH DAILY  Qty: 60 Tablet, Refills: 3      omeprazole (PRILOSEC) 40 mg capsule TAKE 1 CAPSULE BY MOUTH DAILY  Qty: 90 Capsule, Refills: 1    Associated Diagnoses: Medication refill      polyethylene glycol (Miralax) 17 gram packet Take 1 Packet by mouth daily. Qty: 10 Packet, Refills: 0    Associated Diagnoses: Medication refill      apixaban (Eliquis) 2.5 mg tablet Take 1 Tablet by mouth two (2) times a day. Qty: 180 Tablet, Refills: 1    Associated Diagnoses: Medication refill      glucose blood VI test strips (ASCENSIA AUTODISC VI, ONE TOUCH ULTRA TEST VI) strip Check blood glucose 3 times daily, give Accu chek Precious test strips  Qty: 300 Strip, Refills: 3    Associated Diagnoses: Type 2 diabetes mellitus with stage 4 chronic kidney disease, with long-term current use of insulin ([facility])      ergocalciferol (ERGOCALCIFEROL) 1,250 mcg (50,000 unit) capsule TAKE ONE CAPSULE BY MOUTH EVERY 7 DAYS  Qty: 16 Cap, Refills: 1    Associated Diagnoses: Stage 3 chronic kidney disease, unspecified whether stage 3a or 3b CKD ([facility])      BD Ultra-Fine Mini Pen Needle 31 gauge x 3/16\" ndle USE TO INJECT TID  Qty: 100 Pen Needle, Refills: 3      fluticasone propionate (FLONASE) 50 mcg/actuation nasal spray 1 [place] by Both Nostrils route daily as needed. 2.   Follow-up Information    None       3. Return to ED if worse   4. Current Discharge Medication List          Diagnosis     Clinical Impression:   1. Acute on chronic diastolic congestive heart failure ([address]    2. Pulmonary artery hypertension ([address]    3. Chronic kidney disease (CKD), stage IV (severe) ([address]        Attestations:    Amanda Corona, DO    Please note that this dictation was completed with [employer], the [employer] voice recognition software. Quite often unanticipated grammatical, syntax, homophones, and other interpretive errors are inadvertently transcribed by the computer software. Please disregard these errors. Please excuse any errors that have escaped final proofreading. Thank you.

## 2021-10-20 NOTE — ED TRIAGE NOTES
Patient called EMS for shortness of breath for the past week. Patient was 88% on room air, placed on 2 L NC. Patient states all symptoms improved with oxygen. Patient states he was here 2 weeks ago for an acute kidney injury and was told to stop taking his fluid pills then.

## 2021-10-20 NOTE — CONSULTS
NAME:  Hannah Oliva   :   1945   MRN:   [id number]     ATTENDING: Adrian Argueta MD  PCP:  Jacky Giordano NP    Date/Time:  10/20/2021 12:09 PM      Subjective:   REQUESTING PHYSICIAN: Adrian Argueta MD  REASON FOR CONSULT:    ARON, CKD    HISTORY OF PRESENTING ILLNESS:  The patient is a 79-year-old  male with past medical history of bladder cancer, status post a urostomy bag placement; history of congestive heart failure; chronic kidney disease, presented to the emergency room with complaints of shortness of breath. Initial labs showed creatinine of 3.2 and a nephrology consultation is requested for further evaluation and management. The patient found to have fluid overload with lower extremity edema and decompensated congestive heart failure, he was started on IV diuretics and admitted to the medical floor. Patient seen at bedside, he is alert and awake, no acute shortness of breath at this time. He admits to increased p.o. fluid intake  He is aware that he has chronic kidney disease following with Dr. Fernanda Ellington as outpatient. He is not clear about his baseline renal functions. His repeat creatinine this morning is stable at 2.9 from yesterday. Patient is hemodynamically stable, No complaints of any chest pain. No complaints of any abdominal or flank pains.   He had a hemoglobin of 7.5 giving any complaints of GI bleed  Review of old labs showed creatinine in the range of 3-3.5    Past Medical History:   Diagnosis Date    Acid reflux     Arthritis     Bladder cancer (HCC)     Congestive heart failure (HCC)     Deep vein thrombosis (DVT) of proximal vein of both lower extremities ([address] 2020    Diabetes mellitus ([address]     GERD (gastroesophageal reflux disease)     Gout     High cholesterol     Hypertension     Kidney carcinoma, left (HCC)     Kidney disease     Pituitary mass ([address]     Reflux esophagitis     Unspecified deficiency anemia Past Surgical History:   Procedure Laterality Date    HX CYSTECTOMY  09    Dr. Christy Hawkins HX NEPHRECTOMY Left 2009    Nephroureterectomy by Dr. Konstantin Lynn    [id number] [address] HX OTHER SURGICAL      surgery on pituitary gland     Social History     Tobacco Use    Smoking status: Former Smoker     Packs/day: 0.50     Years: 40.00     Pack years: 20.00     Types: Cigarettes     Quit date: 1999     Years since quittin.8    Smokeless tobacco: Never Used   Substance Use Topics    Alcohol use: No      Family History   Problem Relation Age of Onset    Heart Disease Father     Heart Disease Mother        No Known Allergies   Prior to Admission medications    Medication Sig Start Date End Date Taking? Authorizing Provider   hydrALAZINE (APRESOLINE) 100 mg tablet Take 1 Tablet by mouth three (3) times daily for 30 days. 10/11/21 11/10/21 Yes Maria Guadalupe Omalley MD   insulin glargine (LANTUS) 100 unit/mL injection 8 Units by SubCUTAneous route daily for 30 days. 10/11/21 11/10/21 Yes Maria Guadalupe Omalley MD   loratadine (CLARITIN) 10 mg tablet Take 10 mg by mouth daily. 21  Yes Melba Grace MD   albuterol (PROVENTIL HFA, VENTOLIN HFA, PROAIR HFA) 90 mcg/actuation inhaler Take 2 Puffs by inhalation every four (4) hours as needed for Wheezing, Shortness of Breath or Respiratory Distress. 21  Yes Ghazala Smith NP   cinacalcet (Sensipar) 30 mg tablet Take 1 Tablet by mouth daily for 30 days. 9/22/21 10/22/21 Yes Alka Eller MD   ferrous sulfate (IRON) 325 mg (65 mg iron) EC tablet Take 1 Tablet by mouth three (3) times daily (with meals). 21  Yes Alka Eller MD   sodium bicarbonate 650 mg tablet Take 1 Tablet by mouth three (3) times daily for 30 days. 9/22/21 10/22/21 Yes Alka Eller MD   simvastatin (ZOCOR) 40 mg tablet TAKE 1 TABLET BY MOUTH EVERY NIGHT 21  Yes Ghazala Smith NP   metoprolol succinate (TOPROL-XL) 25 mg XL tablet Take 1 Tablet by mouth daily. 7/13/21  Yes Melba Grace MD   minoxidiL (LONITEN) 2.5 mg tablet TAKE 1 TABLET BY MOUTH TWICE DAILY 8/10/21  Yes Mary Carmen Austin MD   amLODIPine (NORVASC) 5 mg tablet TAKE 2 TABLETS BY MOUTH ONCE A DAY 7/30/21  Yes Ghazala Smith NP   glipiZIDE (GLUCOTROL) 10 mg tablet Take 1 Tablet by mouth two (2) times a day. 7/30/21  Yes Ghazala Smith NP   allopurinoL (ZYLOPRIM) 100 mg tablet TAKE 2 TABLETS BY MOUTH DAILY 7/4/21  Yes Ghazala Smith NP   omeprazole (PRILOSEC) 40 mg capsule TAKE 1 CAPSULE BY MOUTH DAILY 6/3/21  Yes Ghazala Smith NP   polyethylene glycol (Miralax) 17 gram packet Take 1 Packet by mouth daily. 6/3/21  Yes Ghazala Smith NP   apixaban (Eliquis) 2.5 mg tablet Take 1 Tablet by mouth two (2) times a day. 6/3/21  Yes Ghazala Smith NP   glucose blood VI test strips (ASCENSIA AUTODISC VI, ONE TOUCH ULTRA TEST VI) strip Check blood glucose 3 times daily, give Accu chek Precoius test strips 6/1/21  Yes Ghazala Smith NP   ergocalciferol (ERGOCALCIFEROL) 1,250 mcg (50,000 unit) capsule TAKE ONE CAPSULE BY MOUTH EVERY 7 DAYS 4/26/21  Yes Mary Carmen Austin MD   BD Ultra-Fine Mini Pen Needle 31 gauge x 3/16\" ndle USE TO INJECT TID 1/26/21  Yes Ghazala Smith NP   fluticasone propionate (FLONASE) 50 mcg/actuation nasal spray 1 [place] by Both Nostrils route daily as needed.    Yes Melba Grace MD   epoetin sadi (Epogen) 20,000 unit/mL injection Inject 20,000 units every 3 weeks subcutaneously 10/13/21   Mary Carmen Austin MD     Current Facility-Administered Medications   Medication Dose Route Frequency    sodium chloride (NS) flush 5-40 mL  5-40 mL IntraVENous Q8H    sodium chloride (NS) flush 5-40 mL  5-40 mL IntraVENous PRN    acetaminophen (TYLENOL) tablet 650 mg  650 mg Oral Q6H PRN    Or    acetaminophen (TYLENOL) suppository 650 mg  650 mg Rectal Q6H PRN    polyethylene glycol (MIRALAX) packet 17 g  17 g Oral DAILY PRN    promethazine (PHENERGAN) tablet 12.5 mg  12.5 mg Oral Q6H PRN    Or    ondansetron (ZOFRAN) injection 4 mg  4 mg IntraVENous Q6H PRN    albuterol (PROVENTIL HFA, VENTOLIN HFA, PROAIR HFA) inhaler 2 Puff  2 Puff Inhalation Q4H PRN    allopurinoL (ZYLOPRIM) tablet 200 mg  200 mg Oral DAILY    amLODIPine (NORVASC) tablet 5 mg  5 mg Oral DAILY    apixaban (ELIQUIS) tablet 2.5 mg  2.5 mg Oral BID    . PHARMACY TO SUBSTITUTE PER PROTOCOL (Reordered from: epoetin sadi (Epogen) 20,000 unit/mL injection)    Per Protocol    ergocalciferol capsule 50,000 Units  50,000 Units Oral Q7D    ferrous sulfate tablet 325 mg  325 mg Oral TID WITH MEALS    fluticasone propionate (FLONASE) 50 mcg/actuation nasal spray 1 Spray  1 Spray Both Nostrils DAILY    hydrALAZINE (APRESOLINE) tablet 100 mg  100 mg Oral TID    cetirizine (ZYRTEC) tablet 10 mg  10 mg Oral DAILY    metoprolol succinate (TOPROL-XL) XL tablet 25 mg  25 mg Oral DAILY    minoxidiL (LONITEN) tablet 2.5 mg  2.5 mg Oral BID    pantoprazole (PROTONIX) tablet 40 mg  40 mg Oral DAILY    polyethylene glycol (MIRALAX) packet 17 g  17 g Oral DAILY    atorvastatin (LIPITOR) tablet 20 mg  20 mg Oral QHS    sodium bicarbonate tablet 650 mg  650 mg Oral TID    insulin lispro (HUMALOG) injection   SubCUTAneous AC&HS    glucose chewable tablet 16 g  4 Tablet Oral PRN    glucagon (GLUCAGEN) injection 1 mg  1 mg IntraMUSCular PRN    dextrose (D50W) injection syrg 12.5-25 g  25-50 mL IntraVENous PRN    furosemide (LASIX) tablet 40 mg  40 mg Oral DAILY       REVIEW OF SYSTEMS:     Complaints as mentioned in the history of presenting illness, he also has complaints of generalized tiredness, decreased exercise tolerance and exertional shortness of breath.   On and off joint pains present  No other significant complaints on complete review of systems       Objective:   VITALS:    Visit Vitals  BP (!) 148/66 (BP 1 Location: Left upper arm, BP Patient Position: Semi fowlers)   Pulse 78 Temp 97.8 °F (36.6 °C)   Resp 16   Ht 5' 8\" (1.727 m)   Wt 94.4 kg (208 lb 3.2 oz)   SpO2 94%   BMI 31.66 kg/m²     Temp (24hrs), Av °F (36.7 °C), Min:97.8 °F (36.6 °C), Max:98.3 °F (36.8 °C)      PHYSICAL EXAM:   GENERAL:  A 79-year-old male, alert, awake, well-oriented, sitting comfortably in the chair, not in any acute distress. HEENT:  Pupils reactive. Extraocular muscles intact. No pallor. No icterus. Mucosa moist.  NECK:  Supple. No jugular venous distention. LUNGS:  Fair air entry present bilaterally. No significant rales or rhonchi heard. HEART:  Sounds are normal.  No rubs or murmurs heard. ABDOMEN:  Soft, nontender. No palpable organomegaly. Normal bowel sounds present. Urostomy bag in place. Urine is clear. EXTREMITIES:  No edema. No cyanosis. NEUROLOGICAL:  The patient is alert, awake, well-oriented. Moving all his extremities. Speech is normal.  SKIN:  Normal skin turgor. No evidence of any skin rashes. MUSCULOSKELETAL:  No acute joint swelling.     LAB DATA REVIEWED:    Recent Results (from the past 24 hour(s))   EKG, 12 LEAD, INITIAL    Collection Time: 10/19/21  9:10 PM   Result Value Ref Range    Ventricular Rate 74 BPM    Atrial Rate 74 BPM    P-R Interval 252 ms    QRS Duration 103 ms    Q-T Interval 434 ms    QTC Calculation (Bezet) 482 ms    Calculated P Axis 18 degrees    Calculated R Axis 6 degrees    Calculated T Axis 0 degrees    Diagnosis       Sinus rhythm  Prolonged NH interval  Borderline low voltage, extremity leads  Abnormal R-wave progression, late transition  Borderline prolonged QT interval     CBC WITH AUTOMATED DIFF    Collection Time: 10/19/21  9:21 PM   Result Value Ref Range    WBC 7.4 4.6 - 13.2 K/uL    RBC 2.85 (L) 4.35 - 5.65 M/uL    HGB 7.5 (L) 13.0 - 16.0 g/dL    HCT 24.2 (L) 36.0 - 48.0 %    MCV 84.9 78.0 - 100.0 FL    MCH 26.3 24.0 - 34.0 PG    MCHC 31.0 31.0 - 37.0 g/dL    RDW 17.6 (H) 11.6 - 14.5 %    PLATELET 088 590 - 543 K/uL    MPV 9.3 9.2 - 11.8 FL    NRBC 0.0 0.0  WBC    ABSOLUTE NRBC 0.00 0.00 - 0.01 K/uL    NEUTROPHILS 75 (H) 40 - 73 %    LYMPHOCYTES 14 (L) 21 - 52 %    MONOCYTES 7 3 - 10 %    EOSINOPHILS 2 0 - 5 %    BASOPHILS 1 0 - 2 %    IMMATURE GRANULOCYTES 1 (H) 0 - 0.5 %    ABS. NEUTROPHILS 5.6 1.8 - 8.0 K/UL    ABS. LYMPHOCYTES 1.0 0.9 - 3.6 K/UL    ABS. MONOCYTES 0.5 0.05 - 1.2 K/UL    ABS. EOSINOPHILS 0.1 0.0 - 0.4 K/UL    ABS. BASOPHILS 0.1 0.0 - 0.1 K/UL    ABS. IMM. GRANS. 0.1 (H) 0.00 - 0.04 K/UL    DF Manual      RBC COMMENTS Anisocytosis  1+        RBC COMMENTS Macrocytosis  1+       METABOLIC PANEL, COMPREHENSIVE    Collection Time: 10/19/21  9:21 PM   Result Value Ref Range    Sodium 141 135 - 145 mmol/L    Potassium 4.5 3.2 - 5.1 mmol/L    Chloride 112 (H) 94 - 111 mmol/L    CO2 20 (L) 21 - 33 mmol/L    Anion gap 9 mmol/L    Glucose 164 (H) 70 - 110 mg/dL    BUN 54 (H) 9 - 21 mg/dL    Creatinine 3.20 (H) 0.8 - 1.50 mg/dL    BUN/Creatinine ratio 17      GFR est AA 23 ml/min/1.73m2    GFR est non-AA 19 ml/min/1.73m2    Calcium 7.8 (L) 8.5 - 10.5 mg/dL    Bilirubin, total 0.5 0.2 - 1.0 mg/dL    AST (SGOT) 28 17 - 74 U/L    ALT (SGPT) 28 3 - 72 U/L    Alk.  phosphatase 72 38 - 126 U/L    Protein, total 7.7 6.1 - 8.4 g/dL    Albumin 3.7 3.5 - 4.7 g/dL    Globulin 4.0 g/dL    A-G Ratio 0.9     TROPONIN I    Collection Time: 10/19/21  9:21 PM   Result Value Ref Range    Troponin-I, Qt. 0.02 0.02 - 0.05 ng/mL   BNP    Collection Time: 10/19/21  9:21 PM   Result Value Ref Range     (H) 0 - 100 pg/mL   GLUCOSE, POC    Collection Time: 10/20/21  7:20 AM   Result Value Ref Range    Glucose (POC) 51 (LL) 70 - 110 mg/dL    Performed by Kathia Ray, POC    Collection Time: 10/20/21  7:22 AM   Result Value Ref Range    Glucose (POC) 52 (LL) 70 - 110 mg/dL    Performed by Kathia Ray, POC    Collection Time: 10/20/21  7:44 AM   Result Value Ref Range    Glucose (POC) 66 (L) 70 - 110 mg/dL    Performed by Heather Rodrigez Shyanne    GLUCOSE, POC    Collection Time: 10/20/21  8:43 AM   Result Value Ref Range    Glucose (POC) 126 (H) 70 - 110 mg/dL    Performed by Jone Jauregui, POC    Collection Time: 10/20/21 11:56 AM   Result Value Ref Range    Glucose (POC) 82 70 - 110 mg/dL    Performed by Idalmis Mann and plan    1. Chronic kidney disease. The patient  has stage IV chronic kidney disease related to his previous nephrectomy and nephrosclerosis with hypertension or diabetic nephropathy. Creatinine is stable, appears to be around baseline. check urine random protein-creatinine ratio to assess for proteinuria   Continue diuretics and continue to monitor renal functions    2. Hypertension. Stable  Continue current antihypertensive medications and continue to monitor blood pressures    3. Lower extremity edema/ Complaints of shortness of breath, congestive heart failure. Echocardiogram showed preserved ejection fraction, chest x-ray suggestive of pulmonary congestion. The patient clinically improved, appears stable at this time. Continue to monitor clinically. Continue Lasix once daily  Recommend to decrease minoxidil to once daily  Change metoprolol to 25 mg p.o. twice daily    5. Renal osteodystrophy. check a phosphorus level tomorrow to assess for hyperphosphatemia. Recommend to monitor intact PTH and vitamin D levels as outpatient and adjust management as needed. 6.  History of gout. The patient is on allopurinol. No complaints of any acute gout at this time. 7.  Type 2 diabetes. Stable blood sugars. Continue to monitor Accu-Cheks and continue current management.     Signed: Beny Navarrete MD

## 2021-10-20 NOTE — PROGRESS NOTES
Reason for Admission: Chart reviewed and noted patient presented with C/O progressively worsening SOB, that became acute today when he tried to walk to the store. Pt states he had to sit down because it was so bad he could no longer walk. When EMS arrives on the scene his oxygen sats were 86% on RA and he was placed on 4 L by NC with sats going up to the 90's. Pt states he felt he had significantly improved while reporting worsening lower extremity edema and acute orthopnea. Dx: Acute on Chronic CHF    PMH: Acid Reflux, Arthritis, Bladder Cancer, CHF, DVT, DM, HTN, Kidney Carcinoma, Left Kidney Disease, Pituitary Mass, Reflux Esophagitis, Deficiency Anemia                      RUR Score: NA                    Plan for utilizing home health: TBD         PCP: First and Last name:  Roxy Duribn NP     Name of Practice:    Are you a current patient: Yes/No:    Approximate date of last visit:    Can you participate in a virtual visit with your PCP:                     Current Advanced Directive/Advance Care Plan: Full Code- No ACP      Healthcare Decision Maker: Topher White, [address] [place] [address]  Click here to [address] including selection of the Healthcare Decision Maker Relationship (ie \"Primary\")             Supplemental (Other) Decision Maker: Solitario Claudio - Other Relative - [phone]    Supplemental (Other) Decision Maker: Elodia Carias - Other Relative - [phone]                  Transition of Care Plan:  TBD    Care Management Interventions  PCP Verified by CM: Yes  Transition of Care Consult (CM Consult):  Discharge Planning. Current Support  Network: Sister- Didi Turner & Niece- Topher Mcdonald- [phone], Relative- Grace De La Torre- [phone], Relative- Halina Odalis- [phone]. Patient lives at home with his sister. He has a cane that he uses for ambulation. He plans to return back home at discharge.

## 2021-10-20 NOTE — PROGRESS NOTES
Problem: Mobility Impaired (Adult and Pediatric)  Goal: *Acute Goals and Plan of Care (Insert Text)  Description: Pt is (I)  with gait and does not require further P.T.    PLOF: Community ambulator who did not use an AD or O2 and who was (I) With ADLs. Outcome: Resolved/Met     Problem: Patient Education: Go to Patient Education Activity  Goal: Patient/Family Education  Outcome: Resolved/Met   PHYSICAL THERAPY EVALUATION AND DISCHARGE    Patient: Tom Marie (13 y.o. male)  Date: 10/20/2021  Primary Diagnosis: Acute on chronic congestive heart failure ([address] [I50.9]  Chronic kidney disease, stage IV (severe) ([address] [N18.4]        Precautions: Other (comment) (Monitor SpO2)    ASSESSMENT :  Based on the objective data described below, the patient presents with CHF and is currently on R. A. He just finished a walking pulse ox shortly before therapy and therapist spoke with respiratory about O2 needs during gait which was 3L. The pt performs mobility well and does not require assistance. He ambulates on 3L without c/o dyspnea. He was then placed back on R. A. as his sats are normal at rest on R. A. per respiratory. Patient does not require further skilled intervention at this level of care. PLAN :  Recommendations and Planned Interventions:   No formal PT needs identified at this time. Discharge Recommendations: Home Health and Home Physical Therapy  Further Equipment Recommendations for Discharge: N/A     SUBJECTIVE:   Patient stated I have a hard time breathing when I go to the grocery store sometimes.     OBJECTIVE DATA SUMMARY:     Past Medical History:   Diagnosis Date    Acid reflux     Arthritis     Bladder cancer (HCC)     Congestive heart failure (HCC)     Deep vein thrombosis (DVT) of proximal vein of both lower extremities ([address] 9/14/2020    Diabetes mellitus (HCC)     GERD (gastroesophageal reflux disease)     Gout     High cholesterol     Hypertension     Kidney carcinoma, left ([facility] Kidney disease     Pituitary mass (HCC)     Reflux esophagitis     Unspecified deficiency anemia      Past Surgical History:   Procedure Laterality Date    HX CYSTECTOMY  12/30/09    Dr. Nahum Stone NEPHRECTOMY Left 5/2009    Nephroureterectomy by Dr. Joaquim Holley      surgery on pituitary gland     Barriers to Learning/Limitations: None  Compensate with: N/A  Home Situation:   Home Situation  Home Environment: Private residence  Wheelchair Ramp: Yes  One/Two Story Residence: One story  Living Alone: No  Support Systems: Other Family Member(s)  Patient Expects to be Discharged to[de-identified] House  Current DME Used/Available at Home: None  Critical Behavior:  Neurologic State: Alert  Orientation Level: Oriented X4        Psychosocial  Purposeful Interaction: Yes  Strength:    Strength: Within functional limits  Tone & Sensation:   Sensation: Intact  Range Of Motion:   AROM: Within functional limits  Posture:  Posture (WDL): Within defined limits    Functional Mobility:  Bed Mobility:  Rolling: Modified independent  Supine to Sit: Modified independent  Sit to Supine: Modified independent  Scooting: Modified independent  Transfers:  Sit to Stand: Modified independent  Stand to Sit: Modified independent  Balance:   Sitting: Intact  Standing: Intact  Ambulation/Gait Training:  Distance (ft): 40 Feet (ft) (on 3L, placed back on R. A. after gait)  Assistive Device: Other (comment) (NO AD)  Ambulation - Level of Assistance: Independent  Gait Description (WDL): Exceptions to WDL  Pain:  Pain level pre-treatment: 0/10   Pain level post-treatment: 0/10  Pain Location: N/A  Activity Tolerance:   Good  Please refer to the flowsheet for vital signs taken during this treatment.   After treatment:   []         Patient left in no apparent distress sitting up in chair  [x]         Patient left in no apparent distress in bed  [x]         Call bell left within reach  [x]         Nursing notified  [] Caregiver present  []         Bed alarm activated  []         SCDs applied    COMMUNICATION/EDUCATION:   [x]         Role of Physical Therapy in the acute care setting. [x]         Fall prevention education was provided and the patient/caregiver indicated understanding. [x]         Patient/family have participated as able in goal setting and plan of care. [x]         Patient/family agree to work toward stated goals and plan of care. []         Patient understands intent and goals of therapy, but is neutral about his/her participation. []         Patient is unable to participate in goal setting/plan of care: ongoing with therapy staff.  []         Other:     Thank you for this referral.  Cassidy Marin, PT, DPT   Time Calculation: 12 mins

## 2021-10-20 NOTE — PROGRESS NOTES
1915   Received care of pt lying in bed watching TV. Routine assessment and vs completed. Pt denies any SOB. Pt is complaint free at this time. Call bell in reach and bed in lowest position. 2200  HS meds and snack provided. Pt complaint free. 0002   VS obtained. No distress noted. 0200  Sleeping with no distress noted. 0545  Pt has slept at long intervals and is complaint free at this time.

## 2021-10-20 NOTE — PROGRESS NOTES
10/20/21 1023   Resting (Room Air)   SpO2 94   HR 77   Resting (On O2)   SpO2 98   HR 81   O2 Device Nasal cannula   O2 Flow Rate (l/min) 3 l/min   During Walk (Room Air)   SpO2 85   HR 83   Walk/Assistance Device Ambulation   Rate of Dyspnea 1   Symptoms Fatigue   During Walk (On O2)   SpO2 88   HR 78   O2 Device Nasal cannula   O2 Flow Rate (l/min) 2 l/min   Need Additional O2 Flow Rate Rows Yes   O2 Flow Rate (l/min) 3 l/min  (02 Sat 90)   Walk/Assistance Device Ambulation   Rate of Dyspnea 1   Symptoms Fatigue   After Walk   SpO2 96   HR 73   O2 Device   (room air)   FIO2 (%) 21   Rate of Dyspnea 0   Symptoms Fatigue   Does the Patient Qualify for Home O2 Yes   Liter Flow at Rest 0   Liter Flow on Exertion 3   Does the Patient Need Portable Oxygen Tanks Yes

## 2021-10-20 NOTE — CONSULTS
[address] [place], [place]    Consult Report    NAME:  Thu Oliveira  SEX:   male  ADMIT: 10/19/2021  DATE OF CONSULT:2021  REFERRING PHYSICIAN:  Dr Jas Benjamin  : 1945  MR#    [id number]  ROOM: [address] [place] [address]#  [de-identified]    CONSULTING PHYSICIAN:  Manish Marie MD    REASON FOR CONSULTATION:  Respiratory distress    HISTORY OF PRESENT ILLNESS:  Thu Oliveira is a 78 y.o. male  has a past medical history of Acid reflux, Arthritis, Bladder cancer ([address] Congestive heart failure ([address] Deep vein thrombosis (DVT) of proximal vein of both lower extremities ([address] (2020), Diabetes mellitus ([address] GERD (gastroesophageal reflux disease), Gout, High cholesterol, Hypertension, Kidney carcinoma, left ([address] Kidney disease, Pituitary mass ([address] Reflux esophagitis, and Unspecified deficiency anemia.       Presents to the emergency department of progressively worsening shortness of breath that became acute today try to ambulate at the store. He reports that he had to sit down it was so bad he could no longer walk. When EMS arrived on scene oxygen saturation was 86% on room air placed on 4 L by nasal cannula with sats going up to the 90s. Felt significantly improved while reporting worsening lower extremity edema and acute orthopnea. He was recently discharged from [facility] on  for acute on chronic heart failure similar to this episode. His creatinine is at baseline presently.     Patient evaluated in his room and feeling somewhat better after being diuresed in the emergency department. Patient continues on oxygen by nasal cannula and may require for home discharge.   He has now been titrated down to 2 L and feeling much better than when he was admitted.     I have been asked to help with his inpatient management       REVIEW OF SYSTEMS:  General ROS: negative for - chills, fatigue or fever  ENT ROS: negative for - epistaxis, headaches, nasal congestion, sore throat or vertigo  Hematological and Lymphatic ROS: negative for - bleeding problems, jaundice, night sweats or weight loss  Endocrine ROS: negative for - malaise/lethargy, mood swings, palpitations, polydipsia/polyuria or temperature intolerance  Respiratory ROS: no cough, shortness of breath, or wheezing  Cardiovascular ROS: no chest pain or dyspnea on exertion  Gastrointestinal ROS: no abdominal pain, change in bowel habits, or black or bloody stools  Genito-Urinary ROS: no dysuria, trouble voiding, or hematuria  Musculoskeletal ROS: negative  negative for - joint pain, joint stiffness, joint swelling or muscular weakness  Neurological ROS: no TIA or stroke symptoms    PAST MEDICAL HISTORY:  Past Medical History:   Diagnosis Date    Acid reflux     Arthritis     Bladder cancer (HCC)     Congestive heart failure (HCC)     Deep vein thrombosis (DVT) of proximal vein of both lower extremities [facility] 2020    Diabetes mellitus (HCC)     GERD (gastroesophageal reflux disease)     Gout     High cholesterol     Hypertension     Kidney carcinoma, left (HCC)     Kidney disease     Pituitary mass (HCC)     Reflux esophagitis     Unspecified deficiency anemia    .     PAST SURGICAL HISTORY:  Past Surgical History:   Procedure Laterality Date    HX CYSTECTOMY  09    Dr. Deborah Borrero HX NEPHRECTOMY Left 2009    Nephroureterectomy by Dr. Jalen Joe    [id number] [place] [address] HX OTHER SURGICAL      surgery on pituitary gland       SOCIAL HISTORY:  Social History     Socioeconomic History    Marital status: SINGLE     Spouse name: Not on file    Number of children: Not on file    Years of education: Not on file    Highest education level: Not on file   Occupational History    Not on file   Tobacco Use    Smoking status: Former Smoker     Packs/day: 0.50     Years: 40.00     Pack years: 20.00     Types: Cigarettes     Quit date: 1999     Years since quittin.8    Smokeless tobacco: Never Used   Vaping Use  Vaping Use: Never used   Substance and Sexual Activity    Alcohol use: No    Drug use: No    Sexual activity: Yes   Other Topics Concern    Not on file   Social History Narrative    Not on file     Social Determinants of Health     Financial Resource Strain:     Difficulty of Paying Living Expenses:    Food Insecurity:     Worried About Running Out of Food in the Last Year:     [address] in the Last Year:    Transportation Needs:     Lack of Transportation (Medical):  Lack of Transportation (Non-Medical):    Physical Activity:     Days of Exercise per Week:     Minutes of Exercise per Session:    Stress:     Feeling of Stress :    Social Connections:     Frequency of Communication with Friends and Family:     Frequency of Social Gatherings with Friends and Family:     Attends Alevism Services:     Active Member of Clubs or Organizations:     Attends Club or Organization Meetings:     Marital Status:    Intimate Partner Violence:     Fear of Current or Ex-Partner:     Emotionally Abused:     Physically Abused:     Sexually Abused:        FAMILY HISTORY:  Family History   Problem Relation Age of Onset    Heart Disease Father     Heart Disease Mother        ALLERGIES:  No Known Allergies    MEDICATIONS:  Prior to Admission Medications   Prescriptions Last Dose Informant Patient Reported? Taking? BD Ultra-Fine Mini Pen Needle 31 gauge x 3/16\" ndle 10/19/2021 at 1800  No Yes   Sig: USE TO INJECT TID   albuterol (PROVENTIL HFA, VENTOLIN HFA, PROAIR HFA) 90 mcg/actuation inhaler 10/19/2021 at 1800  No Yes   Sig: Take 2 Puffs by inhalation every four (4) hours as needed for Wheezing, Shortness of Breath or Respiratory Distress.    allopurinoL (ZYLOPRIM) 100 mg tablet 10/19/2021 at 1800  No Yes   Sig: TAKE 2 TABLETS BY MOUTH DAILY   amLODIPine (NORVASC) 5 mg tablet 10/19/2021 at 1800  No Yes   Sig: TAKE 2 TABLETS BY MOUTH ONCE A DAY   apixaban (Eliquis) 2.5 mg tablet 10/19/2021 at 1800  No Yes   Sig: Take 1 Tablet by mouth two (2) times a day. cinacalcet (Sensipar) 30 mg tablet 10/19/2021 at 0900  No Yes   Sig: Take 1 Tablet by mouth daily for 30 days. epoetin sadi (Epogen) 20,000 unit/mL injection Unknown at Unknown time  No No   Sig: Inject 20,000 units every 3 weeks subcutaneously   ergocalciferol (ERGOCALCIFEROL) 1,250 mcg (50,000 unit) capsule 10/13/2021 at Unknown time  No Yes   Sig: TAKE ONE CAPSULE BY MOUTH EVERY 7 DAYS   ferrous sulfate (IRON) 325 mg (65 mg iron) EC tablet 10/19/2021 at 1800  No Yes   Sig: Take 1 Tablet by mouth three (3) times daily (with meals). fluticasone propionate (FLONASE) 50 mcg/actuation nasal spray 10/19/2021 at 0900  Yes Yes   Si Rice by Both Nostrils route daily as needed. glipiZIDE (GLUCOTROL) 10 mg tablet 10/19/2021 at 1800  No Yes   Sig: Take 1 Tablet by mouth two (2) times a day. glucose blood VI test strips (ASCENSIA AUTODISC VI, ONE TOUCH ULTRA TEST VI) strip 10/19/2021 at 1800  No Yes   Sig: Check blood glucose 3 times daily, give Accu chek Precious test strips   hydrALAZINE (APRESOLINE) 100 mg tablet 10/19/2021 at 1800  No Yes   Sig: Take 1 Tablet by mouth three (3) times daily for 30 days. insulin glargine (LANTUS) 100 unit/mL injection 10/19/2021 at 1800  No Yes   Si Units by SubCUTAneous route daily for 30 days. loratadine (CLARITIN) 10 mg tablet 10/19/2021 at 0900  Yes Yes   Sig: Take 10 mg by mouth daily. metoprolol succinate (TOPROL-XL) 25 mg XL tablet 10/19/2021 at 0900  Yes Yes   Sig: Take 1 Tablet by mouth daily. minoxidiL (LONITEN) 2.5 mg tablet 10/19/2021 at 1800  No Yes   Sig: TAKE 1 TABLET BY MOUTH TWICE DAILY   omeprazole (PRILOSEC) 40 mg capsule 10/19/2021 at 0900  No Yes   Sig: TAKE 1 CAPSULE BY MOUTH DAILY   polyethylene glycol (Miralax) 17 gram packet 10/19/2021 at 0900  No Yes   Sig: Take 1 Packet by mouth daily.    simvastatin (ZOCOR) 40 mg tablet 10/19/2021 at 1800  No Yes   Sig: TAKE 1 TABLET BY MOUTH EVERY NIGHT   sodium bicarbonate 650 mg tablet 10/19/2021 at 1800  No Yes   Sig: Take 1 Tablet by mouth three (3) times daily for 30 days. Facility-Administered Medications: None     Current Facility-Administered Medications   Medication Dose Route Frequency    sodium chloride (NS) flush 5-40 mL  5-40 mL IntraVENous Q8H    sodium chloride (NS) flush 5-40 mL  5-40 mL IntraVENous PRN    acetaminophen (TYLENOL) tablet 650 mg  650 mg Oral Q6H PRN    Or    acetaminophen (TYLENOL) suppository 650 mg  650 mg Rectal Q6H PRN    polyethylene glycol (MIRALAX) packet 17 g  17 g Oral DAILY PRN    promethazine (PHENERGAN) tablet 12.5 mg  12.5 mg Oral Q6H PRN    Or    ondansetron (ZOFRAN) injection 4 mg  4 mg IntraVENous Q6H PRN    albuterol (PROVENTIL HFA, VENTOLIN HFA, PROAIR HFA) inhaler 2 Puff  2 Puff Inhalation Q4H PRN    allopurinoL (ZYLOPRIM) tablet 200 mg  200 mg Oral DAILY    amLODIPine (NORVASC) tablet 5 mg  5 mg Oral DAILY    apixaban (ELIQUIS) tablet 2.5 mg  2.5 mg Oral BID    . PHARMACY TO SUBSTITUTE PER PROTOCOL (Reordered from: epoetin sadi (Epogen) 20,000 unit/mL injection)    Per Protocol    ergocalciferol capsule 50,000 Units  50,000 Units Oral Q7D    ferrous sulfate tablet 325 mg  325 mg Oral TID WITH MEALS    fluticasone propionate (FLONASE) 50 mcg/actuation nasal spray 1 Spray  1 Spray Both Nostrils DAILY    hydrALAZINE (APRESOLINE) tablet 100 mg  100 mg Oral TID    cetirizine (ZYRTEC) tablet 10 mg  10 mg Oral DAILY    metoprolol succinate (TOPROL-XL) XL tablet 25 mg  25 mg Oral DAILY    minoxidiL (LONITEN) tablet 2.5 mg  2.5 mg Oral BID    pantoprazole (PROTONIX) tablet 40 mg  40 mg Oral DAILY    polyethylene glycol (MIRALAX) packet 17 g  17 g Oral DAILY    atorvastatin (LIPITOR) tablet 20 mg  20 mg Oral QHS    sodium bicarbonate tablet 650 mg  650 mg Oral TID    insulin lispro (HUMALOG) injection   SubCUTAneous AC&HS    glucose chewable tablet 16 g  4 Tablet Oral PRN    glucagon (GLUCAGEN) injection 1 mg  1 mg IntraMUSCular PRN    dextrose (D50W) injection syrg 12.5-25 g  25-50 mL IntraVENous PRN    furosemide (LASIX) tablet 40 mg  40 mg Oral DAILY       PHYSICAL EXAMINATION:  Patient Vitals for the past 24 hrs:   Temp Pulse Resp BP SpO2   10/20/21 0839 97.8 °F (36.6 °C) 78 16 (!) 148/66 94 %   10/20/21 0400 97.8 °F (36.6 °C) 69 18 (!) 128/55 93 %   10/20/21 0013 98 °F (36.7 °C) 72 20 (!) 156/77 98 %   10/20/21 0000 -- 72 -- -- --   10/19/21 2233 -- 70 -- (!) 147/72 --   10/19/21 2111 98.3 °F (36.8 °C) 75 20 (!) 172/76 100 %     GENERAL:  A 79-year-old male, alert, awake, well-oriented, sitting comfortably in the chair, not in any acute distress. HEENT:  Pupils reactive.  Extraocular muscles intact.  No pallor.  No icterus.  Mucosa moist.  NECK:  Supple.  No jugular venous distention. LUNGS:  Fair air entry present bilaterally.  No significant rales or rhonchi heard. HEART:  Sounds are normal.  No rubs or murmurs heard. ABDOMEN:  Soft, nontender.  No palpable organomegaly.  Normal bowel sounds present.  Urostomy bag in place.  Urine is clear. EXTREMITIES:  No edema.  No cyanosis. NEUROLOGICAL:  The patient is alert, awake, well-oriented.  Moving all his extremities.  Speech is normal.  SKIN:  Normal skin turgor.  No evidence of any skin rashes. MUSCULOSKELETAL:  No acute joint swelling. LABORATORY DATA:  Labs: Results:   ABG   No results found for: PH, PHI, PCO2, PCO2I, PO2, PO2I, HCO3, HCO3I, FIO2, FIO2I   Chemistry Recent Labs     10/19/21  2121   *      K 4.5   *   CO2 20*   BUN 54*   CREA 3.20*   CA 7.8*   AGAP 9   BUCR 17   AP 72   TP 7.7   ALB 3.7   GLOB 4.0   AGRAT 0.9    Estimated Creatinine Clearance: 22.6 mL/min (A) (based on SCr of 3.2 mg/dL (H)).    CBC w/Diff Recent Labs     10/19/21  2121   WBC 7.4   RBC 2.85*   HGB 7.5*   HCT 24.2*      GRANS 75*   LYMPH 14*   EOS 2      BTNP Lab Results   Component Value Date/Time     (H) 10/19/2021 09:21 PM     (H) 10/06/2021 09:24 PM    BNP 73 01/25/2021 03:10 PM     (H) 09/15/2020 01:30 AM      Cardiac Enzymes No results for input(s): CPK, CKND1, ANDRES in the last 72 hours. No lab exists for component: CKRMB, TROIP   Coagulation No results for input(s): PTP, INR, APTT, INREXT in the last 72 hours. Liver Enzymes Recent Labs     10/19/21  2121   TP 7.7   ALB 3.7   AP 72   ALT 28      Thyroid Studies No results for input(s): T4, T3U, TSH, TSHEXT in the last 72 hours. No lab exists for component: T3RU     UA No results for input(s): UGLU in the last 72 hours. No lab exists for component: SOURCEUR, UBILIRUBIN, UKET, USPG, UOCB, UPH, UPSC, UUROBILISQ, UNITR, URINELEUKOC   Blood cultures Lab Results   Component Value Date/Time    CULT No growth 7 days 10/07/2021 12:40 AM    CULT No growth 7 days 10/07/2021 12:35 AM       No results for input(s): CULT, CULT1, CULT2, GMS in the last 72 hours. Culture result:   Date Value Ref Range Status   10/07/2021 No growth 7 days   Final      Cultures All Micro Results     None           All Micro Results     None         XR CHEST PORT    Result Date: 10/20/2021  EXAM: PORTABLE  FRONTAL CHEST RADIOGRAPH CLINICAL INDICATION/HISTORY: Shortness of breath COMPARISON: 10/6/2021 TECHNIQUE: Portable frontal view of the chest _______________ FINDINGS: SUPPORT DEVICES: EKG leads overlie the patient. HEART AND MEDIASTINUM: Enlarged cardiac silhouette with pulmonary vascular congestion. LUNGS: Perihilar peribronchial cuffing is present. Bilateral interstitial opacities most pronounced in the mid and lower lungs. No lobar consolidation. PLEURAL SPACES:No large pneumothorax. No large pleural effusion. BONY THORAX AND SOFT TISSUES: No acute abnormality. _______________     1. Constellation of findings suggest interstitial edema, likely secondary to congestive heart failure. Superimposed infiltrate cannot be entirely excluded.  2.  The enlarged cardiac silhouette is likely due to cardiomegaly and/or pericardial effusion. ASSESSMENT:  - Acute hypoxemic respiratory failure  - Acute on chronic congestive heart failure  - CKD stage IV  - HTN  - Type 2 DM      PLAN:  - I have reviewed the entire presentation, imaging and labs  - I feel the major issue is her decompensated heart failure in the setting of severe reanl failure  - Continue diuresis  - fluid restriction  - Last ECHO showed 61% EF  - Estimated RVSP was 77 mm of Hg suggesting severe Pulm HTN but this is in the setting of significant fluid overload. Right now , he needs diuresis and eventually a RHC and an evalution by someone like Dr Dagmar Quiros at [facility] to manage his [address].   - Await Nephrology input  - Continue supplemntal oxygen to maintain sats > 90%    Thanks for the consult    Kavitha Alicia MD  October 20, 2021  11:39 AM

## 2021-10-20 NOTE — ASSESSMENT & PLAN NOTE
-Continue diuresis  -Daily weight  -Fluid restriction  -Recent echocardiogram (10/7/21) shows LVEF is 61%. Biplane method used to measure ejection fraction. Normal cavity size and systolic function (ejection fraction normal). Mild concentric hypertrophy. Left ventricular diastolic dysfunction.

## 2021-10-20 NOTE — ROUTINE PROCESS
TRANSFER - OUT REPORT:    Verbal report given to [facility] (name) on Lucius Deras  being transferred to [address] [place] [address] (unit) for routine progression of care       Report consisted of patients Situation, Background, Assessment and   Recommendations(SBAR). Information from the following report(s) SBAR, ED Summary and MAR was reviewed with the receiving nurse. Lines:   Peripheral IV 10/19/21 Left Antecubital (Active)        Opportunity for questions and clarification was provided.       Patient transported with:   Monitor  O2 @ 2 liters  Tech

## 2021-10-21 VITALS
WEIGHT: 205.8 LBS | BODY MASS INDEX: 31.19 KG/M2 | OXYGEN SATURATION: 97 % | DIASTOLIC BLOOD PRESSURE: 71 MMHG | SYSTOLIC BLOOD PRESSURE: 159 MMHG | HEIGHT: 68 IN | HEART RATE: 65 BPM | TEMPERATURE: 98.1 F | RESPIRATION RATE: 20 BRPM

## 2021-10-21 LAB
ALBUMIN SERPL-MCNC: 3.2 G/DL (ref 3.5–4.7)
ANION GAP SERPL CALC-SCNC: 10 MMOL/L
BASOPHILS # BLD: 0.1 K/UL (ref 0–0.1)
BASOPHILS NFR BLD: 1 % (ref 0–2)
BUN SERPL-MCNC: 52 MG/DL (ref 9–21)
BUN/CREAT SERPL: 16
CA-I BLD-MCNC: 8.5 MG/DL (ref 8.5–10.5)
CHLORIDE SERPL-SCNC: 113 MMOL/L (ref 94–111)
CO2 SERPL-SCNC: 23 MMOL/L (ref 21–33)
CREAT SERPL-MCNC: 3.3 MG/DL (ref 0.8–1.5)
DIFFERENTIAL METHOD BLD: ABNORMAL
EOSINOPHIL # BLD: 0.2 K/UL (ref 0–0.4)
EOSINOPHIL NFR BLD: 4 % (ref 0–5)
ERYTHROCYTE [DISTWIDTH] IN BLOOD BY AUTOMATED COUNT: 17.8 % (ref 11.6–14.5)
FERRITIN SERPL-MCNC: 146 NG/ML (ref 8–388)
GLUCOSE BLD STRIP.AUTO-MCNC: 111 MG/DL (ref 70–110)
GLUCOSE BLD STRIP.AUTO-MCNC: 117 MG/DL (ref 70–110)
GLUCOSE SERPL-MCNC: 91 MG/DL (ref 70–110)
HCT VFR BLD AUTO: 23.8 % (ref 36–48)
HGB BLD-MCNC: 7.1 G/DL (ref 13–16)
IMM GRANULOCYTES # BLD AUTO: 0 K/UL (ref 0–0.04)
IMM GRANULOCYTES NFR BLD AUTO: 0 % (ref 0–0.5)
IRON SATN MFR SERPL: 11 % (ref 20–50)
IRON SERPL-MCNC: 24 UG/DL (ref 50–175)
LYMPHOCYTES # BLD: 1 K/UL (ref 0.9–3.6)
LYMPHOCYTES NFR BLD: 18 % (ref 21–52)
MCH RBC QN AUTO: 25.3 PG (ref 24–34)
MCHC RBC AUTO-ENTMCNC: 29.8 G/DL (ref 31–37)
MCV RBC AUTO: 84.7 FL (ref 78–100)
MONOCYTES # BLD: 0.4 K/UL (ref 0.05–1.2)
MONOCYTES NFR BLD: 8 % (ref 3–10)
NEUTS SEG # BLD: 3.8 K/UL (ref 1.8–8)
NEUTS SEG NFR BLD: 69 % (ref 40–73)
NRBC # BLD: 0 K/UL (ref 0–0.01)
NRBC BLD-RTO: 0 PER 100 WBC
PERFORMED BY, TECHID: ABNORMAL
PERFORMED BY, TECHID: ABNORMAL
PHOSPHATE SERPL-MCNC: 4.4 MG/DL (ref 2.5–4.5)
PLATELET # BLD AUTO: 335 K/UL (ref 135–420)
PMV BLD AUTO: 9.2 FL (ref 9.2–11.8)
POTASSIUM SERPL-SCNC: 4.4 MMOL/L (ref 3.2–5.1)
RBC # BLD AUTO: 2.81 M/UL (ref 4.35–5.65)
RBC MORPH BLD: ABNORMAL
SODIUM SERPL-SCNC: 146 MMOL/L (ref 135–145)
TIBC SERPL-MCNC: 225 UG/DL (ref 250–450)
WBC # BLD AUTO: 5.5 K/UL (ref 4.6–13.2)

## 2021-10-21 PROCEDURE — 36415 COLL VENOUS BLD VENIPUNCTURE: CPT

## 2021-10-21 PROCEDURE — 74011250637 HC RX REV CODE- 250/637: Performed by: INTERNAL MEDICINE

## 2021-10-21 PROCEDURE — 74011250637 HC RX REV CODE- 250/637: Performed by: PHYSICIAN ASSISTANT

## 2021-10-21 PROCEDURE — 99218 HC RM OBSERVATION: CPT

## 2021-10-21 PROCEDURE — 85025 COMPLETE CBC W/AUTO DIFF WBC: CPT

## 2021-10-21 PROCEDURE — 82728 ASSAY OF FERRITIN: CPT

## 2021-10-21 PROCEDURE — 82962 GLUCOSE BLOOD TEST: CPT

## 2021-10-21 PROCEDURE — 83540 ASSAY OF IRON: CPT

## 2021-10-21 PROCEDURE — 80069 RENAL FUNCTION PANEL: CPT

## 2021-10-21 RX ORDER — MINOXIDIL 2.5 MG/1
2.5 TABLET ORAL DAILY
Qty: 30 TABLET | Refills: 0 | Status: SHIPPED
Start: 2021-10-22 | End: 2021-11-12

## 2021-10-21 RX ORDER — AMLODIPINE BESYLATE 5 MG/1
10 TABLET ORAL DAILY
Status: DISCONTINUED | OUTPATIENT
Start: 2021-10-22 | End: 2021-10-21 | Stop reason: HOSPADM

## 2021-10-21 RX ORDER — FUROSEMIDE 40 MG/1
40 TABLET ORAL DAILY
Qty: 30 TABLET | Refills: 0 | Status: SHIPPED | OUTPATIENT
Start: 2021-10-21 | End: 2022-02-12

## 2021-10-21 RX ADMIN — FERROUS SULFATE TAB 325 MG (65 MG ELEMENTAL FE) 325 MG: 325 (65 FE) TAB at 11:43

## 2021-10-21 RX ADMIN — FLUTICASONE PROPIONATE 1 SPRAY: 50 SPRAY, METERED NASAL at 09:21

## 2021-10-21 RX ADMIN — PANTOPRAZOLE SODIUM 40 MG: 40 TABLET, DELAYED RELEASE ORAL at 09:13

## 2021-10-21 RX ADMIN — CETIRIZINE HYDROCHLORIDE 10 MG: 10 TABLET, FILM COATED ORAL at 09:13

## 2021-10-21 RX ADMIN — SODIUM BICARBONATE 650 MG: 650 TABLET ORAL at 09:13

## 2021-10-21 RX ADMIN — METOPROLOL TARTRATE 25 MG: 25 TABLET, FILM COATED ORAL at 09:13

## 2021-10-21 RX ADMIN — ALLOPURINOL 200 MG: 100 TABLET ORAL at 09:14

## 2021-10-21 RX ADMIN — FERROUS SULFATE TAB 325 MG (65 MG ELEMENTAL FE) 325 MG: 325 (65 FE) TAB at 09:14

## 2021-10-21 RX ADMIN — POLYETHYLENE GLYCOL (3350) 17 G: 17 POWDER, FOR SOLUTION ORAL at 09:12

## 2021-10-21 RX ADMIN — MINOXIDIL 2.5 MG: 2.5 TABLET ORAL at 09:13

## 2021-10-21 RX ADMIN — APIXABAN 2.5 MG: 2.5 TABLET, FILM COATED ORAL at 09:14

## 2021-10-21 RX ADMIN — FUROSEMIDE 40 MG: 40 TABLET ORAL at 09:13

## 2021-10-21 RX ADMIN — Medication 10 ML: at 05:27

## 2021-10-21 RX ADMIN — AMLODIPINE BESYLATE 5 MG: 5 TABLET ORAL at 09:14

## 2021-10-21 RX ADMIN — HYDRALAZINE HYDROCHLORIDE 100 MG: 25 TABLET, FILM COATED ORAL at 09:14

## 2021-10-21 NOTE — DISCHARGE INSTRUCTIONS
Patient Education        Learning About ACE Inhibitors for Heart Failure  Introduction     ACE (angiotensin-converting enzyme) inhibitors block an enzyme that makes blood vessels narrow. As a result, the blood vessels relax and widen. This lowers blood pressure. These medicines also put more water and salt into the urine. This also lowers blood pressure. In heart failure, your heart does not pump as much blood as your body needs. These medicines can help:  · Make it easier for your heart to pump. · Reduce symptoms. · Make it less likely you will need to stay in a hospital.  · Lower the risk of early death. Examples  · benazepril (Lotensin)  · enalapril (Vasotec)  · lisinopril (Prinivil, Zestril)  · ramipril (Altace)  This is not a complete list.  Possible side effects  Side effects may include:  · A cough. · Low blood pressure. This can make you feel dizzy or weak. · Too much potassium in your body. · Swelling of your lips, tongue, or face. If the swelling is severe, you may need treatment right away. Severe swelling can make it hard to breathe, but this is rare. You may have other side effects or reactions not listed here. Check the information that comes with your medicine. What to know about taking this medicine  · ACE inhibitors can cause a dry cough. Talk to your doctor if you have a dry cough. You may need a different medicine. · These medicines can cause an allergic reaction. This can cause a little swelling. Or it can cause red bumps on your skin that hurt. In rare cases, the swelling may make it hard for you to breathe. · Do not take this medicine if you are pregnant or plan to become pregnant. · Take your medicines exactly as prescribed. Call your doctor if you think you are having a problem with your medicine. · Check with your doctor or pharmacist before you use any other medicines. This includes over-the-counter medicines.  Make sure your doctor knows all of the medicines, vitamins, herbal products, and supplements you take. Taking some medicines together can cause problems. · You may need regular blood tests. Where can you learn more? Go to http://www.gray.com/  Enter J065 in the search box to learn more about \"Learning About ACE Inhibitors for Heart Failure. \"  Current as of: April 29, 2021               Content Version: 13.0  © 2006-2021 [employer]. Care instructions adapted under license by [employer] (which disclaims liability or warranty for this information). If you have questions about a medical condition or this instruction, always ask your healthcare professional. [place] [address] any warranty or liability for your use of this information. Patient Education        Learning About Meal Planning for Diabetes  Why plan your meals? Meal planning can be a key part of managing diabetes. Planning meals and snacks with the right balance of carbohydrate, protein, and fat can help you keep your blood sugar at the target level you set with your doctor. You don't have to eat special foods. You can eat what your family eats, including sweets once in a while. But you do have to pay attention to how often you eat and how much you eat of certain foods. You may want to work with a dietitian or a certified diabetes educator. He or she can give you tips and meal ideas and can answer your questions about meal planning. This health professional can also help you reach a healthy weight if that is one of your goals. What plan is right for you? Your dietitian or diabetes educator may suggest that you start with the plate format or carbohydrate counting. The plate format  The plate format is a simple way to help you manage how you eat. You plan meals by learning how much space each food should take on a plate. Using the plate format helps you spread carbohydrate throughout the day.  It can make it easier to keep your blood sugar level within your target range. It also helps you see if you're eating healthy portion sizes. To use the plate format, you put non-starchy vegetables on half your plate. Add meat or meat substitutes on one-quarter of the plate. Put a grain or starchy vegetable (such as brown rice or a potato) on the final quarter of the plate. You can add a small piece of fruit and some low-fat or fat-free milk or yogurt, depending on your carbohydrate goal for each meal.  Here are some tips for using the plate format:  · Make sure that you are not using an oversized plate. A 9-inch plate is best. Many restaurants use larger plates. · Get used to using the plate format at home. Then you can use it when you eat out. · Write down your questions about using the plate format. Talk to your doctor, a dietitian, or a diabetes educator about your concerns. Carbohydrate counting  With carbohydrate counting, you plan meals based on the amount of carbohydrate in each food. Carbohydrate raises blood sugar higher and more quickly than any other nutrient. It is found in desserts, breads and cereals, and fruit. It's also found in starchy vegetables such as potatoes and corn, grains such as rice and pasta, and milk and yogurt. Spreading carbohydrate throughout the day helps keep your blood sugar levels within your target range. Your daily amount depends on several things, including your weight, how active you are, which diabetes medicines you take, and what your goals are for your blood sugar levels. A registered dietitian or diabetes educator can help you plan how much carbohydrate to include in each meal and snack. A guideline for your daily amount of carbohydrate is:  · 45 to 60 grams at each meal. That's about the same as 3 to 4 carbohydrate servings. · 15 to 20 grams at each snack. That's about the same as 1 carbohydrate serving. The Nutrition Facts label on packaged foods tells you how much carbohydrate is in a serving of the food. First, look at the serving size on the food label. Is that the amount you eat in a serving? All of the nutrition information on a food label is based on that serving size. So if you eat more or less than that, you'll need to adjust the other numbers. Total carbohydrate is the next thing you need to look for on the label. If you count carbohydrate servings, one serving of carbohydrate is 15 grams. For foods that don't come with labels, such as fresh fruits and vegetables, you'll need a guide that lists carbohydrate in these foods. Ask your doctor, dietitian, or diabetes educator about books or other nutrition guides you can use. If you take insulin, you need to know how many grams of carbohydrate are in a meal. This lets you know how much rapid-acting insulin to take before you eat. If you use an insulin pump, you get a constant rate of insulin during the day. So the pump must be programmed at meals to give you extra insulin to cover the rise in blood sugar after meals. When you know how much carbohydrate you will eat, you can take the right amount of insulin. Or, if you always use the same amount of insulin, you need to make sure that you eat the same amount of carbohydrate at meals. If you need more help to understand carbohydrate counting and food labels, ask your doctor, dietitian, or diabetes educator. How can you plan healthy meals? Here are some tips to get started:  · Plan your meals a week at a time. Don't forget to include snacks too. · Use cookbooks or online recipes to plan several main meals. Plan some quick meals for busy nights. You also can double some recipes that freeze well. Then you can save half for other busy nights when you don't have time to cook. · Make sure you have the ingredients you need for your recipes. If you're running low on basic items, put these items on your shopping list too. · List foods that you use to make breakfasts, lunches, and snacks.  List plenty of fruits and vegetables. · Post this list on the refrigerator. Add to it as you think of more things you need. · Take the list to the store to do your weekly shopping. Follow-up care is a key part of your treatment and safety. Be sure to make and go to all appointments, and call your doctor if you are having problems. It's also a good idea to know your test results and keep a list of the medicines you take. Where can you learn more? Go to http://www.gray.com/  Enter T772 in the search box to learn more about \"Learning About Meal Planning for Diabetes. \"  Current as of: December 17, 2020               Content Version: 13.0  © 8810-2326 [employer]. Care instructions adapted under license by [employer] (which disclaims liability or warranty for this information). If you have questions about a medical condition or this instruction, always ask your healthcare professional. [place] [address] any warranty or liability for your use of this information. Patient Education        Learning About High Blood Pressure  What is high blood pressure? Blood pressure is a measure of how hard the blood pushes against the walls of your arteries. It's normal for blood pressure to go up and down throughout the day. But if it stays up, you have high blood pressure. Another name for high blood pressure is hypertension. Two numbers tell you your blood pressure. The first number is the systolic pressure (top number). It shows how hard the blood pushes when your heart is pumping. The second number is the diastolic pressure (bottom number). It shows how hard the blood pushes between heartbeats, when your heart is relaxed and filling with blood. Your doctor will give you a goal for your blood pressure based on your health and your age. High blood pressure (hypertension) means that the top number stays high, or the bottom number stays high, or both.   High blood pressure increases the risk of stroke, heart attack, and other problems. What happens when you have high blood pressure? · Blood flows through your arteries with too much force. Over time, this can damage the heart and the walls of your arteries. But you can't feel it. High blood pressure usually doesn't cause symptoms. · High blood pressure makes your heart work harder. And that can lead to heart failure, which means your heart doesn't pump as much blood as your body needs. · Fat and calcium start to build up in your arteries. This buildup is called hardening of the arteries. It can cause many problems including a heart attack and stroke. · Arteries also carry blood and oxygen to organs like your eyes, kidneys, and brain. If high blood pressure damages those arteries, it can lead to vision loss, kidney disease, stroke, and a higher risk of dementia. How can you prevent high blood pressure? · Stay at a healthy weight. · Try to limit how much sodium you eat to less than 2,300 milligrams (mg) a day. If you limit your sodium to 1,500 mg a day, you can lower your blood pressure even more. ? Buy foods that are labeled \"unsalted,\" \"sodium-free,\" or \"low-sodium. \" Foods labeled \"reduced-sodium\" and \"light sodium\" may still have too much sodium. ? Flavor your food with garlic, lemon juice, onion, vinegar, herbs, and spices instead of salt. Do not use soy sauce, steak sauce, onion salt, garlic salt, mustard, or ketchup on your food. ? Use less salt (or none) when recipes call for it. You can often use half the salt a recipe calls for without losing flavor. · Be physically active. Get at least 30 minutes of exercise on most days of the week. Walking is a good choice. You also may want to do other activities, such as running, swimming, cycling, or playing tennis or team sports. · Limit alcohol to 2 drinks a day for men and 1 drink a day for women. · Eat plenty of fruits, vegetables, and low-fat dairy products.  Eat less saturated and total fats. How is high blood pressure treated? · Your doctor will suggest making lifestyle changes to help your heart. For example, your doctor may ask you to eat healthy foods, quit smoking, lose extra weight, and be more active. · If lifestyle changes don't help enough, your doctor may recommend that you take medicine. · When blood pressure is very high, medicines are needed to lower it. Follow-up care is a key part of your treatment and safety. Be sure to make and go to all appointments, and call your doctor if you are having problems. It's also a good idea to know your test results and keep a list of the medicines you take. Where can you learn more? Go to http://www.[employer].com/  Enter P501 in the search box to learn more about \"Learning About High Blood Pressure. \"  Current as of: April 29, 2021               Content Version: 13.0  © 2091-5627 [employer]. Care instructions adapted under license by [employer] (which disclaims liability or warranty for this information). If you have questions about a medical condition or this instruction, always ask your healthcare professional. [address] any warranty or liability for your use of this information.

## 2021-10-21 NOTE — PROGRESS NOTES
Performed over ride for 1 rx paper to order a referral to pulmonology- Dr Jordin Cosby, per his office, they will not schedule patient without a referral. Unable to place referral in CC for unknown reason- referral was hand written and faxed to his office at [phone]. Pharmacist, Christos Carrington, aware of over ride reason.

## 2021-10-21 NOTE — DISCHARGE SUMMARY
Discharge Summary       PATIENT ID: Sloane Turner  MRN: [id number]   YOB: 1945    DATE OF ADMISSION: 10/19/2021  9:09 PM    DATE OF DISCHARGE: 10/21/21    PRIMARY CARE PROVIDER: Donna Macdonald NP     ATTENDING PHYSICIAN: Ray Sultana MD  DISCHARGING PROVIDER: Ray Sultana MD        CONSULTATIONS: IP CONSULT TO NEPHROLOGY  IP CONSULT TO PULMONOLOGY    PROCEDURES/SURGERIES: * No surgery found *    [address] COURSE:   Sloane Turner is a 78 y.o. male  has a past medical history of Acid reflux, Arthritis, Bladder cancer (HCC), Congestive heart failure (HCC), Deep vein thrombosis (DVT) of proximal vein of both lower extremities ([facility] [address] (9/14/2020), Diabetes mellitus ([facility] [address] [facility] GERD (gastroesophageal reflux disease), Gout, High cholesterol, Hypertension, Kidney carcinoma, left ([facility] [address] Kidney disease, Pituitary mass ([facility] Reflux esophagitis, and Unspecified deficiency anemia.       Presents to the emergency department of progressively worsening shortness of breath that became acute today try to ambulate at the store. She reports that he had to sit down it was so bad he could no longer walk. When EMS arrived on scene oxygen saturation was 86% on room air placed on 4 L by nasal cannula with sats going up to the 90s. Felt significantly improved while reporting worsening lower extremity edema and acute orthopnea. She was recently discharged from our facility on October 11 for acute on chronic heart failure similar to this episode. His creatinine is at baseline presently.     Patient evaluated in his room and feeling somewhat better after being diuresed in the emergency department. Patient continues on oxygen by nasal cannula and may require for home discharge. He has now been titrated down to 2 L and feeling much better than when he was admitted.     Patient will be admitted for observation status length of stay of 1 midnight.         DISCHARGE DIAGNOSES / PLAN: Acute on chronic congestive heart failure (HCC)  -Continue diuresis  -Daily weight  -Fluid restriction  -Recent echocardiogram (10/7/21) shows LVEF is 61%. Biplane method used to measure ejection fraction. Normal cavity size and systolic function (ejection fraction normal). Mild concentric hypertrophy. Left ventricular diastolic dysfunction.     Chronic kidney disease, stage IV (severe) ([facility] [address]  -Continue home medications  -Diuresis as a priority  -Nephrology consultation     Primary hypertension  -Mildly elevated on admission  -Continue with minoxidil, metoprolol, hydralazine and amlodipine  -Continue to monitor     Type 2 diabetes mellitus with stage 4 chronic kidney disease, with long-term current use of insulin ([facility])  -Continue with Lantus  -Insulin sliding scale before meals bedtime  -Hold glipizide      Total dc time 35 mins  Follow closely with outpt doctors. Little further to add as INPT, you need aggressive outpt mgmt. Pulm that specializes in Pulm HTN, maybe Right heart cath. Nephrology to follow diuretics and renal function. FOLLOW UP APPOINTMENTS:    Follow-up Information     Follow up With Specialties Details Why Contact Info    Star Esquivel NP Nurse Practitioner   [address] [place] [address].  [address] [facility]  [phone]      Star Esquivel NP Nurse Practitioner In 1 week  [facility] [address]  [address] [facility] [address]      Pedro Pablo Soliz MD Nephrology, Nephrology In 1 week  [address] [facility].  Kathryn Amador [address] [facility]      Domenico Jones MD Pulmonary Disease In 1 month  [address], low sodium      DISCHARGE MEDICATIONS:  Current Discharge Medication List      START taking these medications    Details   furosemide (LASIX) 40 mg tablet Take 1 Tablet by mouth daily.   Qty: 30 Tablet, Refills: 0  Start date: 10/21/2021         CONTINUE these medications which have CHANGED    Details minoxidiL (LONITEN) 2.5 mg tablet Take 1 Tablet by mouth daily. Qty: 30 Tablet, Refills: 0  Start date: 10/22/2021         CONTINUE these medications which have NOT CHANGED    Details   hydrALAZINE (APRESOLINE) 100 mg tablet Take 1 Tablet by mouth three (3) times daily for 30 days. Qty: 90 Tablet, Refills: 0      loratadine (CLARITIN) 10 mg tablet Take 10 mg by mouth daily. albuterol (PROVENTIL HFA, VENTOLIN HFA, PROAIR HFA) 90 mcg/actuation inhaler Take 2 Puffs by inhalation every four (4) hours as needed for Wheezing, Shortness of Breath or Respiratory Distress. Qty: 1 Each, Refills: 1    Associated Diagnoses: SOB (shortness of breath) on exertion      cinacalcet (Sensipar) 30 mg tablet Take 1 Tablet by mouth daily for 30 days. Qty: 30 Tablet, Refills: 1      ferrous sulfate (IRON) 325 mg (65 mg iron) EC tablet Take 1 Tablet by mouth three (3) times daily (with meals). Qty: 90 Tablet, Refills: 2      sodium bicarbonate 650 mg tablet Take 1 Tablet by mouth three (3) times daily for 30 days. Qty: 90 Tablet, Refills: 1      simvastatin (ZOCOR) 40 mg tablet TAKE 1 TABLET BY MOUTH EVERY NIGHT  Qty: 90 Tablet, Refills: 1      metoprolol succinate (TOPROL-XL) 25 mg XL tablet Take 1 Tablet by mouth daily. amLODIPine (NORVASC) 5 mg tablet TAKE 2 TABLETS BY MOUTH ONCE A DAY  Qty: 180 Tablet, Refills: 0    Associated Diagnoses: Essential hypertension      allopurinoL (ZYLOPRIM) 100 mg tablet TAKE 2 TABLETS BY MOUTH DAILY  Qty: 60 Tablet, Refills: 3      omeprazole (PRILOSEC) 40 mg capsule TAKE 1 CAPSULE BY MOUTH DAILY  Qty: 90 Capsule, Refills: 1    Associated Diagnoses: Medication refill      polyethylene glycol (Miralax) 17 gram packet Take 1 Packet by mouth daily. Qty: 10 Packet, Refills: 0    Associated Diagnoses: Medication refill      apixaban (Eliquis) 2.5 mg tablet Take 1 Tablet by mouth two (2) times a day.   Qty: 180 Tablet, Refills: 1    Associated Diagnoses: Medication refill      glucose blood VI test strips (ASCENSIA AUTODISC VI, ONE TOUCH ULTRA TEST VI) strip Check blood glucose 3 times daily, give Accu chek Precious test strips  Qty: 300 Strip, Refills: 3    Associated Diagnoses: Type 2 diabetes mellitus with stage 4 chronic kidney disease, with long-term current use of insulin ([facility])      ergocalciferol (ERGOCALCIFEROL) 1,250 mcg (50,000 unit) capsule TAKE ONE CAPSULE BY MOUTH EVERY 7 DAYS  Qty: 16 Cap, Refills: 1    Associated Diagnoses: Stage 3 chronic kidney disease, unspecified whether stage 3a or 3b CKD ([facility])      BD Ultra-Fine Mini Pen Needle 31 gauge x 3/16\" ndle USE TO INJECT TID  Qty: 100 Pen Needle, Refills: 3      fluticasone propionate (FLONASE) 50 mcg/actuation nasal spray 1 [place] by Both Nostrils route daily as needed. epoetin sadi (Epogen) 20,000 unit/mL injection Inject 20,000 units every 3 weeks subcutaneously  Qty: 1 Each, Refills: 3    Comments: Please dispense as a 30 day supply  Associated Diagnoses: Stage 3 chronic kidney disease, unspecified whether stage 3a or 3b CKD ([facility] [address]         STOP taking these medications       insulin glargine (LANTUS) 100 unit/mL injection Comments:   Reason for Stopping:         glipiZIDE (GLUCOTROL) 10 mg tablet Comments:   Reason for Stopping:                 NOTIFY YOUR PHYSICIAN FOR ANY OF THE FOLLOWING:   Fever over 101 degrees for 24 hours. Chest pain, shortness of breath, fever, chills, nausea, vomiting, diarrhea, change in mentation, falling, weakness, bleeding. Severe pain or pain not relieved by medications. Or, any other signs or symptoms that you may have questions about. PHYSICAL EXAMINATION AT DISCHARGE:  General:          Alert, cooperative, no distress, appears stated age.      HEENT:           Atraumatic, anicteric sclerae, pink conjunctivae                          No oral ulcers, mucosa moist, throat clear, dentition fair  Neck:               Supple, symmetrical,  thyroid: non tender  Lungs:             Clear to auscultation bilaterally. No Wheezing or Rhonchi. No rales. Chest wall:      No tenderness  No Accessory muscle use. Heart:              Regular  rhythm,  No  murmur   No edema  Abdomen:        Soft, non-tender. Not distended. Bowel sounds normal + urostomy  Extremities:     No cyanosis. No clubbing,                            Skin turgor normal, Capillary refill normal, Radial dial pulse 2+  Skin:                Not pale. Not Jaundiced  No rashes   Psych:             Good insight. Not depressed. Not anxious or agitated. Neurologic:      EOMs intact. No facial asymmetry. No aphasia or slurred speech. Symmetrical strength,   Sensation grossly intact.  Alert and oriented X 4.      CHRONIC MEDICAL DIAGNOSES:  Problem List as of 10/21/2021 Date Reviewed: 10/29/2020        Codes Class Noted - Resolved    Chronic kidney disease, stage IV (severe) ([facility] ICD-10-CM: N18.4  ICD-9-CM: 585.4  10/19/2021 - Present        * (Principal) Acute on chronic congestive heart failure (HCC) ICD-10-CM: I50.9  ICD-9-CM: 428.0  10/19/2021 - Present        Acute on chronic combined systolic and diastolic ACC/AHA stage C congestive heart failure ([facility] ICD-10-CM: I50.43  ICD-9-CM: 428.43, 428.0  10/7/2021 - Present        CHF exacerbation ([facility] ICD-10-CM: I50.9  ICD-9-CM: 428.0  10/6/2021 - Present        Acute on chronic heart failure ([facility] ICD-10-CM: I50.9  ICD-9-CM: 428.0  1/13/2021 - Present        Acute deep vein thrombosis (DVT) of distal vein of both lower extremities ([facility] ICD-10-CM: F00.2L1  ICD-9-CM: 453.42  10/13/2020 - Present        CHF (congestive heart failure) (HCC) ICD-10-CM: I50.9  ICD-9-CM: 428.0  9/15/2020 - Present        Deep vein thrombosis (DVT) of proximal vein of both lower extremities (HCC) (Chronic) ICD-10-CM: I82.4Y3  ICD-9-CM: 453.41  9/14/2020 - Present    Overview Signed 9/14/2020  3:43 PM by Jack Chavarria NP                      Positive D dimer ICD-10-CM: X13.78  ICD-9-CM: 790.92  9/13/2020 - Present General weakness ICD-10-CM: R53.1  ICD-9-CM: 780.79  8/1/2020 - Present        Chronic diastolic congestive heart failure (HCC) ICD-10-CM: I50.32  ICD-9-CM: 428.32, 428.0  7/31/2020 - Present        Coronary artery disease of native artery of native heart with stable angina pectoris ([facility] ICD-10-CM: I25.118  ICD-9-CM: 414.01, 413.9  3/20/2020 - Present        H/O left nephrectomy ICD-10-CM: Z90.5  ICD-9-CM: V45.73  3/20/2020 - Present        History of bladder cancer ICD-10-CM: Z85.51  ICD-9-CM: V10.51  3/20/2020 - Present        Pulmonary edema, acute ([facility] ICD-10-CM: J81.0  ICD-9-CM: 518.4  3/20/2020 - Present        Acute renal failure superimposed on chronic kidney disease ([facility] ICD-10-CM: N17.9, N18.9  ICD-9-CM: 584.9, 585.9  11/23/2017 - Present        Gastroesophageal reflux disease without esophagitis ICD-10-CM: K21.9  ICD-9-CM: 530.81  11/23/2017 - Present        HLD (hyperlipidemia) ICD-10-CM: E78.5  ICD-9-CM: 272.4  11/23/2017 - Present        Renal cell carcinoma of left kidney (HCC) ICD-10-CM: C64.2  ICD-9-CM: 189.0  11/23/2017 - Present        Hematuria ICD-10-CM: R31.9  ICD-9-CM: 599.70  9/4/2014 - Present        Stage 3 chronic kidney disease ([facility] ICD-10-CM: N18.30  ICD-9-CM: 585.3  9/4/2014 - Present        Malignant neoplasm of urinary bladder (HCC) ICD-10-CM: C67.9  ICD-9-CM: 188.9  12/7/2009 - Present    Overview Signed 9/16/2020  2:28 PM by Erika Muñoz NP     Overview:   HG papillary urothelial carcinoma.  S/p cystoprostatectomy 12/30/09             Type 2 diabetes mellitus with stage 4 chronic kidney disease, with long-term current use of insulin (HCC) ICD-10-CM: E11.22, N18.4, Z79.4  ICD-9-CM: 250.40, 585.4, V58.67  12/7/2009 - Present        Primary hypertension ICD-10-CM: I10  ICD-9-CM: 401.9  12/7/2009 - Present              Greater than 40 minutes were spent with the patient on counseling and coordination of care    Signed:   Mary Lockhart MD  10/21/2021  1:10 PM

## 2021-10-21 NOTE — PROGRESS NOTES
Renal Progress Note    Patient: Lesa Travis MRN: [id number]  SSN: [id number]    YOB: 1945  Age: 78 y.o. Sex: male      Admit Date: 10/19/2021    LOS: 0 days     Subjective:   Patient seen at bedside. Alert and awake, no acute distress. Patient is not giving any new complaints today. No complaints of any swelling in lower extremities.    Creat 3.3 today      Current Facility-Administered Medications   Medication Dose Route Frequency    sodium chloride (NS) flush 5-40 mL  5-40 mL IntraVENous Q8H    sodium chloride (NS) flush 5-40 mL  5-40 mL IntraVENous PRN    acetaminophen (TYLENOL) tablet 650 mg  650 mg Oral Q6H PRN    Or    acetaminophen (TYLENOL) suppository 650 mg  650 mg Rectal Q6H PRN    polyethylene glycol (MIRALAX) packet 17 g  17 g Oral DAILY PRN    promethazine (PHENERGAN) tablet 12.5 mg  12.5 mg Oral Q6H PRN    Or    ondansetron (ZOFRAN) injection 4 mg  4 mg IntraVENous Q6H PRN    albuterol (PROVENTIL HFA, VENTOLIN HFA, PROAIR HFA) inhaler 2 Puff  2 Puff Inhalation Q4H PRN    allopurinoL (ZYLOPRIM) tablet 200 mg  200 mg Oral DAILY    amLODIPine (NORVASC) tablet 5 mg  5 mg Oral DAILY    apixaban (ELIQUIS) tablet 2.5 mg  2.5 mg Oral BID    ergocalciferol capsule 50,000 Units  50,000 Units Oral Q7D    ferrous sulfate tablet 325 mg  325 mg Oral TID WITH MEALS    fluticasone propionate (FLONASE) 50 mcg/actuation nasal spray 1 Spray  1 Spray Both Nostrils DAILY    hydrALAZINE (APRESOLINE) tablet 100 mg  100 mg Oral TID    cetirizine (ZYRTEC) tablet 10 mg  10 mg Oral DAILY    pantoprazole (PROTONIX) tablet 40 mg  40 mg Oral DAILY    polyethylene glycol (MIRALAX) packet 17 g  17 g Oral DAILY    atorvastatin (LIPITOR) tablet 20 mg  20 mg Oral QHS    sodium bicarbonate tablet 650 mg  650 mg Oral TID    insulin lispro (HUMALOG) injection   SubCUTAneous AC&HS    glucose chewable tablet 16 g  4 Tablet Oral PRN    glucagon (GLUCAGEN) injection 1 mg  1 mg IntraMUSCular PRN    dextrose (D50W) injection syrg 12.5-25 g  25-50 mL IntraVENous PRN    furosemide (LASIX) tablet 40 mg  40 mg Oral DAILY    epoetin sadi-epbx (RETACRIT) injection 10,000 Units  10,000 Units SubCUTAneous Q7D    minoxidiL (LONITEN) tablet 2.5 mg  2.5 mg Oral DAILY    metoprolol tartrate (LOPRESSOR) tablet 25 mg  25 mg Oral BID        Vitals:    10/20/21 1951 10/21/21 0004 10/21/21 0400 10/21/21 0830   BP:  (!) 136/55 (!) 136/55 (!) 159/71   Pulse: 70 72 78 65   Resp:  18 18 20   Temp:  97.8 °F (36.6 °C) 98.3 °F (36.8 °C) 98.1 °F (36.7 °C)   SpO2:  99% 98% 97%   Weight:   93.4 kg (205 lb 12.8 oz)    Height:         Objective:   General: alert awake well-oriented, no acute distress. HEENT: EOMI, no Icterus, no Pallor,  mucosa moist.  Neck: Neck is supple, No JVD  Lungs: breathsounds normal, no rhonchi, no rales,   CVS: heart sounds normal, no murmurs, no rubs. GI: soft, nontender, normal BS, suprapubic cystostomy tube in place  Extremeties: no cyanosis, no edema,   Neuro: Alert, awake, oriented x3, speech is normal, moving all extremeties well. Skin: normal skin turgor, no skin rashes. Intake and Output:  Current Shift: No intake/output data recorded. Last three shifts: 10/19 1901 - 10/21 0700  In: 240 [P.O.:240]  Out: 3200 [Urine:3200]      Lab/Data Review:  Recent Labs     10/21/21  0407 10/19/21  2121   WBC 5.5 7.4   HGB 7.1* 7.5*   HCT 23.8* 24.2*    336     Recent Labs     10/21/21  0407 10/19/21  2121   * 141   K 4.4 4.5   * 112*   CO2 23 20*   GLU 91 164*   BUN 52* 54*   CREA 3.30* 3.20*   CA 8.5 7.8*   PHOS 4.4  --    ALB 3.2* 3.7   TBILI  --  0.5   ALT  --  28     No results for input(s): PH, PCO2, PO2, HCO3, FIO2 in the last 72 hours.   Recent Results (from the past 24 hour(s))   GLUCOSE, POC    Collection Time: 10/20/21 11:56 AM   Result Value Ref Range    Glucose (POC) 82 70 - 110 mg/dL    Performed by Osvaldo Gates, POC    Collection Time: 10/20/21  4:34 PM Result Value Ref Range    Glucose (POC) 86 70 - 110 mg/dL    Performed by Elfego Andres    CBC WITH AUTOMATED DIFF    Collection Time: 10/21/21  4:07 AM   Result Value Ref Range    WBC 5.5 4.6 - 13.2 K/uL    RBC 2.81 (L) 4.35 - 5.65 M/uL    HGB 7.1 (L) 13.0 - 16.0 g/dL    HCT 23.8 (L) 36.0 - 48.0 %    MCV 84.7 78.0 - 100.0 FL    MCH 25.3 24.0 - 34.0 PG    MCHC 29.8 (L) 31.0 - 37.0 g/dL    RDW 17.8 (H) 11.6 - 14.5 %    PLATELET 623 822 - 926 K/uL    MPV 9.2 9.2 - 11.8 FL    NRBC 0.0 0.0  WBC    ABSOLUTE NRBC 0.00 0.00 - 0.01 K/uL    NEUTROPHILS 69 40 - 73 %    LYMPHOCYTES 18 (L) 21 - 52 %    MONOCYTES 8 3 - 10 %    EOSINOPHILS 4 0 - 5 %    BASOPHILS 1 0 - 2 %    IMMATURE GRANULOCYTES 0 0 - 0.5 %    ABS. NEUTROPHILS 3.8 1.8 - 8.0 K/UL    ABS. LYMPHOCYTES 1.0 0.9 - 3.6 K/UL    ABS. MONOCYTES 0.4 0.05 - 1.2 K/UL    ABS. EOSINOPHILS 0.2 0.0 - 0.4 K/UL    ABS. BASOPHILS 0.1 0.0 - 0.1 K/UL    ABS. IMM. GRANS. 0.0 0.00 - 0.04 K/UL    DF Manual      RBC COMMENTS Normocytic, Normochromic     RENAL FUNCTION PANEL    Collection Time: 10/21/21  4:07 AM   Result Value Ref Range    Sodium 146 (H) 135 - 145 mmol/L    Potassium 4.4 3.2 - 5.1 mmol/L    Chloride 113 (H) 94 - 111 mmol/L    CO2 23 21 - 33 mmol/L    Anion gap 10 mmol/L    Glucose 91 70 - 110 mg/dL    BUN 52 (H) 9 - 21 mg/dL    Creatinine 3.30 (H) 0.8 - 1.50 mg/dL    BUN/Creatinine ratio 16      GFR est AA 22 ml/min/1.73m2    GFR est non-AA 18 ml/min/1.73m2    Calcium 8.5 8.5 - 10.5 mg/dL    Phosphorus 4.4 2.5 - 4.5 mg/dL    Albumin 3.2 (L) 3.5 - 4.7 g/dL   GLUCOSE, POC    Collection Time: 10/21/21  7:10 AM   Result Value Ref Range    Glucose (POC) 111 (H) 70 - 110 mg/dL    Performed by Rogelio Thomas and Plan:     1.  Chronic kidney disease.  The patient  has stage IV chronic kidney disease related to his previous nephrectomy and nephrosclerosis with hypertension or diabetic nephropathy.    Creatinine is stable, appears to be around baseline.    check urine random protein-creatinine ratio to assess for proteinuria   Continue diuretics and continue to monitor renal functions     2. Hypertension.   Stable  Continue current antihypertensive medications and continue to monitor blood pressures     3. Lower extremity edema/ Complaints of shortness of breath, congestive heart failure. Echocardiogram showed preserved ejection fraction, chest x-ray suggestive of pulmonary congestion.    The patient clinically improved, appears stable at this time.  Continue to monitor clinically. Continue Lasix once daily  decreased minoxidil to once daily  Change metoprolol to 25 mg p.o. twice daily  Increase amlodipine to 10 mg daily     5.  Renal osteodystrophy.     calcium and phos are normal  Recommend to monitor intact PTH and vitamin D levels as outpatient and adjust management as needed.     6.  History of gout.  The patient is on allopurinol.  No complaints of any acute gout at this time.     7.  Type 2 diabetes.  Stable blood sugars.  Continue to monitor Accu-Cheks and continue current management.     8. Anemia: check iron studies, add epogen 26518 sq qweekly  Monitor H/H and consider transfusion of 1 unit pRBC      Signed By: Ashley Yanes MD     October 21, 2021

## 2021-10-22 ENCOUNTER — PATIENT OUTREACH (OUTPATIENT)
Dept: CASE MANAGEMENT | Age: 76
End: 2021-10-22

## 2021-10-22 NOTE — PROGRESS NOTES
Care Transitions Initial Call    Call within 2 business days of discharge: Yes     Patient: Yoly Herring Patient : 1945 MRN: [id number]    Last Discharge [address]       Complaint Diagnosis Description Type Department Provider    10/19/21 Shortness of Breath Acute on chronic diastolic congestive heart failure ([facility] [address] . .. ED to Hosp-Admission (Discharged) (ADMIT) CHRISTIANOS Cooper Campos MD; Rosalia Epperson,... Was this an external facility discharge? No Discharge Facility: [facility]  Call placed to only number on file; patient's niece. She stated that she was in the hospital and that patient was at home with other family members. CTN got contact numbers for family there with patient. [phone]  And  [phone]  Challenges to be reviewed by the provider   Additional needs identified to be addressed with provider: yes  medication management - CTN has not been able to get in touch with the family member that manages the patient's medications and the 2 other family members I have been able to talk to state they do not know anything about his medications. There are 3 nieces that report having different responcibilities in the care of the patient. Weights - patient not weighing daily and reports increased swelling to lower extremities. Patient may benefit from remote monitoring of weight and vitals through telehealth. Method of communication with provider : chart routing    Discussed [phone] related testing which was not done at this time. Test results were not done. Patient informed of results, if available? no     Advance Care Planning:   Does patient have an Advance Directive:  currently not on file; education provided     Inpatient Readmission Risk score: Unplanned Readmit Risk Score: 27    Was this a readmission?  yes   Patient stated reason for the admission: short of breath and swelling in lower extremities    Patients top risk factors for readmission: lack of knowledge about disease and medical condition-CHF   Interventions to address risk factors: Obtained and reviewed discharge summary and/or continuity of care documents, Reviewed and followed up on pending diagnostic tests and treatments-recent admission notes and labs, Education of patient/family/caregiver/guardian to support self-management-reinforced the need to weigh patinet daily and report weight gain to PCP or Cardiologist and requested telehealth from PCP     Care Transition Nurse (CTN) contacted the patient by telephone to perform post hospital discharge assessment. Verified name and  with patient as identifiers. Provided introduction to self, and explanation of the CTN role. CTN reviewed discharge instructions, medical action plan and red flags with patient who verbalized understanding. Were discharge instructions available to patient? yes. Reviewed appropriate site of care based on symptoms and resources available to patient including: PCP, Specialist, When to call 911 and CTN. Patient given an opportunity to ask questions and does not have any further questions or concerns at this time. The patient agrees to contact the PCP office for questions related to their healthcare. Medication reconciliation was not performed with patient or family, who verbalizes the niece that manages his medications is at work and not available. CTN requested family have her call me asap to review medications. Patient does report thst he has not gotten his \"water Pill and niece that is staying with the patient said she will call her sister and have her pick it up today. Referral to Pharm D needed: no     Home Health/Outpatient orders at discharge: none    Durable Medical Equipment ordered at discharge: None      Covid Risk Education    Educated patient about risk for severe COVID-19 due to risk factors according to CDC guidelines.  CTN reviewed discharge instructions, medical action plan and red flag symptoms with the patient who verbalized understanding. Discussed COVID vaccination status: Patient was given COVID first dose on 10/15/21. Education provided on COVID-19 vaccination as appropriate. Discussed exposure protocols and quarantine with CDC Guidelines. Family was given an opportunity to verbalize any questions and concerns and agrees to contact CTN or health care provider for questions related to their healthcare. Was patient discharged with a pulse oximeter? no.     Discussed follow-up appointments. If no appointment was previously scheduled, appointment scheduling offered: yes. Is follow up appointment scheduled within 7 days of discharge? yes. [facility] follow up appointment(s):   Future Appointments   Date Time Provider Niya Rai   10/29/2021  2:00 PM Km Smith NP [facility] BS AMB   11/3/2021 12:00 PM MD STEPHEN Wiggins BS AMB   1/27/2022 10:45 AM Jabier Smith NP [facility] BS AMB   3/30/2022  2:30 PM Ghazala Smith, ROGERIO [facility] BS AMB     Non-[facility] follow up appointment(s): none    Plan for follow-up call in 3-5 days based on severity of symptoms and risk factors. Plan for next call: symptom management-weight, swelling and SOB  CTN provided contact information for future needs. Goals Addressed                 This Visit's Progress     Prevent complications post hospitalization. 1. CTN will monitor X 4 weeks  10/22/2021 patient readmitted for SOB and lower extremity swelling     2. Ensure provider appt is scheduled within 7 days post-discharge  10/15/2021 PCP follow up 10/15/2021   3. Confirm patient attended post-discharge provider apt  10/15/2021 patient attended virtual appointment today. No new orders placed  4. Complete post-visit call to confirm attendance and update care needs      5. Review/educate common or potential \"red flags\" of condition worsening  10/22/2021    weight gain of 2 pounds in 24 hours 3 pounds in 3 days or 4- 5 pounds in one week.   Severe shortness of breath  Swelling of lower extremeties  Swelling in abdomen  Loss of appetite  Lightheaded or dizziness     Patient is not weighing     6. Evaluate adherence to medications and priority barriers to resolve     7. Instruct on adherence to medications as ordered and assess for therapeutic response and side-effects       10/22/2021 patient has not picked up lasix but will have niece get it today  8. Discuss and evaluate ADL performance. Provide recommendations on energy conservation, particularly related to transition home from an inpatient admission.

## 2021-10-25 ENCOUNTER — PATIENT OUTREACH (OUTPATIENT)
Dept: CASE MANAGEMENT | Age: 76
End: 2021-10-25

## 2021-10-25 NOTE — PROGRESS NOTES
Care Transitions Follow Up Call    Challenges to be reviewed by the provider   Additional needs identified to be addressed with provider: yes  tele monitoring- patient would benefit from telehealth monitoring            Method of communication with provider : chart routing    Care Transition Nurse (CTN) contacted the patient by telephone to follow up after admission on 10/19/21. Verified name and  with patient as identifiers. Addressed changes since last contact: swelling to bilateral lower extremities are much better  Follow up appointment completed? no. Scheduled for Friday 10/29/21 virtual appointment  Was follow up appointment scheduled within 7 days of discharge? yes. Advance Care Planning:   Does patient have an Advance Directive:  currently not on file; education provided     CTN reviewed discharge instructions, medical action plan and red flags with patient and discussed any barriers to care and/or understanding of plan of care after discharge. Discussed appropriate site of care based on symptoms and resources available to patient including: PCP, Specialist and CTN. The patient agrees to contact the PCP office for questions related to their healthcare. Patients top risk factors for readmission: medical condition-CHF   Interventions to address risk factors: Obtained and reviewed discharge summary and/or continuity of care documents and requested telehealth for patient from [facility] follow up appointment(s):   Future Appointments   Date Time Provider Niya Rai   10/29/2021  2:00 PM Jay Jay Smith NP [facility] BS AMB   11/3/2021 12:00 PM MD STEPHEN Rascon AMB   2022 10:45 AM Jay Jay Smith NP [facility] BS AMB   3/30/2022  2:30 PM Ghazala Smith NP [facility] BS AMB     Non-[facility] follow up appointment(s): none    CTN provided contact information for future needs. Plan for follow-up call in 7-10 days based on severity of symptoms and risk factors.   Plan for next call: symptom management-swelling and SOB review PCP appointment     Goals Addressed                 This Visit's Progress     Prevent complications post hospitalization. 1. CTN will monitor X 4 weeks  10/22/2021 patient readmitted for SOB and lower extremity swelling     2. Ensure provider appt is scheduled within 7 days post-discharge  10/15/2021 PCP follow up 10/15/2021   3. Confirm patient attended post-discharge provider apt  10/15/2021 patient attended virtual appointment today. No new orders placed  4. Complete post-visit call to confirm attendance and update care needs      5. Review/educate common or potential \"red flags\" of condition worsening  10/22/2021    weight gain of 2 pounds in 24 hours 3 pounds in 3 days or 4- 5 pounds in one week. Severe shortness of breath  Swelling of lower extremeties  Swelling in abdomen  Loss of appetite  Lightheaded or dizziness     Patient is not weighing   10/25/2021 patient states he forgot to weigh this morning. Patient denies SOB, denies swelling, and is wearing his oxygen as instructed. 6. Evaluate adherence to medications and priority barriers to resolve     7. Instruct on adherence to medications as ordered and assess for therapeutic response and side-effects       10/22/2021 patient has not picked up lasix but will have niece get it today  8. Discuss and evaluate ADL performance. Provide recommendations on energy conservation, particularly related to transition home from an inpatient admission.

## 2021-10-29 ENCOUNTER — VIRTUAL VISIT (OUTPATIENT)
Dept: FAMILY MEDICINE CLINIC | Age: 76
End: 2021-10-29
Payer: MEDICARE

## 2021-10-29 DIAGNOSIS — R06.02 SOB (SHORTNESS OF BREATH): ICD-10-CM

## 2021-10-29 DIAGNOSIS — I27.20 PULMONARY HYPERTENSION (HCC): ICD-10-CM

## 2021-10-29 DIAGNOSIS — Z09 HOSPITAL DISCHARGE FOLLOW-UP: ICD-10-CM

## 2021-10-29 PROCEDURE — G8427 DOCREV CUR MEDS BY ELIG CLIN: HCPCS | Performed by: NURSE PRACTITIONER

## 2021-10-29 PROCEDURE — 99214 OFFICE O/P EST MOD 30 MIN: CPT | Performed by: NURSE PRACTITIONER

## 2021-10-29 PROCEDURE — 1111F DSCHRG MED/CURRENT MED MERGE: CPT | Performed by: NURSE PRACTITIONER

## 2021-10-29 PROCEDURE — 3017F COLORECTAL CA SCREEN DOC REV: CPT | Performed by: NURSE PRACTITIONER

## 2021-10-29 PROCEDURE — 1101F PT FALLS ASSESS-DOCD LE1/YR: CPT | Performed by: NURSE PRACTITIONER

## 2021-10-29 PROCEDURE — G8432 DEP SCR NOT DOC, RNG: HCPCS | Performed by: NURSE PRACTITIONER

## 2021-10-29 PROCEDURE — G8756 NO BP MEASURE DOC: HCPCS | Performed by: NURSE PRACTITIONER

## 2021-10-29 RX ORDER — ALLOPURINOL 100 MG/1
TABLET ORAL
Qty: 60 TABLET | Refills: 3 | Status: SHIPPED | OUTPATIENT
Start: 2021-10-29 | End: 2022-03-03 | Stop reason: SDUPTHER

## 2021-10-29 NOTE — PROGRESS NOTES
1. Have you been to the ER, urgent care clinic since your last visit? Hospitalized since your last visit? No Yes  [place]    2. Have you seen or consulted any other health care providers outside of the [address] [place] [address] since your last visit? Include any pap smears or colon screening.  No

## 2021-10-29 NOTE — PROGRESS NOTES
Indigo Richmond is a 78 y.o. male who was seen by synchronous (real-time) audio-video technology on 10/29/2021 for Hospital Follow Up [facility])        Assessment & Plan:   Diagnoses and all orders for this visit:    1. SOB (shortness of breath)  -     REFERRAL TO PULMONARY DISEASE    2. Pulmonary hypertension (HCC)  -     REFERRAL TO PULMONARY DISEASE    3. Hospital discharge follow-up        I spent at least 30 minutes on this visit with this established patient. [id number]  Subjective:     Patient is being seen today to follow [facility]. Patient was admitted on 10/19/21 and discharged on 10/21/21 for SOB. EMS arrival patient oxygen was 86 on Room air. Patient had significant lower extremity edema and acute orthopena. He was diuresed in the ER. He was continued on home oxygen at 2 L. It is suggested he have aggressive outpt management. It is recommended he follows pulmonary for possible Pulm HTN. He follows nephrology and will be scheduling appointment for ER follow up. He was seen by cardiology per patient on day he was admitted into the ER. Advise to also schedule ER follow up with cardiologist and possible home monitoring. Referral written today for pulmonology due to possible pulmonary hypertension/SOB. Past medical history of Acid reflux, Arthritis, Bladder cancer (HCC), Congestive heart failure ([address] Deep vein thrombosis (DVT) of proximal vein of both lower extremities ([address] (9/14/2020), Diabetes mellitus ([address] GERD (gastroesophageal reflux disease), Gout, High cholesterol, Hypertension, Kidney carcinoma, left ([address] Kidney disease, Pituitary mass ([address] Reflux esophagitis, and Unspecified deficiency anemia.    LVEF is 61%.     Referral written today for pulmonology due to possible pulmonary hypertension/SOB.           Past Medical History:   Diagnosis Date    Acid reflux     Arthritis     Bladder cancer ([address]     Congestive heart failure (HCC)     Deep vein thrombosis (DVT) of proximal vein of both lower extremities ([facility] [address] 9/14/2020    Diabetes mellitus ([facility]     GERD (gastroesophageal reflux disease)     Gout     High cholesterol     Hypertension     Kidney carcinoma, left (HCC)     Kidney disease     Pituitary mass ([facility]     Reflux esophagitis     Unspecified deficiency anemia         Prior to Admission medications    Medication Sig Start Date End Date Taking? Authorizing Provider   allopurinoL (ZYLOPRIM) 100 mg tablet TAKE 2 TABLETS BY MOUTH DAILY 10/29/21  Yes Ghazala Smith NP   minoxidiL (LONITEN) 2.5 mg tablet Take 1 Tablet by mouth daily. 10/22/21  Yes Janel Doshi MD   furosemide (LASIX) 40 mg tablet Take 1 Tablet by mouth daily. 10/21/21  Yes Janel Doshi MD   loratadine (CLARITIN) 10 mg tablet Take 10 mg by mouth daily. 9/9/21  Yes Lesly, MD Melba   albuterol (PROVENTIL HFA, VENTOLIN HFA, PROAIR HFA) 90 mcg/actuation inhaler Take 2 Puffs by inhalation every four (4) hours as needed for Wheezing, Shortness of Breath or Respiratory Distress. 9/30/21  Yes Ghazala Smith NP   ferrous sulfate (IRON) 325 mg (65 mg iron) EC tablet Take 1 Tablet by mouth three (3) times daily (with meals). 9/22/21  Yes Ciaran Morales MD   simvastatin (ZOCOR) 40 mg tablet TAKE 1 TABLET BY MOUTH EVERY NIGHT 9/8/21  Yes Ghazala Smith NP   metoprolol succinate (TOPROL-XL) 25 mg XL tablet Take 1 Tablet by mouth daily. 7/13/21  Yes Melba Grace MD   amLODIPine (NORVASC) 5 mg tablet TAKE 2 TABLETS BY MOUTH ONCE A DAY 7/30/21  Yes Ghazala Smith NP   omeprazole (PRILOSEC) 40 mg capsule TAKE 1 CAPSULE BY MOUTH DAILY 6/3/21  Yes Ghazala Smith NP   polyethylene glycol (Miralax) 17 gram packet Take 1 Packet by mouth daily. 6/3/21  Yes Ghazala Smith NP   apixaban (Eliquis) 2.5 mg tablet Take 1 Tablet by mouth two (2) times a day.  6/3/21  Yes Ghazala Smith, NP   glucose blood VI test strips (ASCENSIA AUTODISC VI, ONE TOUCH ULTRA TEST VI) strip Check blood glucose 3 times daily, give Accu chek Precious test strips 6/1/21  Yes Ghazala Smith NP   ergocalciferol (ERGOCALCIFEROL) 1,250 mcg (50,000 unit) capsule TAKE ONE CAPSULE BY MOUTH EVERY 7 DAYS 4/26/21  Yes Rosalino French MD   BD Ultra-Fine Mini Pen Needle 31 gauge x 3/16\" ndle USE TO INJECT TID 1/26/21  Yes Ghazala Smith NP   fluticasone propionate (FLONASE) 50 mcg/actuation nasal spray 1 [place] by Both Nostrils route daily as needed. Yes Other, MD Melba   epoetin sadi (Epogen) 20,000 unit/mL injection Inject 20,000 units every 3 weeks subcutaneously 10/13/21   Rosalino French MD   hydrALAZINE (APRESOLINE) 100 mg tablet Take 1 Tablet by mouth three (3) times daily for 30 days.  10/11/21 11/10/21  Jim Philip MD   allopurinoL (ZYLOPRIM) 100 mg tablet TAKE 2 TABLETS BY MOUTH DAILY 7/4/21 10/29/21  Brian Smith NP     Patient Active Problem List   Diagnosis Code    Malignant neoplasm of urinary bladder (HCC) C67.9    Type 2 diabetes mellitus with stage 4 chronic kidney disease, with long-term current use of insulin ([facility]) E11.22, N18.4, Z79.4    Primary hypertension I10    Hematuria R31.9    Stage 3 chronic kidney disease ([facility] [address] [address] N18.30    Chronic diastolic congestive heart failure (HCC) I50.32    Positive D dimer R79.89    Deep vein thrombosis (DVT) of proximal vein of both lower extremities (HCC) I82.4Y3    CHF (congestive heart failure) ([facility]) I50.9    Acute on chronic heart failure (HCC) I50.9    Acute deep vein thrombosis (DVT) of distal vein of both lower extremities ([facility]) I82.4Z3    Acute renal failure superimposed on chronic kidney disease (HCC) N17.9, N18.9    Coronary artery disease of native artery of native heart with stable angina pectoris ([facility]) I25.118    Gastroesophageal reflux disease without esophagitis K21.9    General weakness R53.1    H/O left nephrectomy Z90.5    History of bladder cancer Z85.51    HLD (hyperlipidemia) E78.5    Pulmonary edema, acute (HCC) J81.0    Renal cell carcinoma of left kidney (HCC) C64.2    CHF exacerbation (HCC) I50.9    Acute on chronic combined systolic and diastolic ACC/AHA stage C congestive heart failure (HCC) I50.43    Chronic kidney disease, stage IV (severe) (HCC) N18.4    Acute on chronic congestive heart failure (HCC) I50.9     Current Outpatient Medications   Medication Sig Dispense Refill    allopurinoL (ZYLOPRIM) 100 mg tablet TAKE 2 TABLETS BY MOUTH DAILY 60 Tablet 3    minoxidiL (LONITEN) 2.5 mg tablet Take 1 Tablet by mouth daily. 30 Tablet 0    furosemide (LASIX) 40 mg tablet Take 1 Tablet by mouth daily. 30 Tablet 0    loratadine (CLARITIN) 10 mg tablet Take 10 mg by mouth daily.  albuterol (PROVENTIL HFA, VENTOLIN HFA, PROAIR HFA) 90 mcg/actuation inhaler Take 2 Puffs by inhalation every four (4) hours as needed for Wheezing, Shortness of Breath or Respiratory Distress. 1 Each 1    ferrous sulfate (IRON) 325 mg (65 mg iron) EC tablet Take 1 Tablet by mouth three (3) times daily (with meals). 90 Tablet 2    simvastatin (ZOCOR) 40 mg tablet TAKE 1 TABLET BY MOUTH EVERY NIGHT 90 Tablet 1    metoprolol succinate (TOPROL-XL) 25 mg XL tablet Take 1 Tablet by mouth daily.  amLODIPine (NORVASC) 5 mg tablet TAKE 2 TABLETS BY MOUTH ONCE A  Tablet 0    omeprazole (PRILOSEC) 40 mg capsule TAKE 1 CAPSULE BY MOUTH DAILY 90 Capsule 1    polyethylene glycol (Miralax) 17 gram packet Take 1 Packet by mouth daily. 10 Packet 0    apixaban (Eliquis) 2.5 mg tablet Take 1 Tablet by mouth two (2) times a day.  180 Tablet 1    glucose blood VI test strips (ASCENSIA AUTODISC VI, ONE TOUCH ULTRA TEST VI) strip Check blood glucose 3 times daily, give Accu chek Precious test strips 300 Strip 3    ergocalciferol (ERGOCALCIFEROL) 1,250 mcg (50,000 unit) capsule TAKE ONE CAPSULE BY MOUTH EVERY 7 DAYS 16 Cap 1    BD Ultra-Fine Mini Pen Needle 31 gauge x 3/16\" ndle USE TO INJECT  Pen Needle 3    fluticasone propionate (FLONASE) 50 mcg/actuation nasal spray 1 [place] by Both Nostrils route daily as needed.  epoetin sadi (Epogen) 20,000 unit/mL injection Inject 20,000 units every 3 weeks subcutaneously 1 Each 3    hydrALAZINE (APRESOLINE) 100 mg tablet Take 1 Tablet by mouth three (3) times daily for 30 days. 80 Tablet 0     Past Medical History:   Diagnosis Date    Acid reflux     Arthritis     Bladder cancer (HCC)     Congestive heart failure (HCC)     Deep vein thrombosis (DVT) of proximal vein of both lower extremities ([facility] 2020    Diabetes mellitus (HCC)     GERD (gastroesophageal reflux disease)     Gout     High cholesterol     Hypertension     Kidney carcinoma, left (HCC)     Kidney disease     Pituitary mass ([facility]     Reflux esophagitis     Unspecified deficiency anemia      Past Surgical History:   Procedure Laterality Date    HX CYSTECTOMY  09    Dr. Leon Meter HX NEPHRECTOMY Left 2009    Nephroureterectomy by Dr. Burak Durbin HX OTHER SURGICAL      surgery on pituitary gland     Social History     Tobacco Use    Smoking status: Former Smoker     Packs/day: 0.50     Years: 40.00     Pack years: 20.00     Types: Cigarettes     Quit date: 1999     Years since quittin.8    Smokeless tobacco: Never Used   Substance Use Topics    Alcohol use: No       Review of Systems   Constitutional: Negative for chills, fever and malaise/fatigue. Eyes: Negative. Respiratory: Negative for cough, shortness of breath and wheezing. Cardiovascular: Negative for chest pain, palpitations and leg swelling. Musculoskeletal: Negative. Skin: Negative. Neurological: Negative.         Objective:     Patient-Reported Vitals 10/29/2021   Patient-Reported Weight 200      General: alert, cooperative, no distress   Mental  status: normal mood, behavior, speech, dress, motor activity, and thought processes, able to follow commands   HENT: NCAT   Neck: no visualized mass   Resp: no respiratory distress   Neuro: no gross deficits   Skin: no discoloration or lesions of concern on visible areas   Psychiatric: normal affect, consistent with stated mood, no evidence of hallucinations     Additional exam findings: We discussed the expected course, resolution and complications of the diagnosis(es) in detail. Medication risks, benefits, costs, interactions, and alternatives were discussed as indicated. I advised him to contact the office if his condition worsens, changes or fails to improve as anticipated. He expressed understanding with the diagnosis(es) and plan. Harshil Hardwick, who was evaluated through a patient-initiated, synchronous (real-time) audio-video encounter, and/or his healthcare decision maker, is aware that it is a billable service, with coverage as determined by his insurance carrier. He provided verbal consent to proceed: Yes, and patient identification was verified. It was conducted pursuant to the emergency declaration under the [address] [place] [address], [address] [place] [address] authority and the [employer] Act. A caregiver was present when appropriate. Ability to conduct physical exam was limited. I was in the office. The patient was at home.       Lenora Riley NP

## 2021-11-01 ENCOUNTER — TELEPHONE (OUTPATIENT)
Dept: FAMILY MEDICINE CLINIC | Age: 76
End: 2021-11-01

## 2021-11-01 ENCOUNTER — APPOINTMENT (OUTPATIENT)
Dept: CT IMAGING | Age: 76
End: 2021-11-01
Attending: INTERNAL MEDICINE
Payer: MEDICARE

## 2021-11-01 ENCOUNTER — APPOINTMENT (OUTPATIENT)
Dept: GENERAL RADIOLOGY | Age: 76
End: 2021-11-01
Attending: INTERNAL MEDICINE
Payer: MEDICARE

## 2021-11-01 ENCOUNTER — PATIENT OUTREACH (OUTPATIENT)
Dept: CASE MANAGEMENT | Age: 76
End: 2021-11-01

## 2021-11-01 ENCOUNTER — HOSPITAL ENCOUNTER (OUTPATIENT)
Age: 76
Setting detail: OBSERVATION
Discharge: HOME | End: 2021-11-02
Attending: INTERNAL MEDICINE | Admitting: INTERNAL MEDICINE
Payer: MEDICARE

## 2021-11-01 DIAGNOSIS — R07.9 CHEST PAIN, UNSPECIFIED TYPE: Primary | ICD-10-CM

## 2021-11-01 PROBLEM — N18.4 CHRONIC KIDNEY DISEASE, STAGE 4 (SEVERE) (HCC): Status: ACTIVE | Noted: 2021-11-01

## 2021-11-01 LAB
ALBUMIN SERPL-MCNC: 3.7 G/DL (ref 3.5–4.7)
ALBUMIN/GLOB SERPL: 0.9 {RATIO}
ALP SERPL-CCNC: 57 U/L (ref 38–126)
ALT SERPL-CCNC: 17 U/L (ref 3–72)
ANION GAP SERPL CALC-SCNC: 15 MMOL/L
AST SERPL W P-5'-P-CCNC: 18 U/L (ref 17–74)
ATRIAL RATE: 75 BPM
BASOPHILS # BLD: 0 K/UL (ref 0–0.1)
BASOPHILS NFR BLD: 1 % (ref 0–2)
BILIRUB DIRECT SERPL-MCNC: <0.1 MG/DL (ref 0–0.3)
BILIRUB SERPL-MCNC: 0.5 MG/DL (ref 0.2–1)
BNP SERPL-MCNC: 317 PG/ML (ref 0–100)
BUN SERPL-MCNC: 54 MG/DL (ref 9–21)
BUN/CREAT SERPL: 17
CA-I BLD-MCNC: 8.8 MG/DL (ref 8.5–10.5)
CALCULATED P AXIS, ECG09: -39 DEGREES
CALCULATED R AXIS, ECG10: -16 DEGREES
CALCULATED T AXIS, ECG11: -8 DEGREES
CHLORIDE SERPL-SCNC: 102 MMOL/L (ref 94–111)
CO2 SERPL-SCNC: 21 MMOL/L (ref 21–33)
CREAT SERPL-MCNC: 3.1 MG/DL (ref 0.8–1.5)
DIAGNOSIS, 93000: NORMAL
DIFFERENTIAL METHOD BLD: ABNORMAL
EOSINOPHIL # BLD: 0.1 K/UL (ref 0–0.4)
EOSINOPHIL NFR BLD: 1 % (ref 0–5)
ERYTHROCYTE [DISTWIDTH] IN BLOOD BY AUTOMATED COUNT: 17.8 % (ref 11.6–14.5)
GLOBULIN SER CALC-MCNC: 3.9 G/DL
GLUCOSE BLD STRIP.AUTO-MCNC: 81 MG/DL (ref 70–110)
GLUCOSE SERPL-MCNC: 166 MG/DL (ref 70–110)
HCT VFR BLD AUTO: 26.8 % (ref 36–48)
HGB BLD-MCNC: 8.2 G/DL (ref 13–16)
IMM GRANULOCYTES # BLD AUTO: 0 K/UL (ref 0–0.04)
IMM GRANULOCYTES NFR BLD AUTO: 0 % (ref 0–0.5)
INR PPP: 1.1 (ref 0.8–1.2)
LACTATE SERPL-SCNC: 0.9 MMOL/L (ref 0.5–2)
LIPASE SERPL-CCNC: 52 U/L (ref 10–57)
LYMPHOCYTES # BLD: 0.8 K/UL (ref 0.9–3.6)
LYMPHOCYTES NFR BLD: 14 % (ref 21–52)
MCH RBC QN AUTO: 26.1 PG (ref 24–34)
MCHC RBC AUTO-ENTMCNC: 30.6 G/DL (ref 31–37)
MCV RBC AUTO: 85.4 FL (ref 78–100)
MONOCYTES # BLD: 0.4 K/UL (ref 0.05–1.2)
MONOCYTES NFR BLD: 8 % (ref 3–10)
NEUTS SEG # BLD: 4.3 K/UL (ref 1.8–8)
NEUTS SEG NFR BLD: 76 % (ref 40–73)
NRBC # BLD: 0 K/UL (ref 0–0.01)
NRBC BLD-RTO: 0 PER 100 WBC
P-R INTERVAL, ECG05: 286 MS
PERFORMED BY, TECHID: NORMAL
PLATELET # BLD AUTO: 308 K/UL (ref 135–420)
PMV BLD AUTO: 8.9 FL (ref 9.2–11.8)
POTASSIUM SERPL-SCNC: 4.3 MMOL/L (ref 3.2–5.1)
PROT SERPL-MCNC: 7.6 G/DL (ref 6.1–8.4)
PROTHROMBIN TIME: 13.8 SEC (ref 11.5–15.2)
Q-T INTERVAL, ECG07: 410 MS
QRS DURATION, ECG06: 112 MS
QTC CALCULATION (BEZET), ECG08: 458 MS
RBC # BLD AUTO: 3.14 M/UL (ref 4.35–5.65)
SODIUM SERPL-SCNC: 138 MMOL/L (ref 135–145)
TROPONIN I SERPL-MCNC: 0.02 NG/ML (ref 0.02–0.05)
TROPONIN I SERPL-MCNC: 0.02 NG/ML (ref 0.02–0.05)
VENTRICULAR RATE, ECG03: 75 BPM
WBC # BLD AUTO: 5.7 K/UL (ref 4.6–13.2)

## 2021-11-01 PROCEDURE — 85610 PROTHROMBIN TIME: CPT

## 2021-11-01 PROCEDURE — 80076 HEPATIC FUNCTION PANEL: CPT

## 2021-11-01 PROCEDURE — 82962 GLUCOSE BLOOD TEST: CPT

## 2021-11-01 PROCEDURE — 85025 COMPLETE CBC W/AUTO DIFF WBC: CPT

## 2021-11-01 PROCEDURE — 84484 ASSAY OF TROPONIN QUANT: CPT

## 2021-11-01 PROCEDURE — 83605 ASSAY OF LACTIC ACID: CPT

## 2021-11-01 PROCEDURE — 71045 X-RAY EXAM CHEST 1 VIEW: CPT

## 2021-11-01 PROCEDURE — 74176 CT ABD & PELVIS W/O CONTRAST: CPT

## 2021-11-01 PROCEDURE — 93005 ELECTROCARDIOGRAM TRACING: CPT

## 2021-11-01 PROCEDURE — 83690 ASSAY OF LIPASE: CPT

## 2021-11-01 PROCEDURE — 74011250637 HC RX REV CODE- 250/637: Performed by: NURSE PRACTITIONER

## 2021-11-01 PROCEDURE — G0378 HOSPITAL OBSERVATION PER HR: HCPCS

## 2021-11-01 PROCEDURE — 99218 HC RM OBSERVATION: CPT

## 2021-11-01 PROCEDURE — 83880 ASSAY OF NATRIURETIC PEPTIDE: CPT

## 2021-11-01 PROCEDURE — 70450 CT HEAD/BRAIN W/O DYE: CPT

## 2021-11-01 PROCEDURE — 80048 BASIC METABOLIC PNL TOTAL CA: CPT

## 2021-11-01 PROCEDURE — 99285 EMERGENCY DEPT VISIT HI MDM: CPT

## 2021-11-01 RX ORDER — MAGNESIUM SULFATE 100 %
4 CRYSTALS MISCELLANEOUS AS NEEDED
Status: DISCONTINUED | OUTPATIENT
Start: 2021-11-01 | End: 2021-11-02 | Stop reason: HOSPADM

## 2021-11-01 RX ORDER — ONDANSETRON 2 MG/ML
4 INJECTION INTRAMUSCULAR; INTRAVENOUS
Status: DISCONTINUED | OUTPATIENT
Start: 2021-11-01 | End: 2021-11-02 | Stop reason: HOSPADM

## 2021-11-01 RX ORDER — ALLOPURINOL 100 MG/1
200 TABLET ORAL DAILY
Status: DISCONTINUED | OUTPATIENT
Start: 2021-11-02 | End: 2021-11-02 | Stop reason: HOSPADM

## 2021-11-01 RX ORDER — POLYETHYLENE GLYCOL 3350 17 G/17G
17 POWDER, FOR SOLUTION ORAL DAILY PRN
Status: DISCONTINUED | OUTPATIENT
Start: 2021-11-01 | End: 2021-11-02 | Stop reason: HOSPADM

## 2021-11-01 RX ORDER — HYDRALAZINE HYDROCHLORIDE 25 MG/1
100 TABLET, FILM COATED ORAL 3 TIMES DAILY
Status: DISCONTINUED | OUTPATIENT
Start: 2021-11-01 | End: 2021-11-02 | Stop reason: HOSPADM

## 2021-11-01 RX ORDER — INSULIN LISPRO 100 [IU]/ML
INJECTION, SOLUTION INTRAVENOUS; SUBCUTANEOUS
Status: DISCONTINUED | OUTPATIENT
Start: 2021-11-01 | End: 2021-11-02 | Stop reason: HOSPADM

## 2021-11-01 RX ORDER — CETIRIZINE HCL 10 MG
10 TABLET ORAL DAILY
Status: DISCONTINUED | OUTPATIENT
Start: 2021-11-02 | End: 2021-11-02 | Stop reason: HOSPADM

## 2021-11-01 RX ORDER — SODIUM CHLORIDE 0.9 % (FLUSH) 0.9 %
5-40 SYRINGE (ML) INJECTION EVERY 8 HOURS
Status: DISCONTINUED | OUTPATIENT
Start: 2021-11-01 | End: 2021-11-02 | Stop reason: HOSPADM

## 2021-11-01 RX ORDER — ATORVASTATIN CALCIUM 10 MG/1
20 TABLET, FILM COATED ORAL
Status: DISCONTINUED | OUTPATIENT
Start: 2021-11-01 | End: 2021-11-02 | Stop reason: HOSPADM

## 2021-11-01 RX ORDER — FUROSEMIDE 40 MG/1
40 TABLET ORAL DAILY
Status: DISCONTINUED | OUTPATIENT
Start: 2021-11-02 | End: 2021-11-02 | Stop reason: HOSPADM

## 2021-11-01 RX ORDER — ALBUTEROL SULFATE 90 UG/1
2 AEROSOL, METERED RESPIRATORY (INHALATION)
Status: DISCONTINUED | OUTPATIENT
Start: 2021-11-01 | End: 2021-11-02 | Stop reason: HOSPADM

## 2021-11-01 RX ORDER — PANTOPRAZOLE SODIUM 40 MG/1
40 TABLET, DELAYED RELEASE ORAL
Status: DISCONTINUED | OUTPATIENT
Start: 2021-11-02 | End: 2021-11-02 | Stop reason: HOSPADM

## 2021-11-01 RX ORDER — AMLODIPINE BESYLATE 5 MG/1
5 TABLET ORAL DAILY
Status: DISCONTINUED | OUTPATIENT
Start: 2021-11-02 | End: 2021-11-02 | Stop reason: HOSPADM

## 2021-11-01 RX ORDER — METOPROLOL SUCCINATE 25 MG/1
25 TABLET, EXTENDED RELEASE ORAL DAILY
Status: DISCONTINUED | OUTPATIENT
Start: 2021-11-02 | End: 2021-11-02 | Stop reason: HOSPADM

## 2021-11-01 RX ORDER — ONDANSETRON 4 MG/1
4 TABLET, ORALLY DISINTEGRATING ORAL
Status: DISCONTINUED | OUTPATIENT
Start: 2021-11-01 | End: 2021-11-02 | Stop reason: HOSPADM

## 2021-11-01 RX ORDER — MINOXIDIL 2.5 MG/1
2.5 TABLET ORAL DAILY
Status: DISCONTINUED | OUTPATIENT
Start: 2021-11-02 | End: 2021-11-02 | Stop reason: HOSPADM

## 2021-11-01 RX ORDER — ACETAMINOPHEN 325 MG/1
650 TABLET ORAL
Status: DISCONTINUED | OUTPATIENT
Start: 2021-11-01 | End: 2021-11-02 | Stop reason: HOSPADM

## 2021-11-01 RX ORDER — DEXTROSE 50 % IN WATER (D50W) INTRAVENOUS SYRINGE
25-50 AS NEEDED
Status: DISCONTINUED | OUTPATIENT
Start: 2021-11-01 | End: 2021-11-02 | Stop reason: HOSPADM

## 2021-11-01 RX ORDER — LANOLIN ALCOHOL/MO/W.PET/CERES
325 CREAM (GRAM) TOPICAL
Status: DISCONTINUED | OUTPATIENT
Start: 2021-11-02 | End: 2021-11-02 | Stop reason: HOSPADM

## 2021-11-01 RX ORDER — OMEPRAZOLE 40 MG/1
40 CAPSULE, DELAYED RELEASE ORAL DAILY
Status: DISCONTINUED | OUTPATIENT
Start: 2021-11-02 | End: 2021-11-01

## 2021-11-01 RX ORDER — ACETAMINOPHEN 650 MG/1
650 SUPPOSITORY RECTAL
Status: DISCONTINUED | OUTPATIENT
Start: 2021-11-01 | End: 2021-11-02 | Stop reason: HOSPADM

## 2021-11-01 RX ORDER — SODIUM CHLORIDE 0.9 % (FLUSH) 0.9 %
5-40 SYRINGE (ML) INJECTION AS NEEDED
Status: DISCONTINUED | OUTPATIENT
Start: 2021-11-01 | End: 2021-11-02 | Stop reason: HOSPADM

## 2021-11-01 RX ADMIN — ATORVASTATIN CALCIUM 20 MG: 10 TABLET, FILM COATED ORAL at 22:20

## 2021-11-01 RX ADMIN — APIXABAN 2.5 MG: 2.5 TABLET, FILM COATED ORAL at 22:20

## 2021-11-01 RX ADMIN — HYDRALAZINE HYDROCHLORIDE 100 MG: 25 TABLET, FILM COATED ORAL at 22:20

## 2021-11-01 RX ADMIN — Medication 10 ML: at 22:20

## 2021-11-01 NOTE — ASSESSMENT & PLAN NOTE
-chronic/appears baseline  -54/3.1  -continue home medications  -BMP in am, if any changes or increases will consult nephrologist

## 2021-11-01 NOTE — ED TRIAGE NOTES
Pt states he started with chest pain this morning.   Pt states the pain \"goes from my heart right down to my leg (left)

## 2021-11-01 NOTE — PROGRESS NOTES
11/1/2021 1:47 PM     Review of chart shows that patient is in the ED for complaint of chest pain call placed to family to follow up and check on patient. Message left introducing myself, the purpose of the call and giving my contact information. Requested that patient call back at their earliest convenience.

## 2021-11-01 NOTE — H&P
History and Physical    Subjective:     Jaison Kruger is a 78 y.o. male with a past medical history for congestive heart failure on chronic oxygen, chronic kidney disease stage IV, diabetes, DVT, hypocholesteremia, hypertension, and bladder cancer, patient presented to the ED today with a chief complaint of chest pain. Patient states the chest pain started yesterday evening, was a constant dull ache to his left side of his chest, patient reports that the pain radiated down to his left leg and where he also experienced numbness, rated pain 8/10, currently at this time the patient is pain-free. Patient denies shortness of breath, palpitations, nausea, vomiting, left arm pain, and back pain. In the ED H&H 8.2/26.8, glucose 166, BUN 54, creatinine 3.10, , CT of the abdomen negative for any acute findings, CT of the head negative for any acute changes, chest x-ray is nonspecific streaky opacities in the lung bases favoring minor atelectasis similar to prior exams, EKG sinus rhythm with a first-degree AV block otherwise normal when compared to EKG done on 1019/21 in the ED telemetry neuro was consulted due to patient complaint of left lower extremity numbness, at this time they recommended an MRI of the head within 24 hours but if that can be done repeat CT scan and get carotid ultrasound. Admit to telemetry. Patient assessed in the ED, at the bedside, patient is alert and oriented, there is no acute distress noted. Patient agrees to admission for a diagnosis of chest pain and paresthesia of left lower extremity, treatment to include cardiology consult, repeat CT of the head, carotid ultrasound, and trend troponins.     Past Medical History:   Diagnosis Date    Acid reflux     Arthritis     Bladder cancer (HCC)     Congestive heart failure (HCC)     Deep vein thrombosis (DVT) of proximal vein of both lower extremities ([facility] [address] 9/14/2020    Diabetes mellitus ([facility] [address]     GERD (gastroesophageal reflux disease)     Gout     High cholesterol     Hypertension     Kidney carcinoma, left (HCC)     Kidney disease     Pituitary mass (HCC)     Reflux esophagitis     Unspecified deficiency anemia       Past Surgical History:   Procedure Laterality Date    HX CYSTECTOMY  09    Dr. Marry George HX NEPHRECTOMY Left 2009    Nephroureterectomy by Dr. Whit Benitez HX OTHER SURGICAL      surgery on pituitary gland     Family History   Problem Relation Age of Onset    Heart Disease Father     Heart Disease Mother       Social History     Tobacco Use    Smoking status: Former Smoker     Packs/day: 0.50     Years: 40.00     Pack years: 20.00     Types: Cigarettes     Quit date: 1999     Years since quittin.8    Smokeless tobacco: Never Used   Substance Use Topics    Alcohol use: No       Prior to Admission medications    Medication Sig Start Date End Date Taking? Authorizing Provider   allopurinoL (ZYLOPRIM) 100 mg tablet TAKE 2 TABLETS BY MOUTH DAILY 10/29/21  Yes Ghazala Smith NP   minoxidiL (LONITEN) 2.5 mg tablet Take 1 Tablet by mouth daily. 10/22/21  Yes Bandar Mcneill MD   furosemide (LASIX) 40 mg tablet Take 1 Tablet by mouth daily. 10/21/21  Yes Bandar Mcneill MD   epoetin sadi (Epogen) 20,000 unit/mL injection Inject 20,000 units every 3 weeks subcutaneously 10/13/21  Yes Jennifer Fernandes MD   hydrALAZINE (APRESOLINE) 100 mg tablet Take 1 Tablet by mouth three (3) times daily for 30 days. 10/11/21 11/10/21 Yes Gaston Munson MD   loratadine (CLARITIN) 10 mg tablet Take 10 mg by mouth daily. 21  Yes Melba Grace MD   albuterol (PROVENTIL HFA, VENTOLIN HFA, PROAIR HFA) 90 mcg/actuation inhaler Take 2 Puffs by inhalation every four (4) hours as needed for Wheezing, Shortness of Breath or Respiratory Distress.  21  Yes Ghazala Smith NP   ferrous sulfate (IRON) 325 mg (65 mg iron) EC tablet Take 1 Tablet by mouth three (3) times daily (with meals). 9/22/21  Yes Pedro Pablo Soliz MD   simvastatin (ZOCOR) 40 mg tablet TAKE 1 TABLET BY MOUTH EVERY NIGHT 9/8/21  Yes Ghazala Smith NP   metoprolol succinate (TOPROL-XL) 25 mg XL tablet Take 1 Tablet by mouth daily. 7/13/21  Yes Melba Grace MD   amLODIPine (NORVASC) 5 mg tablet TAKE 2 TABLETS BY MOUTH ONCE A DAY 7/30/21  Yes Ghazala Smith NP   omeprazole (PRILOSEC) 40 mg capsule TAKE 1 CAPSULE BY MOUTH DAILY 6/3/21  Yes Ghazala Smith NP   polyethylene glycol (Miralax) 17 gram packet Take 1 Packet by mouth daily. 6/3/21  Yes Ghazala Smith NP   apixaban (Eliquis) 2.5 mg tablet Take 1 Tablet by mouth two (2) times a day. 6/3/21  Yes Ghazala Smith NP   glucose blood VI test strips (ASCENSIA AUTODISC VI, ONE TOUCH ULTRA TEST VI) strip Check blood glucose 3 times daily, give Accu chek Precious test strips 6/1/21  Yes Ghazala Smith NP   ergocalciferol (ERGOCALCIFEROL) 1,250 mcg (50,000 unit) capsule TAKE ONE CAPSULE BY MOUTH EVERY 7 DAYS 4/26/21  Yes Pedro Pablo Soliz MD   BD Ultra-Fine Mini Pen Needle 31 gauge x 3/16\" ndle USE TO INJECT TID 1/26/21  Yes Ghazala Smith NP   fluticasone propionate (FLONASE) 50 mcg/actuation nasal spray 1 [place] by Both Nostrils route daily as needed.    Yes Lesly, MD Melba     No Known Allergies       REVIEW OF SYSTEMS:       Total of 12 systems reviewed as follows:    Positive = Red  Constitutional: Negative for malaise/fatigue and weakness, negative for fever and chills   HENT: Negative for ear pain, headaches, negative for loss of sense of taste and smell   Eyes: Negative for blurred vision and double vision   Skin: Negative for itching, negative for open areas   Cardiovascular: Positive for chest pain, palpitations, negative for swelling   Respiratory: Negative for shortness or breath, negative for cough, negative for sputum production   Gastrointestinal: Negative for abdominal pain, constipation, nausea, vomiting, and diarrhea   Genitourinary: Negative for dysuria, frequency, and hematuria   Musculoskeletal: Negative for joint pain and myalgias   Neurological: Negative for dizziness, seizures, and headaches. Left leg numbness. Psychiatric: Negative for depression and anxiousness       Objective:   VITALS:    Visit Vitals  BP (!) 148/70   Pulse 61   Temp 98.4 °F (36.9 °C)   Resp 18   Ht 5' 8\" (1.727 m)   Wt 93 kg (205 lb)   SpO2 100%   BMI 31.17 kg/m²       PHYSICAL EXAM:  Positive = Red  Constitutional: Alert and oriented x 3 and no noted acute distress appears to be stated age. HENT: Atraumatic, nose midline, oropharynx clear ad moist, trachea midline, no supraclavicular   Eyes: Conjunctiva normal and pupils equal   Skin: Dry, intact, warm, and dry   Cardiovascular: Regular rate and rhythm, normal heart sounds, no murmurs, pulses palpable, no noted edema   Respiratory: Lungs clear throughout, no wheezes, rales, or rhonchi, effort normal   Gastrointestinal: Appearance normal, bowel sounds are normal, bowl soft and non-tender   Genitourinary: Deferred   Musculoskeletal: Normal ROM   Neurological: Alert and oriented x 3, awake. No facial droop. No slurred speech. Hand grasps equal. Strength 5/5 in all extremities.  Intact sensations   Psychiatric: Affect normal, Answers questions appropriately     __________________________________________________  Care Plan discussed with:    Comments   Patient X    Family      RN     Care Manager                    Consultant:      _______________________________________________________________________  Expected  Disposition:   Home with Family X   HH/PT/OT/RN    SNF/LTC    SHERLYN    ________________________________________________________________________    Labs:  Recent Results (from the past 24 hour(s))   EKG, 12 LEAD, INITIAL    Collection Time: 11/01/21  1:32 PM   Result Value Ref Range    Ventricular Rate 75 BPM    Atrial Rate 75 BPM    P-R Interval 286 ms    QRS Duration 112 ms    Q-T Interval 410 ms    QTC Calculation (Bezet) 458 ms    Calculated P Axis -39 degrees    Calculated R Axis -16 degrees    Calculated T Axis -8 degrees    Diagnosis       Sinus Rhythm with 1st degree A-V block  Borderline Non-specific intra-ventricular conduction delay  Otherwise normal ECG  When compared with ECG of 10/19/21  No significant change was found      Confirmed by Carla Barraza ([id number]) on 11/1/2021 3:39:35 PM     CBC WITH AUTOMATED DIFF    Collection Time: 11/01/21  1:45 PM   Result Value Ref Range    WBC 5.7 4.6 - 13.2 K/uL    RBC 3.14 (L) 4.35 - 5.65 M/uL    HGB 8.2 (L) 13.0 - 16.0 g/dL    HCT 26.8 (L) 36.0 - 48.0 %    MCV 85.4 78.0 - 100.0 FL    MCH 26.1 24.0 - 34.0 PG    MCHC 30.6 (L) 31.0 - 37.0 g/dL    RDW 17.8 (H) 11.6 - 14.5 %    PLATELET 124 460 - 909 K/uL    MPV 8.9 (L) 9.2 - 11.8 FL    NRBC 0.0 0.0  WBC    ABSOLUTE NRBC 0.00 0.00 - 0.01 K/uL    NEUTROPHILS 76 (H) 40 - 73 %    LYMPHOCYTES 14 (L) 21 - 52 %    MONOCYTES 8 3 - 10 %    EOSINOPHILS 1 0 - 5 %    BASOPHILS 1 0 - 2 %    IMMATURE GRANULOCYTES 0 0 - 0.5 %    ABS. NEUTROPHILS 4.3 1.8 - 8.0 K/UL    ABS. LYMPHOCYTES 0.8 (L) 0.9 - 3.6 K/UL    ABS. MONOCYTES 0.4 0.05 - 1.2 K/UL    ABS. EOSINOPHILS 0.1 0.0 - 0.4 K/UL    ABS. BASOPHILS 0.0 0.0 - 0.1 K/UL    ABS. IMM.  GRANS. 0.0 0.00 - 0.04 K/UL    DF AUTOMATED     PROTHROMBIN TIME + INR    Collection Time: 11/01/21  1:45 PM   Result Value Ref Range    Prothrombin time 13.8 11.5 - 15.2 sec    INR 1.1 0.8 - 1.2     METABOLIC PANEL, BASIC    Collection Time: 11/01/21  1:45 PM   Result Value Ref Range    Sodium 138 135 - 145 mmol/L    Potassium 4.3 3.2 - 5.1 mmol/L    Chloride 102 94 - 111 mmol/L    CO2 21 21 - 33 mmol/L    Anion gap 15 mmol/L    Glucose 166 (H) 70 - 110 mg/dL    BUN 54 (H) 9 - 21 mg/dL    Creatinine 3.10 (H) 0.8 - 1.50 mg/dL    BUN/Creatinine ratio 17      GFR est AA 24 ml/min/1.73m2    GFR est non-AA 20 ml/min/1.73m2    Calcium 8.8 8.5 - 10.5 mg/dL   HEPATIC FUNCTION PANEL Collection Time: 11/01/21  1:45 PM   Result Value Ref Range    Protein, total 7.6 6.1 - 8.4 g/dL    Albumin 3.7 3.5 - 4.7 g/dL    Globulin 3.9 g/dL    A-G Ratio 0.9      Bilirubin, total 0.5 0.2 - 1.0 mg/dL    Bilirubin, direct <0.1 0.0 - 0.3 mg/dL    Alk. phosphatase 57 38 - 126 U/L    AST (SGOT) 18 17 - 74 U/L    ALT (SGPT) 17 3 - 72 U/L   TROPONIN I    Collection Time: 11/01/21  1:45 PM   Result Value Ref Range    Troponin-I, Qt. 0.02 0.02 - 0.05 ng/mL   LIPASE    Collection Time: 11/01/21  1:45 PM   Result Value Ref Range    Lipase 52 10 - 57 U/L   LACTIC ACID    Collection Time: 11/01/21  1:45 PM   Result Value Ref Range    Lactic acid 0.9 0.5 - 2.0 mmol/L   BNP    Collection Time: 11/01/21  2:00 PM   Result Value Ref Range     (H) 0 - 100 pg/mL       Imaging:  CT HEAD WO CONT    Result Date: 11/1/2021  EXAM: CT head INDICATION: Left-sided weakness COMPARISON: CT head performed 8/12/2021 TECHNIQUE: Axial CT imaging of the head was performed without intravenous contrast. Standard multiplanar coronal and sagittal reformatted images were obtained and are included in interpretation. One or more dose reduction techniques were used on this CT: automated exposure control, adjustment of the mAs and/or kVp according to patient size, and iterative reconstruction techniques. The specific techniques used on this CT exam have been documented in the patient's electronic medical record. Digital Imaging and Communications in Medicine (DICOM) format image data are available to nonaffiliated external healthcare facilities or entities on a secure, media free, reciprocally searchable basis with patient authorization for at least a 12-month period after this study. _______________ FINDINGS: BRAIN AND POSTERIOR FOSSA: The sulci, folia, ventricles and basal cisterns are within normal limits for the patient?s age. There is no intracranial hemorrhage, mass effect, or midline shift.  The gray-white matter differentiation is within normal limits. EXTRA-AXIAL SPACES AND MENINGES: There are no abnormal extra-axial fluid collections. CALVARIUM: Intact. SINUSES: Chronic appearing mucosal opacification left maxillary sinus, partially visualized. Remaining imaged paranasal sinuses and mastoid air cells are clear. OTHER: None. _______________     1. No acute intracranial abnormality. 2. Chronic left maxillary sinus disease, unchanged as visualized. CT ABD PELV WO CONT    Result Date: 11/1/2021  EXAM: CT of the abdomen and pelvis without intravenous contrast. INDICATION: Abdominal pain. COMPARISON:  CT April 12, 2019. DOSE REDUCTION AND DICOM INFORMATION: One or more dose reduction techniques were used on this CT: automated exposure control, adjustment of the mAs and/or kVp according to patient size, and iterative reconstruction techniques. The specific techniques used on this CT exam have been documented in the patient's electronic medical record. Digital Imaging and Communications in Medicine (DICOM) format image data are available to nonaffiliated external healthcare facilities or entities on a secure, media free, reciprocally searchable basis with patient authorization for at least a 12-month period after this study. _______________ FINDINGS:      IMAGE QUALITY:  Imaging of the solid organs, vasculature, and the bowel is intrinsically limited by noncontrast technique. CHEST BASE: Multichamber cardiac enlargement. Mild bibasilar atelectasis. LIVER: Unremarkable. GALLBLADDER: Small amount of dependent stones or sludge. No evidence of cholecystitis. BILE DUCTS: Unremarkable. PANCREAS: Unremarkable. SPLEEN: Unremarkable. GASTROINTESTINAL TRACT AND PERITONEAL CAVITY: Right lower quadrant loop ileostomy. Lower esophagus:  Unremarkable. Hiatal hernia:  No.           Diverticulosis:  Moderate.            Ascites:  No.           Bowel obstruction:  No.           Pneumoperitoneum:  No. Appendix/region:  Unremarkable. ADRENALS:           Right:  Unremarkable. Left:  Unremarkable. KIDNEYS, URETERS, BLADDER:           Nonobstructive calculi:  None detected. Right kidney/ureter:  Unremarkable. Left kidney/ureter:  Unremarkable. Bladder:  Surgically absent. VASCULATURE: Aortoiliac atherosclerosis without AAA. LYMPH NODES: Unremarkable. REPRODUCTIVE ORGANS: Unremarkable. SUPERFICIAL SOFT TISSUES:  bilateral gynecomastia. BONES:  No aggressive-appearing bone lesions are detected. _______________     No acute findings. Chronic ancillary findings, as described. _______________      XR CHEST PORT    Result Date: 11/1/2021  EXAM: One-view chest CLINICAL HISTORY: chest pain , COMPARISON: Prior chest x-rays FINDINGS: Frontal view of the chest demonstrate streaky opacities in both lung bases. Otherwise clear Cardiac silhouette is normal in size and contour. No acute bony or soft tissue abnormality. Nonspecific streaky opacities in the lung bases favoring minor atelectasis and/or scarring similar to multiple prior examinations. Otherwise clear. Assessment & Plan:       Chest pain  - Differential includes GERD/gastritis flare-up, and ACS   - Troponins negative x 1, will Trend serial cardiac enzymes. - EKG Sinus Rhythm with 1st degree A-V block  Borderline Non-specific intra-ventricular conduction delay Otherwise normal ECG  - cardiology consulted  - Chest x-ray showed no acute cardiopulmonary infiltrates. - Cardiac monitoring  - CBC and BMP    Left Leg Paresthesia  -occurred with chest pain  -tele-neuro consulted  -CT Head: no acute changes  -no MRI tomorrow, repeat CT head tomorrow and  Bilateral caroitid dopplers ordered  -CT spine, lumbar, and thoracic in the am    CHF (congestive heart failure) (HCC)  -chronic/stable  -continue scheduled Lasix  -Recent echocardiogram (10/7/21) shows LVEF is 61%.  Biplane method used to measure ejection fraction. Normal cavity size and systolic function (ejection fraction normal). Mild concentric hypertrophy. Left ventricular diastolic dysfunction. Type 2 diabetes mellitus with stage 4 chronic kidney disease, with long-term current use of insulin (HCC)  -QAC & HS accuchecks  -holding Glipizide  -continue sliding scale    Primary hypertension  -chronic/mildly elevated  -continue Amlodipine, Hydralazine, Minoxidil, and Metoprolol  -monitor BP closely    HLD (hyperlipidemia)  -history of left nephrectomy  -urostomy in place  -supportive care    Acute deep vein thrombosis (DVT) of distal vein of both lower extremities (HCC)  -continue Eliquis    Chronic kidney disease, stage 4 (severe) (HCC)  -chronic/appears baseline  -54/3.1  -continue home medications  -BMP in am, if any changes or increases will consult nephrologist    TOTAL TIME:  45 Minutes    Code Status: Full    Prophylaxis:  eLiquis    Electronically Signed : Pia Caro, [facility] Medicine [facility]    Please note that this dictation was completed with [employer], the computer voice recognition software. Quite often unanticipated grammatical, syntax, homophones, and other interpretive errors are inadvertently transcribed by the computer software. Please disregard these errors. Please excuse any errors that have escaped final proofreading. Thank you.

## 2021-11-01 NOTE — ASSESSMENT & PLAN NOTE
-chronic/mildly elevated  -continue Amlodipine, Hydralazine, Minoxidil, and Metoprolol  -monitor BP closely

## 2021-11-01 NOTE — ASSESSMENT & PLAN NOTE
-chronic/stable  -continue scheduled Lasix  -Recent echocardiogram (10/7/21) shows LVEF is 61%. Biplane method used to measure ejection fraction. Normal cavity size and systolic function (ejection fraction normal). Mild concentric hypertrophy. Left ventricular diastolic dysfunction.

## 2021-11-01 NOTE — ED PROVIDER NOTES
EMERGENCY DEPARTMENT HISTORY AND PHYSICAL EXAM      Date: 11/1/2021  Patient Name: Janna Mancilla      History of Presenting Illness     Chief Complaint   Patient presents with    Chest Pain       History Provided By: Patient    HPI: Janna Mancilla, 78 y.o. 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 Patient states that yesterday evening he started feeling some weakness of his left leg radiating to his left chest where he had a dull chest pressure. He states that it lasted several minutes and then went away. He took a Tylenol. Dates that the pain has bothered him off and on all night and he came to the emergency room today. No history of myocardial infarction and no history of similar pain. Patient is a poor historian. There are no other complaints, changes, or physical findings at this time.     PCP: Solange Mac NP    Current Facility-Administered Medications   Medication Dose Route Frequency Provider Last Rate Last Admin    sodium chloride (NS) flush 5-40 mL  5-40 mL IntraVENous Q8H Kati Odonnell S, ACNP        sodium chloride (NS) flush 5-40 mL  5-40 mL IntraVENous PRN Rigoberto Odonnell, RAFP        acetaminophen (TYLENOL) tablet 650 mg  650 mg Oral Q6H PRN Rigoberto Odonnell ACNP        Or    acetaminophen (TYLENOL) suppository 650 mg  650 mg Rectal Q6H PRN Rigoberto Odonnell, ACNP        polyethylene glycol (MIRALAX) packet 17 g  17 g Oral DAILY PRN Rigoberto Odonnell ACNP        ondansetron (ZOFRAN ODT) tablet 4 mg  4 mg Oral Q8H PRN Yair Odonnell SRAFP        Or    ondansetron (ZOFRAN) injection 4 mg  4 mg IntraVENous Q6H PRN GABBY Majano         Current Outpatient Medications   Medication Sig Dispense Refill    allopurinoL (ZYLOPRIM) 100 mg tablet TAKE 2 TABLETS BY MOUTH DAILY 60 Tablet 3    minoxidiL (LONITEN) 2.5 mg tablet Take 1 Tablet by mouth daily. 30 Tablet 0    furosemide (LASIX) 40 mg tablet Take 1 Tablet by mouth daily. 30 Tablet 0    epoetin sadi (Epogen) 20,000 unit/mL injection Inject 20,000 units every 3 weeks subcutaneously 1 Each 3    hydrALAZINE (APRESOLINE) 100 mg tablet Take 1 Tablet by mouth three (3) times daily for 30 days. 90 Tablet 0    loratadine (CLARITIN) 10 mg tablet Take 10 mg by mouth daily.  albuterol (PROVENTIL HFA, VENTOLIN HFA, PROAIR HFA) 90 mcg/actuation inhaler Take 2 Puffs by inhalation every four (4) hours as needed for Wheezing, Shortness of Breath or Respiratory Distress. 1 Each 1    ferrous sulfate (IRON) 325 mg (65 mg iron) EC tablet Take 1 Tablet by mouth three (3) times daily (with meals). 90 Tablet 2    simvastatin (ZOCOR) 40 mg tablet TAKE 1 TABLET BY MOUTH EVERY NIGHT 90 Tablet 1    metoprolol succinate (TOPROL-XL) 25 mg XL tablet Take 1 Tablet by mouth daily.  amLODIPine (NORVASC) 5 mg tablet TAKE 2 TABLETS BY MOUTH ONCE A  Tablet 0    omeprazole (PRILOSEC) 40 mg capsule TAKE 1 CAPSULE BY MOUTH DAILY 90 Capsule 1    polyethylene glycol (Miralax) 17 gram packet Take 1 Packet by mouth daily. 10 Packet 0    apixaban (Eliquis) 2.5 mg tablet Take 1 Tablet by mouth two (2) times a day. 180 Tablet 1    glucose blood VI test strips (ASCENSIA AUTODISC VI, ONE TOUCH ULTRA TEST VI) strip Check blood glucose 3 times daily, give Accu chek Precious test strips 300 Strip 3    ergocalciferol (ERGOCALCIFEROL) 1,250 mcg (50,000 unit) capsule TAKE ONE CAPSULE BY MOUTH EVERY 7 DAYS 16 Cap 1    BD Ultra-Fine Mini Pen Needle 31 gauge x 3/16\" ndle USE TO INJECT  Pen Needle 3    fluticasone propionate (FLONASE) 50 mcg/actuation nasal spray 1 [place] by Both Nostrils route daily as needed.          Past History     Past Medical History:  Past Medical History:   Diagnosis Date  Acid reflux     Arthritis     Bladder cancer (HCC)     Congestive heart failure (HCC)     Deep vein thrombosis (DVT) of proximal vein of both lower extremities ([facility] [address] 2020    Diabetes mellitus ([facility] [address]     GERD (gastroesophageal reflux disease)     Gout     High cholesterol     Hypertension     Kidney carcinoma, left (HCC)     Kidney disease     Pituitary mass ([facility] [address]     Reflux esophagitis     Unspecified deficiency anemia        Past Surgical History:  Past Surgical History:   Procedure Laterality Date    HX CYSTECTOMY  09    Dr. Rosario Hoskins HX NEPHRECTOMY Left 2009    Nephroureterectomy by Dr. Isamar Epstein HX OTHER SURGICAL      surgery on pituitary gland       Family History:  Family History   Problem Relation Age of Onset    Heart Disease Father     Heart Disease Mother        Social History:  Social History     Tobacco Use    Smoking status: Former Smoker     Packs/day: 0.50     Years: 40.00     Pack years: 20.00     Types: Cigarettes     Quit date: 1999     Years since quittin.8    Smokeless tobacco: Never Used   Vaping Use    Vaping Use: Never used   Substance Use Topics    Alcohol use: No    Drug use: No       Allergies:  No Known Allergies      Review of Systems     Review of Systems   Constitutional: Negative for chills and fever. HENT: Negative for trouble swallowing. Eyes: Negative for redness. Respiratory: Negative for cough and shortness of breath. Gastrointestinal: Negative for abdominal pain, diarrhea, nausea and vomiting. Genitourinary: Negative for dysuria and hematuria. Musculoskeletal: Negative for arthralgias and myalgias. Skin: Negative for rash. Neurological: Negative for weakness and headaches. Psychiatric/Behavioral: Negative for confusion. Physical Exam     Physical Exam  Vitals and nursing note reviewed. Constitutional:       Appearance: He is well-developed. He is not diaphoretic.    HENT:      Head: Normocephalic. Mouth/Throat:      Pharynx: No oropharyngeal exudate. Eyes:      Pupils: Pupils are equal, round, and reactive to light. Cardiovascular:      Rate and Rhythm: Normal rate and regular rhythm. Heart sounds: Normal heart sounds. No murmur heard. No friction rub. No gallop. Pulmonary:      Effort: Pulmonary effort is normal. No respiratory distress. Breath sounds: No wheezing or rales. Chest:      Chest wall: No tenderness. Abdominal:      General: Bowel sounds are normal. There is no distension. Palpations: Abdomen is soft. Tenderness: There is no abdominal tenderness. There is no guarding or rebound. Musculoskeletal:         General: No tenderness. Normal range of motion. Cervical back: Normal range of motion and neck supple. Skin:     General: Skin is warm and dry. Findings: No erythema or rash. Neurological:      Mental Status: He is alert and oriented to person, place, and time. Motor: No weakness. Comments: No leg weakness noted, normal sensation to touch.          Lab and Diagnostic Study Results     Labs -     Recent Results (from the past 12 hour(s))   EKG, 12 LEAD, INITIAL    Collection Time: 11/01/21  1:32 PM   Result Value Ref Range    Ventricular Rate 75 BPM    Atrial Rate 75 BPM    P-R Interval 286 ms    QRS Duration 112 ms    Q-T Interval 410 ms    QTC Calculation (Bezet) 458 ms    Calculated P Axis -39 degrees    Calculated R Axis -16 degrees    Calculated T Axis -8 degrees    Diagnosis       Sinus Rhythm with 1st degree A-V block  Borderline Non-specific intra-ventricular conduction delay  Otherwise normal ECG  When compared with ECG of 10/19/21  No significant change was found      Confirmed by Shannon Mackay ([id number]) on 11/1/2021 3:39:35 PM     CBC WITH AUTOMATED DIFF    Collection Time: 11/01/21  1:45 PM   Result Value Ref Range    WBC 5.7 4.6 - 13.2 K/uL    RBC 3.14 (L) 4.35 - 5.65 M/uL    HGB 8.2 (L) 13.0 - 16.0 g/dL HCT 26.8 (L) 36.0 - 48.0 %    MCV 85.4 78.0 - 100.0 FL    MCH 26.1 24.0 - 34.0 PG    MCHC 30.6 (L) 31.0 - 37.0 g/dL    RDW 17.8 (H) 11.6 - 14.5 %    PLATELET 279 615 - 290 K/uL    MPV 8.9 (L) 9.2 - 11.8 FL    NRBC 0.0 0.0  WBC    ABSOLUTE NRBC 0.00 0.00 - 0.01 K/uL    NEUTROPHILS 76 (H) 40 - 73 %    LYMPHOCYTES 14 (L) 21 - 52 %    MONOCYTES 8 3 - 10 %    EOSINOPHILS 1 0 - 5 %    BASOPHILS 1 0 - 2 %    IMMATURE GRANULOCYTES 0 0 - 0.5 %    ABS. NEUTROPHILS 4.3 1.8 - 8.0 K/UL    ABS. LYMPHOCYTES 0.8 (L) 0.9 - 3.6 K/UL    ABS. MONOCYTES 0.4 0.05 - 1.2 K/UL    ABS. EOSINOPHILS 0.1 0.0 - 0.4 K/UL    ABS. BASOPHILS 0.0 0.0 - 0.1 K/UL    ABS. IMM. GRANS. 0.0 0.00 - 0.04 K/UL    DF AUTOMATED     PROTHROMBIN TIME + INR    Collection Time: 11/01/21  1:45 PM   Result Value Ref Range    Prothrombin time 13.8 11.5 - 15.2 sec    INR 1.1 0.8 - 1.2     METABOLIC PANEL, BASIC    Collection Time: 11/01/21  1:45 PM   Result Value Ref Range    Sodium 138 135 - 145 mmol/L    Potassium 4.3 3.2 - 5.1 mmol/L    Chloride 102 94 - 111 mmol/L    CO2 21 21 - 33 mmol/L    Anion gap 15 mmol/L    Glucose 166 (H) 70 - 110 mg/dL    BUN 54 (H) 9 - 21 mg/dL    Creatinine 3.10 (H) 0.8 - 1.50 mg/dL    BUN/Creatinine ratio 17      GFR est AA 24 ml/min/1.73m2    GFR est non-AA 20 ml/min/1.73m2    Calcium 8.8 8.5 - 10.5 mg/dL   HEPATIC FUNCTION PANEL    Collection Time: 11/01/21  1:45 PM   Result Value Ref Range    Protein, total 7.6 6.1 - 8.4 g/dL    Albumin 3.7 3.5 - 4.7 g/dL    Globulin 3.9 g/dL    A-G Ratio 0.9      Bilirubin, total 0.5 0.2 - 1.0 mg/dL    Bilirubin, direct <0.1 0.0 - 0.3 mg/dL    Alk.  phosphatase 57 38 - 126 U/L    AST (SGOT) 18 17 - 74 U/L    ALT (SGPT) 17 3 - 72 U/L   TROPONIN I    Collection Time: 11/01/21  1:45 PM   Result Value Ref Range    Troponin-I, Qt. 0.02 0.02 - 0.05 ng/mL   LIPASE    Collection Time: 11/01/21  1:45 PM   Result Value Ref Range    Lipase 52 10 - 57 U/L   LACTIC ACID    Collection Time: 11/01/21  1:45 PM   Result Value Ref Range    Lactic acid 0.9 0.5 - 2.0 mmol/L   BNP    Collection Time: 11/01/21  2:00 PM   Result Value Ref Range     (H) 0 - 100 pg/mL       Radiologic Studies -   [unfilled]  CT Results  (Last 48 hours)               11/01/21 1628  CT HEAD WO CONT Final result    Impression:          1. No acute intracranial abnormality. 2. Chronic left maxillary sinus disease, unchanged as visualized. Narrative:  EXAM: CT head       INDICATION: Left-sided weakness       COMPARISON: CT head performed 8/12/2021       TECHNIQUE: Axial CT imaging of the head was performed without intravenous   contrast. Standard multiplanar coronal and sagittal reformatted images were   obtained and are included in interpretation. One or more dose reduction techniques were used on this CT: automated exposure   control, adjustment of the mAs and/or kVp according to patient size, and   iterative reconstruction techniques. The specific techniques used on this CT   exam have been documented in the patient's electronic medical record. Digital   Imaging and Communications in Medicine (DICOM) format image data are available   to nonaffiliated external healthcare facilities or entities on a secure, media   free, reciprocally searchable basis with patient authorization for at least a   12-month period after this study. _______________       FINDINGS:       BRAIN AND POSTERIOR FOSSA: The sulci, folia, ventricles and basal cisterns are   within normal limits for the patient?s age. There is no intracranial hemorrhage,   mass effect, or midline shift. The gray-white matter differentiation is within   normal limits. EXTRA-AXIAL SPACES AND MENINGES: There are no abnormal extra-axial fluid   collections. CALVARIUM: Intact. SINUSES: Chronic appearing mucosal opacification left maxillary sinus, partially   visualized. Remaining imaged paranasal sinuses and mastoid air cells are clear.        OTHER: None.       _______________           11/01/21 1628  CT ABD PELV WO CONT Final result    Impression:      No acute findings. Chronic ancillary findings, as described. _______________                               Narrative:  EXAM: CT of the abdomen and pelvis without intravenous contrast.       INDICATION: Abdominal pain. COMPARISON:  CT April 12, 2019. DOSE REDUCTION AND DICOM INFORMATION: One or more dose reduction techniques were   used on this CT: automated exposure control, adjustment of the mAs and/or kVp   according to patient size, and iterative reconstruction techniques. The   specific techniques used on this CT exam have been documented in the patient's   electronic medical record. Digital Imaging and Communications in Medicine   (DICOM) format image data are available to nonaffiliated external healthcare   facilities or entities on a secure, media free, reciprocally searchable basis   with patient authorization for at least a 12-month period after this study. _______________       FINDINGS:            IMAGE QUALITY:  Imaging of the solid organs, vasculature, and the bowel is   intrinsically limited by noncontrast technique. CHEST BASE: Multichamber cardiac enlargement. Mild bibasilar atelectasis. LIVER: Unremarkable. GALLBLADDER: Small amount of dependent stones or sludge. No evidence of   cholecystitis. BILE DUCTS: Unremarkable. PANCREAS: Unremarkable. SPLEEN: Unremarkable. GASTROINTESTINAL TRACT AND PERITONEAL CAVITY: Right lower quadrant loop   ileostomy. Lower esophagus:  Unremarkable. Hiatal hernia:  No.             Diverticulosis:  Moderate. Ascites:  No.             Bowel obstruction:  No.             Pneumoperitoneum:  No.             Appendix/region:  Unremarkable. ADRENALS:                  Right:  Unremarkable.                  Left: Unremarkable. KIDNEYS, URETERS, BLADDER:                 Nonobstructive calculi:  None detected. Right kidney/ureter:  Unremarkable. Left kidney/ureter:  Unremarkable. Bladder:  Surgically absent. VASCULATURE: Aortoiliac atherosclerosis without AAA. LYMPH NODES: Unremarkable. REPRODUCTIVE ORGANS: Unremarkable. SUPERFICIAL SOFT TISSUES:  bilateral gynecomastia. BONES:  No aggressive-appearing bone lesions are detected.       _______________               CXR Results  (Last 48 hours)               11/01/21 1408  XR CHEST PORT Final result    Impression:      Nonspecific streaky opacities in the lung bases favoring minor atelectasis   and/or scarring similar to multiple prior examinations. Otherwise clear. Narrative:  EXAM: One-view chest       CLINICAL HISTORY: chest pain ,       COMPARISON: Prior chest x-rays       FINDINGS:       Frontal view of the chest demonstrate streaky opacities in both lung bases. Otherwise clear Cardiac silhouette is normal in size and contour. No acute bony   or soft tissue abnormality. Medical Decision Making and ED Course   - I am the first and primary provider for this patient AND AM THE PRIMARY PROVIDER OF RECORD. - I reviewed the vital signs, available nursing notes, past medical history, past surgical history, family history and social history. - Initial assessment performed. The patients presenting problems have been discussed, and the staff are in agreement with the care plan formulated and outlined with them. I have encouraged them to ask questions as they arise throughout their visit. Vital Signs-Reviewed the patient's vital signs.     Patient Vitals for the past 12 hrs:   Temp Pulse Resp BP SpO2   11/01/21 1802 -- 61 18 (!) 148/70 100 %   11/01/21 1744 -- 62 18 (!) 146/65 100 %   11/01/21 1629 -- 70 18 (!) 145/62 100 % 11/01/21 1514 -- 63 18 133/62 98 %   11/01/21 1435 -- 67 18 (!) 146/65 98 %   11/01/21 1414 -- 65 18 (!) 146/65 98 %   11/01/21 1339 98.4 °F (36.9 °C) 74 20 (!) 154/71 99 %       EKG interpretation: (Preliminary): Performed at 1332  Rhythm: normal sinus rhythm; and regular . Rate (approx.): 75; Axis: normal; CT interval: prolonged; QRS interval: normal ; ST/T wave: non-specific changes; Other findings: QTc 485. Q in III; Flat T in aVF; V1. No acute ST changes. Records Reviewed: Nursing Notes    ED Course:   Dissection, coronary syndrome, AAA, metastasis         Seen by teleneurology, no clear CVA. Will admit for further evaluation. No evidence of AAA or aortic dissection        Consultations:       Consultations:       Procedures and Critical Care             Disposition     Disposition:       Diagnosis     Clinical Impression:   1. Chest pain, unspecified type        Attestations:    Fidel Rivera MD    Please note that this dictation was completed with TimePad, the computer voice recognition software. Quite often unanticipated grammatical, syntax, homophones, and other interpretive errors are inadvertently transcribed by the computer software. Please disregard these errors. Please excuse any errors that have escaped final proofreading. Thank you.

## 2021-11-01 NOTE — TELEPHONE ENCOUNTER
Patient's niece called to advise the patient was not feeling well. Complaining of SOB from time to time, weakness, chest pains (discomfort). Advised caller that the patient's provider was out of the office and due patient's hx it was recommended that he return to the ER for evaluation and or treatment.   She verbalized understanding

## 2021-11-01 NOTE — ED NOTES
Pt now telling MD that pain started yesterday, that pain is intermittent    Pt took tylenol yesterday

## 2021-11-01 NOTE — ASSESSMENT & PLAN NOTE
- Differential includes GERD/gastritis flare-up, and ACS   - Troponins negative x 1, will Trend serial cardiac enzymes. - EKG Sinus Rhythm with 1st degree A-V block  Borderline Non-specific intra-ventricular conduction delay Otherwise normal ECG  - cardiology consulted  - Chest x-ray showed no acute cardiopulmonary infiltrates.     - Cardiac monitoring  - CBC and BMP

## 2021-11-02 ENCOUNTER — APPOINTMENT (OUTPATIENT)
Dept: VASCULAR SURGERY | Age: 76
End: 2021-11-02
Attending: NURSE PRACTITIONER
Payer: MEDICARE

## 2021-11-02 ENCOUNTER — APPOINTMENT (OUTPATIENT)
Dept: CT IMAGING | Age: 76
End: 2021-11-02
Attending: NURSE PRACTITIONER
Payer: MEDICARE

## 2021-11-02 VITALS
TEMPERATURE: 97.2 F | DIASTOLIC BLOOD PRESSURE: 64 MMHG | RESPIRATION RATE: 18 BRPM | HEIGHT: 68 IN | WEIGHT: 196.4 LBS | HEART RATE: 64 BPM | BODY MASS INDEX: 29.77 KG/M2 | OXYGEN SATURATION: 97 % | SYSTOLIC BLOOD PRESSURE: 134 MMHG

## 2021-11-02 LAB
ANION GAP SERPL CALC-SCNC: 9 MMOL/L
BUN SERPL-MCNC: 55 MG/DL (ref 9–21)
BUN/CREAT SERPL: 17
CA-I BLD-MCNC: 8.2 MG/DL (ref 8.5–10.5)
CHLORIDE SERPL-SCNC: 105 MMOL/L (ref 94–111)
CO2 SERPL-SCNC: 26 MMOL/L (ref 21–33)
CREAT SERPL-MCNC: 3.2 MG/DL (ref 0.8–1.5)
ERYTHROCYTE [DISTWIDTH] IN BLOOD BY AUTOMATED COUNT: 17.9 % (ref 11.6–14.5)
GLUCOSE BLD STRIP.AUTO-MCNC: 144 MG/DL (ref 70–110)
GLUCOSE BLD STRIP.AUTO-MCNC: 184 MG/DL (ref 70–110)
GLUCOSE BLD STRIP.AUTO-MCNC: 93 MG/DL (ref 70–110)
GLUCOSE SERPL-MCNC: 143 MG/DL (ref 70–110)
HCT VFR BLD AUTO: 25.6 % (ref 36–48)
HGB BLD-MCNC: 7.8 G/DL (ref 13–16)
LEFT CCA DIST DIAS: 17.4 CM/S
LEFT CCA DIST SYS: 104 CM/S
LEFT CCA MID DIAS: 22.4 CM/S
LEFT CCA MID SYS: 116 CM/S
LEFT CCA PROX DIAS: 19.3 CM/S
LEFT CCA PROX SYS: 116 CM/S
LEFT ECA SYS: 137 CM/S
LEFT ICA DIST DIAS: 25.2 CM/S
LEFT ICA DIST SYS: 81.1 CM/S
LEFT ICA MID DIAS: 27.8 CM/S
LEFT ICA MID SYS: 98.6 CM/S
LEFT ICA PROX DIAS: 13.7 CM/S
LEFT ICA PROX SYS: 69.6 CM/S
LEFT ICA/CCA SYS: 0.95
LEFT VERTEBRAL DIAS: 19.2 CM/S
LEFT VERTEBRAL SYS: 67.5 CM/S
MAGNESIUM SERPL-MCNC: 1.8 MG/DL (ref 1.7–2.8)
MCH RBC QN AUTO: 26.1 PG (ref 24–34)
MCHC RBC AUTO-ENTMCNC: 30.5 G/DL (ref 31–37)
MCV RBC AUTO: 85.6 FL (ref 78–100)
NRBC # BLD: 0 K/UL (ref 0–0.01)
NRBC BLD-RTO: 0 PER 100 WBC
PERFORMED BY, TECHID: ABNORMAL
PERFORMED BY, TECHID: ABNORMAL
PERFORMED BY, TECHID: NORMAL
PLATELET # BLD AUTO: 294 K/UL (ref 135–420)
PMV BLD AUTO: 9.2 FL (ref 9.2–11.8)
POTASSIUM SERPL-SCNC: 3.8 MMOL/L (ref 3.2–5.1)
RBC # BLD AUTO: 2.99 M/UL (ref 4.35–5.65)
RIGHT CCA DIST DIAS: 21 CM/S
RIGHT CCA DIST SYS: 116 CM/S
RIGHT CCA MID DIAS: 18.2 CM/S
RIGHT CCA MID SYS: 98.1 CM/S
RIGHT CCA PROX DIAS: 20.8 CM/S
RIGHT CCA PROX SYS: 173 CM/S
RIGHT ECA SYS: 149 CM/S
RIGHT ICA DIST DIAS: 29.4 CM/S
RIGHT ICA DIST SYS: 111 CM/S
RIGHT ICA MID DIAS: 20 CM/S
RIGHT ICA MID SYS: 110 CM/S
RIGHT ICA PROX DIAS: 21.9 CM/S
RIGHT ICA PROX SYS: 160 CM/S
RIGHT ICA/CCA SYS: 1.38
RIGHT VERTEBRAL DIAS: 15.5 CM/S
RIGHT VERTEBRAL SYS: 75.8 CM/S
SODIUM SERPL-SCNC: 140 MMOL/L (ref 135–145)
TROPONIN I SERPL-MCNC: 0.02 NG/ML (ref 0.02–0.05)
TROPONIN I SERPL-MCNC: 0.02 NG/ML (ref 0.02–0.05)
VAS LEFT SUBCLAVIAN MID PSV: 95.1 CM/S
VAS RIGHT SUBCLAVIAN MID PSV: 84.2 CM/S
WBC # BLD AUTO: 6.4 K/UL (ref 4.6–13.2)

## 2021-11-02 PROCEDURE — 84484 ASSAY OF TROPONIN QUANT: CPT

## 2021-11-02 PROCEDURE — 74011250637 HC RX REV CODE- 250/637: Performed by: NURSE PRACTITIONER

## 2021-11-02 PROCEDURE — 77010033678 HC OXYGEN DAILY

## 2021-11-02 PROCEDURE — 94761 N-INVAS EAR/PLS OXIMETRY MLT: CPT

## 2021-11-02 PROCEDURE — 99218 HC RM OBSERVATION: CPT

## 2021-11-02 PROCEDURE — 36415 COLL VENOUS BLD VENIPUNCTURE: CPT

## 2021-11-02 PROCEDURE — 93880 EXTRACRANIAL BILAT STUDY: CPT

## 2021-11-02 PROCEDURE — 74011636637 HC RX REV CODE- 636/637: Performed by: NURSE PRACTITIONER

## 2021-11-02 PROCEDURE — 82962 GLUCOSE BLOOD TEST: CPT

## 2021-11-02 PROCEDURE — 72128 CT CHEST SPINE W/O DYE: CPT

## 2021-11-02 PROCEDURE — 85027 COMPLETE CBC AUTOMATED: CPT

## 2021-11-02 PROCEDURE — G0378 HOSPITAL OBSERVATION PER HR: HCPCS

## 2021-11-02 PROCEDURE — 80048 BASIC METABOLIC PNL TOTAL CA: CPT

## 2021-11-02 PROCEDURE — 83735 ASSAY OF MAGNESIUM: CPT

## 2021-11-02 PROCEDURE — 70450 CT HEAD/BRAIN W/O DYE: CPT

## 2021-11-02 RX ORDER — SODIUM BICARBONATE 650 MG/1
650 TABLET ORAL 3 TIMES DAILY
COMMUNITY
End: 2021-12-29 | Stop reason: SDUPTHER

## 2021-11-02 RX ORDER — LANOLIN ALCOHOL/MO/W.PET/CERES
400 CREAM (GRAM) TOPICAL
Status: COMPLETED | OUTPATIENT
Start: 2021-11-02 | End: 2021-11-02

## 2021-11-02 RX ORDER — CINACALCET 30 MG/1
30 TABLET, FILM COATED ORAL DAILY
COMMUNITY
End: 2021-11-12

## 2021-11-02 RX ORDER — POTASSIUM CHLORIDE 750 MG/1
40 TABLET, EXTENDED RELEASE ORAL
Status: COMPLETED | OUTPATIENT
Start: 2021-11-02 | End: 2021-11-02

## 2021-11-02 RX ADMIN — AMLODIPINE BESYLATE 5 MG: 5 TABLET ORAL at 08:42

## 2021-11-02 RX ADMIN — ALLOPURINOL 200 MG: 100 TABLET ORAL at 08:42

## 2021-11-02 RX ADMIN — CETIRIZINE HYDROCHLORIDE 10 MG: 10 TABLET, FILM COATED ORAL at 08:43

## 2021-11-02 RX ADMIN — Medication 10 ML: at 14:00

## 2021-11-02 RX ADMIN — PANTOPRAZOLE SODIUM 40 MG: 40 TABLET, DELAYED RELEASE ORAL at 08:42

## 2021-11-02 RX ADMIN — FERROUS SULFATE TAB 325 MG (65 MG ELEMENTAL FE) 325 MG: 325 (65 FE) TAB at 08:42

## 2021-11-02 RX ADMIN — METOPROLOL SUCCINATE 25 MG: 25 TABLET, EXTENDED RELEASE ORAL at 08:43

## 2021-11-02 RX ADMIN — MINOXIDIL 2.5 MG: 2.5 TABLET ORAL at 08:43

## 2021-11-02 RX ADMIN — HYDRALAZINE HYDROCHLORIDE 100 MG: 25 TABLET, FILM COATED ORAL at 16:36

## 2021-11-02 RX ADMIN — POTASSIUM CHLORIDE 40 MEQ: 10 TABLET, EXTENDED RELEASE ORAL at 11:50

## 2021-11-02 RX ADMIN — INSULIN LISPRO 2 UNITS: 100 INJECTION, SOLUTION INTRAVENOUS; SUBCUTANEOUS at 11:51

## 2021-11-02 RX ADMIN — Medication 400 MG: at 11:50

## 2021-11-02 RX ADMIN — APIXABAN 2.5 MG: 2.5 TABLET, FILM COATED ORAL at 08:43

## 2021-11-02 RX ADMIN — FERROUS SULFATE TAB 325 MG (65 MG ELEMENTAL FE) 325 MG: 325 (65 FE) TAB at 11:50

## 2021-11-02 RX ADMIN — HYDRALAZINE HYDROCHLORIDE 100 MG: 25 TABLET, FILM COATED ORAL at 08:43

## 2021-11-02 RX ADMIN — FUROSEMIDE 40 MG: 40 TABLET ORAL at 08:42

## 2021-11-02 RX ADMIN — FERROUS SULFATE TAB 325 MG (65 MG ELEMENTAL FE) 325 MG: 325 (65 FE) TAB at 16:37

## 2021-11-02 NOTE — PROGRESS NOTES
Pt admitted to [place] [id number] via stretcher accompanied by ED nurse, pt oriented to room and call bell system, no acute distress or sob, denies any pain or discomfort at this time, Call bell in reach, bed in lowest position.

## 2021-11-02 NOTE — DISCHARGE SUMMARY
Hospitalist Discharge Summary     Patient ID:    Hannah Oliva  [id number]  78 y.o.  1945    Admit date: 11/1/2021    Discharge date : 11/2/2021    Chronic Diagnoses:    Problem List as of 11/2/2021 Date Reviewed: 10/29/2020        Codes Class Noted - Resolved    Chest pain ICD-10-CM: R07.9  ICD-9-CM: 786.50  11/1/2021 - Present        Chronic kidney disease, stage 4 (severe) ([facility] ICD-10-CM: N18.4  ICD-9-CM: 585.4  11/1/2021 - Present        Chronic kidney disease, stage IV (severe) ([facility] ICD-10-CM: N18.4  ICD-9-CM: 585.4  10/19/2021 - Present        Acute on chronic congestive heart failure ([facility] ICD-10-CM: I50.9  ICD-9-CM: 428.0  10/19/2021 - Present        Acute on chronic combined systolic and diastolic ACC/AHA stage C congestive heart failure ([facility] ICD-10-CM: I50.43  ICD-9-CM: 428.43, 428.0  10/7/2021 - Present        CHF exacerbation ([facility] ICD-10-CM: I50.9  ICD-9-CM: 428.0  10/6/2021 - Present        Acute on chronic heart failure ([facility] ICD-10-CM: I50.9  ICD-9-CM: 428.0  1/13/2021 - Present        Acute deep vein thrombosis (DVT) of distal vein of both lower extremities ([facility] ICD-10-CM: K56.0M9  ICD-9-CM: 453.42  10/13/2020 - Present        CHF (congestive heart failure) (HCC) ICD-10-CM: I50.9  ICD-9-CM: 428.0  9/15/2020 - Present        Deep vein thrombosis (DVT) of proximal vein of both lower extremities (HCC) (Chronic) ICD-10-CM: I82.4Y3  ICD-9-CM: 453.41  9/14/2020 - Present    Overview Signed 9/14/2020  3:43 PM by Kelly Peralta NP                      Positive D dimer ICD-10-CM: H04.17  ICD-9-CM: 790.92  9/13/2020 - Present        General weakness ICD-10-CM: R53.1  ICD-9-CM: 780.79  8/1/2020 - Present        Chronic diastolic congestive heart failure ([facility] ICD-10-CM: I50.32  ICD-9-CM: 428.32, 428.0  7/31/2020 - Present        Coronary artery disease of native artery of native heart with stable angina pectoris ([facility] ICD-10-CM: I25.118  ICD-9-CM: 414.01, 413.9 3/20/2020 - Present        H/O left nephrectomy ICD-10-CM: Z90.5  ICD-9-CM: V45.73  3/20/2020 - Present        History of bladder cancer ICD-10-CM: Z85.51  ICD-9-CM: V10.51  3/20/2020 - Present        Pulmonary edema, acute ([facility] ICD-10-CM: J81.0  ICD-9-CM: 518.4  3/20/2020 - Present        Acute renal failure superimposed on chronic kidney disease ([facility] ICD-10-CM: N17.9, N18.9  ICD-9-CM: 584.9, 585.9  11/23/2017 - Present        Gastroesophageal reflux disease without esophagitis ICD-10-CM: K21.9  ICD-9-CM: 530.81  11/23/2017 - Present        HLD (hyperlipidemia) ICD-10-CM: E78.5  ICD-9-CM: 272.4  11/23/2017 - Present        Renal cell carcinoma of left kidney (HCC) ICD-10-CM: C64.2  ICD-9-CM: 189.0  11/23/2017 - Present        Hematuria ICD-10-CM: R31.9  ICD-9-CM: 599.70  9/4/2014 - Present        Stage 3 chronic kidney disease ([facility] ICD-10-CM: N18.30  ICD-9-CM: 585.3  9/4/2014 - Present        Malignant neoplasm of urinary bladder (HCC) ICD-10-CM: C67.9  ICD-9-CM: 188.9  12/7/2009 - Present    Overview Signed 9/16/2020  2:28 PM by Sugar Dominguez NP     Overview:   HG papillary urothelial carcinoma. S/p cystoprostatectomy 12/30/09             Type 2 diabetes mellitus with stage 4 chronic kidney disease, with long-term current use of insulin (HCC) ICD-10-CM: E11.22, N18.4, Z79.4  ICD-9-CM: 250.40, 585.4, V58.67  12/7/2009 - Present        Primary hypertension ICD-10-CM: I10  ICD-9-CM: 401.9  12/7/2009 - Present          22    Final Diagnoses:    Active Problems:    Type 2 diabetes mellitus with stage 4 chronic kidney disease, with long-term current use of insulin ([facility] (12/7/2009)      Primary hypertension (12/7/2009)      CHF (congestive heart failure) ([facility] (9/15/2020)      Acute deep vein thrombosis (DVT) of distal vein of both lower extremities ([facility] (10/13/2020)      HLD (hyperlipidemia) (11/23/2017)      Chest pain (11/1/2021)      Chronic kidney disease, stage 4 (severe) ([facility] (11/1/2021)        Reason for Hospitalization:    Indigo Richmond is a 78 y.o. male with a past medical history for congestive heart failure on chronic oxygen, chronic kidney disease stage IV, diabetes, DVT, hypocholesteremia, hypertension, and bladder cancer, patient presented to the ED today with a chief complaint of chest pain. Cardiologist consulted, no changes in his current regimen, no new cardiac events occurred, patient is currently symptom free, and is stable for discharge on his chronic 2 L of oxygen. Chest pain  - Differential includes GERD/gastritis flare-up, and ACS   - Troponins negative x 3  - EKG Sinus Rhythm with 1st degree A-V block  Borderline Non-specific intra-ventricular conduction delay Otherwise normal ECG  - cardiology consulted  - Chest x-ray showed no acute cardiopulmonary infiltrates.        Left Leg Paresthesia  -occurred with chest pain  -tele-neuro consulted  -CT Head: no acute changes  - repeat CT head tomorrow no acute changes and  Bilateral caroitid dopplers shown moderate stenosis, patient currently on statin, antihypertensives, and Eliquis, patient recommended to follow-up with PCP for referral for vascular surgeon  -CT spine, lumbar, and thoracic:Multilevel lumbar degenerative disc disease and spondylosis with asymmetric moderate appearing left greater than right degenerative foraminal encroachment, identified on earlier same day comparison CT lumbar spine. Recommend correlation for left greater the right L3-L5 nerve root radiculopathy  -PT consulted    CHF (congestive heart failure) (HCC)  -chronic/stable  -continue scheduled Lasix  -Recent echocardiogram (10/7/21) shows LVEF is 61%. Biplane method used to measure ejection fraction. Normal cavity size and systolic function (ejection fraction normal). Mild concentric hypertrophy.  Left ventricular diastolic dysfunction.     Type 2 diabetes mellitus with stage 4 chronic kidney disease, with long-term current use of insulin (HCC)  -QAC & HS accuchecks  -continue home dose insulin       Primary hypertension  -chronic/mildly elevated  -continue Amlodipine, Hydralazine, Minoxidil, and Metoprolol    HLD (hyperlipidemia)  -continue statin     Acute deep vein thrombosis (DVT) of distal vein of both lower extremities (HCC)  -continue Eliquis     Chronic kidney disease, stage 4 (severe) (HCC)  -chronic/appears baseline  -continue home medications        Discharge Medications:   Current Discharge Medication List      CONTINUE these medications which have NOT CHANGED    Details   allopurinoL (ZYLOPRIM) 100 mg tablet TAKE 2 TABLETS BY MOUTH DAILY  Qty: 60 Tablet, Refills: 3      minoxidiL (LONITEN) 2.5 mg tablet Take 1 Tablet by mouth daily. Qty: 30 Tablet, Refills: 0      furosemide (LASIX) 40 mg tablet Take 1 Tablet by mouth daily. Qty: 30 Tablet, Refills: 0      hydrALAZINE (APRESOLINE) 100 mg tablet Take 1 Tablet by mouth three (3) times daily for 30 days. Qty: 90 Tablet, Refills: 0      loratadine (CLARITIN) 10 mg tablet Take 10 mg by mouth daily. albuterol (PROVENTIL HFA, VENTOLIN HFA, PROAIR HFA) 90 mcg/actuation inhaler Take 2 Puffs by inhalation every four (4) hours as needed for Wheezing, Shortness of Breath or Respiratory Distress. Qty: 1 Each, Refills: 1    Associated Diagnoses: SOB (shortness of breath) on exertion      ferrous sulfate (IRON) 325 mg (65 mg iron) EC tablet Take 1 Tablet by mouth three (3) times daily (with meals). Qty: 90 Tablet, Refills: 2      simvastatin (ZOCOR) 40 mg tablet TAKE 1 TABLET BY MOUTH EVERY NIGHT  Qty: 90 Tablet, Refills: 1      metoprolol succinate (TOPROL-XL) 25 mg XL tablet Take 1 Tablet by mouth daily.       amLODIPine (NORVASC) 5 mg tablet TAKE 2 TABLETS BY MOUTH ONCE A DAY  Qty: 180 Tablet, Refills: 0    Associated Diagnoses: Essential hypertension      omeprazole (PRILOSEC) 40 mg capsule TAKE 1 CAPSULE BY MOUTH DAILY  Qty: 90 Capsule, Refills: 1    Associated Diagnoses: Medication refill polyethylene glycol (Miralax) 17 gram packet Take 1 Packet by mouth daily. Qty: 10 Packet, Refills: 0    Associated Diagnoses: Medication refill      apixaban (Eliquis) 2.5 mg tablet Take 1 Tablet by mouth two (2) times a day. Qty: 180 Tablet, Refills: 1    Associated Diagnoses: Medication refill      ergocalciferol (ERGOCALCIFEROL) 1,250 mcg (50,000 unit) capsule TAKE ONE CAPSULE BY MOUTH EVERY 7 DAYS  Qty: 16 Cap, Refills: 1    Associated Diagnoses: Stage 3 chronic kidney disease, unspecified whether stage 3a or 3b CKD ([facility])      BD Ultra-Fine Mini Pen Needle 31 gauge x 3/16\" ndle USE TO INJECT TID  Qty: 100 Pen Needle, Refills: 3      fluticasone propionate (FLONASE) 50 mcg/actuation nasal spray 1 [place] by Both Nostrils route daily as needed. cinacalcet (SENSIPAR) 30 mg tablet Take 30 mg by mouth daily. sodium bicarbonate 650 mg tablet Take 650 mg by mouth three (3) times daily. epoetin sadi (Epogen) 20,000 unit/mL injection Inject 20,000 units every 3 weeks subcutaneously  Qty: 1 Each, Refills: 3    Comments: Please dispense as a 30 day supply  Associated Diagnoses: Stage 3 chronic kidney disease, unspecified whether stage 3a or 3b CKD ([facility]               Follow up Care:    1. Ghazala Smith NP in 1-2 weeks. Follow-up Information     Follow up With Specialties Details Why Contact Info    lEda Smith NP Nurse Practitioner   [address] [phone]      Mary Davis NP Nurse Practitioner In 1 week  [address]  [address] [facility] [address]      [facility]  In 1 week  [address]  [phone]            Patient Follow Up Instructions:    Activity: Activity as tolerated  Diet:  Cardiac Diet    Condition at Discharge:  Stable  __________________________________________________________________    Disposition  Home or Self Care  ____________________________________________________________________    Code Status:  Full Code  ___________________________________________________________________    Discharge Exam:  Patient seen and examined by me on discharge day. Pertinent Findings:  Gen:    Not in distress  Chest: Clear lungs  CVS:   Regular rhythm.   No edema  Abd:  Soft, not distended, not tender  Neuro:  Alert    CONSULTATIONS: Cardiology    Significant Diagnostic Studies:   Recent Results (from the past 24 hour(s))   TROPONIN I    Collection Time: 11/01/21  9:15 PM   Result Value Ref Range    Troponin-I, Qt. 0.02 0.02 - 0.05 ng/mL   GLUCOSE, POC    Collection Time: 11/01/21  9:41 PM   Result Value Ref Range    Glucose (POC) 81 70 - 110 mg/dL    Performed by [id number], BASIC    Collection Time: 11/02/21  2:14 AM   Result Value Ref Range    Sodium 140 135 - 145 mmol/L    Potassium 3.8 3.2 - 5.1 mmol/L    Chloride 105 94 - 111 mmol/L    CO2 26 21 - 33 mmol/L    Anion gap 9 mmol/L    Glucose 143 (H) 70 - 110 mg/dL    BUN 55 (H) 9 - 21 mg/dL    Creatinine 3.20 (H) 0.8 - 1.50 mg/dL    BUN/Creatinine ratio 17      GFR est AA 23 ml/min/1.73m2    GFR est non-AA 19 ml/min/1.73m2    Calcium 8.2 (L) 8.5 - 10.5 mg/dL   MAGNESIUM    Collection Time: 11/02/21  2:14 AM   Result Value Ref Range    Magnesium 1.8 1.7 - 2.8 mg/dL   CBC W/O DIFF    Collection Time: 11/02/21  2:14 AM   Result Value Ref Range    WBC 6.4 4.6 - 13.2 K/uL    RBC 2.99 (L) 4.35 - 5.65 M/uL    HGB 7.8 (L) 13.0 - 16.0 g/dL    HCT 25.6 (L) 36.0 - 48.0 %    MCV 85.6 78.0 - 100.0 FL    MCH 26.1 24.0 - 34.0 PG    MCHC 30.5 (L) 31.0 - 37.0 g/dL    RDW 17.9 (H) 11.6 - 14.5 %    PLATELET 297 396 - 648 K/uL    MPV 9.2 9.2 - 11.8 FL    NRBC 0.0 0.0  WBC    ABSOLUTE NRBC 0.00 0.00 - 0.01 K/uL   TROPONIN I    Collection Time: 11/02/21  2:14 AM   Result Value Ref Range    Troponin-I, Qt. 0.02 0.02 - 0.05 ng/mL   GLUCOSE, POC    Collection Time: 11/02/21  7:40 AM Result Value Ref Range    Glucose (POC) 93 70 - 110 mg/dL    Performed by Queenie Singh    TROPONIN I    Collection Time: 11/02/21  8:25 AM   Result Value Ref Range    Troponin-I, Qt. 0.02 0.02 - 0.05 ng/mL   DUPLEX CAROTID BILATERAL    Collection Time: 11/02/21 10:17 AM   Result Value Ref Range    Left CCA dist sys 104.0 cm/s    Left CCA dist holcomb 17.4 cm/s    LEFT COMMON CAROTID ARTERY MID S 116.00 cm/s    LEFT COMMON CAROTID ARTERY MID D 22.40 cm/s    Left CCA prox sys 116.0 cm/s    Left CCA prox holcomb 19.3 cm/s    Left ICA dist sys 81.1 cm/s    Left ICA dist holcomb 25.2 cm/s    Left ICA mid sys 98.6 cm/s    Left ICA mid holcomb 27.8 cm/s    Left ICA prox sys 69.6 cm/s    Left ICA prox holcomb 13.7 cm/s    Left subclavian mid PSV 95.1 cm/s    Left ECA sys 137.0 cm/s    Left vertebral sys 67.5 cm/s    LEFT VERTEBRAL ARTERY D 19.20 cm/s    Right cca dist sys 116.0 cm/s    Right CCA dist holcomb 21.0 cm/s    RIGHT COMMON CAROTID ARTERY MID S 98.10 cm/s    RIGHT COMMON CAROTID ARTERY MID D 18.20 cm/s    Right CCA prox sys 173.0 cm/s    Right CCA prox holcomb 20.8 cm/s    Right ICA dist sys 111.0 cm/s    Right ICA dist holcomb 29.4 cm/s    Right ICA mid sys 110.0 cm/s    Right ICA mid holcomb 20.0 cm/s    Right ICA prox sys 160.0 cm/s    Right ICA prox holcomb 21.9 cm/s    Right subclavian mid PSV 84.2 cm/s    Right eca sys 149.0 cm/s    Right vertebral sys 75.8 cm/s    RIGHT VERTEBRAL ARTERY D 15.50 cm/s    Right ICA/CCA sys 1.38     Left ICA/CCA sys 0.95    GLUCOSE, POC    Collection Time: 11/02/21 11:33 AM   Result Value Ref Range    Glucose (POC) 184 (H) 70 - 110 mg/dL    Performed by Queenie Singh      DUPLEX CAROTID BILATERAL   Final Result      CT SPINE [facility] WO CONT   Final Result      1. No evidence of fracture or dislocation. No high-grade central thoracic   stenosis.       2. Multilevel lumbar degenerative disc disease and spondylosis with asymmetric   moderate appearing left greater than right degenerative foraminal encroachment, identified on earlier same day comparison CT lumbar spine. Recommend correlation   for left greater the right L3-L5 nerve root radiculopathy. 3. Cardiomegaly with Constellation of pulmonary findings, which may represent   edema from fluid overload versus CHF. 4. C5-6 disc disease and spondylosis with chronic appearing degenerative   moderate central stenosis. CT HEAD WO CONT   Final Result      1. Stable exam. No CT findings of an acute intracranial abnormality. Please note   that noncontrast head CT may be normal in early acute infarct. CT HEAD WO CONT   Final Result         1. No acute intracranial abnormality. 2. Chronic left maxillary sinus disease, unchanged as visualized. CT ABD PELV WO CONT   Final Result      No acute findings. Chronic ancillary findings, as described. _______________                         XR CHEST PORT   Final Result      Nonspecific streaky opacities in the lung bases favoring minor atelectasis   and/or scarring similar to multiple prior examinations. Otherwise clear.         Signed:  GABBY Johnson  11/2/2021  4:13 PM

## 2021-11-02 NOTE — PROGRESS NOTES
Chart reviewed and noted OBS adm for CC of chest pain. Pt stated that the pain radiated down his left leg and was rated 8/10. Pt was evlauated in the ED and was placed in OBS Tele for chest pain and paresthesia of left lower extremity, treatment to include cards consult, repeat CT of the head , carotid ultrsound, and trend trops. Pt noted to have multiple co-morbidities.   CM following for discharge or any home needs the pt may request.

## 2021-11-02 NOTE — PROGRESS NOTES
Discharge instructions given, IV removed, telemetry discontinued, patient taken downstairs via wheelchair, patient discharged home.

## 2021-11-02 NOTE — PROGRESS NOTES
Multiple attempts made to make follow up appointments. No answer with PCP or [facility]. Pt made aware that he will need to make follow up appointments in 1-2 weeks.

## 2021-11-02 NOTE — PROGRESS NOTES
0200- Pt resting in bed no acute distress or sob, call bell is in reach. 2630- Radiology on floor to take pt down for CT will continue to monitor upon return.

## 2021-11-02 NOTE — ROUTINE PROCESS
TRANSFER - OUT REPORT:    Verbal report given to [address], LPN(name) on Prince Griffin  being transferred to [employer] for routine progression of care       Report consisted of patients Situation, Background, Assessment and   Recommendations(SBAR). Information from the following report(s)  was reviewed with the receiving nurse. Lines:   Peripheral IV 11/01/21 Right Forearm (Active)        Opportunity for questions and clarification was provided.       Patient transported with:   Monitor

## 2021-11-02 NOTE — CONSULTS
CONSULTATION    REASON FOR CONSULT:  Chest Pain    REQUESTING PROVIDER:  Lashell Mitchell NP    CHIEF COMPLAINT:    Chief Complaint   Patient presents with    Chest Pain         HISTORY OF PRESENT ILLNESS:  Tere Simpson is a 78y.o. year-old male with past medical history significant for congestive heart failure on chronic oxygen, chronic kidney disease stage IV, diabetes, DVT, hypocholesteremia, hypertension, and bladder cancer who presented to ED for evaluation of chest pain. He reports that it was a dull ache on the left side of his chest that radiated down his left leg and caused numbness. He has denied any subsequent chest pain since arriving at the ER. He continues to deny it today on examination. Troponin has trended negatively and his EKG was without any changes. He denies any additional complaints this morning. Records from hospital admission course thus far reviewed.       Telemetry Review: Sinus Rhythm 1* AV Block      PAST MEDICAL HISTORY:    Past Medical History:   Diagnosis Date    Acid reflux     Arthritis     Bladder cancer (HCC)     Congestive heart failure (HCC)     Deep vein thrombosis (DVT) of proximal vein of both lower extremities ([facility] [address] 9/14/2020    Diabetes mellitus (HCC)     GERD (gastroesophageal reflux disease)     Gout     High cholesterol     Hypertension     Kidney carcinoma, left (HCC)     Kidney disease     Pituitary mass (HCC)     Reflux esophagitis     Unspecified deficiency anemia        PAST SURGICAL HISTORY:   Past Surgical History:   Procedure Laterality Date    HX CYSTECTOMY  12/30/09    Dr. Darrian Polk HX NEPHRECTOMY Left 5/2009    Nephroureterectomy by Dr. Michael Fuentes OTHER SURGICAL      HX OTHER SURGICAL      surgery on pituitary gland       ALLERGIES:  No Known Allergies    FAMILY HISTORY:    Family History   Problem Relation Age of Onset    Heart Disease Father     Heart Disease Mother        SOCIAL HISTORY:    Tobacco: Former Smoker  Drugs: Never  ETOH: Never    HOME MEDICATIONS:    Prior to Admission Medications   Prescriptions Last Dose Informant Patient Reported? Taking? BD Ultra-Fine Mini Pen Needle 31 gauge x 3/16\" ndle 2021 at Unknown time Transfer Papers No Yes   Sig: USE TO INJECT TID   albuterol (PROVENTIL HFA, VENTOLIN HFA, PROAIR HFA) 90 mcg/actuation inhaler 10/25/2021 at Unknown time Transfer Papers No Yes   Sig: Take 2 Puffs by inhalation every four (4) hours as needed for Wheezing, Shortness of Breath or Respiratory Distress. allopurinoL (ZYLOPRIM) 100 mg tablet 2021 at Unknown time Transfer Papers No Yes   Sig: TAKE 2 TABLETS BY MOUTH DAILY   amLODIPine (NORVASC) 5 mg tablet 2021 at Unknown time Transfer Papers No Yes   Sig: TAKE 2 TABLETS BY MOUTH ONCE A DAY   apixaban (Eliquis) 2.5 mg tablet 2021 at Unknown time Transfer Papers No Yes   Sig: Take 1 Tablet by mouth two (2) times a day. cinacalcet (SENSIPAR) 30 mg tablet Unknown at Unknown time Transfer Papers Yes No   Sig: Take 30 mg by mouth daily. epoetin sadi (Epogen) 20,000 unit/mL injection Unknown at Unknown time Transfer Papers No No   Sig: Inject 20,000 units every 3 weeks subcutaneously   ergocalciferol (ERGOCALCIFEROL) 1,250 mcg (50,000 unit) capsule 10/27/2021 Transfer Papers No Yes   Sig: TAKE ONE CAPSULE BY MOUTH EVERY 7 DAYS   ferrous sulfate (IRON) 325 mg (65 mg iron) EC tablet 2021 at Unknown time Transfer Papers No Yes   Sig: Take 1 Tablet by mouth three (3) times daily (with meals). fluticasone propionate (FLONASE) 50 mcg/actuation nasal spray 2021 at Unknown time Transfer Papers Yes Yes   [place] by Both Nostrils route daily as needed. furosemide (LASIX) 40 mg tablet 2021 at Unknown time Transfer Papers No Yes   Sig: Take 1 Tablet by mouth daily. hydrALAZINE (APRESOLINE) 100 mg tablet 2021 at Unknown time Transfer Papers No Yes   Sig: Take 1 Tablet by mouth three (3) times daily for 30 days. loratadine (CLARITIN) 10 mg tablet 11/1/2021 at Unknown time Transfer Papers Yes Yes   Sig: Take 10 mg by mouth daily. metoprolol succinate (TOPROL-XL) 25 mg XL tablet 11/1/2021 at Unknown time Transfer Papers Yes Yes   Sig: Take 1 Tablet by mouth daily. minoxidiL (LONITEN) 2.5 mg tablet 11/1/2021 at Unknown time Transfer Papers No Yes   Sig: Take 1 Tablet by mouth daily. omeprazole (PRILOSEC) 40 mg capsule 11/1/2021 at Unknown time Transfer Papers No Yes   Sig: TAKE 1 CAPSULE BY MOUTH DAILY   polyethylene glycol (Miralax) 17 gram packet 10/25/2021 at Unknown time Transfer Papers No Yes   Sig: Take 1 Packet by mouth daily. simvastatin (ZOCOR) 40 mg tablet 10/31/2021 at Unknown time Transfer Papers No Yes   Sig: TAKE 1 TABLET BY MOUTH EVERY NIGHT   sodium bicarbonate 650 mg tablet Unknown at Unknown time  Yes No   Sig: Take 650 mg by mouth three (3) times daily. Facility-Administered Medications: None       REVIEW OF SYSTEMS:  Complete review of systems performed, pertinents noted above, all other systems are negative.     Patient Vitals for the past 24 hrs:   Temp Pulse Resp BP SpO2   11/02/21 0900 -- -- -- -- 96 %   11/02/21 0820 -- -- -- -- 96 %   11/02/21 0814 98.3 °F (36.8 °C) 64 18 124/68 98 %   11/02/21 0800 -- 64 -- -- --   11/02/21 0414 97 °F (36.1 °C) 60 18 139/70 99 %   11/02/21 0400 -- 60 -- -- --   11/02/21 0058 96.9 °F (36.1 °C) (!) 59 18 (!) 143/64 100 %   11/02/21 0000 -- (!) 59 -- -- --   11/01/21 2103 97.9 °F (36.6 °C) 60 20 (!) 148/59 100 %   11/01/21 2000 -- 60 -- -- --   11/01/21 1917 98.4 °F (36.9 °C) 66 20 (!) 151/69 98 %   11/01/21 1802 -- 61 18 (!) 148/70 100 %   11/01/21 1744 -- 62 18 (!) 146/65 100 %   11/01/21 1629 -- 70 18 (!) 145/62 100 %   11/01/21 1514 -- 63 18 133/62 98 %   11/01/21 1435 -- 67 18 (!) 146/65 98 %   11/01/21 1414 -- 65 18 (!) 146/65 98 %   11/01/21 1339 98.4 °F (36.9 °C) 74 20 (!) 154/71 99 %       PHYSICAL EXAMINATION:    General: Well nourished, NAD, A&O  HEENT: Normocephalic, PERRL, no drainage  Neck: Supple, Trachea midline, No JVD  RESP: Fine crackles in bases bilaterally. + Symmetrical chest movement. No SOB or distress. On 2L NC-baseline  Cardiovascular: RRR no MRG  PVS: No rubor, cyanosis, no edema, Radial, DP, PT pulses equal bilaterally  ABD: soft, NT, Normoactive BS- urostomy on left lower quadrant  Derm: Warm/Dry/Intact with no lesions, good turgor  Neuro: A&O PPTS, cranial nerves II- XII grossly intact via interaction with patient. No focal deficits  PSYCH: No anxiety or agitation      Electrocardiogram performed earlier reviewed, it shows Sinus Rhythm with 1st degree A-V block   Borderline Non-specific intra-ventricular conduction delay    Recent labs results and imaging reviewed.       Recent Results (from the past 24 hour(s))   EKG, 12 LEAD, INITIAL    Collection Time: 11/01/21  1:32 PM   Result Value Ref Range    Ventricular Rate 75 BPM    Atrial Rate 75 BPM    P-R Interval 286 ms    QRS Duration 112 ms    Q-T Interval 410 ms    QTC Calculation (Bezet) 458 ms    Calculated P Axis -39 degrees    Calculated R Axis -16 degrees    Calculated T Axis -8 degrees    Diagnosis       Sinus Rhythm with 1st degree A-V block  Borderline Non-specific intra-ventricular conduction delay  Otherwise normal ECG  When compared with ECG of 10/19/21  No significant change was found      Confirmed by Cara Vu ([id number]) on 11/1/2021 3:39:35 PM     CBC WITH AUTOMATED DIFF    Collection Time: 11/01/21  1:45 PM   Result Value Ref Range    WBC 5.7 4.6 - 13.2 K/uL    RBC 3.14 (L) 4.35 - 5.65 M/uL    HGB 8.2 (L) 13.0 - 16.0 g/dL    HCT 26.8 (L) 36.0 - 48.0 %    MCV 85.4 78.0 - 100.0 FL    MCH 26.1 24.0 - 34.0 PG    MCHC 30.6 (L) 31.0 - 37.0 g/dL    RDW 17.8 (H) 11.6 - 14.5 %    PLATELET 859 913 - 612 K/uL    MPV 8.9 (L) 9.2 - 11.8 FL    NRBC 0.0 0.0  WBC    ABSOLUTE NRBC 0.00 0.00 - 0.01 K/uL    NEUTROPHILS 76 (H) 40 - 73 %    LYMPHOCYTES 14 (L) 21 - 52 % MONOCYTES 8 3 - 10 %    EOSINOPHILS 1 0 - 5 %    BASOPHILS 1 0 - 2 %    IMMATURE GRANULOCYTES 0 0 - 0.5 %    ABS. NEUTROPHILS 4.3 1.8 - 8.0 K/UL    ABS. LYMPHOCYTES 0.8 (L) 0.9 - 3.6 K/UL    ABS. MONOCYTES 0.4 0.05 - 1.2 K/UL    ABS. EOSINOPHILS 0.1 0.0 - 0.4 K/UL    ABS. BASOPHILS 0.0 0.0 - 0.1 K/UL    ABS. IMM. GRANS. 0.0 0.00 - 0.04 K/UL    DF AUTOMATED     PROTHROMBIN TIME + INR    Collection Time: 11/01/21  1:45 PM   Result Value Ref Range    Prothrombin time 13.8 11.5 - 15.2 sec    INR 1.1 0.8 - 1.2     METABOLIC PANEL, BASIC    Collection Time: 11/01/21  1:45 PM   Result Value Ref Range    Sodium 138 135 - 145 mmol/L    Potassium 4.3 3.2 - 5.1 mmol/L    Chloride 102 94 - 111 mmol/L    CO2 21 21 - 33 mmol/L    Anion gap 15 mmol/L    Glucose 166 (H) 70 - 110 mg/dL    BUN 54 (H) 9 - 21 mg/dL    Creatinine 3.10 (H) 0.8 - 1.50 mg/dL    BUN/Creatinine ratio 17      GFR est AA 24 ml/min/1.73m2    GFR est non-AA 20 ml/min/1.73m2    Calcium 8.8 8.5 - 10.5 mg/dL   HEPATIC FUNCTION PANEL    Collection Time: 11/01/21  1:45 PM   Result Value Ref Range    Protein, total 7.6 6.1 - 8.4 g/dL    Albumin 3.7 3.5 - 4.7 g/dL    Globulin 3.9 g/dL    A-G Ratio 0.9      Bilirubin, total 0.5 0.2 - 1.0 mg/dL    Bilirubin, direct <0.1 0.0 - 0.3 mg/dL    Alk.  phosphatase 57 38 - 126 U/L    AST (SGOT) 18 17 - 74 U/L    ALT (SGPT) 17 3 - 72 U/L   TROPONIN I    Collection Time: 11/01/21  1:45 PM   Result Value Ref Range    Troponin-I, Qt. 0.02 0.02 - 0.05 ng/mL   LIPASE    Collection Time: 11/01/21  1:45 PM   Result Value Ref Range    Lipase 52 10 - 57 U/L   LACTIC ACID    Collection Time: 11/01/21  1:45 PM   Result Value Ref Range    Lactic acid 0.9 0.5 - 2.0 mmol/L   BNP    Collection Time: 11/01/21  2:00 PM   Result Value Ref Range     (H) 0 - 100 pg/mL   TROPONIN I    Collection Time: 11/01/21  9:15 PM   Result Value Ref Range    Troponin-I, Qt. 0.02 0.02 - 0.05 ng/mL   GLUCOSE, POC    Collection Time: 11/01/21  9:41 PM   Result Value Ref Range    Glucose (POC) 81 70 - 110 mg/dL    Performed by [id number] [place], BASIC    Collection Time: 11/02/21  2:14 AM   Result Value Ref Range    Sodium 140 135 - 145 mmol/L    Potassium 3.8 3.2 - 5.1 mmol/L    Chloride 105 94 - 111 mmol/L    CO2 26 21 - 33 mmol/L    Anion gap 9 mmol/L    Glucose 143 (H) 70 - 110 mg/dL    BUN 55 (H) 9 - 21 mg/dL    Creatinine 3.20 (H) 0.8 - 1.50 mg/dL    BUN/Creatinine ratio 17      GFR est AA 23 ml/min/1.73m2    GFR est non-AA 19 ml/min/1.73m2    Calcium 8.2 (L) 8.5 - 10.5 mg/dL   MAGNESIUM    Collection Time: 11/02/21  2:14 AM   Result Value Ref Range    Magnesium 1.8 1.7 - 2.8 mg/dL   CBC W/O DIFF    Collection Time: 11/02/21  2:14 AM   Result Value Ref Range    WBC 6.4 4.6 - 13.2 K/uL    RBC 2.99 (L) 4.35 - 5.65 M/uL    HGB 7.8 (L) 13.0 - 16.0 g/dL    HCT 25.6 (L) 36.0 - 48.0 %    MCV 85.6 78.0 - 100.0 FL    MCH 26.1 24.0 - 34.0 PG    MCHC 30.5 (L) 31.0 - 37.0 g/dL    RDW 17.9 (H) 11.6 - 14.5 %    PLATELET 100 261 - 204 K/uL    MPV 9.2 9.2 - 11.8 FL    NRBC 0.0 0.0  WBC    ABSOLUTE NRBC 0.00 0.00 - 0.01 K/uL   TROPONIN I    Collection Time: 11/02/21  2:14 AM   Result Value Ref Range    Troponin-I, Qt. 0.02 0.02 - 0.05 ng/mL   GLUCOSE, POC    Collection Time: 11/02/21  7:40 AM   Result Value Ref Range    Glucose (POC) 93 70 - 110 mg/dL    Performed by Ranjeet Garcia    TROPONIN I    Collection Time: 11/02/21  8:25 AM   Result Value Ref Range    Troponin-I, Qt. 0.02 0.02 - 0.05 ng/mL   DUPLEX CAROTID BILATERAL    Collection Time: 11/02/21 10:17 AM   Result Value Ref Range    Left CCA dist sys 104.0 cm/s    Left CCA dist holcomb 17.4 cm/s    LEFT COMMON CAROTID ARTERY MID S 116.00 cm/s    LEFT COMMON CAROTID ARTERY MID D 22.40 cm/s    Left CCA prox sys 116.0 cm/s    Left CCA prox holcomb 19.3 cm/s    Left ICA dist sys 81.1 cm/s    Left ICA dist holcomb 25.2 cm/s    Left ICA mid sys 98.6 cm/s    Left ICA mid holcomb 27.8 cm/s    Left ICA prox sys 69.6 cm/s Left ICA prox holcomb 13.7 cm/s    Left subclavian mid PSV 95.1 cm/s    Left ECA sys 137.0 cm/s    Left vertebral sys 67.5 cm/s    LEFT VERTEBRAL ARTERY D 19.20 cm/s    Right cca dist sys 116.0 cm/s    Right CCA dist holcomb 21.0 cm/s    RIGHT COMMON CAROTID ARTERY MID S 98.10 cm/s    RIGHT COMMON CAROTID ARTERY MID D 18.20 cm/s    Right CCA prox sys 173.0 cm/s    Right CCA prox holcomb 20.8 cm/s    Right ICA dist sys 111.0 cm/s    Right ICA dist holcomb 29.4 cm/s    Right ICA mid sys 110.0 cm/s    Right ICA mid holcomb 20.0 cm/s    Right ICA prox sys 160.0 cm/s    Right ICA prox holcomb 21.9 cm/s    Right subclavian mid PSV 84.2 cm/s    Right eca sys 149.0 cm/s    Right vertebral sys 75.8 cm/s    RIGHT VERTEBRAL ARTERY D 15.50 cm/s    Right ICA/CCA sys 1.38     Left ICA/CCA sys 0.95        XR Results (maximum last 3): Results from [place] encounter on 11/01/21    XR CHEST PORT    Impression  Nonspecific streaky opacities in the lung bases favoring minor atelectasis  and/or scarring similar to multiple prior examinations. Otherwise clear. Results from [place] encounter on 10/19/21    XR CHEST PORT    Impression  1. Constellation of findings suggest interstitial edema, likely secondary to  congestive heart failure. Superimposed infiltrate cannot be entirely excluded. 2.  The enlarged cardiac silhouette is likely due to cardiomegaly and/or  pericardial effusion. Results from [place] encounter on 10/06/21    XR CHEST PORT    Impression  1. Patchy left perihilar densities and right basilar densities which could  represent changes of atelectasis or pneumonia. This report has been generated using voice recognition software. CT Results (maximum last 3): Results from [place] encounter on 11/01/21    CT SPINE [facility] WO CONT    Impression  1. No evidence of fracture or dislocation. No high-grade central thoracic  stenosis.     2. Multilevel lumbar degenerative disc disease and spondylosis with asymmetric  moderate appearing left greater than right degenerative foraminal encroachment,  identified on earlier same day comparison CT lumbar spine. Recommend correlation  for left greater the right L3-L5 nerve root radiculopathy. 3. Cardiomegaly with Constellation of pulmonary findings, which may represent  edema from fluid overload versus CHF. 4. C5-6 disc disease and spondylosis with chronic appearing degenerative  moderate central stenosis. CT HEAD WO CONT    Impression  1. Stable exam. No CT findings of an acute intracranial abnormality. Please note  that noncontrast head CT may be normal in early acute infarct. CT ABD PELV WO CONT    Impression  No acute findings. Chronic ancillary findings, as described. _______________        Nuclear Medicine Results (maximum last 3): Results from [place] encounter on 09/13/20    NM LUNG SCAN PERF    Impression  Impression:    1. Low probability for pulmonary embolism. Study interpreted with modified PIOPED II criteria for perfusion only lung  scans. US Results (maximum last 3): Results from Hospital Encounter encounter on 09/13/20    US RETROPERITONEUM LTD    Impression  IMPRESSION:    Status post left nephrectomy. Unremarkable right kidney. Due to technical factors, prior studies performed on this patient are  unavailable at the time of dictation for comparison. _______________      Results from Abstract encounter on 02/04/13    US RETROPERITONEUM COMP      VAS/US Results (maximum last 3):   Results from Abstract encounter on 07/01/21    DUPLEX LOWER EXT VENOUS RIGHT        Current Facility-Administered Medications:     potassium chloride (KLOR-CON) tablet 40 mEq, 40 mEq, Oral, NOW, [place], Dinah Y, NP    magnesium oxide (MAG-OX) tablet 400 mg, 400 mg, Oral, NOW, [place], Dinah Y, NP    sodium chloride (NS) flush 5-40 mL, 5-40 mL, IntraVENous, Q8H, Kati Odonnell S, ACNP, 10 mL at 11/01/21 2220    sodium chloride (NS) flush 5-40 mL, 5-40 mL, IntraVENous, PRN, [place] Odonnellyl S, ACNP    acetaminophen (TYLENOL) tablet 650 mg, 650 mg, Oral, Q6H PRN **OR** acetaminophen (TYLENOL) suppository 650 mg, 650 mg, Rectal, Q6H PRN, Belle, Kati S, ACNP    polyethylene glycol (MIRALAX) packet 17 g, 17 g, Oral, DAILY PRN, Belle Kati S, ACNP    ondansetron (ZOFRAN ODT) tablet 4 mg, 4 mg, Oral, Q8H PRN **OR** ondansetron (ZOFRAN) injection 4 mg, 4 mg, IntraVENous, Q6H PRN, [place]yl S, ACNP    albuterol (PROVENTIL HFA, VENTOLIN HFA, PROAIR HFA) inhaler 2 Puff, 2 Puff, Inhalation, Q4H PRN, [place] Odonnellyl S, ACNP    allopurinoL (ZYLOPRIM) tablet 200 mg, 200 mg, Oral, DAILY, Belle, Kati S, ACNP, 200 mg at 11/02/21 0842    amLODIPine (NORVASC) tablet 5 mg, 5 mg, Oral, DAILY, [place], Kati S, ACNP, 5 mg at 11/02/21 1654    apixaban (ELIQUIS) tablet 2.5 mg, 2.5 mg, Oral, BID, Belle Kati S, ACNP, 2.5 mg at 11/02/21 8277    ferrous sulfate tablet 325 mg, 325 mg, Oral, TID WITH MEALS, [place], Kati S, ACNP, 325 mg at 11/02/21 0842    furosemide (LASIX) tablet 40 mg, 40 mg, Oral, DAILY, Belle, Kati S, ACNP, 40 mg at 11/02/21 3961    hydrALAZINE (APRESOLINE) tablet 100 mg, 100 mg, Oral, TID, Belle, Kati S, ACNP, 100 mg at 11/02/21 0843    minoxidiL (LONITEN) tablet 2.5 mg, 2.5 mg, Oral, DAILY, [place], Kati S, ACNP, 2.5 mg at 11/02/21 0843    metoprolol succinate (TOPROL-XL) XL tablet 25 mg, 25 mg, Oral, DAILY, Kati Odonnell ACNP, 25 mg at 11/02/21 0843    cetirizine (ZYRTEC) tablet 10 mg, 10 mg, Oral, DAILY, Kati Odonnell ACNP, 10 mg at 11/02/21 0843    atorvastatin (LIPITOR) tablet 20 mg, 20 mg, Oral, QHS, Kati Odonnell ACNP, 20 mg at 11/01/21 2220    insulin lispro (HUMALOG) injection, , SubCUTAneous, AC&HS, Kati Odonnell ACNP    glucose chewable tablet 16 g, 4 Tablet, Oral, PRN, Barbra Odonnell ACNP  [facility] glucagon (GLUCAGEN) injection 1 mg, 1 mg, IntraMUSCular, PRN, Belle, Kati S, ACNP    dextrose (D50W) injection syrg 12.5-25 g, 25-50 mL, IntraVENous, PRN, [place], Kati S, ACNP    pantoprazole (PROTONIX) tablet 40 mg, 40 mg, Oral, ACB, [place], Kati S, ACNP, 40 mg at 11/02/21 7435          Case discussed with collaborating physician Dr. Alexis Hoang and our impression and recommendations are as follows:   1. Chest Pain: Troponins flat and no EKG changes noted. ACS ruled out. Continue telemetry and to monitor symptoms. He was scheduled for a NST outpatient in October, but missed it. Will get him rescheduled for outpatient ischemic work up with [facility]. 2. HFpEF: Echo completed 10/7/21 with EF 61%. Moderately dilated left and right atrium and mild multivalvular regurgitation. Appears euvolemic today. Continue Furosemide and strict BP control. 3. Hypokalemia: Replete to goal 4-5. Recheck on AM labs. 4. Hypomagnesemia: Replete to goal >2. Recheck on AM labs. 5. Hypertension: Appears well controlled on current medications. 6. CKD: Appears to be at baseline. Cr. 3.2 today. 7. Bilateral DVT's: Previously diagnosed. Continue on Eliquis. Thank you for involving us in the care of this patient. Please do not hesitate to call if additional questions arise.  If after hours please call [phone]

## 2021-11-03 ENCOUNTER — OFFICE VISIT (OUTPATIENT)
Dept: NEPHROLOGY | Age: 76
End: 2021-11-03
Payer: MEDICARE

## 2021-11-03 ENCOUNTER — PATIENT OUTREACH (OUTPATIENT)
Dept: CASE MANAGEMENT | Age: 76
End: 2021-11-03

## 2021-11-03 ENCOUNTER — TRANSCRIBE ORDER (OUTPATIENT)
Dept: SCHEDULING | Age: 76
End: 2021-11-03

## 2021-11-03 VITALS
HEART RATE: 72 BPM | BODY MASS INDEX: 29.8 KG/M2 | SYSTOLIC BLOOD PRESSURE: 138 MMHG | DIASTOLIC BLOOD PRESSURE: 65 MMHG | WEIGHT: 196 LBS

## 2021-11-03 DIAGNOSIS — N18.4 CKD (CHRONIC KIDNEY DISEASE), STAGE IV (HCC): Primary | ICD-10-CM

## 2021-11-03 DIAGNOSIS — Z01.810 PREOP CARDIOVASCULAR EXAM: Primary | ICD-10-CM

## 2021-11-03 PROCEDURE — G8536 NO DOC ELDER MAL SCRN: HCPCS | Performed by: INTERNAL MEDICINE

## 2021-11-03 PROCEDURE — G8752 SYS BP LESS 140: HCPCS | Performed by: INTERNAL MEDICINE

## 2021-11-03 PROCEDURE — G8754 DIAS BP LESS 90: HCPCS | Performed by: INTERNAL MEDICINE

## 2021-11-03 PROCEDURE — 1111F DSCHRG MED/CURRENT MED MERGE: CPT | Performed by: INTERNAL MEDICINE

## 2021-11-03 PROCEDURE — G8427 DOCREV CUR MEDS BY ELIG CLIN: HCPCS | Performed by: INTERNAL MEDICINE

## 2021-11-03 PROCEDURE — G8417 CALC BMI ABV UP PARAM F/U: HCPCS | Performed by: INTERNAL MEDICINE

## 2021-11-03 PROCEDURE — G8432 DEP SCR NOT DOC, RNG: HCPCS | Performed by: INTERNAL MEDICINE

## 2021-11-03 PROCEDURE — 99213 OFFICE O/P EST LOW 20 MIN: CPT | Performed by: INTERNAL MEDICINE

## 2021-11-03 PROCEDURE — 1101F PT FALLS ASSESS-DOCD LE1/YR: CPT | Performed by: INTERNAL MEDICINE

## 2021-11-03 PROCEDURE — 3017F COLORECTAL CA SCREEN DOC REV: CPT | Performed by: INTERNAL MEDICINE

## 2021-11-03 RX ORDER — INSULIN GLARGINE 100 [IU]/ML
8 INJECTION, SOLUTION SUBCUTANEOUS
COMMUNITY
End: 2022-03-30 | Stop reason: SDUPTHER

## 2021-11-03 RX ORDER — GLIPIZIDE 10 MG/1
10 TABLET ORAL 2 TIMES DAILY
COMMUNITY
End: 2022-03-30 | Stop reason: SDUPTHER

## 2021-11-03 NOTE — PROGRESS NOTES
Juan Antonio Kary presents today for   Chief Complaint   Patient presents with    Follow-up     CKD       Is someone accompanying this pt? Yes,estella    Is the patient using any DME equipment during OV? no    Depression Screening:  3 most recent PHQ Screens 10/29/2021   Little interest or pleasure in doing things Not at all   Feeling down, depressed, irritable, or hopeless Not at all   Total Score PHQ 2 0   Trouble falling or staying asleep, or sleeping too much -   Feeling tired or having little energy -   Poor appetite, weight loss, or overeating -   Feeling bad about yourself - or that you are a failure or have let yourself or your family down -   Trouble concentrating on things such as school, work, reading, or watching TV -   Moving or speaking so slowly that other people could have noticed; or the opposite being so fidgety that others notice -   Thoughts of being better off dead, or hurting yourself in some way -   PHQ 9 Score -   How difficult have these problems made it for you to do your work, take care of your home and get along with others -       Learning Assessment:  Learning Assessment 2/23/2021   PRIMARY LEARNER Patient   HIGHEST LEVEL OF EDUCATION - PRIMARY LEARNER  DID NOT GRADUATE HIGH SCHOOL   BARRIERS PRIMARY LEARNER COGNITIVE   CO-LEARNER CAREGIVER No   PRIMARY LANGUAGE ENGLISH   LEARNER PREFERENCE PRIMARY DEMONSTRATION   ANSWERED BY patient   RELATIONSHIP SELF       Fall Risk  Fall Risk Assessment, last 12 mths 11/3/2021   Able to walk? Yes   Fall in past 12 months? 0   Do you feel unsteady? 0   Are you worried about falling 0   Is TUG test greater than 12 seconds? -   Is the gait abnormal? -   Number of falls in past 12 months -   Fall with injury? -       Coordination of Care:  1. Have you been to the ER, urgent care clinic since your last visit? Hospitalized since your last visit? Yes, [facility] weakness and chest pain got discharged yesterday    2.  Have you seen or consulted any other health care providers outside of the [address] [place] [address] since your last visit? Include any pap smears or colon screening.  Yes, PCP [facility]

## 2021-11-03 NOTE — PROGRESS NOTES
Reason for f/u:  ARON on CKD IV      HPI:  The pt is seen for f/u for CKD. He is c/o sob arben on exertion. Noticed to have no  LE swelling also. No N/V/D.  No urinary sx.      ROS:  Constitutional   · Constitutional: no fatigue, no fever, no night sweats, no significant weight gain, no significant weight loss     Eyes   · Eyes: no dry eyes, no vision change     ENMT   · Nose: no frequent nosebleeds, no nose/sinus problems   · Mouth/Throat: no dry mouth, no bleeding gums, no sore throat, no teeth problems, no snoring     Cardiovascular   · Cardiovascular: no swelling in the extremities, no chest pain, no arm pain on exertion, no shortness of breath when walking, no known heart murmur, no shortness of breath when lying down, no palpitations     Respiratory   · Respiratory: shortness of breath+, no cough, no wheezing, no coughing up blood     Gastrointestinal   · Gastrointestinal: no nausea, no abdominal pain, no vomiting, normal appetite, no diarrhea, not vomiting blood     Genitourinary   · Genitourinary: no hematuria, no incontinence, no difficulty urinating, no increased frequency     Musculoskeletal   · Musculoskeletal: no back pain, no arthralgias/joint pain, no muscle aches, no muscle weakness     Integumentary   · Skin: no rashes, no jaundice     Neurologic   · Neurologic: no dizziness, no numbness, no loss of consciousness, no weakness, no seizures, no headaches     Psychiatric   · Psych: no depression, no sleep disturbances     Hematologic/Lymphatic   · Hematologic/Lymphatic no swollen glands, no bruising     Allergic/Immunologic   · Allergy/Immunologic: no runny nose, no hives   ROS as noted in the HPI      PE:  Constitutional   · Level of Distress: NAD, no acute illness, no chronic illness   · General Appearance: healthy-appearing, well-nourished, well-developed   · Ambulation: ambulating normally     Eyes   · Pupils: PERRLA   · EOM: EOMI     Cardiovascular   · Heart Auscultation: RRR, normal S1, normal S2, no murmurs, no rubs, no gallop, no click, no KARI     Lungs   · Auscultation: breath sounds normal, good air movement, CTA except as noted, no wheezing, no rales/crackles, no rhonchi     Abdomen   · Inspection and Palpation: soft, non-distended, no tenderness, no CVA tenderness     Musculoskeletal System   · Extremities: no clubbing, no cyanosis, edema +    Skin   · Inspection and Palpation: no rash, no lesions, no ulcer, no induration, no nodule, turgor normal, no jaundice     Neurologic   · Cranial Nerves: grossly intact   · Gait And Station: normal gait, normal station        A/P:    1. 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   -he has unilateral functioning kidney due to left total nephrectomy.  -His Cr ranges between 2.5-2.9   -His urinalysis showed bland sediment and has 4-5 proteinuria.  -A kidney ultrasound surgically absent left kidney and right kidney with no hydronephrosis. -He was advised to avoid any kind of NSAIDs.  -I will see him back in 3 weeks. 2. History of kidney cancer:  -Status post left total nephrectomy 8 years ago. 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  -He follow-up with oncologist.    3. Hypertension:  -Blood pressure is better now.  -will keep hydralazine 100 mg tid   -will keep metoprolol ER 50 mg daily  -off of clonidine and minoxidil  -on amlodipine 10 mg daily       4. Anemia:  -Hb is 7.8   11/02  -TSAT 22->10%, Ferritin 283-->124  -advised to increase po FeSO4 325 mg bid->tid. -recommended to start on Epo 20,000 units every 3rd week (got denied)      5.  Renal osteodystrophy.  -His Ca, phosphorus ok  - vitamin D25 hydroxy 18, will order Vit D 50,000 units weekly for 16 weeks.  -has sec HPT   -iPTH 169-->248, ordered parathyroid nuclear scan (pt could not do it this time)  -will reschedule   -put on sensipar 30 mg daily. -will check SFL ratio    6. Gouty arthritis:  -His Uric acid is 5.0  -will keep on Allopurinol 200 mg daily. 7. Metabolic acidosis:  -CO2  22-->19-->22  -will increase NaHCO3 650 mg 1 tabs bid-->tid    8. Proteinuria, nephrotic range:   -has 4-5 g/g per day   -likely from diabetic nephropathy   -Hep B/C, C3/C4, SFL ratio and ANCAs are neg   -unable to add ARB due to # 1 and K 5.0     9. SOB:  -from fluid overload  -sob on exertion and LE edema (better)  -last Echo 03/2020 LVEF 50-55%  -on  po lasix 80 mg daily    10.  DM2:  -takes glipizide 10 mg bid  -already had an episode of severe hypoglycemia  -advised to decrease 10 mg daily and hold if BS < 100 and inform PCP

## 2021-11-03 NOTE — PROGRESS NOTES
Care Transitions Initial Call    Call within 2 business days of discharge: Yes     Patient: Gabrielle Cano Patient : 1945 MRN: [id number]    Last Discharge [address]       Complaint Diagnosis Description Type Department Provider    21 Chest Pain Chest pain, unspecified type ED to Hosp-Admission (Discharged) (ADMIT) [facility] Alana Daniels MD; Gabino Lyn. .. Was this an external facility discharge? No Discharge Facility: N/a    Challenges to be reviewed by the provider   Additional needs identified to be addressed with provider: no  none         Method of communication with provider : chart routing    Discussed COVID-19 related testing which was not done at this time. Test results were not done. Patient informed of results, if available? N/A     Advance Care Planning:   Does patient have an Advance Directive:  not on file    Inpatient Readmission Risk score: Unplanned Readmit Risk Score: 27    Was this a readmission? yes  Patient stated reason for the admission: chest pain    Patients top risk factors for readmission: medical condition-CHF   Interventions to address risk factors: Obtained and reviewed discharge summary and/or continuity of care documents    Care Transition Nurse (CTN) contacted the patient by telephone to perform post hospital discharge assessment. Verified name and  with patient as identifiers. Received verbal permission to speak with cari Torres. Provided introduction to self, and explanation of the CTN role. Niece verbalized she is working with her sisters to apply for Medicaid and get a personal care aide for patient. Niece voiced she comes over everyday to check in and help patient. Encouraged patient to contact CTN if assistance is needed. Patient on oxygen 2 LPM . Occasionally uses cane. Niece providing transportation to appts. Has a scale at home.  Niece voiced he had been weighing daily prior to hospitalization but not recording weights. Educated family on the importance of daily weights. Instructed family to weigh at the same time of day; preferably in the morning after urinating but before eating, wearing the same amount of clothing. Family  instructed to write down the date, time and weight each time he/she weighs. Instructed to report a weight gain of 3 lbs or greater in 24 hours/1 day or 5 lbs or more in one week. Also instructed family  to monitor for SOB while lying flat, swelling to legs/feet, fatigue with doing normal daily activities or swelling/fullness to abdomen. CTN reviewed discharge instructions, medical action plan and red flags with family who verbalized understanding. Were discharge instructions available to patient? yes. Reviewed appropriate site of care based on symptoms and resources available to patient including: PCP, Specialist and CTN. Family given an opportunity to ask questions and does not have any further questions or concerns at this time. The family agrees to contact the PCP office for questions related to their healthcare. Medication reconciliation was performed with family, who verbalizes understanding of administration of home medications. Advised obtaining a 90-day supply of all daily and as-needed medications. Referral to Pharm D needed: no        Covid Risk Education    Educated patient about risk for severe COVID-19 due to risk factors according to CDC guidelines. CTN reviewed discharge instructions, medical action plan and red flag symptoms with the family who verbalized understanding. Discussed COVID vaccination status: no. Education provided on COVID-19 vaccination as appropriate. Discussed exposure protocols and quarantine with CDC Guidelines. Family was given an opportunity to verbalize any questions and concerns and agrees to contact CTN or health care provider for questions related to their healthcare. Discussed follow-up appointments.  If no appointment was previously scheduled, appointment scheduling offered: yes. Is follow up appointment scheduled within 7 days of discharge? TBD.   [facility] follow up appointment(s):   Future Appointments   Date Time Provider Niya Rai   11/3/2021 12:00 PM MD STEPHEN Busch [facility]   1/27/2022 10:45 AM Berry Smith, ROGERIO TIERNEY [facility]   3/30/2022  2:30 PM Ghazala Smith NP [facility]     Non-[facility] follow up appointment(s): Dr. Tg Robles (nephrology) 11/3/21 @ 12 PM  Family advised to contact [facility]iology to schedule 1 week f/up appt. Plan for follow-up call in 7-10 days based on severity of symptoms and risk factors. Plan for next call: symptom management-assess for CHF exacerbation, follow up appointment-verify scheduling/attendance of appts with PCP and cardiology and medication management-follow up on receipt of Norvasc and Sensipar  CTN provided contact information for future needs. Goals Addressed                 This Visit's Progress     Attends follow-up appointments as directed. Goal: Patient will attend all appointments scheduled within the next 30 days. Ensure provider appt is scheduled within 7 business days post-discharge; 11/3: [facility] OC[facility] appt request sent. Family aware of nephrology appt and to schedule a 1 week follow up with cardiology. Confirm patient attended post-discharge provider appt   Complete post-visit call to confirm attendance and update care needs           Prevent complications post hospitalization. Goal: No admissions post 30 days from discharge of 11/2/21.       Review/educate common or potential \"red flags\" of condition worsening;11/3: Writer educated/reviewed the following s/s of CHF exacerbation to monitor and report:    Symptoms:  Remember WORSEN     W=Weight gain (more than 3 pounds in one day or 5 pounds in one week, weigh every morning with no clothes and record your weight)  O=Orthopnea (difficulty breathing while lying flat, may need to sleep with extra pillows or in a recliner)  R=Resting more than usual  S=Shortness of breath (with activity and at rest)  E=Edema (swelling in lower legs, abdomen, scrotum)  N=Non-productive cough (a cough is not always a cold, it may mean fluid is backing up into your lungs)  Monitor lab results and symptoms, escalate to provider       Assess for health risk behaviors and educate patient/caregivers on reducing risk; 11/3: Educated on reason for following a no salt added diet and performing daily weights   Coordinate plan to treat at home when symptoms arise; 11/3: Instructed to contact PCP or cardiology or CTN for weight gain > 3 lbs X 24 hrs or >5 lbs X 7 days. Observe for trends in symptoms and measures, provide direction to patient, and notify provider as needed       Discuss and provide resources for ACP; 11/3: Not on file; will address at a later time.

## 2021-11-09 ENCOUNTER — HOSPITAL ENCOUNTER (OUTPATIENT)
Dept: NUCLEAR MEDICINE | Age: 76
Discharge: HOME | End: 2021-11-09
Attending: NURSE PRACTITIONER
Payer: MEDICARE

## 2021-11-09 ENCOUNTER — HOSPITAL ENCOUNTER (OUTPATIENT)
Dept: NON INVASIVE DIAGNOSTICS | Age: 76
Discharge: HOME | End: 2021-11-09
Attending: NURSE PRACTITIONER
Payer: MEDICARE

## 2021-11-09 VITALS
HEART RATE: 84 BPM | WEIGHT: 202 LBS | BODY MASS INDEX: 30.62 KG/M2 | DIASTOLIC BLOOD PRESSURE: 80 MMHG | SYSTOLIC BLOOD PRESSURE: 138 MMHG | HEIGHT: 68 IN

## 2021-11-09 DIAGNOSIS — Z01.810 PREOP CARDIOVASCULAR EXAM: ICD-10-CM

## 2021-11-09 LAB
STRESS ST DEPRESSION: 0 MM
STRESS ST ELEVATION: 0 MM
STRESS TARGET HR: 146 BPM

## 2021-11-09 PROCEDURE — 74011250636 HC RX REV CODE- 250/636: Performed by: NURSE PRACTITIONER

## 2021-11-09 PROCEDURE — 93017 CV STRESS TEST TRACING ONLY: CPT

## 2021-11-09 RX ORDER — TETRAKIS(2-METHOXYISOBUTYLISOCYANIDE)COPPER(I) TETRAFLUOROBORATE 1 MG/ML
32.1 INJECTION, POWDER, LYOPHILIZED, FOR SOLUTION INTRAVENOUS
Status: COMPLETED | OUTPATIENT
Start: 2021-11-09 | End: 2021-11-09

## 2021-11-09 RX ORDER — TETRAKIS(2-METHOXYISOBUTYLISOCYANIDE)COPPER(I) TETRAFLUOROBORATE 1 MG/ML
8.8 INJECTION, POWDER, LYOPHILIZED, FOR SOLUTION INTRAVENOUS
Status: COMPLETED | OUTPATIENT
Start: 2021-11-09 | End: 2021-11-09

## 2021-11-09 RX ADMIN — TETRAKIS(2-METHOXYISOBUTYLISOCYANIDE)COPPER(I) TETRAFLUOROBORATE 8.8 MILLICURIE: 1 INJECTION, POWDER, LYOPHILIZED, FOR SOLUTION INTRAVENOUS at 06:43

## 2021-11-09 RX ADMIN — REGADENOSON 0.4 MG: 0.08 INJECTION, SOLUTION INTRAVENOUS at 08:48

## 2021-11-09 RX ADMIN — TETRAKIS(2-METHOXYISOBUTYLISOCYANIDE)COPPER(I) TETRAFLUOROBORATE 32.1 MILLICURIE: 1 INJECTION, POWDER, LYOPHILIZED, FOR SOLUTION INTRAVENOUS at 09:05

## 2021-11-11 ENCOUNTER — PATIENT OUTREACH (OUTPATIENT)
Dept: CASE MANAGEMENT | Age: 76
End: 2021-11-11

## 2021-11-11 NOTE — PROGRESS NOTES
Care Transitions Follow Up Call    Challenges to be reviewed by the provider   Additional needs identified to be addressed with provider: no  none           Method of communication with provider : none    Care Transition Nurse (CTN) contacted the patient by telephone to follow up after admission on 21. Verified name and  with patient as identifiers. Spoke with Paige Danielsprinceclari per patient's request. Niece denied SOB, need to increase oxygen, swelling to fet/leg, dry cough or CP. Did reported mentioned clothes fitting tighter around waist on yesterday. Niece voiced she fell and sprained her ankle. Sister helping with patient. They reported patient is weighing daily; weight 201 lbs. They have not been recording weights. Reinforced the importance of recording weights to monitor gradual weight gain. Suggested writing down on a calendar or notebook. Instructed if patient gains > 3 lbs overnight or > 5 lbs within 7 days to contact cardiology office. Niece verbalized understanding. Niece voiced patient had nuclear stress test and has cardiology follow up next week. Addressed changes since last contact: medications- Niece reported Norvasc increased by cardiology but it was denied by insurance. They will dicuss this with them next week. Unsure if patient has or is taking Sensipar. Follow up appointment completed? yes. Was follow up appointment scheduled within 7 days of discharge? yes. Advance Care Planning:   Does patient have an Advance Directive:  currently not on file; education provided . Requested document be mailed to home. Verified address on file. CTN reviewed discharge instructions, medical action plan and red flags with family and discussed any barriers to care and/or understanding of plan of care after discharge. Discussed appropriate site of care based on symptoms and resources available to patient including: PCP, Specialist, When to call 911 and CTN.  The family agrees to contact the PCP office for questions related to their healthcare. Patients top risk factors for readmission: medical condition-CHF   Interventions to address risk factors: Reinforced education on daily weights    [facility] follow up appointment(s):   Future Appointments   Date Time Provider Niya Cecelia   11/12/2021 11:00 AM Brian Smith NP [facility]   11/24/2021  9:15 AM MD STEPHEN Caicedo [facility]   1/27/2022 10:45 AM Brian Smith NP [facility]   3/30/2022  2:30 PM Ghazala Smith NP [facility]     [facility] follow up appointment(s[facility]    CTN provided contact information for future needs. Plan for follow-up call in 7-10 days based on severity of symptoms and risk factors. Plan for next call: symptom management-assess for CHF exacerbation, follow up appointment-verify attendance of cardiology appt and medication management-follow up on [facility]     Goals Addressed                 This Visit's Progress     Attends follow-up appointments as directed. On track     Goal: Patient will attend all appointments scheduled within the next 30 days. Ensure provider appt is scheduled within 7 business days post-discharge; 11/3: [facility] appt request sent. Family aware of nephrology appt and to schedule a 1 week follow up with cardiology. 11/11: Patient had nuclear stress test with cardiology follow up planned for next week. Family aware PCP appt scheduled for 11/12. Confirm patient attended post-discharge provider appt   Complete post-visit call to confirm attendance and update care needs; 11/11: Done.  Prevent complications post hospitalization. Goal: No admissions post 30 days from discharge of 11/2/21.       Review/educate common or potential \"red flags\" of condition worsening;11/3: Writer educated/reviewed the following s/s of CHF exacerbation to monitor and report:    Symptoms:  Remember WORSEN     W=Weight gain (more than 3 pounds in one day or 5 pounds in one week, weigh every morning with no clothes and record your weight)  O=Orthopnea (difficulty breathing while lying flat, may need to sleep with extra pillows or in a recliner)  R=Resting more than usual  S=Shortness of breath (with activity and at rest)  E=Edema (swelling in lower legs, abdomen, scrotum)  N=Non-productive cough (a cough is not always a cold, it may mean fluid is backing up into your lungs)  Monitor lab results and symptoms, escalate to provider       Assess for health risk behaviors and educate patient/caregivers on reducing risk; 11/3: Educated on reason for following a no salt added diet and performing daily weights. 11/11: Reinforced the importance of recording daily weights. Coordinate plan to treat at home when symptoms arise; 11/3: Instructed to contact PCP or cardiology or CTN for weight gain > 3 lbs X 24 hrs or >5 lbs X 7 days. Observe for trends in symptoms and measures, provide direction to patient, and notify provider as needed       Discuss and provide resources for ACP; 11/3: Not on file; will address at a later time. 11/11: Education provided; document will be mailed to home.

## 2021-11-12 ENCOUNTER — VIRTUAL VISIT (OUTPATIENT)
Dept: FAMILY MEDICINE CLINIC | Age: 76
End: 2021-11-12
Payer: MEDICARE

## 2021-11-12 DIAGNOSIS — Z09 HOSPITAL DISCHARGE FOLLOW-UP: ICD-10-CM

## 2021-11-12 DIAGNOSIS — R07.9 CHEST PAIN, UNSPECIFIED TYPE: ICD-10-CM

## 2021-11-12 PROCEDURE — G8756 NO BP MEASURE DOC: HCPCS | Performed by: NURSE PRACTITIONER

## 2021-11-12 PROCEDURE — 1101F PT FALLS ASSESS-DOCD LE1/YR: CPT | Performed by: NURSE PRACTITIONER

## 2021-11-12 PROCEDURE — G8427 DOCREV CUR MEDS BY ELIG CLIN: HCPCS | Performed by: NURSE PRACTITIONER

## 2021-11-12 PROCEDURE — 3017F COLORECTAL CA SCREEN DOC REV: CPT | Performed by: NURSE PRACTITIONER

## 2021-11-12 PROCEDURE — 99212 OFFICE O/P EST SF 10 MIN: CPT | Performed by: NURSE PRACTITIONER

## 2021-11-12 PROCEDURE — G8432 DEP SCR NOT DOC, RNG: HCPCS | Performed by: NURSE PRACTITIONER

## 2021-11-12 NOTE — PROGRESS NOTES
1. \"Have you been to the ER, urgent care clinic since your last visit? Hospitalized since your last visit? \" No    2. \"Have you seen or consulted any other health care providers outside of the [address] [place] [address] since your last visit? \" No     3. For patients aged 39-70: Has the patient had a colonoscopy? No     If the patient is female:    4. For patients aged 41-77: Has the patient had a mammogram within the past 2 years? No    5. For patients aged 21-65: Has the patient had a pap smear?  No

## 2021-11-12 NOTE — PROGRESS NOTES
Lucius Deras is a 78 y.o. male who was seen by synchronous (real-time) audio-video technology on 11/12/2021 for Hospital Follow Up ([address] [place] [address] )        Assessment & Plan:   Diagnoses and all orders for this visit:    1. Hospital discharge follow-up    2. Chest pain, unspecified type  Patient is follow up with cardiology next week      I spent at least 15 minutes on this visit with this established patient. 712  Subjective:     Patient is being seen today for a hospital discharge follow up from [facility]. Was admitted on 11/1/2021 to 11/2/2021. Patient was admitted for chest pain. Patient is following by cardiology. He have an appointment to follow up. No changes in his medications and patient was symptom free upon discharge. Patient was discharged on his chronic 2 L of oxygen. Patient has CHF and CT done, chest xray completed. Patient medications were reviewed today. Patient have no concerns today and denies any chest pain. Patient states he feel well overall. Patient was referred to pulmonary on last visit information given to call and schedule visit. Past Medical History:   Diagnosis Date    Acid reflux     Arrhythmia     Arthritis     Bladder cancer (HCC)     Congestive heart failure (HCC)     Deep vein thrombosis (DVT) of proximal vein of both lower extremities ([address] 9/14/2020    Diabetes mellitus ([address]     GERD (gastroesophageal reflux disease)     Gout     High cholesterol     Hypertension     Kidney carcinoma, left (HCC)     Kidney disease     Pituitary mass ([address]     Reflux esophagitis     Unspecified deficiency anemia         Prior to Admission medications    Medication Sig Start Date End Date Taking? Authorizing Provider   glipiZIDE (GLUCOTROL) 10 mg tablet Take 10 mg by mouth two (2) times a day. Yes Provider, Historical   insulin glargine (Lantus Solostar U-100 Insulin) 100 unit/mL (3 mL) inpn 8 Units by SubCUTAneous route nightly.    Yes Provider, Historical   sodium bicarbonate 650 mg tablet Take 650 mg by mouth three (3) times daily. Yes Provider, Historical   allopurinoL (ZYLOPRIM) 100 mg tablet TAKE 2 TABLETS BY MOUTH DAILY 10/29/21  Yes Ghazala Smith NP   minoxidiL (LONITEN) 2.5 mg tablet Take 1 Tablet by mouth daily. Patient taking differently: Take 2.5 mg by mouth two (2) times a day. 10/22/21  Yes Mickey Zepeda MD   furosemide (LASIX) 40 mg tablet Take 1 Tablet by mouth daily. 10/21/21  Yes Mickey Zepeda MD   loratadine (CLARITIN) 10 mg tablet Take 10 mg by mouth daily. 9/9/21  Yes Lesly, MD Melba   albuterol (PROVENTIL HFA, VENTOLIN HFA, PROAIR HFA) 90 mcg/actuation inhaler Take 2 Puffs by inhalation every four (4) hours as needed for Wheezing, Shortness of Breath or Respiratory Distress. 9/30/21  Yes Ghazala Smith NP   ferrous sulfate (IRON) 325 mg (65 mg iron) EC tablet Take 1 Tablet by mouth three (3) times daily (with meals). 9/22/21  Yes Cally Rawls MD   simvastatin (ZOCOR) 40 mg tablet TAKE 1 TABLET BY MOUTH EVERY NIGHT 9/8/21  Yes Ghazala Smith NP   metoprolol succinate (TOPROL-XL) 25 mg XL tablet Take 1 Tablet by mouth daily. 7/13/21  Yes Melba Grace MD   amLODIPine (NORVASC) 5 mg tablet TAKE 2 TABLETS BY MOUTH ONCE A DAY 7/30/21  Yes Ghazala Smith NP   omeprazole (PRILOSEC) 40 mg capsule TAKE 1 CAPSULE BY MOUTH DAILY 6/3/21  Yes Ghazala Smith NP   polyethylene glycol (Miralax) 17 gram packet Take 1 Packet by mouth daily. Patient taking differently: Take 17 g by mouth daily as needed. 6/3/21  Yes Ghazala Smith NP   apixaban (Eliquis) 2.5 mg tablet Take 1 Tablet by mouth two (2) times a day.  6/3/21  Yes Ghazala Smith NP   ergocalciferol (ERGOCALCIFEROL) 1,250 mcg (50,000 unit) capsule TAKE ONE CAPSULE BY MOUTH EVERY 7 DAYS 4/26/21  Yes Cally Rawls MD   BD Ultra-Fine Mini Pen Needle 31 gauge x 3/16\" ndle USE TO INJECT TID 1/26/21  Yes Jhonny Smith Smoker, NP fluticasone propionate (FLONASE) 50 mcg/actuation nasal spray 1 [place] by Both Nostrils route daily as needed. Yes Other, MD Melba   cinacalcet (SENSIPAR) 30 mg tablet Take 30 mg by mouth daily.   Patient not taking: Reported on 11/3/2021    Provider, Mackenzie   epoetin sadi (Epogen) 20,000 unit/mL injection Inject 20,000 units every 3 weeks subcutaneously  Patient not taking: Reported on 11/3/2021 10/13/21   Marci Shelby MD     Patient Active Problem List   Diagnosis Code    Malignant neoplasm of urinary bladder (HCC) C67.9    Type 2 diabetes mellitus with stage 4 chronic kidney disease, with long-term current use of insulin ([facility]) E11.22, N18.4, Z79.4    Primary hypertension I10    Hematuria R31.9    Stage 3 chronic kidney disease ([facility] N18.30    Chronic diastolic congestive heart failure (HCC) I50.32    Positive D dimer R79.89    Deep vein thrombosis (DVT) of proximal vein of both lower extremities ([facility]) I82.4Y3    CHF (congestive heart failure) (HCC) I50.9    Acute on chronic heart failure (HCC) I50.9    Acute deep vein thrombosis (DVT) of distal vein of both lower extremities ([facility]) I82.4Z3    Acute renal failure superimposed on chronic kidney disease (HCC) N17.9, N18.9    Coronary artery disease of native artery of native heart with stable angina pectoris ([facility]) I25.118    Gastroesophageal reflux disease without esophagitis K21.9    General weakness R53.1    H/O left nephrectomy Z90.5    History of bladder cancer Z85.51    HLD (hyperlipidemia) E78.5    Pulmonary edema, acute (HCC) J81.0    Renal cell carcinoma of left kidney (HCC) C64.2    CHF exacerbation (HCC) I50.9    Acute on chronic combined systolic and diastolic ACC/AHA stage C congestive heart failure (HCC) I50.43    Chronic kidney disease, stage IV (severe) (HCC) N18.4    Acute on chronic congestive heart failure (HCC) I50.9    Chest pain R07.9    Chronic kidney disease, stage 4 (severe) (HCC) N18.4     Patient Active Problem List    Diagnosis Date Noted    Chest pain 11/01/2021    Chronic kidney disease, stage 4 (severe) ([address] 11/01/2021    Chronic kidney disease, stage IV (severe) ([address] 10/19/2021    Acute on chronic congestive heart failure ([address] 10/19/2021    Acute on chronic combined systolic and diastolic ACC/AHA stage C congestive heart failure ([address] 10/07/2021    CHF exacerbation ([address] 10/06/2021    Acute on chronic heart failure ([address] 01/13/2021    Acute deep vein thrombosis (DVT) of distal vein of both lower extremities ([address] 10/13/2020    CHF (congestive heart failure) ([address] 09/15/2020    Deep vein thrombosis (DVT) of proximal vein of both lower extremities ([address] 09/14/2020    Positive D dimer 09/13/2020    General weakness 08/01/2020    Chronic diastolic congestive heart failure ([address] 07/31/2020    Coronary artery disease of native artery of native heart with stable angina pectoris ([address] 03/20/2020    H/O left nephrectomy 03/20/2020    History of bladder cancer 03/20/2020    Pulmonary edema, acute ([address] 03/20/2020    Acute renal failure superimposed on chronic kidney disease ([address] 11/23/2017    Gastroesophageal reflux disease without esophagitis 11/23/2017    HLD (hyperlipidemia) 11/23/2017    Renal cell carcinoma of left kidney ([address] 11/23/2017    Hematuria 09/04/2014    Stage 3 chronic kidney disease ([address] 09/04/2014    Malignant neoplasm of urinary bladder ([address] 12/07/2009    Type 2 diabetes mellitus with stage 4 chronic kidney disease, with long-term current use of insulin ([address] 12/07/2009    Primary hypertension 12/07/2009     Not on File  Past Surgical History:   Procedure Laterality Date    HX CYSTECTOMY  12/30/09    Dr. Deborah Borrero HX NEPHRECTOMY Left 5/2009    Nephroureterectomy by Dr. Anders Shelton HX OTHER SURGICAL      surgery on pituitary gland     Family History   Problem Relation Age of Onset    Heart Disease Father     Heart Disease Mother        Review of Systems   Constitutional: Negative for chills, fever and malaise/fatigue. Eyes: Negative. Respiratory: Negative for cough, shortness of breath and wheezing. Cardiovascular: Negative for chest pain, palpitations and leg swelling. Musculoskeletal: Negative. Skin: Negative. Neurological: Negative. Objective:     Patient-Reported Vitals 11/12/2021   Patient-Reported Weight 202      General: alert, cooperative, no distress   Mental  status: normal mood, behavior, speech, dress, motor activity, and thought processes, able to follow commands   HENT: NCAT   Neck: no visualized mass   Resp: no respiratory distress   Neuro: no gross deficits   Skin: no discoloration or lesions of concern on visible areas   Psychiatric: normal affect, consistent with stated mood, no evidence of hallucinations     Additional exam findings: We discussed the expected course, resolution and complications of the diagnosis(es) in detail. Medication risks, benefits, costs, interactions, and alternatives were discussed as indicated. I advised him to contact the office if his condition worsens, changes or fails to improve as anticipated. He expressed understanding with the diagnosis(es) and plan. Tere Simpson, who was evaluated through a patient-initiated, synchronous (real-time) audio-video encounter, and/or his healthcare decision maker, is aware that it is a billable service, with coverage as determined by his insurance carrier. He provided verbal consent to proceed: Yes, and patient identification was verified. It was conducted pursuant to the emergency declaration under the [address] [place] [address], [address] [place] [address] authority and the [employer] General Act. A caregiver was present when appropriate. Ability to conduct physical exam was limited. I was in the office. The patient was at home.       Braxton Kelly NP

## 2021-11-15 ENCOUNTER — HOSPITAL ENCOUNTER (OUTPATIENT)
Dept: PREADMISSION TESTING | Age: 76
Discharge: HOME | End: 2021-11-15
Payer: MEDICARE

## 2021-11-15 DIAGNOSIS — Z01.818 PRE-OP TESTING: Primary | ICD-10-CM

## 2021-11-15 DIAGNOSIS — Z01.818 PRE-OP TESTING: ICD-10-CM

## 2021-11-15 LAB — SARS-COV-2, NAA: NOT DETECTED

## 2021-11-15 PROCEDURE — U0003 INFECTIOUS AGENT DETECTION BY NUCLEIC ACID (DNA OR RNA); SEVERE ACUTE RESPIRATORY SYNDROME CORONAVIRUS 2 (SARS-COV-2) (CORONAVIRUS DISEASE [COVID-19]), AMPLIFIED PROBE TECHNIQUE, MAKING USE OF HIGH THROUGHPUT TECHNOLOGIES AS DESCRIBED BY CMS-2020-01-R: HCPCS

## 2021-11-15 RX ORDER — CINACALCET 30 MG/1
30 TABLET, FILM COATED ORAL DAILY
COMMUNITY
End: 2021-12-16

## 2021-11-15 RX ORDER — HYDRALAZINE HYDROCHLORIDE 100 MG/1
100 TABLET, FILM COATED ORAL 3 TIMES DAILY
COMMUNITY
End: 2022-03-30 | Stop reason: SDUPTHER

## 2021-11-15 RX ORDER — FUROSEMIDE 80 MG/1
80 TABLET ORAL DAILY
COMMUNITY
End: 2021-12-30

## 2021-11-15 NOTE — PERIOP NOTES
Dallas Hodgson's PAT phone assessment completed on 11/15/21. Cardiac clearance recommended per anesthesia due to CHF and pulmonary HTN. Patient has cardiac clearance appointment 11/16/21. Spoke to cari Mcdonald who states he also has an upcoming appointment with new pulmonary. Per notes in care everywhere admission 10/21/21 was to follow up with Dr. Bety Gomez for Pulmonary HTN and possible right heart cath. Patient is on oxygen 2 L NC and also needs instructions on when to stop eliquis. The following instructions were reviewed with Topher Mcdonald. Topher Mcdonald verbalized understanding. 1. Do NOT eat or drink anything, including candy, gum, or ice chips after midnight on 11/19, unless you have specific instructions from your surgeon or anesthesia provider to do so.  2. You may brush your teeth before coming to the hospital.  3. No smoking 24 hours prior to the day of surgery. 4. No alcohol 24 hours prior to the day of surgery. 5. No recreational drugs for one week prior to the day of surgery. 6. Leave all valuables, including money/purse, at home. 7. Remove all jewelry, nail polish, acrylic nails, and makeup (including mascara); no lotions powders, deodorant, or perfume/cologne/after shave on the skin. 8. Glasses/contact lenses and dentures may be worn to the hospital.  They will be removed prior to surgery. 9. Call your doctor if symptoms of a cold or illness develop within 24-48 hours prior to your surgery. 10.  AN ADULT MUST DRIVE YOU HOME AFTER OUTPATIENT SURGERY. 11.  If you are having an outpatient procedure, please make arrangements for a responsible adult to be with you for 24 hours after your surgery. 12. ONE VISITOR in the hospital at this time for outpatient procedures. Special Instructions:      Bring list of CURRENT medications. Bring inhaler. Bring any pertinent legal medical records.   Take these medications the morning of surgery with a sip of water:  As directed by MD  Follow physician instructions about insulin. Follow physician instructions about stopping anticoagulants. Complete bowel prep per MD instructions.

## 2021-11-18 ENCOUNTER — ANESTHESIA EVENT (OUTPATIENT)
Dept: ENDOSCOPY | Age: 76
End: 2021-11-18
Payer: MEDICARE

## 2021-11-18 ENCOUNTER — PATIENT OUTREACH (OUTPATIENT)
Dept: CASE MANAGEMENT | Age: 76
End: 2021-11-18

## 2021-11-18 NOTE — PROGRESS NOTES
Care Transitions Follow Up Call    Challenges to be reviewed by the provider   Additional needs identified to be addressed with provider: no  none           Method of communication with provider : none    Care Transition Nurse (CTN) contacted the patient by telephone to follow up after admission on 21. Verified name and  with patient as identifiers. Received verbal permission to speak with cari Doyle. Niece reported patient's FBS was 90 so he did not take Glipizide. Patient only using oxygen at bedtime; doing okay without it during the day. Seen  by cardiology this Tuesday. Denied any changes to medications. Addressed changes since last contact: medications- Glipized decreased from BID to daily; HOLD for BS < 100  Follow up appointment completed? yes. Was follow up appointment scheduled within 7 days of discharge? yes. Advance Care Planning:   Does patient have an Advance Directive:  documents to be mailed to home    CTN reviewed discharge instructions, medical action plan and red flags with family and discussed any barriers to care and/or understanding of plan of care after discharge. Discussed appropriate site of care based on symptoms and resources available to patient including: PCP, Specialist and CTN. The family agrees to contact the PCP office for questions related to their healthcare. Patients top risk factors for readmission: medical condition-CHF   Interventions to address risk factors: Assessed for CF exacerbation    REHABILITATION [facility] follow up appointment(s):   Future Appointments   Date Time Provider Niya Rai   2021  9:15 AM MD STEPHEN Sood BS AMB   2022 10:45 AM Gelacio Banegas MD [facility] BS AMB   2022 10:45 AM Sharita Smith NP [facility] BS AMB   3/30/2022  2:30 PM Ghazala Smith NP [facility] BS AMB     Non-[facility] follow up appointment(s): None    CTN provided contact information for future needs.  Plan for follow-up call in 10-14 days based on severity of symptoms and risk factors. Plan for next call: symptom management-assess for CHF exacerbation, self management-daily weights and referrals-follow up scheduling pulmonology appt      Goals Addressed                 This Visit's Progress     Attends follow-up appointments as directed. On track     Goal: Patient will attend all appointments scheduled within the next 30 days. Ensure provider appt is scheduled within 7 business days post-discharge; 11/3: [facility] appt request sent. Family aware of nephrology appt and to schedule a 1 week follow up with cardiology. 11/11: Patient had nuclear stress test with cardiology follow up planned for next week. Family aware PCP appt scheduled for 11/12. Confirm patient attended post-discharge provider appt ; 11/18: Attended nephrology appt on 11/3 and cardiology appt on 11/16. Complete post-visit call to confirm attendance and update care needs; 11/11: Done. 11/18: Done.  Prevent complications post hospitalization. Goal: No admissions post 30 days from discharge of 11/2/21.       Review/educate common or potential \"red flags\" of condition worsening;11/3: Writer educated/reviewed the following s/s of CHF exacerbation to monitor and report:    Symptoms:  Remember WORSEN     W=Weight gain (more than 3 pounds in one day or 5 pounds in one week, weigh every morning with no clothes and record your weight)  O=Orthopnea (difficulty breathing while lying flat, may need to sleep with extra pillows or in a recliner)  R=Resting more than usual  S=Shortness of breath (with activity and at rest)  E=Edema (swelling in lower legs, abdomen, scrotum)  N=Non-productive cough (a cough is not always a cold, it may mean fluid is backing up into your lungs)  Monitor lab results and symptoms, escalate to provider       Assess for health risk behaviors and educate patient/caregivers on reducing risk; 11/3: Educated on reason for following a no salt added diet and performing daily weights. 11/11: Reinforced the importance of recording daily weights. 11/18: Patient is not weighing. Coordinate plan to treat at home when symptoms arise; 11/3: Instructed to contact PCP or cardiology or CTN for weight gain > 3 lbs X 24 hrs or >5 lbs X 7 days. Observe for trends in symptoms and measures, provide direction to patient, and notify provider as needed       Discuss and provide resources for ACP; 11/3: Not on file; will address at a later time. 11/11: Education provided; document will be mailed to home.

## 2021-11-19 ENCOUNTER — HOSPITAL ENCOUNTER (OUTPATIENT)
Age: 76
Setting detail: OUTPATIENT SURGERY
Discharge: HOME | End: 2021-11-19
Attending: COLON & RECTAL SURGERY | Admitting: COLON & RECTAL SURGERY
Payer: MEDICARE

## 2021-11-19 ENCOUNTER — ANESTHESIA (OUTPATIENT)
Dept: ENDOSCOPY | Age: 76
End: 2021-11-19
Payer: MEDICARE

## 2021-11-19 VITALS
RESPIRATION RATE: 20 BRPM | SYSTOLIC BLOOD PRESSURE: 182 MMHG | BODY MASS INDEX: 29.64 KG/M2 | HEIGHT: 68 IN | DIASTOLIC BLOOD PRESSURE: 70 MMHG | HEART RATE: 70 BPM | WEIGHT: 195.6 LBS | OXYGEN SATURATION: 100 % | TEMPERATURE: 97.9 F

## 2021-11-19 LAB
GLUCOSE BLD STRIP.AUTO-MCNC: 115 MG/DL (ref 70–110)
GLUCOSE BLD STRIP.AUTO-MCNC: 176 MG/DL (ref 70–110)

## 2021-11-19 PROCEDURE — 77030020018 HC MRKR ENDOSC SPOT 5ML SYR GISP -B: Performed by: COLON & RECTAL SURGERY

## 2021-11-19 PROCEDURE — 00811 ANES LWR INTST NDSC NOS: CPT | Performed by: ANESTHESIOLOGY

## 2021-11-19 PROCEDURE — 99100 ANES PT EXTEME AGE<1 YR&>70: CPT | Performed by: ANESTHESIOLOGY

## 2021-11-19 PROCEDURE — 77030010936 HC CLP LIG BSC -C: Performed by: COLON & RECTAL SURGERY

## 2021-11-19 PROCEDURE — 74011250636 HC RX REV CODE- 250/636: Performed by: NURSE ANESTHETIST, CERTIFIED REGISTERED

## 2021-11-19 PROCEDURE — C1729 CATH, DRAINAGE: HCPCS | Performed by: COLON & RECTAL SURGERY

## 2021-11-19 PROCEDURE — 00811 ANES LWR INTST NDSC NOS: CPT | Performed by: NURSE ANESTHETIST, CERTIFIED REGISTERED

## 2021-11-19 PROCEDURE — 88305 TISSUE EXAM BY PATHOLOGIST: CPT

## 2021-11-19 PROCEDURE — 77030013992 HC SNR POLYP ENDOSC BSC -B: Performed by: COLON & RECTAL SURGERY

## 2021-11-19 PROCEDURE — 77030008565 HC TBNG SUC IRR ERBE -B: Performed by: COLON & RECTAL SURGERY

## 2021-11-19 PROCEDURE — 74011250637 HC RX REV CODE- 250/637: Performed by: NURSE ANESTHETIST, CERTIFIED REGISTERED

## 2021-11-19 PROCEDURE — 45381 COLONOSCOPY SUBMUCOUS NJX: CPT | Performed by: COLON & RECTAL SURGERY

## 2021-11-19 PROCEDURE — 74011000250 HC RX REV CODE- 250: Performed by: NURSE ANESTHETIST, CERTIFIED REGISTERED

## 2021-11-19 PROCEDURE — 74011636637 HC RX REV CODE- 636/637: Performed by: NURSE ANESTHETIST, CERTIFIED REGISTERED

## 2021-11-19 PROCEDURE — 2709999900 HC NON-CHARGEABLE SUPPLY: Performed by: COLON & RECTAL SURGERY

## 2021-11-19 PROCEDURE — 76060000032 HC ANESTHESIA 0.5 TO 1 HR: Performed by: COLON & RECTAL SURGERY

## 2021-11-19 PROCEDURE — 99100 ANES PT EXTEME AGE<1 YR&>70: CPT | Performed by: NURSE ANESTHETIST, CERTIFIED REGISTERED

## 2021-11-19 PROCEDURE — 76040000007: Performed by: COLON & RECTAL SURGERY

## 2021-11-19 PROCEDURE — 45385 COLONOSCOPY W/LESION REMOVAL: CPT | Performed by: COLON & RECTAL SURGERY

## 2021-11-19 PROCEDURE — 82962 GLUCOSE BLOOD TEST: CPT

## 2021-11-19 PROCEDURE — 77030003657 HC NDL SCLER BSC -B: Performed by: COLON & RECTAL SURGERY

## 2021-11-19 RX ORDER — SODIUM CHLORIDE 0.9 % (FLUSH) 0.9 %
5-40 SYRINGE (ML) INJECTION EVERY 8 HOURS
Status: CANCELLED | OUTPATIENT
Start: 2021-11-19

## 2021-11-19 RX ORDER — DEXTROSE 50 % IN WATER (D50W) INTRAVENOUS SYRINGE
25-50 AS NEEDED
Status: CANCELLED | OUTPATIENT
Start: 2021-11-19

## 2021-11-19 RX ORDER — INSULIN LISPRO 100 [IU]/ML
INJECTION, SOLUTION INTRAVENOUS; SUBCUTANEOUS ONCE
Status: COMPLETED | OUTPATIENT
Start: 2021-11-19 | End: 2021-11-19

## 2021-11-19 RX ORDER — FAMOTIDINE 20 MG/1
20 TABLET, FILM COATED ORAL ONCE
Status: COMPLETED | OUTPATIENT
Start: 2021-11-19 | End: 2021-11-19

## 2021-11-19 RX ORDER — LIDOCAINE HYDROCHLORIDE 20 MG/ML
INJECTION, SOLUTION EPIDURAL; INFILTRATION; INTRACAUDAL; PERINEURAL AS NEEDED
Status: DISCONTINUED | OUTPATIENT
Start: 2021-11-19 | End: 2021-11-19 | Stop reason: HOSPADM

## 2021-11-19 RX ORDER — SODIUM CHLORIDE, SODIUM LACTATE, POTASSIUM CHLORIDE, CALCIUM CHLORIDE 600; 310; 30; 20 MG/100ML; MG/100ML; MG/100ML; MG/100ML
25 INJECTION, SOLUTION INTRAVENOUS CONTINUOUS
Status: DISCONTINUED | OUTPATIENT
Start: 2021-11-19 | End: 2021-11-19 | Stop reason: HOSPADM

## 2021-11-19 RX ORDER — INSULIN LISPRO 100 [IU]/ML
INJECTION, SOLUTION INTRAVENOUS; SUBCUTANEOUS ONCE
Status: CANCELLED | OUTPATIENT
Start: 2021-11-19 | End: 2021-11-19

## 2021-11-19 RX ORDER — MAGNESIUM SULFATE 100 %
4 CRYSTALS MISCELLANEOUS AS NEEDED
Status: CANCELLED | OUTPATIENT
Start: 2021-11-19

## 2021-11-19 RX ORDER — SODIUM CHLORIDE 0.9 % (FLUSH) 0.9 %
5-40 SYRINGE (ML) INJECTION AS NEEDED
Status: CANCELLED | OUTPATIENT
Start: 2021-11-19

## 2021-11-19 RX ORDER — SODIUM CHLORIDE, SODIUM LACTATE, POTASSIUM CHLORIDE, CALCIUM CHLORIDE 600; 310; 30; 20 MG/100ML; MG/100ML; MG/100ML; MG/100ML
25 INJECTION, SOLUTION INTRAVENOUS CONTINUOUS
Status: CANCELLED | OUTPATIENT
Start: 2021-11-19

## 2021-11-19 RX ORDER — PROPOFOL 10 MG/ML
INJECTION, EMULSION INTRAVENOUS AS NEEDED
Status: DISCONTINUED | OUTPATIENT
Start: 2021-11-19 | End: 2021-11-19 | Stop reason: HOSPADM

## 2021-11-19 RX ADMIN — PROPOFOL 40 MG: 10 INJECTION, EMULSION INTRAVENOUS at 09:39

## 2021-11-19 RX ADMIN — PROPOFOL 40 MG: 10 INJECTION, EMULSION INTRAVENOUS at 09:28

## 2021-11-19 RX ADMIN — SODIUM CHLORIDE, SODIUM LACTATE, POTASSIUM CHLORIDE, AND CALCIUM CHLORIDE 25 ML/HR: 600; 310; 30; 20 INJECTION, SOLUTION INTRAVENOUS at 08:26

## 2021-11-19 RX ADMIN — INSULIN LISPRO 3 UNITS: 100 INJECTION, SOLUTION INTRAVENOUS; SUBCUTANEOUS at 08:27

## 2021-11-19 RX ADMIN — PROPOFOL 40 MG: 10 INJECTION, EMULSION INTRAVENOUS at 09:44

## 2021-11-19 RX ADMIN — PROPOFOL 80 MG: 10 INJECTION, EMULSION INTRAVENOUS at 09:14

## 2021-11-19 RX ADMIN — PROPOFOL 40 MG: 10 INJECTION, EMULSION INTRAVENOUS at 09:50

## 2021-11-19 RX ADMIN — LIDOCAINE HYDROCHLORIDE 100 MG: 20 INJECTION, SOLUTION EPIDURAL; INFILTRATION; INTRACAUDAL; PERINEURAL at 09:23

## 2021-11-19 RX ADMIN — PROPOFOL 40 MG: 10 INJECTION, EMULSION INTRAVENOUS at 09:16

## 2021-11-19 RX ADMIN — PROPOFOL 40 MG: 10 INJECTION, EMULSION INTRAVENOUS at 09:19

## 2021-11-19 RX ADMIN — FAMOTIDINE 20 MG: 20 TABLET ORAL at 08:29

## 2021-11-19 RX ADMIN — PROPOFOL 40 MG: 10 INJECTION, EMULSION INTRAVENOUS at 09:33

## 2021-11-19 RX ADMIN — PROPOFOL 40 MG: 10 INJECTION, EMULSION INTRAVENOUS at 09:23

## 2021-11-19 NOTE — H&P
HPI: Otilio Trujillo is a 78 y.o. male presenting with chief complain of need for crc scren    Past Medical History:   Diagnosis Date    Acid reflux     Arrhythmia     Arthritis     [facility]     Congestive heart failure (HCC)     Deep vein thrombosis (DVT) of proximal vein of both lower extremities ([facility] 2020    Diabetes mellitus ([facility]     GERD (gastroesophageal reflux disease)     Gout     High cholesterol     Hypertension     Kidney carcinoma, left (HCC)     Kidney disease     Pituitary mass ([facility]     Pulmonary HTN ([facility] 10/2021    PASP 77 mmHg    Reflux esophagitis     Unspecified deficiency anemia        Past Surgical History:   Procedure Laterality Date    HX CYSTECTOMY  09    Dr. Ehsan Sheikh HX NEPHRECTOMY Left 2009    Nephroureterectomy by Dr. Jonna Hernandez HX OTHER SURGICAL      surgery on pituitary gland       Family History   Problem Relation Age of Onset    Heart Disease Father     Heart Disease Mother        Social History     Socioeconomic History    Marital status: SINGLE   Tobacco Use    Smoking status: Former Smoker     Packs/day: 0.50     Years: 40.00     Pack years: 20.00     Types: Cigarettes     Quit date: 1999     Years since quittin.8    Smokeless tobacco: Never Used   Vaping Use    Vaping Use: Never used   Substance and Sexual Activity    Alcohol use: No    Drug use: No    Sexual activity: Yes       Review of Systems - neg    Outpatient Medications Marked as Taking for the 21 encounter [facility] Encounter)   Medication Sig Dispense Refill    hydrALAZINE (APRESOLINE) 100 mg tablet Take 100 mg by mouth three (3) times daily.  cinacalcet (SENSIPAR) 30 mg tablet Take 30 mg by mouth daily.  Oxygen 2 LPM nc as needed      insulin glargine (Lantus Solostar U-100 Insulin) 100 unit/mL (3 mL) inpn 8 Units by SubCUTAneous route nightly.  Sliding scale      sodium bicarbonate 650 mg tablet Take 650 mg by mouth three (3) times daily.  allopurinoL (ZYLOPRIM) 100 mg tablet TAKE 2 TABLETS BY MOUTH DAILY 60 Tablet 3    loratadine (CLARITIN) 10 mg tablet Take 10 mg by mouth daily.  albuterol (PROVENTIL HFA, VENTOLIN HFA, PROAIR HFA) 90 mcg/actuation inhaler Take 2 Puffs by inhalation every four (4) hours as needed for Wheezing, Shortness of Breath or Respiratory Distress. 1 Each 1    ferrous sulfate (IRON) 325 mg (65 mg iron) EC tablet Take 1 Tablet by mouth three (3) times daily (with meals). 90 Tablet 2    simvastatin (ZOCOR) 40 mg tablet TAKE 1 TABLET BY MOUTH EVERY NIGHT 90 Tablet 1    omeprazole (PRILOSEC) 40 mg capsule TAKE 1 CAPSULE BY MOUTH DAILY 90 Capsule 1    apixaban (Eliquis) 2.5 mg tablet Take 1 Tablet by mouth two (2) times a day. 180 Tablet 1    ergocalciferol (ERGOCALCIFEROL) 1,250 mcg (50,000 unit) capsule TAKE ONE CAPSULE BY MOUTH EVERY 7 DAYS 16 Cap 1    fluticasone propionate (FLONASE) 50 mcg/actuation nasal spray 1 [place] by Both Nostrils route daily as needed. No Known Allergies    Vitals:    11/15/21 1357 11/19/21 0817   BP:  (!) 173/86   Pulse:  71   Resp:  18   Temp:  97.9 °F (36.6 °C)   SpO2:  100%   Weight: 91.6 kg (202 lb) 88.7 kg (195 lb 9.6 oz)   Height: 5' 8\" (1.727 m) 5' 8\" (1.727 m)       Physical Exam  Constitutional:       Appearance: He is well-developed. HENT:      Head: Normocephalic and atraumatic. Eyes:      Conjunctiva/sclera: Conjunctivae normal.   Abdominal:      General: There is no distension. Palpations: Abdomen is soft. There is no mass. Tenderness: There is no abdominal tenderness. Musculoskeletal:         General: Normal range of motion. Lymphadenopathy:      Cervical: No cervical adenopathy. Skin:     General: Skin is warm and dry. Neurological:      Sensory: No sensory deficit. Psychiatric:         Speech: Speech normal.         Assessment / Plan    colonoscopy    The diagnoses and plan were discussed with the patient.  All questions answered. Plan of care agreed to by all concerned.

## 2021-11-19 NOTE — OP NOTES
[facility]  [address], [address]   [place], [address]  [phone]                    Colonoscopy Procedure Note      Lucy Johnson  1945  [id number]                Date of Procedure: 11/19/2021    Preoperative diagnosis: Colon cancer screening:  Z12.11    Postoperative diagnosis: Ascending Colon Polyp x 1, Transverse Colon Polyp x 1, Descending Colon Polyp x 1, Sigmoid Polyp x 1, Rectum Polyps x 3    :  Nathalie Casas MD    Assistant(s): Endoscopy Technician-1: Jeff Barba  Endoscopy RN-1: Ekaterina Burnette RN; Grabiel MEDRANO RN    Sedation: MAC    Complications: None    Implants: None    Procedure Details:  Prior to the procedure, a history and physical were performed. The patients medications, allergies and sensitivities were reviewed and all questions were answered. After informed consent was obtained for the procedure, with all risks and benefits of procedure explained. The patient was taken to the endoscopy suite and placed in the left lateral decubitus position. Patient identification and proposed procedure were verified prior to the procedure by the nurse and I. After sequential anesthesia administered by anesthesiologist, a digital rectal exam was performed and was normal.  The Olympus video colonoscope was introduced through the anus and advanced to cecum, which was identified by the ileocecal valve and appendiceal orifice. The quality of preparation was fair. The colonoscope was slowly withdrawn and the mucosa examined for any abnormalities. Cecal withdrawal time was greater than 6 minutes. The patient tolerated the procedure well. There were no complications.       Findings/Interventions:   Polyps - #1, 5 mm in size, located in the ascending colon, removed by snare cautery and retrieved for pathology, - #2, 25 mm in size, sessile, located in the transverse colon, removed by snare cautery and retrieved for pathology, ink injected and 2 clips placed - #3, 10 mm in size, located in the descending colon, removed by snare cautery and retrieved for pathology, - #4, 5 mm in size, located in the sigmoid, removed by snare cautery and retrieved for pathology, - #5, 10 mm in size, located in the rectum, removed by snare cautery and retrieved for pathology, - #6, 5 mm in size, located in the rectum, removed by snare cautery and retrieved for pathology, - #7, 5 mm in size, located in the rectum, removed by snare cautery and retrieved for pathology    EBL: none    Recommendations: -Repeat colonoscopy in 2 years   NO aspirin for 7 days. Hold Eliquis 7 days.      Discharge Disposition:  Tacho Mora MD  11/19/2021  10:02 AM

## 2021-11-19 NOTE — ANESTHESIA PREPROCEDURE EVALUATION
Relevant Problems   No relevant active problems       Anesthetic History   No history of anesthetic complications            Review of Systems / Medical History  Patient summary reviewed and pertinent labs reviewed    Pulmonary  Within defined limits                 Neuro/Psych   Within defined limits           Cardiovascular    Hypertension      CHF  Dysrhythmias   CAD    Exercise tolerance: <4 METS     GI/Hepatic/Renal     GERD           Endo/Other    Diabetes    Obesity and arthritis     Other Findings              Physical Exam    Airway  Mallampati: II  TM Distance: 4 - 6 cm  Neck ROM: normal range of motion   Mouth opening: Normal     Cardiovascular    Rhythm: regular  Rate: normal         Dental    Dentition: Lower dentition intact and Upper dentition intact     Pulmonary  Breath sounds clear to auscultation               Abdominal         Other Findings            Anesthetic Plan    ASA: 3            Induction: Intravenous  Anesthetic plan and risks discussed with: Patient

## 2021-11-19 NOTE — ADDENDUM NOTE
Addendum  created 11/19/21 1054 by Adam Nix MD    Clinical Note Signed, Review and Sign - Ready for Procedure

## 2021-11-19 NOTE — ANESTHESIA POSTPROCEDURE EVALUATION
Procedure(s):  COLONOSCOPY with Polypectomies, Tattoo & Clip Placement. MAC    Anesthesia Post Evaluation      Multimodal analgesia: multimodal analgesia used between 6 hours prior to anesthesia start to PACU discharge  Patient location during evaluation: PACU  Patient participation: complete - patient participated  Pain management: adequate  Airway patency: patent  Anesthetic complications: no  Cardiovascular status: acceptable  Respiratory status: acceptable  Hydration status: acceptable  Post anesthesia nausea and vomiting:  none  Final Post Anesthesia Temperature Assessment:  Normothermia (36.0-37.5 degrees C)      INITIAL Post-op Vital signs:   Vitals Value Taken Time   /70 11/19/21 1023   Temp 36.6 °C (97.9 °F) 11/19/21 1008   Pulse 68 11/19/21 1033   Resp 12 11/19/21 1033   SpO2 99 % 11/19/21 1033   Vitals shown include unvalidated device data.

## 2021-11-19 NOTE — DISCHARGE INSTRUCTIONS
***Resume Eliquis in 7 days (Friday, November 26th)***    Colonoscopy: What to Expect at [address] [place] [address]  After you have a colonoscopy, you will stay at the clinic for 1 to 2 hours until the medicines wear off. Then you can go home. But you will need to arrange for a ride. Your doctor will tell you when you can eat and do your other usual activities. Your doctor will talk to you about when you will need your next colonoscopy. Your doctor can help you decide how often you need to be checked. This will depend on the results of your test and your risk for colorectal cancer. After the test, you may be bloated or have gas pains. You may need to pass gas. If a biopsy was done or a polyp was removed, you may have streaks of blood in your stool (feces) for a few days. This care sheet gives you a general idea about how long it will take for you to recover. But each person recovers at a different pace. Follow the steps below to get better as quickly as possible. How can you care for yourself at home? Activity  · Rest when you feel tired. · You can do your normal activities when it feels okay to do so. Diet  · Follow your doctor's directions for eating. · Unless your doctor has told you not to, drink plenty of fluids. This helps to replace the fluids that were lost during the colon prep. · Do not drink alcohol. Medicines  · If polyps were removed or a biopsy was done during the test, your doctor may tell you not to take aspirin or other anti-inflammatory medicines for a few days. These include ibuprofen (Advil, Motrin) and naproxen (Aleve). Other instructions  · For your safety, do not drive or operate machinery until the medicine wears off and you can think clearly. Your doctor may tell you not to drive or operate machinery until the day after your test.  · Do not sign legal documents or make major decisions until the medicine wears off and you can think clearly.  The anesthesia can make it hard for you to fully understand what you are agreeing to. Follow-up care is a key part of your treatment and safety. Be sure to make and go to all appointments, and call your doctor if you are having problems. It's also a good idea to know your test results and keep a list of the medicines you take. When should you call for help? Call 911 anytime you think you may need emergency care. For example, call if:  · You passed out (lost consciousness). · You pass maroon or bloody stools. · You have severe belly pain. Call your doctor now or seek immediate medical care if:  · Your stools are black and tarlike. · Your stools have streaks of blood, but you did not have a biopsy or any polyps removed. · You have belly pain, or your belly is swollen and firm. · You vomit. · You have a fever. · You are very dizzy. Watch closely for changes in your health, and be sure to contact your doctor if you have any problems. Where can you learn more? Go to "[employer].be  Enter E264 in the search box to learn more about \"Colonoscopy: What to Expect at Home. \"   © 7528-7056 Healthwise, Incorporated. Care instructions adapted under license by [facility] [employer] (which disclaims liability or warranty for this information). This care instruction is for use with your licensed healthcare professional. If you have questions about a medical condition or this instruction, always ask your healthcare professional. [place] [address] any warranty or liability for your use of this information. Content Version: 55.8.366835; Current as of: November 14, 2014      DISCHARGE SUMMARY from Nurse     POST-PROCEDURE INSTRUCTIONS:    Call your Physician if you:  ? Observe any excess bleeding. ? Develop a temperature over 100.5o F.  ? Experience abdominal, shoulder or chest pain. ?  Notice any signs of decreased circulation or nerve impairment to an extremity such as a change in color, persistent numbness, tingling, coldness or increase in pain. ? Vomit blood or you have nausea and vomiting lasting longer than 4 hours. ? Are unable to take medications. ? Are unable to urinate within 8 hours after discharge following general anesthesia or intravenous sedation. For the next 24 hours after receiving general anesthesia or intravenous sedation, or while taking prescription Narcotics, limit your activities:  ? Do NOT drive a motor vehicle, operate hazard machinery or power tools, or perform tasks that require coordination. The medication you received during your procedure may have some effect on your mental awareness. ? Do NOT make important personal or business decisions. The medication you received during your procedure may have some effect on your mental awareness. ? Do NOT drink alcoholic beverages. These drinks do not mix well with the medications that have been given to you. ? Upon discharge from the hospital, you must be accompanied by a responsible adult. ? Resume your diet as directed by your physician. ? Resume medications as your physician has prescribed. ? Please give a list of your current medications to your Primary Care Provider. ? Please update this list whenever your medications are discontinued, doses are changed, or new medications (including over-the-counter products) are added. ? Please carry medication information at all times in case of emergency situations. These are general instructions for a healthy lifestyle:    No smoking/ No tobacco products/ Avoid exposure to second hand smoke.  Surgeon General's Warning:  Quitting smoking now greatly reduces serious risk to your health. Obesity, smoking, and a sedentary lifestyle greatly increase your risk for illness.    A healthy diet, regular physical exercise & weight monitoring are important for maintaining a healthy lifestyle   You may be retaining fluid if you have a history of heart failure or if you experience any of the following symptoms:  Weight gain of 3 pounds or more overnight or 5 pounds in a week, increased swelling in our hands or feet or shortness of breath while lying flat in bed. Please call your doctor as soon as you notice any of these symptoms; do not wait until your next office visit. Colorectal Screening   Colorectal cancer almost always develops from precancerous polyps (abnormal growths) in the colon or rectum. Screening tests can find precancerous polyps, so that they can be removed before they turn into cancer. Screening tests can also find colorectal cancer early, when treatment works best.  [place] Speak with your physician about when you should begin screening and how often you should be tested. Additional Information    Educational references and/or instructions provided during this visit included:    See attached. APPOINTMENTS:    Per MD Instruction. Discharge information has been reviewed with the patient. The patient verbalized understanding. Patient Education        Colon Polyps: Care Instructions  Your Care Instructions     Colon polyps are growths in the colon or the rectum. The cause of most colon polyps is not known, and most people who get them do not have any problems. But a certain kind can turn into cancer. For this reason, regular testing for colon polyps is important for people as they get older. It is also important for anyone who has an increased risk for colon cancer. Polyps are usually found through routine colon cancer screening tests. Although most colon polyps are not cancerous, they are usually removed and then tested for cancer. Screening for colon cancer saves lives because the cancer can usually be cured if it is caught early. If you have a polyp that is the type that can turn into cancer, you may need more tests to examine your entire colon. The doctor will remove any other polyps that he or she finds, and you will be tested more often.   Follow-up care is a key part of your treatment and safety. Be sure to make and go to all appointments, and call your doctor if you are having problems. It's also a good idea to know your test results and keep a list of the medicines you take. How can you care for yourself at home? Regular exams to look for colon polyps are the best way to prevent polyps from turning into colon cancer. These can include stool tests, sigmoidoscopy, colonoscopy, and CT colonography. Talk with your doctor about a testing schedule that is right for you. To prevent polyps  There is no home treatment that can prevent colon polyps. But these steps may help lower your risk for cancer. · Stay active. Being active can help you get to and stay at a healthy weight. Try to exercise on most days of the week. Walking is a good choice. · Eat well. Choose a variety of vegetables, fruits, legumes (such as peas and beans), fish, poultry, and whole grains. · Do not smoke. If you need help quitting, talk to your doctor about stop-smoking programs and medicines. These can increase your chances of quitting for good. · If you drink alcohol, limit how much you drink. Limit alcohol to 2 drinks a day for men and 1 drink a day for women. When should you call for help? Call your doctor now or seek immediate medical care if:    · You have severe belly pain.     · Your stools are maroon or very bloody. Watch closely for changes in your health, and be sure to contact your doctor if:    · You have a fever.     · You have nausea or vomiting.     · You have a change in bowel habits (new constipation or diarrhea).     · Your symptoms get worse or are not improving as expected. Where can you learn more? Go to http://www.[employer].com/  Enter C571 in the search box to learn more about \"Colon Polyps: Care Instructions. \"  Current as of: December 17, 2020               Content Version: 13.0  © 8341-4425 Healthwise, Incorporated.    Care instructions adapted under license by [employer] (which disclaims liability or warranty for this information). If you have questions about a medical condition or this instruction, always ask your healthcare professional. [address] any warranty or liability for your use of this information. Patient Education        High-Fiber Diet: Care Instructions  Overview     A high-fiber diet may help you relieve constipation and feel less bloated. Your doctor and dietitian will help you make a high-fiber eating plan based on your personal needs. The plan will include the things you like to eat. It will also make sure that you get 25 to 35 grams of fiber a day. Before you make changes to the way you eat, be sure to talk with your doctor or dietitian. Follow-up care is a key part of your treatment and safety. Be sure to make and go to all appointments, and call your doctor if you are having problems. It's also a good idea to know your test results and keep a list of the medicines you take. How can you care for yourself at home? · You can increase how much fiber you get if you eat more of certain foods. These foods include:  ? Whole-grain breads and cereals. ? Fruits, such as pears, apples, and peaches. Eat the skins and peels if you can.  ? Vegetables, such as broccoli, cabbage, spinach, carrots, asparagus, and squash. ? Starchy vegetables. These include potatoes with skins, kidney beans, and lima beans. · Take a fiber supplement every day if your doctor recommends it. Examples are Benefiber, Citrucel, FiberCon, and Metamucil. Ask your doctor how much to take. · Drink plenty of fluids. If you have kidney, heart, or liver disease and have to limit fluids, talk with your doctor before you increase the amount of fluids you drink. Where can you learn more? Go to http://www.gray.com/  Enter T988 in the search box to learn more about \"High-Fiber Diet: Care Instructions. \"  Current as of: December 17, 2020               Content Version: 13.0  © 1362-0870 Healthwise, Incorporated. Care instructions adapted under license by [employer] (which disclaims liability or warranty for this information). If you have questions about a medical condition or this instruction, always ask your healthcare professional. [address] any warranty or liability for your use of this information.

## 2021-11-27 DIAGNOSIS — N18.30 STAGE 3 CHRONIC KIDNEY DISEASE, UNSPECIFIED WHETHER STAGE 3A OR 3B CKD (HCC): ICD-10-CM

## 2021-12-02 ENCOUNTER — PATIENT OUTREACH (OUTPATIENT)
Dept: CASE MANAGEMENT | Age: 76
End: 2021-12-02

## 2021-12-02 NOTE — PROGRESS NOTES
Care Transitions Follow Up Call    Challenges to be reviewed by the provider   Additional needs identified to be addressed with provider: no  none           Method of communication with provider : none    Care Transition Nurse (CTN) contacted the patient by telephone to follow up after admission on 21. Call answered by Monae Mccarthy cari. Verified name and  with family as identifiers. Family verbalized patient not really using oxygen. Denied any reports of s/s CHF. Attended cardiology follow up for stress test. Niece voiced unsure if patient is weighing daily but he reported losing weight. Addressed changes since last contact: medications- Increase in Norvasc not covered by insurance; pt remains on Norvasc 5 mg  Follow up appointment completed? yes. Was follow up appointment scheduled within 7 days of discharge? no.     Advance Care Planning:   Does patient have an Advance Directive:  addressed    CTN reviewed discharge instructions, medical action plan and red flags with family and discussed any barriers to care and/or understanding of plan of care after discharge. Discussed appropriate site of care based on symptoms and resources available to patient including: PCP, Specialist and CTN. The family agrees to contact the PCP office for questions related to their healthcare. Patients top risk factors for readmission: medical condition-CHF    Interventions to address risk factors: Reviewed s/s of CHF to monitor for and report    REHABILITATION [facility] follow up appointment(s):   Future Appointments   Date Time Provider Niya Rai   2021  9:30 AM Maren Mcdonald MD [facility] BS AMB   2022 10:45 AM Rolan Bosch MD [facility] BS AMB   2022 10:45 AM Samara Smith NP [facility] BS AMB   3/30/2022  2:30 PM Leesa Smith NP [facility] BS AMB     Non-[facility] follow up appointment(s): None    CTN provided contact information for future needs.  No further follow-up call indicated based on severity of symptoms and risk factors. Plan for next call: none     Goals Addressed                 This Visit's Progress     Attends follow-up appointments as directed. Goal: Patient will attend all appointments scheduled within the next 30 days. Ensure provider appt is scheduled within 7 business days post-discharge; 11/3: [facility] appt request sent. Family aware of nephrology appt and to schedule a 1 week follow up with cardiology. 11/11: Patient had nuclear stress test with cardiology follow up planned for next week. Family aware PCP appt scheduled for 11/12. Confirm patient attended post-discharge provider appt ; 11/18: Attended nephrology appt on 11/3 and cardiology appt on 11/16. Complete post-visit call to confirm attendance and update care needs; 11/11: Done. 11/18: Done. 12/2: Done.  Prevent complications post hospitalization. On track     Goal: No admissions post 30 days from discharge of 11/2/21. Review/educate common or potential \"red flags\" of condition worsening;11/3: Writer educated/reviewed the following s/s of CHF exacerbation to monitor and report:    Symptoms:  Remember WORSEN     W=Weight gain (more than 3 pounds in one day or 5 pounds in one week, weigh every morning with no clothes and record your weight)  O=Orthopnea (difficulty breathing while lying flat, may need to sleep with extra pillows or in a recliner)  R=Resting more than usual  S=Shortness of breath (with activity and at rest)  E=Edema (swelling in lower legs, abdomen, scrotum)  N=Non-productive cough (a cough is not always a cold, it may mean fluid is backing up into your lungs)  Monitor lab results and symptoms, escalate to provider       Assess for health risk behaviors and educate patient/caregivers on reducing risk; 11/3: Educated on reason for following a no salt added diet and performing daily weights. 11/11: Reinforced the importance of recording daily weights. 11/18: Patient is not weighing.    Coordinate plan to treat at home when symptoms arise; 11/3: Instructed to contact PCP or cardiology or CTN for weight gain > 3 lbs X 24 hrs or >5 lbs X 7 days. Observe for trends in symptoms and measures, provide direction to patient, and notify provider as needed       Discuss and provide resources for ACP; 11/3: Not on file; will address at a later time. 11/11: Education provided; document will be mailed to home. Patient/Caregiver provided printed discharge information.

## 2021-12-03 ENCOUNTER — PATIENT OUTREACH (OUTPATIENT)
Dept: CASE MANAGEMENT | Age: 76
End: 2021-12-03

## 2021-12-03 NOTE — PROGRESS NOTES
Patient has graduated from the Transitions of Care Coordination  program on 12/3/21. Patient/family has the ability to self-manage at this time Care management goals have been completed. Patient was not referred to the [facility] team for further management. Goals Addressed                 This Visit's Progress     COMPLETED: Attends follow-up appointments as directed. Goal: Patient will attend all appointments scheduled within the next 30 days. Ensure provider appt is scheduled within 7 business days post-discharge; 11/3: [facility] appt request sent. Family aware of nephrology appt and to schedule a 1 week follow up with cardiology. 11/11: Patient had nuclear stress test with cardiology follow up planned for next week. Family aware PCP appt scheduled for 11/12. Confirm patient attended post-discharge provider appt ; 11/18: Attended nephrology appt on 11/3 and cardiology appt on 11/16. Complete post-visit call to confirm attendance and update care needs; 11/11: Done. 11/18: Done. 12/2: Done.  COMPLETED: Prevent complications post hospitalization. On track     Goal: No admissions post 30 days from discharge of 11/2/21.       Review/educate common or potential \"red flags\" of condition worsening;11/3: Writer educated/reviewed the following s/s of CHF exacerbation to monitor and report:    Symptoms:  Remember WORSEN     W=Weight gain (more than 3 pounds in one day or 5 pounds in one week, weigh every morning with no clothes and record your weight)  O=Orthopnea (difficulty breathing while lying flat, may need to sleep with extra pillows or in a recliner)  R=Resting more than usual  S=Shortness of breath (with activity and at rest)  E=Edema (swelling in lower legs, abdomen, scrotum)  N=Non-productive cough (a cough is not always a cold, it may mean fluid is backing up into your lungs)  Monitor lab results and symptoms, escalate to provider       Assess for health risk behaviors and educate patient/caregivers on reducing risk; 11/3: Educated on reason for following a no salt added diet and performing daily weights. 11/11: Reinforced the importance of recording daily weights. 11/18: Patient is not weighing. Coordinate plan to treat at home when symptoms arise; 11/3: Instructed to contact PCP or cardiology or CTN for weight gain > 3 lbs X 24 hrs or >5 lbs X 7 days. Observe for trends in symptoms and measures, provide direction to patient, and notify provider as needed       Discuss and provide resources for ACP; 11/3: Not on file; will address at a later time. 11/11: Education provided; document will be mailed to home. Patient has Care Transition Nurse's contact information for any further questions, concerns, or needs.   Patients upcoming visits:    Future Appointments   Date Time Provider Niya Rai   12/6/2021  9:30 AM Aman Arshad MD [facility] BS AMB   1/6/2022 10:45 AM Ana Richardson MD [facility] BS AMB   1/27/2022 10:45 AM Km Smith NP [facility] BS AMB   3/30/2022  2:30 PM Km Smith NP [facility] BS AMB

## 2021-12-06 ENCOUNTER — HOSPITAL ENCOUNTER (OUTPATIENT)
Dept: LAB | Age: 76
Discharge: HOME | End: 2021-12-06

## 2021-12-06 ENCOUNTER — DOCUMENTATION ONLY (OUTPATIENT)
Dept: SURGERY | Age: 76
End: 2021-12-06

## 2021-12-06 ENCOUNTER — OFFICE VISIT (OUTPATIENT)
Dept: SURGERY | Age: 76
End: 2021-12-06
Payer: MEDICARE

## 2021-12-06 VITALS
HEART RATE: 76 BPM | TEMPERATURE: 97.3 F | RESPIRATION RATE: 16 BRPM | BODY MASS INDEX: 29.1 KG/M2 | SYSTOLIC BLOOD PRESSURE: 175 MMHG | DIASTOLIC BLOOD PRESSURE: 77 MMHG | OXYGEN SATURATION: 96 % | WEIGHT: 192 LBS | HEIGHT: 68 IN

## 2021-12-06 DIAGNOSIS — C18.4 MALIGNANT NEOPLASM OF TRANSVERSE COLON (HCC): Primary | ICD-10-CM

## 2021-12-06 PROCEDURE — G9711 PT HX TOT COL OR COLON CA: HCPCS | Performed by: COLON & RECTAL SURGERY

## 2021-12-06 PROCEDURE — G8753 SYS BP > OR = 140: HCPCS | Performed by: COLON & RECTAL SURGERY

## 2021-12-06 PROCEDURE — 1101F PT FALLS ASSESS-DOCD LE1/YR: CPT | Performed by: COLON & RECTAL SURGERY

## 2021-12-06 PROCEDURE — G8536 NO DOC ELDER MAL SCRN: HCPCS | Performed by: COLON & RECTAL SURGERY

## 2021-12-06 PROCEDURE — G8754 DIAS BP LESS 90: HCPCS | Performed by: COLON & RECTAL SURGERY

## 2021-12-06 PROCEDURE — 99215 OFFICE O/P EST HI 40 MIN: CPT | Performed by: COLON & RECTAL SURGERY

## 2021-12-06 PROCEDURE — G8432 DEP SCR NOT DOC, RNG: HCPCS | Performed by: COLON & RECTAL SURGERY

## 2021-12-06 PROCEDURE — G8427 DOCREV CUR MEDS BY ELIG CLIN: HCPCS | Performed by: COLON & RECTAL SURGERY

## 2021-12-06 PROCEDURE — G8417 CALC BMI ABV UP PARAM F/U: HCPCS | Performed by: COLON & RECTAL SURGERY

## 2021-12-06 RX ORDER — METRONIDAZOLE 500 MG/1
500 TABLET ORAL 3 TIMES DAILY
Qty: 3 TABLET | Refills: 0 | Status: SHIPPED | OUTPATIENT
Start: 2021-12-06 | End: 2022-01-21

## 2021-12-06 RX ORDER — ERGOCALCIFEROL 1.25 MG/1
CAPSULE ORAL
Qty: 16 CAPSULE | Refills: 1 | Status: SHIPPED | OUTPATIENT
Start: 2021-12-06 | End: 2022-03-09 | Stop reason: SDUPTHER

## 2021-12-06 RX ORDER — NEOMYCIN SULFATE 500 MG/1
1000 TABLET ORAL 3 TIMES DAILY
Qty: 6 TABLET | Refills: 0 | Status: SHIPPED | OUTPATIENT
Start: 2021-12-06 | End: 2022-01-21

## 2021-12-06 NOTE — LETTER
12/6/2021    Patient: Lucy Johnson   YOB: 1945   Date of Visit: 12/6/2021     Heraclio Draper NP  [address]  [place]    Dear Nuria Alegria is seen in the office in follow-up after recent colonoscopy. I removed a 2-1/2 cm polyp from the transverse colon. Pathology showed a poorly differentiated adenocarcinoma. I discussed the results with him in the office today. I have ordered CT imaging and CEA level to complete his staging and we will plan for robotic left colectomy. I will send him to his cardiologist for preoperative for stratification and optimization. This may be a slightly complicated surgery given his history of nephrectomy and cystectomy with ileal conduit. If you have questions, please do not hesitate to call me. I look forward to following your patient along with you.       Sincerely,    Nathalie Casas MD

## 2021-12-09 ENCOUNTER — DOCUMENTATION ONLY (OUTPATIENT)
Dept: SURGERY | Age: 76
End: 2021-12-09

## 2021-12-09 DIAGNOSIS — C18.4 MALIGNANT NEOPLASM OF TRANSVERSE COLON (HCC): Primary | ICD-10-CM

## 2021-12-09 NOTE — PROGRESS NOTES
Notified patient's sister of cancellation of CT C/A/P tomorrow. She will notify patient as well. They do not need to have this test done due to his kidney function. Dr. Christie Murcia ordered a PET scan instead.

## 2021-12-15 ENCOUNTER — HOSPITAL ENCOUNTER (OUTPATIENT)
Dept: CT IMAGING | Age: 76
Discharge: HOME | End: 2021-12-15
Attending: PHYSICIAN ASSISTANT
Payer: MEDICARE

## 2021-12-15 DIAGNOSIS — J32.2 CHRONIC ETHMOIDAL SINUSITIS: ICD-10-CM

## 2021-12-15 PROCEDURE — 70486 CT MAXILLOFACIAL W/O DYE: CPT

## 2021-12-16 ENCOUNTER — DOCUMENTATION ONLY (OUTPATIENT)
Dept: SURGERY | Age: 76
End: 2021-12-16

## 2021-12-16 ENCOUNTER — TELEPHONE (OUTPATIENT)
Dept: SURGERY | Age: 76
End: 2021-12-16

## 2021-12-16 RX ORDER — METOPROLOL SUCCINATE 25 MG/1
TABLET, EXTENDED RELEASE ORAL DAILY
Qty: 90 TABLET | Refills: 0 | Status: SHIPPED | OUTPATIENT
Start: 2021-12-16 | End: 2021-12-29 | Stop reason: SDUPTHER

## 2021-12-16 NOTE — TELEPHONE ENCOUNTER
I spoke to Mr. Hodgson's niece on 12/16/2021 at 4:30pm requesting that she please call Central Scheduling at [phone] to get the patient's PET/CT scheduled. She stated she would call as soon as we got off the phone.

## 2021-12-16 NOTE — PROGRESS NOTES
Genetic testing discussed with pt. 2 + MMR protein abnormalities. Likely [facility]. Given his age and medical risk factors favor segmental resection. Indeed this was his first colonoscopy and all that had developed was a malignant polyp. Pt understands he will require colonoscopy for surveillance. Will eventually refer patient to genetic counselor after surgery and recovery.

## 2021-12-20 DIAGNOSIS — N18.4 TYPE 2 DIABETES MELLITUS WITH STAGE 4 CHRONIC KIDNEY DISEASE, WITH LONG-TERM CURRENT USE OF INSULIN (HCC): ICD-10-CM

## 2021-12-20 DIAGNOSIS — Z79.4 TYPE 2 DIABETES MELLITUS WITH STAGE 4 CHRONIC KIDNEY DISEASE, WITH LONG-TERM CURRENT USE OF INSULIN (HCC): ICD-10-CM

## 2021-12-20 DIAGNOSIS — E11.22 TYPE 2 DIABETES MELLITUS WITH STAGE 4 CHRONIC KIDNEY DISEASE, WITH LONG-TERM CURRENT USE OF INSULIN (HCC): ICD-10-CM

## 2021-12-20 RX ORDER — BLOOD SUGAR DIAGNOSTIC
STRIP MISCELLANEOUS
Qty: 300 STRIP | Refills: 3 | Status: SHIPPED | OUTPATIENT
Start: 2021-12-20

## 2021-12-21 NOTE — PROGRESS NOTES
Met with patient and reviewed preop education booklet. Stressed ambulation early post op. Reviewed Incentive spirometer, scd's, iv fluids, and sommers catheter information. Reveiwed post op diet and activity. Instructed to drink one protein drink a day until day before surgery. Patient verbalized understanding. Answered all questions.

## 2021-12-28 PROBLEM — N39.0 UTI (URINARY TRACT INFECTION): Status: ACTIVE | Noted: 2020-10-10

## 2021-12-30 ENCOUNTER — HOSPITAL ENCOUNTER (OUTPATIENT)
Dept: PET IMAGING | Age: 76
Discharge: HOME | End: 2021-12-30
Attending: COLON & RECTAL SURGERY
Payer: MEDICARE

## 2021-12-30 DIAGNOSIS — C18.4 MALIGNANT NEOPLASM OF TRANSVERSE COLON (HCC): ICD-10-CM

## 2021-12-30 PROCEDURE — A9552 F18 FDG: HCPCS

## 2021-12-30 PROCEDURE — 74011000250 HC RX REV CODE- 250: Performed by: COLON & RECTAL SURGERY

## 2021-12-30 RX ORDER — METOPROLOL SUCCINATE 25 MG/1
25 TABLET, EXTENDED RELEASE ORAL DAILY
Qty: 90 TABLET | Refills: 0 | Status: SHIPPED | OUTPATIENT
Start: 2021-12-30 | End: 2022-08-05

## 2021-12-30 RX ORDER — SODIUM BICARBONATE 650 MG/1
650 TABLET ORAL 3 TIMES DAILY
Qty: 90 TABLET | Refills: 1 | Status: SHIPPED | OUTPATIENT
Start: 2021-12-30 | End: 2022-03-09 | Stop reason: SDUPTHER

## 2021-12-30 RX ORDER — BARIUM SULFATE 20 MG/ML
900 SUSPENSION ORAL
Status: COMPLETED | OUTPATIENT
Start: 2021-12-30 | End: 2021-12-30

## 2021-12-30 RX ORDER — FUROSEMIDE 80 MG/1
TABLET ORAL
Qty: 90 TABLET | Refills: 0 | Status: SHIPPED | OUTPATIENT
Start: 2021-12-30 | End: 2022-03-30

## 2021-12-30 RX ORDER — FLUDEOXYGLUCOSE F-18 200 MCI/ML
7.37 INJECTION INTRAVENOUS ONCE
Status: COMPLETED | OUTPATIENT
Start: 2021-12-30 | End: 2021-12-30

## 2021-12-30 RX ADMIN — BARIUM SULFATE 675 ML: 20 SUSPENSION ORAL at 12:29

## 2021-12-30 RX ADMIN — FLUDEOXYGLUCOSE F-18 7.37 MILLICURIE: 200 INJECTION INTRAVENOUS at 11:44

## 2022-01-14 ENCOUNTER — HOSPITAL ENCOUNTER (OUTPATIENT)
Dept: LAB | Age: 77
Discharge: HOME | End: 2022-01-14

## 2022-01-14 LAB — SENTARA SPECIMEN COL,SENBCF: NORMAL

## 2022-01-14 PROCEDURE — 99001 SPECIMEN HANDLING PT-LAB: CPT

## 2022-01-15 LAB
ABO + RH BLD: NORMAL
ABSOLUTE LYMPHOCYTE COUNT, 10803: 1.3 K/UL (ref 1–4.8)
ALBUMIN SERPL-MCNC: 3.6 G/DL (ref 3.5–5)
ANION GAP SERPL CALC-SCNC: 15 MMOL/L (ref 3–15)
ANTIBODY SCREEN INTERPRETATION, 14017: NEGATIVE
APTT PPP: 25 SEC (ref 22–36)
BASOPHILS # BLD: 0.1 K/UL (ref 0–0.2)
BASOPHILS NFR BLD: 1 % (ref 0–2)
BUN SERPL-MCNC: 57 MG/DL (ref 6–22)
CALCIUM SERPL-MCNC: 8.3 MG/DL (ref 8.4–10.5)
CEA,002147: 2.9 NG/ML (ref 0–5)
CHLORIDE SERPL-SCNC: 104 MMOL/L (ref 98–110)
CO2 SERPL-SCNC: 24 MMOL/L (ref 20–32)
CREAT SERPL-MCNC: 2.8 MG/DL (ref 0.8–1.6)
EOSINOPHIL # BLD: 0.2 K/UL (ref 0–0.5)
EOSINOPHIL NFR BLD: 2 % (ref 0–6)
ERYTHROCYTE [DISTWIDTH] IN BLOOD BY AUTOMATED COUNT: 15.9 % (ref 10–15.5)
GFRAA, 66117: 27.1
GFRNA, 66118: 22.4
GLUCOSE SERPL-MCNC: 258 MG/DL (ref 70–99)
GRANULOCYTES,GRANS: 72 % (ref 40–75)
HCT VFR BLD AUTO: 38 % (ref 37.8–52.2)
HGB BLD-MCNC: 11.9 G/DL (ref 12.6–17.1)
INR PPP: 0.92 (ref 0.89–1.29)
LYMPHOCYTES, LYMLT: 17 % (ref 20–45)
MCH RBC QN AUTO: 26 PG (ref 26–34)
MCHC RBC AUTO-ENTMCNC: 31 G/DL (ref 31–36)
MCV RBC AUTO: 84 FL (ref 80–95)
MONOCYTES # BLD: 0.6 K/UL (ref 0.1–1)
MONOCYTES NFR BLD: 8 % (ref 3–12)
NEUTROPHILS # BLD AUTO: 5.5 K/UL (ref 1.8–7.7)
PLATELET # BLD AUTO: 316 K/UL (ref 140–440)
PMV BLD AUTO: 9.6 FL (ref 9–13)
POTASSIUM SERPL-SCNC: 3.6 MMOL/L (ref 3.5–5.5)
PROTHROMBIN TIME: 10.2 SEC (ref 9–13)
RBC # BLD AUTO: 4.5 M/UL (ref 3.8–5.8)
SODIUM SERPL-SCNC: 143 MMOL/L (ref 133–145)
WBC # BLD AUTO: 7.6 K/UL (ref 4–11)

## 2022-01-20 ENCOUNTER — HOSPITAL ENCOUNTER (OUTPATIENT)
Dept: PREADMISSION TESTING | Age: 77
Discharge: HOME | End: 2022-01-20
Payer: MEDICARE

## 2022-01-20 PROCEDURE — U0003 INFECTIOUS AGENT DETECTION BY NUCLEIC ACID (DNA OR RNA); SEVERE ACUTE RESPIRATORY SYNDROME CORONAVIRUS 2 (SARS-COV-2) (CORONAVIRUS DISEASE [COVID-19]), AMPLIFIED PROBE TECHNIQUE, MAKING USE OF HIGH THROUGHPUT TECHNOLOGIES AS DESCRIBED BY CMS-2020-01-R: HCPCS

## 2022-01-21 LAB — SARS-COV-2, NAA: DETECTED

## 2022-01-21 RX ORDER — METRONIDAZOLE 500 MG/1
500 TABLET ORAL 3 TIMES DAILY
Qty: 3 TABLET | Refills: 0 | Status: SHIPPED | OUTPATIENT
Start: 2022-01-21 | End: 2022-01-22

## 2022-01-21 RX ORDER — NEOMYCIN SULFATE 500 MG/1
1000 TABLET ORAL 3 TIMES DAILY
Qty: 6 TABLET | Refills: 0 | Status: SHIPPED | OUTPATIENT
Start: 2022-01-21 | End: 2022-01-22

## 2022-01-24 ENCOUNTER — DOCUMENTATION ONLY (OUTPATIENT)
Dept: SURGERY | Age: 77
End: 2022-01-24

## 2022-01-24 RX ORDER — CINACALCET 30 MG/1
TABLET, FILM COATED ORAL
Qty: 30 TABLET | Refills: 0 | Status: SHIPPED | OUTPATIENT
Start: 2022-01-24 | End: 2022-02-12

## 2022-01-24 RX ORDER — CINACALCET 30 MG/1
TABLET, FILM COATED ORAL
Qty: 30 TABLET | Refills: 0 | Status: SHIPPED | OUTPATIENT
Start: 2022-01-24 | End: 2022-01-28 | Stop reason: SDUPTHER

## 2022-01-24 RX ORDER — FERROUS SULFATE TAB 325 MG (65 MG ELEMENTAL FE) 325 (65 FE) MG
TAB ORAL
Qty: 90 TABLET | Refills: 1 | Status: SHIPPED | OUTPATIENT
Start: 2022-01-24 | End: 2022-03-09 | Stop reason: SDUPTHER

## 2022-01-24 NOTE — PROGRESS NOTES
PET/CT does not show evidence of metastatic disease, however there is note of possible pituitary tumor. Patient states years ago he had resection of pituitary tumor, sounds like this was done transsphenoidal. I told the patient he will need to see either an oncologist or perhaps a neurosurgeon. Patient would like to wait until after surgery with me to have this evaluated.

## 2022-01-25 NOTE — PERIOP NOTES
PRE-SURGICAL INSTRUCTIONS        Patient's Name:  Sasha Chaudhry      Today's Date:  1/25/2022            Covid Testing Date and Time:    Surgery Date:  2/8/2022                1. Do NOT eat or drink anything, including candy, gum, or ice chips after midnight on 2/8, unless you have specific instructions from your surgeon or anesthesia provider to do so.  2. You may brush your teeth before coming to the hospital.  3. No smoking 24 hours prior to the day of surgery. 4. No alcohol 24 hours prior to the day of surgery. 5. No recreational drugs for one week prior to the day of surgery. 6. Leave all valuables, including money/purse, at home. 7. Remove all jewelry, nail polish, acrylic nails, and makeup (including mascara); no lotions powders, deodorant, or perfume/cologne/after shave on the skin. 8. Follow instruction for Hibiclens washes and CHG wipes from surgeon's office. 9. Glasses/contact lenses and dentures may be worn to the hospital.  They will be removed prior to surgery. 10. Call your doctor if symptoms of a cold or illness develop within 24-48 hours prior to your surgery. 11.  If you are having an outpatient procedure, please make arrangements for a responsible ADULT TO [address] [place] [address] and stay with you for 24 hours after your surgery. 12. ONE VISITOR in the hospital at this time for outpatient procedures. Exceptions may be made for surgical admissions, per nursing unit guidelines      Special Instructions:      Bring list of CURRENT medications. Bring inhaler. Bring any pertinent legal medical records. Take these medications the morning of surgery with a sip of water:  Per office  Follow physician instructions about insulin. Follow physician instructions about stopping anticoagulants. Complete bowel prep per MD instructions. On the day of surgery, come in the main entrance of DR[facility]". Let the  at the desk know you are there for surgery. A staff member will come escort you to the surgical area on the second floor. If you have any questions or concerns, please do not hesitate to call:     (Prior to the day of surgery) PAT department:  [phone]   (Day of surgery) Pre-Op department:  [phone]    These surgical instructions were reviewed with the patient and Minor Townsend during the PAT phone call.

## 2022-01-28 ENCOUNTER — VIRTUAL VISIT (OUTPATIENT)
Dept: FAMILY MEDICINE CLINIC | Age: 77
End: 2022-01-28
Payer: MEDICARE

## 2022-01-28 DIAGNOSIS — Z00.00 MEDICARE ANNUAL WELLNESS VISIT, SUBSEQUENT: Primary | ICD-10-CM

## 2022-01-28 DIAGNOSIS — Z13.31 SCREENING FOR DEPRESSION: ICD-10-CM

## 2022-01-28 DIAGNOSIS — Z71.89 ACP (ADVANCE CARE PLANNING): ICD-10-CM

## 2022-01-28 PROBLEM — N18.9 ANEMIA IN CHRONIC KIDNEY DISEASE: Status: ACTIVE | Noted: 2021-12-20

## 2022-01-28 PROBLEM — I87.2 PERIPHERAL VENOUS INSUFFICIENCY: Status: ACTIVE | Noted: 2021-12-20

## 2022-01-28 PROBLEM — D63.1 ANEMIA IN CHRONIC KIDNEY DISEASE: Status: ACTIVE | Noted: 2021-12-20

## 2022-01-28 PROBLEM — E66.9 OBESE: Status: ACTIVE | Noted: 2021-12-14

## 2022-01-28 PROBLEM — N19 RENAL FAILURE SYNDROME: Status: ACTIVE | Noted: 2021-12-14

## 2022-01-28 PROBLEM — R00.2 PALPITATIONS: Status: ACTIVE | Noted: 2021-12-14

## 2022-01-28 PROCEDURE — G0439 PPPS, SUBSEQ VISIT: HCPCS | Performed by: NURSE PRACTITIONER

## 2022-01-28 PROCEDURE — G8432 DEP SCR NOT DOC, RNG: HCPCS | Performed by: NURSE PRACTITIONER

## 2022-01-28 PROCEDURE — G8756 NO BP MEASURE DOC: HCPCS | Performed by: NURSE PRACTITIONER

## 2022-01-28 PROCEDURE — G8417 CALC BMI ABV UP PARAM F/U: HCPCS | Performed by: NURSE PRACTITIONER

## 2022-01-28 PROCEDURE — G9711 PT HX TOT COL OR COLON CA: HCPCS | Performed by: NURSE PRACTITIONER

## 2022-01-28 PROCEDURE — 1101F PT FALLS ASSESS-DOCD LE1/YR: CPT | Performed by: NURSE PRACTITIONER

## 2022-01-28 PROCEDURE — G8427 DOCREV CUR MEDS BY ELIG CLIN: HCPCS | Performed by: NURSE PRACTITIONER

## 2022-01-28 PROCEDURE — G8536 NO DOC ELDER MAL SCRN: HCPCS | Performed by: NURSE PRACTITIONER

## 2022-01-28 RX ORDER — POLYETHYLENE GLYCOL 3350 17 G/17G
POWDER, FOR SOLUTION ORAL
COMMUNITY
End: 2022-02-12

## 2022-01-28 RX ORDER — HYDRALAZINE HYDROCHLORIDE 100 MG/1
100 TABLET, FILM COATED ORAL 3 TIMES DAILY
Status: CANCELLED | OUTPATIENT
Start: 2022-01-28

## 2022-01-28 RX ORDER — LANOLIN ALCOHOL/MO/W.PET/CERES
CREAM (GRAM) TOPICAL
Qty: 90 TABLET | Refills: 1 | Status: CANCELLED | OUTPATIENT
Start: 2022-01-28

## 2022-01-28 NOTE — PROGRESS NOTES
This is the Subsequent Medicare Annual Wellness Exam, performed 12 months or more after the Initial AWV or the last Subsequent AWV    I have reviewed the patient's medical history in detail and updated the computerized patient record. Assessment/Plan   Education and counseling provided:  Are appropriate based on today's review and evaluation    1. Medicare annual wellness visit, subsequent  2. Screening for depression  3. ACP (advance care planning)       Depression Risk Factor Screening     3 most recent PHQ Screens 1/28/2022   Little interest or pleasure in doing things Not at all   Feeling down, depressed, irritable, or hopeless Not at all   Total Score PHQ 2 0   Trouble falling or staying asleep, or sleeping too much Not at all   Feeling tired or having little energy Not at all   Poor appetite, weight loss, or overeating Not at all   Feeling bad about yourself - or that you are a failure or have let yourself or your family down Not at all   Trouble concentrating on things such as school, work, reading, or watching TV Not at all   Moving or speaking so slowly that other people could have noticed; or the opposite being so fidgety that others notice Not at all   Thoughts of being better off dead, or hurting yourself in some way Not at all   PHQ 9 Score 0   How difficult have these problems made it for you to do your work, take care of your home and get along with others Not difficult at all       Alcohol & Drug Abuse Risk Screen    Do you average more than 1 drink per night or more than 7 drinks a week: No    In the past three months have you have had more than 4 drinks containing alcohol on one occasion: No          Functional Ability and Level of Safety    Hearing: Hearing is good. Activities of Daily Living: The home contains: handrails and grab bars  Patient does total self care      Ambulation: with mild difficulty     Fall Risk:  Fall Risk Assessment, last 12 mths 1/28/2022   Able to walk?  Yes Fall in past 12 months? 1   Do you feel unsteady? 1   Are you worried about falling 1   Is TUG test greater than 12 seconds? 0   Is the gait abnormal? 1   Number of falls in past 12 months 1   Fall with injury?  0      Abuse Screen:  Patient is not abused       Cognitive Screening    Has your family/caregiver stated any concerns about your memory: no     Cognitive Screening: Normal - MMSE (Mini Mental Status Exam)    Health Maintenance Due     Health Maintenance Due   Topic Date Due    Eye Exam Retinal or Dilated  Never done    DTaP/Tdap/Td series (1 - Tdap) Never done    Shingrix Vaccine Age 50> (1 of 2) Never done    Pneumococcal 65+ yrs at Risk Vaccine (2 of 2 - PCV13) 12/02/2020    Flu Vaccine (1) 09/01/2021    MICROALBUMIN Q1  10/27/2021    Medicare Yearly Exam  01/28/2022    Foot Exam Q1  02/23/2022       Patient Care Team   Patient Care Team:  Afia Cast NP as PCP - General (Nurse Practitioner)  Afia Cast NP as PCP [facility]  Cony Case MD as Physician (Nephrology)  Sean Naidu MD as Physician (Vascular Surgery)  Ольга Fraser MD as Physician (Cardiology)  Lele Silva MD as Physician (Ophthalmology)  Woodrow Wu MD as Surgeon (Colon and Rectal Surgery)  Shaylee Marquez NP as Physician Assistant (Nurse Practitioner)    History     Patient Active Problem List   Diagnosis Code    Malignant neoplasm of urinary bladder ([facility] C67.9    Type 2 diabetes mellitus with stage 4 chronic kidney disease, with long-term current use of insulin ([facility]) E11.22, N18.4, Z79.4    Primary hypertension I10    Hematuria R31.9    Stage 3 chronic kidney disease ([facility] N18.30    Chronic diastolic congestive heart failure ([facility]) I50.32    Positive D dimer R79.89    Deep vein thrombosis (DVT) of proximal vein of both lower extremities ([facility]) I82.4Y3    CHF (congestive heart failure) ([facility]) I50.9    Acute on chronic heart failure ([facility]) I50.9    Acute deep vein thrombosis (DVT) of distal vein of both lower extremities (HCC) I82.4Z3    Acute renal failure superimposed on chronic kidney disease (HCC) N17.9, N18.9    Coronary artery disease of native artery of native heart with stable angina pectoris (HCC) I25.118    Gastroesophageal reflux disease without esophagitis K21.9    General weakness R53.1    H/O left nephrectomy Z90.5    History of bladder cancer Z85.51    HLD (hyperlipidemia) E78.5    Pulmonary edema, acute (HCC) J81.0    Renal cell carcinoma of left kidney (HCC) C64.2    CHF exacerbation (HCC) I50.9    Acute on chronic combined systolic and diastolic ACC/AHA stage C congestive heart failure (HCC) I50.43    Chronic kidney disease, stage IV (severe) (HCC) N18.4    Acute on chronic congestive heart failure (HCC) I50.9    Chest pain R07.9    Chronic kidney disease, stage 4 (severe) (HCC) N18.4    UTI (urinary tract infection) N39.0    Anemia in chronic kidney disease N18.9, D63.1    Obese E66.9    Palpitations R00.2    Peripheral venous insufficiency I87.2    Renal failure syndrome N19     Past Medical History:   Diagnosis Date    Acid reflux     Arrhythmia     Arthritis     Bladder cancer (HCC)     Congestive heart failure (HCC)     Deep vein thrombosis (DVT) of proximal vein of both lower extremities ([address] 9/14/2020    Diabetes mellitus ([address]     Difficult intubation     Narrow airway-per Pulm notes(in media)     GERD (gastroesophageal reflux disease)     Gout     High cholesterol     Hypertension     Kidney carcinoma, left ([address] 2016    left nephrectomy    Kidney disease     Pituitary mass ([address]     Pulmonary HTN ([address] 10/2021    PASP 77 mmHg    Reflux esophagitis     Unspecified deficiency anemia       Past Surgical History:   Procedure Laterality Date    COLONOSCOPY N/A 11/19/2021    COLONOSCOPY with Polypectomies, Tattoo & Clip Placement performed by Мария Elias MD at [address] HX CYSTECTOMY  12/30/09 Dr. Av Albrecht HX NEPHRECTOMY Left 5/2009    Nephroureterectomy by Dr. James Gamez HX OTHER SURGICAL      surgery on pituitary gland     Current Outpatient Medications   Medication Sig Dispense Refill    polyethylene glycol (MIRALAX) 17 gram/dose powder polyethylene glycol 3350 17 gram oral powder packet   MIX AND DRINK 1 PACKET BY MOUTH DAILY      apixaban (Eliquis) 5 mg tablet Take 5 mg by mouth two (2) times a day.  cinacalcet (SENSIPAR) 30 mg tablet TAKE 1 TABLET BY MOUTH DAILY 30 Tablet 0    FeroSuL 325 mg (65 mg iron) tablet TAKE 1 TABLET BY MOUTH THREE TIMES DAILY WITH MEALS 90 Tablet 1    furosemide (LASIX) 80 mg tablet TAKE 1 TABLET BY MOUTH DAILY 90 Tablet 0    sodium bicarbonate 650 mg tablet Take 1 Tablet by mouth three (3) times daily. 90 Tablet 1    metoprolol succinate (TOPROL-XL) 25 mg XL tablet Take 1 Tablet by mouth daily. 90 Tablet 0    glucose blood VI test strips (Accu-Chek Precious Plus test strp) strip CHECK BLOOD GLUCOSE THREE TIMES DAILY 300 Strip 3    ergocalciferol (ERGOCALCIFEROL) 1,250 mcg (50,000 unit) capsule TAKE ONE CAPSULE BY MOUTH EVERY 7 DAYS 16 Capsule 1    hydrALAZINE (APRESOLINE) 100 mg tablet Take 100 mg by mouth three (3) times daily.  Oxygen 2 LPM nc as needed      glipiZIDE (GLUCOTROL) 10 mg tablet Take 10 mg by mouth two (2) times a day.  insulin glargine (Lantus Solostar U-100 Insulin) 100 unit/mL (3 mL) inpn 8 Units by SubCUTAneous route nightly. Sliding scale      allopurinoL (ZYLOPRIM) 100 mg tablet TAKE 2 TABLETS BY MOUTH DAILY 60 Tablet 3    loratadine (CLARITIN) 10 mg tablet Take 10 mg by mouth daily.  albuterol (PROVENTIL HFA, VENTOLIN HFA, PROAIR HFA) 90 mcg/actuation inhaler Take 2 Puffs by inhalation every four (4) hours as needed for Wheezing, Shortness of Breath or Respiratory Distress.  1 Each 1    simvastatin (ZOCOR) 40 mg tablet TAKE 1 TABLET BY MOUTH EVERY NIGHT 90 Tablet 1    amLODIPine (NORVASC) 5 mg tablet TAKE 2 TABLETS BY MOUTH ONCE A DAY (Patient taking differently: 5 mg. TAKE 2 TABLETS BY MOUTH ONCE A DAY) 180 Tablet 0    omeprazole (PRILOSEC) 40 mg capsule TAKE 1 CAPSULE BY MOUTH DAILY 90 Capsule 1    BD Ultra-Fine Mini Pen Needle 31 gauge x 3/16\" ndle USE TO INJECT  Pen Needle 3    fluticasone propionate (FLONASE) 50 mcg/actuation nasal spray 1 [place] by Both Nostrils route daily as needed.  furosemide (LASIX) 40 mg tablet Take 1 Tablet by mouth daily.  (Patient not taking: Reported on 2022) 30 Tablet 0     No Known Allergies    Family History   Problem Relation Age of Onset    Heart Disease Father     Heart Disease Mother      Social History     Tobacco Use    Smoking status: Former Smoker     Packs/day: 0.50     Years: 40.00     Pack years: 20.00     Types: Cigarettes     Quit date: 1999     Years since quittin.0    Smokeless tobacco: Never Used   Substance Use Topics    Alcohol use: No         Sylvia Onofre

## 2022-01-28 NOTE — PATIENT INSTRUCTIONS
Medicare Wellness Visit, Male    The best way to live healthy is to have a lifestyle where you eat a well-balanced diet, exercise regularly, limit alcohol use, and quit all forms of tobacco/nicotine, if applicable. Regular preventive services are another way to keep healthy. Preventive services (vaccines, screening tests, monitoring & exams) can help personalize your care plan, which helps you manage your own care. Screening tests can find health problems at the earliest stages, when they are easiest to treat. [facility] follows the current, evidence-based guidelines published by the [facility] Manoj ([facility]) when recommending preventive services for our patients. Because we follow these guidelines, sometimes recommendations change over time as research supports it. (For example, a prostate screening blood test is no longer routinely recommended for men with no symptoms). Of course, you and your doctor may decide to screen more often for some diseases, based on your risk and co-morbidities (chronic disease you are already diagnosed with). Preventive services for you include:  - Medicare offers their members a free annual wellness visit, which is time for you and your primary care provider to discuss and plan for your preventive service needs. Take advantage of this benefit every year!  -All adults over age 72 should receive the recommended pneumonia vaccines. Current USPSTF guidelines recommend a series of two vaccines for the best pneumonia protection.   -All adults should have a flu vaccine yearly and tetanus vaccine every 10 years.  -All adults age 48 and older should receive the shingles vaccines (series of two vaccines).        -All adults age 38-68 who are overweight should have a diabetes screening test once every three years.   -Other screening tests & preventive services for persons with diabetes include: an eye exam to screen for diabetic retinopathy, a kidney function test, a foot exam, and stricter control over your cholesterol.   -Cardiovascular screening for adults with routine risk involves an electrocardiogram (ECG) at intervals determined by the provider.   -Colorectal cancer screening should be done for adults age 54-65 with no increased risk factors for colorectal cancer. There are a number of acceptable methods of screening for this type of cancer. Each test has its own benefits and drawbacks. Discuss with your provider what is most appropriate for you during your annual wellness visit. The different tests include: colonoscopy (considered the best screening method), a fecal occult blood test, a fecal DNA test, and sigmoidoscopy.  -All adults born between [facility] should be screened once for Hepatitis C.  -An Abdominal Aortic Aneurysm (AAA) Screening is recommended for men age 73-68 who has ever smoked in their lifetime.      Here is a list of your current Health Maintenance items (your personalized list of preventive services) with a due date:  Health Maintenance Due   Topic Date Due    Eye Exam  Never done    DTaP/Tdap/Td  (1 - Tdap) Never done    Shingles Vaccine (1 of 2) Never done    Pneumococcal Vaccine 65+ years at Risk (2 of 2 - PCV13) 12/02/2020    Yearly Flu Vaccine (1) 09/01/2021    Albumin Urine Test  10/27/2021    Annual Well Visit  01/28/2022    Diabetic Foot Care  02/23/2022

## 2022-01-28 NOTE — ACP (ADVANCE CARE PLANNING)
Advance Care Planning     Advance Care Planning (ACP) Physician/NP/PA Conversation      Date of Conversation: 1/28/2022  Conducted with: Patient with Decision Making Capacity    Healthcare Decision Maker:     Supplemental (Other) Decision Maker: Shira Ulrich - Other Relative - [phone]    Supplemental (Other) Decision Maker: Dejan Lin - Other Relative - [phone]  Click here to complete [address] including selection of the Healthcare Decision Maker Relationship (ie \"Primary\")        Today we documented Decision Maker(s) consistent with Legal Next of Kin hierarchy. Care Preferences:    Hospitalization: \"If your health worsens and it becomes clear that your chance of recovery is unlikely, what would be your preference regarding hospitalization? \"  The patient would prefer comfort-focused treatment without hospitalization. Ventilation: \"If you were unable to breathe on your own and your chance of recovery was unlikely, what would be your preference about the use of a ventilator (breathing machine) if it was available to you? \"   The patient would desire the use of a ventilator. Resuscitation: \"In the event your heart stopped as a result of an underlying serious health condition, would you want attempts to be made to restart your heart, or would you prefer a natural death? \"   Yes, attempt to resuscitate.     Additional topics discussed: treatment goals    Conversation Outcomes / Follow-Up Plan:   ACP complete - no further action today  Reviewed DNR/DNI and patient elects Full Code (Attempt Resuscitation)     Length of Voluntary ACP Conversation in minutes:  <16 minutes (Non-Billable)    Glo Layne NP

## 2022-01-28 NOTE — PROGRESS NOTES
1. \"Have you been to the ER, urgent care clinic since your last visit? Hospitalized since your last visit? \" No    2. \"Have you seen or consulted any other health care providers outside of the [address] [place] [address] since your last visit? \" No     3. For patients aged 39-70: Has the patient had a colonoscopy / FIT/ Cologuard? Yes - no Care Gap present      If the patient is female:    4. For patients aged 41-77: Has the patient had a mammogram within the past 2 years? NA - based on age or sex  See top three    5. For patients aged 21-65: Has the patient had a pap smear?  NA - based on age or sex

## 2022-02-04 ENCOUNTER — HOSPITAL ENCOUNTER (OUTPATIENT)
Dept: PREADMISSION TESTING | Age: 77
Discharge: HOME | End: 2022-02-04
Payer: MEDICARE

## 2022-02-04 ENCOUNTER — HOSPITAL ENCOUNTER (OUTPATIENT)
Dept: LAB | Age: 77
Discharge: HOME | End: 2022-02-04

## 2022-02-04 DIAGNOSIS — C18.4 MALIGNANT NEOPLASM OF TRANSVERSE COLON (HCC): ICD-10-CM

## 2022-02-04 LAB
ABO + RH BLD: NORMAL
BLOOD GROUP ANTIBODIES SERPL: NORMAL
SENTARA SPECIMEN COL,SENBCF: NORMAL
SPECIMEN EXP DATE BLD: NORMAL

## 2022-02-04 PROCEDURE — 86900 BLOOD TYPING SEROLOGIC ABO: CPT

## 2022-02-04 PROCEDURE — 99001 SPECIMEN HANDLING PT-LAB: CPT

## 2022-02-05 LAB — CEA,002147: 3.1 NG/ML (ref 0–5)

## 2022-02-07 ENCOUNTER — ANESTHESIA EVENT (OUTPATIENT)
Dept: SURGERY | Age: 77
DRG: 329 | End: 2022-02-07
Payer: MEDICARE

## 2022-02-08 ENCOUNTER — HOSPITAL ENCOUNTER (INPATIENT)
Age: 77
LOS: 4 days | Discharge: HOME | DRG: 329 | End: 2022-02-12
Attending: COLON & RECTAL SURGERY | Admitting: COLON & RECTAL SURGERY
Payer: MEDICARE

## 2022-02-08 ENCOUNTER — ANESTHESIA (OUTPATIENT)
Dept: SURGERY | Age: 77
DRG: 329 | End: 2022-02-08
Payer: MEDICARE

## 2022-02-08 DIAGNOSIS — C18.4 MALIGNANT NEOPLASM OF TRANSVERSE COLON (HCC): Primary | ICD-10-CM

## 2022-02-08 PROBLEM — C18.9 COLON CANCER (HCC): Status: ACTIVE | Noted: 2022-02-08

## 2022-02-08 LAB
GLUCOSE BLD STRIP.AUTO-MCNC: 123 MG/DL (ref 70–110)
GLUCOSE BLD STRIP.AUTO-MCNC: 160 MG/DL (ref 70–110)
GLUCOSE BLD STRIP.AUTO-MCNC: 191 MG/DL (ref 70–110)
HBA1C MFR BLD: 9.6 % (ref 4.2–5.6)

## 2022-02-08 PROCEDURE — 77030034696 HC CATH URETH FOL 2W BARD -A: Performed by: COLON & RECTAL SURGERY

## 2022-02-08 PROCEDURE — 76210000017 HC OR PH I REC 1.5 TO 2 HR: Performed by: COLON & RECTAL SURGERY

## 2022-02-08 PROCEDURE — 00790 ANES IPER UPR ABD NOS: CPT | Performed by: NURSE ANESTHETIST, CERTIFIED REGISTERED

## 2022-02-08 PROCEDURE — 76060000042 HC ANESTHESIA 5.5 TO 6 HR: Performed by: COLON & RECTAL SURGERY

## 2022-02-08 PROCEDURE — 76010000883 HC OR TIME 5.5 TO 6HR INTENSV - TIER 2: Performed by: COLON & RECTAL SURGERY

## 2022-02-08 PROCEDURE — 65270000029 HC RM PRIVATE

## 2022-02-08 PROCEDURE — 77030008608 HC TRCR ENDOSC SMTH AMR -B: Performed by: COLON & RECTAL SURGERY

## 2022-02-08 PROCEDURE — 77030031139 HC SUT VCRL2 J&J -A: Performed by: COLON & RECTAL SURGERY

## 2022-02-08 PROCEDURE — 77030028754 HC RCTRCTR LAPSCP ALX DSP AMR -B: Performed by: COLON & RECTAL SURGERY

## 2022-02-08 PROCEDURE — 74011250636 HC RX REV CODE- 250/636: Performed by: ANESTHESIOLOGY

## 2022-02-08 PROCEDURE — 77030018836 HC SOL IRR NACL ICUM -A: Performed by: COLON & RECTAL SURGERY

## 2022-02-08 PROCEDURE — 77030010939 HC CLP LIG TELE -B: Performed by: COLON & RECTAL SURGERY

## 2022-02-08 PROCEDURE — 77030034628 HC LIGASURE LAP SEAL DIV MD COVD -F: Performed by: COLON & RECTAL SURGERY

## 2022-02-08 PROCEDURE — 77030011808 HC STPLR ENDOSCOPIC J&J -D: Performed by: COLON & RECTAL SURGERY

## 2022-02-08 PROCEDURE — 99100 ANES PT EXTEME AGE<1 YR&>70: CPT | Performed by: NURSE ANESTHETIST, CERTIFIED REGISTERED

## 2022-02-08 PROCEDURE — 2709999900 HC NON-CHARGEABLE SUPPLY: Performed by: COLON & RECTAL SURGERY

## 2022-02-08 PROCEDURE — 0DTL4ZZ RESECTION OF TRANSVERSE COLON, PERCUTANEOUS ENDOSCOPIC APPROACH: ICD-10-PCS | Performed by: COLON & RECTAL SURGERY

## 2022-02-08 PROCEDURE — 74011000250 HC RX REV CODE- 250: Performed by: ANESTHESIOLOGY

## 2022-02-08 PROCEDURE — 00790 ANES IPER UPR ABD NOS: CPT | Performed by: ANESTHESIOLOGY

## 2022-02-08 PROCEDURE — 74011250637 HC RX REV CODE- 250/637: Performed by: COLON & RECTAL SURGERY

## 2022-02-08 PROCEDURE — 74011250636 HC RX REV CODE- 250/636: Performed by: NURSE ANESTHETIST, CERTIFIED REGISTERED

## 2022-02-08 PROCEDURE — 77030008756 HC TU IRR SUC STRY -B: Performed by: COLON & RECTAL SURGERY

## 2022-02-08 PROCEDURE — 77030036731 HC STPLR ENDOSC J&J -F: Performed by: COLON & RECTAL SURGERY

## 2022-02-08 PROCEDURE — 77030003028 HC SUT VCRL J&J -A: Performed by: COLON & RECTAL SURGERY

## 2022-02-08 PROCEDURE — 77030012770 HC TRCR OPT FX AMR -B: Performed by: COLON & RECTAL SURGERY

## 2022-02-08 PROCEDURE — 77030040830 HC CATH URETH FOL MDII -A: Performed by: COLON & RECTAL SURGERY

## 2022-02-08 PROCEDURE — 77030020829: Performed by: COLON & RECTAL SURGERY

## 2022-02-08 PROCEDURE — 82962 GLUCOSE BLOOD TEST: CPT

## 2022-02-08 PROCEDURE — 74011636637 HC RX REV CODE- 636/637: Performed by: NURSE ANESTHETIST, CERTIFIED REGISTERED

## 2022-02-08 PROCEDURE — 77030011296 HC FCPS GRSP ENDOSC J&J -B: Performed by: COLON & RECTAL SURGERY

## 2022-02-08 PROCEDURE — 77030002966 HC SUT PDS J&J -A: Performed by: COLON & RECTAL SURGERY

## 2022-02-08 PROCEDURE — 77030010031 HC SCIS ENDOSC MPLR J&J -C: Performed by: COLON & RECTAL SURGERY

## 2022-02-08 PROCEDURE — 74011250636 HC RX REV CODE- 250/636: Performed by: COLON & RECTAL SURGERY

## 2022-02-08 PROCEDURE — 99100 ANES PT EXTEME AGE<1 YR&>70: CPT | Performed by: ANESTHESIOLOGY

## 2022-02-08 PROCEDURE — 74011000250 HC RX REV CODE- 250: Performed by: COLON & RECTAL SURGERY

## 2022-02-08 PROCEDURE — 74011000250 HC RX REV CODE- 250: Performed by: NURSE ANESTHETIST, CERTIFIED REGISTERED

## 2022-02-08 PROCEDURE — 77030002933 HC SUT MCRYL J&J -A: Performed by: COLON & RECTAL SURGERY

## 2022-02-08 PROCEDURE — 77030010935 HC CLP LIG ABSRB TELE -B: Performed by: COLON & RECTAL SURGERY

## 2022-02-08 PROCEDURE — 77030008565 HC TBNG SUC IRR ERBE -B: Performed by: COLON & RECTAL SURGERY

## 2022-02-08 PROCEDURE — 77030040361 HC SLV COMPR DVT MDII -B: Performed by: COLON & RECTAL SURGERY

## 2022-02-08 PROCEDURE — 77030040922 HC BLNKT HYPOTHRM STRY -A: Performed by: COLON & RECTAL SURGERY

## 2022-02-08 PROCEDURE — 83036 HEMOGLOBIN GLYCOSYLATED A1C: CPT

## 2022-02-08 PROCEDURE — 77030009979 HC RELD STPLR TCR J&J -C: Performed by: COLON & RECTAL SURGERY

## 2022-02-08 PROCEDURE — 74011000258 HC RX REV CODE- 258: Performed by: NURSE ANESTHETIST, CERTIFIED REGISTERED

## 2022-02-08 PROCEDURE — 88309 TISSUE EXAM BY PATHOLOGIST: CPT

## 2022-02-08 PROCEDURE — 77030013449 HC CLP LIG TELE -A: Performed by: COLON & RECTAL SURGERY

## 2022-02-08 PROCEDURE — 74011250636 HC RX REV CODE- 250/636

## 2022-02-08 RX ORDER — OXYCODONE AND ACETAMINOPHEN 5; 325 MG/1; MG/1
1 TABLET ORAL AS NEEDED
Status: DISCONTINUED | OUTPATIENT
Start: 2022-02-08 | End: 2022-02-09

## 2022-02-08 RX ORDER — MAGNESIUM SULFATE 100 %
4 CRYSTALS MISCELLANEOUS AS NEEDED
Status: DISCONTINUED | OUTPATIENT
Start: 2022-02-08 | End: 2022-02-09

## 2022-02-08 RX ORDER — CINACALCET 30 MG/1
30 TABLET, FILM COATED ORAL
Status: DISCONTINUED | OUTPATIENT
Start: 2022-02-09 | End: 2022-02-11

## 2022-02-08 RX ORDER — INSULIN LISPRO 100 [IU]/ML
INJECTION, SOLUTION INTRAVENOUS; SUBCUTANEOUS ONCE
Status: COMPLETED | OUTPATIENT
Start: 2022-02-08 | End: 2022-02-08

## 2022-02-08 RX ORDER — SUCCINYLCHOLINE CHLORIDE 20 MG/ML
INJECTION INTRAMUSCULAR; INTRAVENOUS AS NEEDED
Status: DISCONTINUED | OUTPATIENT
Start: 2022-02-08 | End: 2022-02-08 | Stop reason: HOSPADM

## 2022-02-08 RX ORDER — INSULIN LISPRO 100 [IU]/ML
INJECTION, SOLUTION INTRAVENOUS; SUBCUTANEOUS EVERY 6 HOURS
Status: DISCONTINUED | OUTPATIENT
Start: 2022-02-09 | End: 2022-02-12 | Stop reason: HOSPADM

## 2022-02-08 RX ORDER — ONDANSETRON 2 MG/ML
INJECTION INTRAMUSCULAR; INTRAVENOUS AS NEEDED
Status: DISCONTINUED | OUTPATIENT
Start: 2022-02-08 | End: 2022-02-08 | Stop reason: HOSPADM

## 2022-02-08 RX ORDER — LIDOCAINE HYDROCHLORIDE 20 MG/ML
INJECTION, SOLUTION EPIDURAL; INFILTRATION; INTRACAUDAL; PERINEURAL AS NEEDED
Status: DISCONTINUED | OUTPATIENT
Start: 2022-02-08 | End: 2022-02-08 | Stop reason: HOSPADM

## 2022-02-08 RX ORDER — FENTANYL CITRATE 50 UG/ML
INJECTION, SOLUTION INTRAMUSCULAR; INTRAVENOUS AS NEEDED
Status: DISCONTINUED | OUTPATIENT
Start: 2022-02-08 | End: 2022-02-08 | Stop reason: HOSPADM

## 2022-02-08 RX ORDER — ACETAMINOPHEN 500 MG
1000 TABLET ORAL ONCE
Status: COMPLETED | OUTPATIENT
Start: 2022-02-08 | End: 2022-02-08

## 2022-02-08 RX ORDER — GABAPENTIN 300 MG/1
300 CAPSULE ORAL 3 TIMES DAILY
Status: DISCONTINUED | OUTPATIENT
Start: 2022-02-08 | End: 2022-02-09

## 2022-02-08 RX ORDER — SODIUM CHLORIDE, SODIUM LACTATE, POTASSIUM CHLORIDE, CALCIUM CHLORIDE 600; 310; 30; 20 MG/100ML; MG/100ML; MG/100ML; MG/100ML
25 INJECTION, SOLUTION INTRAVENOUS CONTINUOUS
Status: DISCONTINUED | OUTPATIENT
Start: 2022-02-08 | End: 2022-02-09

## 2022-02-08 RX ORDER — FENTANYL CITRATE 50 UG/ML
50 INJECTION, SOLUTION INTRAMUSCULAR; INTRAVENOUS
Status: DISCONTINUED | OUTPATIENT
Start: 2022-02-08 | End: 2022-02-09

## 2022-02-08 RX ORDER — HYDROMORPHONE HYDROCHLORIDE 2 MG/ML
INJECTION, SOLUTION INTRAMUSCULAR; INTRAVENOUS; SUBCUTANEOUS AS NEEDED
Status: DISCONTINUED | OUTPATIENT
Start: 2022-02-08 | End: 2022-02-08 | Stop reason: HOSPADM

## 2022-02-08 RX ORDER — HYDRALAZINE HYDROCHLORIDE 50 MG/1
100 TABLET, FILM COATED ORAL 3 TIMES DAILY
Status: DISCONTINUED | OUTPATIENT
Start: 2022-02-09 | End: 2022-02-12 | Stop reason: HOSPADM

## 2022-02-08 RX ORDER — METOPROLOL SUCCINATE 25 MG/1
25 TABLET, EXTENDED RELEASE ORAL DAILY
Status: DISCONTINUED | OUTPATIENT
Start: 2022-02-09 | End: 2022-02-12 | Stop reason: HOSPADM

## 2022-02-08 RX ORDER — SODIUM CHLORIDE, SODIUM LACTATE, POTASSIUM CHLORIDE, CALCIUM CHLORIDE 600; 310; 30; 20 MG/100ML; MG/100ML; MG/100ML; MG/100ML
125 INJECTION, SOLUTION INTRAVENOUS CONTINUOUS
Status: DISCONTINUED | OUTPATIENT
Start: 2022-02-08 | End: 2022-02-10

## 2022-02-08 RX ORDER — ACETAMINOPHEN 500 MG
1000 TABLET ORAL EVERY 6 HOURS
Status: DISCONTINUED | OUTPATIENT
Start: 2022-02-09 | End: 2022-02-12 | Stop reason: HOSPADM

## 2022-02-08 RX ORDER — ONDANSETRON 2 MG/ML
4 INJECTION INTRAMUSCULAR; INTRAVENOUS
Status: DISCONTINUED | OUTPATIENT
Start: 2022-02-08 | End: 2022-02-12 | Stop reason: HOSPADM

## 2022-02-08 RX ORDER — NEOSTIGMINE METHYLSULFATE 1 MG/ML
INJECTION, SOLUTION INTRAVENOUS AS NEEDED
Status: DISCONTINUED | OUTPATIENT
Start: 2022-02-08 | End: 2022-02-08 | Stop reason: HOSPADM

## 2022-02-08 RX ORDER — SODIUM CHLORIDE, SODIUM LACTATE, POTASSIUM CHLORIDE, CALCIUM CHLORIDE 600; 310; 30; 20 MG/100ML; MG/100ML; MG/100ML; MG/100ML
50 INJECTION, SOLUTION INTRAVENOUS CONTINUOUS
Status: DISCONTINUED | OUTPATIENT
Start: 2022-02-08 | End: 2022-02-08 | Stop reason: HOSPADM

## 2022-02-08 RX ORDER — HEPARIN SODIUM 5000 [USP'U]/ML
5000 INJECTION, SOLUTION INTRAVENOUS; SUBCUTANEOUS
Status: COMPLETED | OUTPATIENT
Start: 2022-02-08 | End: 2022-02-08

## 2022-02-08 RX ORDER — INSULIN LISPRO 100 [IU]/ML
INJECTION, SOLUTION INTRAVENOUS; SUBCUTANEOUS ONCE
Status: DISCONTINUED | OUTPATIENT
Start: 2022-02-08 | End: 2022-02-09

## 2022-02-08 RX ORDER — HEPARIN SODIUM 5000 [USP'U]/ML
5000 INJECTION, SOLUTION INTRAVENOUS; SUBCUTANEOUS EVERY 8 HOURS
Status: DISCONTINUED | OUTPATIENT
Start: 2022-02-08 | End: 2022-02-12 | Stop reason: HOSPADM

## 2022-02-08 RX ORDER — DIPHENHYDRAMINE HYDROCHLORIDE 50 MG/ML
25 INJECTION, SOLUTION INTRAMUSCULAR; INTRAVENOUS
Status: DISCONTINUED | OUTPATIENT
Start: 2022-02-08 | End: 2022-02-12 | Stop reason: HOSPADM

## 2022-02-08 RX ORDER — AMLODIPINE BESYLATE 10 MG/1
10 TABLET ORAL DAILY
Status: DISCONTINUED | OUTPATIENT
Start: 2022-02-09 | End: 2022-02-12 | Stop reason: HOSPADM

## 2022-02-08 RX ORDER — ETOMIDATE 2 MG/ML
INJECTION INTRAVENOUS AS NEEDED
Status: DISCONTINUED | OUTPATIENT
Start: 2022-02-08 | End: 2022-02-08 | Stop reason: HOSPADM

## 2022-02-08 RX ORDER — BUPIVACAINE HYDROCHLORIDE 5 MG/ML
INJECTION, SOLUTION EPIDURAL; INTRACAUDAL AS NEEDED
Status: DISCONTINUED | OUTPATIENT
Start: 2022-02-08 | End: 2022-02-08 | Stop reason: HOSPADM

## 2022-02-08 RX ORDER — SODIUM CHLORIDE, SODIUM LACTATE, POTASSIUM CHLORIDE, CALCIUM CHLORIDE 600; 310; 30; 20 MG/100ML; MG/100ML; MG/100ML; MG/100ML
INJECTION, SOLUTION INTRAVENOUS
Status: DISCONTINUED | OUTPATIENT
Start: 2022-02-08 | End: 2022-02-08 | Stop reason: HOSPADM

## 2022-02-08 RX ORDER — DEXTROSE MONOHYDRATE 100 MG/ML
125-250 INJECTION, SOLUTION INTRAVENOUS AS NEEDED
Status: DISCONTINUED | OUTPATIENT
Start: 2022-02-08 | End: 2022-02-12 | Stop reason: HOSPADM

## 2022-02-08 RX ORDER — GLYCOPYRROLATE 0.2 MG/ML
INJECTION INTRAMUSCULAR; INTRAVENOUS AS NEEDED
Status: DISCONTINUED | OUTPATIENT
Start: 2022-02-08 | End: 2022-02-08 | Stop reason: HOSPADM

## 2022-02-08 RX ORDER — MORPHINE SULFATE 2 MG/ML
2 INJECTION, SOLUTION INTRAMUSCULAR; INTRAVENOUS
Status: DISCONTINUED | OUTPATIENT
Start: 2022-02-08 | End: 2022-02-12 | Stop reason: HOSPADM

## 2022-02-08 RX ORDER — ALBUTEROL SULFATE 0.83 MG/ML
2.5 SOLUTION RESPIRATORY (INHALATION)
Status: DISCONTINUED | OUTPATIENT
Start: 2022-02-08 | End: 2022-02-12 | Stop reason: HOSPADM

## 2022-02-08 RX ORDER — MAGNESIUM SULFATE 100 %
4 CRYSTALS MISCELLANEOUS AS NEEDED
Status: DISCONTINUED | OUTPATIENT
Start: 2022-02-08 | End: 2022-02-12 | Stop reason: HOSPADM

## 2022-02-08 RX ORDER — PROPOFOL 10 MG/ML
INJECTION, EMULSION INTRAVENOUS AS NEEDED
Status: DISCONTINUED | OUTPATIENT
Start: 2022-02-08 | End: 2022-02-08 | Stop reason: HOSPADM

## 2022-02-08 RX ORDER — DEXTROSE 50 % IN WATER (D50W) INTRAVENOUS SYRINGE
25-50 AS NEEDED
Status: DISCONTINUED | OUTPATIENT
Start: 2022-02-08 | End: 2022-02-09

## 2022-02-08 RX ORDER — CEFOXITIN 2 G/1
INJECTION, POWDER, FOR SOLUTION INTRAVENOUS AS NEEDED
Status: DISCONTINUED | OUTPATIENT
Start: 2022-02-08 | End: 2022-02-08 | Stop reason: HOSPADM

## 2022-02-08 RX ORDER — GABAPENTIN 300 MG/1
600 CAPSULE ORAL ONCE
Status: COMPLETED | OUTPATIENT
Start: 2022-02-08 | End: 2022-02-08

## 2022-02-08 RX ORDER — VECURONIUM BROMIDE FOR INJECTION 1 MG/ML
INJECTION, POWDER, LYOPHILIZED, FOR SOLUTION INTRAVENOUS AS NEEDED
Status: DISCONTINUED | OUTPATIENT
Start: 2022-02-08 | End: 2022-02-08 | Stop reason: HOSPADM

## 2022-02-08 RX ORDER — FENTANYL CITRATE 50 UG/ML
25 INJECTION, SOLUTION INTRAMUSCULAR; INTRAVENOUS AS NEEDED
Status: DISCONTINUED | OUTPATIENT
Start: 2022-02-08 | End: 2022-02-09

## 2022-02-08 RX ORDER — LIDOCAINE HYDROCHLORIDE 10 MG/ML
0.1 INJECTION, SOLUTION EPIDURAL; INFILTRATION; INTRACAUDAL; PERINEURAL AS NEEDED
Status: DISCONTINUED | OUTPATIENT
Start: 2022-02-08 | End: 2022-02-08 | Stop reason: HOSPADM

## 2022-02-08 RX ADMIN — FAMOTIDINE 20 MG: 10 INJECTION INTRAVENOUS at 22:36

## 2022-02-08 RX ADMIN — GABAPENTIN 600 MG: 300 CAPSULE ORAL at 12:39

## 2022-02-08 RX ADMIN — CEFOXITIN 2 G: 2 INJECTION, POWDER, FOR SOLUTION INTRAVENOUS at 15:09

## 2022-02-08 RX ADMIN — SODIUM CHLORIDE, SODIUM LACTATE, POTASSIUM CHLORIDE, AND CALCIUM CHLORIDE: 600; 310; 30; 20 INJECTION, SOLUTION INTRAVENOUS at 14:44

## 2022-02-08 RX ADMIN — LIDOCAINE HYDROCHLORIDE 100 MG: 20 INJECTION, SOLUTION EPIDURAL; INFILTRATION; INTRACAUDAL; PERINEURAL at 13:05

## 2022-02-08 RX ADMIN — PHENYLEPHRINE HYDROCHLORIDE 150 MCG: 10 INJECTION INTRAVENOUS at 14:18

## 2022-02-08 RX ADMIN — VECURONIUM BROMIDE 2 MG: 1 INJECTION, POWDER, LYOPHILIZED, FOR SOLUTION INTRAVENOUS at 16:10

## 2022-02-08 RX ADMIN — GLYCOPYRROLATE 0.2 MG: 0.2 INJECTION INTRAMUSCULAR; INTRAVENOUS at 13:31

## 2022-02-08 RX ADMIN — HEPARIN SODIUM 5000 UNITS: 5000 INJECTION INTRAVENOUS; SUBCUTANEOUS at 12:46

## 2022-02-08 RX ADMIN — GABAPENTIN 300 MG: 300 CAPSULE ORAL at 22:39

## 2022-02-08 RX ADMIN — PHENYLEPHRINE HYDROCHLORIDE 100 MCG: 10 INJECTION INTRAVENOUS at 13:53

## 2022-02-08 RX ADMIN — HYDROMORPHONE HYDROCHLORIDE 0.4 MG: 2 INJECTION, SOLUTION INTRAMUSCULAR; INTRAVENOUS; SUBCUTANEOUS at 17:10

## 2022-02-08 RX ADMIN — SODIUM CHLORIDE, SODIUM LACTATE, POTASSIUM CHLORIDE, AND CALCIUM CHLORIDE 125 ML/HR: 600; 310; 30; 20 INJECTION, SOLUTION INTRAVENOUS at 22:34

## 2022-02-08 RX ADMIN — HEPARIN SODIUM 5000 UNITS: 5000 INJECTION INTRAVENOUS; SUBCUTANEOUS at 22:38

## 2022-02-08 RX ADMIN — CEFOXITIN 2 G: 2 INJECTION, POWDER, FOR SOLUTION INTRAVENOUS at 17:09

## 2022-02-08 RX ADMIN — VASOPRESSIN 1 UNITS: 20 INJECTION INTRAVENOUS at 14:42

## 2022-02-08 RX ADMIN — HYDROMORPHONE HYDROCHLORIDE 0.2 MG: 2 INJECTION, SOLUTION INTRAMUSCULAR; INTRAVENOUS; SUBCUTANEOUS at 18:30

## 2022-02-08 RX ADMIN — GLYCOPYRROLATE 0.4 MG: 0.2 INJECTION INTRAMUSCULAR; INTRAVENOUS at 17:51

## 2022-02-08 RX ADMIN — FENTANYL CITRATE 50 MCG: 50 INJECTION, SOLUTION INTRAMUSCULAR; INTRAVENOUS at 13:05

## 2022-02-08 RX ADMIN — ACETAMINOPHEN 1000 MG: 500 TABLET ORAL at 12:38

## 2022-02-08 RX ADMIN — PHENYLEPHRINE HYDROCHLORIDE 25 MCG/MIN: 10 INJECTION INTRAVENOUS at 14:21

## 2022-02-08 RX ADMIN — CEFOXITIN SODIUM 2 G: 2 POWDER, FOR SOLUTION INTRAVENOUS at 22:39

## 2022-02-08 RX ADMIN — CEFOXITIN 2 G: 2 INJECTION, POWDER, FOR SOLUTION INTRAVENOUS at 13:15

## 2022-02-08 RX ADMIN — SUCCINYLCHOLINE CHLORIDE 100 MG: 20 INJECTION, SOLUTION INTRAMUSCULAR; INTRAVENOUS at 13:07

## 2022-02-08 RX ADMIN — VECURONIUM BROMIDE 2 MG: 1 INJECTION, POWDER, LYOPHILIZED, FOR SOLUTION INTRAVENOUS at 14:55

## 2022-02-08 RX ADMIN — ETOMIDATE 18 MG: 2 INJECTION, SOLUTION INTRAVENOUS at 13:07

## 2022-02-08 RX ADMIN — Medication 3 UNITS: at 12:43

## 2022-02-08 RX ADMIN — PHENYLEPHRINE HYDROCHLORIDE 100 MCG: 10 INJECTION INTRAVENOUS at 17:42

## 2022-02-08 RX ADMIN — VECURONIUM BROMIDE 6 MG: 1 INJECTION, POWDER, LYOPHILIZED, FOR SOLUTION INTRAVENOUS at 13:15

## 2022-02-08 RX ADMIN — Medication 3 MG: at 17:53

## 2022-02-08 RX ADMIN — ONDANSETRON 4 MG: 2 INJECTION INTRAMUSCULAR; INTRAVENOUS at 17:49

## 2022-02-08 RX ADMIN — SODIUM CHLORIDE, SODIUM LACTATE, POTASSIUM CHLORIDE, AND CALCIUM CHLORIDE 50 ML/HR: 600; 310; 30; 20 INJECTION, SOLUTION INTRAVENOUS at 12:39

## 2022-02-08 RX ADMIN — PHENYLEPHRINE HYDROCHLORIDE 100 MCG: 10 INJECTION INTRAVENOUS at 13:23

## 2022-02-08 RX ADMIN — SODIUM CHLORIDE, SODIUM LACTATE, POTASSIUM CHLORIDE, AND CALCIUM CHLORIDE: 600; 310; 30; 20 INJECTION, SOLUTION INTRAVENOUS at 12:57

## 2022-02-08 RX ADMIN — SODIUM CHLORIDE, PRESERVATIVE FREE 20 MG: 5 INJECTION INTRAVENOUS at 12:38

## 2022-02-08 RX ADMIN — HYDROMORPHONE HYDROCHLORIDE 0.4 MG: 2 INJECTION, SOLUTION INTRAMUSCULAR; INTRAVENOUS; SUBCUTANEOUS at 17:25

## 2022-02-08 RX ADMIN — PROPOFOL 20 MG: 10 INJECTION, EMULSION INTRAVENOUS at 13:08

## 2022-02-08 RX ADMIN — PHENYLEPHRINE HYDROCHLORIDE 100 MCG: 10 INJECTION INTRAVENOUS at 14:12

## 2022-02-08 RX ADMIN — PHENYLEPHRINE HYDROCHLORIDE 100 MCG: 10 INJECTION INTRAVENOUS at 13:26

## 2022-02-08 RX ADMIN — PHENYLEPHRINE HYDROCHLORIDE 100 MCG: 10 INJECTION INTRAVENOUS at 18:05

## 2022-02-08 RX ADMIN — PHENYLEPHRINE HYDROCHLORIDE 200 MCG: 10 INJECTION INTRAVENOUS at 14:27

## 2022-02-08 RX ADMIN — PHENYLEPHRINE HYDROCHLORIDE 100 MCG: 10 INJECTION INTRAVENOUS at 14:06

## 2022-02-08 RX ADMIN — FENTANYL CITRATE 50 MCG: 50 INJECTION, SOLUTION INTRAMUSCULAR; INTRAVENOUS at 16:10

## 2022-02-08 RX ADMIN — PHENYLEPHRINE HYDROCHLORIDE 100 MCG: 10 INJECTION INTRAVENOUS at 18:08

## 2022-02-08 NOTE — H&P
HPI: Lary Patton is a 78 y.o. male presenting with chief complain of transverse colon cancer    Past Medical History:   Diagnosis Date    Acid reflux     Arrhythmia     Arthritis     Bladder cancer (HCC)     Congestive heart failure (HCC)     Deep vein thrombosis (DVT) of proximal vein of both lower extremities ([facility] 2020    Diabetes mellitus ([facility]     Difficult intubation     Narrow airway-per Pulm notes(in media)     GERD (gastroesophageal reflux disease)     Gout     High cholesterol     Hypertension     Kidney carcinoma, left ([facility] 2016    left nephrectomy    Kidney disease     Pituitary mass ([facility]     Pulmonary HTN ([facility] 10/2021    PASP 77 mmHg    Reflux esophagitis     Unspecified deficiency anemia        Past Surgical History:   Procedure Laterality Date    COLONOSCOPY N/A 2021    COLONOSCOPY with Polypectomies, Tattoo & Clip Placement performed by Raghavendra Rivas MD at [facility]COPY    HX CYSTECTOMY  09    Dr. Charlene Faustin HX NEPHRECTOMY Left 2009    Nephroureterectomy by Dr. Tej Judd  [address]      surgery on pituitary gland       Family History   Problem Relation Age of Onset    Heart Disease Father     Heart Disease Mother        Social History     Socioeconomic History    Marital status: SINGLE   Tobacco Use    Smoking status: Former Smoker     Packs/day: 0.50     Years: 40.00     Pack years: 20.00     Types: Cigarettes     Quit date: 1999     Years since quittin.1    Smokeless tobacco: Never Used   Vaping Use    Vaping Use: Never used   Substance and Sexual Activity    Alcohol use: No    Drug use: No    Sexual activity: Yes       Review of Systems - neg    Outpatient Medications Marked as Taking for the 22 [facility] Encounter)   Medication Sig Dispense Refill    apixaban (Eliquis) 5 mg tablet Take 5 mg by mouth two (2) times a day.       FeroSuL 325 mg (65 mg iron) tablet TAKE 1 TABLET BY MOUTH THREE TIMES DAILY WITH MEALS 90 Tablet 1    furosemide (LASIX) 80 mg tablet TAKE 1 TABLET BY MOUTH DAILY 90 Tablet 0    sodium bicarbonate 650 mg tablet Take 1 Tablet by mouth three (3) times daily. 90 Tablet 1    metoprolol succinate (TOPROL-XL) 25 mg XL tablet Take 1 Tablet by mouth daily. 90 Tablet 0    ergocalciferol (ERGOCALCIFEROL) 1,250 mcg (50,000 unit) capsule TAKE ONE CAPSULE BY MOUTH EVERY 7 DAYS 16 Capsule 1    glipiZIDE (GLUCOTROL) 10 mg tablet Take 10 mg by mouth two (2) times a day.  insulin glargine (Lantus Solostar U-100 Insulin) 100 unit/mL (3 mL) inpn 8 Units by SubCUTAneous route nightly. Sliding scale      allopurinoL (ZYLOPRIM) 100 mg tablet TAKE 2 TABLETS BY MOUTH DAILY 60 Tablet 3    loratadine (CLARITIN) 10 mg tablet Take 10 mg by mouth daily.  simvastatin (ZOCOR) 40 mg tablet TAKE 1 TABLET BY MOUTH EVERY NIGHT 90 Tablet 1    amLODIPine (NORVASC) 5 mg tablet TAKE 2 TABLETS BY MOUTH ONCE A DAY (Patient taking differently: 5 mg. TAKE 2 TABLETS BY MOUTH ONCE A DAY) 180 Tablet 0    omeprazole (PRILOSEC) 40 mg capsule TAKE 1 CAPSULE BY MOUTH DAILY 90 Capsule 1    fluticasone propionate (FLONASE) 50 mcg/actuation nasal spray 1 [place] by Both Nostrils route daily as needed. No Known Allergies    Vitals:    01/25/22 1548   Weight: 87.1 kg (192 lb)       Physical Exam  Constitutional:       Appearance: He is well-developed. HENT:      Head: Normocephalic and atraumatic. Eyes:      Conjunctiva/sclera: Conjunctivae normal.   Abdominal:      General: There is no distension. Palpations: Abdomen is soft. There is no mass. Tenderness: There is no abdominal tenderness. Musculoskeletal:         General: Normal range of motion. Lymphadenopathy:      Cervical: No cervical adenopathy. Skin:     General: Skin is warm and dry. Neurological:      Sensory: No sensory deficit.    Psychiatric:         Speech: Speech normal.         Assessment / Plan    Robotic left colectomy    The diagnoses and plan were discussed with the patient. All questions answered. Plan of care agreed to by all concerned.

## 2022-02-08 NOTE — BRIEF OP NOTE
Brief Postoperative Note    Patient: Savanna Gutierrez  YOB: 1945  MRN: [id number]    Date of Procedure: 2/8/2022     Pre-Op Diagnosis: Malignant neoplasm of transverse colon ([address] [C18.4]    Post-Op Diagnosis: Same as preoperative diagnosis.       Procedure(s):  Laparoscopic transverse colectomy, splenic flexure mobilization    Surgeon(s):  Hilario Gibbons MD    Surgical Assistant: Surg Asst-1: Lulú CLIFTON    Anesthesia: General     Estimated Blood Loss (mL): less than 50     Complications: None    Specimens:   ID Type Source Tests Collected by Time Destination   1 : transverse colon and portion of ascending and descending colon Preservative Colon  Hilario Gibbons MD 2/8/2022 1703 Pathology        Implants: * No implants in log *    Drains:   Orogastric Tube 02/08/22 (Active)       Findings: mid-transverse colon cancer    [id number]    Electronically Signed by Martinez Oneil MD on 2/8/2022 at 6:16 PM

## 2022-02-08 NOTE — ANESTHESIA PREPROCEDURE EVALUATION
Relevant Problems   CARDIOVASCULAR   (+) Acute on chronic congestive heart failure (HCC)   (+) CHF (congestive heart failure) (HCC)   (+) CHF exacerbation (HCC)   (+) Chronic diastolic congestive heart failure (HCC)   (+) Coronary artery disease of native artery of native heart with stable angina pectoris (HCC)   (+) Primary hypertension      GASTROINTESTINAL   (+) Gastroesophageal reflux disease without esophagitis      RENAL FAILURE   (+) Acute renal failure superimposed on chronic kidney disease (HCC)   (+) Chronic kidney disease, stage 4 (severe) (HCC)   (+) Chronic kidney disease, stage IV (severe) (HCC)   (+) Renal cell carcinoma of left kidney (HCC)   (+) Renal failure syndrome   (+) Stage 3 chronic kidney disease (HCC)      ENDOCRINE   (+) Obese   (+) Type 2 diabetes mellitus with stage 4 chronic kidney disease, with long-term current use of insulin (HCC)      HEMATOLOGY   (+) Anemia in chronic kidney disease      PERSONAL HX & FAMILY HX OF CANCER   (+) Colon cancer (HCC)   (+) Malignant neoplasm of urinary bladder (HCC)   (+) Renal cell carcinoma of left kidney (HCC)       Anesthetic History     Increased risk of difficult airway (As per Pulmonary notes)          Review of Systems / Medical History  Patient summary reviewed and pertinent labs reviewed    Pulmonary  Within defined limits                 Neuro/Psych   Within defined limits           Cardiovascular    Hypertension: well controlled      CHF (On Oxygen 2L prn)    CAD    Exercise tolerance: >4 METS  Comments: H/O Pulmonary HTN-   GI/Hepatic/Renal     GERD: well controlled           Endo/Other    Diabetes: well controlled, type 2    Morbid obesity, arthritis and cancer (H/O Bladder cancer)     Other Findings   Comments: Has only one kidney. S/P left Nephrectomy due to Cancer.   H/O pitutary tumor   H/O DVT         Physical Exam    Airway  Mallampati: III  TM Distance: 4 - 6 cm  Neck ROM: normal range of motion   Mouth opening: Normal Cardiovascular  Regular rate and rhythm,  S1 and S2 normal,  no murmur, click, rub, or gallop             Dental      Comments: Several missing. Left upper insicor loose-D/W possibility of dislodgement of loose tooth. Understands and accepts   Pulmonary  Breath sounds clear to auscultation               Abdominal         Other Findings            Anesthetic Plan    ASA: 3  Anesthesia type: general          Induction: Intravenous  Anesthetic plan and risks discussed with: Patient      Patient is a poor historian

## 2022-02-09 LAB
ANION GAP SERPL CALC-SCNC: 11 MMOL/L (ref 3–18)
BUN SERPL-MCNC: 48 MG/DL (ref 7–18)
BUN/CREAT SERPL: 12 (ref 12–20)
CALCIUM SERPL-MCNC: 7.9 MG/DL (ref 8.5–10.1)
CHLORIDE SERPL-SCNC: 108 MMOL/L (ref 100–111)
CO2 SERPL-SCNC: 22 MMOL/L (ref 21–32)
CREAT SERPL-MCNC: 4.06 MG/DL (ref 0.6–1.3)
ERYTHROCYTE [DISTWIDTH] IN BLOOD BY AUTOMATED COUNT: 15.9 % (ref 11.6–14.5)
GLUCOSE BLD STRIP.AUTO-MCNC: 107 MG/DL (ref 70–110)
GLUCOSE BLD STRIP.AUTO-MCNC: 110 MG/DL (ref 70–110)
GLUCOSE BLD STRIP.AUTO-MCNC: 166 MG/DL (ref 70–110)
GLUCOSE BLD STRIP.AUTO-MCNC: 90 MG/DL (ref 70–110)
GLUCOSE SERPL-MCNC: 118 MG/DL (ref 74–99)
HCT VFR BLD AUTO: 36.5 % (ref 36–48)
HGB BLD-MCNC: 11.2 G/DL (ref 13–16)
MCH RBC QN AUTO: 25.9 PG (ref 24–34)
MCHC RBC AUTO-ENTMCNC: 30.7 G/DL (ref 31–37)
MCV RBC AUTO: 84.5 FL (ref 78–100)
NRBC # BLD: 0 K/UL (ref 0–0.01)
NRBC BLD-RTO: 0 PER 100 WBC
PHOSPHATE SERPL-MCNC: 4.7 MG/DL (ref 2.5–4.9)
PLATELET # BLD AUTO: 302 K/UL (ref 135–420)
PMV BLD AUTO: 9.6 FL (ref 9.2–11.8)
POTASSIUM SERPL-SCNC: 3.9 MMOL/L (ref 3.5–5.5)
RBC # BLD AUTO: 4.32 M/UL (ref 4.35–5.65)
SODIUM SERPL-SCNC: 141 MMOL/L (ref 136–145)
WBC # BLD AUTO: 12.5 K/UL (ref 4.6–13.2)

## 2022-02-09 PROCEDURE — 74011636637 HC RX REV CODE- 636/637: Performed by: COLON & RECTAL SURGERY

## 2022-02-09 PROCEDURE — 65270000029 HC RM PRIVATE

## 2022-02-09 PROCEDURE — 77010033678 HC OXYGEN DAILY

## 2022-02-09 PROCEDURE — 83735 ASSAY OF MAGNESIUM: CPT

## 2022-02-09 PROCEDURE — 74011250636 HC RX REV CODE- 250/636: Performed by: COLON & RECTAL SURGERY

## 2022-02-09 PROCEDURE — 36415 COLL VENOUS BLD VENIPUNCTURE: CPT

## 2022-02-09 PROCEDURE — 44204 LAPARO PARTIAL COLECTOMY: CPT | Performed by: COLON & RECTAL SURGERY

## 2022-02-09 PROCEDURE — 44213 LAP MOBIL SPLENIC FL ADD-ON: CPT | Performed by: COLON & RECTAL SURGERY

## 2022-02-09 PROCEDURE — 74011000250 HC RX REV CODE- 250: Performed by: COLON & RECTAL SURGERY

## 2022-02-09 PROCEDURE — 80048 BASIC METABOLIC PNL TOTAL CA: CPT

## 2022-02-09 PROCEDURE — 84100 ASSAY OF PHOSPHORUS: CPT

## 2022-02-09 PROCEDURE — 82962 GLUCOSE BLOOD TEST: CPT

## 2022-02-09 PROCEDURE — 2709999900 HC NON-CHARGEABLE SUPPLY

## 2022-02-09 PROCEDURE — 74011250637 HC RX REV CODE- 250/637: Performed by: COLON & RECTAL SURGERY

## 2022-02-09 PROCEDURE — 85027 COMPLETE CBC AUTOMATED: CPT

## 2022-02-09 RX ORDER — GABAPENTIN 300 MG/1
300 CAPSULE ORAL 2 TIMES DAILY
Status: DISCONTINUED | OUTPATIENT
Start: 2022-02-09 | End: 2022-02-10

## 2022-02-09 RX ORDER — SODIUM CHLORIDE 9 MG/ML
500 INJECTION, SOLUTION INTRAVENOUS ONCE
Status: COMPLETED | OUTPATIENT
Start: 2022-02-09 | End: 2022-02-09

## 2022-02-09 RX ADMIN — GABAPENTIN 300 MG: 300 CAPSULE ORAL at 09:02

## 2022-02-09 RX ADMIN — AMLODIPINE BESYLATE 10 MG: 10 TABLET ORAL at 09:02

## 2022-02-09 RX ADMIN — ACETAMINOPHEN 1000 MG: 500 TABLET ORAL at 17:35

## 2022-02-09 RX ADMIN — HEPARIN SODIUM 5000 UNITS: 5000 INJECTION INTRAVENOUS; SUBCUTANEOUS at 21:21

## 2022-02-09 RX ADMIN — HEPARIN SODIUM 5000 UNITS: 5000 INJECTION INTRAVENOUS; SUBCUTANEOUS at 06:25

## 2022-02-09 RX ADMIN — SODIUM CHLORIDE 500 ML: 9 INJECTION, SOLUTION INTRAVENOUS at 09:02

## 2022-02-09 RX ADMIN — HYDRALAZINE HYDROCHLORIDE 100 MG: 50 TABLET, FILM COATED ORAL at 21:21

## 2022-02-09 RX ADMIN — SODIUM CHLORIDE, SODIUM LACTATE, POTASSIUM CHLORIDE, AND CALCIUM CHLORIDE 125 ML/HR: 600; 310; 30; 20 INJECTION, SOLUTION INTRAVENOUS at 06:40

## 2022-02-09 RX ADMIN — CINACALCET 30 MG: 30 TABLET, FILM COATED ORAL at 09:02

## 2022-02-09 RX ADMIN — ACETAMINOPHEN 1000 MG: 500 TABLET ORAL at 11:43

## 2022-02-09 RX ADMIN — HEPARIN SODIUM 5000 UNITS: 5000 INJECTION INTRAVENOUS; SUBCUTANEOUS at 16:11

## 2022-02-09 RX ADMIN — METOPROLOL SUCCINATE 25 MG: 25 TABLET, FILM COATED, EXTENDED RELEASE ORAL at 09:02

## 2022-02-09 RX ADMIN — SODIUM CHLORIDE, SODIUM LACTATE, POTASSIUM CHLORIDE, AND CALCIUM CHLORIDE 125 ML/HR: 600; 310; 30; 20 INJECTION, SOLUTION INTRAVENOUS at 21:14

## 2022-02-09 RX ADMIN — HYDRALAZINE HYDROCHLORIDE 100 MG: 50 TABLET, FILM COATED ORAL at 09:02

## 2022-02-09 RX ADMIN — GABAPENTIN 300 MG: 300 CAPSULE ORAL at 21:21

## 2022-02-09 RX ADMIN — FAMOTIDINE 20 MG: 10 INJECTION INTRAVENOUS at 21:20

## 2022-02-09 RX ADMIN — CEFOXITIN SODIUM 2 G: 2 POWDER, FOR SOLUTION INTRAVENOUS at 06:23

## 2022-02-09 RX ADMIN — Medication 2 UNITS: at 00:03

## 2022-02-09 RX ADMIN — ACETAMINOPHEN 1000 MG: 500 TABLET ORAL at 00:03

## 2022-02-09 RX ADMIN — Medication 2 UNITS: at 17:41

## 2022-02-09 RX ADMIN — HYDRALAZINE HYDROCHLORIDE 100 MG: 50 TABLET, FILM COATED ORAL at 16:10

## 2022-02-09 RX ADMIN — ACETAMINOPHEN 1000 MG: 500 TABLET ORAL at 06:24

## 2022-02-09 NOTE — PROGRESS NOTES
Reason for Admission:  Malignant neoplasm of transverse colon ([facility] [C18.4]  Colon cancer ([facility] [C18.9]                 RUR Score:    19%            Plan for utilizing home health:    yes                      Likelihood of Readmission:   Moderate                         Do you (patient/family) have any concerns for transition/discharge?  no    Transition of Care Plan:       Initial assessment completed with patient. Cognitive status of patient: oriented to time, place, person and situation. Face sheet information confirmed:  yes. The patient designates sisterAlexandro to participate in his discharge plan and to receive any needed information. This patient lives in a single family home with brother. Patient is able to navigate steps as needed. Prior to hospitalization, patient was considered to be independent with ADLs/IADLS : yes . Patient has a current ACP document on file: no      Healthcare Decision Maker:   Supplemental (Other) Decision Maker: Michael Haywood - Other Relative - [phone]    Supplemental (Other) Decision Maker: Janene Diaz - Other Relative - [phone]    Click here to complete [address] including selection of the Healthcare Decision Maker Relationship (ie \"Primary\")    The sister will be available to transport patient home upon discharge. The patient already has [facility] available in the home. Patient is not currently active with home health. Patient has not stayed in a skilled nursing facility or rehab. This patient is on dialysis :no    List of available Home Health agencies were provided and reviewed with the patient prior to discharge. Freedom of choice signed: yes, for Ja. Currently, the discharge plan is [facility]. The patient states that he can obtain his medications from the pharmacy, and take his medications as directed. Patient's current insurance is [employer].       Care Management Interventions  PCP Verified by CM:  Yes  Mode of Transport at Discharge: Self  Transition of Care Consult (CM Consult): Discharge [address] [place]: No  Reason Outside [place]: Managed care specific requirement  Support Systems: Other Family Member(s)  Confirm Follow Up Transport: Family  The Patient and/or Patient Representative was Provided with a Choice of Provider and Agrees with the Discharge Plan?: Yes  Freedom of Choice List was Provided with Basic Dialogue that Supports the Patient's Individualized Plan of Care/Goals, Treatment Preferences and Shares the Quality Data Associated with the Providers?: Yes  Discharge Location  Patient Expects to be Discharged to[de-identified] Home with home health        Jeanie Lundborg RN - Outcomes Manager  [phone]

## 2022-02-09 NOTE — PROGRESS NOTES
Problem: Airway Clearance - Ineffective  Goal: Achieve or maintain patent airway  Outcome: Progressing Towards Goal     Problem: Gas Exchange - Impaired  Goal: Absence of hypoxia  Outcome: Progressing Towards Goal  Goal: Promote optimal lung function  Outcome: Progressing Towards Goal     Problem: Breathing Pattern - Ineffective  Goal: Ability to achieve and maintain a regular respiratory rate  Outcome: Progressing Towards Goal     Problem: Body Temperature -  Risk of, Imbalanced  Goal: Ability to maintain a body temperature within defined limits  Outcome: Progressing Towards Goal  Goal: Will regain or maintain usual level of consciousness  Outcome: Progressing Towards Goal  Goal: Complications related to the disease process, condition or treatment will be avoided or minimized  Outcome: Progressing Towards Goal     Problem: Nutrition Deficits  Goal: Optimize nutrtional status  Outcome: Progressing Towards Goal     Problem: Risk for Fluid Volume Deficit  Goal: Maintain normal heart rhythm  Outcome: Progressing Towards Goal  Goal: Maintain absence of muscle cramping  Outcome: Progressing Towards Goal  Goal: Maintain normal serum potassium, sodium, calcium, phosphorus, and pH  Outcome: Progressing Towards Goal     Problem: Diabetes Self-Management  Goal: *Disease process and treatment process  Description: Define diabetes and identify own type of diabetes; list 3 options for treating diabetes. Outcome: Progressing Towards Goal  Goal: *Incorporating nutritional management into lifestyle  Description: Describe effect of type, amount and timing of food on blood glucose; list 3 methods for planning meals. Outcome: Progressing Towards Goal  Goal: *Incorporating physical activity into lifestyle  Description: State effect of exercise on blood glucose levels.   Outcome: Progressing Towards Goal  Goal: *Developing strategies to promote health/change behavior  Description: Define the ABC's of diabetes; identify appropriate screenings, schedule and personal plan for screenings. Outcome: Progressing Towards Goal  Goal: *Using medications safely  Description: State effect of diabetes medications on diabetes; name diabetes medication taking, action and side effects. Outcome: Progressing Towards Goal  Goal: *Monitoring blood glucose, interpreting and using results  Description: Identify recommended blood glucose targets  and personal targets. Outcome: Progressing Towards Goal  Goal: *Prevention, detection, treatment of acute complications  Description: List symptoms of hyper- and hypoglycemia; describe how to treat low blood sugar and actions for lowering  high blood glucose level. Outcome: Progressing Towards Goal  Goal: *Prevention, detection and treatment of chronic complications  Description: Define the natural course of diabetes and describe the relationship of blood glucose levels to long term complications of diabetes. Outcome: Progressing Towards Goal  Goal: *Developing strategies to address psychosocial issues  Description: Describe feelings about living with diabetes; identify support needed and support network  Outcome: Progressing Towards Goal  Goal: *Insulin pump training  Outcome: Progressing Towards Goal  Goal: *Sick day guidelines  Outcome: Progressing Towards Goal  Goal: *Patient Specific Goal (EDIT GOAL, INSERT TEXT)  Outcome: Progressing Towards Goal     Problem: Falls - Risk of  Goal: *Absence of Falls  Description: Document Bob Fall Risk and appropriate interventions in the flowsheet.   Outcome: Progressing Towards Goal  Note: Fall Risk Interventions:  Mobility Interventions: Assess mobility with egress test,Patient to call before getting OOB         Medication Interventions: Patient to call before getting OOB    Elimination Interventions: Call light in reach,Bed/chair exit alarm,Patient to call for help with toileting needs              Problem: Patient Education: Go to Patient Education Activity  Goal: Patient/Family Education  Outcome: Progressing Towards Goal     Problem: Pain  Goal: *Control of Pain  Outcome: Progressing Towards Goal  Goal: *PALLIATIVE CARE:  Alleviation of Pain  Outcome: Progressing Towards Goal     Problem: Pain  Goal: *Control of Pain  Outcome: Progressing Towards Goal  Goal: *PALLIATIVE CARE:  Alleviation of Pain  Outcome: Progressing Towards Goal     Problem: Patient Education: Go to Patient Education Activity  Goal: Patient/Family Education  Outcome: Progressing Towards Goal

## 2022-02-09 NOTE — PROGRESS NOTES
[facility] [address]  [address] [facility] [address]  [phone]  Colon and Rectal Surgery Progress Note      Patient: Mesha Webb MRN: [id number]  SSN: [id number]    YOB: 1945  Age: 78 y.o. Sex: male      Admit Date: 2/8/2022    LOS: 1 day     Subjective:     No events. Objective:     Vitals:    02/08/22 2134 02/08/22 2303 02/09/22 0353 02/09/22 0854   BP: (!) 155/80 (!) 174/81 (!) 156/85 138/78   Pulse: 67 75 83 82   Resp: 18 19 17 16   Temp: 98.4 °F (36.9 °C) 97.4 °F (36.3 °C) 98 °F (36.7 °C) 98.3 °F (36.8 °C)   SpO2: 90% 90% 91% (!) 88%   Weight:       Height:            Intake and Output:  Current Shift: No intake/output data recorded.   Last three shifts: 02/07 1901 - 02/09 0700  In: 1700 [I.V.:1700]  Out: 650 [Urine:600]    Physical Exam:     Constitutional: well developed, in NAD  Abdomen: soft, mildly distended, NT    Lab/Data Review:    CMP:   Lab Results   Component Value Date/Time     02/09/2022 02:05 AM    K 3.9 02/09/2022 02:05 AM     02/09/2022 02:05 AM    CO2 22 02/09/2022 02:05 AM    AGAP 11 02/09/2022 02:05 AM     (H) 02/09/2022 02:05 AM    BUN 48 (H) 02/09/2022 02:05 AM    CREA 4.06 (H) 02/09/2022 02:05 AM    GFRAA 18 (L) 02/09/2022 02:05 AM    GFRNA 15 (L) 02/09/2022 02:05 AM    CA 7.9 (L) 02/09/2022 02:05 AM    PHOS 4.7 02/09/2022 02:05 AM     CBC:   Lab Results   Component Value Date/Time    WBC 12.5 02/09/2022 02:05 AM    HGB 11.2 (L) 02/09/2022 02:05 AM    HCT 36.5 02/09/2022 02:05 AM     02/09/2022 02:05 AM        Assessment:     S/p lap transverse colectomy for malignant polyp    Plan:     Sips clears  OOB  Sq heparin  Timing to start heparin gtt pending  No scd's given history of lower extremity clots    Signed By: Jose Neely MD        February 9, 2022

## 2022-02-09 NOTE — PROGRESS NOTES
Reason for Renal Dosing:  Per Renal Dosing Policy    Patient clinical status and labs ordered/reviewed. Pt Weight Weight: 87.5 kg (193 lb)   Serum Creatinine Lab Results   Component Value Date/Time    Creatinine 2.8 (H) 01/14/2022 12:00 PM       Creatinine Clearance Estimated Creatinine Clearance: 24.9 mL/min (A) (by C-G formula based on SCr of 2.8 mg/dL (H)). BUN Lab Results   Component Value Date/Time    BUN 57 (H) 01/14/2022 12:00 PM       WBC Lab Results   Component Value Date/Time    WBC 7.6 01/14/2022 12:00 PM      Temperature Temp: 98.4 °F (36.9 °C)   HR Pulse (Heart Rate): 67     BP BP: (!) 155/80           Drug type: Non-Antibiotic      Drug/dose: for naïve patient was initiated at: famotidine 20 mg IV q24 hours    Continue to monitor.     Signed Mabel Savage PHARMD  Date 2/8/2022  Time 9:35 PM

## 2022-02-09 NOTE — PERIOP NOTES
@ 2015 Notified CRNA about patient being very drowsy and difficulty keeping eyes open. Patient is arousable on calling answering questions appropriately. No s/sx of discomfort and vitals were stable. After assessing patient, CRNA stated it was okay for patient to go to the unit.

## 2022-02-09 NOTE — ROUTINE PROCESS
Bedside shift change report given to Delicia Saez RN (oncoming nurse) by Watt Aschoff, RN (offgoing nurse). Report included the following information SBAR, Kardex, Intake/Output and MAR.

## 2022-02-09 NOTE — PROGRESS NOTES
conducted an initial consultation and Spiritual Assessment for Grace Chahal, who is a 78 y.o.,male. Patients Primary Language is: [place]. According to the patients EMR Congregation Affiliation is: [employer].      The reason the Patient came to the hospital is:   Patient Active Problem List    Diagnosis Date Noted    Colon cancer ([address] 02/08/2022    Anemia in chronic kidney disease 12/20/2021    Peripheral venous insufficiency 12/20/2021    Obese 12/14/2021    Palpitations 12/14/2021    Renal failure syndrome 12/14/2021    Chest pain 11/01/2021    Chronic kidney disease, stage 4 (severe) ([address] 11/01/2021    Chronic kidney disease, stage IV (severe) ([address] 10/19/2021    Acute on chronic congestive heart failure ([address] 10/19/2021    Acute on chronic combined systolic and diastolic ACC/AHA stage C congestive heart failure ([address] 10/07/2021    CHF exacerbation ([address] 10/06/2021    Acute on chronic heart failure ([address] 01/13/2021    Acute deep vein thrombosis (DVT) of distal vein of both lower extremities ([address] 10/13/2020    UTI (urinary tract infection) 10/10/2020    CHF (congestive heart failure) ([address] 09/15/2020    Deep vein thrombosis (DVT) of proximal vein of both lower extremities ([address] 09/14/2020    Positive D dimer 09/13/2020    General weakness 08/01/2020    Chronic diastolic congestive heart failure ([address] 07/31/2020    Coronary artery disease of native artery of native heart with stable angina pectoris ([address] 03/20/2020    H/O left nephrectomy 03/20/2020    History of bladder cancer 03/20/2020    Pulmonary edema, acute ([address] 03/20/2020    Acute renal failure superimposed on chronic kidney disease ([address] 11/23/2017    Gastroesophageal reflux disease without esophagitis 11/23/2017    HLD (hyperlipidemia) 11/23/2017    Renal cell carcinoma of left kidney ([address] 11/23/2017    Hematuria 09/04/2014    Stage 3 chronic kidney disease ([facility] 09/04/2014    Malignant neoplasm of urinary bladder ([facility] 12/07/2009    Type 2 diabetes mellitus with stage 4 chronic kidney disease, with long-term current use of insulin ([facility] 12/07/2009    Primary hypertension 12/07/2009        The  provided the following Interventions:  Initiated a relationship of care and support. Explored issues of surekha, belief, spirituality and Taoist/ritual needs while hospitalized. Listened empathically. Offered prayer and assurance of continued prayers on patient's behalf. Chart reviewed. The following outcomes where achieved:  Patient shared limited information about both their medical narrative and spiritual journey/beliefs.  confirmed Patient's [employer] Affiliation. Patient processed feeling about current hospitalization. Patient expressed gratitude for 's visit. Assessment:  Patient does not have any Taoist/cultural needs that will affect patients preferences in health care. There are no spiritual or Taoist issues which require intervention at this time. Plan:  Chaplains will continue to follow and will provide pastoral care on an as needed/requested basis.  recommends bedside caregivers page  on duty if patient shows signs of acute spiritual or emotional distress.       [facility]   [phone]

## 2022-02-09 NOTE — DIABETES MGMT
Diabetes/ Glycemic Control Plan of Care    Recommendations:   1.) correctional lispro insulin as ordered. 2.) add daily basal lantus insulin 8 units (home dose) if two BG values are 200 and above. 3.) add 4 carb choices in liquid diet. Done    2/09: Seen patient this morning. POD #1 laparoscopic transverse colectomy. A1c 9.6 (2/08/2022)  Completed assessment of home diabetes management and education. Noted patient with limited knowledge of diabetes and management. Patient reported that he was drinking a lot of regular soda and milk. Patient receptive to diabetes education. He verbalized understanding that high blood sugar affects healing. He will monitor his blood sugar 3 times daily at home as told by his doctor. He was only checking his blood twice daily: morning and evening. Assessment:   DX: No diagnosis found. Noted the following assessment:  Malignant neoplasm of transverse colon  Status post laparoscopic transverse colectomy on 2/08/2022    Fasting/ Morning blood glucose:   Lab Results   Component Value Date/Time    Glucose 118 (H) 02/09/2022 02:05 AM    Glucose (POC) 110 02/09/2022 11:34 AM    Glucose (POC) 144 (H) 11/02/2021 04:24 PM     IV Fluids containing dextrose:  None    Steroids:   None    Blood glucose values: Within target range (70-180mg/dL):  Yes. He had one episode of above target BG at bedtime last night    Current insulin orders:  Correctional lispro insulin. Normal sensitivity dose    Total Daily Dose previous 24 hours:  2 units correctional lispro insulin    Current A1c:   Lab Results   Component Value Date/Time    Hemoglobin A1c 9.6 (H) 02/08/2022 12:31 PM    Hemoglobin A1c (POC) 7.0 09/30/2021 02:55 PM      equivalent  to ave Blood Glucose of 229 mg/dl for 2-3 months prior to admission    Adequate glycemic control PTA: No    Nutrition/Diet:   Active Orders   Diet    ADULT DIET Clear Liquid; 4 carb choices (60 gm/meal)      Meal Intake:  No data found.   Supplement Intake:  No data found. Home diabetes medications:  Verified with patient on 2/09/2022  Key Antihyperglycemic Medications             glipiZIDE (GLUCOTROL) 10 mg tablet (Taking) Take 10 mg by mouth two (2) times a day. insulin glargine (Lantus Solostar U-100 Insulin) 100 unit/mL (3 mL) inpn (Taking) 8 Units by SubCUTAneous route nightly. Sliding scale          Plan/Goals:   Blood glucose will be within target of 70 - 180 mg/dl within 72 hours  Reinforce dietary and medication compliance at home.        Education:  [x] Refer to Diabetes Education Record: 2/09/2022                       [] Education not indicated at this time     Era Schafer RN [facility]  Pager: [phone]

## 2022-02-09 NOTE — DIABETES MGMT
Diabetes Patient/Family Education Record    Factors That May Influence Patients Ability to Learn or Comply with Recommendations   []   Language barrier    []   Cultural needs   []   Motivation    []   Cognitive limitation    []   Physical   [x]   Education    []   Physiological factors   []   Hearing/vision/speaking impairment   []   Mandaen beliefs    []   Financial factors   []  Other:   []  No factors identified at this time. Person Instructed:   [x]   Patient   []   Family   []  Other     Preference for Learning:   [x]   Verbal   []   Written   []  Demonstration     Level of Comprehension & Competence:    [x]  Good                                      [] Fair                                     []  Poor                             []  Needs Reinforcement   [x]  Teach back completed    Education Component:  Patient stated, \"I don't know why my sugar is always high at home because I take my diabetes medicines. \"  Patient educated and receptive to learning. [x]  Medication management, including how to administer insulin (if appropriate) and potential medication interactions: Verified that patient takes the following diabetes medications at home:  Lantus (SoloStar) pen insulin 8 units daily at bedtime  Glipizide 10 mg 2 times daily  Patient reported that he takes his diabetes medications everyday. Patient reported that he drinks a lot of milk and regular soda. He did not know it affects his blood sugar. [x]  Nutritional management - [x] Obtained usual meal pattern: Patient reported that he doesn't have any problem limiting carb intake (starches - he pref pasta only, fruits, dairy). He did not know that consuming a lot of milk and regular soda affects his blood sugar. He does not remember being told of informed in the past when he was diagnosed with diabetes that he should limit amount of dairy (milk) and choose diet or sugar free beverages.  Patient receptive to learning:    [x]   Basic carbohydrate counting  []  Plate method  [x]  Limit concentrated sweets and avoid sweetened beverages: Patient stated that he would like to cont to drink milk. He agreed to limit serving size of milk. He did not know that regular can of soda contain about 10-12 teaspoon of sugar. He is willing to switch to sugar free or diet soda. []  Portion control  [x]    Avoid skipping meals     [x]  Exercise: Discussed with patient that regular physical activity that he can tolerate is part of diabetes management. [x]  Signs, symptoms, and treatment of hyperglycemia and hypoglycemia: Discussed high blood glucose with patient. See notes above under nutrition education. [x] Prevention, recognition and treatment of hyperglycemia and hypoglycemia: Discussed low blood glucose. Patient reported seldom problem with hypoglycemia and he's able to recognize the symptoms. Educated patient on how to treat low blood glucose to above 70 and prevention. [x]  Importance of blood glucose monitoring: Patient did not the recommended fasting BG target range. Educated. [x] Blood Glucose targets   []   Provided patient with blood glucose meter  [x]  Has glucometer and supplies at home   []  Instruction on use of the blood glucose meter and recommended monitoring schedule   [x]  Discuss the importance of HbA1C monitoring. Patient did not know A1c. Educated. Patient's current A1c is 9.6% (2/08/2022). This is equivalent to average glucose of 229 mg/dl for the past 2-3 months.  Patient thinks switching to sugar free beverages and limiting to serving size of milk will help his blood sugar.     []  Sick day guidelines   []  Proper use and disposal of lancets, needles, syringes or insulin pens (if appropriate)   [x]  Potential long-term complications (retinopathy, kidney disease, neuropathy, foot care)   [x] Information about whom to contact in case of emergency or for more information    [x]  Goal: Patient/family will demonstrate understanding of Diabetes Self- Management Skills by: 2/16/2022  Plan for post-discharge education or self-management support:    [] Outpatient class schedule provided            [] Patient Declined    [] Scheduled for outpatient classes (date) _______    [] Written information provided  Verify: [x] Prior to admission Diabetes medications    Does patient understand how diabetes medications work? Yes. Does patient have difficulty obtaining diabetes medications and testing supplies?  No.    Gato Rodríguez RN [facility]  Pager: [phone]

## 2022-02-09 NOTE — PROGRESS NOTES
Nutrition Assessment (Disaster Mode)    Type and Reason for Visit: Initial,Positive nutrition screen    Nutrition Recommendations/Plan:   -Add Ensure Clear BID and Gelatein  -Monitor diet tolerance and ability to advance  -IV fluid per MD     Malnutrition Assessment:  Malnutrition Status: At risk for malnutrition (specify) (variable intake with altered GI function and weight loss PTA)      Nutrition Assessment:   Nutrition Assessment: Patient admitted 2/8 for Robotic left colectomy due to transverse colon cancer s/p Laparoscopic transverse colectomy, splenic flexure mobilization (2/8/22) transverse colon and portion of ascending and descending colon sent for pathology. Patient found OOB sitting up in chair with plan to ambulate with therapy this afternoon. Nutrition Related Findings: Patient started on clear liquid diet this morning (POD1) and tolerated first meal at lunch today with good intake per patient report, denies abdominal pain, nausea/vomiting, not passing flatus and no stool since surgery. Last BM PTA (usually with bowel movement up to 3 times a day and takes miralax prn at home). Urostomy in place (present on admission). Pertinent medications: Norvasc, Insulin, sensipar, Apresoline, Pepcid, morphine prn, LR a 125 mL/hr plus 500 mL bolus NS     Nutrition History and Allergies: Past medical history of Acid reflux, papillary urothelial carcinoma. S/p cystoprostatectomy 2009, Nephroureterectomy (2009), left kidney carcinoma s/p left nephrectomy (2016), Congestive heart failure, Diabetes Mellitus, CKD stage 4, GERD, Hypertension and High Cholesterol. Weight History per chart review: 220(8/11/2020), 212(9/9/20), 210 (2/23/20),202 lb (11/9/21), 192 lb (12/6/21) with 10 lb, 5% weight loss x month and weight on admission at 193 (2/8/22). NKFA. Patient reports he has family that helps him at home with meal preparation.  Patient with variable meal intake with periods of decreased appetite and early satiety with abdominal distension for the past several months PTA, prior to onset of symptoms he usually consumes up to three meals per day and likes cornflakes and milk (1% or 2%). Reports usual weight of 250 lb with weight loss in the past during previous cancer diagnosis and treatment with recent weight  Cultural/Hinduism/Ethnic Food Preference(s): None     Total Energy Requirements (kcals/day): 5250-9967 (HBE)   Weight Used for Energy Requirements: Current  Total Protein Requirements (g/day):  (1.0-1.2) Weight Used for Protein Requirements: Current  Total Fluid Requirements (ml/day): 0194-2060 Method Used for Fluid Requirements: 1 ml/kcal    Current Nutrition Therapies:  ADULT DIET Clear Liquid; 4 carb choices (60 gm/meal)     Anthropometric Measures:  Height: 5' 8\" (172.7 cm) Weight: 87.5 kg (193 lb) Body mass index is 29.35 kg/m². Admission Body Weight: 193 lb  Ideal Body Weight (lbs) (Calculated): 154 lbs Ideal Body Weight (Kg) (Calculated): 70 kg % IBW (Calculated): 125.3 %  Usual Body Weight: 114 kg (251 lb 5.2 oz) % Weight Change (Calculated): -23.2            Nutrition Diagnosis:   · Inadequate oral intake related to altered GI function,altered GI structure,increased demand for energy/nutrients as evidenced by NPO or clear liquid status due to medical condition,poor intake prior to admission     Nutrition Interventions:   Food and/or Nutrient Delivery: Continue current diet,Start oral nutrition supplement,IV fluid delivery  Nutrition Education/Counseling: No recommendations at this time  Coordination of Nutrition Care: Continue to monitor while inpatient       Goals: PO nutrition intake will meet >75% of patient estimated nutritional needs by next follow up date. Nutrition Monitoring and Evaluation: Patient will be monitored per nutrition standards of care. Consult Dietitian if nutrition intervention essential to patient care is needed.    Behavioral-Environmental Outcomes: None identified  Food/Nutrient Intake Outcomes: Diet advancement/tolerance,Food and nutrient intake,Supplement intake,IVF intake  Physical Signs/Symptoms Outcomes: GI status,Biochemical data,Meal time behavior,Fluid status or edema    Discharge Planning:  Too soon to determine    Nakita Ac on 2/9/2022 at 4:01 PM  Contact: [phone]    Disaster Mode Active

## 2022-02-09 NOTE — PROGRESS NOTES
TRANSFER - IN REPORT:    Verbal report received from [address], RN(name) on [facility]  being received from [employer]) for routine progression of care      Report consisted of patients Situation, Background, Assessment and   Recommendations(SBAR). Information from the following report(s) SBAR, Kardex, OR Summary, Intake/Output and MAR was reviewed with the receiving nurse. Opportunity for questions and clarification was provided. Assessment completed upon patients arrival to unit and care assumed.

## 2022-02-09 NOTE — PROGRESS NOTES
Patient transferred to chair, educated on Incentive spirometer, patient tolerated well and can use independently.

## 2022-02-09 NOTE — ANESTHESIA POSTPROCEDURE EVALUATION
Procedure(s):  ROBOTIC CONVERTED TO LAPAROSCOPIC LEFT COLECTOMY. general    Anesthesia Post Evaluation      Multimodal analgesia: multimodal analgesia used between 6 hours prior to anesthesia start to PACU discharge  Patient location during evaluation: bedside  Level of consciousness: awake  Pain management: adequate  Airway patency: patent  Anesthetic complications: no  Cardiovascular status: acceptable  Respiratory status: acceptable  Hydration status: acceptable  Post anesthesia nausea and vomiting:  none  Final Post Anesthesia Temperature Assessment:  Normothermia (36.0-37.5 degrees C)      INITIAL Post-op Vital signs:   Vitals Value Taken Time   /57 02/08/22 1907   Temp     Pulse 58 02/08/22 1912   Resp 18 02/08/22 1912   SpO2 100 % 02/08/22 1912   Vitals shown include unvalidated device data.

## 2022-02-10 LAB
ANION GAP SERPL CALC-SCNC: 8 MMOL/L (ref 3–18)
BUN SERPL-MCNC: 54 MG/DL (ref 7–18)
BUN/CREAT SERPL: 11 (ref 12–20)
CALCIUM SERPL-MCNC: 7.6 MG/DL (ref 8.5–10.1)
CALCIUM SERPL-MCNC: 8 MG/DL (ref 8.5–10.1)
CHLORIDE SERPL-SCNC: 107 MMOL/L (ref 100–111)
CO2 SERPL-SCNC: 22 MMOL/L (ref 21–32)
CREAT SERPL-MCNC: 5.03 MG/DL (ref 0.6–1.3)
ERYTHROCYTE [DISTWIDTH] IN BLOOD BY AUTOMATED COUNT: 16.3 % (ref 11.6–14.5)
FERRITIN SERPL-MCNC: 288 NG/ML (ref 8–388)
GLUCOSE BLD STRIP.AUTO-MCNC: 122 MG/DL (ref 70–110)
GLUCOSE BLD STRIP.AUTO-MCNC: 146 MG/DL (ref 70–110)
GLUCOSE BLD STRIP.AUTO-MCNC: 198 MG/DL (ref 70–110)
GLUCOSE BLD STRIP.AUTO-MCNC: 215 MG/DL (ref 70–110)
GLUCOSE BLD STRIP.AUTO-MCNC: 225 MG/DL (ref 70–110)
GLUCOSE SERPL-MCNC: 128 MG/DL (ref 74–99)
HCT VFR BLD AUTO: 33.1 % (ref 36–48)
HGB BLD-MCNC: 10.6 G/DL (ref 13–16)
IRON SATN MFR SERPL: 7 % (ref 20–50)
IRON SERPL-MCNC: 11 UG/DL (ref 50–175)
MAGNESIUM SERPL-MCNC: 1.3 MG/DL (ref 1.6–2.3)
MCH RBC QN AUTO: 26.6 PG (ref 24–34)
MCHC RBC AUTO-ENTMCNC: 32 G/DL (ref 31–37)
MCV RBC AUTO: 83 FL (ref 78–100)
NRBC # BLD: 0 K/UL (ref 0–0.01)
NRBC BLD-RTO: 0 PER 100 WBC
PHOSPHATE SERPL-MCNC: 5 MG/DL (ref 2.5–4.9)
PLATELET # BLD AUTO: 304 K/UL (ref 135–420)
PMV BLD AUTO: 10.5 FL (ref 9.2–11.8)
POTASSIUM SERPL-SCNC: 3.7 MMOL/L (ref 3.5–5.5)
PTH-INTACT SERPL-MCNC: 235.7 PG/ML (ref 18.4–88)
RBC # BLD AUTO: 3.99 M/UL (ref 4.35–5.65)
SODIUM SERPL-SCNC: 137 MMOL/L (ref 136–145)
TIBC SERPL-MCNC: 148 UG/DL (ref 250–450)
WBC # BLD AUTO: 8.6 K/UL (ref 4.6–13.2)

## 2022-02-10 PROCEDURE — 74011636637 HC RX REV CODE- 636/637: Performed by: COLON & RECTAL SURGERY

## 2022-02-10 PROCEDURE — 74011000250 HC RX REV CODE- 250: Performed by: COLON & RECTAL SURGERY

## 2022-02-10 PROCEDURE — 82728 ASSAY OF FERRITIN: CPT

## 2022-02-10 PROCEDURE — 74011250636 HC RX REV CODE- 250/636: Performed by: COLON & RECTAL SURGERY

## 2022-02-10 PROCEDURE — 36415 COLL VENOUS BLD VENIPUNCTURE: CPT

## 2022-02-10 PROCEDURE — 82962 GLUCOSE BLOOD TEST: CPT

## 2022-02-10 PROCEDURE — 83735 ASSAY OF MAGNESIUM: CPT

## 2022-02-10 PROCEDURE — 83970 ASSAY OF PARATHORMONE: CPT

## 2022-02-10 PROCEDURE — 80048 BASIC METABOLIC PNL TOTAL CA: CPT

## 2022-02-10 PROCEDURE — 2709999900 HC NON-CHARGEABLE SUPPLY

## 2022-02-10 PROCEDURE — 84100 ASSAY OF PHOSPHORUS: CPT

## 2022-02-10 PROCEDURE — 83540 ASSAY OF IRON: CPT

## 2022-02-10 PROCEDURE — 97161 PT EVAL LOW COMPLEX 20 MIN: CPT

## 2022-02-10 PROCEDURE — 85027 COMPLETE CBC AUTOMATED: CPT

## 2022-02-10 PROCEDURE — 65270000029 HC RM PRIVATE

## 2022-02-10 PROCEDURE — 97116 GAIT TRAINING THERAPY: CPT

## 2022-02-10 PROCEDURE — 74011250637 HC RX REV CODE- 250/637: Performed by: COLON & RECTAL SURGERY

## 2022-02-10 RX ORDER — SODIUM CHLORIDE 450 MG/100ML
50 INJECTION, SOLUTION INTRAVENOUS CONTINUOUS
Status: DISCONTINUED | OUTPATIENT
Start: 2022-02-10 | End: 2022-02-12 | Stop reason: HOSPADM

## 2022-02-10 RX ORDER — GABAPENTIN 300 MG/1
300 CAPSULE ORAL DAILY
Status: DISCONTINUED | OUTPATIENT
Start: 2022-02-11 | End: 2022-02-12 | Stop reason: HOSPADM

## 2022-02-10 RX ADMIN — FAMOTIDINE 20 MG: 10 INJECTION INTRAVENOUS at 22:27

## 2022-02-10 RX ADMIN — GABAPENTIN 300 MG: 300 CAPSULE ORAL at 08:58

## 2022-02-10 RX ADMIN — Medication 4 UNITS: at 23:40

## 2022-02-10 RX ADMIN — Medication 4 UNITS: at 12:18

## 2022-02-10 RX ADMIN — MORPHINE SULFATE 2 MG: 2 INJECTION, SOLUTION INTRAMUSCULAR; INTRAVENOUS at 08:59

## 2022-02-10 RX ADMIN — Medication 2 UNITS: at 18:25

## 2022-02-10 RX ADMIN — HEPARIN SODIUM 5000 UNITS: 5000 INJECTION INTRAVENOUS; SUBCUTANEOUS at 22:26

## 2022-02-10 RX ADMIN — ACETAMINOPHEN 1000 MG: 500 TABLET ORAL at 18:22

## 2022-02-10 RX ADMIN — ACETAMINOPHEN 1000 MG: 500 TABLET ORAL at 12:17

## 2022-02-10 RX ADMIN — AMLODIPINE BESYLATE 10 MG: 10 TABLET ORAL at 08:58

## 2022-02-10 RX ADMIN — ACETAMINOPHEN 1000 MG: 500 TABLET ORAL at 23:38

## 2022-02-10 RX ADMIN — MORPHINE SULFATE 2 MG: 2 INJECTION, SOLUTION INTRAMUSCULAR; INTRAVENOUS at 06:21

## 2022-02-10 RX ADMIN — HEPARIN SODIUM 5000 UNITS: 5000 INJECTION INTRAVENOUS; SUBCUTANEOUS at 06:09

## 2022-02-10 RX ADMIN — SODIUM CHLORIDE, SODIUM LACTATE, POTASSIUM CHLORIDE, AND CALCIUM CHLORIDE 125 ML/HR: 600; 310; 30; 20 INJECTION, SOLUTION INTRAVENOUS at 06:09

## 2022-02-10 RX ADMIN — SODIUM CHLORIDE 125 ML/HR: 450 INJECTION, SOLUTION INTRAVENOUS at 09:07

## 2022-02-10 RX ADMIN — HEPARIN SODIUM 5000 UNITS: 5000 INJECTION INTRAVENOUS; SUBCUTANEOUS at 15:15

## 2022-02-10 RX ADMIN — CINACALCET 30 MG: 30 TABLET, FILM COATED ORAL at 08:59

## 2022-02-10 RX ADMIN — HYDRALAZINE HYDROCHLORIDE 100 MG: 50 TABLET, FILM COATED ORAL at 18:22

## 2022-02-10 RX ADMIN — ACETAMINOPHEN 1000 MG: 500 TABLET ORAL at 06:10

## 2022-02-10 RX ADMIN — HYDRALAZINE HYDROCHLORIDE 100 MG: 50 TABLET, FILM COATED ORAL at 08:58

## 2022-02-10 RX ADMIN — ACETAMINOPHEN 1000 MG: 500 TABLET ORAL at 00:07

## 2022-02-10 RX ADMIN — METOPROLOL SUCCINATE 25 MG: 25 TABLET, FILM COATED, EXTENDED RELEASE ORAL at 08:58

## 2022-02-10 RX ADMIN — HYDRALAZINE HYDROCHLORIDE 100 MG: 50 TABLET, FILM COATED ORAL at 22:25

## 2022-02-10 NOTE — PROGRESS NOTES
[facility] [address]  [address] [facility] [address]  [phone]  Colon and Rectal Surgery Progress Note      Patient: Darvin Kennedy MRN: [id number]  SSN: [id number]    YOB: 1945  Age: 78 y.o. Sex: male      Admit Date: 2/8/2022    LOS: 2 days     Subjective: Tolerating clears. No substantial bowel function. Objective:     Vitals:    02/09/22 2014 02/09/22 2015 02/09/22 2324 02/10/22 0400   BP:  (!) 142/66 (!) 149/70 (!) 154/64   Pulse:  65 70 77   Resp:  17 18 18   Temp:  97.9 °F (36.6 °C) 97.3 °F (36.3 °C) 98.6 °F (37 °C)   SpO2:  95% 92% 91%   Weight: 87.1 kg (192 lb 1.6 oz)      Height:            Intake and Output:  Current Shift: No intake/output data recorded.   Last three shifts: 02/08 1901 - 02/10 0700  In: -   Out: 1900 [Urine:1900]    Physical Exam:     Constitutional: well developed, in NAD  Abdomen: soft, nontender nondistended    Lab/Data Review:    CMP:   Lab Results   Component Value Date/Time     02/10/2022 03:50 AM    K 3.7 02/10/2022 03:50 AM     02/10/2022 03:50 AM    CO2 22 02/10/2022 03:50 AM    AGAP 8 02/10/2022 03:50 AM     (H) 02/10/2022 03:50 AM    BUN 54 (H) 02/10/2022 03:50 AM    CREA 5.03 (H) 02/10/2022 03:50 AM    GFRAA 14 (L) 02/10/2022 03:50 AM    GFRNA 11 (L) 02/10/2022 03:50 AM    CA 8.0 (L) 02/10/2022 03:50 AM    PHOS 5.0 (H) 02/10/2022 03:50 AM     CBC:   Lab Results   Component Value Date/Time    WBC 8.6 02/10/2022 03:50 AM    HGB 10.6 (L) 02/10/2022 03:50 AM    HCT 33.1 (L) 02/10/2022 03:50 AM     02/10/2022 03:50 AM        Assessment:     S/p lap transverse colectomy for malignant polyp    Plan:     Clear liquids, advance to full liquids for dinner  OOB with PT  Sq heparin  Timing to start heparin gtt pending  No scd's given history of lower extremity clots  Nephrology consult for acute on chronic renal failure    Signed By: Layne Hopkins MD        February 10, 2022

## 2022-02-10 NOTE — CONSULTS
Consult Note  Consult requested by: Dr. Beena Soto is a 78 y.o. male BLACK/ who is being seen on consult for renal failure   No chief complaint on file. Admission diagnosis: s/p laproscopic transverse colectomy     81-year-old male with past medical history of diabetes, hypertension, known CKD stage IV admitted for laparoscopic transverse colectomy, following for renal failure. Impression & Plan:   IMPRESSION:   Acute on chronic kidney injury, ATN, risk factor prerenal injury, hemodynamic changes during operation  CKD stage IV with heavy proteinuria, status post nephrectomy, history of uncontrolled diabetes, creatinine at 2.8 mg per DL earlier this year. Follows with Dr. Marco Costa in [place]  History of renal cancer, s/p left nephrectomy and also cystectomy now has a urostomy bag  Diabetes, uncontrolled  Anemia  S/p laparoscopic transverse colectomy   PLAN:   Mr. Juan Tran appears comfortable from volume standpoint agree with gentle IV hydration. Monitor urine output. Monitor for urine retention. No urgency to start dialysis at present, he is aware about his severe chronic kidney disease and agreed to start dialysis if he needs during this hospitalization. Check anemia profile, PTH  Agree to resume Sensipar. Decrease gabapentin to once a daily  Adjust medication per current renal function status. Thank you very much for allowing me to participate in the care of this patient. We will continue to monitor with you and make recommendations as needed.     Discussed with Dr. Lisandra Andrade. HPI: 81-year-old male with past medical history of diabetes, hypertension, renal cell carcinoma status post left nephrectomy, history of cystectomy and now has urostomy bag, admitted for elective robotic left colectomy. Mr. Juan Tran has a known CKD, he follows with Dr. Marco Costa in [place]. He has a heavy albuminuria from his uncontrolled diabetes.   He was told in the past that he will need dialysis in the future. On this admission postoperatively his labs shows elevated creatinine. Nephrology service consulted for further assistant. During my visit, he denies for any chest pain, short of breath, denies for any difficulty in passing urine.   Past Medical History:   Diagnosis Date    Acid reflux     Arrhythmia     Arthritis     Bladder cancer (HCC)     Congestive heart failure (HCC)     Deep vein thrombosis (DVT) of proximal vein of both lower extremities ([address] 2020    Diabetes mellitus ([address]     Difficult intubation     Narrow airway-per Pulm notes(in media)     GERD (gastroesophageal reflux disease)     Gout     High cholesterol     Hypertension     Kidney carcinoma, left ([address] 2016    left nephrectomy    Kidney disease     Pituitary mass ([address]     Pulmonary HTN ([address] 10/2021    PASP 77 mmHg    Reflux esophagitis     Unspecified deficiency anemia       Past Surgical History:   Procedure Laterality Date    COLONOSCOPY N/A 2021    COLONOSCOPY with Polypectomies, Tattoo & Clip Placement performed by Ben Jones MD at [facility] ENDOSCOPY    HX CYSTECTOMY  2009    bladder removal/ urostomy bag    HX NEPHRECTOMY Left 2009    Nephroureterectomy by Dr. Jenelle Landin HX OTHER SURGICAL      surgery on pituitary gland       Social History     Socioeconomic History    Marital status: SINGLE     Spouse name: Not on file    Number of children: Not on file    Years of education: Not on file    Highest education level: Not on file   Occupational History    Not on file   Tobacco Use    Smoking status: Former Smoker     Packs/day: 0.50     Years: 40.00     Pack years: 20.00     Types: Cigarettes     Quit date: 1999     Years since quittin.1    Smokeless tobacco: Never Used   Vaping Use    Vaping Use: Never used   Substance and Sexual Activity    Alcohol use: No    Drug use: No    Sexual activity: Yes   Other Topics Concern    Not on file   Social History Narrative    Not on file     Social Determinants of Health     Financial Resource Strain:     Difficulty of Paying Living Expenses: Not on file   Food Insecurity:     Worried About Running Out of Food in the Last Year: Not on file    Damian of Food in the Last Year: Not on file   Transportation Needs:     Lack of Transportation (Medical): Not on file    Lack of Transportation (Non-Medical): Not on file   Physical Activity:     Days of Exercise per Week: Not on file    Minutes of Exercise per Session: Not on file   Stress:     Feeling of Stress : Not on file   Social Connections:     Frequency of Communication with Friends and Family: Not on file    Frequency of Social Gatherings with Friends and Family: Not on file    Attends Taoism Services: Not on file    Active Member of [address] [place] [address] [employer] or Organizations: Not on file    Attends Club or Organization Meetings: Not on file    Marital Status: Not on file   Intimate Partner Violence:     Fear of Current or Ex-Partner: Not on file    Emotionally Abused: Not on file    Physically Abused: Not on file    Sexually Abused: Not on file   Housing Stability:     Unable to Pay for Housing in the Last Year: Not on file    Number of [place] in the Last Year: Not on file    Unstable Housing in the Last Year: Not on file       Family History   Problem Relation Age of Onset    Heart Disease Father     Heart Disease Mother      No Known Allergies     Home Medications:     Prior to Admission Medications   Prescriptions Last Dose Informant Patient Reported? Taking?    BD Ultra-Fine Mini Pen Needle 31 gauge x 3/16\" ndle  Transfer Papers No No   Sig: USE TO INJECT TID   FeroSuL 325 mg (65 mg iron) tablet   No Yes   Sig: TAKE 1 TABLET BY MOUTH THREE TIMES DAILY WITH MEALS   Oxygen   Yes No   Si LPM nc as needed   albuterol (PROVENTIL HFA, VENTOLIN HFA, PROAIR HFA) 90 mcg/actuation inhaler Not Taking at Unknown time Transfer Papers No No Sig: Take 2 Puffs by inhalation every four (4) hours as needed for Wheezing, Shortness of Breath or Respiratory Distress. allopurinoL (ZYLOPRIM) 100 mg tablet  Transfer Papers No Yes   Sig: TAKE 2 TABLETS BY MOUTH DAILY   amLODIPine (NORVASC) 5 mg tablet  Transfer Papers No Yes   Sig: TAKE 2 TABLETS BY MOUTH ONCE A DAY   Patient taking differently: 5 mg. TAKE 2 TABLETS BY MOUTH ONCE A DAY   apixaban (Eliquis) 5 mg tablet   Yes Yes   Sig: Take 5 mg by mouth two (2) times a day. cinacalcet (SENSIPAR) 30 mg tablet   No No   Sig: TAKE 1 TABLET BY MOUTH DAILY   ergocalciferol (ERGOCALCIFEROL) 1,250 mcg (50,000 unit) capsule   No Yes   Sig: TAKE ONE CAPSULE BY MOUTH EVERY 7 DAYS   fluticasone propionate (FLONASE) 50 mcg/actuation nasal spray  Transfer Papers Yes Yes   Si [place] by Both Nostrils route daily as needed. furosemide (LASIX) 40 mg tablet Not Taking at Unknown time Transfer Papers No No   Sig: Take 1 Tablet by mouth daily. Patient not taking: Reported on 2022   furosemide (LASIX) 80 mg tablet   No Yes   Sig: TAKE 1 TABLET BY MOUTH DAILY   glipiZIDE (GLUCOTROL) 10 mg tablet   Yes Yes   Sig: Take 10 mg by mouth two (2) times a day. glucose blood VI test strips (Accu-Chek Precious Plus test strp) strip   No No   Sig: CHECK BLOOD GLUCOSE THREE TIMES DAILY   hydrALAZINE (APRESOLINE) 100 mg tablet Not Taking at Unknown time  Yes No   Sig: Take 100 mg by mouth three (3) times daily. insulin glargine (Lantus Solostar U-100 Insulin) 100 unit/mL (3 mL) inpn   Yes Yes   Si Units by SubCUTAneous route nightly. Sliding scale   loratadine (CLARITIN) 10 mg tablet  Transfer Papers Yes Yes   Sig: Take 10 mg by mouth daily. metoprolol succinate (TOPROL-XL) 25 mg XL tablet   No Yes   Sig: Take 1 Tablet by mouth daily.    omeprazole (PRILOSEC) 40 mg capsule  Transfer Papers No Yes   Sig: TAKE 1 CAPSULE BY MOUTH DAILY   polyethylene glycol (MIRALAX) 17 gram/dose powder   Yes No   Sig: polyethylene glycol 3350 17 gram oral powder packet   MIX AND DRINK 1 PACKET BY MOUTH DAILY   simvastatin (ZOCOR) 40 mg tablet  Transfer Papers No Yes   Sig: TAKE 1 TABLET BY MOUTH EVERY NIGHT   sodium bicarbonate 650 mg tablet   No Yes   Sig: Take 1 Tablet by mouth three (3) times daily. Facility-Administered Medications: None       Current Facility-Administered Medications   Medication Dose Route Frequency    0.45% sodium chloride infusion  125 mL/hr IntraVENous CONTINUOUS    gabapentin (NEURONTIN) capsule 300 mg  300 mg Oral BID    diphenhydrAMINE (BENADRYL) injection 25 mg  25 mg IntraVENous Q6H PRN    heparin (porcine) injection 5,000 Units  5,000 Units SubCUTAneous Q8H    morphine injection 2 mg  2 mg IntraVENous Q3H PRN    famotidine (PF) (PEPCID) 20 mg in 0.9% sodium chloride 10 mL injection  20 mg IntraVENous Q24H    acetaminophen (TYLENOL) tablet 1,000 mg  1,000 mg Oral Q6H    ondansetron (ZOFRAN) injection 4 mg  4 mg IntraVENous Q6H PRN    cinacalcet (SENSIPAR) tablet 30 mg  30 mg Oral DAILY WITH BREAKFAST    metoprolol succinate (TOPROL-XL) XL tablet 25 mg  25 mg Oral DAILY    hydrALAZINE (APRESOLINE) tablet 100 mg  100 mg Oral TID    albuterol (PROVENTIL VENTOLIN) nebulizer solution 2.5 mg  2.5 mg Nebulization Q4H PRN    amLODIPine (NORVASC) tablet 10 mg  10 mg Oral DAILY    insulin lispro (HUMALOG) injection   SubCUTAneous Q6H    glucose chewable tablet 16 g  4 Tablet Oral PRN    glucagon (GLUCAGEN) injection 1 mg  1 mg IntraMUSCular PRN    dextrose 10% infusion 125-250 mL  125-250 mL IntraVENous PRN       Review of Systems:      Complete 10-point review of systems were obtained and discussed in length  with the patient. Complete review of systems was negative/unremarkable  except as mentioned in HPI section.   Data Review:    Labs: Results:       Chemistry Recent Labs     02/10/22  0350 02/09/22  0205   * 118*    141   K 3.7 3.9    108   CO2 22 22   BUN 54* 48*   CREA 5.03* 4.06*   CA 8. 0* 7.9*   AGAP 8 11   BUCR 11* 12      CBC w/Diff Recent Labs     02/10/22  0350 02/09/22  0205   WBC 8.6 12.5   RBC 3.99* 4.32*   HGB 10.6* 11.2*   HCT 33.1* 36.5    302      Coagulation No results for input(s): PTP, INR, APTT, INREXT in the last 72 hours. Iron/Ferritin No results for input(s): IRON in the last 72 hours. No lab exists for component: TIBCCALC   BNP No results for input(s): BNPP in the last 72 hours. Cardiac Enzymes No results for input(s): CPK, CKND1, ANDRES in the last 72 hours. No lab exists for component: CKRMB, TROIP   Liver Enzymes No results for input(s): TP, ALB, TBIL, AP in the last 72 hours.     No lab exists for component: SGOT, GPT, DBIL   Thyroid Studies No results found for: T4, T3U, TSH, TSHEXT      EKG:   Physical Assessment:     Visit Vitals  BP (!) 153/75   Pulse 83   Temp 97.7 °F (36.5 °C)   Resp 18   Ht 5' 8\" (1.727 m)   Wt 87.1 kg (192 lb 1.6 oz)   SpO2 90%   BMI 29.21 kg/m²     Weight change: -0.408 kg (-14.4 oz)    Intake/Output Summary (Last 24 hours) at 2/10/2022 1315  Last data filed at 2/10/2022 0451  Gross per 24 hour   Intake --   Output 600 ml   Net -600 ml     Physical Exam:   General: comfortable, no acute distress   HEENT sclera anicteric, supple neck, no thyromegaly  CVS: S1S2 heard,  no rub  RS: + air entry b/l,   Abd: Soft, Non tender, Not distended, Positive bowel sounds, no organomegaly, no CVA / supra pubic tenderness  Neuro: non focal, awake, alert , CN II-XII are grossly intact  Extrm: edema, no cyanosis, clubbing   Skin: no visible  Rash  Musculoskeletal: No gross joints or bone deformities     Procedures/imaging: see electronic medical records for all procedures, Xrays and details which were not copied into this note but were reviewed prior to creation of Lori Zepeda MD  February 10, 2022  [facility] Nephrology  Office [phone]

## 2022-02-10 NOTE — PROGRESS NOTES
Problem: Mobility Impaired (Adult and Pediatric)  Goal: *Acute Goals and Plan of Care (Insert Text)  Description: Physical Therapy Goals  Initiated 2/10/2022 and to be accomplished within 7 day(s)  1. Patient will move from supine to sit and sit to supine in bed with modified independence. 2.  Patient will transfer from bed to chair and chair to bed with modified independence using the least restrictive device. 3.  Patient will perform sit to stand with modified independence. 4.  Patient will ambulate with modified independence for 300 feet with the least restrictive device. 5.  Patient will ascend/descend 3 stairs with handrail(s) with supervision/set-up. PLOF: Patient was independent with mobility occationally needing SPC. He lives with family in single story home with 3 REI. Outcome: Progressing Towards Goal    PHYSICAL THERAPY EVALUATION    Patient: Cecilia hO (44 y.o. male)  Date: 2/10/2022  Primary Diagnosis: Malignant neoplasm of transverse colon ([address] [C18.4]  Colon cancer ([address] [C18.9]  Procedure(s) (LRB):  ROBOTIC CONVERTED TO LAPAROSCOPIC LEFT COLECTOMY (N/A) 2 Days Post-Op   Precautions:   Fall      ASSESSMENT :  Based on the objective data described below, the patient presents with decreased strength, decreased balance reactions, and gait deficits. Patient agreeable to PT evaluation. Educated on log roll technique for OOB to decrease abdominal pain. Supine to sit with CGA. He has good BLE strength grossly 4/5. Patient stood with SBA. Ambulating to the restroom, pt unsteady, holding onto furniture. SBA for toileting. He ambulates with RW in hallway with supervision for 200 ft. Encouraged to ambulate 3x daily with support of nursing staff. Patient left resting in the recliner with call bell in reach. Patient will benefit from skilled intervention to address the above impairments.   Patient's rehabilitation potential is considered to be Good  Factors which may influence rehabilitation potential include:   []         None noted  []         Mental ability/status  [x]         Medical condition  []         Home/family situation and support systems  []         Safety awareness  []         Pain tolerance/management  []         Other:      PLAN :  Recommendations and Planned Interventions:   [x]           Bed Mobility Training             [x]    Neuromuscular Re-Education  [x]           Transfer Training                   []    Orthotic/Prosthetic Training  [x]           Gait Training                          []    Modalities  [x]           Therapeutic Exercises           []    Edema Management/Control  [x]           Therapeutic Activities            [x]    Family Training/Education  [x]           Patient Education  []           Other (comment):    Frequency/Duration: Patient will be followed by physical therapy 1-2 times per day/4-7 days per week to address goals. Discharge Recommendations: Home Health  Further Equipment Recommendations for Discharge: rolling walker     SUBJECTIVE:   Patient stated \" I want to walk .     OBJECTIVE DATA SUMMARY:     Past Medical History:   Diagnosis Date    Acid reflux     Arrhythmia     Arthritis     Bladder [facility]     Congestive heart failure (HCC)     Deep vein thrombosis (DVT) of proximal vein of both lower extremities ([facility] 9/14/2020    Diabetes mellitus ([facility]     Difficult intubation     Narrow airway-per Pulm notes(in media)     GERD (gastroesophageal reflux disease)     Gout     High cholesterol     Hypertension     Kidney carcinoma, left ([address] 2016    left nephrectomy    Kidney disease     Pituitary mass ([address]     Pulmonary HTN ([facility] 10/2021    PASP 77 mmHg    Reflux esophagitis     Unspecified deficiency anemia      Past Surgical History:   Procedure Laterality Date    COLONOSCOPY N/A 11/19/2021    COLONOSCOPY with Polypectomies, Tattoo & Clip Placement performed by Mandeep Munoz MD at [facility]  12/30/2009 bladder removal/ urostomy bag    HX NEPHRECTOMY Left 5/2009    Nephroureterectomy by Dr. Rucker [place]      surgery on pituitary gland     Barriers to Learning/Limitations: None  Compensate with: N/A  Home Situation:  Home Situation  Home Environment: Private residence  One/Two Story Residence: One story  Living Alone: No  Support Systems: Other Family Member(s)  Patient Expects to be Discharged to[de-identified] Home with home health  Current DME Used/Available at Home: None  Critical Behavior:  Neurologic State: Alert  Orientation Level: Oriented X4  Cognition: Follows commands  Safety/Judgement: Fall prevention  Psychosocial  Patient Behaviors: Calm; Cooperative                 Strength:    Strength: Generally decreased, functional                    Tone & Sensation:   Tone: Normal              Sensation: Intact               Range Of Motion:  AROM: Within functional limits                Functional Mobility:  Bed Mobility:     Supine to Sit: Contact guard assistance        Transfers:  Sit to Stand: Stand-by assistance  Stand to Sit: Stand-by assistance                     Balance:   Sitting: Intact  Standing: Impaired; With support  Standing - Static: Good  Standing - Dynamic : Fair ((+))        Ambulation/Gait Training:  Distance (ft): 200 Feet (ft)  Assistive Device: Walker, rolling  Ambulation - Level of Assistance: Supervision  Gait Abnormalities: Decreased step clearance       Pain:  Pain level pre-treatment: 1/10   Pain level post-treatment: 1/10   Pain Intervention(s) : Medication (see MAR); Rest, Ice, Repositioning  Response to intervention: Nurse notified, See doc flow    Activity Tolerance:   Good  Please refer to the flowsheet for vital signs taken during this treatment.   After treatment:   [x]         Patient left in no apparent distress sitting up in chair  []         Patient left in no apparent distress in bed  [x]         Call bell left within reach  [x]         Nursing notified  []         Caregiver present  []         Bed alarm activated  []         SCDs applied    COMMUNICATION/EDUCATION:   [x]         Role of Physical Therapy in the acute care setting. [x]         Fall prevention education was provided and the patient/caregiver indicated understanding. [x]         Patient/family have participated as able in goal setting and plan of care. [x]         Patient/family agree to work toward stated goals and plan of care. []         Patient understands intent and goals of therapy, but is neutral about his/her participation. []         Patient is unable to participate in goal setting/plan of care: ongoing with therapy staff.  []         Other:     Thank you for this referral.  Nima Coffey, PT   Time Calculation: 35 mins      Eval Complexity: History: LOW Complexity : Zero comorbidities / personal factors that will impact the outcome / POCExam:LOW Complexity : 1-2 Standardized tests and measures addressing body structure, function, activity limitation and / or participation in recreation  Presentation: LOW Complexity : Stable, uncomplicated  Clinical Decision Making:Low Complexity    Overall Complexity:LOW

## 2022-02-10 NOTE — PROGRESS NOTES
Verbal shift change report given to Son Hernandez (oncoming nurse) by Nuha Koch RN (offgoing nurse). Report included the following information SBAR, Kardex, Intake/Output and MAR.

## 2022-02-10 NOTE — OP NOTES
[address] [place] [address]   OPERATIVE REPORT    Name:  Tu Thornton  MR#:   [id number]  :  1945  ACCOUNT #:  [de-identified]  DATE OF SERVICE:  2022    PREOPERATIVE DIAGNOSIS:  Malignant polyp of the transverse colon. POSTOPERATIVE DIAGNOSIS:  Malignant polyp of the transverse colon. PROCEDURES PERFORMED:  Laparoscopic transverse colectomy, splenic flexure mobilization. SURGEON:  Kendrick Diaz. Jai Rodriguez MD.    ASSISTANT:  Leta Romero. ANESTHESIA:  General.    COMPLICATIONS:  None. SPECIMENS REMOVED:  Transverse colon with portion of ascending and descending colon to Pathology. IMPLANTS:  None. ESTIMATED BLOOD LOSS:  50 mL. FINDINGS:  Mid transverse colon malignant polyp.  Operation performed with curative intent: Yes   Tumor location: Transverse colon   Extent of colon and vascular resection: Transverse colectomy - middle colic      INDICATIONS:  The patient is a 47-year-old male who had a large polyp removed from his transverse colon. This proved invasive carcinoma. He was brought to the operating room for robotic colectomy. I explained the risks to the patient including bleeding, infection, injury to other structures in the area, need for temporary or permanent stoma, and death. He understood and wished to proceed. DESCRIPTION OF PROCEDURE:  The patient was properly identified in the holding area and brought to the operating room, he was laid supine on the operating room table. General anesthesia was administered. Snider catheter was not placed as the patient had a cystectomy with an ileal conduit in addition to a left ureteronephrectomy. Arms were tucked at his side. He was placed in a split-leg position. Chest was strapped to the bed. Abdomen was prepped and draped in the usual sterile fashion.   We made a small stab incision beneath the left subcostal margin, inserted a Veress needle, insufflated the abdomen to 15 mmHg, placed an 8-mm robotic port in the upper midline as well as a second 8-mm port on the patient's right lateral region. We ran the entirety of the colon and identified the tattoo from polypectomy site. This was actually in the mid transverse colon, not the distal transverse colon. We, therefore, opted to perform a transverse colectomy as opposed to a left colectomy. In addition, there was substantial adhesive disease in the right lower quadrant from the patient's prior creation of the ileal conduit and out of concern for potentially injuring these structures in the process of performing a right colectomy as part of an extended right colectomy with ileal descending anastomosis, we decided to perform a transverse colectomy instead. We placed additional ports on the patient's left side and in the lower midline after lysing some adhesive disease from the anterior abdominal wall. We began our dissection of the patient's left colon by incising the lesser sac and transecting omentum laterally. The lateral attachments along the white line of Toldt of the descending colon were rather difficult to incise and mobilize because the patient had a nephrectomy in this area and there were no good tissue planes with which to mobilize the area, but we mobilized the majority of the descending colon and the fascia as well and finally, we were able to mobilize the splenic flexure down into its entirety. We then turned our attention to the patient's right side, where we lysed additional adhesions in the right lower quadrant and mobilized the ascending colon and hepatic flexure of the colon in a lateral to medial fashion. We decided not to ligate the ileocolic pedicle because I was concerned that this might hinder blood supply to the patient's ileal conduit. Lastly, we transected omental tissue across the patient's midline and then transected the mesentery of the transverse colon all along the upper abdomen.   The middle colic artery was identified and triply ligated with Hem-o-chiquita clips and transected and was hemostatic. We extracorporealized the specimen after marking the hepatic flexure and splenic flexure with 3-0 Vicryl sutures intracorporeally. We did this through a left upper quadrant muscle-splitting incision. This was performed transversely. We opted to be off of midline for prevention of hernia as well as due to concerns that the mobilization was limited of the patient's descending colon given his previous surgery. We were able to remove the structures from the abdomen. We transected proximally and distally with single firings of a 75-mm blue load KAVON stapler and passed the specimen off the field. We then created a standard side-to-side functional end-to-end anastomosis between the distal ascending colon and the proximal descending colon in standard fashion. Resulting common defect was closed with a TX 60 linear stapler, that staple line was oversewn, crotch stitch was placed, and the anastomosis was placed back in the abdominal cavity. We re-insufflated the abdomen to 15 mmHg. Blood and fluid were suctioned from all areas of the abdomen. Anastomosis was identified. We made sure there was no significant amount of small bowel beneath the mesenteric rent. In fact, the only small bowel beneath the mesenteric rent upon conclusion of case was the very beginning of the jejunum. Anastomosis appeared to lie comfortably with no undue tension. All ports were removed under direct visualization. The extraction site was closed in two layers with 0 Vicryl suture and #1 PDS suture. The upper midline port site was a 12-mm port site. This was closed at the level of fascia with 0 Vicryl suture. Subcutaneous tissues were irrigated. All skin incisions were closed with 4-0 Monocryl subcuticular suture. Steri-Strips were applied. The patient tolerated the procedure well. All instrument, sponge, and needle counts were correct at the end of case x2.   The patient awoke from anesthesia, was extubated, and transported to PACU in stable condition.         Megan Meléndez MD      [id number]  D:  02/09/2022 15:46  T:  02/10/2022 2:45  JOB #:  [id number]

## 2022-02-11 LAB
ANION GAP SERPL CALC-SCNC: 8 MMOL/L (ref 3–18)
BUN SERPL-MCNC: 55 MG/DL (ref 7–18)
BUN/CREAT SERPL: 11 (ref 12–20)
CALCIUM SERPL-MCNC: 7.2 MG/DL (ref 8.5–10.1)
CHLORIDE SERPL-SCNC: 108 MMOL/L (ref 100–111)
CO2 SERPL-SCNC: 21 MMOL/L (ref 21–32)
CREAT SERPL-MCNC: 4.99 MG/DL (ref 0.6–1.3)
ERYTHROCYTE [DISTWIDTH] IN BLOOD BY AUTOMATED COUNT: 16.1 % (ref 11.6–14.5)
GLUCOSE BLD STRIP.AUTO-MCNC: 104 MG/DL (ref 70–110)
GLUCOSE BLD STRIP.AUTO-MCNC: 150 MG/DL (ref 70–110)
GLUCOSE BLD STRIP.AUTO-MCNC: 196 MG/DL (ref 70–110)
GLUCOSE BLD STRIP.AUTO-MCNC: 221 MG/DL (ref 70–110)
GLUCOSE SERPL-MCNC: 91 MG/DL (ref 74–99)
HCT VFR BLD AUTO: 29.7 % (ref 36–48)
HGB BLD-MCNC: 9.5 G/DL (ref 13–16)
MAGNESIUM SERPL-MCNC: 1.5 MG/DL (ref 1.6–2.3)
MCH RBC QN AUTO: 26.5 PG (ref 24–34)
MCHC RBC AUTO-ENTMCNC: 32 G/DL (ref 31–37)
MCV RBC AUTO: 83 FL (ref 78–100)
NRBC # BLD: 0 K/UL (ref 0–0.01)
NRBC BLD-RTO: 0 PER 100 WBC
PHOSPHATE SERPL-MCNC: 4.8 MG/DL (ref 2.5–4.9)
PLATELET # BLD AUTO: 270 K/UL (ref 135–420)
PMV BLD AUTO: 9.6 FL (ref 9.2–11.8)
POTASSIUM SERPL-SCNC: 3.3 MMOL/L (ref 3.5–5.5)
RBC # BLD AUTO: 3.58 M/UL (ref 4.35–5.65)
SODIUM SERPL-SCNC: 137 MMOL/L (ref 136–145)
WBC # BLD AUTO: 8.6 K/UL (ref 4.6–13.2)

## 2022-02-11 PROCEDURE — 74011636637 HC RX REV CODE- 636/637: Performed by: COLON & RECTAL SURGERY

## 2022-02-11 PROCEDURE — 74011250636 HC RX REV CODE- 250/636: Performed by: COLON & RECTAL SURGERY

## 2022-02-11 PROCEDURE — 97116 GAIT TRAINING THERAPY: CPT

## 2022-02-11 PROCEDURE — 85027 COMPLETE CBC AUTOMATED: CPT

## 2022-02-11 PROCEDURE — 80048 BASIC METABOLIC PNL TOTAL CA: CPT

## 2022-02-11 PROCEDURE — 74011250636 HC RX REV CODE- 250/636: Performed by: INTERNAL MEDICINE

## 2022-02-11 PROCEDURE — 83735 ASSAY OF MAGNESIUM: CPT

## 2022-02-11 PROCEDURE — 74011250637 HC RX REV CODE- 250/637: Performed by: COLON & RECTAL SURGERY

## 2022-02-11 PROCEDURE — 84100 ASSAY OF PHOSPHORUS: CPT

## 2022-02-11 PROCEDURE — 65270000029 HC RM PRIVATE

## 2022-02-11 PROCEDURE — 74011250637 HC RX REV CODE- 250/637: Performed by: INTERNAL MEDICINE

## 2022-02-11 PROCEDURE — 36415 COLL VENOUS BLD VENIPUNCTURE: CPT

## 2022-02-11 PROCEDURE — 2709999900 HC NON-CHARGEABLE SUPPLY

## 2022-02-11 PROCEDURE — 82962 GLUCOSE BLOOD TEST: CPT

## 2022-02-11 PROCEDURE — 97112 NEUROMUSCULAR REEDUCATION: CPT

## 2022-02-11 RX ORDER — CALCITRIOL 0.25 UG/1
0.25 CAPSULE ORAL DAILY
Status: DISCONTINUED | OUTPATIENT
Start: 2022-02-12 | End: 2022-02-12 | Stop reason: HOSPADM

## 2022-02-11 RX ORDER — CALCITRIOL 0.25 UG/1
0.25 CAPSULE ORAL DAILY
Qty: 30 CAPSULE | Refills: 0 | Status: SHIPPED | OUTPATIENT
Start: 2022-02-12 | End: 2022-03-14

## 2022-02-11 RX ORDER — POTASSIUM CHLORIDE 20 MEQ/1
20 TABLET, EXTENDED RELEASE ORAL
Status: COMPLETED | OUTPATIENT
Start: 2022-02-11 | End: 2022-02-11

## 2022-02-11 RX ORDER — MAGNESIUM SULFATE 1 G/100ML
1 INJECTION INTRAVENOUS ONCE
Status: COMPLETED | OUTPATIENT
Start: 2022-02-11 | End: 2022-02-11

## 2022-02-11 RX ORDER — POTASSIUM CHLORIDE 20 MEQ/1
20 TABLET, EXTENDED RELEASE ORAL DAILY
Status: DISCONTINUED | OUTPATIENT
Start: 2022-02-12 | End: 2022-02-11

## 2022-02-11 RX ORDER — GABAPENTIN 300 MG/1
300 CAPSULE ORAL DAILY
Qty: 7 CAPSULE | Refills: 0 | Status: SHIPPED | OUTPATIENT
Start: 2022-02-12 | End: 2022-02-19

## 2022-02-11 RX ORDER — ACETAMINOPHEN 500 MG
1000 TABLET ORAL EVERY 6 HOURS
Qty: 56 TABLET | Refills: 0 | Status: SHIPPED | OUTPATIENT
Start: 2022-02-11 | End: 2022-02-18

## 2022-02-11 RX ORDER — FAMOTIDINE 20 MG/1
20 TABLET, FILM COATED ORAL EVERY 24 HOURS
Status: DISCONTINUED | OUTPATIENT
Start: 2022-02-11 | End: 2022-02-12 | Stop reason: HOSPADM

## 2022-02-11 RX ADMIN — FAMOTIDINE 20 MG: 20 TABLET ORAL at 21:45

## 2022-02-11 RX ADMIN — HYDRALAZINE HYDROCHLORIDE 100 MG: 50 TABLET, FILM COATED ORAL at 08:10

## 2022-02-11 RX ADMIN — HEPARIN SODIUM 5000 UNITS: 5000 INJECTION INTRAVENOUS; SUBCUTANEOUS at 14:07

## 2022-02-11 RX ADMIN — GABAPENTIN 300 MG: 300 CAPSULE ORAL at 08:10

## 2022-02-11 RX ADMIN — MAGNESIUM SULFATE HEPTAHYDRATE 1 G: 1 INJECTION, SOLUTION INTRAVENOUS at 16:04

## 2022-02-11 RX ADMIN — POTASSIUM CHLORIDE 20 MEQ: 1500 TABLET, EXTENDED RELEASE ORAL at 14:07

## 2022-02-11 RX ADMIN — ACETAMINOPHEN 1000 MG: 500 TABLET ORAL at 12:21

## 2022-02-11 RX ADMIN — Medication 4 UNITS: at 23:47

## 2022-02-11 RX ADMIN — HYDRALAZINE HYDROCHLORIDE 100 MG: 50 TABLET, FILM COATED ORAL at 16:04

## 2022-02-11 RX ADMIN — Medication 2 UNITS: at 12:28

## 2022-02-11 RX ADMIN — METOPROLOL SUCCINATE 25 MG: 25 TABLET, FILM COATED, EXTENDED RELEASE ORAL at 08:10

## 2022-02-11 RX ADMIN — HYDRALAZINE HYDROCHLORIDE 100 MG: 50 TABLET, FILM COATED ORAL at 21:45

## 2022-02-11 RX ADMIN — ACETAMINOPHEN 1000 MG: 500 TABLET ORAL at 23:47

## 2022-02-11 RX ADMIN — HEPARIN SODIUM 5000 UNITS: 5000 INJECTION INTRAVENOUS; SUBCUTANEOUS at 05:58

## 2022-02-11 RX ADMIN — HEPARIN SODIUM 5000 UNITS: 5000 INJECTION INTRAVENOUS; SUBCUTANEOUS at 21:47

## 2022-02-11 RX ADMIN — ACETAMINOPHEN 1000 MG: 500 TABLET ORAL at 05:58

## 2022-02-11 RX ADMIN — ACETAMINOPHEN 1000 MG: 500 TABLET ORAL at 18:52

## 2022-02-11 RX ADMIN — AMLODIPINE BESYLATE 10 MG: 10 TABLET ORAL at 08:10

## 2022-02-11 RX ADMIN — Medication 2 UNITS: at 17:25

## 2022-02-11 RX ADMIN — CINACALCET 30 MG: 30 TABLET, FILM COATED ORAL at 08:07

## 2022-02-11 NOTE — PROGRESS NOTES
[facility] [address]  [address] [facility] [address]  [phone]  Colon and Rectal Surgery Progress Note      Patient: Tim Gee MRN: [id number]  SSN: [id number]    YOB: 1945  Age: 78 y.o. Sex: male      Admit Date: 2/8/2022    LOS: 3 days     Subjective: Tolerating fulls. +BM. Ambulating. Objective:     Vitals:    02/10/22 1600 02/10/22 2013 02/11/22 0320 02/11/22 0724   BP: 128/64 (!) 156/73 (!) 143/70 (!) 153/69   Pulse: 80 69 76 73   Resp: 20 18 16 16   Temp: 98.2 °F (36.8 °C) 98.2 °F (36.8 °C) 97.5 °F (36.4 °C) 98.8 °F (37.1 °C)   SpO2: 96% 93% 91% 91%   Weight:       Height:            Intake and Output:  Current Shift: No intake/output data recorded.   Last three shifts: 02/09 1901 - 02/11 0700  In: -   Out: 2000 [Urine:2000]    Physical Exam:     Constitutional: well developed, in NAD  Abdomen: soft, nontender nondistended    Lab/Data Review:    CMP:   Lab Results   Component Value Date/Time     02/11/2022 04:33 AM    K 3.3 (L) 02/11/2022 04:33 AM     02/11/2022 04:33 AM    CO2 21 02/11/2022 04:33 AM    AGAP 8 02/11/2022 04:33 AM    GLU 91 02/11/2022 04:33 AM    BUN 55 (H) 02/11/2022 04:33 AM    CREA 4.99 (H) 02/11/2022 04:33 AM    GFRAA 14 (L) 02/11/2022 04:33 AM    GFRNA 11 (L) 02/11/2022 04:33 AM    CA 7.2 (L) 02/11/2022 04:33 AM    PHOS 4.8 02/11/2022 04:33 AM     CBC:   Lab Results   Component Value Date/Time    WBC 8.6 02/11/2022 04:33 AM    HGB 9.5 (L) 02/11/2022 04:33 AM    HCT 29.7 (L) 02/11/2022 04:33 AM     02/11/2022 04:33 AM        Assessment:     S/p lap transverse colectomy for malignant polyp    Plan:     Advance to solids  OOB with PT  Sq heparin  No scd's given history of lower extremity clots  Nephrology consult for acute on chronic renal failure  Pt was not on anticoagulation at home, no need to start heparin gtt    Signed By: Mateo Ulrich MD        February 11, 2022

## 2022-02-11 NOTE — PROGRESS NOTES
Chart reviewed. PT not recommending home health but is recommending a rolling walker. FOC signed for [facility] if needed. Plan currently is home to self care when medically cleared.   Becca Albright RN - Outcomes Manager  [phone]

## 2022-02-11 NOTE — DIABETES MGMT
Diabetes/ Glycemic Control Plan of Care    Recommendations:   1. Will adjust diet to consistent CHO, 4 servings per meal to optimize prandial BG readings. 2. Should meal time blood glucose readings remain > 180 mg/dl, can consider adding low dose meal time lispro (would start with 3 units TID AC). Assessment:   Pt with known history of T2DM (A1C - 9.6%; home regimen of Lantus and glipizide). Pt is Status post laparoscopic transverse colectomy on 2/08/2022. Fasting blood glucose this morning - 104 mg/dL (in target range). Prandial blood glucose readings elevated. Receiving corrective lispro 4 times daily as needed. Noted pt received diabetes education 2/9/22. DX:   1. Malignant neoplasm of transverse colon (HCC)  gabapentin (NEURONTIN) 300 mg capsule        Fasting/ Morning blood glucose:   Lab Results   Component Value Date/Time    Glucose 91 02/11/2022 04:33 AM    Glucose (POC) 196 (H) 02/11/2022 12:23 PM    Glucose (POC) 144 (H) 11/02/2021 04:24 PM     IV Fluids containing dextrose:  None    Steroids:   None    Blood glucose values:   2/11:  Lab - 91;  POC - 104, 196  2/10:  Lab - 128;  POC - 146, 215, 198, 225          Within target range (70-180mg/dL):  varied    Current insulin orders:  Correctional lispro insulin. Normal sensitivity dose    Total Daily Dose previous 24 hours:  10 units corrective lispro    Current A1c:   Lab Results   Component Value Date/Time    Hemoglobin A1c 9.6 (H) 02/08/2022 12:31 PM    Hemoglobin A1c (POC) 7.0 09/30/2021 02:55 PM      equivalent  to ave Blood Glucose of 229 mg/dl for 2-3 months prior to admission    Adequate glycemic control PTA: No    Nutrition/Diet:   Active Orders   Diet    ADULT DIET Regular; Low Fiber      Meal Intake:  No data found. Supplement Intake:  No data found.     Home diabetes medications:  Verified with patient on 2/09/2022  Key Antihyperglycemic Medications             glipiZIDE (GLUCOTROL) 10 mg tablet (Taking) Take 10 mg by mouth two (2) times a day. insulin glargine (Lantus Solostar U-100 Insulin) 100 unit/mL (3 mL) inpn (Taking) 8 Units by SubCUTAneous route nightly. Sliding scale          Plan/Goals:   Blood glucose will be within target of 70 - 180 mg/dl within 72 hours  Reinforce dietary and medication compliance at home.        Education:  [x] Refer to Diabetes Education Record: 2/09/2022                       [] Education not indicated at this time     Shae Villalpando  MS, RD, [facility]  Diabetes and Glycemic Control   [employer]

## 2022-02-11 NOTE — PROGRESS NOTES
Problem: Mobility Impaired (Adult and Pediatric)  Goal: *Acute Goals and Plan of Care (Insert Text)  Description: Physical Therapy Goals  Initiated 2/10/2022 and to be accomplished within 7 day(s)  1. Patient will move from supine to sit and sit to supine in bed with modified independence. 2.  Patient will transfer from bed to chair and chair to bed with modified independence using the least restrictive device. 3.  Patient will perform sit to stand with modified independence. 4.  Patient will ambulate with modified independence for 300 feet with the least restrictive device. 5.  Patient will ascend/descend 3 stairs with handrail(s) with supervision/set-up. PLOF: Patient was independent with mobility occationally needing SPC. He lives with family in single story home with 3 REI. Outcome: Resolved/Met   PHYSICAL THERAPY TREATMENT AND DISCHARGE    Patient: Eb Nova (38 y.o. male)  Date: 2/11/2022  Diagnosis: Malignant neoplasm of transverse colon ([address] [C18.4]  Colon cancer ([address] [C18.9]   Procedure(s) (LRB):  ROBOTIC CONVERTED TO LAPAROSCOPIC LEFT COLECTOMY (N/A) 3 Days Post-Op  Precautions: Fall  ASSESSMENT:  Mod I for sit to supine. Requests urostomy be emptied prior to amb. Seated EOB 15 minutes with good balance. RN assisted with urostomy drainage. Amb 500ft with ww and good balance. Returned to room. Amb 20ft with no AD and steady gait. Seated in chair. Declines trial of stairs; ramp entry to home. Family support. Educated on amb at least 3 times a day with staff supervision; verbalized understanding. Call bell in reach. PLAN:  Further skilled physical therapy is not indicated at this time.   Rationale for discharge:  [x]     Goals Achieved  []     [address]  []     Patient not participating in therapy  []     Other:  Discharge Recommendations:  None(home)  Further Equipment Recommendations for Discharge:  rolling walker     SUBJECTIVE:   Patient stated I want to use the walker.     OBJECTIVE DATA SUMMARY:   Critical Behavior:  Neurologic State: Alert  Orientation Level: Oriented X4  Cognition: Follows commands  Safety/Judgement: Fall prevention     Functional Mobility Training:  Bed Mobility:  Supine to Sit: Modified independent  Transfers:  Sit to Stand: Modified independent  Stand to Sit: Modified independent  Balance:   Sitting: Intact  Standing: Impaired  Standing - Static: Good  Standing - Dynamic : Good  Ambulation/Gait Training:  Distance (ft): 500 Feet (ft)   Assistive Device: Walker, rolling  Ambulation - Level of Assistance: Modified independent  Stairs:   Declines; has ramp entry to home    Neuro Re-Education:  Seated EOB 15 minutes  Therapeutic Exercises:   Sit to stand x2    Pain:  Pain level pre-treatment: 0/10   Pain level post-treatment: 0/10     Activity Tolerance:   Good    After treatment:   [x] Patient left in no apparent distress sitting up in chair  [] Patient left in no apparent distress in bed  [x] Call bell left within reach  [x] Nursing notified  [] Caregiver present  [] Bed alarm activated  [] SCDs applied      COMMUNICATION/EDUCATION:   [x]         Role of physical therapy in the acute care setting. [x]         Fall prevention education was provided and the patient/caregiver indicated understanding. [x]         Patient/family have participated as able and agree with findings and recommendations. []         Patient is unable to participate in plan of care at this time.        Shayan Beatty, PT   Time Calculation: 28 mins

## 2022-02-11 NOTE — PROGRESS NOTES
Progress  Note      81-year-old male with past medical history of diabetes, hypertension, known CKD stage IV admitted for laparoscopic transverse colectomy, following for renal failure. Subjective      Overnight event noted  Good urine output  Plan to advance diet noted      IMPRESSION:   Acute on chronic kidney injury, ATN, risk factor prerenal injury, hemodynamic changes during operation  CKD stage IV with heavy proteinuria, status post nephrectomy, history of uncontrolled diabetes, creatinine at 2.8 mg per DL earlier this year. Follows with Dr. Denver Grieve in [facility]  History of renal cancer, s/p left nephrectomy and also cystectomy now has a urostomy bag  Diabetes, uncontrolled  Anemia  S/p laparoscopic transverse colectomy  Secondary hyperparathyroid, on sensipar    PLAN:   NO indication for dialysis today   Ordered K and Mg supplement today   Switch him to calcitriol , dc sensipar as his calcium trending down. From discharge stand point,  If his renal function remain  Stable then okay discharge, he follows  nephrologist in [address] [place] [address]. Adjust medication per current renal function status. Spoke with family member over phone, regarding his follow  Up with Dr. Denver Grieve, recommend follow up in week. I also  provided my contact info.    Facility-Administered Medications: None       Current Facility-Administered Medications   Medication Dose Route Frequency    0.45% sodium chloride infusion  75 mL/hr IntraVENous CONTINUOUS    gabapentin (NEURONTIN) capsule 300 mg  300 mg Oral DAILY    diphenhydrAMINE (BENADRYL) injection 25 mg  25 mg IntraVENous Q6H PRN    heparin (porcine) injection 5,000 Units  5,000 Units SubCUTAneous Q8H    morphine injection 2 mg  2 mg IntraVENous Q3H PRN    famotidine (PF) (PEPCID) 20 mg in 0.9% sodium chloride 10 mL injection  20 mg IntraVENous Q24H    acetaminophen (TYLENOL) tablet 1,000 mg  1,000 mg Oral Q6H    ondansetron (ZOFRAN) injection 4 mg  4 mg IntraVENous Q6H PRN    cinacalcet (SENSIPAR) tablet 30 mg  30 mg Oral DAILY WITH BREAKFAST    metoprolol succinate (TOPROL-XL) XL tablet 25 mg  25 mg Oral DAILY    hydrALAZINE (APRESOLINE) tablet 100 mg  100 mg Oral TID    albuterol (PROVENTIL VENTOLIN) nebulizer solution 2.5 mg  2.5 mg Nebulization Q4H PRN    amLODIPine (NORVASC) tablet 10 mg  10 mg Oral DAILY    insulin lispro (HUMALOG) injection   SubCUTAneous Q6H    glucose chewable tablet 16 g  4 Tablet Oral PRN    glucagon (GLUCAGEN) injection 1 mg  1 mg IntraMUSCular PRN    dextrose 10% infusion 125-250 mL  125-250 mL IntraVENous PRN       Review of Systems:      Complete 10-point review of systems were obtained and discussed in length  with the patient. Complete review of systems was negative/unremarkable  except as mentioned in HPI section. Data Review:    Labs: Results:       Chemistry Recent Labs     02/11/22  0433 02/10/22  1543 02/10/22  0350 02/09/22  0205 02/09/22  0205   GLU 91  --  128*  --  118*     --  137  --  141   K 3.3*  --  3.7  --  3.9     --  107  --  108   CO2 21  --  22  --  22   BUN 55*  --  54*  --  48*   CREA 4.99*  --  5.03*  --  4.06*   CA 7.2* 7.6* 8.0*   < > 7.9*   AGAP 8  --  8  --  11   BUCR 11*  --  11*  --  12    < > = values in this interval not displayed. CBC w/Diff Recent Labs     02/11/22  0433 02/10/22  0350 02/09/22  0205   WBC 8.6 8.6 12.5   RBC 3.58* 3.99* 4.32*   HGB 9.5* 10.6* 11.2*   HCT 29.7* 33.1* 36.5    304 302      Coagulation No results for input(s): PTP, INR, APTT, INREXT, INREXT in the last 72 hours. Iron/Ferritin Recent Labs     02/10/22  1543   IRON 11*      BNP No results for input(s): BNPP in the last 72 hours. Cardiac Enzymes No results for input(s): CPK, CKND1, ANDRES in the last 72 hours. No lab exists for component: CKRMB, TROIP   Liver Enzymes No results for input(s): TP, ALB, TBIL, AP in the last 72 hours.     No lab exists for component: SGOT, GPT, DBIL   Thyroid Studies No results found for: T4, T3U, TSH, TSHEXT, TSHEXT      EKG:   Physical Assessment:     Visit Vitals  /77   Pulse 76   Temp 97.5 °F (36.4 °C)   Resp 16   Ht 5' 8\" (1.727 m)   Wt 91.2 kg (201 lb)   SpO2 93%   BMI 30.56 kg/m²     Weight change:     Intake/Output Summary (Last 24 hours) at 2/11/2022 1305  Last data filed at 2/11/2022 1220  Gross per 24 hour   Intake 898.75 ml   Output 1900 ml   Net -1001.25 ml     Physical Exam:   General: comfortable, no acute distress   HEENT sclera anicteric, supple neck, no thyromegaly  CVS: S1S2 heard,  no rub  RS: + air entry b/l,   Abd: Soft, Non tender, Not distended, Positive bowel sounds, no organomegaly, no CVA / supra pubic tenderness  Neuro: non focal, awake, alert , CN II-XII are grossly intact  Extrm: edema, no cyanosis, clubbing   Skin: no visible  Rash  Musculoskeletal: No gross joints or bone deformities     Procedures/imaging: see electronic medical records for all procedures, Xrays and details which were not copied into this note but were reviewed prior to creation of Kalen Mcneal MD  February 11, 2022  [facility]  Office [phone]

## 2022-02-11 NOTE — DISCHARGE INSTRUCTIONS
Instructions After Colectomy    No heavy lifting (nothing more than 10-15 lbs)    Low fiber diet - avoid raw fruits and vegetables, but small amounts of cooked vegetables are ok. Eat white bread products instead of wheat bread products. Meats, fish, chicken, noodles are ok. Showers over your wounds are ok, but avoid submerging wounds in pools or baths.  The strips over your wound will fall off on their own or I will remove them in the office    Make an office follow up appointment in 2 weeks    See your nephrologist and primary care doctor next week

## 2022-02-12 VITALS
HEIGHT: 68 IN | HEART RATE: 84 BPM | BODY MASS INDEX: 30.46 KG/M2 | DIASTOLIC BLOOD PRESSURE: 76 MMHG | RESPIRATION RATE: 18 BRPM | TEMPERATURE: 98.6 F | WEIGHT: 201 LBS | OXYGEN SATURATION: 98 % | SYSTOLIC BLOOD PRESSURE: 161 MMHG

## 2022-02-12 LAB
ANION GAP SERPL CALC-SCNC: 9 MMOL/L (ref 3–18)
BUN SERPL-MCNC: 62 MG/DL (ref 7–18)
BUN/CREAT SERPL: 12 (ref 12–20)
CALCIUM SERPL-MCNC: 7.3 MG/DL (ref 8.5–10.1)
CHLORIDE SERPL-SCNC: 108 MMOL/L (ref 100–111)
CO2 SERPL-SCNC: 19 MMOL/L (ref 21–32)
CREAT SERPL-MCNC: 5.2 MG/DL (ref 0.6–1.3)
ERYTHROCYTE [DISTWIDTH] IN BLOOD BY AUTOMATED COUNT: 16.1 % (ref 11.6–14.5)
GLUCOSE BLD STRIP.AUTO-MCNC: 142 MG/DL (ref 70–110)
GLUCOSE BLD STRIP.AUTO-MCNC: 300 MG/DL (ref 70–110)
GLUCOSE SERPL-MCNC: 174 MG/DL (ref 74–99)
HCT VFR BLD AUTO: 30.8 % (ref 36–48)
HGB BLD-MCNC: 9.9 G/DL (ref 13–16)
MAGNESIUM SERPL-MCNC: 1.5 MG/DL (ref 1.6–2.3)
MAGNESIUM SERPL-MCNC: 2 MG/DL (ref 1.6–2.6)
MCH RBC QN AUTO: 26.3 PG (ref 24–34)
MCHC RBC AUTO-ENTMCNC: 32.1 G/DL (ref 31–37)
MCV RBC AUTO: 81.9 FL (ref 78–100)
NRBC # BLD: 0 K/UL (ref 0–0.01)
NRBC BLD-RTO: 0 PER 100 WBC
PHOSPHATE SERPL-MCNC: 4.6 MG/DL (ref 2.5–4.9)
PLATELET # BLD AUTO: 298 K/UL (ref 135–420)
PMV BLD AUTO: 10.1 FL (ref 9.2–11.8)
POTASSIUM SERPL-SCNC: 3.6 MMOL/L (ref 3.5–5.5)
RBC # BLD AUTO: 3.76 M/UL (ref 4.35–5.65)
SODIUM SERPL-SCNC: 136 MMOL/L (ref 136–145)
WBC # BLD AUTO: 8.2 K/UL (ref 4.6–13.2)

## 2022-02-12 PROCEDURE — 74011000250 HC RX REV CODE- 250: Performed by: INTERNAL MEDICINE

## 2022-02-12 PROCEDURE — 80048 BASIC METABOLIC PNL TOTAL CA: CPT

## 2022-02-12 PROCEDURE — 74011636637 HC RX REV CODE- 636/637: Performed by: COLON & RECTAL SURGERY

## 2022-02-12 PROCEDURE — 36415 COLL VENOUS BLD VENIPUNCTURE: CPT

## 2022-02-12 PROCEDURE — 85027 COMPLETE CBC AUTOMATED: CPT

## 2022-02-12 PROCEDURE — 82962 GLUCOSE BLOOD TEST: CPT

## 2022-02-12 PROCEDURE — 74011250636 HC RX REV CODE- 250/636: Performed by: COLON & RECTAL SURGERY

## 2022-02-12 PROCEDURE — 74011250637 HC RX REV CODE- 250/637: Performed by: INTERNAL MEDICINE

## 2022-02-12 PROCEDURE — 2709999900 HC NON-CHARGEABLE SUPPLY

## 2022-02-12 PROCEDURE — 83735 ASSAY OF MAGNESIUM: CPT

## 2022-02-12 PROCEDURE — 74011250637 HC RX REV CODE- 250/637: Performed by: COLON & RECTAL SURGERY

## 2022-02-12 PROCEDURE — 84100 ASSAY OF PHOSPHORUS: CPT

## 2022-02-12 RX ADMIN — HEPARIN SODIUM 5000 UNITS: 5000 INJECTION INTRAVENOUS; SUBCUTANEOUS at 05:30

## 2022-02-12 RX ADMIN — AMLODIPINE BESYLATE 10 MG: 10 TABLET ORAL at 08:10

## 2022-02-12 RX ADMIN — Medication 8 UNITS: at 12:10

## 2022-02-12 RX ADMIN — METOPROLOL SUCCINATE 25 MG: 25 TABLET, FILM COATED, EXTENDED RELEASE ORAL at 08:10

## 2022-02-12 RX ADMIN — ACETAMINOPHEN 1000 MG: 500 TABLET ORAL at 05:28

## 2022-02-12 RX ADMIN — GABAPENTIN 300 MG: 300 CAPSULE ORAL at 08:10

## 2022-02-12 RX ADMIN — HYDRALAZINE HYDROCHLORIDE 100 MG: 50 TABLET, FILM COATED ORAL at 08:10

## 2022-02-12 RX ADMIN — CALCITRIOL CAPSULES 0.25 MCG 0.25 MCG: 0.25 CAPSULE ORAL at 08:10

## 2022-02-12 RX ADMIN — SODIUM CHLORIDE 50 ML/HR: 450 INJECTION, SOLUTION INTRAVENOUS at 07:10

## 2022-02-12 NOTE — PROGRESS NOTES
Discharge instruction given to patient. All questions and concerns answered to patients satisfaction. Prescriptions sent to pharmacy. IV removed. Awaiting for CM to bring RW. To be discharged home with family via [facility].    [phone]: Waiting for ride    [phone]: Awaiting for ride. 1451: Call placed to patient's ride, states they are 30 minutes away. Will call when here.

## 2022-02-12 NOTE — PROGRESS NOTES
Discharge planning    Discharge order noted for today. Met with patient and agreeable to the transition plan today. Transport has been arranged with family. Updated bedside RN,Cassidy,  to the transition plan. Patient signed paperwork for rolling walker and provided from [facility] ([facility]).       SANGEETA GonzalezN, RN  Pager # [phone]  Care Manager

## 2022-02-12 NOTE — PROGRESS NOTES
Problem: Airway Clearance - Ineffective  Goal: Achieve or maintain patent airway  Outcome: Progressing Towards Goal     Problem: Gas Exchange - Impaired  Goal: Absence of hypoxia  Outcome: Progressing Towards Goal  Goal: Promote optimal lung function  Outcome: Progressing Towards Goal     Problem: Breathing Pattern - Ineffective  Goal: Ability to achieve and maintain a regular respiratory rate  Outcome: Progressing Towards Goal     Problem:  Body Temperature -  Risk of, Imbalanced  Goal: Ability to maintain a body temperature within defined limits  Outcome: Progressing Towards Goal  Goal: Will regain or maintain usual level of consciousness  Outcome: Progressing Towards Goal  Goal: Complications related to the disease process, condition or treatment will be avoided or minimized  Outcome: Progressing Towards Goal     Problem: Isolation Precautions - Risk of Spread of Infection  Goal: Prevent transmission of infectious organism to others  Outcome: Progressing Towards Goal     Problem: Nutrition Deficits  Goal: Optimize nutrtional status  Outcome: Progressing Towards Goal     Problem: Risk for Fluid Volume Deficit  Goal: Maintain normal heart rhythm  Outcome: Progressing Towards Goal  Goal: Maintain absence of muscle cramping  Outcome: Progressing Towards Goal  Goal: Maintain normal serum potassium, sodium, calcium, phosphorus, and pH  Outcome: Progressing Towards Goal     Problem: Loneliness or Risk for Loneliness  Goal: Demonstrate positive use of time alone when socialization is not possible  Outcome: Progressing Towards Goal     Problem: Fatigue  Goal: Verbalize increase energy and improved vitality  Outcome: Progressing Towards Goal     Problem: Patient Education: Go to Patient Education Activity  Goal: Patient/Family Education  Outcome: Progressing Towards Goal     Problem: Diabetes Self-Management  Goal: *Disease process and treatment process  Description: Define diabetes and identify own type of diabetes; list 3 options for treating diabetes. Outcome: Progressing Towards Goal  Goal: *Incorporating nutritional management into lifestyle  Description: Describe effect of type, amount and timing of food on blood glucose; list 3 methods for planning meals. Outcome: Progressing Towards Goal  Goal: *Incorporating physical activity into lifestyle  Description: State effect of exercise on blood glucose levels. Outcome: Progressing Towards Goal  Goal: *Developing strategies to promote health/change behavior  Description: Define the ABC's of diabetes; identify appropriate screenings, schedule and personal plan for screenings. Outcome: Progressing Towards Goal  Goal: *Using medications safely  Description: State effect of diabetes medications on diabetes; name diabetes medication taking, action and side effects. Outcome: Progressing Towards Goal  Goal: *Monitoring blood glucose, interpreting and using results  Description: Identify recommended blood glucose targets  and personal targets. Outcome: Progressing Towards Goal  Goal: *Prevention, detection, treatment of acute complications  Description: List symptoms of hyper- and hypoglycemia; describe how to treat low blood sugar and actions for lowering  high blood glucose level. Outcome: Progressing Towards Goal  Goal: *Prevention, detection and treatment of chronic complications  Description: Define the natural course of diabetes and describe the relationship of blood glucose levels to long term complications of diabetes.   Outcome: Progressing Towards Goal  Goal: *Developing strategies to address psychosocial issues  Description: Describe feelings about living with diabetes; identify support needed and support network  Outcome: Progressing Towards Goal  Goal: *Insulin pump training  Outcome: Progressing Towards Goal  Goal: *Sick day guidelines  Outcome: Progressing Towards Goal  Goal: *Patient Specific Goal (EDIT GOAL, INSERT TEXT)  Outcome: Progressing Towards Goal     Problem: Patient Education: Go to Patient Education Activity  Goal: Patient/Family Education  Outcome: Progressing Towards Goal     Problem: Falls - Risk of  Goal: *Absence of Falls  Description: Document Michael Emelia Fall Risk and appropriate interventions in the flowsheet. Outcome: Progressing Towards Goal  Note: Fall Risk Interventions:  Mobility Interventions: Patient to call before getting OOB         Medication Interventions: Teach patient to arise slowly    Elimination Interventions: Call light in reach              Problem: Patient Education: Go to Patient Education Activity  Goal: Patient/Family Education  Outcome: Progressing Towards Goal     Problem: Pain  Goal: *Control of Pain  Outcome: Progressing Towards Goal  Goal: *PALLIATIVE CARE:  Alleviation of Pain  Outcome: Progressing Towards Goal     Problem: Patient Education: Go to Patient Education Activity  Goal: Patient/Family Education  Outcome: Progressing Towards Goal     Problem: Diabetes Self-Management  Goal: *Disease process and treatment process  Description: Define diabetes and identify own type of diabetes; list 3 options for treating diabetes. Outcome: Progressing Towards Goal  Goal: *Incorporating nutritional management into lifestyle  Description: Describe effect of type, amount and timing of food on blood glucose; list 3 methods for planning meals. Outcome: Progressing Towards Goal  Goal: *Incorporating physical activity into lifestyle  Description: State effect of exercise on blood glucose levels. Outcome: Progressing Towards Goal  Goal: *Developing strategies to promote health/change behavior  Description: Define the ABC's of diabetes; identify appropriate screenings, schedule and personal plan for screenings. Outcome: Progressing Towards Goal  Goal: *Using medications safely  Description: State effect of diabetes medications on diabetes; name diabetes medication taking, action and side effects.   Outcome: Progressing Towards Goal  Goal: *Monitoring blood glucose, interpreting and using results  Description: Identify recommended blood glucose targets  and personal targets. Outcome: Progressing Towards Goal  Goal: *Prevention, detection, treatment of acute complications  Description: List symptoms of hyper- and hypoglycemia; describe how to treat low blood sugar and actions for lowering  high blood glucose level. Outcome: Progressing Towards Goal  Goal: *Prevention, detection and treatment of chronic complications  Description: Define the natural course of diabetes and describe the relationship of blood glucose levels to long term complications of diabetes.   Outcome: Progressing Towards Goal  Goal: *Developing strategies to address psychosocial issues  Description: Describe feelings about living with diabetes; identify support needed and support network  Outcome: Progressing Towards Goal  Goal: *Insulin pump training  Outcome: Progressing Towards Goal  Goal: *Sick day guidelines  Outcome: Progressing Towards Goal  Goal: *Patient Specific Goal (EDIT GOAL, INSERT TEXT)  Outcome: Progressing Towards Goal     Problem: Patient Education: Go to Patient Education Activity  Goal: Patient/Family Education  Outcome: Progressing Towards Goal     Problem: Pain  Goal: *Control of Pain  Outcome: Progressing Towards Goal  Goal: *PALLIATIVE CARE:  Alleviation of Pain  Outcome: Progressing Towards Goal     Problem: Patient Education: Go to Patient Education Activity  Goal: Patient/Family Education  Outcome: Progressing Towards Goal     Problem: Patient Education: Go to Patient Education Activity  Goal: Patient/Family Education  Outcome: Progressing Towards Goal

## 2022-02-12 NOTE — PROGRESS NOTES
Problem: Airway Clearance - Ineffective  Goal: Achieve or maintain patent airway  2/12/2022 1126 by Fidelina Blanc RN  Outcome: Resolved/Met  2/12/2022 0711 by Fidelina Blanc RN  Outcome: Progressing Towards Goal     Problem: Gas Exchange - Impaired  Goal: Absence of hypoxia  2/12/2022 1126 by Fidelina Blanc RN  Outcome: Resolved/Met  2/12/2022 0711 by Fidelina Blanc RN  Outcome: Progressing Towards Goal  Goal: Promote optimal lung function  2/12/2022 1126 by Fidelina Blanc RN  Outcome: Resolved/Met  2/12/2022 0711 by Fidelina Blanc RN  Outcome: Progressing Towards Goal     Problem: Breathing Pattern - Ineffective  Goal: Ability to achieve and maintain a regular respiratory rate  2/12/2022 1126 by Fidelina Blanc RN  Outcome: Resolved/Met  2/12/2022 0711 by Fidelina Blanc RN  Outcome: Progressing Towards Goal     Problem:  Body Temperature -  Risk of, Imbalanced  Goal: Ability to maintain a body temperature within defined limits  2/12/2022 1126 by Fidelina Blanc RN  Outcome: Resolved/Met  2/12/2022 0711 by Fidelina Blanc RN  Outcome: Progressing Towards Goal  Goal: Will regain or maintain usual level of consciousness  2/12/2022 1126 by Fidelina Blanc RN  Outcome: Resolved/Met  2/12/2022 0711 by Fidelina Blanc RN  Outcome: Progressing Towards Goal  Goal: Complications related to the disease process, condition or treatment will be avoided or minimized  2/12/2022 1126 by Fidelina Blanc RN  Outcome: Resolved/Met  2/12/2022 0711 by Fidelina Blanc RN  Outcome: Progressing Towards Goal     Problem: Isolation Precautions - Risk of Spread of Infection  Goal: Prevent transmission of infectious organism to others  2/12/2022 1126 by Fidelina Blanc RN  Outcome: Resolved/Met  2/12/2022 0711 by Fidelina Blanc RN  Outcome: Progressing Towards Goal     Problem: Nutrition Deficits  Goal: Optimize nutrtional status  2/12/2022 1126 by Fidelina Blanc RN  Outcome: Resolved/Met  2/12/2022 0711 by Fidelina Blanc RN  Outcome: Progressing Towards Goal     Problem: Risk for Fluid Volume Deficit  Goal: Maintain normal heart rhythm  2/12/2022 1126 by Tali Saravia RN  Outcome: Resolved/Met  2/12/2022 0711 by Tali Saravia RN  Outcome: Progressing Towards Goal  Goal: Maintain absence of muscle cramping  2/12/2022 1126 by Tali Saravia RN  Outcome: Resolved/Met  2/12/2022 0711 by Tali Saravia RN  Outcome: Progressing Towards Goal  Goal: Maintain normal serum potassium, sodium, calcium, phosphorus, and pH  2/12/2022 1126 by Tali Saravia RN  Outcome: Resolved/Met  2/12/2022 0711 by Tali Saravia RN  Outcome: Progressing Towards Goal     Problem: Loneliness or Risk for Loneliness  Goal: Demonstrate positive use of time alone when socialization is not possible  2/12/2022 1126 by Tali Saravia RN  Outcome: Resolved/Met  2/12/2022 0711 by Tali Saravia RN  Outcome: Progressing Towards Goal     Problem: Fatigue  Goal: Verbalize increase energy and improved vitality  2/12/2022 1126 by Tali Saravia RN  Outcome: Resolved/Met  2/12/2022 0711 by Tali Saravia RN  Outcome: Progressing Towards Goal     Problem: Patient Education: Go to Patient Education Activity  Goal: Patient/Family Education  2/12/2022 1126 by Tali Saravia RN  Outcome: Resolved/Met  2/12/2022 0711 by Tali Saravia RN  Outcome: Progressing Towards Goal     Problem: Diabetes Self-Management  Goal: *Disease process and treatment process  Description: Define diabetes and identify own type of diabetes; list 3 options for treating diabetes. 2/12/2022 1126 by Tali Saravia RN  Outcome: Resolved/Met  2/12/2022 0711 by Tali Saravia RN  Outcome: Progressing Towards Goal  Goal: *Incorporating nutritional management into lifestyle  Description: Describe effect of type, amount and timing of food on blood glucose; list 3 methods for planning meals.   2/12/2022 1126 by Tali Saravia RN  Outcome: Resolved/Met  2/12/2022 0711 by Tali Saravia RN  Outcome: Progressing Towards Goal  Goal: *Incorporating physical activity into lifestyle  Description: State effect of exercise on blood glucose levels. 2/12/2022 1126 by Christophe Perez RN  Outcome: Resolved/Met  2/12/2022 0711 by Christophe Perez RN  Outcome: Progressing Towards Goal  Goal: *Developing strategies to promote health/change behavior  Description: Define the ABC's of diabetes; identify appropriate screenings, schedule and personal plan for screenings. 2/12/2022 1126 by Christophe Perez RN  Outcome: Resolved/Met  2/12/2022 0711 by Christophe Perez RN  Outcome: Progressing Towards Goal  Goal: *Using medications safely  Description: State effect of diabetes medications on diabetes; name diabetes medication taking, action and side effects. 2/12/2022 1126 by Christophe Perez RN  Outcome: Resolved/Met  2/12/2022 0711 by Christophe Perez RN  Outcome: Progressing Towards Goal  Goal: *Monitoring blood glucose, interpreting and using results  Description: Identify recommended blood glucose targets  and personal targets. 2/12/2022 1126 by Christophe Perez RN  Outcome: Resolved/Met  2/12/2022 0711 by Christophe Perez RN  Outcome: Progressing Towards Goal  Goal: *Prevention, detection, treatment of acute complications  Description: List symptoms of hyper- and hypoglycemia; describe how to treat low blood sugar and actions for lowering  high blood glucose level. 2/12/2022 1126 by Christophe Perez RN  Outcome: Resolved/Met  2/12/2022 0711 by Christophe Perez RN  Outcome: Progressing Towards Goal  Goal: *Prevention, detection and treatment of chronic complications  Description: Define the natural course of diabetes and describe the relationship of blood glucose levels to long term complications of diabetes.   2/12/2022 1126 by Christophe Perez RN  Outcome: Resolved/Met  2/12/2022 0711 by Christophe Perez RN  Outcome: Progressing Towards Goal  Goal: *Developing strategies to address psychosocial issues  Description: Describe feelings about living with diabetes; identify support needed and support network  2/12/2022 1126 by Devon Ramos RN  Outcome: Resolved/Met  2/12/2022 0711 by Devon Ramos RN  Outcome: Progressing Towards Goal  Goal: *Insulin pump training  2/12/2022 1126 by Devon Ramos RN  Outcome: Resolved/Met  2/12/2022 0711 by Devon Ramos RN  Outcome: Progressing Towards Goal  Goal: *Sick day guidelines  2/12/2022 1126 by Devon Ramos RN  Outcome: Resolved/Met  2/12/2022 0711 by Devon Ramos RN  Outcome: Progressing Towards Goal  Goal: *Patient Specific Goal (EDIT GOAL, INSERT TEXT)  2/12/2022 1126 by Devon Ramos RN  Outcome: Resolved/Met  2/12/2022 0711 by Devon Ramos RN  Outcome: Progressing Towards Goal     Problem: Patient Education: Go to Patient Education Activity  Goal: Patient/Family Education  2/12/2022 1126 by Devon Ramos RN  Outcome: Resolved/Met  2/12/2022 0711 by Devon Ramos RN  Outcome: Progressing Towards Goal     Problem: Falls - Risk of  Goal: *Absence of Falls  Description: Document [place] Fall Risk and appropriate interventions in the flowsheet.   2/12/2022 1126 by Devon Ramos RN  Outcome: Resolved/Met  Note: Fall Risk Interventions:  Mobility Interventions: Communicate number of staff needed for ambulation/transfer,OT consult for ADLs,PT Consult for mobility concerns,PT Consult for assist device competence,Patient to call before getting OOB         Medication Interventions: Teach patient to arise slowly,Patient to call before getting OOB    Elimination Interventions: Call light in reach,Stay With Me (per policy),Toilet paper/wipes in reach,Toileting schedule/hourly rounds           2/12/2022 0711 by Devon Ramos RN  Outcome: Progressing Towards Goal  Note: Fall Risk Interventions:  Mobility Interventions: Patient to call before getting OOB         Medication Interventions: Teach patient to arise slowly    Elimination Interventions: Call light in reach              Problem: Patient Education: Go to Patient Education Activity  Goal: Patient/Family Education  2022 1126 by Jefm Apgar, RN  Outcome: Resolved/Met  2022 0711 by Jefm Apgar, RN  Outcome: Progressing Towards Goal     Problem: Pain  Goal: *Control of Pain  2022 1126 by Jefm Apgar, RN  Outcome: Resolved/Met  2022 0711 by Jefm Apgar, RN  Outcome: Progressing Towards Goal  Goal: *PALLIATIVE CARE:  Alleviation of Pain  2022 1126 by Jefm Apgar, RN  Outcome: Resolved/Met  2022 0711 by Jefm Apgar, RN  Outcome: Progressing Towards Goal     Problem: Patient Education: Go to Patient Education Activity  Goal: Patient/Family Education  2022 1126 by Jefm Apgar, RN  Outcome: Resolved/Met  2022 0711 by Jefm Apgar, RN  Outcome: Progressing Towards Goal     Problem: Diabetes Self-Management  Goal: *Disease process and treatment process  Description: Define diabetes and identify own type of diabetes; list 3 options for treating diabetes. 2022 1126 by Jefm Apgar, RN  Outcome: Resolved/Met  2022 07 by Jefm Apgar, RN  Outcome: Progressing Towards Goal  Goal: *Incorporating nutritional management into lifestyle  Description: Describe effect of type, amount and timing of food on blood glucose; list 3 methods for planning meals. 2022 1126 by Jefm Apgar, RN  Outcome: Resolved/Met  2022 0711 by Jefm Apgar, RN  Outcome: Progressing Towards Goal  Goal: *Incorporating physical activity into lifestyle  Description: State effect of exercise on blood glucose levels. 2022 1126 by Jefm Apgar, RN  Outcome: Resolved/Met  2022 0711 by Jefm Apgar, RN  Outcome: Progressing Towards Goal  Goal: *Developing strategies to promote health/change behavior  Description: Define the ABC's of diabetes; identify appropriate screenings, schedule and personal plan for screenings.   2022 1126 by Jefm Apgar, RN  Outcome: Resolved/Met  2022 6079 by Bowen Matthews RN  Outcome: Progressing Towards Goal  Goal: *Using medications safely  Description: State effect of diabetes medications on diabetes; name diabetes medication taking, action and side effects. 2/12/2022 1126 by Bowen Matthews RN  Outcome: Resolved/Met  2/12/2022 0711 by Bowen Matthews RN  Outcome: Progressing Towards Goal  Goal: *Monitoring blood glucose, interpreting and using results  Description: Identify recommended blood glucose targets  and personal targets. 2/12/2022 1126 by Bowen Matthews RN  Outcome: Resolved/Met  2/12/2022 0711 by Bowen Matthews RN  Outcome: Progressing Towards Goal  Goal: *Prevention, detection, treatment of acute complications  Description: List symptoms of hyper- and hypoglycemia; describe how to treat low blood sugar and actions for lowering  high blood glucose level. 2/12/2022 1126 by Bowen Matthews RN  Outcome: Resolved/Met  2/12/2022 0711 by Bowen Matthews RN  Outcome: Progressing Towards Goal  Goal: *Prevention, detection and treatment of chronic complications  Description: Define the natural course of diabetes and describe the relationship of blood glucose levels to long term complications of diabetes.   2/12/2022 1126 by Bowen Matthews RN  Outcome: Resolved/Met  2/12/2022 0711 by Bowen Matthews RN  Outcome: Progressing Towards Goal  Goal: *Developing strategies to address psychosocial issues  Description: Describe feelings about living with diabetes; identify support needed and support network  2/12/2022 1126 by Bowen Matthews RN  Outcome: Resolved/Met  2/12/2022 0711 by Bowen Matthews RN  Outcome: Progressing Towards Goal  Goal: *Insulin pump training  2/12/2022 1126 by Bowen Matthews RN  Outcome: Resolved/Met  2/12/2022 0711 by Bowen Matthews RN  Outcome: Progressing Towards Goal  Goal: *Sick day guidelines  2/12/2022 1126 by Bowen Matthews RN  Outcome: Resolved/Met  2/12/2022 0711 by Bowen Matthews RN  Outcome: Progressing Towards Goal  Goal: *Patient Specific Goal (EDIT GOAL, INSERT TEXT)  2/12/2022 1126 by Faith Schaefer RN  Outcome: Resolved/Met  2/12/2022 0711 by Faith Schaefer RN  Outcome: Progressing Towards Goal     Problem: Patient Education: Go to Patient Education Activity  Goal: Patient/Family Education  2/12/2022 1126 by Faith Scheafer RN  Outcome: Resolved/Met  2/12/2022 0711 by Faith Schaefer RN  Outcome: Progressing Towards Goal     Problem: Pain  Goal: *Control of Pain  2/12/2022 1126 by Faith Schaefer RN  Outcome: Resolved/Met  2/12/2022 0711 by Faith Schaefer RN  Outcome: Progressing Towards Goal  Goal: *PALLIATIVE CARE:  Alleviation of Pain  2/12/2022 1126 by Faith Schaefer RN  Outcome: Resolved/Met  2/12/2022 0711 by Faith Schaefer RN  Outcome: Progressing Towards Goal     Problem: Patient Education: Go to Patient Education Activity  Goal: Patient/Family Education  2/12/2022 1126 by Faith Schaefer RN  Outcome: Resolved/Met  2/12/2022 0711 by Faith Schaefer RN  Outcome: Progressing Towards Goal     Problem: Patient Education: Go to Patient Education Activity  Goal: Patient/Family Education  2/12/2022 1126 by Faith Schaefer RN  Outcome: Resolved/Met  2/12/2022 0711 by Faith Schaefer RN  Outcome: Progressing Towards Goal

## 2022-02-12 NOTE — PROGRESS NOTES
Problem: Airway Clearance - Ineffective  Goal: Achieve or maintain patent airway  Outcome: Progressing Towards Goal     Problem: Breathing Pattern - Ineffective  Goal: Ability to achieve and maintain a regular respiratory rate  Outcome: Progressing Towards Goal     Problem: Isolation Precautions - Risk of Spread of Infection  Goal: Prevent transmission of infectious organism to others  Outcome: Progressing Towards Goal     Problem: Nutrition Deficits  Goal: Optimize nutrtional status  Outcome: Progressing Towards Goal

## 2022-02-14 ENCOUNTER — OFFICE VISIT (OUTPATIENT)
Dept: NEPHROLOGY | Age: 77
End: 2022-02-14
Payer: MEDICARE

## 2022-02-14 VITALS
BODY MASS INDEX: 30.56 KG/M2 | HEART RATE: 87 BPM | SYSTOLIC BLOOD PRESSURE: 157 MMHG | DIASTOLIC BLOOD PRESSURE: 79 MMHG | WEIGHT: 201 LBS

## 2022-02-14 DIAGNOSIS — N18.4 CKD (CHRONIC KIDNEY DISEASE), STAGE IV (HCC): Primary | ICD-10-CM

## 2022-02-14 PROCEDURE — G8754 DIAS BP LESS 90: HCPCS | Performed by: INTERNAL MEDICINE

## 2022-02-14 PROCEDURE — 1101F PT FALLS ASSESS-DOCD LE1/YR: CPT | Performed by: INTERNAL MEDICINE

## 2022-02-14 PROCEDURE — G8753 SYS BP > OR = 140: HCPCS | Performed by: INTERNAL MEDICINE

## 2022-02-14 PROCEDURE — G8510 SCR DEP NEG, NO PLAN REQD: HCPCS | Performed by: INTERNAL MEDICINE

## 2022-02-14 PROCEDURE — 99213 OFFICE O/P EST LOW 20 MIN: CPT | Performed by: INTERNAL MEDICINE

## 2022-02-14 PROCEDURE — 1111F DSCHRG MED/CURRENT MED MERGE: CPT | Performed by: INTERNAL MEDICINE

## 2022-02-14 PROCEDURE — G9711 PT HX TOT COL OR COLON CA: HCPCS | Performed by: INTERNAL MEDICINE

## 2022-02-14 PROCEDURE — G8417 CALC BMI ABV UP PARAM F/U: HCPCS | Performed by: INTERNAL MEDICINE

## 2022-02-14 PROCEDURE — G8427 DOCREV CUR MEDS BY ELIG CLIN: HCPCS | Performed by: INTERNAL MEDICINE

## 2022-02-14 PROCEDURE — G8536 NO DOC ELDER MAL SCRN: HCPCS | Performed by: INTERNAL MEDICINE

## 2022-02-14 NOTE — PROGRESS NOTES
Physician Progress Note      PATIENT:               Lindsey Zeng  CSN #:                  [id number]  :                       1945  ADMIT DATE:       2022 11:33 AM  DISCH DATE:        2022 3:27 PM  RESPONDING  PROVIDER #:        Susan Baum MD          QUERY TEXT:    Noted documentation of acute on chronic kidney injury, ATN on 2/10/22 by Nephrology consultant. If possible, please document in progress notes and discharge summary:    The medical record reflects the following:  Risk Factors: CKD stage IV with heavy proteinuria, status post nephrectomy, history of uncontrolled diabetes  Clinical Indicators: 2/10  Nephrology consult: Acute on chronic kidney injury, ATN, risk factor prerenal injury, hemodynamic changes during operation  Serial creatinines:  4.06, 5.03, 4.99  Treatment: Nephrology consult; IV hydration; monitor urine output; monitor for urine retention; DM control; resume Sensipar; decrease gabapentin; adjust medication per current renal function status    Thank you,  Jennifer Aguilar [email or web address]  Options provided:  -- Acute on chronic kidney injury, ATN confirmed not present on admission  -- Defer to Nephrology consultant documentation regarding acute on chronic kidney injury, ATN  -- Other - I will add my own diagnosis  -- Disagree - Not applicable / Not valid  -- Disagree - Clinically unable to determine / Unknown  -- Refer to Clinical Documentation Reviewer    PROVIDER RESPONSE TEXT:    I defer to Nephrology consultant regarding documentation of acute on chronic kidney injury, ATN. 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 If possible, please document in progress notes and d/c summary a correlating diagnosis to explain pathology findings:     The medical record reflects the following:  Risk Factors: Malignant polyp of the transverse colon  Clinical Indicators: 2/8/22 Path:  Colon, transverse and portions of ascending and descending, partial colectomy:  Tubular adenoma. Negative for residual invasive carcinoma in colon. 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     Query created by: Osvaldo Alvarez on 2/11/2022 10:02 AM      Electronically signed by:  Collette Spence MD 2/14/2022 9:32 AM

## 2022-02-14 NOTE — PROGRESS NOTES
Reason for f/u:  ARON on CKD IV/V     HPI:  The pt is seen for f/u for CKD. He is c/o sob arben on exertion. Noticed to have no  LE swelling also. No N/V/D.  No urinary sx.      ROS:  Constitutional   · Constitutional: no fatigue, no fever, no night sweats, no significant weight gain, no significant weight loss     Eyes   · Eyes: no dry eyes, no vision change     ENMT   · Nose: no frequent nosebleeds, no nose/sinus problems   · Mouth/Throat: no dry mouth, no bleeding gums, no sore throat, no teeth problems, no snoring     Cardiovascular   · Cardiovascular: no swelling in the extremities, no chest pain, no arm pain on exertion, no shortness of breath when walking, no known heart murmur, no shortness of breath when lying down, no palpitations     Respiratory   · Respiratory: shortness of breath+, no cough, no wheezing, no coughing up blood     Gastrointestinal   · Gastrointestinal: no nausea, no abdominal pain, no vomiting, normal appetite, no diarrhea, not vomiting blood     Genitourinary   · Genitourinary: no hematuria, no incontinence, no difficulty urinating, no increased frequency     Musculoskeletal   · Musculoskeletal: no back pain, no arthralgias/joint pain, no muscle aches, no muscle weakness     Integumentary   · Skin: no rashes, no jaundice     Neurologic   · Neurologic: no dizziness, no numbness, no loss of consciousness, no weakness, no seizures, no headaches     Psychiatric   · Psych: no depression, no sleep disturbances     Hematologic/Lymphatic   · Hematologic/Lymphatic no swollen glands, no bruising     Allergic/Immunologic   · Allergy/Immunologic: no runny nose, no hives   ROS as noted in the HPI      PE:  Constitutional   · Level of Distress: NAD, no acute illness, no chronic illness   · General Appearance: healthy-appearing, well-nourished, well-developed   · Ambulation: ambulating normally     Eyes   · Pupils: PERRLA   · EOM: EOMI     Cardiovascular   · Heart Auscultation: RRR, normal S1, normal S2, no murmurs, no rubs, no gallop, no click, no KARI     Lungs   · Auscultation: breath sounds normal, good air movement, CTA except as noted, no wheezing, no rales/crackles, no rhonchi     Abdomen   · Inspection and Palpation: soft, non-distended, no tenderness, no CVA tenderness     Musculoskeletal System   · Extremities: no clubbing, no cyanosis, edema +    Skin   · Inspection and Palpation: no rash, no lesions, no ulcer, no induration, no nodule, turgor normal, no jaundice     Neurologic   · Cranial Nerves: grossly intact   · Gait And Station: normal gait, normal station        A/P:    1. ARON on Chronic kidney disease stage IV in a single kidney:   -BUN/Cr was up to 60/3.3 at last visit  -worse at 5.2 (2/12/22)  -Cr was at 3.1-3.2  On 11/01-11/02  -his lasix was held but put it back on it for SOB/on NC 2L.   -he has unilateral functioning kidney due to left total nephrectomy.  -His urinalysis showed bland sediment and has 4-5 proteinuria.  -A kidney ultrasound surgically absent left kidney and right kidney with no hydronephrosis. -He was advised to avoid any kind of NSAIDs.  -Has mild uremic sx, No volume overload  -No emergent indication for the dialysis  -I have explained in detail about the severity of his kidney disease.  -I will see him back in 3 weeks. 2. History of kidney cancer:  -Status post left total nephrectomy 8 years ago. 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  -He follow-up with oncologist.    3. Hypertension:  -Blood pressure is better now.  -will keep hydralazine 100 mg tid   -will keep metoprolol ER 50 mg daily  -off of clonidine and minoxidil  -on amlodipine 10 mg daily       4. Anemia:  -Hb is 7.8   11/02  -TSAT 22->10%, Ferritin 283-->124  -advised to increase po FeSO4 325 mg bid->tid. -recommended to start on Epo 20,000 units every 3rd week (got denied)      5.  Renal osteodystrophy.  -His Ca, phosphorus ok  - vitamin D25 hydroxy 18, will order Vit D 50,000 units weekly for 16 weeks.  -has sec HPT   -iPTH 169-->248, ordered parathyroid nuclear scan (pt could not do it this time)  -off  sensipar 30 mg daily. -on calcitriol 0.25 mcg daily    6. Gouty arthritis:  -His Uric acid is 5.0  -will keep on Allopurinol 200 mg daily. 7. Metabolic acidosis:  -CO2  22-->19-->22  -will keep NaHCO3 650 mg  tid    8. Proteinuria, nephrotic range:   -has 4-5 g/g per day   -likely from diabetic nephropathy   -Hep B/C, C3/C4, SFL ratio and ANCAs are neg   -unable to add ARB due to # 1 and K 5.0     9. SOB:  -from fluid overload  -sob on exertion and LE edema (better)  -last Echo 03/2020 LVEF 50-55%  -on  po lasix 80 mg daily    10.  DM2:  -takes glipizide 10 mg bid  -already had an episode of severe hypoglycemia  -advised to decrease 10 mg daily and hold if BS < 100 and inform PCP

## 2022-02-14 NOTE — PROGRESS NOTES
Darinel Credit presents today for   Chief Complaint   Patient presents with    Follow-up     CKD Stage IV       Is someone accompanying this pt? Yes, Bernardo Shell    Is the patient using any DME equipment during OV?no    Depression Screening:  3 most recent PHQ Screens 2/14/2022   Little interest or pleasure in doing things Not at all   Feeling down, depressed, irritable, or hopeless Not at all   Total Score PHQ 2 0   Trouble falling or staying asleep, or sleeping too much -   Feeling tired or having little energy -   Poor appetite, weight loss, or overeating -   Feeling bad about yourself - or that you are a failure or have let yourself or your family down -   Trouble concentrating on things such as school, work, reading, or watching TV -   Moving or speaking so slowly that other people could have noticed; or the opposite being so fidgety that others notice -   Thoughts of being better off dead, or hurting yourself in some way -   PHQ 9 Score -   How difficult have these problems made it for you to do your work, take care of your home and get along with others -       Learning Assessment:  Learning Assessment 1/28/2022   PRIMARY LEARNER Patient   HIGHEST LEVEL OF EDUCATION - PRIMARY LEARNER  GRADUATED HIGH SCHOOL OR GED   BARRIERS PRIMARY LEARNER NONE   CO-LEARNER CAREGIVER Yes   CO-LEARNER NAME Sandra   PRIMARY LANGUAGE ENGLISH   LEARNER PREFERENCE PRIMARY DEMONSTRATION   ANSWERED BY daughter   RELATIONSHIP OTHER       Fall Risk  Fall Risk Assessment, last 12 mths 2/14/2022   Able to walk? Yes   Fall in past 12 months? 0   Do you feel unsteady? 0   Are you worried about falling 0   Is TUG test greater than 12 seconds? -   Is the gait abnormal? -   Number of falls in past 12 months -   Fall with injury? -       Coordination of Care:  1. Have you been to the ER, urgent care clinic since your last visit? Hospitalized since your last visit? No but had colon surgery last week    2.  Have you seen or consulted any other health care providers outside of the [address] [place] [address] since your last visit? Include any pap smears or colon screening.  Yes, PCP [facility]

## 2022-02-15 NOTE — DISCHARGE SUMMARY
Colon and Rectal Surgery Discharge Summary     Patient: Derrick Pitt MRN: [id number]  SSN: [id number]    YOB: 1945  Age: 78 y.o. Sex: male       Admit Date: 2/8/2022    Discharge Date: 2/15/2022      Admission Diagnoses: Malignant neoplasm of transverse colon ([facility] [C18.4]; Colon cancer ([facility] [C18.9]    Discharge Diagnoses: same    Procedure: laparoscopic transverse colectomy    Problem List as of 2/12/2022 Date Reviewed: 1/28/2022          Codes Class Noted - Resolved    Anemia in chronic kidney disease ICD-10-CM: N18.9, D63.1  ICD-9-CM: 285.21  12/20/2021 - Present        Peripheral venous insufficiency ICD-10-CM: I87.2  ICD-9-CM: 459.81  12/20/2021 - Present        Obese ICD-10-CM: E66.9  ICD-9-CM: 278.00  12/14/2021 - Present        Palpitations ICD-10-CM: R00.2  ICD-9-CM: 785.1  12/14/2021 - Present        Renal failure syndrome ICD-10-CM: N19  ICD-9-CM: 847  12/14/2021 - Present        Chest pain ICD-10-CM: R07.9  ICD-9-CM: 786.50  11/1/2021 - Present        Chronic kidney disease, stage 4 (severe) ([facility] ICD-10-CM: N18.4  ICD-9-CM: 585.4  11/1/2021 - Present        Chronic kidney disease, stage IV (severe) (HCC) ICD-10-CM: N18.4  ICD-9-CM: 585.4  10/19/2021 - Present        Acute on chronic congestive heart failure (HCC) ICD-10-CM: I50.9  ICD-9-CM: 428.0  10/19/2021 - Present        Acute on chronic combined systolic and diastolic ACC/AHA stage C congestive heart failure (HCC) ICD-10-CM: I50.43  ICD-9-CM: 428.43, 428.0  10/7/2021 - Present        CHF exacerbation ([facility] ICD-10-CM: I50.9  ICD-9-CM: 428.0  10/6/2021 - Present        Acute on chronic heart failure (HCC) ICD-10-CM: I50.9  ICD-9-CM: 428.0  1/13/2021 - Present        Acute deep vein thrombosis (DVT) of distal vein of both lower extremities (HCC) ICD-10-CM: T31.6D2  ICD-9-CM: 453.42  10/13/2020 - Present        UTI (urinary tract infection) ICD-10-CM: N39.0  ICD-9-CM: 599.0  10/10/2020 - Present        CHF (congestive heart failure) ([facility] ICD-10-CM: I50.9  ICD-9-CM: 428.0  9/15/2020 - Present        Deep vein thrombosis (DVT) of proximal vein of both lower extremities (HCC) (Chronic) ICD-10-CM: I82.4Y3  ICD-9-CM: 453.41  9/14/2020 - Present    Overview Signed 9/14/2020  3:43 PM by Jose Vincent NP                      Positive D dimer ICD-10-CM: R79.89  ICD-9-CM: 790.92  9/13/2020 - Present        General weakness ICD-10-CM: R53.1  ICD-9-CM: 780.79  8/1/2020 - Present        Chronic diastolic congestive heart failure (HCC) ICD-10-CM: I50.32  ICD-9-CM: 428.32, 428.0  7/31/2020 - Present        Coronary artery disease of native artery of native heart with stable angina pectoris ([facility] ICD-10-CM: I25.118  ICD-9-CM: 414.01, 413.9  3/20/2020 - Present        H/O left nephrectomy ICD-10-CM: Z90.5  ICD-9-CM: V45.73  3/20/2020 - Present        History of bladder cancer ICD-10-CM: Z85.51  ICD-9-CM: V10.51  3/20/2020 - Present        Pulmonary edema, acute ([facility] ICD-10-CM: J81.0  ICD-9-CM: 518.4  3/20/2020 - Present        Acute renal failure superimposed on chronic kidney disease ([facility] ICD-10-CM: N17.9, N18.9  ICD-9-CM: 584.9, 585.9  11/23/2017 - Present        Gastroesophageal reflux disease without esophagitis ICD-10-CM: K21.9  ICD-9-CM: 530.81  11/23/2017 - Present        HLD (hyperlipidemia) ICD-10-CM: E78.5  ICD-9-CM: 272.4  11/23/2017 - Present        Renal cell carcinoma of left kidney (HCC) ICD-10-CM: C64.2  ICD-9-CM: 189.0  11/23/2017 - Present        Hematuria ICD-10-CM: R31.9  ICD-9-CM: 599.70  9/4/2014 - Present        Stage 3 chronic kidney disease ([facility] ICD-10-CM: N18.30  ICD-9-CM: 585.3  9/4/2014 - Present        Malignant neoplasm of urinary bladder (HCC) ICD-10-CM: C67.9  ICD-9-CM: 188.9  12/7/2009 - Present    Overview Signed 9/16/2020  2:28 PM by She Foss NP     Overview:   HG papillary urothelial carcinoma.  S/p cystoprostatectomy 12/30/09             Type 2 diabetes mellitus with stage 4 chronic kidney disease, with long-term current use of insulin ([facility] ICD-10-CM: E11.22, N18.4, Z79.4  ICD-9-CM: 250.40, 585.4, V58.67  12/7/2009 - Present        Primary hypertension ICD-10-CM: I10  ICD-9-CM: 401.9  12/7/2009 - Present        Colon cancer ([facility] ICD-10-CM: C18.9  ICD-9-CM: 153.9  2/8/2022 - Present               Discharge Condition: Good    Hospital Course: To OR for above. Diet advanced. Ambulated. Acute renal failure on CRI, renal consulted. Monitored closely but no dialysis needed. Remainder hospital course unremarkable. Consults: Nephrology    Significant Diagnostic Studies: None    Disposition: home    Discharge Medications:   Cannot display discharge medications since this patient is not currently admitted. Activity: No heavy lifting for 6 weeks  Diet: Regular Diet  Wound Care: None needed    Follow-up Appointments   Procedures    FOLLOW UP VISIT Appointment in: Two Weeks     Standing Status:   Standing     Number of Occurrences:   1     Order Specific Question:   Appointment in     Answer: Two Weeks       Signed By: Mustapha Nunez MD     February 15, 2022       .

## 2022-03-03 ENCOUNTER — OFFICE VISIT (OUTPATIENT)
Dept: SURGERY | Age: 77
End: 2022-03-03
Payer: MEDICARE

## 2022-03-03 ENCOUNTER — HOSPITAL ENCOUNTER (OUTPATIENT)
Dept: LAB | Age: 77
Discharge: HOME | End: 2022-03-03

## 2022-03-03 VITALS
SYSTOLIC BLOOD PRESSURE: 160 MMHG | TEMPERATURE: 97.6 F | HEIGHT: 68 IN | DIASTOLIC BLOOD PRESSURE: 64 MMHG | BODY MASS INDEX: 28.64 KG/M2 | OXYGEN SATURATION: 98 % | RESPIRATION RATE: 16 BRPM | WEIGHT: 189 LBS | HEART RATE: 69 BPM

## 2022-03-03 DIAGNOSIS — C18.4 MALIGNANT NEOPLASM OF TRANSVERSE COLON (HCC): ICD-10-CM

## 2022-03-03 DIAGNOSIS — C18.4 MALIGNANT NEOPLASM OF TRANSVERSE COLON (HCC): Primary | ICD-10-CM

## 2022-03-03 LAB — SENTARA SPECIMEN COL,SENBCF: NORMAL

## 2022-03-03 PROCEDURE — 99001 SPECIMEN HANDLING PT-LAB: CPT

## 2022-03-03 PROCEDURE — 99024 POSTOP FOLLOW-UP VISIT: CPT | Performed by: COLON & RECTAL SURGERY

## 2022-03-03 NOTE — TELEPHONE ENCOUNTER
Requested Prescriptions     Pending Prescriptions Disp Refills    allopurinoL (ZYLOPRIM) 100 mg tablet 60 Tablet 3     Sig: Take 2 Tablets by mouth daily.

## 2022-03-03 NOTE — PROGRESS NOTES
Subjective: Tolerating diet moving his bowels. Twice per day. Becoming solid. Past medical history and ROS were reviewed and unchanged. Abdomen: Soft, nontender nondistended  Wounds healed, no hernia    Path shows no malignancy at the polypectomy site, however there is one positive lymph node    Assessment / Plan    Status post laparoscopic transverse colectomy for endoscopically excised malignant polyp, found to be stage III cancer with 1+ lymph node  Refer for evaluation for chemotherapy  Given his renal failure, previous cystectomy and nephrectomy he may be a high risk candidate  Follow-up with me for colonoscopy in a year  Follow up with neurosurgeon for ? Pituitary tumor  Follow-up with his nephrologist, order electrolytes today  Follow up with vascular for IVC filter removal  Patient was asked to follow-up with his nephrologist 1 week after surgery and he failed to do so      The diagnoses and plan were discussed with patient. All questions answered. Plan of care agreed to by all concerned.

## 2022-03-03 NOTE — LETTER
3/3/2022    Patient: Cecilia Oh   YOB: 1945   Date of Visit: 3/3/2022     Mirna Holland NP  [address]  [address] [facility] [address]    Dear Ramona Weller is seen in follow-up after laparoscopic transverse colectomy for the malignant polyp identified during colonoscopy. His postoperative course was complicated by acute on chronic renal failure. He did not require dialysis per nephrology consultants. In the office today he tells me he is tolerating diet without difficulty and his bowels are functioning well. Although final pathology did not show any residual tumor at the polypectomy site there was one lymph node positive for regionally metastatic disease. I will refer him to an oncologist for consideration of adjuvant chemotherapy. I have also asked him to follow-up with his nephrologist regarding his renal function and need for dialysis. He will also require removal of an IVC filter which was placed for PE prevention. Lastly his PET CT showed a possible pituitary tumor, he will need neurosurgery involved to evaluate this. He will follow with me in 1 year for surveillance colonoscopy. I have asked him to follow up with you as well to review his medical issues in a more complete fashion. If you have questions, please do not hesitate to call me. I look forward to following your patient along with you.       Sincerely,    Kiesha Galvan MD

## 2022-03-03 NOTE — TELEPHONE ENCOUNTER
Please see patient refill request    Patient last seen on 1-    Last prescribed on 10-  #60 X 3    Thank you

## 2022-03-04 ENCOUNTER — HOSPITAL ENCOUNTER (OUTPATIENT)
Dept: LAB | Age: 77
Discharge: HOME | End: 2022-03-04

## 2022-03-04 LAB
ANION GAP SERPL CALC-SCNC: 15 MMOL/L (ref 3–15)
BUN SERPL-MCNC: 74 MG/DL (ref 6–22)
CALCIUM SERPL-MCNC: 9.6 MG/DL (ref 8.4–10.5)
CHLORIDE SERPL-SCNC: 106 MMOL/L (ref 98–110)
CO2 SERPL-SCNC: 21 MMOL/L (ref 20–32)
CREAT SERPL-MCNC: 3.7 MG/DL (ref 0.8–1.6)
GFRAA, 66117: 19.7
GFRNA, 66118: 16.3
GLUCOSE SERPL-MCNC: 222 MG/DL (ref 70–99)
MAGNESIUM SERPL-MCNC: 2.2 MG/DL (ref 1.6–2.5)
PHOSPHATE SERPL-MCNC: 3.9 MG/DL (ref 2.5–4.5)
POTASSIUM SERPL-SCNC: 4.4 MMOL/L (ref 3.5–5.5)
SODIUM SERPL-SCNC: 142 MMOL/L (ref 133–145)

## 2022-03-04 RX ORDER — ALLOPURINOL 100 MG/1
200 TABLET ORAL DAILY
Qty: 180 TABLET | Refills: 3 | Status: SHIPPED | OUTPATIENT
Start: 2022-03-04 | End: 2023-02-27

## 2022-03-07 ENCOUNTER — TELEPHONE (OUTPATIENT)
Dept: FAMILY MEDICINE CLINIC | Age: 77
End: 2022-03-07

## 2022-03-07 NOTE — TELEPHONE ENCOUNTER
Attempted to call [employer] and no answer left a message for patient to call this office back. Also spoke with Ms Julien Munguia advising that the patient is in need of Ostomy supplies.   Advised her taht we would call [employer] for further indormation

## 2022-03-07 NOTE — TELEPHONE ENCOUNTER
Ms. Aline Bell to speak to a nurse or Dr. Emeli Sellers about his medical supplies. The order number is [id number]. The supply company is [email or web address] [phone].

## 2022-03-09 ENCOUNTER — TELEPHONE (OUTPATIENT)
Dept: NEPHROLOGY | Age: 77
End: 2022-03-09

## 2022-03-09 ENCOUNTER — OFFICE VISIT (OUTPATIENT)
Dept: NEPHROLOGY | Age: 77
End: 2022-03-09
Payer: MEDICARE

## 2022-03-09 VITALS
DIASTOLIC BLOOD PRESSURE: 90 MMHG | HEART RATE: 62 BPM | SYSTOLIC BLOOD PRESSURE: 172 MMHG | BODY MASS INDEX: 28.74 KG/M2 | WEIGHT: 189 LBS

## 2022-03-09 DIAGNOSIS — N18.30 STAGE 3 CHRONIC KIDNEY DISEASE, UNSPECIFIED WHETHER STAGE 3A OR 3B CKD (HCC): ICD-10-CM

## 2022-03-09 DIAGNOSIS — N18.4 CKD (CHRONIC KIDNEY DISEASE), STAGE IV (HCC): Primary | ICD-10-CM

## 2022-03-09 PROCEDURE — 1111F DSCHRG MED/CURRENT MED MERGE: CPT | Performed by: INTERNAL MEDICINE

## 2022-03-09 PROCEDURE — G8417 CALC BMI ABV UP PARAM F/U: HCPCS | Performed by: INTERNAL MEDICINE

## 2022-03-09 PROCEDURE — G8510 SCR DEP NEG, NO PLAN REQD: HCPCS | Performed by: INTERNAL MEDICINE

## 2022-03-09 PROCEDURE — G8753 SYS BP > OR = 140: HCPCS | Performed by: INTERNAL MEDICINE

## 2022-03-09 PROCEDURE — G8536 NO DOC ELDER MAL SCRN: HCPCS | Performed by: INTERNAL MEDICINE

## 2022-03-09 PROCEDURE — G9711 PT HX TOT COL OR COLON CA: HCPCS | Performed by: INTERNAL MEDICINE

## 2022-03-09 PROCEDURE — G8754 DIAS BP LESS 90: HCPCS | Performed by: INTERNAL MEDICINE

## 2022-03-09 PROCEDURE — 99213 OFFICE O/P EST LOW 20 MIN: CPT | Performed by: INTERNAL MEDICINE

## 2022-03-09 PROCEDURE — G8427 DOCREV CUR MEDS BY ELIG CLIN: HCPCS | Performed by: INTERNAL MEDICINE

## 2022-03-09 PROCEDURE — 1101F PT FALLS ASSESS-DOCD LE1/YR: CPT | Performed by: INTERNAL MEDICINE

## 2022-03-09 RX ORDER — ERGOCALCIFEROL 1.25 MG/1
CAPSULE ORAL
Qty: 16 CAPSULE | Refills: 1 | Status: SHIPPED | OUTPATIENT
Start: 2022-03-09

## 2022-03-09 RX ORDER — LANOLIN ALCOHOL/MO/W.PET/CERES
CREAM (GRAM) TOPICAL
Qty: 90 TABLET | Refills: 5 | Status: SHIPPED | OUTPATIENT
Start: 2022-03-09

## 2022-03-09 RX ORDER — SODIUM BICARBONATE 650 MG/1
650 TABLET ORAL 3 TIMES DAILY
Qty: 90 TABLET | Refills: 5 | Status: SHIPPED | OUTPATIENT
Start: 2022-03-09 | End: 2022-10-06

## 2022-03-09 NOTE — PROGRESS NOTES
Reason for f/u:  ARON on CKD IV/V     HPI:  The pt is seen for f/u for CKD. He is c/o sob arben on exertion. Noticed to have no  LE swelling also. No N/V/D.  No urinary sx.      ROS:  Constitutional   · Constitutional: no fatigue, no fever, no night sweats, no significant weight gain, no significant weight loss     Eyes   · Eyes: no dry eyes, no vision change     ENMT   · Nose: no frequent nosebleeds, no nose/sinus problems   · Mouth/Throat: no dry mouth, no bleeding gums, no sore throat, no teeth problems, no snoring     Cardiovascular   · Cardiovascular: no swelling in the extremities, no chest pain, no arm pain on exertion, no shortness of breath when walking, no known heart murmur, no shortness of breath when lying down, no palpitations     Respiratory   · Respiratory: shortness of breath+, no cough, no wheezing, no coughing up blood     Gastrointestinal   · Gastrointestinal: no nausea, no abdominal pain, no vomiting, normal appetite, no diarrhea, not vomiting blood     Genitourinary   · Genitourinary: no hematuria, no incontinence, no difficulty urinating, no increased frequency     Musculoskeletal   · Musculoskeletal: no back pain, no arthralgias/joint pain, no muscle aches, no muscle weakness     Integumentary   · Skin: no rashes, no jaundice     Neurologic   · Neurologic: no dizziness, no numbness, no loss of consciousness, no weakness, no seizures, no headaches     Psychiatric   · Psych: no depression, no sleep disturbances     Hematologic/Lymphatic   · Hematologic/Lymphatic no swollen glands, no bruising     Allergic/Immunologic   · Allergy/Immunologic: no runny nose, no hives   ROS as noted in the HPI      PE:  Constitutional   · Level of Distress: NAD, no acute illness, no chronic illness   · General Appearance: healthy-appearing, well-nourished, well-developed   · Ambulation: ambulating normally     Eyes   · Pupils: PERRLA   · EOM: EOMI     Cardiovascular   · Heart Auscultation: RRR, normal S1, normal S2, no murmurs, no rubs, no gallop, no click, no KARI     Lungs   · Auscultation: breath sounds normal, good air movement, CTA except as noted, no wheezing, no rales/crackles, no rhonchi     Abdomen   · Inspection and Palpation: soft, non-distended, no tenderness, no CVA tenderness     Musculoskeletal System   · Extremities: no clubbing, no cyanosis, edema +    Skin   · Inspection and Palpation: no rash, no lesions, no ulcer, no induration, no nodule, turgor normal, no jaundice     Neurologic   · Cranial Nerves: grossly intact   · Gait And Station: normal gait, normal station        A/P:    1. ARON on Chronic kidney disease stage IV in a single kidney:   -BUN/Cr was up to 60/3.3 at last visit  -worse at 5.2 (2/12/22), has improved to 3.2  (3/05)  -Cr was at 3.1-3.2  On 11/01-11/02  -his lasix was held but put it back on it for SOB/on NC 2L.   -advised to decrease lasix from 80 to 40 mg daily (3/09). -he has unilateral functioning kidney due to left total nephrectomy.  -His urinalysis showed bland sediment and has 4-5 proteinuria.  -A kidney ultrasound surgically absent left kidney and right kidney with no hydronephrosis. -He was advised to avoid any kind of NSAIDs.  -Has mild uremic sx, No volume overload.  -I have explained in detail about the severity of his kidney disease.  -I will see him back in 6 weeks. 2. History of kidney cancer:  -Status post left total nephrectomy 8 years ago. 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  -He follow-up with oncologist.    3. Hypertension:  -Blood pressure is better now.  -will keep hydralazine 100 mg tid   -will keep metoprolol ER 50 mg daily  -off of clonidine and minoxidil  -on amlodipine 10 mg daily       4. Anemia:  -Hb is 7.8   11/02  -TSAT 22->10%, Ferritin 283-->124  -advised to increase po FeSO4 325 mg bid->tid. -recommended to start on Epo 20,000 units every 3rd week (got denied)      5.  Renal osteodystrophy.  -His Ca, phosphorus ok  - vitamin D25 hydroxy 18, will order Vit D 50,000 units weekly for 16 weeks.  -has sec HPT   -iPTH 169-->248, ordered parathyroid nuclear scan (pt could not do it this time)  -off  sensipar 30 mg daily. -on calcitriol 0.25 mcg daily    6. Gouty arthritis:  -His Uric acid is 5.0  -will keep on Allopurinol 200 mg daily. 7. Metabolic acidosis:  -CO2  22-->19-->22  -will keep NaHCO3 650 mg  tid    8. Proteinuria, nephrotic range:   -has 4-5 g/g per day   -likely from diabetic nephropathy   -Hep B/C, C3/C4, SFL ratio and ANCAs are neg   -unable to add ARB due to # 1 and K 5.0     9. SOB:  -from fluid overload  -sob on exertion and LE edema (better)  -last Echo 03/2020 LVEF 50-55%  -on  po lasix 80 mg daily  -advised to take 40 mg daily (3/09).      10. DM2:  -takes glipizide 10 mg bid  -already had an episode of severe hypoglycemia  -advised to decrease 10 mg daily and hold if BS < 100 and inform PCP

## 2022-03-09 NOTE — PROGRESS NOTES
Gemini López presents today for   Chief Complaint   Patient presents with    Follow-up     CKD Stage IV       Is someone accompanying this pt? Yes, estella    Is the patient using any DME equipment during OV? no    Depression Screening:  3 most recent PHQ Screens 3/9/2022   Little interest or pleasure in doing things Not at all   Feeling down, depressed, irritable, or hopeless Not at all   Total Score PHQ 2 0   Trouble falling or staying asleep, or sleeping too much -   Feeling tired or having little energy -   Poor appetite, weight loss, or overeating -   Feeling bad about yourself - or that you are a failure or have let yourself or your family down -   Trouble concentrating on things such as school, work, reading, or watching TV -   Moving or speaking so slowly that other people could have noticed; or the opposite being so fidgety that others notice -   Thoughts of being better off dead, or hurting yourself in some way -   PHQ 9 Score -   How difficult have these problems made it for you to do your work, take care of your home and get along with others -       Learning Assessment:  Learning Assessment 1/28/2022   PRIMARY LEARNER Patient   HIGHEST LEVEL OF EDUCATION - PRIMARY LEARNER  GRADUATED HIGH SCHOOL OR GED   BARRIERS PRIMARY LEARNER NONE   CO-LEARNER CAREGIVER Yes   CO-LEARNER NAME Sandra   PRIMARY LANGUAGE ENGLISH   LEARNER PREFERENCE PRIMARY DEMONSTRATION   ANSWERED BY daughter   RELATIONSHIP OTHER       Fall Risk  Fall Risk Assessment, last 12 mths 3/9/2022   Able to walk? Yes   Fall in past 12 months? 0   Do you feel unsteady? 0   Are you worried about falling 0   Is TUG test greater than 12 seconds? -   Is the gait abnormal? -   Number of falls in past 12 months -   Fall with injury? -       Coordination of Care:  1. Have you been to the ER, urgent care clinic since your last visit? Hospitalized since your last visit? no    2.  Have you seen or consulted any other health care providers outside of the [address] [place] [address] [place] since your last visit? Include any pap smears or colon screening.  Yes, PCP Fatoumata Martell

## 2022-03-09 NOTE — TELEPHONE ENCOUNTER
----- Message from Joy Contreras sent at 3/9/2022  3:46 PM EST -----  Needs prescription refills was seen today by mey.   sodium bicarbonate 650 mg tablet   FeroSuL 325 mg (65 mg iron)  ergocalciferol (ERGOCALCIFEROL) 1,250 mcg

## 2022-03-10 ENCOUNTER — TELEPHONE (OUTPATIENT)
Dept: FAMILY MEDICINE CLINIC | Age: 77
End: 2022-03-10

## 2022-03-10 NOTE — TELEPHONE ENCOUNTER
Spoke with [facility] they will have the document team fax over the orders.   Attempted to call Ms Rita Steiner no answer left message

## 2022-03-10 NOTE — TELEPHONE ENCOUNTER
Attempted to call no answer left message that Ren[facility] was sending the order for us to fill out and send back to them

## 2022-03-10 NOTE — TELEPHONE ENCOUNTER
Shane De Oliveira called in regards to this patient and his medical supplies that she discussed with Thania Ngo.  Please follow up when available to discuss this matter further at [phone]

## 2022-03-18 PROBLEM — I25.118 CORONARY ARTERY DISEASE OF NATIVE ARTERY OF NATIVE HEART WITH STABLE ANGINA PECTORIS (HCC): Status: ACTIVE | Noted: 2020-03-20

## 2022-03-18 PROBLEM — D63.1 ANEMIA IN CHRONIC KIDNEY DISEASE: Status: ACTIVE | Noted: 2021-12-20

## 2022-03-18 PROBLEM — N18.9 ANEMIA IN CHRONIC KIDNEY DISEASE: Status: ACTIVE | Noted: 2021-12-20

## 2022-03-18 PROBLEM — N39.0 UTI (URINARY TRACT INFECTION): Status: ACTIVE | Noted: 2020-10-10

## 2022-03-18 PROBLEM — R53.1 GENERAL WEAKNESS: Status: ACTIVE | Noted: 2020-08-01

## 2022-03-18 PROBLEM — Z90.5 H/O LEFT NEPHRECTOMY: Status: ACTIVE | Noted: 2020-03-20

## 2022-03-18 PROBLEM — Z85.51 HISTORY OF BLADDER CANCER: Status: ACTIVE | Noted: 2020-03-20

## 2022-03-18 PROBLEM — I50.9 CHF EXACERBATION (HCC): Status: ACTIVE | Noted: 2021-10-06

## 2022-03-18 PROBLEM — N19 RENAL FAILURE SYNDROME: Status: ACTIVE | Noted: 2021-12-14

## 2022-03-18 PROBLEM — I50.9 ACUTE ON CHRONIC HEART FAILURE (HCC): Status: ACTIVE | Noted: 2021-01-13

## 2022-03-19 PROBLEM — I82.4Z3 ACUTE DEEP VEIN THROMBOSIS (DVT) OF DISTAL VEIN OF BOTH LOWER EXTREMITIES (HCC): Status: ACTIVE | Noted: 2020-10-13

## 2022-03-19 PROBLEM — N17.9 ACUTE RENAL FAILURE SUPERIMPOSED ON CHRONIC KIDNEY DISEASE (HCC): Status: ACTIVE | Noted: 2017-11-23

## 2022-03-19 PROBLEM — I87.2 PERIPHERAL VENOUS INSUFFICIENCY: Status: ACTIVE | Noted: 2021-12-20

## 2022-03-19 PROBLEM — E78.5 HLD (HYPERLIPIDEMIA): Status: ACTIVE | Noted: 2017-11-23

## 2022-03-19 PROBLEM — E66.9 OBESE: Status: ACTIVE | Noted: 2021-12-14

## 2022-03-19 PROBLEM — I50.9 CHF (CONGESTIVE HEART FAILURE) (HCC): Status: ACTIVE | Noted: 2020-09-15

## 2022-03-19 PROBLEM — J81.0 PULMONARY EDEMA, ACUTE (HCC): Status: ACTIVE | Noted: 2020-03-20

## 2022-03-19 PROBLEM — I50.43 ACUTE ON CHRONIC COMBINED SYSTOLIC AND DIASTOLIC ACC/AHA STAGE C CONGESTIVE HEART FAILURE (HCC): Status: ACTIVE | Noted: 2021-10-07

## 2022-03-19 PROBLEM — I50.32 CHRONIC DIASTOLIC CONGESTIVE HEART FAILURE (HCC): Status: ACTIVE | Noted: 2020-07-31

## 2022-03-19 PROBLEM — N18.9 ACUTE RENAL FAILURE SUPERIMPOSED ON CHRONIC KIDNEY DISEASE (HCC): Status: ACTIVE | Noted: 2017-11-23

## 2022-03-19 PROBLEM — K21.9 GASTROESOPHAGEAL REFLUX DISEASE WITHOUT ESOPHAGITIS: Status: ACTIVE | Noted: 2017-11-23

## 2022-03-19 PROBLEM — N18.4 CHRONIC KIDNEY DISEASE, STAGE IV (SEVERE) (HCC): Status: ACTIVE | Noted: 2021-10-19

## 2022-03-19 PROBLEM — R79.89 POSITIVE D DIMER: Status: ACTIVE | Noted: 2020-09-13

## 2022-03-19 PROBLEM — I82.4Y3 DEEP VEIN THROMBOSIS (DVT) OF PROXIMAL VEIN OF BOTH LOWER EXTREMITIES (HCC): Status: ACTIVE | Noted: 2020-09-14

## 2022-03-19 PROBLEM — R00.2 PALPITATIONS: Status: ACTIVE | Noted: 2021-12-14

## 2022-03-19 PROBLEM — C18.9 COLON CANCER (HCC): Status: ACTIVE | Noted: 2022-02-08

## 2022-03-19 PROBLEM — C64.2 RENAL CELL CARCINOMA OF LEFT KIDNEY (HCC): Status: ACTIVE | Noted: 2017-11-23

## 2022-03-19 PROBLEM — I50.9 ACUTE ON CHRONIC CONGESTIVE HEART FAILURE (HCC): Status: ACTIVE | Noted: 2021-10-19

## 2022-03-20 PROBLEM — N18.4 CHRONIC KIDNEY DISEASE, STAGE 4 (SEVERE) (HCC): Status: ACTIVE | Noted: 2021-11-01

## 2022-03-20 PROBLEM — R07.9 CHEST PAIN: Status: ACTIVE | Noted: 2021-11-01

## 2022-03-21 ENCOUNTER — TELEPHONE (OUTPATIENT)
Dept: FAMILY MEDICINE CLINIC | Age: 77
End: 2022-03-21

## 2022-03-21 NOTE — TELEPHONE ENCOUNTER
Ms. Topher Mcdonald called to speak to Cary Clarke about Mr. Uriel Valencia. Ms. Hernandezlldewey Malloy wouldn't give any further details. She can be reached at [phone]. Please advise.  Thank you!!!

## 2022-03-24 DIAGNOSIS — Z76.0 MEDICATION REFILL: ICD-10-CM

## 2022-03-24 RX ORDER — OMEPRAZOLE 40 MG/1
CAPSULE, DELAYED RELEASE ORAL
Qty: 90 CAPSULE | Refills: 1 | Status: SHIPPED | OUTPATIENT
Start: 2022-03-24 | End: 2022-10-14 | Stop reason: SDUPTHER

## 2022-03-24 NOTE — TELEPHONE ENCOUNTER
----- Message from [facility] sent at 3/23/2022 12:05 PM EDT -----  Subject: Refill Request    QUESTIONS  Name of Medication? omeprazole (PRILOSEC) 40 mg capsule  Patient-reported dosage and instructions? 1x daily  How many days do you have left? 0  Preferred Pharmacy? [facility]  Pharmacy phone number (if available)? [phone]  ---------------------------------------------------------------------------  --------------  CALL BACK INFO  What is the best way for the office to contact you? OK to leave message on   voicemail  Preferred Call Back Phone Number?  [phone]

## 2022-03-25 ENCOUNTER — DOCUMENTATION ONLY (OUTPATIENT)
Dept: FAMILY MEDICINE CLINIC | Age: 77
End: 2022-03-25

## 2022-03-25 ENCOUNTER — TELEPHONE (OUTPATIENT)
Dept: FAMILY MEDICINE CLINIC | Age: 77
End: 2022-03-25

## 2022-03-25 NOTE — TELEPHONE ENCOUNTER
----- Message from Edward Salguero sent at 3/21/2022 12:23 PM EDT -----  Subject: Message to Provider    QUESTIONS  Information for Provider? Radha Johnson from [employer] called to   speak to someone at the office about 6 medication supplies for the patient   that gets refilled annually. Please call Radha Johnson back for more information. extension? [phone]  ---------------------------------------------------------------------------  --------------  Dariana BRITT  What is the best way for the office to contact you? OK to leave message on   voicemail  Preferred Call Back Phone Number? [phone]  ---------------------------------------------------------------------------  --------------  SCRIPT ANSWERS  Relationship to Patient?  Third Party  Representative Name? Isaiah Bailey

## 2022-03-29 ENCOUNTER — TRANSCRIBE ORDER (OUTPATIENT)
Dept: SCHEDULING | Age: 77
End: 2022-03-29

## 2022-03-29 DIAGNOSIS — C18.4 MALIGNANT NEOPLASM OF TRANSVERSE COLON (HCC): ICD-10-CM

## 2022-03-29 DIAGNOSIS — D49.6 NEOPLASM, BRAIN (HCC): Primary | ICD-10-CM

## 2022-03-30 ENCOUNTER — OFFICE VISIT (OUTPATIENT)
Dept: FAMILY MEDICINE CLINIC | Age: 77
End: 2022-03-30
Payer: MEDICARE

## 2022-03-30 VITALS
SYSTOLIC BLOOD PRESSURE: 156 MMHG | RESPIRATION RATE: 17 BRPM | BODY MASS INDEX: 29.4 KG/M2 | HEART RATE: 63 BPM | WEIGHT: 194 LBS | OXYGEN SATURATION: 99 % | HEIGHT: 68 IN | DIASTOLIC BLOOD PRESSURE: 69 MMHG | TEMPERATURE: 98.1 F

## 2022-03-30 DIAGNOSIS — I50.32 CHRONIC DIASTOLIC CONGESTIVE HEART FAILURE (HCC): ICD-10-CM

## 2022-03-30 DIAGNOSIS — Z79.4 TYPE 2 DIABETES MELLITUS WITH HYPERGLYCEMIA, WITH LONG-TERM CURRENT USE OF INSULIN (HCC): ICD-10-CM

## 2022-03-30 DIAGNOSIS — Z01.00 DIABETIC EYE EXAM (HCC): ICD-10-CM

## 2022-03-30 DIAGNOSIS — Z79.4 TYPE 2 DIABETES MELLITUS WITH STAGE 4 CHRONIC KIDNEY DISEASE, WITH LONG-TERM CURRENT USE OF INSULIN (HCC): ICD-10-CM

## 2022-03-30 DIAGNOSIS — I82.4Z3 ACUTE DEEP VEIN THROMBOSIS (DVT) OF DISTAL VEIN OF BOTH LOWER EXTREMITIES (HCC): ICD-10-CM

## 2022-03-30 DIAGNOSIS — I50.9 CHRONIC CONGESTIVE HEART FAILURE, UNSPECIFIED HEART FAILURE TYPE (HCC): ICD-10-CM

## 2022-03-30 DIAGNOSIS — N18.30 STAGE 3 CHRONIC KIDNEY DISEASE, UNSPECIFIED WHETHER STAGE 3A OR 3B CKD (HCC): Primary | ICD-10-CM

## 2022-03-30 DIAGNOSIS — E11.65 TYPE 2 DIABETES MELLITUS WITH HYPERGLYCEMIA, WITH LONG-TERM CURRENT USE OF INSULIN (HCC): ICD-10-CM

## 2022-03-30 DIAGNOSIS — E78.2 MIXED HYPERLIPIDEMIA: ICD-10-CM

## 2022-03-30 DIAGNOSIS — C18.4 MALIGNANT NEOPLASM OF TRANSVERSE COLON (HCC): ICD-10-CM

## 2022-03-30 DIAGNOSIS — E11.9 DIABETIC EYE EXAM (HCC): ICD-10-CM

## 2022-03-30 DIAGNOSIS — N18.4 TYPE 2 DIABETES MELLITUS WITH STAGE 4 CHRONIC KIDNEY DISEASE, WITH LONG-TERM CURRENT USE OF INSULIN (HCC): ICD-10-CM

## 2022-03-30 DIAGNOSIS — J30.89 ENVIRONMENTAL AND SEASONAL ALLERGIES: ICD-10-CM

## 2022-03-30 DIAGNOSIS — I25.118 CORONARY ARTERY DISEASE OF NATIVE ARTERY OF NATIVE HEART WITH STABLE ANGINA PECTORIS (HCC): ICD-10-CM

## 2022-03-30 DIAGNOSIS — I10 PRIMARY HYPERTENSION: ICD-10-CM

## 2022-03-30 DIAGNOSIS — N18.4 TYPE 2 DIABETES MELLITUS WITH STAGE 4 CHRONIC KIDNEY DISEASE, UNSPECIFIED WHETHER LONG TERM INSULIN USE (HCC): ICD-10-CM

## 2022-03-30 DIAGNOSIS — E11.22 TYPE 2 DIABETES MELLITUS WITH STAGE 4 CHRONIC KIDNEY DISEASE, WITH LONG-TERM CURRENT USE OF INSULIN (HCC): ICD-10-CM

## 2022-03-30 DIAGNOSIS — E11.22 TYPE 2 DIABETES MELLITUS WITH STAGE 4 CHRONIC KIDNEY DISEASE, UNSPECIFIED WHETHER LONG TERM INSULIN USE (HCC): ICD-10-CM

## 2022-03-30 PROCEDURE — G8536 NO DOC ELDER MAL SCRN: HCPCS | Performed by: FAMILY MEDICINE

## 2022-03-30 PROCEDURE — G8754 DIAS BP LESS 90: HCPCS | Performed by: FAMILY MEDICINE

## 2022-03-30 PROCEDURE — G8753 SYS BP > OR = 140: HCPCS | Performed by: FAMILY MEDICINE

## 2022-03-30 PROCEDURE — 3046F HEMOGLOBIN A1C LEVEL >9.0%: CPT | Performed by: FAMILY MEDICINE

## 2022-03-30 PROCEDURE — G8510 SCR DEP NEG, NO PLAN REQD: HCPCS | Performed by: FAMILY MEDICINE

## 2022-03-30 PROCEDURE — 99214 OFFICE O/P EST MOD 30 MIN: CPT | Performed by: FAMILY MEDICINE

## 2022-03-30 PROCEDURE — G9711 PT HX TOT COL OR COLON CA: HCPCS | Performed by: FAMILY MEDICINE

## 2022-03-30 PROCEDURE — 1101F PT FALLS ASSESS-DOCD LE1/YR: CPT | Performed by: FAMILY MEDICINE

## 2022-03-30 PROCEDURE — 2022F DILAT RTA XM EVC RTNOPTHY: CPT | Performed by: FAMILY MEDICINE

## 2022-03-30 PROCEDURE — G8427 DOCREV CUR MEDS BY ELIG CLIN: HCPCS | Performed by: FAMILY MEDICINE

## 2022-03-30 PROCEDURE — G8417 CALC BMI ABV UP PARAM F/U: HCPCS | Performed by: FAMILY MEDICINE

## 2022-03-30 RX ORDER — FUROSEMIDE 40 MG/1
TABLET ORAL
Qty: 90 TABLET | Refills: 1 | Status: SHIPPED | OUTPATIENT
Start: 2022-03-30 | End: 2022-11-03

## 2022-03-30 RX ORDER — ATORVASTATIN CALCIUM 40 MG/1
40 TABLET, FILM COATED ORAL DAILY
Qty: 90 TABLET | Refills: 3 | Status: SHIPPED | OUTPATIENT
Start: 2022-03-30 | End: 2022-06-28

## 2022-03-30 RX ORDER — INSULIN GLARGINE 100 [IU]/ML
8 INJECTION, SOLUTION SUBCUTANEOUS
Qty: 7.2 ML | Refills: 2 | Status: SHIPPED | OUTPATIENT
Start: 2022-03-30 | End: 2022-06-28

## 2022-03-30 RX ORDER — SPIRONOLACTONE 25 MG/1
12.5 TABLET ORAL DAILY
Qty: 15 TABLET | Refills: 1 | Status: SHIPPED | OUTPATIENT
Start: 2022-03-30 | End: 2022-03-30

## 2022-03-30 RX ORDER — GLIPIZIDE 10 MG/1
10 TABLET ORAL 2 TIMES DAILY
Qty: 180 TABLET | Refills: 2 | Status: SHIPPED | OUTPATIENT
Start: 2022-03-30 | End: 2022-06-28

## 2022-03-30 RX ORDER — LORATADINE 10 MG/1
10 TABLET ORAL DAILY
Qty: 90 TABLET | Refills: 0 | Status: SHIPPED | OUTPATIENT
Start: 2022-03-30 | End: 2022-06-27

## 2022-03-30 RX ORDER — HYDRALAZINE HYDROCHLORIDE 100 MG/1
100 TABLET, FILM COATED ORAL 3 TIMES DAILY
Qty: 270 TABLET | Refills: 2 | Status: SHIPPED | OUTPATIENT
Start: 2022-03-30 | End: 2022-03-31

## 2022-03-30 RX ORDER — FUROSEMIDE 40 MG/1
TABLET ORAL
Qty: 90 TABLET | Refills: 3 | Status: SHIPPED | OUTPATIENT
Start: 2022-03-30 | End: 2022-03-30 | Stop reason: SDUPTHER

## 2022-03-30 RX ORDER — AMLODIPINE BESYLATE 10 MG/1
10 TABLET ORAL DAILY
Qty: 90 TABLET | Refills: 2 | Status: SHIPPED | OUTPATIENT
Start: 2022-03-30 | End: 2022-03-31

## 2022-03-30 RX ORDER — FLUTICASONE PROPIONATE 50 MCG
1 SPRAY, SUSPENSION (ML) NASAL DAILY
Qty: 1 EACH | Refills: 3 | Status: SHIPPED | OUTPATIENT
Start: 2022-03-30 | End: 2022-10-14

## 2022-03-30 NOTE — PATIENT INSTRUCTIONS
DASH Diet: Care Instructions  Your Care Instructions     The DASH diet is an eating plan that can help lower your blood pressure. DASH stands for Dietary Approaches to Stop Hypertension. Hypertension is high blood pressure. The DASH diet focuses on eating foods that are high in calcium, potassium, and magnesium. These nutrients can lower blood pressure. The foods that are highest in these nutrients are fruits, vegetables, low-fat dairy products, nuts, seeds, and legumes. But taking calcium, potassium, and magnesium supplements instead of eating foods that are high in those nutrients does not have the same effect. The DASH diet also includes whole grains, fish, and poultry. The DASH diet is one of several lifestyle changes your doctor may recommend to lower your high blood pressure. Your doctor may also want you to decrease the amount of sodium in your diet. Lowering sodium while following the DASH diet can lower blood pressure even further than just the DASH diet alone. Follow-up care is a key part of your treatment and safety. Be sure to make and go to all appointments, and call your doctor if you are having problems. It's also a good idea to know your test results and keep a list of the medicines you take. How can you care for yourself at home? Following the DASH diet  · Eat 4 to 5 servings of fruit each day. A serving is 1 medium-sized piece of fruit, ½ cup chopped or canned fruit, 1/4 cup dried fruit, or 4 ounces (½ cup) of fruit juice. Choose fruit more often than fruit juice. · Eat 4 to 5 servings of vegetables each day. A serving is 1 cup of lettuce or raw leafy vegetables, ½ cup of chopped or cooked vegetables, or 4 ounces (½ cup) of vegetable juice. Choose vegetables more often than vegetable juice. · Get 2 to 3 servings of low-fat and fat-free dairy each day. A serving is 8 ounces of milk, 1 cup of yogurt, or 1 ½ ounces of cheese. · Eat 6 to 8 servings of grains each day.  A serving is 1 slice of bread, 1 ounce of dry cereal, or ½ cup of cooked rice, pasta, or cooked cereal. Try to choose whole-grain products as much as possible. · Limit lean meat, poultry, and fish to 2 servings each day. A serving is 3 ounces, about the size of a deck of cards. · Eat 4 to 5 servings of nuts, seeds, and legumes (cooked dried beans, lentils, and split peas) each week. A serving is 1/3 cup of nuts, 2 tablespoons of seeds, or ½ cup of cooked beans or peas. · Limit fats and oils to 2 to 3 servings each day. A serving is 1 teaspoon of vegetable oil or 2 tablespoons of salad dressing. · Limit sweets and added sugars to 5 servings or less a week. A serving is 1 tablespoon jelly or jam, ½ cup sorbet, or 1 cup of lemonade. · Eat less than 2,300 milligrams (mg) of sodium a day. If you limit your sodium to 1,500 mg a day, you can lower your blood pressure even more. · Be aware that all of these are the suggested number of servings for people who eat 1,800 to 2,000 calories a day. Your recommended number of servings may be different if you need more or fewer calories. Tips for success  · Start small. Do not try to make dramatic changes to your diet all at once. You might feel that you are missing out on your favorite foods and then be more likely to not follow the plan. Make small changes, and stick with them. Once those changes become habit, add a few more changes. · Try some of the following:  ? Make it a goal to eat a fruit or vegetable at every meal and at snacks. This will make it easy to get the recommended amount of fruits and vegetables each day. ? Try yogurt topped with fruit and nuts for a snack or healthy dessert. ? Add lettuce, tomato, cucumber, and onion to sandwiches. ? Combine a ready-made pizza crust with low-fat mozzarella cheese and lots of vegetable toppings. Try using tomatoes, squash, spinach, broccoli, carrots, cauliflower, and onions. ?  Have a variety of cut-up vegetables with a low-fat dip as an appetizer instead of chips and dip. ? Sprinkle sunflower seeds or chopped almonds over salads. Or try adding chopped walnuts or almonds to cooked vegetables. ? Try some vegetarian meals using beans and peas. Add garbanzo or kidney beans to salads. Make burritos and tacos with mashed vance beans or black beans. Where can you learn more? Go to http://www.castro.com/  Enter H967 in the search box to learn more about \"DASH Diet: Care Instructions. \"  Current as of: January 10, 2022               Content Version: 13.2  © 3240-0677 [employer]. Care instructions adapted under license by [employer] (which disclaims liability or warranty for this information). If you have questions about a medical condition or this instruction, always ask your healthcare professional. [address] any warranty or liability for your use of this information.

## 2022-03-30 NOTE — PROGRESS NOTES
Chief Complaint   Patient presents with   [facility]     1. \"Have you been to the ER, urgent care clinic since your last visit? Hospitalized since your last visit? \" No    2. \"Have you seen or consulted any other health care providers outside of the [address] [place] [address] since your last visit? \" No     3. For patients aged 39-70: Has the patient had a colonoscopy / FIT/ Cologuard? Yes - no Care Gap present      If the patient is female:    4. For patients aged 41-77: Has the patient had a mammogram within the past 2 years? NA - based on age or sex      11. For patients aged 21-65: Has the patient had a pap smear?  NA - based on age or sex

## 2022-03-31 DIAGNOSIS — C18.4 MALIGNANT NEOPLASM OF TRANSVERSE COLON (HCC): ICD-10-CM

## 2022-03-31 DIAGNOSIS — C18.4 MALIGNANT NEOPLASM OF TRANSVERSE COLON (HCC): Primary | ICD-10-CM

## 2022-03-31 RX ORDER — HYDRALAZINE HYDROCHLORIDE 100 MG/1
100 TABLET, FILM COATED ORAL 3 TIMES DAILY
Qty: 270 TABLET | Refills: 2 | Status: SHIPPED | OUTPATIENT
Start: 2022-03-31 | End: 2022-04-18

## 2022-03-31 RX ORDER — AMLODIPINE BESYLATE 10 MG/1
10 TABLET ORAL DAILY
Qty: 90 TABLET | Refills: 2 | Status: SHIPPED | OUTPATIENT
Start: 2022-03-31 | End: 2022-06-29

## 2022-03-31 NOTE — PROGRESS NOTES
HPI  This is a 21-year-old -American male with past medical history significant for bladder cancer status post removal, renal cell carcinoma status post nephrectomy on one side, hypertension, diabetes, coronary artery disease, congestive heart failure, pulmonary hypertension, CKD stage IV, hyperparathyroidism, pituitary mass, gout, anemia of chronic disease, history of DVTs, recent diagnosis of colon cancer status post colectomy who is here to follow-up on chronic conditions    Hypertension  Patient is currently on amlodipine 5 mg, hydralazine 100 mg 3 times daily, metoprolol 25 mg XL, Lasix 40 mg daily which was recently cut down by nephrologist.  Patient denies any headaches, blurred vision, chest pain, palpitations, swelling in legs, claudication. Type 2 diabetes  Patient is on Lantus 8 units at bedtime and glipizide 10 mg twice daily. Patient's last HbA1c was 9.2. He has been referred in the past to Dr. Jon White however patient has now establish care with him. Advised to call and make an appointment with Dr. Jon White. CKD stage IV/renal cell carcinoma status post nephrectomy on left side  Patient follows up with nephrologist.  Who recently cut down Lasix to 40 mg from 80 mg. Coronary artery disease/congestive heart failure  Patient follows up with cardiologist regularly. He is currently on Lasix 40 mg daily, metoprolol 25 mg extended release daily. Denies any current chest pain or palpitations. Malignant neoplasm of the colon  Patient recently underwent colectomy. States that he needed colostomy bags. Patient follows up with hematology oncology. Currently undergoing chemotherapy. Was recently restarted on Eliquis due to history of DVTs and because of current malignancy. Mixed hyperlipidemia  Patient is on simvastatin 20 mg we will switch him to atorvastatin 20 mg daily instead. Environmental and seasonal allergies  Patient needs a refill on his Claritin.     DVT  Patient was recently restarted on Eliquis twice daily. Past Medical History  Past Medical History:   Diagnosis Date    Acid reflux     Arrhythmia     Arthritis     Bladder cancer (HCC)     Congestive heart failure (HCC)     Deep vein thrombosis (DVT) of proximal vein of both lower extremities ([address] 9/14/2020    Diabetes mellitus ([address]     Difficult intubation     Narrow airway-per Pulm notes(in media)     GERD (gastroesophageal reflux disease)     Gout     High cholesterol     Hypertension     Kidney carcinoma, left ([address] 2016    left nephrectomy    Kidney disease     Pituitary mass ([address]     Pulmonary HTN ([address] 10/2021    PASP 77 mmHg    Reflux esophagitis     Unspecified deficiency anemia        Surgical History  Past Surgical History:   Procedure Laterality Date    COLONOSCOPY N/A 11/19/2021    COLONOSCOPY with Polypectomies, Tattoo & Clip Placement performed by Tabby Peterson MD at [facility]    HX CYSTECTOMY  12/30/2009    bladder removal/ urostomy bag    HX NEPHRECTOMY Left 5/2009    Nephroureterectomy by Dr. James Gamez  [address]      surgery on pituitary gland        Medications  Current Outpatient Medications   Medication Sig Dispense Refill    amLODIPine (NORVASC) 10 mg tablet Take 1 Tablet by mouth daily for 90 days. 90 Tablet 2    hydrALAZINE (APRESOLINE) 100 mg tablet Take 1 Tablet by mouth three (3) times daily for 90 days. 270 Tablet 2    loratadine (CLARITIN) 10 mg tablet Take 1 Tablet by mouth daily for 90 days. 90 Tablet 0    insulin glargine (Lantus Solostar U-100 Insulin) 100 unit/mL (3 mL) inpn 8 Units by SubCUTAneous route nightly for 90 days. Sliding scale 7.2 mL 2    glipiZIDE (GLUCOTROL) 10 mg tablet Take 1 Tablet by mouth two (2) times a day for 90 days. 180 Tablet 2    fluticasone propionate (FLONASE) 50 mcg/actuation nasal spray 1 [place] by Both Nostrils route daily.  1 Each 3    atorvastatin (LIPITOR) 40 mg tablet Take 1 Tablet by mouth daily for 90 days. 90 Tablet 3    apixaban (ELIQUIS) 2.5 mg tablet Take 2.5 mg by mouth two (2) times a day.  omeprazole (PRILOSEC) 40 mg capsule TAKE 1 CAPSULE BY MOUTH DAILY 90 Capsule 1    sodium bicarbonate 650 mg tablet Take 1 Tablet by mouth three (3) times daily. 90 Tablet 5    ferrous sulfate (FeroSuL) 325 mg (65 mg iron) tablet TAKE 1 TABLET BY MOUTH THREE TIMES DAILY WITH MEALS 90 Tablet 5    ergocalciferol (ERGOCALCIFEROL) 1,250 mcg (50,000 unit) capsule TAKE ONE CAPSULE BY MOUTH EVERY 7 DAYS 16 Capsule 1    allopurinoL (ZYLOPRIM) 100 mg tablet Take 2 Tablets by mouth daily for 360 days. 180 Tablet 3    metoprolol succinate (TOPROL-XL) 25 mg XL tablet Take 1 Tablet by mouth daily. 90 Tablet 0    glucose blood VI test strips (Accu-Chek Precious Plus test strp) strip CHECK BLOOD GLUCOSE THREE TIMES DAILY 300 Strip 3    Oxygen 2 LPM nc as needed      albuterol (PROVENTIL HFA, VENTOLIN HFA, PROAIR HFA) 90 mcg/actuation inhaler Take 2 Puffs by inhalation every four (4) hours as needed for Wheezing, Shortness of Breath or Respiratory Distress.  1 Each 1    BD Ultra-Fine Mini Pen Needle 31 gauge x 3/16\" ndle USE TO INJECT  Pen Needle 3    furosemide (LASIX) 40 mg tablet Take one tab daily 90 Tablet 1       Allergies  No Known Allergies    Family History  Family History   Problem Relation Age of Onset    Heart Disease Father     Heart Disease Mother        Social History  Social History     Socioeconomic History    Marital status: SINGLE     Spouse name: Not on file    Number of children: Not on file    Years of education: Not on file    Highest education level: Not on file   Occupational History    Not on file   Tobacco Use    Smoking status: Former Smoker     Packs/day: 0.50     Years: 40.00     Pack years: 20.00     Types: Cigarettes     Quit date: 1999     Years since quittin.2    Smokeless tobacco: Never Used   Vaping Use    Vaping Use: Never used   Substance and Sexual Activity    Alcohol use: No    Drug use: No    Sexual activity: Yes   Other Topics Concern    Not on file   Social History Narrative    Not on file     Social Determinants of Health     Financial Resource Strain:     Difficulty of Paying Living Expenses: Not on file   Food Insecurity:     Worried About Running Out of Food in the Last Year: Not on file    Damian of Food in the Last Year: Not on file   Transportation Needs:     Lack of Transportation (Medical): Not on file    Lack of Transportation (Non-Medical): Not on file   Physical Activity:     Days of Exercise per Week: Not on file    Minutes of Exercise per Session: Not on file   Stress:     Feeling of Stress : Not on file   Social Connections:     Frequency of Communication with Friends and Family: Not on file    Frequency of Social Gatherings with Friends and Family: Not on file    Attends Zoroastrianism Services: Not on file    Active Member of [address] [place] [address] or Organizations: Not on file    Attends Club or Organization Meetings: Not on file    Marital Status: Not on file   Intimate Partner Violence:     Fear of Current or Ex-Partner: Not on file    Emotionally Abused: Not on file    Physically Abused: Not on file    Sexually Abused: Not on file   Housing Stability:     Unable to Pay for Housing in the Last Year: Not on file    Number of [place] in the Last Year: Not on file    Unstable Housing in the Last Year: Not on file       Review of Systems  Review of Systems   Constitutional: Negative for chills and fever. Respiratory: Negative for cough and shortness of breath. Cardiovascular: Negative for chest pain and leg swelling. Gastrointestinal: Negative for abdominal pain, nausea and vomiting. Skin: Negative for rash. Neurological: Negative for dizziness and headaches.          Vital Signs  Visit Vitals  BP (!) 156/69 (BP 1 Location: Left upper arm, BP Patient Position: Sitting, BP Cuff Size: Large adult)   Pulse 63   Temp 98.1 °F (36.7 °C) (Temporal)   Resp 17   Ht 5' 8\" (1.727 m)   Wt 194 lb (88 kg)   SpO2 99%   BMI 29.50 kg/m²         Physical Exam  Physical Exam  Constitutional:       Appearance: Normal appearance. HENT:      Head: Normocephalic and atraumatic. Nose: Nose normal.   Eyes:      General: No scleral icterus. Conjunctiva/sclera: Conjunctivae normal.   Cardiovascular:      Rate and Rhythm: Normal rate and regular rhythm. Heart sounds: Normal heart sounds. No murmur heard. No gallop. Pulmonary:      Effort: Pulmonary effort is normal. No respiratory distress. Breath sounds: No wheezing. Abdominal:      General: There is no distension. Palpations: Abdomen is soft. Musculoskeletal:      Cervical back: Normal range of motion and neck supple. Skin:     Findings: No erythema or rash. Neurological:      Mental Status: He is alert and oriented to person, place, and time. Mental status is at baseline. Results  Results for orders placed or performed in visit on 29/25/38   METABOLIC PANEL, BASIC   Result Value Ref Range    Glucose 222 (H) 70 - 99 mg/dL    BUN 74 (H) 6 - 22 mg/dL    Creatinine 3.7 (H) 0.8 - 1.6 mg/dL    Sodium 142 133 - 145 mmol/L    Potassium 4.4 3.5 - 5.5 mmol/L    Chloride 106 98 - 110 mmol/L    CO2 21 20 - 32 mmol/L    Calcium 9.6 8.4 - 10.5 mg/dL    Anion gap 15.0 3.0 - 15.0 mmol/L    GFRAA 19.7 (L) >60.0    GFRNA 16.3 (L) >60.0   MAGNESIUM   Result Value Ref Range    Magnesium 2.2 1.6 - 2.5 mg/dL   PHOSPHORUS   Result Value Ref Range    Phosphorus 3.9 2.5 - 4.5 mg/dL       ASSESSMENT and PLAN  1. Primary hypertension  -medication regimen: Increased amlodipine to 10 mg daily, metoprolol 25 mg extended release, Lasix 40 mg daily, hydralazine 100 mg 3 times daily.  -counseled about diet rich in green leafy vegetables/protein, decrease intake of red meat/sodium/and cholesterol.   -counseled about 150 min of moderate intensity exercise a week.  Alternating between cardio and strength training.   -counseled about medication adherence and weight loss  -counseled to keep log of blood pressure at home and bring to next appointment   -Return visit: 3 months to follow up on chronic conditions. Given hand out for new PCP's in the area and advised to establish care with one of them so that when I leave there is no gap in care. - amLODIPine (NORVASC) 10 mg tablet; Take 1 Tablet by mouth daily for 90 days. Dispense: 90 Tablet; Refill: 2  - hydrALAZINE (APRESOLINE) 100 mg tablet; Take 1 Tablet by mouth three (3) times daily for 90 days. Dispense: 270 Tablet; Refill: 2    2. Type 2 diabetes mellitus with hyperglycemia, with long-term current use of insulin ([facility])/3. Type 2 diabetes mellitus with stage 4 chronic kidney disease, with long-term current use of insulin ([facility]  4. Type 2 diabetes mellitus with stage 4 chronic kidney disease, unspecified whether long term insulin use ([facility]  -Advised to establish care with endocrinologist for better control of diabetes.  -Last HbA1c was 9.2.  - insulin glargine (Lantus Solostar U-100 Insulin) 100 unit/mL (3 mL) inpn; 8 Units by SubCUTAneous route nightly for 90 days. Sliding scale  Dispense: 7.2 mL; Refill: 2  - glipiZIDE (GLUCOTROL) 10 mg tablet; Take 1 Tablet by mouth two (2) times a day for 90 days. Dispense: 180 Tablet; Refill: 2  - MICROALBUMIN, UR, RAND W/ MICROALB/CREAT RATIO; Future    5. Diabetic eye exam ([facility]  - REFERRAL TO OPHTHALMOLOGY    6. Coronary artery disease of native artery of native heart with stable angina pectoris ([facility]  7. Chronic congestive heart failure, unspecified heart failure type ([facility]  8. Chronic diastolic congestive heart failure (HCC)  -Currently on Lasix 40 mg daily and metoprolol 25 mg extended release daily.  -Follows up with cardiology      9. Malignant neoplasm of transverse colon ([facility]  -Recent colectomy with colostomy bag.  -Follows up with heme-onc. Currently undergoing chemotherapy    10. Mixed hyperlipidemia  - atorvastatin (LIPITOR) 40 mg tablet; Take 1 Tablet by mouth daily for 90 days. Dispense: 90 Tablet; Refill: 3    11. Environmental and seasonal allergies  - loratadine (CLARITIN) 10 mg tablet; Take 1 Tablet by mouth daily for 90 days. Dispense: 90 Tablet; Refill: 0  - fluticasone propionate (FLONASE) 50 mcg/actuation nasal spray; 1 [place] by Both Nostrils route daily. Dispense: 1 Each; Refill: 3    12. Acute deep vein thrombosis (DVT) of distal vein of both lower extremities (HCC)  -Eliquis 2.5 mg twice daily           Follow-up and Dispositions    · Return in about 6 weeks (around 5/11/2022), or if symptoms worsen or fail to improve, for follow up HTN. I have discussed the diagnosis with the patient and the intended plan of care as seen in the above orders. The patient has received an after-visit summary and questions were answered concerning future plans. I have discussed medication, side effects, and warnings with the patient in detail. The patient verbalized understanding and is in agreement with the plan of care. The patient will contact the office with any additional concerns. I spent at least 30 minutes on this visit with this established patient. Henretta Holter, MD    PLEASE NOTE:   This document has been produced using voice recognition software.  Unrecognized errors in transcription may be present

## 2022-04-12 ENCOUNTER — HOSPITAL ENCOUNTER (OUTPATIENT)
Dept: LAB | Age: 77
Discharge: HOME | End: 2022-04-12

## 2022-04-12 PROCEDURE — 99001 SPECIMEN HANDLING PT-LAB: CPT

## 2022-04-15 ENCOUNTER — HOSPITAL ENCOUNTER (OUTPATIENT)
Dept: MRI IMAGING | Age: 77
Discharge: HOME | End: 2022-04-15
Payer: MEDICARE

## 2022-04-15 DIAGNOSIS — C18.4 MALIGNANT NEOPLASM OF TRANSVERSE COLON (HCC): ICD-10-CM

## 2022-04-15 DIAGNOSIS — D49.6 NEOPLASM, BRAIN (HCC): ICD-10-CM

## 2022-04-15 PROCEDURE — 74011250636 HC RX REV CODE- 250/636: Performed by: INTERNAL MEDICINE

## 2022-04-15 PROCEDURE — A9576 INJ PROHANCE MULTIPACK: HCPCS | Performed by: INTERNAL MEDICINE

## 2022-04-15 PROCEDURE — 70553 MRI BRAIN STEM W/O & W/DYE: CPT

## 2022-04-15 RX ADMIN — GADOTERIDOL 7 ML: 279.3 INJECTION, SOLUTION INTRAVENOUS at 08:53

## 2022-04-18 ENCOUNTER — HOSPITAL ENCOUNTER (EMERGENCY)
Age: 77
Discharge: HOME | End: 2022-04-18
Attending: FAMILY MEDICINE
Payer: MEDICARE

## 2022-04-18 ENCOUNTER — APPOINTMENT (OUTPATIENT)
Dept: GENERAL RADIOLOGY | Age: 77
End: 2022-04-18
Attending: FAMILY MEDICINE
Payer: MEDICARE

## 2022-04-18 VITALS
OXYGEN SATURATION: 100 % | HEART RATE: 67 BPM | TEMPERATURE: 98.3 F | HEIGHT: 68 IN | SYSTOLIC BLOOD PRESSURE: 184 MMHG | RESPIRATION RATE: 18 BRPM | DIASTOLIC BLOOD PRESSURE: 90 MMHG | BODY MASS INDEX: 29.86 KG/M2 | WEIGHT: 197 LBS
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
## 2022-04-18 DIAGNOSIS — M19.90 ARTHRITIS: ICD-10-CM

## 2022-04-18 PROCEDURE — 72170 X-RAY EXAM OF PELVIS: CPT

## 2022-04-18 PROCEDURE — 71101 X-RAY EXAM UNILAT RIBS/CHEST: CPT

## 2022-04-18 PROCEDURE — 73562 X-RAY EXAM OF KNEE 3: CPT

## 2022-04-18 PROCEDURE — 99283 EMERGENCY DEPT VISIT LOW MDM: CPT

## 2022-04-18 RX ORDER — SPIRONOLACTONE 25 MG/1
1 TABLET ORAL DAILY
COMMUNITY
Start: 2022-03-30 | End: 2022-10-14 | Stop reason: SDUPTHER

## 2022-04-18 RX ORDER — BACLOFEN 5 MG/1
5 TABLET ORAL
Qty: 15 TABLET | Refills: 0 | Status: SHIPPED | OUTPATIENT
Start: 2022-04-18 | End: 2022-10-14 | Stop reason: ALTCHOICE

## 2022-04-18 RX ORDER — HYDRALAZINE HYDROCHLORIDE 50 MG/1
100 TABLET, FILM COATED ORAL 3 TIMES DAILY
COMMUNITY
Start: 2022-04-15

## 2022-04-18 RX ORDER — APIXABAN 5 MG/1
1 TABLET, FILM COATED ORAL DAILY
COMMUNITY
Start: 2022-04-15

## 2022-04-18 RX ORDER — PROMETHAZINE HYDROCHLORIDE 12.5 MG/1
TABLET ORAL
COMMUNITY
Start: 2022-03-30 | End: 2022-08-11 | Stop reason: ALTCHOICE
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
## 2022-04-19 ENCOUNTER — TELEPHONE (OUTPATIENT)
Dept: SURGERY | Age: 77
End: 2022-04-19
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
## 2022-04-21 ENCOUNTER — TELEPHONE (OUTPATIENT)
Dept: SURGERY | Age: 77
End: 2022-04-21

## 2022-04-21 NOTE — PERIOP NOTES
PAT phone assessment completed on 4/21/2022. The following instructions were reviewed with patient's niece Janusz Kothari and verbalized understanding. 1. Do NOT eat or drink anything, including candy, gum, or ice chips after midnight on 4/28/2022, unless you have specific instructions from your surgeon or anesthesia provider to do so.  2. You may brush your teeth before coming to the hospital.  3. No smoking 24 hours prior to the day of surgery. 4. No alcohol 24 hours prior to the day of surgery. 5. No recreational drugs for one week prior to the day of surgery. 6. Leave all valuables, including money/purse, at home. 7. Remove all jewelry, nail polish, acrylic nails, and makeup (including mascara); no lotions powders, deodorant, or perfume/cologne/after shave on the skin. 8. Glasses/contact lenses and dentures may be worn to the hospital.  They will be removed prior to surgery. 9. Call your doctor if symptoms of a cold or illness develop within 24-48 hours prior to your surgery. 10.  AN ADULT MUST DRIVE YOU HOME AFTER OUTPATIENT SURGERY. 11.  If you are having an outpatient procedure, please make arrangements for a responsible adult to be with you for 24 hours after your surgery. 12.  NO VISITORS in the hospital at this time for outpatient procedures. Exceptions may be made for surgical admissions, per hospital guidelines        Special Instructions:      Bring list of CURRENT medications. Bring inhaler. Bring CPAP machine. Bring any pertinent legal medical records. Take these medications the morning of surgery with a sip of water: BP meds  Follow physician instructions about insulin. Follow physician instructions about stopping anticoagulants. Complete bowel prep per MD instructions. Patient has not resumed Eliquis since cancellation of surgery on 4/20/2022, emailed Sharona Dai to address this and call Patient's niece Samir Devlin.

## 2022-04-21 NOTE — TELEPHONE ENCOUNTER
On 4/21/2022 at 3:40pm I called Kayy Early - her voice mailbox is full and I am unable to leave a message. I also called Mr. Sotero Coronel but his phone does not have the voice mailbox set up and I am unable to reach him. The patient needs to restart his Eliquis and then stop it April 26-29 before his Mediport insertion surgery on 4/29/2022.

## 2022-04-29 ENCOUNTER — HOSPITAL ENCOUNTER (OUTPATIENT)
Age: 77
Setting detail: OUTPATIENT SURGERY
Discharge: HOME | End: 2022-04-29
Attending: COLON & RECTAL SURGERY | Admitting: COLON & RECTAL SURGERY
Payer: MEDICARE

## 2022-04-29 VITALS
HEART RATE: 83 BPM | DIASTOLIC BLOOD PRESSURE: 91 MMHG | HEIGHT: 68 IN | OXYGEN SATURATION: 99 % | BODY MASS INDEX: 30.13 KG/M2 | SYSTOLIC BLOOD PRESSURE: 185 MMHG | RESPIRATION RATE: 20 BRPM | WEIGHT: 198.8 LBS | TEMPERATURE: 98.2 F

## 2022-04-29 LAB
ANION GAP SERPL CALC-SCNC: 7 MMOL/L (ref 3–18)
BUN SERPL-MCNC: 77 MG/DL (ref 7–18)
BUN/CREAT SERPL: 22 (ref 12–20)
CALCIUM SERPL-MCNC: 9.1 MG/DL (ref 8.5–10.1)
CHLORIDE SERPL-SCNC: 114 MMOL/L (ref 100–111)
CO2 SERPL-SCNC: 22 MMOL/L (ref 21–32)
CREAT SERPL-MCNC: 3.49 MG/DL (ref 0.6–1.3)
GLUCOSE BLD STRIP.AUTO-MCNC: 239 MG/DL (ref 70–110)
GLUCOSE SERPL-MCNC: 252 MG/DL (ref 74–99)
POTASSIUM SERPL-SCNC: 4.3 MMOL/L (ref 3.5–5.5)
SODIUM SERPL-SCNC: 143 MMOL/L (ref 136–145)

## 2022-04-29 PROCEDURE — 74011250637 HC RX REV CODE- 250/637: Performed by: NURSE ANESTHETIST, CERTIFIED REGISTERED

## 2022-04-29 PROCEDURE — 74011250636 HC RX REV CODE- 250/636: Performed by: NURSE ANESTHETIST, CERTIFIED REGISTERED

## 2022-04-29 PROCEDURE — 74011636637 HC RX REV CODE- 636/637: Performed by: NURSE ANESTHETIST, CERTIFIED REGISTERED

## 2022-04-29 PROCEDURE — 82962 GLUCOSE BLOOD TEST: CPT

## 2022-04-29 PROCEDURE — 80048 BASIC METABOLIC PNL TOTAL CA: CPT

## 2022-04-29 RX ORDER — SODIUM CHLORIDE 0.9 % (FLUSH) 0.9 %
5-40 SYRINGE (ML) INJECTION EVERY 8 HOURS
Status: DISCONTINUED | OUTPATIENT
Start: 2022-04-29 | End: 2022-04-29 | Stop reason: HOSPADM

## 2022-04-29 RX ORDER — FAMOTIDINE 20 MG/1
20 TABLET, FILM COATED ORAL ONCE
Status: COMPLETED | OUTPATIENT
Start: 2022-04-29 | End: 2022-04-29

## 2022-04-29 RX ORDER — SODIUM CHLORIDE, SODIUM LACTATE, POTASSIUM CHLORIDE, CALCIUM CHLORIDE 600; 310; 30; 20 MG/100ML; MG/100ML; MG/100ML; MG/100ML
25 INJECTION, SOLUTION INTRAVENOUS CONTINUOUS
Status: DISCONTINUED | OUTPATIENT
Start: 2022-04-29 | End: 2022-04-29 | Stop reason: HOSPADM

## 2022-04-29 RX ORDER — LIDOCAINE HYDROCHLORIDE 10 MG/ML
0.1 INJECTION, SOLUTION EPIDURAL; INFILTRATION; INTRACAUDAL; PERINEURAL AS NEEDED
Status: DISCONTINUED | OUTPATIENT
Start: 2022-04-29 | End: 2022-04-29 | Stop reason: HOSPADM

## 2022-04-29 RX ORDER — SODIUM CHLORIDE 0.9 % (FLUSH) 0.9 %
5-40 SYRINGE (ML) INJECTION AS NEEDED
Status: DISCONTINUED | OUTPATIENT
Start: 2022-04-29 | End: 2022-04-29 | Stop reason: HOSPADM

## 2022-04-29 RX ORDER — INSULIN LISPRO 100 [IU]/ML
INJECTION, SOLUTION INTRAVENOUS; SUBCUTANEOUS ONCE
Status: COMPLETED | OUTPATIENT
Start: 2022-04-29 | End: 2022-04-29

## 2022-04-29 RX ADMIN — SODIUM CHLORIDE, SODIUM LACTATE, POTASSIUM CHLORIDE, AND CALCIUM CHLORIDE 25 ML/HR: 600; 310; 30; 20 INJECTION, SOLUTION INTRAVENOUS at 08:00

## 2022-04-29 RX ADMIN — Medication 6 UNITS: at 08:30

## 2022-04-29 RX ADMIN — FAMOTIDINE 20 MG: 20 TABLET ORAL at 08:32

## 2022-04-29 NOTE — PROGRESS NOTES
conducted a pre-surgery visit with Sharyle Britain, who is a 78 y.o.,male. The  provided the following Interventions:  Initiated a relationship of care and support. Offered prayer and assurance of continued prayers on patient's behalf. There is no advance directive present. Plan:  Chaplains will continue to follow and will provide pastoral care on an as needed/requested basis.  recommends bedside caregivers page  on duty if patient shows signs of acute spiritual or emotional distress.   Reiseñor 3   Board Certified [address] [place] [address]   [phone]

## 2022-04-29 NOTE — H&P
HPI: Odilia Do is a 78 y.o. male presenting with chief complain of transverse colon cancer.     Past Medical History:   Diagnosis Date    Acid reflux     Arrhythmia     Arthritis     [facility]     Colon cancer ([facility] 2021    Congestive heart failure (HCC)     Deep vein thrombosis (DVT) of proximal vein of both lower extremities ([address] 2020    Diabetes mellitus ([facility]     Difficult intubation     Narrow airway-per Pulm notes(in media)     GERD (gastroesophageal reflux disease)     Gout     High cholesterol     Hypertension     Kidney carcinoma, left ([address] 2016    left nephrectomy    Kidney disease     Pituitary mass ([address]     Pulmonary HTN ([facility] 10/2021    PASP 77 mmHg    Reflux esophagitis     Unspecified deficiency anemia        Past Surgical History:   Procedure Laterality Date    COLONOSCOPY N/A 2021    COLONOSCOPY with Polypectomies, Tattoo & Clip Placement performed by Deejay Charles MD at [address] ENDOSCOPY    HX CYSTECTOMY  2009    bladder removal/ urostomy bag    HX NEPHRECTOMY Left 2009    Nephroureterectomy by Dr. Mele Abad  [facility]      surgery on pituitary gland       Family History   Problem Relation Age of Onset    Heart Disease Father     Heart Disease Mother        Social History     Socioeconomic History    Marital status: SINGLE   Tobacco Use    Smoking status: Former Smoker     Packs/day: 0.50     Years: 40.00     Pack years: 20.00     Types: Cigarettes     Quit date: 1999     Years since quittin.3    Smokeless tobacco: Never Used   Vaping Use    Vaping Use: Never used   Substance and Sexual Activity    Alcohol use: No    Drug use: No    Sexual activity: Yes       Review of Systems - neg        No Known Allergies    Vitals:    22 0725   BP: (!) 185/91   Pulse: 83   Resp: 20   Temp: 98.2 °F (36.8 °C)   SpO2: 99%   Weight: 90.2 kg (198 lb 12.8 oz)   Height: 5' 8\" (1.727 m) Physical Exam  Constitutional:       Appearance: He is well-developed. HENT:      Head: Normocephalic and atraumatic. Eyes:      Conjunctiva/sclera: Conjunctivae normal.   Abdominal:      General: There is no distension. Palpations: Abdomen is soft. There is no mass. Tenderness: There is no abdominal tenderness. Musculoskeletal:         General: Normal range of motion. Lymphadenopathy:      Cervical: No cervical adenopathy. Skin:     General: Skin is warm and dry. Neurological:      Sensory: No sensory deficit. Psychiatric:         Speech: Speech normal.         Assessment / Plan    mediport insertion    The diagnoses and plan were discussed with the patient. All questions answered. Plan of care agreed to by all concerned.

## 2022-04-29 NOTE — H&P (VIEW-ONLY)
HPI: Alysia Jewell is a 78 y.o. male presenting with chief complain of transverse colon cancer.     Past Medical History:   Diagnosis Date    Acid reflux     Arrhythmia     Arthritis     Bladder cancer ([address]     Colon cancer ([address] 2021    Congestive heart failure (HCC)     Deep vein thrombosis (DVT) of proximal vein of both lower extremities ([address] 2020    Diabetes mellitus ([address]     Difficult intubation     Narrow airway-per Pulm notes(in media)     GERD (gastroesophageal reflux disease)     Gout     High cholesterol     Hypertension     Kidney carcinoma, left ([address] 2016    left nephrectomy    Kidney disease     Pituitary mass ([address]     Pulmonary HTN ([address] 10/2021    PASP 77 mmHg    Reflux esophagitis     Unspecified deficiency anemia        Past Surgical History:   Procedure Laterality Date    COLONOSCOPY N/A 2021    COLONOSCOPY with Polypectomies, Tattoo & Clip Placement performed by Chadwick Pope MD at [facility]COPY    HX CYSTECTOMY  2009    bladder removal/ urostomy bag    HX NEPHRECTOMY Left 2009    Nephroureterectomy by Dr. Cosme Found  [address] [place]      surgery on pituitary gland       Family History   Problem Relation Age of Onset    Heart Disease Father     Heart Disease Mother        Social History     Socioeconomic History    Marital status: SINGLE   Tobacco Use    Smoking status: Former Smoker     Packs/day: 0.50     Years: 40.00     Pack years: 20.00     Types: Cigarettes     Quit date: 1999     Years since quittin.3    Smokeless tobacco: Never Used   Vaping Use    Vaping Use: Never used   Substance and Sexual Activity    Alcohol use: No    Drug use: No    Sexual activity: Yes       Review of Systems - neg        No Known Allergies    Vitals:    22 0725   BP: (!) 185/91   Pulse: 83   Resp: 20   Temp: 98.2 °F (36.8 °C)   SpO2: 99%   Weight: 90.2 kg (198 lb 12.8 oz)   Height: 5' 8\" (1.727 m) Physical Exam  Constitutional:       Appearance: He is well-developed. HENT:      Head: Normocephalic and atraumatic. Eyes:      Conjunctiva/sclera: Conjunctivae normal.   Abdominal:      General: There is no distension. Palpations: Abdomen is soft. There is no mass. Tenderness: There is no abdominal tenderness. Musculoskeletal:         General: Normal range of motion. Lymphadenopathy:      Cervical: No cervical adenopathy. Skin:     General: Skin is warm and dry. Neurological:      Sensory: No sensory deficit. Psychiatric:         Speech: Speech normal.         Assessment / Plan    mediport insertion    The diagnoses and plan were discussed with the patient. All questions answered. Plan of care agreed to by all concerned.

## 2022-05-02 NOTE — PERIOP NOTES
Dallas Hodgson's PAT phone assessment completed on 5/2. Procedure cancelled on 4/29/22 per cari Pardo. States he ate breakfast. Reinforced nothing to eat or drink after midnight. The following instructions were reviewed with Kayy Pardo. Kayy Pardo verbalized understanding. 1. Do NOT eat or drink anything, including candy, gum, or ice chips after midnight on 5/6, unless you have specific instructions from your surgeon or anesthesia provider to do so.  2. You may brush your teeth before coming to the hospital.  3. No smoking 24 hours prior to the day of surgery. 4. No alcohol 24 hours prior to the day of surgery. 5. No recreational drugs for one week prior to the day of surgery. 6. Leave all valuables, including money/purse, at home. 7. Remove all jewelry, nail polish, acrylic nails, and makeup (including mascara); no lotions powders, deodorant, or perfume/cologne/after shave on the skin. 8. Glasses/contact lenses and dentures may be worn to the hospital.  They will be removed prior to surgery. 9. Call your doctor if symptoms of a cold or illness develop within 24-48 hours prior to your surgery. 10.  AN ADULT MUST DRIVE YOU HOME AFTER OUTPATIENT SURGERY. 11.  If you are having an outpatient procedure, please make arrangements for a responsible adult to be with you for 24 hours after your surgery. 12. ONE VISITOR in the hospital at this time for outpatient procedures. Exceptions may be made for surgical admissions, per hospital guidelines        Special Instructions:      Bring list of CURRENT medications. Bring any pertinent legal medical records. Take these medications the morning of surgery with a sip of water:  As directed by MD  Follow physician instructions about stopping anticoagulants. Per Kyay alcala Mr. Cheikh Matthews has instructions.

## 2022-05-05 ENCOUNTER — ANESTHESIA EVENT (OUTPATIENT)
Dept: SURGERY | Age: 77
End: 2022-05-05
Payer: MEDICARE

## 2022-05-06 ENCOUNTER — APPOINTMENT (OUTPATIENT)
Dept: GENERAL RADIOLOGY | Age: 77
End: 2022-05-06
Attending: COLON & RECTAL SURGERY
Payer: MEDICARE

## 2022-05-06 ENCOUNTER — HOSPITAL ENCOUNTER (OUTPATIENT)
Age: 77
Setting detail: OUTPATIENT SURGERY
Discharge: HOME | End: 2022-05-06
Attending: COLON & RECTAL SURGERY | Admitting: COLON & RECTAL SURGERY
Payer: MEDICARE

## 2022-05-06 ENCOUNTER — ANESTHESIA (OUTPATIENT)
Dept: SURGERY | Age: 77
End: 2022-05-06
Payer: MEDICARE

## 2022-05-06 VITALS
RESPIRATION RATE: 18 BRPM | BODY MASS INDEX: 29.65 KG/M2 | DIASTOLIC BLOOD PRESSURE: 83 MMHG | SYSTOLIC BLOOD PRESSURE: 192 MMHG | HEART RATE: 57 BPM | OXYGEN SATURATION: 95 % | WEIGHT: 195 LBS | TEMPERATURE: 97.4 F

## 2022-05-06 DIAGNOSIS — C18.4 MALIGNANT NEOPLASM OF TRANSVERSE COLON (HCC): Primary | ICD-10-CM

## 2022-05-06 LAB
GLUCOSE BLD STRIP.AUTO-MCNC: 81 MG/DL (ref 70–110)
GLUCOSE BLD STRIP.AUTO-MCNC: 89 MG/DL (ref 70–110)

## 2022-05-06 PROCEDURE — 76937 US GUIDE VASCULAR ACCESS: CPT

## 2022-05-06 PROCEDURE — 00532 ANES ACCESS CTR VENOUS CRCJ: CPT | Performed by: ANESTHESIOLOGY

## 2022-05-06 PROCEDURE — 00532 ANES ACCESS CTR VENOUS CRCJ: CPT | Performed by: NURSE ANESTHETIST, CERTIFIED REGISTERED

## 2022-05-06 PROCEDURE — 76010000149 HC OR TIME 1 TO 1.5 HR: Performed by: COLON & RECTAL SURGERY

## 2022-05-06 PROCEDURE — 36561 INSERT TUNNELED CV CATH: CPT | Performed by: COLON & RECTAL SURGERY

## 2022-05-06 PROCEDURE — 74011000250 HC RX REV CODE- 250: Performed by: NURSE ANESTHETIST, CERTIFIED REGISTERED

## 2022-05-06 PROCEDURE — 77001 FLUOROGUIDE FOR VEIN DEVICE: CPT

## 2022-05-06 PROCEDURE — 76937 US GUIDE VASCULAR ACCESS: CPT | Performed by: COLON & RECTAL SURGERY

## 2022-05-06 PROCEDURE — 77001 FLUOROGUIDE FOR VEIN DEVICE: CPT | Performed by: COLON & RECTAL SURGERY

## 2022-05-06 PROCEDURE — 77030031139 HC SUT VCRL2 J&J -A: Performed by: COLON & RECTAL SURGERY

## 2022-05-06 PROCEDURE — C1788 PORT, INDWELLING, IMP: HCPCS | Performed by: COLON & RECTAL SURGERY

## 2022-05-06 PROCEDURE — 74011000250 HC RX REV CODE- 250: Performed by: COLON & RECTAL SURGERY

## 2022-05-06 PROCEDURE — 99100 ANES PT EXTEME AGE<1 YR&>70: CPT | Performed by: NURSE ANESTHETIST, CERTIFIED REGISTERED

## 2022-05-06 PROCEDURE — 77030002933 HC SUT MCRYL J&J -A: Performed by: COLON & RECTAL SURGERY

## 2022-05-06 PROCEDURE — 74011250636 HC RX REV CODE- 250/636: Performed by: NURSE ANESTHETIST, CERTIFIED REGISTERED

## 2022-05-06 PROCEDURE — 76060000033 HC ANESTHESIA 1 TO 1.5 HR: Performed by: COLON & RECTAL SURGERY

## 2022-05-06 PROCEDURE — 76210000021 HC REC RM PH II 0.5 TO 1 HR: Performed by: COLON & RECTAL SURGERY

## 2022-05-06 PROCEDURE — 82962 GLUCOSE BLOOD TEST: CPT

## 2022-05-06 PROCEDURE — 74011250636 HC RX REV CODE- 250/636: Performed by: COLON & RECTAL SURGERY

## 2022-05-06 PROCEDURE — 71045 X-RAY EXAM CHEST 1 VIEW: CPT

## 2022-05-06 PROCEDURE — 2709999900 HC NON-CHARGEABLE SUPPLY: Performed by: COLON & RECTAL SURGERY

## 2022-05-06 PROCEDURE — 99100 ANES PT EXTEME AGE<1 YR&>70: CPT | Performed by: ANESTHESIOLOGY

## 2022-05-06 PROCEDURE — 76210000017 HC OR PH I REC 1.5 TO 2 HR: Performed by: COLON & RECTAL SURGERY

## 2022-05-06 DEVICE — PORT INFUS 8FR PLAS ATTCH OPN END POLYUR CATH SIL FILL SUT: Type: IMPLANTABLE DEVICE | Site: CHEST | Status: FUNCTIONAL

## 2022-05-06 RX ORDER — FAMOTIDINE 20 MG/1
20 TABLET, FILM COATED ORAL ONCE
Status: DISCONTINUED | OUTPATIENT
Start: 2022-05-06 | End: 2022-05-06 | Stop reason: HOSPADM

## 2022-05-06 RX ORDER — SODIUM CHLORIDE 0.9 % (FLUSH) 0.9 %
5-40 SYRINGE (ML) INJECTION EVERY 8 HOURS
Status: DISCONTINUED | OUTPATIENT
Start: 2022-05-06 | End: 2022-05-06 | Stop reason: HOSPADM

## 2022-05-06 RX ORDER — INSULIN LISPRO 100 [IU]/ML
INJECTION, SOLUTION INTRAVENOUS; SUBCUTANEOUS ONCE
Status: DISCONTINUED | OUTPATIENT
Start: 2022-05-06 | End: 2022-05-06 | Stop reason: HOSPADM

## 2022-05-06 RX ORDER — LIDOCAINE HYDROCHLORIDE 10 MG/ML
0.1 INJECTION, SOLUTION EPIDURAL; INFILTRATION; INTRACAUDAL; PERINEURAL AS NEEDED
Status: DISCONTINUED | OUTPATIENT
Start: 2022-05-06 | End: 2022-05-06 | Stop reason: HOSPADM

## 2022-05-06 RX ORDER — DIPHENHYDRAMINE HYDROCHLORIDE 50 MG/ML
12.5 INJECTION, SOLUTION INTRAMUSCULAR; INTRAVENOUS
Status: DISCONTINUED | OUTPATIENT
Start: 2022-05-06 | End: 2022-05-06 | Stop reason: HOSPADM

## 2022-05-06 RX ORDER — FENTANYL CITRATE 50 UG/ML
INJECTION, SOLUTION INTRAMUSCULAR; INTRAVENOUS AS NEEDED
Status: DISCONTINUED | OUTPATIENT
Start: 2022-05-06 | End: 2022-05-06 | Stop reason: HOSPADM

## 2022-05-06 RX ORDER — SODIUM CHLORIDE, SODIUM LACTATE, POTASSIUM CHLORIDE, CALCIUM CHLORIDE 600; 310; 30; 20 MG/100ML; MG/100ML; MG/100ML; MG/100ML
50 INJECTION, SOLUTION INTRAVENOUS CONTINUOUS
Status: DISCONTINUED | OUTPATIENT
Start: 2022-05-06 | End: 2022-05-06 | Stop reason: HOSPADM

## 2022-05-06 RX ORDER — SODIUM CHLORIDE, SODIUM LACTATE, POTASSIUM CHLORIDE, CALCIUM CHLORIDE 600; 310; 30; 20 MG/100ML; MG/100ML; MG/100ML; MG/100ML
INJECTION, SOLUTION INTRAVENOUS
Status: DISCONTINUED | OUTPATIENT
Start: 2022-05-06 | End: 2022-05-06 | Stop reason: HOSPADM

## 2022-05-06 RX ORDER — LABETALOL HCL 20 MG/4 ML
SYRINGE (ML) INTRAVENOUS AS NEEDED
Status: DISCONTINUED | OUTPATIENT
Start: 2022-05-06 | End: 2022-05-06 | Stop reason: HOSPADM

## 2022-05-06 RX ORDER — SODIUM CHLORIDE 0.9 % (FLUSH) 0.9 %
5-40 SYRINGE (ML) INJECTION AS NEEDED
Status: DISCONTINUED | OUTPATIENT
Start: 2022-05-06 | End: 2022-05-06 | Stop reason: HOSPADM

## 2022-05-06 RX ORDER — PROPOFOL 10 MG/ML
VIAL (ML) INTRAVENOUS
Status: DISCONTINUED | OUTPATIENT
Start: 2022-05-06 | End: 2022-05-06 | Stop reason: HOSPADM

## 2022-05-06 RX ORDER — KETAMINE HCL 50MG/ML(1)
SYRINGE (ML) INTRAVENOUS AS NEEDED
Status: DISCONTINUED | OUTPATIENT
Start: 2022-05-06 | End: 2022-05-06 | Stop reason: HOSPADM

## 2022-05-06 RX ORDER — NALOXONE HYDROCHLORIDE 0.4 MG/ML
0.1 INJECTION, SOLUTION INTRAMUSCULAR; INTRAVENOUS; SUBCUTANEOUS AS NEEDED
Status: DISCONTINUED | OUTPATIENT
Start: 2022-05-06 | End: 2022-05-06 | Stop reason: HOSPADM

## 2022-05-06 RX ORDER — LABETALOL HCL 20 MG/4 ML
5 SYRINGE (ML) INTRAVENOUS
Status: DISCONTINUED | OUTPATIENT
Start: 2022-05-06 | End: 2022-05-06 | Stop reason: HOSPADM

## 2022-05-06 RX ORDER — HYDROMORPHONE HYDROCHLORIDE 1 MG/ML
0.5 INJECTION, SOLUTION INTRAMUSCULAR; INTRAVENOUS; SUBCUTANEOUS
Status: DISCONTINUED | OUTPATIENT
Start: 2022-05-06 | End: 2022-05-06 | Stop reason: HOSPADM

## 2022-05-06 RX ORDER — SODIUM CHLORIDE 9 MG/ML
25 INJECTION, SOLUTION INTRAVENOUS CONTINUOUS
Status: DISCONTINUED | OUTPATIENT
Start: 2022-05-06 | End: 2022-05-06 | Stop reason: HOSPADM

## 2022-05-06 RX ORDER — ONDANSETRON 2 MG/ML
4 INJECTION INTRAMUSCULAR; INTRAVENOUS ONCE
Status: DISCONTINUED | OUTPATIENT
Start: 2022-05-06 | End: 2022-05-06 | Stop reason: HOSPADM

## 2022-05-06 RX ORDER — OXYCODONE AND ACETAMINOPHEN 5; 325 MG/1; MG/1
1 TABLET ORAL
Qty: 25 TABLET | Refills: 0 | Status: SHIPPED | OUTPATIENT
Start: 2022-05-06 | End: 2022-05-13

## 2022-05-06 RX ORDER — HYDROMORPHONE HYDROCHLORIDE 1 MG/ML
0.2 INJECTION, SOLUTION INTRAMUSCULAR; INTRAVENOUS; SUBCUTANEOUS AS NEEDED
Status: DISCONTINUED | OUTPATIENT
Start: 2022-05-06 | End: 2022-05-06 | Stop reason: HOSPADM

## 2022-05-06 RX ADMIN — LABETALOL 20 MG/4 ML (5 MG/ML) INTRAVENOUS SYRINGE 5 MG: at 12:10

## 2022-05-06 RX ADMIN — LABETALOL HYDROCHLORIDE 5 MG: 5 INJECTION, SOLUTION INTRAVENOUS at 13:15

## 2022-05-06 RX ADMIN — Medication 25 MG: at 11:34

## 2022-05-06 RX ADMIN — PROPOFOL 50 MCG/KG/MIN: 10 INJECTION, EMULSION INTRAVENOUS at 11:36

## 2022-05-06 RX ADMIN — LABETALOL 20 MG/4 ML (5 MG/ML) INTRAVENOUS SYRINGE 5 MG: at 12:00

## 2022-05-06 RX ADMIN — LABETALOL 20 MG/4 ML (5 MG/ML) INTRAVENOUS SYRINGE 5 MG: at 11:47

## 2022-05-06 RX ADMIN — Medication 25 MG: at 11:38

## 2022-05-06 RX ADMIN — LABETALOL HYDROCHLORIDE 5 MG: 5 INJECTION, SOLUTION INTRAVENOUS at 13:27

## 2022-05-06 RX ADMIN — FENTANYL CITRATE 100 MCG: 50 INJECTION, SOLUTION INTRAMUSCULAR; INTRAVENOUS at 11:31

## 2022-05-06 RX ADMIN — LABETALOL 20 MG/4 ML (5 MG/ML) INTRAVENOUS SYRINGE 5 MG: at 11:54

## 2022-05-06 RX ADMIN — CEFAZOLIN SODIUM 2 G: 1 INJECTION, POWDER, FOR SOLUTION INTRAMUSCULAR; INTRAVENOUS at 11:38

## 2022-05-06 RX ADMIN — SODIUM CHLORIDE, SODIUM LACTATE, POTASSIUM CHLORIDE, AND CALCIUM CHLORIDE: 600; 310; 30; 20 INJECTION, SOLUTION INTRAVENOUS at 11:26

## 2022-05-06 NOTE — OP NOTES
[address] [place] [address]   OPERATIVE REPORT    Name:  Terri Gray  MR#:   [id number]  :  1945  ACCOUNT #:  [de-identified]  DATE OF SERVICE:  2022    PREOPERATIVE DIAGNOSIS:  Transverse colon cancer. POSTOPERATIVE DIAGNOSIS:  Transverse colon cancer. PROCEDURE PERFORMED:  MediPort insertion, right internal jugular vein. SURGEON:  Josie Phillips MD    ASSISTANT:  Marii Frye    ANESTHESIA:   MAC.    COMPLICATIONS:  None. SPECIMENS REMOVED:  None. IMPLANTS:  MediPort device. ESTIMATED BLOOD LOSS:  Minimal.    FINDINGS:  Right internal jugular vein. INDICATION:  The patient is a 71-year-old male status post transverse colectomy for a transverse colon cancer. He presents for MediPort insertion for chemotherapy. I explained the risks to the patient including bleeding, infection, and pneumothorax. He understood and wished to proceed. PROCEDURE:  The patient was properly identified in the holding area and brought to the operating room, he was laid supine on the operating table. Sedation was administered by Anesthesia. Chest and neck were prepped and draped in usual sterile fashion. We injected local anesthetic beneath the right clavicle. I inserted the large-bore needle and attempted to gain access. We gained access to the vein, but could not sheath the wire easily. We turned our attention to the right internal jugular vein region. We identified this with ultrasound guidance and accessed it with a large-bore needle. We passed a wire easily and confirmed this fluoroscopically to be at the atriocaval junction. We created a MediPort pocket 3 cm beneath the right clavicle. We tunneled the catheter between the needle insertion site and the MediPort catheter. We placed a dilator and peel-away sheath over the wire and fed this into the superior vena cava removing the inner dilator and wire.   We fed the MediPort catheter through the peel-away sheath and after confirming this fluoroscopically to be in proper location, peeled away the catheter. We then withdrew the catheter until the tip was identified at the atriocaval junction. We trimmed it to size and attached it to the MediPort device with overlocking snap. We sewed this into the MediPort pocket with 3-0 Vicryl suture. The port was accessed. It withdrew blood easily and was injected with heparinized saline. After reconfirming proper location fluoroscopically, skin incisions were closed with 4-0 Monocryl subcuticular suture. Steri-Strips, dry sterile dressing, and Tegaderm were applied. The patient tolerated the procedure well. All instrument, sponge, and needle counts were correct at the end of case x2. The patient awoke from anesthesia, was transported to PACU in stable condition.       Jaun Swann MD      [id number]  D:  05/06/2022 12:24  T:  05/06/2022 13:29  JOB #:  [id number]

## 2022-05-06 NOTE — BRIEF OP NOTE
Brief Postoperative Note    Patient: Kelvin Mccormack  YOB: 1945  MRN: [id number]    Date of Procedure: 5/6/2022     Pre-Op Diagnosis: Malignant neoplasm of transverse colon ([address] [C18.4]    Post-Op Diagnosis: Same as preoperative diagnosis. Procedure(s): MEDIPORT INSERTION, SINGLE LUMEN/C-ARM    Surgeon(s):  Aniya Fonseca MD    Surgical Assistant: Surg Asst-1: Elmo Oquendo    Anesthesia: MAC     Estimated Blood Loss (mL): Minimal    Complications: None    Specimens: * No specimens in log *     Implants:   Implant Name Type Inv.  Item Serial No.  Lot No. LRB No. Used Action   PORT INFUS 8FR PLAS ATTCH OPN END POLYUR CATH CRISSY FILL SUT - [id number]  PORT INFUS 8FR PLAS ATTCH OPN END POLYUR CATH CRISSY FILL SUT  [employer]WD [id number] Right 1 Implanted       Drains: * No LDAs found *    FindingsLearta Corporal    [id number]    Electronically Signed by Matt Rojas MD on 5/6/2022 at 12:21 PM

## 2022-05-06 NOTE — DISCHARGE INSTRUCTIONS
[facility] Discharge Instructions    OK to shower in 48 hours  Remove outer dressing in 5 days if not already removed by your oncologist  Let steri strips fall off on their own  If steri strips have not fallen off in 2 weeks may remove in shower  Percocet for pain    RESTART ELIQUIS IN 5 DAYS      Patient Education        Implanted Port: What to Expect at [facility]. had a procedure to implant a port. A port is a device placed, in most cases, under the skin of your chest below your collarbone. It is made of plastic, stainless steel, or titanium. It's about the size of a quarter, but thicker. It looks like a small bump under your skin. A thin, flexible tube called a catheter runs under the skin from the port into a large vein. With the port, you will be able to get medicines (such as chemotherapy) with more comfort. You also can get blood, nutrients, or other fluids. Blood can be taken through the port for tests. You will probably have some discomfort and bruising at the port site. This will go away in a few days. The port can be used right away. You may have the port for weeks, months, or longer. Your port will need to be flushed out regularly to keep it open. A nurse or other health professional will do this for you. This care sheet gives you a general idea about how long it will take for you to recover. But each person recovers at a different pace. Follow the steps below to feel better as quickly as possible. How can you care for yourself at home? Activity    · Avoid arm and upper body movements that may pull on the catheter for the first few days. These movements include heavy weight lifting and vigorous use of your arms.     · You will probably need to take 1 day off from work and will be able to return to normal activities shortly after. This depends on the type of work you do, why you have the catheter, and how you feel.     · You probably will be able to take baths and swim.  But you may need to avoid some activities. Talk to your doctor about any limits on your activity.     · Ask your doctor when you can drive again. Be careful when you pull your seat belt across your chest so it doesn't pull out the catheter. It's okay if the seat belt lays over the catheter. Medicines    · Your doctor will tell you if and when you can restart your medicines. He or she will also give you instructions about taking any new medicines.     · If you take aspirin or some other blood thinner, ask your doctor if and when to start taking it again. Make sure that you understand exactly what your doctor wants you to do.     · Be safe with medicines. Read and follow all instructions on the label. ? If the doctor gave you a prescription medicine for pain, take it as prescribed. ? If you are not taking a prescription pain medicine, ask your doctor if you can take an over-the-counter medicine. Incision care    · If you have a bandage, your doctor will tell you when you can remove it. After you remove the bandage, you may shower. Wash the area with soap and water and pat it dry. Don't use hydrogen peroxide or alcohol, which can slow healing. You may cover the area with a gauze bandage if it weeps or rubs against clothing. Change the bandage every day.     · If you have strips of tape on the cut (incision) the doctor made, leave the tape on for a week or until it falls off. Other instructions    · Always carry the medical alert card that your doctor gives you. It contains information about your port. It will tell health care workers that you have a port in case you need emergency care.     · When you get dressed, be careful not to rub the port. Do not wear a bra or suspenders that irritate your skin near the port. Follow-up care is a key part of your treatment and safety. Be sure to make and go to all appointments, and call your doctor if you are having problems.  It's also a good idea to know your test results and keep a list of the medicines you take. When should you call for help? Call your doctor now or seek immediate medical care if:    · You have signs of infection, such as:  ? Increased pain, swelling, warmth, or redness. ? Red streaks leading from the area. ? Pus draining from the area. ? A fever.     · You have pain or swelling in your neck or arm.     · You have trouble breathing. Watch closely for changes in your health, and be sure to contact your doctor if:    · You have any problems with your line or port. Where can you learn more? Go to http://www.gray.com/  Enter M256 in the search box to learn more about \"Implanted Port: What to Expect at Home. \"  Current as of: July 1, 2021               Content Version: 13.2  © 2006-2022 [employer]. Care instructions adapted under license by [employer] (which disclaims liability or warranty for this information). If you have questions about a medical condition or this instruction, always ask your healthcare professional. [place] [address] any warranty or liability for your use of this information. DISCHARGE SUMMARY from Nurse    PATIENT INSTRUCTIONS:    After general anesthesia or intravenous sedation, for 24 hours or while taking prescription Narcotics:  · Limit your activities  · Do not drive and operate hazardous machinery  · Do not make important personal or business decisions  · Do  not drink alcoholic beverages  · If you have not urinated within 8 hours after discharge, please contact your surgeon on call.     Report the following to your surgeon:  · Excessive pain, swelling, redness or odor of or around the surgical area  · Temperature over 100.5  · Nausea and vomiting lasting longer than 4 hours or if unable to take medications  · Any signs of decreased circulation or nerve impairment to extremity: change in color, persistent  numbness, tingling, coldness or increase pain  · Any questions    These are general instructions for a healthy lifestyle:    No smoking/ No tobacco products/ Avoid exposure to second hand smoke  Surgeon General's Warning:  Quitting smoking now greatly reduces serious risk to your health. Obesity, smoking, and sedentary lifestyle greatly increases your risk for illness    A healthy diet, regular physical exercise & weight monitoring are important for maintaining a healthy lifestyle    You may be retaining fluid if you have a history of heart failure or if you experience any of the following symptoms:  Weight gain of 3 pounds or more overnight or 5 pounds in a week, increased swelling in our hands or feet or shortness of breath while lying flat in bed. Please call your doctor as soon as you notice any of these symptoms; do not wait until your next office visit. The discharge information has been reviewed with the patient. The patient verbalized understanding. Discharge medications reviewed with the patient and appropriate educational materials and side effects teaching were provided.   ___________________________________________________________________________________________________________________________________

## 2022-05-06 NOTE — INTERVAL H&P NOTE
Update History & Physical    The Patient's History and Physical of April 29, 2022 was reviewed with the patient and I examined the patient. There was no change. The surgical site was confirmed by the patient and me. Plan:  The risk, benefits, expected outcome, and alternative to the recommended procedure have been discussed with the patient. Patient understands and wants to proceed with the procedure.     Electronically signed by Hubert Lloyd MD on 5/6/2022 at 10:52 AM

## 2022-05-06 NOTE — ANESTHESIA PREPROCEDURE EVALUATION
Relevant Problems   CARDIOVASCULAR   (+) Acute on chronic congestive heart failure (HCC)   (+) CHF (congestive heart failure) (HCC)   (+) CHF exacerbation (HCC)   (+) Chronic diastolic congestive heart failure (HCC)   (+) Coronary artery disease of native artery of native heart with stable angina pectoris (HCC)   (+) Primary hypertension      GASTROINTESTINAL   (+) Gastroesophageal reflux disease without esophagitis      RENAL FAILURE   (+) Acute renal failure superimposed on chronic kidney disease (HCC)   (+) Chronic kidney disease, stage 4 (severe) (HCC)   (+) Chronic kidney disease, stage IV (severe) (HCC)   (+) Renal cell carcinoma of left kidney (HCC)   (+) Renal failure syndrome   (+) Stage 3 chronic kidney disease (HCC)      ENDOCRINE   (+) Obese   (+) Type 2 diabetes mellitus with chronic kidney disease (HCC)   (+) Type 2 diabetes mellitus with stage 4 chronic kidney disease, with long-term current use of insulin (HCC)      HEMATOLOGY   (+) Anemia in chronic kidney disease      PERSONAL HX & FAMILY HX OF CANCER   (+) Colon cancer (HCC)   (+) Malignant neoplasm of urinary bladder (HCC)   (+) Renal cell carcinoma of left kidney (HCC)       Anesthetic History     Increased risk of difficult airway (As per Pulmonary notes)          Review of Systems / Medical History  Patient summary reviewed and pertinent labs reviewed    Pulmonary  Within defined limits                 Neuro/Psych   Within defined limits           Cardiovascular    Hypertension: well controlled      CHF (On Oxygen 2L prn)    CAD    Exercise tolerance: >4 METS  Comments: H/O Pulmonary HTN-   GI/Hepatic/Renal     GERD: well controlled           Endo/Other    Diabetes: well controlled, type 2    Morbid obesity, arthritis and cancer (H/O Bladder cancer)     Other Findings   Comments: Has only one kidney. S/P left Nephrectomy due to Cancer.   H/O pitutary tumor   H/O DVT           Physical Exam    Airway  Mallampati: III  TM Distance: 4 - 6 cm  Neck ROM: normal range of motion   Mouth opening: Normal     Cardiovascular  Regular rate and rhythm,  S1 and S2 normal,  no murmur, click, rub, or gallop             Dental      Comments: Several missing. Left upper insicor loose-D/W possibility of dislodgement of loose tooth. Understands and accepts   Pulmonary                 Abdominal         Other Findings            Anesthetic Plan    ASA: 3  Anesthesia type: MAC          Induction: Intravenous  Anesthetic plan and risks discussed with: Patient      Patient is a poor historian

## 2022-05-06 NOTE — ANESTHESIA POSTPROCEDURE EVALUATION
Procedure(s): MEDIPORT INSERTION, SINGLE LUMEN/C-ARM. MAC    Anesthesia Post Evaluation      Multimodal analgesia: multimodal analgesia used between 6 hours prior to anesthesia start to PACU discharge  Patient location during evaluation: bedside  Patient participation: complete - patient participated  Level of consciousness: awake  Pain management: adequate  Airway patency: patent  Anesthetic complications: no  Cardiovascular status: stable  Respiratory status: acceptable  Hydration status: acceptable  Post anesthesia nausea and vomiting:  controlled      INITIAL Post-op Vital signs:   Vitals Value Taken Time   /67 05/06/22 1334   Temp 36.5 °C (97.7 °F) 05/06/22 1235   Pulse 56 05/06/22 1341   Resp 13 05/06/22 1341   SpO2 100 % 05/06/22 1341   Vitals shown include unvalidated device data.

## 2022-06-01 LAB — CREATININE, EXTERNAL: 3.3

## 2022-07-05 ENCOUNTER — HOSPITAL ENCOUNTER (OUTPATIENT)
Dept: LAB | Age: 77
Discharge: HOME | End: 2022-07-05

## 2022-07-05 PROCEDURE — 99001 SPECIMEN HANDLING PT-LAB: CPT

## 2022-07-11 ENCOUNTER — TRANSCRIBE ORDER (OUTPATIENT)
Dept: SCHEDULING | Age: 77
End: 2022-07-11

## 2022-07-11 DIAGNOSIS — R10.9 ABDOMINAL PAIN: Primary | ICD-10-CM

## 2022-07-11 DIAGNOSIS — C18.4 MALIGNANT NEOPLASM OF TRANSVERSE COLON (HCC): ICD-10-CM

## 2022-07-13 ENCOUNTER — OFFICE VISIT (OUTPATIENT)
Dept: NEPHROLOGY | Age: 77
End: 2022-07-13
Payer: MEDICARE

## 2022-07-13 VITALS
WEIGHT: 194.2 LBS | DIASTOLIC BLOOD PRESSURE: 78 MMHG | HEART RATE: 76 BPM | SYSTOLIC BLOOD PRESSURE: 165 MMHG | BODY MASS INDEX: 29.53 KG/M2

## 2022-07-13 DIAGNOSIS — N18.4 CKD (CHRONIC KIDNEY DISEASE), STAGE IV (HCC): Primary | ICD-10-CM

## 2022-07-13 PROCEDURE — G8417 CALC BMI ABV UP PARAM F/U: HCPCS | Performed by: INTERNAL MEDICINE

## 2022-07-13 PROCEDURE — 99213 OFFICE O/P EST LOW 20 MIN: CPT | Performed by: INTERNAL MEDICINE

## 2022-07-13 PROCEDURE — 1123F ACP DISCUSS/DSCN MKR DOCD: CPT | Performed by: INTERNAL MEDICINE

## 2022-07-13 PROCEDURE — 1101F PT FALLS ASSESS-DOCD LE1/YR: CPT | Performed by: INTERNAL MEDICINE

## 2022-07-13 PROCEDURE — G8427 DOCREV CUR MEDS BY ELIG CLIN: HCPCS | Performed by: INTERNAL MEDICINE

## 2022-07-13 PROCEDURE — G8753 SYS BP > OR = 140: HCPCS | Performed by: INTERNAL MEDICINE

## 2022-07-13 PROCEDURE — G8754 DIAS BP LESS 90: HCPCS | Performed by: INTERNAL MEDICINE

## 2022-07-13 PROCEDURE — G8536 NO DOC ELDER MAL SCRN: HCPCS | Performed by: INTERNAL MEDICINE

## 2022-07-13 PROCEDURE — G8432 DEP SCR NOT DOC, RNG: HCPCS | Performed by: INTERNAL MEDICINE

## 2022-07-13 PROCEDURE — G9711 PT HX TOT COL OR COLON CA: HCPCS | Performed by: INTERNAL MEDICINE

## 2022-07-13 NOTE — PROGRESS NOTES
Hiwot Garcia presents today for   Chief Complaint   Patient presents with    Follow-up     CKS Stage IV       Is someone accompanying this pt? no    Is the patient using any DME equipment during OV? no    Depression Screening:  3 most recent PHQ Screens 3/30/2022   Little interest or pleasure in doing things Several days   Feeling down, depressed, irritable, or hopeless Several days   Total Score PHQ 2 2   Trouble falling or staying asleep, or sleeping too much -   Feeling tired or having little energy -   Poor appetite, weight loss, or overeating -   Feeling bad about yourself - or that you are a failure or have let yourself or your family down -   Trouble concentrating on things such as school, work, reading, or watching TV -   Moving or speaking so slowly that other people could have noticed; or the opposite being so fidgety that others notice -   Thoughts of being better off dead, or hurting yourself in some way -   PHQ 9 Score -   How difficult have these problems made it for you to do your work, take care of your home and get along with others -       Learning Assessment:  Learning Assessment 1/28/2022   PRIMARY LEARNER Patient   HIGHEST LEVEL OF EDUCATION - PRIMARY LEARNER  GRADUATED HIGH SCHOOL OR GED   BARRIERS PRIMARY LEARNER NONE   CO-LEARNER CAREGIVER Yes   CO-LEARNER NAME Sandra   PRIMARY LANGUAGE ENGLISH   LEARNER PREFERENCE PRIMARY DEMONSTRATION   ANSWERED BY daughter   RELATIONSHIP OTHER       Fall Risk  Fall Risk Assessment, last 12 mths 7/13/2022   Able to walk? Yes   Fall in past 12 months? 0   Do you feel unsteady? 0   Are you worried about falling 0   Is TUG test greater than 12 seconds? -   Is the gait abnormal? -   Number of falls in past 12 months -   Fall with injury? -       Coordination of Care:  1. Have you been to the ER, urgent care clinic since your last visit? Hospitalized since your last visit? no    2.  Have you seen or consulted any other health care providers outside of the [facility] since your last visit? Include any pap smears or colon screening.  Yes, PCP Thi Garcia

## 2022-07-13 NOTE — PROGRESS NOTES
Reason for f/u:  ARON on CKD IV/V     HPI:  The pt is seen for f/u for CKD. He is c/o sob arben on exertion. Noticed to have no  LE swelling also. No N/V/D.  No urinary sx.      ROS:  Constitutional   · Constitutional: no fatigue, no fever, no night sweats, no significant weight gain, no significant weight loss     Eyes   · Eyes: no dry eyes, no vision change     ENMT   · Nose: no frequent nosebleeds, no nose/sinus problems   · Mouth/Throat: no dry mouth, no bleeding gums, no sore throat, no teeth problems, no snoring     Cardiovascular   · Cardiovascular: no swelling in the extremities, no chest pain, no arm pain on exertion, no shortness of breath when walking, no known heart murmur, no shortness of breath when lying down, no palpitations     Respiratory   · Respiratory: shortness of breath+, no cough, no wheezing, no coughing up blood     Gastrointestinal   · Gastrointestinal: no nausea, no abdominal pain, no vomiting, normal appetite, no diarrhea, not vomiting blood     Genitourinary   · Genitourinary: no hematuria, no incontinence, no difficulty urinating, no increased frequency     Musculoskeletal   · Musculoskeletal: no back pain, no arthralgias/joint pain, no muscle aches, no muscle weakness     Integumentary   · Skin: no rashes, no jaundice     Neurologic   · Neurologic: no dizziness, no numbness, no loss of consciousness, no weakness, no seizures, no headaches     Psychiatric   · Psych: no depression, no sleep disturbances     Hematologic/Lymphatic   · Hematologic/Lymphatic no swollen glands, no bruising     Allergic/Immunologic   · Allergy/Immunologic: no runny nose, no hives   ROS as noted in the HPI      PE:  Constitutional   · Level of Distress: NAD, no acute illness, no chronic illness   · General Appearance: healthy-appearing, well-nourished, well-developed   · Ambulation: ambulating normally     Eyes   · Pupils: PERRLA   · EOM: EOMI     Cardiovascular   · Heart Auscultation: RRR, normal S1, normal S2, no murmurs, no rubs, no gallop, no click, no KARI     Lungs   · Auscultation: breath sounds normal, good air movement, CTA except as noted, no wheezing, no rales/crackles, no rhonchi     Abdomen   · Inspection and Palpation: soft, non-distended, no tenderness, no CVA tenderness     Musculoskeletal System   · Extremities: no clubbing, no cyanosis, edema +    Skin   · Inspection and Palpation: no rash, no lesions, no ulcer, no induration, no nodule, turgor normal, no jaundice     Neurologic   · Cranial Nerves: grossly intact   · Gait And Station: normal gait, normal station        A/P:    1. ARON on Chronic kidney disease stage IV in a single kidney:   -BUN/Cr was up to 60/3.3 at last visit  -worse at 5.2 (2/12/22), has improved to 3.2  (3/05)  -Cr 3.1 (7/05)  -Cr was at 3.1-3.2  On 11/01-11/02  -his lasix was held but put it back on it for SOB/on NC 2L.   -advised to decrease lasix from 80 to 40 mg daily (3/09). -he has unilateral functioning kidney due to left total nephrectomy.  -His urinalysis showed bland sediment and has 4-5 proteinuria.  -A kidney ultrasound surgically absent left kidney and right kidney with no hydronephrosis. -He was advised to avoid any kind of NSAIDs.  -Has mild uremic sx, No volume overload.  -I have explained in detail about the severity of his kidney disease.  -I will see him back in 8 weeks. 2. History of kidney cancer:  -Status post left total nephrectomy 8 years ago. 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  -He follow-up with oncologist.    3. Hypertension:  -Blood pressure is better now.  -will keep hydralazine 100 mg tid   -will keep metoprolol ER 50 mg daily  -off of clonidine and minoxidil  -on amlodipine 10 mg daily       4. Anemia:  -Hb is 7.8   11/02  -Hb 9.8 (7/05)  -TSAT 22->10%, Ferritin 283-->124  -advised to increase po FeSO4 325 mg bid->tid.   -recommended to start on Epo 20,000 units every 3rd week (got denied)      5. Renal osteodystrophy.  -His Ca, phosphorus ok  - vitamin D25 hydroxy 18, will order Vit D 50,000 units weekly for 16 weeks.  -has sec HPT   -iPTH 169-->248, ordered parathyroid nuclear scan (pt could not do it this time)  -off  sensipar 30 mg daily. -on calcitriol 0.25 mcg daily    6. Gouty arthritis:  -His Uric acid is 5.0  -will keep on Allopurinol 200 mg daily. 7. Metabolic acidosis:  -CO2  22-->19-->22  -will keep NaHCO3 650 mg  tid    8. Proteinuria, nephrotic range:   -has 4-5 g/g per day   -likely from diabetic nephropathy   -Hep B/C, C3/C4, SFL ratio and ANCAs are neg   -unable to add ARB due to # 1 and K 5.0     9. SOB:  -from fluid overload  -sob on exertion and LE edema (better)  -last Echo 03/2020 LVEF 50-55%  -on  po lasix 80 mg daily  -advised to take 40 mg daily (3/09).      10. DM2:  -takes glipizide 10 mg bid  -already had an episode of severe hypoglycemia  -advised to decrease 10 mg daily and hold if BS < 100 and inform PCP

## 2022-07-27 ENCOUNTER — TRANSCRIBE ORDER (OUTPATIENT)
Dept: REGISTRATION | Age: 77
End: 2022-07-27

## 2022-07-27 DIAGNOSIS — R10.9 ABDOMINAL PAIN: Primary | ICD-10-CM

## 2022-07-27 DIAGNOSIS — C18.4 MALIGNANT NEOPLASM OF TRANSVERSE COLON (HCC): ICD-10-CM

## 2022-07-28 ENCOUNTER — HOSPITAL ENCOUNTER (OUTPATIENT)
Dept: CT IMAGING | Age: 77
Discharge: HOME | End: 2022-07-28
Payer: MEDICARE

## 2022-07-28 ENCOUNTER — TRANSCRIBE ORDER (OUTPATIENT)
Dept: REGISTRATION | Age: 77
End: 2022-07-28

## 2022-07-28 ENCOUNTER — HOSPITAL ENCOUNTER (OUTPATIENT)
Dept: LAB | Age: 77
Discharge: HOME | End: 2022-07-28
Payer: MEDICARE

## 2022-07-28 DIAGNOSIS — C18.4 MALIGNANT NEOPLASM OF TRANSVERSE COLON (HCC): Primary | ICD-10-CM

## 2022-07-28 DIAGNOSIS — C18.4 MALIGNANT NEOPLASM OF TRANSVERSE COLON (HCC): ICD-10-CM

## 2022-07-28 DIAGNOSIS — R10.9 ABDOMINAL PAIN: ICD-10-CM

## 2022-07-28 LAB — CREAT SERPL-MCNC: 3.97 MG/DL (ref 0.6–1.3)

## 2022-07-28 PROCEDURE — 74011000250 HC RX REV CODE- 250

## 2022-07-28 PROCEDURE — 82565 ASSAY OF CREATININE: CPT

## 2022-07-28 PROCEDURE — 36415 COLL VENOUS BLD VENIPUNCTURE: CPT

## 2022-07-28 PROCEDURE — 74176 CT ABD & PELVIS W/O CONTRAST: CPT

## 2022-07-28 RX ORDER — BARIUM SULFATE 20 MG/ML
450 SUSPENSION ORAL
Status: COMPLETED | OUTPATIENT
Start: 2022-07-28 | End: 2022-07-28

## 2022-07-28 RX ADMIN — BARIUM SULFATE 450 ML: 20 SUSPENSION ORAL at 08:00

## 2022-08-05 RX ORDER — METOPROLOL SUCCINATE 25 MG/1
25 TABLET, EXTENDED RELEASE ORAL DAILY
Qty: 90 TABLET | Refills: 0 | Status: SHIPPED | OUTPATIENT
Start: 2022-08-05 | End: 2022-10-14 | Stop reason: SDUPTHER

## 2022-08-11 ENCOUNTER — OFFICE VISIT (OUTPATIENT)
Dept: INTERNAL MEDICINE CLINIC | Age: 77
End: 2022-08-11
Payer: MEDICARE

## 2022-08-11 VITALS
RESPIRATION RATE: 22 BRPM | HEART RATE: 90 BPM | DIASTOLIC BLOOD PRESSURE: 64 MMHG | BODY MASS INDEX: 30.4 KG/M2 | WEIGHT: 200.6 LBS | OXYGEN SATURATION: 98 % | HEIGHT: 68 IN | SYSTOLIC BLOOD PRESSURE: 130 MMHG | TEMPERATURE: 97.7 F

## 2022-08-11 DIAGNOSIS — E11.22 TYPE 2 DIABETES MELLITUS WITH STAGE 4 CHRONIC KIDNEY DISEASE, WITH LONG-TERM CURRENT USE OF INSULIN (HCC): Primary | ICD-10-CM

## 2022-08-11 DIAGNOSIS — C67.9 MALIGNANT NEOPLASM OF URINARY BLADDER, UNSPECIFIED SITE (HCC): ICD-10-CM

## 2022-08-11 DIAGNOSIS — I25.118 CORONARY ARTERY DISEASE OF NATIVE ARTERY OF NATIVE HEART WITH STABLE ANGINA PECTORIS (HCC): ICD-10-CM

## 2022-08-11 DIAGNOSIS — C64.2 RENAL CELL CARCINOMA OF LEFT KIDNEY (HCC): ICD-10-CM

## 2022-08-11 DIAGNOSIS — I82.4Z3 ACUTE DEEP VEIN THROMBOSIS (DVT) OF DISTAL VEIN OF BOTH LOWER EXTREMITIES (HCC): ICD-10-CM

## 2022-08-11 DIAGNOSIS — Z79.4 TYPE 2 DIABETES MELLITUS WITH STAGE 4 CHRONIC KIDNEY DISEASE, WITH LONG-TERM CURRENT USE OF INSULIN (HCC): Primary | ICD-10-CM

## 2022-08-11 DIAGNOSIS — N18.4 TYPE 2 DIABETES MELLITUS WITH STAGE 4 CHRONIC KIDNEY DISEASE, WITH LONG-TERM CURRENT USE OF INSULIN (HCC): Primary | ICD-10-CM

## 2022-08-11 DIAGNOSIS — E21.0 PRIMARY HYPERPARATHYROIDISM (HCC): ICD-10-CM

## 2022-08-11 LAB
CREATININE, URINE POC: NORMAL MG/DL
HBA1C MFR BLD HPLC: 8.7 %
MICROALBUMIN UR TEST STR-MCNC: NORMAL MG/L
MICROALBUMIN/CREAT RATIO POC: >300 MG/G

## 2022-08-11 PROCEDURE — 82044 UR ALBUMIN SEMIQUANTITATIVE: CPT | Performed by: FAMILY MEDICINE

## 2022-08-11 PROCEDURE — 99214 OFFICE O/P EST MOD 30 MIN: CPT | Performed by: FAMILY MEDICINE

## 2022-08-11 PROCEDURE — 1123F ACP DISCUSS/DSCN MKR DOCD: CPT | Performed by: FAMILY MEDICINE

## 2022-08-11 PROCEDURE — 36416 COLLJ CAPILLARY BLOOD SPEC: CPT | Performed by: FAMILY MEDICINE

## 2022-08-11 PROCEDURE — 3052F HG A1C>EQUAL 8.0%<EQUAL 9.0%: CPT | Performed by: FAMILY MEDICINE

## 2022-08-11 PROCEDURE — 83036 HEMOGLOBIN GLYCOSYLATED A1C: CPT | Performed by: FAMILY MEDICINE

## 2022-08-11 RX ORDER — PEN NEEDLE, DIABETIC 31 GX5/16"
NEEDLE, DISPOSABLE MISCELLANEOUS
Qty: 100 PEN NEEDLE | Refills: 3 | Status: SHIPPED | OUTPATIENT
Start: 2022-08-11 | End: 2022-10-20

## 2022-08-11 RX ORDER — GLIPIZIDE 10 MG/1
10 TABLET ORAL 2 TIMES DAILY
COMMUNITY
Start: 2022-07-03

## 2022-08-11 RX ORDER — ACETAMINOPHEN 500 MG
2000 TABLET ORAL DAILY
COMMUNITY
Start: 2022-06-17 | End: 2022-10-14

## 2022-08-11 RX ORDER — AMLODIPINE BESYLATE 5 MG/1
TABLET ORAL
COMMUNITY
Start: 2022-05-15

## 2022-08-11 RX ORDER — ATORVASTATIN CALCIUM 40 MG/1
40 TABLET, FILM COATED ORAL DAILY
COMMUNITY

## 2022-08-11 NOTE — PROGRESS NOTES
Fingerstick for HBa1C done in right  middle finger by Yamil Griffith LPN per order of Dr. Jada Lynn after cleaning area with alcohol wipe. Patient tolerated procedure well. Lucy Johnson presents today for   Chief Complaint   Patient presents with    New Patient       Is someone accompanying this pt? Niece with patient today , Mary White    Is the patient using any DME equipment during [address]? Yes, cane    Depression Screening:  3 most recent PHQ Screens 8/11/2022   Little interest or pleasure in doing things Not at all   Feeling down, depressed, irritable, or hopeless Not at all   Total Score PHQ 2 0   Trouble falling or staying asleep, or sleeping too much -   Feeling tired or having little energy -   Poor appetite, weight loss, or overeating -   Feeling bad about yourself - or that you are a failure or have let yourself or your family down -   Trouble concentrating on things such as school, work, reading, or watching TV -   Moving or speaking so slowly that other people could have noticed; or the opposite being so fidgety that others notice -   Thoughts of being better off dead, or hurting yourself in some way -   PHQ 9 Score -   How difficult have these problems made it for you to do your work, take care of your home and get along with others -       Learning Assessment:  Learning Assessment 1/28/2022   PRIMARY LEARNER Patient   HIGHEST LEVEL OF EDUCATION - PRIMARY LEARNER  GRADUATED HIGH SCHOOL OR GED   BARRIERS PRIMARY LEARNER NONE   CO-LEARNER CAREGIVER Yes   CO-LEARNER NAME Sandra   PRIMARY LANGUAGE ENGLISH   LEARNER PREFERENCE PRIMARY DEMONSTRATION   ANSWERED BY daughter   RELATIONSHIP OTHER       Fall Risk  Fall Risk Assessment, last 12 mths 8/11/2022   Able to walk? Yes   Fall in past 12 months? 0   Do you feel unsteady? 0   Are you worried about falling -   Is TUG test greater than 12 seconds?  -   Is the gait abnormal? -   Number of falls in past 12 months -   Fall with injury? -       ADL  ADL Assessment 8/11/2022   Feeding yourself No Help Needed   Getting from bed to chair No Help Needed   Getting dressed No Help Needed   Bathing or showering No Help Needed   Walk across the room (includes cane/walker) Help Needed   Using the telphone No Help Needed   Taking your medications Help Needed   Preparing meals Help Needed   Managing money (expenses/bills) No Help Needed   Moderately strenuous housework (laundry) Help Needed   Shopping for personal items (toiletries/medicines) Help Needed   Shopping for groceries No Help Needed   Driving Help Needed   Climbing a flight of stairs No Help Needed   Getting to places beyond walking distances No Help Needed       Health Maintenance reviewed and discussed and ordered per Provider. Health Maintenance Due   Topic Date Due    Eye Exam Retinal or Dilated  Never done    Shingrix Vaccine Age 49> (1 of 2) Never done    DTaP/Tdap/Td series (1 - Tdap) Never done    AAA Screening 73-69 YO Male Smoking Patients  Never done    Pneumococcal 65+ years (3 - PCV) 12/02/2020    MICROALBUMIN Q1  10/27/2021    COVID-19 Vaccine (4 - Booster) 01/07/2022    Foot Exam Q1  02/23/2022    A1C test (Diabetic or Prediabetic)  08/08/2022   . Coordination of Care:  1. \"Have you been to the ER, urgent care clinic since your last visit? Hospitalized since your last visit? \" No    2. \"Have you seen or consulted any other health care providers outside of the [address] [place] [address] [place] since your last visit? \" No     3. For patients aged 39-70: Has the patient had a colonoscopy? Yes - no Care Gap present     If the patient is female:    4. For patients aged 41-77: Has the patient had a mammogram within the past 2 years? NA - based on age    11. For patients aged 21-65: Has the patient had a pap smear?  NA - based on age

## 2022-08-11 NOTE — PROGRESS NOTES
Lelo Ying (: 1945) is a 78 y.o. male here for evaluation of the following chief concern(s):  New Patient; previously under the care of Dr. Elier Horowitz [facility]). Chronic condition management    ASSESSMENT/PLAN:  1. Type 2 diabetes mellitus with stage 4 chronic kidney disease, with long-term current use of insulin (HCC)  We discussed borderline control; despite co-morbid CAD and CHF, I would not recommend Ozempic given hx of multiple cancer dx, or Farxiga given renal impairment. We discussed Glipizide is not ideal in the setting of renal impairment, and consideration of referral to Endocrinology given complexity of his case. Pt opts to continue current regimen for the time being.     -     AMB POC HEMOGLOBIN A1C  -     COLLECTION CAPILLARY BLOOD SPECIMEN  -     BD Ultra-Fine Mini Pen Needle 31 gauge x 3/16\" ndle; USE TO INJECT TID, Normal, Disp-100 Pen Needle, R-3, JUANY  -     AMB POC URINE, MICROALBUMIN, SEMIQUANTITATIVE  -     HM DIABETES FOOT EXAM  -     PODIATRY REFERRAL    2. Primary hyperparathyroidism [facility])  Being followed by Nephrology. 3. Renal cell carcinoma of left kidney (HCC)  S/p Left nephrectomy, no evidence of recurrent disease on 2022 CT chest/abd/pelvis. 4. Malignant neoplasm of urinary bladder, unspecified site [facility])  S/p cystectomy, no evidence of recurrent disease on 2022 CT chest/abd/pelvis. 5. Acute deep vein thrombosis (DVT) of distal vein of both lower extremities (HCC)  Pt is tolerating anticoagulation. 6. Coronary artery disease of native artery of native heart with stable angina pectoris ([address]  No unstable angina; we discussed ER precautions for persistent/severe chest pain. Return in about 4 weeks (around 2022) for follow-up chronic conditions. [place] L. Edie Gowers agrees with plan as above and has no additional questions at this time.      SUBJECTIVE/OBJECTIVE:  Per chart review, Mr. Edie Gowers is a 74-year-old -American male with complicated past medical history significant for renal cell carcinoma s/p Left nephrectomy, bladder cancer s/p cystectomy and current use of urostomy bag, recent dx of colon adenocarcinoma s/p colectomy, diabetes and HTN complicated by CKD-4 and hyperparathyroidism as well as anemia, coronary artery disease, congestive heart failure, pulmonary hypertension, pituitary mass, gout, DVTs on anticoagulation. He is following w/ Dr. Arlette Melendez for Nephrology, last visit was 7/13/22; noted lasix dose recommended 40mg every day given recent ARON on CKD-4. Pt is accompanied to the office today by his niece, Allen Hazel, who assists w/ HPI at pt's requests. DMII:  Aug 8.7%  Meds: Lantus 8units every day. He held his insulin this morning because his sugar was 114. Pt has had dilated eye exam w/ Dr. Dax Nova. Pt follows w/ Dr. Arlette Melendez for CKD, renal indices UTD. Overdue for diabetic foot exam.     He shares that he is currently on chemotherapy pump for multiple cancer diagnoses. 7/28/22 CT chest/abd/pelvis- study limited by lack of contrast:  No evidence of local tumor recurrence or intrathoracic/intra-abdominal  metastatic disease on noncontrast images. Incidental finding, ascending thoracic aorta ectasia, 4.4 cm in diameter. Review of Systems   Constitutional:  Negative for chills and fever. HENT:  Negative for nosebleeds. Eyes:  Positive for visual disturbance (Improved. ). Respiratory:  Negative for cough and shortness of breath. Cardiovascular:  Positive for chest pain (Rare, lasts a couple of minutes, not necessarily exertional.). Negative for palpitations. Gastrointestinal:  Negative for abdominal pain and blood in stool. Genitourinary:  Negative for hematuria. Skin:  Negative for rash and wound. Neurological:  Negative for seizures.      Past Medical History:   Diagnosis Date    Acid reflux     Arrhythmia     Arthritis     Bladder cancer ([facility] [address]     Colon cancer ([facility] [address] 11/2021    Congestive heart failure (HCC)     Deep vein thrombosis (DVT) of proximal vein of both lower extremities ([address] 2020    Diabetes mellitus ([address]     Difficult intubation     Narrow airway-per Pulm notes(in media)     GERD (gastroesophageal reflux disease)     Gout     High cholesterol     Hypertension     Kidney carcinoma, left ([address] 2016    left nephrectomy    Kidney disease     Pituitary mass ([address]     Pulmonary HTN ([address] 10/2021    PASP 77 mmHg    Reflux esophagitis     Unspecified deficiency anemia      Past Surgical History:   Procedure Laterality Date    COLONOSCOPY N/A 2021    COLONOSCOPY with Polypectomies, Tattoo & Clip Placement performed by Cherylene Slimmer, MD at [facility]  2009    bladder removal/ urostomy bag    HX NEPHRECTOMY Left 2009    Nephroureterectomy by Dr. Eldora Saint      surgery on pituitary gland     Family History   Problem Relation Age of Onset    Heart Disease Father     Heart Disease Mother        Social History     Socioeconomic History    Marital status: SINGLE     Spouse name: Not on file    Number of children: Not on file    Years of education: Not on file    Highest education level: Not on file   Occupational History    Not on file   Tobacco Use    Smoking status: Former     Packs/day: 0.50     Years: 40.00     Pack years: 20.00     Types: Cigarettes     Quit date: 1999     Years since quittin.6    Smokeless tobacco: Never   Vaping Use    Vaping Use: Never used   Substance and Sexual Activity    Alcohol use: No    Drug use: No    Sexual activity: Yes   Other Topics Concern    Not on file   Social History Narrative    Not on file     Social Determinants of Health     Financial Resource Strain: Not on file   Food Insecurity: Not on file   Transportation Needs: Not on file   Physical Activity: Not on file   Stress: Not on file   Social Connections: Not on file   Intimate Partner Violence: Not on file   Housing Stability: Not on file     Social History     Tobacco Use   Smoking Status Former    Packs/day: 0.50    Years: 40.00    Pack years: 20.00    Types: Cigarettes    Quit date: 1999    Years since quittin.6   Smokeless Tobacco Never       Current Outpatient Medications   Medication Sig Dispense Refill    atorvastatin (LIPITOR) 40 mg tablet Take 40 mg by mouth in the morning. cholecalciferol (VITAMIN D3) (2,000 UNITS /50 MCG) cap capsule Take 2,000 Units by mouth in the morning. amLODIPine (NORVASC) 5 mg tablet       glipiZIDE (GLUCOTROL) 10 mg tablet Take 10 mg by mouth two (2) times a day. BD Ultra-Fine Mini Pen Needle 31 gauge x 3/16\" ndle USE TO INJECT  Pen Needle 3    loratadine (CLARITIN) 10 mg tablet Take 1 Tablet by mouth daily for 90 days. 90 Tablet 1    Eliquis 5 mg tablet Take 1 Tablet by mouth daily. hydrALAZINE (APRESOLINE) 50 mg tablet Take 100 mg by mouth three (3) times daily. fluticasone propionate (FLONASE) 50 mcg/actuation nasal spray 1 [place] by Both Nostrils route daily. 1 Each 3    omeprazole (PRILOSEC) 40 mg capsule TAKE 1 CAPSULE BY MOUTH DAILY 90 Capsule 1    ferrous sulfate (FeroSuL) 325 mg (65 mg iron) tablet TAKE 1 TABLET BY MOUTH THREE TIMES DAILY WITH MEALS 90 Tablet 5    ergocalciferol (ERGOCALCIFEROL) 1,250 mcg (50,000 unit) capsule TAKE ONE CAPSULE BY MOUTH EVERY 7 DAYS 16 Capsule 1    allopurinoL (ZYLOPRIM) 100 mg tablet Take 2 Tablets by mouth daily for 360 days. 180 Tablet 3    glucose blood VI test strips (Accu-Chek Precious Plus test strp) strip CHECK BLOOD GLUCOSE THREE TIMES DAILY 300 Strip 3    Oxygen 2 LPM nc as needed      albuterol (PROVENTIL HFA, VENTOLIN HFA, PROAIR HFA) 90 mcg/actuation inhaler Take 2 Puffs by inhalation every four (4) hours as needed for Wheezing, Shortness of Breath or Respiratory Distress.  1 Each 1    metoprolol succinate (TOPROL-XL) 25 mg XL tablet TAKE 1 TABLET BY MOUTH DAILY 90 Tablet 0    spironolactone (ALDACTONE) 25 mg tablet Take 1 Tablet by mouth daily. baclofen 5 mg tab Take 5 mg by mouth three (3) times daily as needed for Pain. 15 Tablet 0    furosemide (LASIX) 40 mg tablet Take one tab daily 90 Tablet 1    sodium bicarbonate 650 mg tablet Take 1 Tablet by mouth three (3) times daily. 90 Tablet 5     No Known Allergies    /64 (BP 1 Location: Left upper arm, BP Patient Position: Sitting)   Pulse 90   Temp 97.7 °F (36.5 °C) (Temporal)   Resp 22   Ht 5' 8\" (1.727 m)   Wt 200 lb 9.6 oz (91 kg)   SpO2 98%   BMI 30.50 kg/m²     Physical Exam  Constitutional:       General: He is not in acute distress. Appearance: Normal appearance. He is not ill-appearing. Comments: Frail, elderly appearing gentleman. HENT:      Head: Normocephalic and atraumatic. Nose: Nose normal.      Mouth/Throat:      Mouth: Mucous membranes are moist.      Pharynx: Oropharynx is clear. Eyes:      General: No scleral icterus. Conjunctiva/sclera: Conjunctivae normal.      Pupils: Pupils are equal, round, and reactive to light. Cardiovascular:      Rate and Rhythm: Normal rate and regular rhythm. Heart sounds: Normal heart sounds. Pulmonary:      Effort: Pulmonary effort is normal.      Breath sounds: Normal breath sounds. Abdominal:      General: Bowel sounds are normal.      Palpations: Abdomen is soft. Tenderness: There is no abdominal tenderness. Musculoskeletal:      Right lower leg: No edema. Left lower leg: No edema. Skin:     General: Skin is warm and dry. Neurological:      Mental Status: He is alert and oriented to person, place, and time. Psychiatric:         Mood and Affect: Mood normal.   Foot exam: Mild bunion deformity, preserved arches, no open sores- though scab to Right 2nd digit. Dystrophic nails. 1+ DP, bilat. Reduced sensation to monofilament.       Lab Results   Component Value Date/Time    WBC 8.2 02/12/2022 01:03 AM    HGB 9.9 (L) 02/12/2022 01:03 AM    HCT 30.8 (L) 02/12/2022 01:03 AM    PLATELET 271 50/82/9533 01:03 AM    MCV 81.9 02/12/2022 01:03 AM     Lab Results   Component Value Date/Time    Sodium 143 04/29/2022 08:08 AM    Potassium 4.3 04/29/2022 08:08 AM    Chloride 114 (H) 04/29/2022 08:08 AM    CO2 22 04/29/2022 08:08 AM    Anion gap 7 04/29/2022 08:08 AM    Glucose 252 (H) 04/29/2022 08:08 AM    BUN 77 (H) 04/29/2022 08:08 AM    Creatinine 3.97 (H) 07/28/2022 08:05 AM    BUN/Creatinine ratio 22 (H) 04/29/2022 08:08 AM    GFR est AA 21 (L) 04/29/2022 08:08 AM    GFR est non-AA 17 (L) 04/29/2022 08:08 AM    Calcium 9.1 04/29/2022 08:08 AM    Bilirubin, total 0.5 11/01/2021 01:45 PM    Alk. phosphatase 57 11/01/2021 01:45 PM    Protein, total 7.6 11/01/2021 01:45 PM    Albumin 3.6 01/14/2022 12:00 PM    Globulin 3.9 11/01/2021 01:45 PM    A-G Ratio 0.9 11/01/2021 01:45 PM    ALT (SGPT) 17 11/01/2021 01:45 PM    AST (SGOT) 18 11/01/2021 01:45 PM     Lab Results   Component Value Date/Time    Cholesterol, total 97 10/06/2021 11:25 PM    HDL Cholesterol 31 (L) 10/06/2021 11:25 PM    LDL, calculated 53.8 10/06/2021 11:25 PM    VLDL, calculated 12.2 10/06/2021 11:25 PM    Triglyceride 61 10/06/2021 11:25 PM    CHOL/HDL Ratio 3.1 10/06/2021 11:25 PM       On this date 08/11/22 I have spent >45 minutes reviewing previous notes, test results and face to face with the patient for interview/exam, discussing working diagnosis and treatment plan as well as documenting on the day of the visit. Medical decision making complexity: moderate-high.     Abdifatah Zacarias MD   Family & Geriatric Medicine

## 2022-09-12 ENCOUNTER — HOSPITAL ENCOUNTER (OUTPATIENT)
Dept: LAB | Age: 77
Discharge: HOME | End: 2022-09-12

## 2022-09-12 PROCEDURE — 99001 SPECIMEN HANDLING PT-LAB: CPT

## 2022-09-13 ENCOUNTER — TRANSCRIBE ORDER (OUTPATIENT)
Dept: SCHEDULING | Age: 77
End: 2022-09-13

## 2022-09-13 DIAGNOSIS — J32.2 CHRONIC ETHMOIDAL SINUSITIS: Primary | ICD-10-CM

## 2022-09-14 ENCOUNTER — OFFICE VISIT (OUTPATIENT)
Dept: NEPHROLOGY | Age: 77
End: 2022-09-14
Payer: MEDICARE

## 2022-09-14 VITALS
DIASTOLIC BLOOD PRESSURE: 85 MMHG | HEART RATE: 83 BPM | SYSTOLIC BLOOD PRESSURE: 168 MMHG | WEIGHT: 199 LBS | BODY MASS INDEX: 30.26 KG/M2

## 2022-09-14 DIAGNOSIS — N17.9 AKI (ACUTE KIDNEY INJURY) (HCC): Primary | ICD-10-CM

## 2022-09-14 PROCEDURE — G8753 SYS BP > OR = 140: HCPCS | Performed by: INTERNAL MEDICINE

## 2022-09-14 PROCEDURE — G9711 PT HX TOT COL OR COLON CA: HCPCS | Performed by: INTERNAL MEDICINE

## 2022-09-14 PROCEDURE — 1123F ACP DISCUSS/DSCN MKR DOCD: CPT | Performed by: INTERNAL MEDICINE

## 2022-09-14 PROCEDURE — 99213 OFFICE O/P EST LOW 20 MIN: CPT | Performed by: INTERNAL MEDICINE

## 2022-09-14 PROCEDURE — G8754 DIAS BP LESS 90: HCPCS | Performed by: INTERNAL MEDICINE

## 2022-09-14 PROCEDURE — G8427 DOCREV CUR MEDS BY ELIG CLIN: HCPCS | Performed by: INTERNAL MEDICINE

## 2022-09-14 PROCEDURE — G8417 CALC BMI ABV UP PARAM F/U: HCPCS | Performed by: INTERNAL MEDICINE

## 2022-09-14 PROCEDURE — G8536 NO DOC ELDER MAL SCRN: HCPCS | Performed by: INTERNAL MEDICINE

## 2022-09-14 PROCEDURE — 1101F PT FALLS ASSESS-DOCD LE1/YR: CPT | Performed by: INTERNAL MEDICINE

## 2022-09-14 PROCEDURE — G8510 SCR DEP NEG, NO PLAN REQD: HCPCS | Performed by: INTERNAL MEDICINE

## 2022-09-14 RX ORDER — SODIUM BICARBONATE 650 MG/1
1300 TABLET ORAL
Qty: 180 TABLET | Refills: 2 | Status: SHIPPED | OUTPATIENT
Start: 2022-09-14 | End: 2022-10-14

## 2022-09-14 NOTE — PROGRESS NOTES
Harshil Hardwick presents today for   Chief Complaint   Patient presents with    Follow-up     CKD Stage IV       Is someone accompanying this pt? no    Is the patient using any DME equipment during OV? no    Depression Screening:  3 most recent PHQ Screens 9/14/2022   Little interest or pleasure in doing things Not at all   Feeling down, depressed, irritable, or hopeless Not at all   Total Score PHQ 2 0   Trouble falling or staying asleep, or sleeping too much -   Feeling tired or having little energy -   Poor appetite, weight loss, or overeating -   Feeling bad about yourself - or that you are a failure or have let yourself or your family down -   Trouble concentrating on things such as school, work, reading, or watching TV -   Moving or speaking so slowly that other people could have noticed; or the opposite being so fidgety that others notice -   Thoughts of being better off dead, or hurting yourself in some way -   PHQ 9 Score -   How difficult have these problems made it for you to do your work, take care of your home and get along with others -       Learning Assessment:  Learning Assessment 1/28/2022   PRIMARY LEARNER Patient   HIGHEST LEVEL OF EDUCATION - PRIMARY LEARNER  GRADUATED HIGH SCHOOL OR GED   BARRIERS PRIMARY LEARNER NONE   CO-LEARNER CAREGIVER Yes   CO-LEARNER NAME Sandra   PRIMARY LANGUAGE ENGLISH   LEARNER PREFERENCE PRIMARY DEMONSTRATION   ANSWERED BY daughter   RELATIONSHIP OTHER       Fall Risk  Fall Risk Assessment, last 12 mths 9/14/2022   Able to walk? Yes   Fall in past 12 months? 0   Do you feel unsteady? 0   Are you worried about falling 0   Is TUG test greater than 12 seconds? -   Is the gait abnormal? -   Number of falls in past 12 months -   Fall with injury? -       Coordination of Care:  1. Have you been to the ER, urgent care clinic since your last visit? Hospitalized since your last visit? no    2.  Have you seen or consulted any other health care providers outside of the [facility] [address] since your last visit? Include any pap smears or colon screening.  Yes, pcp meagan

## 2022-09-14 NOTE — PROGRESS NOTES
Reason for f/u:  ARON on CKD IV/V     HPI:  The pt is seen for f/u for CKD. He is c/o sob arben on exertion. Noticed to have no  LE swelling also. No N/V/D. No urinary sx.       ROS:  Constitutional   Constitutional: no fatigue, no fever, no night sweats, no significant weight gain, no significant weight loss     Eyes   Eyes: no dry eyes, no vision change     ENMT   Nose: no frequent nosebleeds, no nose/sinus problems   Mouth/Throat: no dry mouth, no bleeding gums, no sore throat, no teeth problems, no snoring     Cardiovascular   Cardiovascular: no swelling in the extremities, no chest pain, no arm pain on exertion, no shortness of breath when walking, no known heart murmur, no shortness of breath when lying down, no palpitations     Respiratory   Respiratory: shortness of breath+, no cough, no wheezing, no coughing up blood     Gastrointestinal   Gastrointestinal: no nausea, no abdominal pain, no vomiting, normal appetite, no diarrhea, not vomiting blood     Genitourinary   Genitourinary: no hematuria, no incontinence, no difficulty urinating, no increased frequency     Musculoskeletal   Musculoskeletal: no back pain, no arthralgias/joint pain, no muscle aches, no muscle weakness     Integumentary   Skin: no rashes, no jaundice     Neurologic   Neurologic: no dizziness, no numbness, no loss of consciousness, no weakness, no seizures, no headaches     Psychiatric   Psych: no depression, no sleep disturbances     Hematologic/Lymphatic   Hematologic/Lymphatic no swollen glands, no bruising     Allergic/Immunologic   Allergy/Immunologic: no runny nose, no hives   ROS as noted in the HPI      PE:  Constitutional   Level of Distress: NAD, no acute illness, no chronic illness   General Appearance: healthy-appearing, well-nourished, well-developed   Ambulation: ambulating normally     Eyes   Pupils: PERRLA   EOM: EOMI     Cardiovascular   Heart Auscultation: RRR, normal S1, normal S2, no murmurs, no rubs, no gallop, no click, no KARI     Lungs   Auscultation: breath sounds normal, good air movement, CTA except as noted, no wheezing, no rales/crackles, no rhonchi     Abdomen   Inspection and Palpation: soft, non-distended, no tenderness, no CVA tenderness     Musculoskeletal System   Extremities: no clubbing, no cyanosis, edema +    Skin   Inspection and Palpation: no rash, no lesions, no ulcer, no induration, no nodule, turgor normal, no jaundice     Neurologic   Cranial Nerves: grossly intact   Gait And Station: normal gait, normal station        A/P:    1. ARON on Chronic kidney disease stage IV in a single kidney:   -worse at 5.2 (2/12/22), has improved to 3.2  (3/05)  -Cr 3.1 (7/05)  -Creatinine is 3.4 based on labs on 9/12.  -Cr was at 3.1-3.2  On 11/01-11/02  -his lasix was held but put it back on it for SOB/on NC 2L.   -advised to decrease lasix from 80 to 40 mg daily (3/09). -he has unilateral functioning kidney due to left total nephrectomy.  -His urinalysis showed bland sediment and has 4-5 proteinuria.  -A kidney ultrasound surgically absent left kidney and right kidney with no hydronephrosis. -He was advised to avoid any kind of NSAIDs.  -Has mild uremic sx, No volume overload.  -I have explained in detail about the severity of his kidney disease.  -I will see him back in 6 weeks. 2. History of kidney cancer:  -Status post left total nephrectomy 8 years ago. 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  -He follow-up with oncologist.    3. Hypertension:  -Blood pressure is better now.  -will keep hydralazine 100 mg tid   -will keep metoprolol ER 50 mg daily  -off of clonidine and minoxidil  -on amlodipine 10 mg daily       4. Anemia:  -Hb is 7.8   11/02  -Hb 9.8 (7/05)  -Hemoglobin 9.7 on 9/12. -TSAT 22->10%, Ferritin 283-->124  -advised to continue taking po FeSO4 325 mg tid. -recommended to start on Epo 20,000 units every 3rd week (got denied)      5. Renal osteodystrophy.  -His Ca, phosphorus ok  - vitamin D25 hydroxy 18, will order Vit D 50,000 units weekly for 16 weeks.  -has sec HPT   -iPTH 169-->248, ordered parathyroid nuclear scan (pt could not do it this time)  -off  sensipar 30 mg daily. -on calcitriol 0.25 mcg daily    6. Gouty arthritis:  -His Uric acid is 5.0  -will keep on Allopurinol 200 mg daily. 7. Metabolic acidosis:  -CO2  22-->19-->22-->18  -will increase NaHCO3 650 mg 2 tabs tid    8. Proteinuria, nephrotic range:   -has 4-5 g/g per day   -likely from diabetic nephropathy   -Hep B/C, C3/C4, SFL ratio and ANCAs are neg   -unable to add ARB due to # 1 and K 5.0     9. SOB:  -from fluid overload  -sob on exertion and LE edema (better)  -last Echo 03/2020 LVEF 50-55%  -on  po lasix 80 mg daily  -advised to take 40 mg daily (3/09).      10. DM2:  -takes glipizide 10 mg bid  -already had an episode of severe hypoglycemia  -advised to decrease 10 mg daily and hold if BS < 100 and inform PCP

## 2022-09-15 ENCOUNTER — HOSPITAL ENCOUNTER (OUTPATIENT)
Dept: CT IMAGING | Age: 77
Discharge: HOME | End: 2022-09-15
Attending: PHYSICIAN ASSISTANT
Payer: MEDICARE

## 2022-09-15 DIAGNOSIS — J32.2 CHRONIC ETHMOIDAL SINUSITIS: ICD-10-CM

## 2022-09-15 PROCEDURE — 70486 CT MAXILLOFACIAL W/O DYE: CPT

## 2022-10-06 RX ORDER — SODIUM BICARBONATE 650 MG/1
TABLET ORAL
Qty: 90 TABLET | Refills: 5 | Status: SHIPPED | OUTPATIENT
Start: 2022-10-06

## 2022-10-07 ENCOUNTER — TELEPHONE (OUTPATIENT)
Dept: NEPHROLOGY | Age: 77
End: 2022-10-07

## 2022-10-07 NOTE — TELEPHONE ENCOUNTER
LVM for patient to call us. Patient needs to schedule an appointment with Dr. Luz Marina Hutchins and get lab work done.  LENNIE   10/7/2022

## 2022-10-07 NOTE — LETTER
10/12/2022 1:34 PM    Mr. Melanie Parrish  [address] [facility] [address]      Dear Mr. Hermanmagnodewey Soler:    Radha Reeves missed you! Please call our office at [phone] and schedule a follow up appointment for your continued care.         Sincerely,      Alanis Merino MD

## 2022-10-10 NOTE — TELEPHONE ENCOUNTER
LVM for patient to return the call. Patient needs to schedule an appointment with Dr. Maria Del Rosario Madsen and needs labs done.

## 2022-10-14 ENCOUNTER — TELEPHONE (OUTPATIENT)
Dept: INTERNAL MEDICINE CLINIC | Age: 77
End: 2022-10-14

## 2022-10-14 ENCOUNTER — OFFICE VISIT (OUTPATIENT)
Dept: INTERNAL MEDICINE CLINIC | Age: 77
End: 2022-10-14
Payer: MEDICARE

## 2022-10-14 VITALS
BODY MASS INDEX: 30.04 KG/M2 | HEART RATE: 76 BPM | SYSTOLIC BLOOD PRESSURE: 179 MMHG | OXYGEN SATURATION: 96 % | TEMPERATURE: 97 F | WEIGHT: 197.6 LBS | RESPIRATION RATE: 15 BRPM | DIASTOLIC BLOOD PRESSURE: 84 MMHG

## 2022-10-14 DIAGNOSIS — K21.9 GASTROESOPHAGEAL REFLUX DISEASE, UNSPECIFIED WHETHER ESOPHAGITIS PRESENT: ICD-10-CM

## 2022-10-14 DIAGNOSIS — K52.9 CHRONIC DIARRHEA: ICD-10-CM

## 2022-10-14 DIAGNOSIS — N39.0 URINARY TRACT INFECTION WITH HEMATURIA, SITE UNSPECIFIED: Primary | ICD-10-CM

## 2022-10-14 DIAGNOSIS — R53.83 MALAISE AND FATIGUE: ICD-10-CM

## 2022-10-14 DIAGNOSIS — N18.4 CKD (CHRONIC KIDNEY DISEASE) STAGE 4, GFR 15-29 ML/MIN (HCC): ICD-10-CM

## 2022-10-14 DIAGNOSIS — R53.81 MALAISE AND FATIGUE: ICD-10-CM

## 2022-10-14 DIAGNOSIS — I16.0 HYPERTENSIVE URGENCY: ICD-10-CM

## 2022-10-14 DIAGNOSIS — R31.9 URINARY TRACT INFECTION WITH HEMATURIA, SITE UNSPECIFIED: Primary | ICD-10-CM

## 2022-10-14 LAB
BILIRUB UR QL STRIP: NEGATIVE
GLUCOSE UR-MCNC: NEGATIVE MG/DL
KETONES P FAST UR STRIP-MCNC: NEGATIVE MG/DL
PH UR STRIP: 7 [PH] (ref 4.6–8)
PROT UR QL STRIP: NORMAL
SP GR UR STRIP: 1.02 (ref 1–1.03)
UA UROBILINOGEN AMB POC: NORMAL (ref 0.2–1)
URINALYSIS CLARITY POC: NORMAL
URINALYSIS COLOR POC: NORMAL
URINE BLOOD POC: NORMAL
URINE LEUKOCYTES POC: NORMAL
URINE NITRITES POC: NEGATIVE

## 2022-10-14 PROCEDURE — 93000 ELECTROCARDIOGRAM COMPLETE: CPT | Performed by: FAMILY MEDICINE

## 2022-10-14 PROCEDURE — 99214 OFFICE O/P EST MOD 30 MIN: CPT | Performed by: FAMILY MEDICINE

## 2022-10-14 PROCEDURE — 1123F ACP DISCUSS/DSCN MKR DOCD: CPT | Performed by: FAMILY MEDICINE

## 2022-10-14 PROCEDURE — 81003 URINALYSIS AUTO W/O SCOPE: CPT | Performed by: FAMILY MEDICINE

## 2022-10-14 RX ORDER — LOPERAMIDE HYDROCHLORIDE 2 MG/1
CAPSULE ORAL
COMMUNITY
Start: 2022-09-12 | End: 2022-10-14 | Stop reason: SDUPTHER

## 2022-10-14 RX ORDER — KETOROLAC TROMETHAMINE 10 MG/1
10 TABLET, FILM COATED ORAL
COMMUNITY
End: 2022-10-14

## 2022-10-14 RX ORDER — PROMETHAZINE HYDROCHLORIDE 12.5 MG/1
1 TABLET ORAL
COMMUNITY
Start: 2022-03-29

## 2022-10-14 RX ORDER — LOPERAMIDE HYDROCHLORIDE 2 MG/1
CAPSULE ORAL
Qty: 90 CAPSULE | Refills: 1 | Status: SHIPPED | OUTPATIENT
Start: 2022-10-14 | End: 2022-10-20

## 2022-10-14 RX ORDER — METOPROLOL SUCCINATE 25 MG/1
25 TABLET, EXTENDED RELEASE ORAL DAILY
Qty: 30 TABLET | Refills: 0 | Status: SHIPPED | OUTPATIENT
Start: 2022-10-14 | End: 2022-10-14

## 2022-10-14 RX ORDER — LORATADINE 10 MG/1
10 TABLET ORAL
COMMUNITY

## 2022-10-14 RX ORDER — SPIRONOLACTONE 25 MG/1
25 TABLET ORAL DAILY
Qty: 30 TABLET | Refills: 0 | Status: SHIPPED | OUTPATIENT
Start: 2022-10-14

## 2022-10-14 RX ORDER — OMEPRAZOLE 40 MG/1
CAPSULE, DELAYED RELEASE ORAL
Qty: 90 CAPSULE | Refills: 2 | Status: SHIPPED | OUTPATIENT
Start: 2022-10-14

## 2022-10-14 NOTE — PATIENT INSTRUCTIONS
Mr. Carballo Pi,  I am not sure at this time why you are feeling bad, based on your exam your blood pressure is very high. Please talk with your niece about your current medications, and check to make sure you are taking the following for your hypertension:  Metoprolol 25mg once a day (refill sent to pharmacy)  2. Spironolactone 25mg once a day (refill sent to pharmacy)  3. Amlodipine 5mg once a day  4. Hydralazine 100mg three times a day  5. Furosemide (Lasix) 40mg once a day  I will have my nurse call Teddy Rule #[phone] to see if you have been missing any of the above medications. Your EKG today looked OK. We have collected some labs, and your urine study may suggest a UTI so I am witting you an antibiotic. If you start feeling any worse, please call to be seen in the ER.     We will follow-up on Monday at 10:30AM to see how your are doing, re-check your blood pressure and review your lab results.     ~Dr. Krystina Lee

## 2022-10-14 NOTE — PROGRESS NOTES
Karen Zheng presents today for   Chief Complaint   Patient presents with    Fatigue     Says he has been very weak for about 2 weeks now        Is someone accompanying this pt? Yes Niece     Is the patient using any DME equipment during OV? No     Depression Screening:  3 most recent PHQ Screens 10/14/2022   Little interest or pleasure in doing things Not at all   Feeling down, depressed, irritable, or hopeless Not at all   Total Score PHQ 2 0   Trouble falling or staying asleep, or sleeping too much -   Feeling tired or having little energy -   Poor appetite, weight loss, or overeating -   Feeling bad about yourself - or that you are a failure or have let yourself or your family down -   Trouble concentrating on things such as school, work, reading, or watching TV -   Moving or speaking so slowly that other people could have noticed; or the opposite being so fidgety that others notice -   Thoughts of being better off dead, or hurting yourself in some way -   PHQ 9 Score -   How difficult have these problems made it for you to do your work, take care of your home and get along with others -       Learning Assessment:  Learning Assessment 1/28/2022   PRIMARY LEARNER Patient   HIGHEST LEVEL OF EDUCATION - PRIMARY LEARNER  GRADUATED HIGH SCHOOL OR GED   BARRIERS PRIMARY LEARNER NONE   CO-LEARNER CAREGIVER Yes   CO-LEARNER NAME Sandra   PRIMARY LANGUAGE ENGLISH   LEARNER PREFERENCE PRIMARY DEMONSTRATION   ANSWERED BY daughter   RELATIONSHIP OTHER       Fall Risk  Fall Risk Assessment, last 12 mths 10/14/2022   Able to walk? Yes   Fall in past 12 months? 0   Do you feel unsteady? 0   Are you worried about falling 0   Is TUG test greater than 12 seconds?  -   Is the gait abnormal? -   Number of falls in past 12 months -   Fall with injury? -       ADL  ADL Assessment 8/11/2022   Feeding yourself No Help Needed   Getting from bed to chair No Help Needed   Getting dressed No Help Needed   Bathing or showering No Help Needed   Walk across the room (includes cane/walker) Help Needed   Using the telphone No Help Needed   Taking your medications Help Needed   Preparing meals Help Needed   Managing money (expenses/bills) No Help Needed   Moderately strenuous housework (laundry) Help Needed   Shopping for personal items (toiletries/medicines) Help Needed   Shopping for groceries No Help Needed   Driving Help Needed   Climbing a flight of stairs No Help Needed   Getting to places beyond walking distances No Help Needed       Travel Screening:    Travel Screening       Question Response    In the last 10 days, have you been in contact with someone who was confirmed or suspected to have Coronavirus/COVID-19? No / Unsure    Have you had a COVID-19 viral test in the last 10 days? No    Do you have any of the following new or worsening symptoms? None of these    Have you traveled internationally or domestically in the last month? No          Travel History   Travel since 09/14/22    No documented travel since 09/14/22         Health Maintenance reviewed and discussed and ordered per Provider. Health Maintenance Due   Topic Date Due    Eye Exam Retinal or Dilated  Never done    Shingrix Vaccine Age 49> (1 of 2) Never done    DTaP/Tdap/Td series (1 - Tdap) Never done    AAA Screening 73-67 YO Male Smoking Patients  Never done    COVID-19 Vaccine (4 - Booster) 01/07/2022    Flu Vaccine (1) 08/01/2022    Lipid Screen  10/06/2022   . Coordination of Care:  1. \"Have you been to the ER, urgent care clinic since your last visit? Hospitalized since your last visit? \" No    2. \"Have you seen or consulted any other health care providers outside of the [address] [place] [address] since your last visit? \" No     3. For patients aged 39-70: Has the patient had a colonoscopy? Yes - no Care Gap present     If the patient is female:    4. For patients aged 41-77: Has the patient had a mammogram within the past 2 years? NA - based on age/sex    5. For patients aged 21-65: Has the patient had a pap smear?  NA - based on age/sex

## 2022-10-14 NOTE — PROGRESS NOTES
Karen Zheng (: 1945) is a 78 y.o. male here for evaluation of the following chief concern(s):  Acute concerns: Fatigue  Follow-up; chronic condition management    ASSESSMENT/PLAN:  Diagnoses and all orders for this visit:    1. Urinary tract infection with hematuria, site unspecified  -     cefdinir (OMNICEF) 300 mg capsule; Take 1 Capsule by mouth daily for 10 days. 2. Malaise and fatigue  -     AMB POC EKG ROUTINE W/ 12 LEADS, INTER & REP  -     CBC WITH AUTOMATED DIFF  -     TSH 3RD GENERATION  -     METABOLIC PANEL, COMPREHENSIVE  -     MAGNESIUM  -     PHOSPHORUS  -     AMB POC URINALYSIS DIP STICK AUTO W/O MICRO  -     CULTURE, URINE    3. Hypertensive urgency  -     spironolactone (ALDACTONE) 25 mg tablet; Take 1 Tablet by mouth daily.  -     AMB POC EKG ROUTINE W/ 12 LEADS, INTER & REP  -     CBC WITH AUTOMATED DIFF  -     TSH 3RD GENERATION  -     METABOLIC PANEL, COMPREHENSIVE  -     MAGNESIUM  -     PHOSPHORUS    4. CKD (chronic kidney disease) stage 4, GFR 15-29 ml/min ([facility])  -     CBC WITH AUTOMATED DIFF  -     METABOLIC PANEL, COMPREHENSIVE  -     MAGNESIUM  -     PHOSPHORUS    5. Gastroesophageal reflux disease, unspecified whether esophagitis present  -     omeprazole (PRILOSEC) 40 mg capsule; TAKE 1 CAPSULE BY MOUTH DAILY    6. Chronic diarrhea  -     loperamide (IMODIUM) 2 mg capsule; TAKE 1 CAPSULE BY MOUTH ONCE DAILY IF NEEDED FOR LOOSE STOOLS    Mr. Annalisa Esquivel appears medically stable at this time, markedly elevated blood pressure improved on re-check and asymptomatic. EKG is non-acute. After pt's visit, I called Sun Montalvo who had checked pt's home med box to confirm current antihypertensive regimen; I advised pt re-start Metoprolol and HOLD Spironolactone, given he has already been taking and should continue Amlodipine, Hydralazine and Lasix. Malaise likely multifactorial, concern it is primarily driven by UTI; will empirically treat w/ Cephalosporin w/ renal dosing.   We reviewed strict ER precautions for new/worsening symptoms. Estimated Creatinine Clearance: 17.5 mL/min (A) (by C-G formula based on SCr of 3.97 mg/dL (H)). Future visits will need to focus on diabetes management; despite co-morbid CAD and CHF, I would not recommend Ozempic given hx of multiple cancer dx, or Farxiga given significant renal impairment. We discussed Glipizide is not ideal in the setting of renal impairment, and consideration of referral to Endocrinology given complexity of his case. Return in about 2 days (around 10/16/2022). Dallas Cervantes agrees with plan as above and has no additional questions at this time. SUBJECTIVE/OBJECTIVE:  Per chart review, Mr. Favian Cervantes is a 72-year-old -American male with complicated past medical history significant for renal cell carcinoma s/p Left nephrectomy, bladder cancer s/p cystectomy and current use of urostomy bag, recent dx of colon adenocarcinoma s/p colectomy, diabetes and HTN complicated by CKD-4 and hyperparathyroidism as well as anemia, coronary artery disease, congestive heart failure, pulmonary hypertension, pituitary mass, gout, DVTs on anticoagulation. He is following w/ Dr. Matilde Whitaker for Nephrology, last visit was 22; noted lasix dose recommended 40mg every day given recent ARON on CKD-4. Pt is accompanied to the office today by his niece, Angelica Diallo, who assists w/ HPI at pt's requests. Fatigue and malaise:  Progressive. Chronic/intermittent but increased over the past couple of weeks, in the past similar symptoms have preceded hospital admission. ROS: No CP, palp, edema, abd pain, hematuria, diarrhea, f/c.  +CHAUDHARI, poor appetite, back pain. No changes to character of urine. Recent labs per chart review;  22 Cr 3.97  22 hgb 9.9, MCV 81    HTN:  Unsure of what meds pt is taking, niece who is not present manages his meds. DMII:  Aug 2022 8.7%  Fasting AM B this morning, 90's-200's.   Meds: Lantus 8units every day. He held his insulin this morning because his sugar was 114. Pt has had dilated eye exam w/ Dr. Lauren Clifton. Pt follows w/ Dr. Matt Mcgee for CKD, renal indices UTD. Overdue for diabetic foot exam.     Taking Eliquis daily- no missed doses. He shares that he is currently on chemotherapy pump for multiple cancer diagnoses. 7/28/22 CT chest/abd/pelvis- study limited by lack of contrast:  No evidence of local tumor recurrence or intrathoracic/intra-abdominal  metastatic disease on noncontrast images. Incidental finding, ascending thoracic aorta ectasia, 4.4 cm in diameter.         Past Medical History:   Diagnosis Date    Acid reflux     Arrhythmia     Arthritis     Bladder cancer ([address]     Colon cancer ([address] 11/2021    Congestive heart failure (HCC)     Deep vein thrombosis (DVT) of proximal vein of both lower extremities ([address] 9/14/2020    Diabetes mellitus ([address]     Difficult intubation     Narrow airway-per Pulm notes(in media)     GERD (gastroesophageal reflux disease)     Gout     High cholesterol     Hypertension     Kidney carcinoma, left ([address] 2016    left nephrectomy    Kidney disease     Pituitary mass ([address]     Pulmonary HTN ([address] 10/2021    PASP 77 mmHg    Reflux esophagitis     Unspecified deficiency anemia      Past Surgical History:   Procedure Laterality Date    COLONOSCOPY N/A 11/19/2021    COLONOSCOPY with Polypectomies, Tattoo & Clip Placement performed by Shabana Alexander MD at [facility]    [address]  12/30/2009    bladder removal/ urostomy bag    HX NEPHRECTOMY Left 5/2009    Nephroureterectomy by Dr. Maria C Alves      surgery on pituitary gland     Family History   Problem Relation Age of Onset    Heart Disease Father     Heart Disease Mother        Social History     Socioeconomic History    Marital status: SINGLE     Spouse name: Not on file    Number of children: Not on file    Years of education: Not on file    Highest education level: Not on file   Occupational History    Not on file   Tobacco Use    Smoking status: Former     Packs/day: 0.50     Years: 40.00     Pack years: 20.00     Types: Cigarettes     Quit date: 1999     Years since quittin.8    Smokeless tobacco: Never   Vaping Use    Vaping Use: Never used   Substance and Sexual Activity    Alcohol use: No    Drug use: No    Sexual activity: Yes   Other Topics Concern    Not on file   Social History Narrative    Not on file     Social Determinants of Health     Financial Resource Strain: Not on file   Food Insecurity: Not on file   Transportation Needs: Not on file   Physical Activity: Not on file   Stress: Not on file   Social Connections: Not on file   Intimate Partner Violence: Not on file   Housing Stability: Not on file     Social History     Tobacco Use   Smoking Status Former    Packs/day: 0.50    Years: 40.00    Pack years: 20.00    Types: Cigarettes    Quit date: 1999    Years since quittin.8   Smokeless Tobacco Never       Current Outpatient Medications   Medication Sig Dispense Refill    cefdinir (OMNICEF) 300 mg capsule Take 1 Capsule by mouth daily for 10 days. 10 Capsule 0    promethazine (PHENERGAN) 12.5 mg tablet Take 1 Tablet by mouth.      loratadine (CLARITIN) 10 mg tablet Take 10 mg by mouth. spironolactone (ALDACTONE) 25 mg tablet Take 1 Tablet by mouth daily. 30 Tablet 0    loperamide (IMODIUM) 2 mg capsule TAKE 1 CAPSULE BY MOUTH ONCE DAILY IF NEEDED FOR LOOSE STOOLS 90 Capsule 1    omeprazole (PRILOSEC) 40 mg capsule TAKE 1 CAPSULE BY MOUTH DAILY 90 Capsule 2    sodium bicarbonate 650 mg tablet TAKE 1 TABLET BY MOUTH THREE TIMES DAILY 90 Tablet 5    atorvastatin (LIPITOR) 40 mg tablet Take 40 mg by mouth in the morning. amLODIPine (NORVASC) 5 mg tablet       glipiZIDE (GLUCOTROL) 10 mg tablet Take 10 mg by mouth two (2) times a day.       BD Ultra-Fine Mini Pen Needle 31 gauge x 3/16\" ndle USE TO INJECT  Pen Needle 3    Eliquis 5 mg tablet Take 1 Tablet by mouth daily. hydrALAZINE (APRESOLINE) 50 mg tablet Take 100 mg by mouth three (3) times daily. furosemide (LASIX) 40 mg tablet Take one tab daily 90 Tablet 1    ferrous sulfate (FeroSuL) 325 mg (65 mg iron) tablet TAKE 1 TABLET BY MOUTH THREE TIMES DAILY WITH MEALS 90 Tablet 5    ergocalciferol (ERGOCALCIFEROL) 1,250 mcg (50,000 unit) capsule TAKE ONE CAPSULE BY MOUTH EVERY 7 DAYS 16 Capsule 1    allopurinoL (ZYLOPRIM) 100 mg tablet Take 2 Tablets by mouth daily for 360 days. 180 Tablet 3    glucose blood VI test strips (Accu-Chek Precious Plus test strp) strip CHECK BLOOD GLUCOSE THREE TIMES DAILY 300 Strip 3    Oxygen 2 LPM nc as needed      albuterol (PROVENTIL HFA, VENTOLIN HFA, PROAIR HFA) 90 mcg/actuation inhaler Take 2 Puffs by inhalation every four (4) hours as needed for Wheezing, Shortness of Breath or Respiratory Distress. 1 Each 1    metoprolol succinate (TOPROL-XL) 25 mg XL tablet TAKE 1 TABLET BY MOUTH DAILY 90 Tablet 0     No Known Allergies    BP (!) 179/84   Pulse 76   Temp 97 °F (36.1 °C)   Resp 15   Wt 197 lb 9.6 oz (89.6 kg)   SpO2 96%   BMI 30.04 kg/m²     Physical Exam  Constitutional:       General: He is not in acute distress. Appearance: Normal appearance. He is not ill-appearing. Comments: Frail, elderly appearing gentleman. HENT:      Head: Normocephalic and atraumatic. Nose: Nose normal.      Mouth/Throat:      Mouth: Mucous membranes are moist.      Pharynx: Oropharynx is clear. Eyes:      General: No scleral icterus. Conjunctiva/sclera: Conjunctivae normal.      Pupils: Pupils are equal, round, and reactive to light. Cardiovascular:      Rate and Rhythm: Normal rate and regular rhythm. Heart sounds: Normal heart sounds. Pulmonary:      Effort: Pulmonary effort is normal.      Breath sounds: Normal breath sounds. Abdominal:      General: Bowel sounds are normal.      Palpations: Abdomen is soft. Tenderness: no abdominal tenderness   Musculoskeletal:      Right lower leg: No edema. Left lower leg: No edema. Skin:     General: Skin is warm and dry. Neurological:      Mental Status: He is alert and oriented to person, place, and time. Psychiatric:         Mood and Affect: Mood normal.       Lab Results   Component Value Date/Time    WBC 8.2 02/12/2022 01:03 AM    HGB 9.9 (L) 02/12/2022 01:03 AM    HCT 30.8 (L) 02/12/2022 01:03 AM    PLATELET 887 07/90/8498 01:03 AM    MCV 81.9 02/12/2022 01:03 AM     Lab Results   Component Value Date/Time    Sodium 143 04/29/2022 08:08 AM    Potassium 4.3 04/29/2022 08:08 AM    Chloride 114 (H) 04/29/2022 08:08 AM    CO2 22 04/29/2022 08:08 AM    Anion gap 7 04/29/2022 08:08 AM    Glucose 252 (H) 04/29/2022 08:08 AM    BUN 77 (H) 04/29/2022 08:08 AM    Creatinine 3.97 (H) 07/28/2022 08:05 AM    BUN/Creatinine ratio 22 (H) 04/29/2022 08:08 AM    GFR est AA 21 (L) 04/29/2022 08:08 AM    GFR est non-AA 17 (L) 04/29/2022 08:08 AM    Calcium 9.1 04/29/2022 08:08 AM    Bilirubin, total 0.5 11/01/2021 01:45 PM    Alk. phosphatase 57 11/01/2021 01:45 PM    Protein, total 7.6 11/01/2021 01:45 PM    Albumin 3.6 01/14/2022 12:00 PM    Globulin 3.9 11/01/2021 01:45 PM    A-G Ratio 0.9 11/01/2021 01:45 PM    ALT (SGPT) 17 11/01/2021 01:45 PM    AST (SGOT) 18 11/01/2021 01:45 PM     Lab Results   Component Value Date/Time    Cholesterol, total 97 10/06/2021 11:25 PM    HDL Cholesterol 31 (L) 10/06/2021 11:25 PM    LDL, calculated 53.8 10/06/2021 11:25 PM    VLDL, calculated 12.2 10/06/2021 11:25 PM    Triglyceride 61 10/06/2021 11:25 PM    CHOL/HDL Ratio 3.1 10/06/2021 11:25 PM       On this date 10/14/22 I have spent >45 minutes reviewing previous notes, test results and face to face with the patient for interview/exam, discussing working diagnosis and treatment plan. Medical decision making complexity: moderate-high.     Bharathi Gonzales MD   Family & Geriatric Medicine

## 2022-10-14 NOTE — TELEPHONE ENCOUNTER
Spoke with patients princeclari Logancameron Franco and she says he is supposed to be on Metoprolol that he ran out and was unable to get a refill so her sister thought the doctor had taken him off it and for the spironolactone he never started that medication

## 2022-10-15 RX ORDER — CEFDINIR 300 MG/1
300 CAPSULE ORAL DAILY
Qty: 10 CAPSULE | Refills: 0 | Status: SHIPPED | OUTPATIENT
Start: 2022-10-15 | End: 2022-10-20

## 2022-10-17 ENCOUNTER — OFFICE VISIT (OUTPATIENT)
Dept: INTERNAL MEDICINE CLINIC | Age: 77
End: 2022-10-17
Payer: MEDICARE

## 2022-10-17 VITALS
WEIGHT: 200 LBS | OXYGEN SATURATION: 99 % | SYSTOLIC BLOOD PRESSURE: 192 MMHG | BODY MASS INDEX: 30.41 KG/M2 | TEMPERATURE: 97.3 F | HEART RATE: 85 BPM | RESPIRATION RATE: 15 BRPM | DIASTOLIC BLOOD PRESSURE: 84 MMHG

## 2022-10-17 DIAGNOSIS — R31.9 URINARY TRACT INFECTION WITH HEMATURIA, SITE UNSPECIFIED: Primary | ICD-10-CM

## 2022-10-17 DIAGNOSIS — N39.0 URINARY TRACT INFECTION WITH HEMATURIA, SITE UNSPECIFIED: Primary | ICD-10-CM

## 2022-10-17 DIAGNOSIS — E83.42 HYPOMAGNESEMIA: ICD-10-CM

## 2022-10-17 PROCEDURE — 1123F ACP DISCUSS/DSCN MKR DOCD: CPT | Performed by: FAMILY MEDICINE

## 2022-10-17 PROCEDURE — 96372 THER/PROPH/DIAG INJ SC/IM: CPT | Performed by: FAMILY MEDICINE

## 2022-10-17 PROCEDURE — 99214 OFFICE O/P EST MOD 30 MIN: CPT | Performed by: FAMILY MEDICINE

## 2022-10-17 RX ORDER — KETOROLAC TROMETHAMINE 10 MG/1
10 TABLET, FILM COATED ORAL
COMMUNITY
End: 2022-10-20

## 2022-10-17 RX ORDER — TALC
250 POWDER (GRAM) TOPICAL EVERY OTHER DAY
Qty: 10 TABLET | Refills: 0 | Status: SHIPPED | OUTPATIENT
Start: 2022-10-17

## 2022-10-17 RX ORDER — CEFTRIAXONE 500 MG/1
500 INJECTION, POWDER, FOR SOLUTION INTRAMUSCULAR; INTRAVENOUS ONCE
Status: COMPLETED | OUTPATIENT
Start: 2022-10-17 | End: 2022-10-17

## 2022-10-17 RX ADMIN — CEFTRIAXONE 500 MG: 500 INJECTION, POWDER, FOR SOLUTION INTRAMUSCULAR; INTRAVENOUS at 12:04

## 2022-10-17 NOTE — PATIENT INSTRUCTIONS
Mr. Malik Bradford,  I am glad you are feeling better, but your blood pressure remains VERY elevated. I recommend the following at this time to help improve your blood pressure:  INCREASE Amlodipine 5mg to one tablet TWICE a day (instead of one tablet once a day)  Metoprolol 25mg once a day  Hydralazine 100mg three times a day  Furosemide (Lasix) 40mg once a day    Please HOLD the Spironolactone unless you are advised to re-start this by your Nephrology. Please check on the date of your next Nephrology appointment. Your magnesium was low, so I am sending a prescription supplement to help with this. For the UTI, I will give you an injection of an antibiotic today, then you can  the \"Cefdinir\" to take starting tomorrow.       Lets plan to follow-up in 1 week for BP check.      ~Dr. Nicole Jaeger

## 2022-10-17 NOTE — PROGRESS NOTES
John Knee presents today for   Chief Complaint   Patient presents with    Follow-up     2 day follow up        Is someone accompanying this pt? Yes Niece     Is the patient using any DME equipment during OV? Yes Oxygen     Depression Screening:  3 most recent PHQ Screens 10/17/2022   Little interest or pleasure in doing things Not at all   Feeling down, depressed, irritable, or hopeless Not at all   Total Score PHQ 2 0   Trouble falling or staying asleep, or sleeping too much -   Feeling tired or having little energy -   Poor appetite, weight loss, or overeating -   Feeling bad about yourself - or that you are a failure or have let yourself or your family down -   Trouble concentrating on things such as school, work, reading, or watching TV -   Moving or speaking so slowly that other people could have noticed; or the opposite being so fidgety that others notice -   Thoughts of being better off dead, or hurting yourself in some way -   PHQ 9 Score -   How difficult have these problems made it for you to do your work, take care of your home and get along with others -       Learning Assessment:  Learning Assessment 1/28/2022   PRIMARY LEARNER Patient   HIGHEST LEVEL OF EDUCATION - PRIMARY LEARNER  GRADUATED HIGH SCHOOL OR GED   BARRIERS PRIMARY LEARNER NONE   CO-LEARNER CAREGIVER Yes   CO-LEARNER NAME Sandra   PRIMARY LANGUAGE ENGLISH   LEARNER PREFERENCE PRIMARY DEMONSTRATION   ANSWERED BY daughter   RELATIONSHIP OTHER       Fall Risk  Fall Risk Assessment, last 12 mths 10/17/2022   Able to walk? Yes   Fall in past 12 months? 0   Do you feel unsteady? 0   Are you worried about falling 0   Is TUG test greater than 12 seconds?  -   Is the gait abnormal? -   Number of falls in past 12 months -   Fall with injury? -       ADL  ADL Assessment 8/11/2022   Feeding yourself No Help Needed   Getting from bed to chair No Help Needed   Getting dressed No Help Needed   Bathing or showering No Help Needed   Walk across the room (includes cane/walker) Help Needed   Using the telphone No Help Needed   Taking your medications Help Needed   Preparing meals Help Needed   Managing money (expenses/bills) No Help Needed   Moderately strenuous housework (laundry) Help Needed   Shopping for personal items (toiletries/medicines) Help Needed   Shopping for groceries No Help Needed   Driving Help Needed   Climbing a flight of stairs No Help Needed   Getting to places beyond walking distances No Help Needed       Travel Screening:    Travel Screening       Question Response    In the last 10 days, have you been in contact with someone who was confirmed or suspected to have Coronavirus/COVID-19? No / Unsure    Have you had a COVID-19 viral test in the last 10 days? No    Do you have any of the following new or worsening symptoms? None of these    Have you traveled internationally or domestically in the last month? No          Travel History   Travel since 09/17/22    No documented travel since 09/17/22         Health Maintenance reviewed and discussed and ordered per Provider. Health Maintenance Due   Topic Date Due    Eye Exam Retinal or Dilated  Never done    Shingrix Vaccine Age 49> (1 of 2) Never done    DTaP/Tdap/Td series (1 - Tdap) Never done    AAA Screening 73-67 YO Male Smoking Patients  Never done    COVID-19 Vaccine (4 - Booster) 01/07/2022    Flu Vaccine (1) 08/01/2022    Lipid Screen  10/06/2022   . Coordination of Care:  1. \"Have you been to the ER, urgent care clinic since your last visit? Hospitalized since your last visit? \" No    2. \"Have you seen or consulted any other health care providers outside of the [address] [place] [address] since your last visit? \" No     3. For patients aged 39-70: Has the patient had a colonoscopy? NA - based on age     If the patient is female:    4. For patients aged 41-77: Has the patient had a mammogram within the past 2 years? NA - based on age/sex    5.  For patients aged 21-65: Has the patient had a pap smear?  NA - based on age/sex

## 2022-10-17 NOTE — PROGRESS NOTES
Brayden Bosch (: 1945) is a 78 y.o. male here for evaluation of the following chief concern(s):  Follow-up; acute and chronic condition management    ASSESSMENT/PLAN:  Diagnoses and all orders for this visit:    1. Urinary tract infection with hematuria, site unspecified  -     cefTRIAXone (ROCEPHIN) injection 500 mg    2. Hypomagnesemia  -     magnesium oxide 250 mg magnesium tablet; Take 1 Tablet by mouth every other day. Mr. Yannick Bosch appears medically stable at this time, persistent markedly elevated blood pressure- asymptomatic. INCREASE Amlodipine 5mg to one tablet TWICE a day (instead of one tablet once a day)  Continue Metoprolol 25mg once a day, Hydralazine 100mg three times a day, Furosemide (Lasix) 40mg once a day. Malaise improved; likely multifactorial, concern UTI as a driving force based on 10/14/22 UA; IM Ceftriaxone today and pt aware oral Cefdinir should be at his pharmacy. We reviewed strict ER precautions for new/worsening symptoms. In addition, see personalized AVS.      Estimated Creatinine Clearance: 17.6 mL/min (A) (by C-G formula based on SCr of 3.97 mg/dL (H)). Future visits will need to focus on diabetes management; despite co-morbid CAD and CHF, I would not recommend Ozempic given hx of multiple cancer dx, or Farxiga given significant renal impairment. We discussed Glipizide is not ideal in the setting of renal impairment, and consideration of referral to Endocrinology given complexity of his case. Return in about 1 week (around 10/24/2022) for uncontrolled hypertension. [place] Bosch agrees with plan as above and has no additional questions at this time.      +ADDENDUM; chart review;  10/23/22 ER eval for Influenza B being treated w/ Oseltamivir, volume overload      SUBJECTIVE/OBJECTIVE:  Per chart review, Mr. Yannick Bosch is a 70-year-old -American male with complicated past medical history significant for renal cell carcinoma s/p Left nephrectomy, bladder cancer s/p cystectomy and current use of urostomy bag, recent dx of colon adenocarcinoma s/p colectomy, diabetes and HTN complicated by CKD-4 and hyperparathyroidism as well as anemia, coronary artery disease, congestive heart failure, pulmonary hypertension, pituitary mass, gout, DVTs on anticoagulation. He is following w/ Dr. Army Marie for Nephrology, last visit was 7/13/22; noted lasix dose recommended 40mg every day given recent ARON on CKD-4. Pt is accompanied to the office today by his niece, Saroj Ho, who assists w/ HPI at pt's requests. 10/14/22 results review;  CBC; WBC 4.6, hgb 9, platelets 171  CMP- BUN 52, Cr 3.2 (baseline ~3.5), GFR 19 mL/min, , albumin 3.4, Ca2+ 8.2  Mag 1.1  Phos 3.2  TSH 3.41  Ucx in progress    Presumed UTI, prescribed Cefdinir; pt has not started his antibiotic yet. Estimated Creatinine Clearance: 17.6 mL/min (A) (by C-G formula based on SCr of 3.97 mg/dL (H)). Pt confirms NKDA. Fatigue and malaise:  Improved form last visit. Hx;  Progressive. Chronic/intermittent but increased over the past couple of weeks, in the past similar symptoms have preceded hospital admission. ROS: No CP, palp, edema, abd pain, hematuria, diarrhea, f/c.  +CHAUDHARI, poor appetite, back pain. No changes to character of urine. Recent labs per chart review;  7/28/22 Cr 3.97  2/12/22 hgb 9.9, MCV 81    HTN:  He reports having taken his BP meds today- other niece manages his medications; He brought his meds to his appointment today; Amlodipine 5mg, furosemide 40mg every day, Hydralazine 50mg TID, Metoprolol ER 25mg every day. Hx;  Last visit, pt unsure of what meds pt is taking, niece who is not present manages his meds. DMII:  Aug 2022 8.7%  Meds: Lantus 8units every day. He held his insulin this morning because his sugar was 114. Pt has had dilated eye exam w/ Dr. Glory Sames. Pt follows w/ Dr. Army Marie for CKD, renal indices UTD.    Overdue for diabetic foot exam. CKD:  PT follows w/ Dr. Rachel Nova (Nephrology)- he has an upcoming appointment. Taking Eliquis daily- no missed doses. He shares that he is currently on chemotherapy pump for multiple cancer diagnoses. 7/28/22 CT chest/abd/pelvis- study limited by lack of contrast:  No evidence of local tumor recurrence or intrathoracic/intra-abdominal  metastatic disease on noncontrast images. Incidental finding, ascending thoracic aorta ectasia, 4.4 cm in diameter.         Past Medical History:   Diagnosis Date    Acid reflux     Arrhythmia     Arthritis     Bladder cancer ([address]     Colon cancer ([address] 11/2021    Congestive heart failure (HCC)     Deep vein thrombosis (DVT) of proximal vein of both lower extremities ([address] 9/14/2020    Diabetes mellitus ([address]     Difficult intubation     Narrow airway-per Pulm notes(in media)     GERD (gastroesophageal reflux disease)     Gout     High cholesterol     Hypertension     Kidney carcinoma, left ([address] 2016    left nephrectomy    Kidney disease     Pituitary mass ([address]     Pulmonary HTN ([address] 10/2021    PASP 77 mmHg    Reflux esophagitis     Unspecified deficiency anemia      Past Surgical History:   Procedure Laterality Date    COLONOSCOPY N/A 11/19/2021    COLONOSCOPY with Polypectomies, Tattoo & Clip Placement performed by Andrey Coon MD at [facility]    [address]  12/30/2009    bladder removal/ urostomy bag    HX NEPHRECTOMY Left 5/2009    Nephroureterectomy by Dr. Mai Martinez      surgery on pituitary gland     Family History   Problem Relation Age of Onset    Heart Disease Father     Heart Disease Mother        Social History     Socioeconomic History    Marital status: SINGLE     Spouse name: Not on file    Number of children: Not on file    Years of education: Not on file    Highest education level: Not on file   Occupational History    Not on file   Tobacco Use    Smoking status: Former     Packs/day: 0.50     Years: 40.00     Pack years: 20.00     Types: Cigarettes     Quit date: 1999     Years since quittin.8    Smokeless tobacco: Never   Vaping Use    Vaping Use: Never used   Substance and Sexual Activity    Alcohol use: No    Drug use: No    Sexual activity: Yes   Other Topics Concern    Not on file   Social History Narrative    Not on file     Social Determinants of Health     Financial Resource Strain: Not on file   Food Insecurity: Not on file   Transportation Needs: Not on file   Physical Activity: Not on file   Stress: Not on file   Social Connections: Not on file   Intimate Partner Violence: Not on file   Housing Stability: Not on file     Social History     Tobacco Use   Smoking Status Former    Packs/day: 0.50    Years: 40.00    Pack years: 20.00    Types: Cigarettes    Quit date: 1999    Years since quittin.8   Smokeless Tobacco Never       Current Outpatient Medications   Medication Sig Dispense Refill    ketorolac (TORADOL) 10 mg tablet Take 10 mg by mouth every six (6) hours as needed for Pain.      magnesium oxide 250 mg magnesium tablet Take 1 Tablet by mouth every other day. 10 Tablet 0    promethazine (PHENERGAN) 12.5 mg tablet Take 1 Tablet by mouth.      loratadine (CLARITIN) 10 mg tablet Take 10 mg by mouth. spironolactone (ALDACTONE) 25 mg tablet Take 1 Tablet by mouth daily. 30 Tablet 0    loperamide (IMODIUM) 2 mg capsule TAKE 1 CAPSULE BY MOUTH ONCE DAILY IF NEEDED FOR LOOSE STOOLS 90 Capsule 1    omeprazole (PRILOSEC) 40 mg capsule TAKE 1 CAPSULE BY MOUTH DAILY 90 Capsule 2    metoprolol succinate (TOPROL-XL) 25 mg XL tablet TAKE 1 TABLET BY MOUTH DAILY 90 Tablet 0    sodium bicarbonate 650 mg tablet TAKE 1 TABLET BY MOUTH THREE TIMES DAILY 90 Tablet 5    atorvastatin (LIPITOR) 40 mg tablet Take 40 mg by mouth in the morning. amLODIPine (NORVASC) 5 mg tablet       glipiZIDE (GLUCOTROL) 10 mg tablet Take 10 mg by mouth two (2) times a day.       Eliquis 5 mg tablet Take 1 Tablet by mouth daily. hydrALAZINE (APRESOLINE) 50 mg tablet Take 100 mg by mouth three (3) times daily. furosemide (LASIX) 40 mg tablet Take one tab daily 90 Tablet 1    ferrous sulfate (FeroSuL) 325 mg (65 mg iron) tablet TAKE 1 TABLET BY MOUTH THREE TIMES DAILY WITH MEALS 90 Tablet 5    ergocalciferol (ERGOCALCIFEROL) 1,250 mcg (50,000 unit) capsule TAKE ONE CAPSULE BY MOUTH EVERY 7 DAYS 16 Capsule 1    allopurinoL (ZYLOPRIM) 100 mg tablet Take 2 Tablets by mouth daily for 360 days. 180 Tablet 3    glucose blood VI test strips (Accu-Chek Precious Plus test strp) strip CHECK BLOOD GLUCOSE THREE TIMES DAILY 300 Strip 3    Oxygen 2 LPM nc as needed      albuterol (PROVENTIL HFA, VENTOLIN HFA, PROAIR HFA) 90 mcg/actuation inhaler Take 2 Puffs by inhalation every four (4) hours as needed for Wheezing, Shortness of Breath or Respiratory Distress. 1 Each 1    cefdinir (OMNICEF) 300 mg capsule Take 1 Capsule by mouth daily for 10 days. (Patient not taking: Reported on 10/17/2022) 10 Capsule 0    BD Ultra-Fine Mini Pen Needle 31 gauge x 3/16\" ndle USE TO INJECT TID (Patient not taking: Reported on 10/17/2022) 100 Pen Needle 3     Current Facility-Administered Medications   Medication Dose Route Frequency Provider Last Rate Last Admin    cefTRIAXone (ROCEPHIN) injection 500 mg  500 mg IntraMUSCular ONCE Kelley Kim MD         No Known Allergies    BP (!) 192/84   Pulse 85   Temp 97.3 °F (36.3 °C)   Resp 15   Wt 200 lb (90.7 kg)   SpO2 99%   BMI 30.41 kg/m²     Physical Exam  Constitutional:       General: He is not in acute distress. Appearance: Normal appearance. He is not ill-appearing. Comments: Frail, elderly appearing gentleman. HENT:      Head: Normocephalic and atraumatic. Nose: Nose normal.      Mouth/Throat:      Mouth: Mucous membranes are moist.      Pharynx: Oropharynx is clear. Eyes:      General: No scleral icterus.      Conjunctiva/sclera: Conjunctivae normal.      Pupils: Pupils are equal, round, and reactive to light. Cardiovascular:      Rate and Rhythm: Normal rate and regular rhythm. Heart sounds: Normal heart sounds. Pulmonary:      Effort: Pulmonary effort is normal.      Breath sounds: Normal breath sounds. Abdominal:      General: Bowel sounds are normal.      Palpations: Abdomen is soft. Tenderness: no abdominal tenderness   Musculoskeletal:      Right lower leg: No edema. Left lower leg: No edema. Skin:     General: Skin is warm and dry. Neurological:      Mental Status: He is alert and oriented to person, place, and time. Psychiatric:         Mood and Affect: Mood normal.       Lab Results   Component Value Date/Time    WBC 8.2 02/12/2022 01:03 AM    HGB 9.9 (L) 02/12/2022 01:03 AM    HCT 30.8 (L) 02/12/2022 01:03 AM    PLATELET 232 10/33/4732 01:03 AM    MCV 81.9 02/12/2022 01:03 AM     Lab Results   Component Value Date/Time    Sodium 143 04/29/2022 08:08 AM    Potassium 4.3 04/29/2022 08:08 AM    Chloride 114 (H) 04/29/2022 08:08 AM    CO2 22 04/29/2022 08:08 AM    Anion gap 7 04/29/2022 08:08 AM    Glucose 252 (H) 04/29/2022 08:08 AM    BUN 77 (H) 04/29/2022 08:08 AM    Creatinine 3.97 (H) 07/28/2022 08:05 AM    BUN/Creatinine ratio 22 (H) 04/29/2022 08:08 AM    GFR est AA 21 (L) 04/29/2022 08:08 AM    GFR est non-AA 17 (L) 04/29/2022 08:08 AM    Calcium 9.1 04/29/2022 08:08 AM    Bilirubin, total 0.5 11/01/2021 01:45 PM    Alk.  phosphatase 57 11/01/2021 01:45 PM    Protein, total 7.6 11/01/2021 01:45 PM    Albumin 3.6 01/14/2022 12:00 PM    Globulin 3.9 11/01/2021 01:45 PM    A-G Ratio 0.9 11/01/2021 01:45 PM    ALT (SGPT) 17 11/01/2021 01:45 PM    AST (SGOT) 18 11/01/2021 01:45 PM     Lab Results   Component Value Date/Time    Cholesterol, total 97 10/06/2021 11:25 PM    HDL Cholesterol 31 (L) 10/06/2021 11:25 PM    LDL, calculated 53.8 10/06/2021 11:25 PM    VLDL, calculated 12.2 10/06/2021 11:25 PM    Triglyceride 61 10/06/2021 11:25 PM CHOL/HDL Ratio 3.1 10/06/2021 11:25 PM       On this date 10/17/22 I have spent >30 minutes reviewing previous notes, test results and face to face with the patient for interview/exam, discussing working diagnosis and treatment plan. Medical decision making complexity: moderate-high.     Pantera Chen MD   Family & Geriatric Medicine

## 2022-10-20 ENCOUNTER — APPOINTMENT (OUTPATIENT)
Dept: GENERAL RADIOLOGY | Age: 77
End: 2022-10-20
Attending: EMERGENCY MEDICINE
Payer: MEDICARE

## 2022-10-20 ENCOUNTER — APPOINTMENT (OUTPATIENT)
Dept: CT IMAGING | Age: 77
End: 2022-10-20
Attending: EMERGENCY MEDICINE
Payer: MEDICARE

## 2022-10-20 ENCOUNTER — HOSPITAL ENCOUNTER (EMERGENCY)
Age: 77
Discharge: HOME | End: 2022-10-20
Attending: EMERGENCY MEDICINE
Payer: MEDICARE

## 2022-10-20 VITALS
DIASTOLIC BLOOD PRESSURE: 63 MMHG | RESPIRATION RATE: 16 BRPM | OXYGEN SATURATION: 100 % | WEIGHT: 190 LBS | BODY MASS INDEX: 28.79 KG/M2 | HEIGHT: 68 IN | HEART RATE: 60 BPM | TEMPERATURE: 97.1 F | SYSTOLIC BLOOD PRESSURE: 158 MMHG

## 2022-10-20 DIAGNOSIS — N39.0 URINARY TRACT INFECTION WITHOUT HEMATURIA, SITE UNSPECIFIED: ICD-10-CM

## 2022-10-20 DIAGNOSIS — J01.90 ACUTE SINUSITIS, RECURRENCE NOT SPECIFIED, UNSPECIFIED LOCATION: Primary | ICD-10-CM

## 2022-10-20 DIAGNOSIS — J10.1 INFLUENZA B: ICD-10-CM

## 2022-10-20 DIAGNOSIS — E83.42 HYPOMAGNESEMIA: ICD-10-CM

## 2022-10-20 DIAGNOSIS — N18.4 CKD (CHRONIC KIDNEY DISEASE) STAGE 4, GFR 15-29 ML/MIN (HCC): ICD-10-CM

## 2022-10-20 LAB
ALBUMIN SERPL-MCNC: 3.1 G/DL (ref 3.4–5)
ALBUMIN/GLOB SERPL: 0.8 {RATIO} (ref 0.8–1.7)
ALP SERPL-CCNC: 97 U/L (ref 45–117)
ALT SERPL-CCNC: 33 U/L (ref 16–61)
ANION GAP SERPL CALC-SCNC: 11 MMOL/L (ref 3–18)
APPEARANCE UR: CLEAR
AST SERPL W P-5'-P-CCNC: 30 U/L (ref 10–38)
BACTERIA URNS QL MICRO: ABNORMAL /HPF
BASOPHILS # BLD: 0 K/UL (ref 0–0.1)
BASOPHILS NFR BLD: 0 % (ref 0–2)
BILIRUB SERPL-MCNC: 0.2 MG/DL (ref 0.2–1)
BILIRUB UR QL: NEGATIVE
BNP SERPL-MCNC: 2191 PG/ML (ref 0–1800)
BUN SERPL-MCNC: 64 MG/DL (ref 7–18)
BUN/CREAT SERPL: 17 (ref 12–20)
CA-I BLD-MCNC: 7.6 MG/DL (ref 8.5–10.1)
CHLORIDE SERPL-SCNC: 110 MMOL/L (ref 100–111)
CO2 SERPL-SCNC: 21 MMOL/L (ref 21–32)
COLOR UR: YELLOW
CREAT SERPL-MCNC: 3.85 MG/DL (ref 0.6–1.3)
DIFFERENTIAL METHOD BLD: ABNORMAL
EOSINOPHIL # BLD: 0 K/UL (ref 0–0.4)
EOSINOPHIL NFR BLD: 0 % (ref 0–5)
EPITH CASTS URNS QL MICRO: ABNORMAL /LPF (ref 0–20)
ERYTHROCYTE [DISTWIDTH] IN BLOOD BY AUTOMATED COUNT: 17.1 % (ref 11.6–14.5)
FLUAV AG NPH QL IA: NEGATIVE
FLUBV AG NOSE QL IA: POSITIVE
GLOBULIN SER CALC-MCNC: 3.9 G/DL (ref 2–4)
GLUCOSE SERPL-MCNC: 138 MG/DL (ref 74–99)
GLUCOSE UR STRIP.AUTO-MCNC: NEGATIVE MG/DL
HCT VFR BLD AUTO: 31.8 % (ref 36–48)
HGB BLD-MCNC: 10.2 G/DL (ref 13–16)
HGB UR QL STRIP: NEGATIVE
IMM GRANULOCYTES # BLD AUTO: 0 K/UL (ref 0–0.04)
IMM GRANULOCYTES NFR BLD AUTO: 0 % (ref 0–0.5)
KETONES UR QL STRIP.AUTO: NEGATIVE MG/DL
LACTATE SERPL-SCNC: 1.3 MMOL/L (ref 0.4–2)
LEUKOCYTE ESTERASE UR QL STRIP.AUTO: ABNORMAL
LYMPHOCYTES # BLD: 0.9 K/UL (ref 0.9–3.6)
LYMPHOCYTES NFR BLD: 31 % (ref 21–52)
MAGNESIUM SERPL-MCNC: 1.2 MG/DL (ref 1.6–2.6)
MCH RBC QN AUTO: 29.4 PG (ref 24–34)
MCHC RBC AUTO-ENTMCNC: 32.1 G/DL (ref 31–37)
MCV RBC AUTO: 91.6 FL (ref 78–100)
MONOCYTES # BLD: 0.2 K/UL (ref 0.05–1.2)
MONOCYTES NFR BLD: 6 % (ref 3–10)
NEUTS SEG # BLD: 1.7 K/UL (ref 1.8–8)
NEUTS SEG NFR BLD: 63 % (ref 40–73)
NITRITE UR QL STRIP.AUTO: NEGATIVE
NRBC # BLD: 0 K/UL (ref 0–0.01)
NRBC BLD-RTO: 0 PER 100 WBC
PH UR STRIP: 6.5 [PH] (ref 5–8)
PLATELET # BLD AUTO: 239 K/UL (ref 135–420)
PMV BLD AUTO: 10.8 FL (ref 9.2–11.8)
POTASSIUM SERPL-SCNC: 4.1 MMOL/L (ref 3.5–5.5)
PROT SERPL-MCNC: 7 G/DL (ref 6.4–8.2)
PROT UR STRIP-MCNC: 300 MG/DL
RBC # BLD AUTO: 3.47 M/UL (ref 4.35–5.65)
RBC #/AREA URNS HPF: ABNORMAL /HPF (ref 0–2)
SODIUM SERPL-SCNC: 142 MMOL/L (ref 136–145)
SP GR UR REFRACTOMETRY: 1.01 (ref 1–1.03)
UROBILINOGEN UR QL STRIP.AUTO: 0.2 EU/DL (ref 0.2–1)
WBC # BLD AUTO: 2.8 K/UL (ref 4.6–13.2)
WBC URNS QL MICRO: ABNORMAL /HPF (ref 0–4)

## 2022-10-20 PROCEDURE — 96365 THER/PROPH/DIAG IV INF INIT: CPT

## 2022-10-20 PROCEDURE — 83605 ASSAY OF LACTIC ACID: CPT

## 2022-10-20 PROCEDURE — 83880 ASSAY OF NATRIURETIC PEPTIDE: CPT

## 2022-10-20 PROCEDURE — 74011250637 HC RX REV CODE- 250/637: Performed by: EMERGENCY MEDICINE

## 2022-10-20 PROCEDURE — 74011000258 HC RX REV CODE- 258: Performed by: EMERGENCY MEDICINE

## 2022-10-20 PROCEDURE — 93005 ELECTROCARDIOGRAM TRACING: CPT

## 2022-10-20 PROCEDURE — 81001 URINALYSIS AUTO W/SCOPE: CPT

## 2022-10-20 PROCEDURE — 85025 COMPLETE CBC W/AUTO DIFF WBC: CPT

## 2022-10-20 PROCEDURE — 80053 COMPREHEN METABOLIC PANEL: CPT

## 2022-10-20 PROCEDURE — 99285 EMERGENCY DEPT VISIT HI MDM: CPT

## 2022-10-20 PROCEDURE — 96375 TX/PRO/DX INJ NEW DRUG ADDON: CPT

## 2022-10-20 PROCEDURE — 87804 INFLUENZA ASSAY W/OPTIC: CPT

## 2022-10-20 PROCEDURE — 74011250636 HC RX REV CODE- 250/636: Performed by: EMERGENCY MEDICINE

## 2022-10-20 PROCEDURE — 71045 X-RAY EXAM CHEST 1 VIEW: CPT

## 2022-10-20 PROCEDURE — 70450 CT HEAD/BRAIN W/O DYE: CPT

## 2022-10-20 PROCEDURE — 96368 THER/DIAG CONCURRENT INF: CPT

## 2022-10-20 PROCEDURE — 83735 ASSAY OF MAGNESIUM: CPT

## 2022-10-20 RX ORDER — FUROSEMIDE 10 MG/ML
40 INJECTION INTRAMUSCULAR; INTRAVENOUS
Status: COMPLETED | OUTPATIENT
Start: 2022-10-20 | End: 2022-10-20

## 2022-10-20 RX ORDER — ACETAMINOPHEN 500 MG
500 TABLET ORAL
Status: COMPLETED | OUTPATIENT
Start: 2022-10-20 | End: 2022-10-20

## 2022-10-20 RX ORDER — OSELTAMIVIR PHOSPHATE 75 MG/1
75 CAPSULE ORAL ONCE
Status: COMPLETED | OUTPATIENT
Start: 2022-10-20 | End: 2022-10-20

## 2022-10-20 RX ORDER — MAGNESIUM SULFATE 1 G/100ML
1 INJECTION INTRAVENOUS
Status: COMPLETED | OUTPATIENT
Start: 2022-10-20 | End: 2022-10-20

## 2022-10-20 RX ADMIN — OSELTAMIVIR PHOSPHATE 75 MG: 75 CAPSULE ORAL at 16:33

## 2022-10-20 RX ADMIN — ACETAMINOPHEN 500 MG: 500 TABLET ORAL at 18:09

## 2022-10-20 RX ADMIN — FUROSEMIDE 40 MG: 10 INJECTION, SOLUTION INTRAMUSCULAR; INTRAVENOUS at 16:51

## 2022-10-20 RX ADMIN — CEFTRIAXONE 1 G: 1 INJECTION, POWDER, FOR SOLUTION INTRAMUSCULAR; INTRAVENOUS at 16:49

## 2022-10-20 RX ADMIN — MAGNESIUM SULFATE HEPTAHYDRATE 1 G: 1 INJECTION, SOLUTION INTRAVENOUS at 16:52

## 2022-10-20 NOTE — ED TRIAGE NOTES
EMS called to residence for pt that c/o feeling weak and has cough x 2 days. Pt is on chemo for bladder cancer.

## 2022-10-20 NOTE — ED NOTES
Family states that pt is not well enough to be discharged home and requests to speak to MD. MD made aware.

## 2022-10-20 NOTE — ED PROVIDER NOTES
EMERGENCY DEPARTMENT HISTORY AND PHYSICAL EXAM      Date: 10/20/2022  Patient Name: Darvin Kennedy    History of Presenting Illness     Chief Complaint   Patient presents with    Cough    Fatigue       History Provided By: Patient, Caregiver, and Family members    HPI: Darvin Kennedy, 78 y.o. male PMHx sella Turcica mass resection, Renal ca post resection, bladder ca post resection and uretostomy, CKD, HTN, DM, Hyperlipidemia, CHF, Chronic anemia Presents with progressive fatigue, cough productive of white phlegm, SOB, Orthopnea. Started 2 weeks ago but has been getting worse over last 3 days. It is worse with laying flat and mild exertion. He can no longer walk or stand without assistance. It is relieved by Oxygen and he uses supplemental Oxygen at home. Was seen yesterday by PCP and Oncologist who did labs 2 days ago which showed ESRF, hypomagnesemia. He was started on antibiotics for a UTI, his spironolactone was discontinued and he was to follow up with PCP next week about HTN, the Oncologist and Nephrologist for his bladder ca and ESRF. He is currently on chemo via portacath for bladder ca,    There are no other complaints, changes, or physical findings at this time. PCP: Amparo Thompson MD    Current Facility-Administered Medications   Medication Dose Route Frequency Provider Last Rate Last Admin    cefTRIAXone (ROCEPHIN) 1 g in 0.9% sodium chloride (MBP/ADV) 50 mL MBP  1 g IntraVENous ONCE Gunjan Carrasco  mL/hr at 10/20/22 1649 1 g at 10/20/22 1649    magnesium sulfate 1 g/100 ml IVPB (premix or compounded)  1 g IntraVENous NOW Bettye YOUNGER  mL/hr at 10/20/22 1652 1 g at 10/20/22 1652     Current Outpatient Medications   Medication Sig Dispense Refill    promethazine (PHENERGAN) 12.5 mg tablet Take 1 Tablet by mouth.      loratadine (CLARITIN) 10 mg tablet Take 10 mg by mouth. spironolactone (ALDACTONE) 25 mg tablet Take 1 Tablet by mouth daily.  30 Tablet 0 omeprazole (PRILOSEC) 40 mg capsule TAKE 1 CAPSULE BY MOUTH DAILY 90 Capsule 2    metoprolol succinate (TOPROL-XL) 25 mg XL tablet TAKE 1 TABLET BY MOUTH DAILY 90 Tablet 0    sodium bicarbonate 650 mg tablet TAKE 1 TABLET BY MOUTH THREE TIMES DAILY 90 Tablet 5    atorvastatin (LIPITOR) 40 mg tablet Take 40 mg by mouth in the morning. amLODIPine (NORVASC) 5 mg tablet       glipiZIDE (GLUCOTROL) 10 mg tablet Take 10 mg by mouth two (2) times a day. Eliquis 5 mg tablet Take 1 Tablet by mouth daily. hydrALAZINE (APRESOLINE) 50 mg tablet Take 100 mg by mouth three (3) times daily. furosemide (LASIX) 40 mg tablet Take one tab daily 90 Tablet 1    ferrous sulfate (FeroSuL) 325 mg (65 mg iron) tablet TAKE 1 TABLET BY MOUTH THREE TIMES DAILY WITH MEALS 90 Tablet 5    ergocalciferol (ERGOCALCIFEROL) 1,250 mcg (50,000 unit) capsule TAKE ONE CAPSULE BY MOUTH EVERY 7 DAYS 16 Capsule 1    allopurinoL (ZYLOPRIM) 100 mg tablet Take 2 Tablets by mouth daily for 360 days. 180 Tablet 3    glucose blood VI test strips (Accu-Chek Precious Plus test strp) strip CHECK BLOOD GLUCOSE THREE TIMES DAILY 300 Strip 3    Oxygen 2 LPM nc as needed      albuterol (PROVENTIL HFA, VENTOLIN HFA, PROAIR HFA) 90 mcg/actuation inhaler Take 2 Puffs by inhalation every four (4) hours as needed for Wheezing, Shortness of Breath or Respiratory Distress. 1 Each 1    magnesium oxide 250 mg magnesium tablet Take 1 Tablet by mouth every other day.  10 Tablet 0       Past History   Past Medical History:  Past Medical History:   Diagnosis Date    Acid reflux     Arrhythmia     Arthritis     Bladder cancer ([facility]     Colon cancer ([facility] [facility] 11/2021    Congestive heart failure (HCC)     Deep vein thrombosis (DVT) of proximal vein of both lower extremities ([facility] [facility] 9/14/2020    Diabetes mellitus (HCC)     Difficult intubation     Narrow airway-per Pulm notes(in media)     GERD (gastroesophageal reflux disease)     Gout     High cholesterol     Hypertension Kidney carcinoma, left ([facility] 2016    left nephrectomy    Kidney disease     Pituitary mass ([facility]     Pulmonary HTN ([facility] [address] 10/2021    PASP 77 mmHg    Reflux esophagitis     Unspecified deficiency anemia        Past Surgical History:  Past Surgical History:   Procedure Laterality Date    COLONOSCOPY N/A 2021    COLONOSCOPY with Polypectomies, Tattoo & Clip Placement performed by Nicklas Denver, MD at [facility]    [address]  2009    bladder removal/ urostomy bag    HX NEPHRECTOMY Left 2009    Nephroureterectomy by Dr. Aba Malloy      surgery on pituitary gland       Family History:  Family History   Problem Relation Age of Onset    Heart Disease Father     Heart Disease Mother        Social History:  Social History     Tobacco Use    Smoking status: Former     Packs/day: 0.50     Years: 40.00     Pack years: 20.00     Types: Cigarettes     Quit date: 1999     Years since quittin.8    Smokeless tobacco: Never   Vaping Use    Vaping Use: Never used   Substance Use Topics    Alcohol use: No    Drug use: No       Allergies:  No Known Allergies  Review of Systems   Review of Systems   Constitutional:  Positive for activity change, appetite change and fatigue. HENT:  Positive for congestion. Respiratory:  Positive for cough and shortness of breath. Cardiovascular: Negative. Gastrointestinal: Negative. Genitourinary: Negative. Musculoskeletal: Negative. Neurological: Negative. All other systems reviewed and are negative. Physical Exam   Physical Exam  Vitals and nursing note reviewed. Constitutional:       Comments: Lethargic, obese, coughing whitish sputum. HENT:      Head: Normocephalic. Eyes:      Extraocular Movements: Extraocular movements intact. Pupils: Pupils are equal, round, and reactive to light. Cardiovascular:      Rate and Rhythm: Normal rate and regular rhythm.    Pulmonary:      Effort: Pulmonary effort is normal.      Breath sounds: Rales present. Abdominal:      Palpations: Abdomen is soft. Comments: Cystostomy bag. Musculoskeletal:         General: Normal range of motion. Comments: Christie Bowling cath   Neurological:      Mental Status: Mental status is at baseline. Comments: Power 3/5 both legs.  Sensations intact       Lab and Diagnostic Study Results   Labs -     Recent Results (from the past 12 hour(s))   URINALYSIS W/ RFLX MICROSCOPIC    Collection Time: 10/20/22  1:34 PM   Result Value Ref Range    Color Yellow      Appearance Clear      Specific gravity 1.011 1.005 - 1.030      pH (UA) 6.5 5.0 - 8.0      Protein 300 (A) Negative mg/dL    Glucose Negative Negative mg/dL    Ketone Negative Negative mg/dL    Bilirubin Negative Negative      Blood Negative Negative      Urobilinogen 0.2 0.2 - 1.0 EU/dL    Nitrites Negative Negative      Leukocyte Esterase Trace (A) Negative     URINE MICROSCOPIC    Collection Time: 10/20/22  1:34 PM   Result Value Ref Range    WBC 0-4 0 - 4 /hpf    RBC 0-5 0 - 2 /hpf    Epithelial cells Few 0 - 20 /lpf    Bacteria 1+ (A) None /hpf   EKG, 12 LEAD, INITIAL    Collection Time: 10/20/22  1:49 PM   Result Value Ref Range    Ventricular Rate 66 BPM    Atrial Rate 66 BPM    P-R Interval 218 ms    QRS Duration 106 ms    Q-T Interval 444 ms    QTC Calculation (Bezet) 466 ms    Calculated P Axis 0 degrees    Calculated R Axis -26 degrees    Calculated T Axis 10 degrees    Diagnosis       Sinus rhythm  Borderline prolonged DE interval  Borderline left axis deviation  Abnormal R-wave progression, late transition  ST elevation, consider lateral injury     INFLUENZA A & B AG (RAPID TEST)    Collection Time: 10/20/22  2:15 PM   Result Value Ref Range    Influenza A Antigen Negative Negative      Influenza B Antigen Positive (A) Negative     CBC WITH AUTOMATED DIFF    Collection Time: 10/20/22  2:32 PM   Result Value Ref Range    WBC 2.8 (L) 4.6 - 13.2 K/uL    RBC 3.47 (L) 4.35 - 5.65 M/uL    HGB 10.2 (L) 13.0 - 16.0 g/dL    HCT 31.8 (L) 36.0 - 48.0 %    MCV 91.6 78.0 - 100.0 FL    MCH 29.4 24.0 - 34.0 PG    MCHC 32.1 31.0 - 37.0 g/dL    RDW 17.1 (H) 11.6 - 14.5 %    PLATELET 334 366 - 612 K/uL    MPV 10.8 9.2 - 11.8 FL    NRBC 0.0 0.0  WBC    ABSOLUTE NRBC 0.00 0.00 - 0.01 K/uL    NEUTROPHILS 63 40 - 73 %    LYMPHOCYTES 31 21 - 52 %    MONOCYTES 6 3 - 10 %    EOSINOPHILS 0 0 - 5 %    BASOPHILS 0 0 - 2 %    IMMATURE GRANULOCYTES 0 0 - 0.5 %    ABS. NEUTROPHILS 1.7 (L) 1.8 - 8.0 K/UL    ABS. LYMPHOCYTES 0.9 0.9 - 3.6 K/UL    ABS. MONOCYTES 0.2 0.05 - 1.2 K/UL    ABS. EOSINOPHILS 0.0 0.0 - 0.4 K/UL    ABS. BASOPHILS 0.0 0.0 - 0.1 K/UL    ABS. IMM. GRANS. 0.0 0.00 - 0.04 K/UL    DF AUTOMATED     METABOLIC PANEL, COMPREHENSIVE    Collection Time: 10/20/22  2:32 PM   Result Value Ref Range    Sodium 142 136 - 145 mmol/L    Potassium 4.1 3.5 - 5.5 mmol/L    Chloride 110 100 - 111 mmol/L    CO2 21 21 - 32 mmol/L    Anion gap 11 3.0 - 18.0 mmol/L    Glucose 138 (H) 74 - 99 mg/dL    BUN 64 (H) 7 - 18 mg/dL    Creatinine 3.85 (H) 0.60 - 1.30 mg/dL    BUN/Creatinine ratio 17 12 - 20      eGFR 16 (L) >60 ml/min/1.73m2    Calcium 7.6 (L) 8.5 - 10.1 mg/dL    Bilirubin, total 0.2 0.2 - 1.0 mg/dL    AST (SGOT) 30 10 - 38 U/L    ALT (SGPT) 33 16 - 61 U/L    Alk.  phosphatase 97 45 - 117 U/L    Protein, total 7.0 6.4 - 8.2 g/dL    Albumin 3.1 (L) 3.4 - 5.0 g/dL    Globulin 3.9 2.0 - 4.0 g/dL    A-G Ratio 0.8 0.8 - 1.7     LACTIC ACID    Collection Time: 10/20/22  2:32 PM   Result Value Ref Range    Lactic acid 1.3 0.4 - 2.0 mmol/L   MAGNESIUM    Collection Time: 10/20/22  2:32 PM   Result Value Ref Range    Magnesium 1.2 (L) 1.6 - 2.6 mg/dL   NT-PRO BNP    Collection Time: 10/20/22  2:32 PM   Result Value Ref Range    NT pro-BNP 2,191 (H) 0 - 1,800 pg/mL       Radiologic Studies -   [unfilled]  CT Results  (Last 48 hours)                 10/20/22 1403  CT HEAD WO CONT Final result Impression:          1. No acute intracranial abnormality. 2. Chronic left maxillary sinus disease. 3. Residual sellar mass, similar to prior brain MRI 7 months prior. Narrative:  EXAM: CT head       INDICATION: Left lower extremity numbness, cough, weakness       COMPARISON: MR brain 4/15/2022       TECHNIQUE: Axial CT imaging of the head was performed without intravenous   contrast. Standard multiplanar coronal and sagittal reformatted images were   obtained and are included in interpretation. One or more dose reduction techniques were used on this CT: automated exposure   control, adjustment of the mAs and/or kVp according to patient size, and   iterative reconstruction techniques. The specific techniques used on this CT   exam have been documented in the patient's electronic medical record. Digital   Imaging and Communications in Medicine (DICOM) format image data are available   to nonaffiliated external healthcare facilities or entities on a secure, media   free, reciprocally searchable basis with patient authorization for at least a   12-month period after this study. _______________       FINDINGS:       BRAIN AND POSTERIOR FOSSA: Mild cortical and cerebellar volume loss   redemonstrated. Stable ventricular size and configuration with patent basilar   cisterns. There is no intracranial hemorrhage, mass effect, or midline shift. Gray-white matter differentiation is within normal limits. Expanded sella with   soft tissue density, similar to prior brain MRI. EXTRA-AXIAL SPACES AND MENINGES: There are no abnormal extra-axial fluid   collections. CALVARIUM: Intact. SINUSES: Chronic left maxillary mucosal thickening increased density of the left   maxillary sinus contents. OTHER: None.       _______________                 CXR Results  (Last 48 hours)                 10/20/22 1413  XR CHEST PORT Final result    Impression:      No active cardiopulmonary disease. Narrative:  EXAM: XR CHEST PORT       CLINICAL INDICATION/HISTORY: Weakness   -Additional: None       COMPARISON: May 6, 2022       TECHNIQUE: Portable frontal view of the chest       _______________       FINDINGS:       SUPPORT DEVICES: Right chest wall Mediport catheter in stable position from   prior study. HEART AND MEDIASTINUM: Normal cardiac size and mediastinal contours. LUNGS AND PLEURAL SPACES: No focal pneumonic opacity, pneumothorax or pleural   effusion. BONY THORAX AND SOFT TISSUES: Unremarkable.       _______________                   Medical Decision Making and ED Course   Differential Diagnosis & Medical Decision Making Provider Note:       - I am the first and primary provider for this patient. I reviewed the vital signs, available nursing notes, past medical history, past surgical history, family history and social history. The patient's presenting problems have been discussed, and the staff are in agreement with the care plan formulated and outlined with them. I have encouraged them to ask questions as they arise throughout their visit. Vital Signs-Reviewed the patient's vital signs. Patient Vitals for the past 12 hrs:   Temp Pulse Resp BP SpO2   10/20/22 1651 -- 60 -- (!) 158/63 --   10/20/22 1218 97.1 °F (36.2 °C) 69 16 (!) 164/77 100 %       EKG interpretation: (Preliminary): EKG Interpreted by me. Shows NSR 66/minute, prolonged pR, NSSTT changes    ED Course:        HYPERTENSION COUNSELING: Education was provided to the patient today regarding their hypertension. Patient is made aware of their elevated blood pressure and is instructed to follow up this week with their Primary Care for a recheck. Patient is counseled regarding consequences of chronic, uncontrolled hypertension including kidney disease, heart disease, stroke or even death. Patient states their understanding and agrees to follow up this week.  Additionally, during their visit, I discussed sodium restriction, maintaining ideal body weight and regular exercise program as physiologic means to achieve blood pressure control. The patient will strive towards this. Procedures and Critical Care     Performed by: Nitza Balderas MD  Procedures        Disposition   Disposition: Condition stable and improved  DC- Adult Discharges: All of the diagnostic tests were reviewed and questions answered. Diagnosis, care plan and treatment options were discussed. The patient understands the instructions and will follow up as directed. The patients results have been reviewed with them. They have been counseled regarding their diagnosis. The patient and family member patient, daughter, and other:  Family members (Lubnablayne Wolf) verbally convey understanding and agreement of the signs, symptoms, diagnosis, treatment and prognosis and additionally agrees to follow up as recommended with their PCP in 24 - 48 hours. They also agree with the care-plan and convey that all of their questions have been answered. I have also put together some discharge instructions for them that include: 1) educational information regarding their diagnosis, 2) how to care for their diagnosis at home, as well a 3) list of reasons why they would want to return to the ED prior to their follow-up appointment, should their condition change. DISCHARGE PLAN:  1. Current Discharge Medication List        CONTINUE these medications which have NOT CHANGED    Details   promethazine (PHENERGAN) 12.5 mg tablet Take 1 Tablet by mouth.      loratadine (CLARITIN) 10 mg tablet Take 10 mg by mouth. spironolactone (ALDACTONE) 25 mg tablet Take 1 Tablet by mouth daily.   Qty: 30 Tablet, Refills: 0    Associated Diagnoses: Hypertensive urgency      omeprazole (PRILOSEC) 40 mg capsule TAKE 1 CAPSULE BY MOUTH DAILY  Qty: 90 Capsule, Refills: 2    Associated Diagnoses: Gastroesophageal reflux disease, unspecified whether esophagitis present      metoprolol succinate (TOPROL-XL) 25 mg XL tablet TAKE 1 TABLET BY MOUTH DAILY  Qty: 90 Tablet, Refills: 0    Comments: **Patient requests 90 days supply**  Associated Diagnoses: Hypertensive urgency      sodium bicarbonate 650 mg tablet TAKE 1 TABLET BY MOUTH THREE TIMES DAILY  Qty: 90 Tablet, Refills: 5      atorvastatin (LIPITOR) 40 mg tablet Take 40 mg by mouth in the morning. amLODIPine (NORVASC) 5 mg tablet       glipiZIDE (GLUCOTROL) 10 mg tablet Take 10 mg by mouth two (2) times a day. Eliquis 5 mg tablet Take 1 Tablet by mouth daily. hydrALAZINE (APRESOLINE) 50 mg tablet Take 100 mg by mouth three (3) times daily. furosemide (LASIX) 40 mg tablet Take one tab daily  Qty: 90 Tablet, Refills: 1    Associated Diagnoses: Chronic diastolic congestive heart failure ([address] Stage 3 chronic kidney disease, unspecified whether stage 3a or 3b CKD ([facility])      ferrous sulfate (FeroSuL) 325 mg (65 mg iron) tablet TAKE 1 TABLET BY MOUTH THREE TIMES DAILY WITH MEALS  Qty: 90 Tablet, Refills: 5      ergocalciferol (ERGOCALCIFEROL) 1,250 mcg (50,000 unit) capsule TAKE ONE CAPSULE BY MOUTH EVERY 7 DAYS  Qty: 16 Capsule, Refills: 1    Associated Diagnoses: Stage 3 chronic kidney disease, unspecified whether stage 3a or 3b CKD ([facility])      allopurinoL (ZYLOPRIM) 100 mg tablet Take 2 Tablets by mouth daily for 360 days. Qty: 180 Tablet, Refills: 3      glucose blood VI test strips (Accu-Chek Precious Plus test strp) strip CHECK BLOOD GLUCOSE THREE TIMES DAILY  Qty: 300 Strip, Refills: 3    Associated Diagnoses: Type 2 diabetes mellitus with stage 4 chronic kidney disease, with long-term current use of insulin ([facility])      Oxygen 2 LPM nc as needed      albuterol (PROVENTIL HFA, VENTOLIN HFA, PROAIR HFA) 90 mcg/actuation inhaler Take 2 Puffs by inhalation every four (4) hours as needed for Wheezing, Shortness of Breath or Respiratory Distress.   Qty: 1 Each, Refills: 1    Associated Diagnoses: SOB (shortness of breath) on exertion magnesium oxide 250 mg magnesium tablet Take 1 Tablet by mouth every other day. Qty: 10 Tablet, Refills: 0    Associated Diagnoses: Hypomagnesemia           2. Follow-up Information       Follow up With Specialties Details Why Contact Info    Cata Lewis MD Family Medicine In 1 week As scheduled [address] [address] [address] [place] [address]      Frank De Los Santos MD Nephrology Schedule an appointment as soon as possible for a visit today  [address] [place] [address] Dr. Juan Armando [id number]  [phone]            3. Return to ED if worse   4. Current Discharge Medication List        Remove if admitted/discharged    Diagnosis/Clinical Impression     Clinical Impression:   1. Acute sinusitis, recurrence not specified, unspecified location    2. Urinary tract infection without hematuria, site unspecified    3. CKD (chronic kidney disease) stage 4, GFR 15-29 ml/min ([facility])    4. Hypomagnesemia    5. Influenza B        Attestations: Aron Bailey MD, am the primary clinician of record. Please note that this dictation was completed with [employer], the computer voice recognition software. Quite often unanticipated grammatical, syntax, homophones, and other interpretive errors are inadvertently transcribed by the computer software. Please disregard these errors. Please excuse any errors that have escaped final proofreading. Thank you.

## 2022-10-21 LAB
ATRIAL RATE: 66 BPM
CALCULATED P AXIS, ECG09: 0 DEGREES
CALCULATED R AXIS, ECG10: -26 DEGREES
CALCULATED T AXIS, ECG11: 10 DEGREES
DIAGNOSIS, 93000: NORMAL
P-R INTERVAL, ECG05: 218 MS
Q-T INTERVAL, ECG07: 444 MS
QRS DURATION, ECG06: 106 MS
QTC CALCULATION (BEZET), ECG08: 466 MS
VENTRICULAR RATE, ECG03: 66 BPM

## 2022-10-23 PROBLEM — R79.89 ELEVATED TROPONIN: Status: ACTIVE | Noted: 2022-10-23

## 2022-10-23 PROBLEM — J10.1 INFLUENZA B: Status: ACTIVE | Noted: 2022-10-23

## 2022-10-23 PROBLEM — R77.8 ELEVATED TROPONIN: Status: ACTIVE | Noted: 2022-10-23

## 2022-10-23 PROBLEM — R53.1 WEAKNESS: Status: ACTIVE | Noted: 2022-10-23

## 2022-10-29 DIAGNOSIS — N18.30 STAGE 3 CHRONIC KIDNEY DISEASE, UNSPECIFIED WHETHER STAGE 3A OR 3B CKD (HCC): ICD-10-CM

## 2022-10-29 DIAGNOSIS — I50.32 CHRONIC DIASTOLIC CONGESTIVE HEART FAILURE (HCC): ICD-10-CM

## 2022-11-03 RX ORDER — FUROSEMIDE 40 MG/1
TABLET ORAL
Qty: 90 TABLET | Refills: 1 | Status: SHIPPED | OUTPATIENT
Start: 2022-11-03

## 2022-11-22 PROBLEM — R77.8 ELEVATED TROPONIN: Status: RESOLVED | Noted: 2022-10-23 | Resolved: 2022-11-22

## 2022-11-22 PROBLEM — R79.89 ELEVATED TROPONIN: Status: RESOLVED | Noted: 2022-10-23 | Resolved: 2022-11-22

## 2022-12-15 ENCOUNTER — OFFICE VISIT (OUTPATIENT)
Dept: INTERNAL MEDICINE CLINIC | Age: 77
End: 2022-12-15
Payer: MEDICARE

## 2022-12-15 ENCOUNTER — HOSPITAL ENCOUNTER (OUTPATIENT)
Dept: LAB | Age: 77
Discharge: HOME | End: 2022-12-15

## 2022-12-15 VITALS
TEMPERATURE: 97.3 F | OXYGEN SATURATION: 98 % | DIASTOLIC BLOOD PRESSURE: 58 MMHG | SYSTOLIC BLOOD PRESSURE: 105 MMHG | BODY MASS INDEX: 28.28 KG/M2 | HEART RATE: 88 BPM | RESPIRATION RATE: 20 BRPM | WEIGHT: 186.6 LBS | HEIGHT: 68 IN

## 2022-12-15 DIAGNOSIS — R09.02 HYPOXIA: ICD-10-CM

## 2022-12-15 DIAGNOSIS — D64.9 ANEMIA, UNSPECIFIED TYPE: ICD-10-CM

## 2022-12-15 DIAGNOSIS — Z09 HOSPITAL DISCHARGE FOLLOW-UP: ICD-10-CM

## 2022-12-15 DIAGNOSIS — I50.33 ACUTE ON CHRONIC DIASTOLIC HEART FAILURE (HCC): Primary | ICD-10-CM

## 2022-12-15 DIAGNOSIS — N17.9 AKI (ACUTE KIDNEY INJURY) (HCC): ICD-10-CM

## 2022-12-15 PROCEDURE — 3078F DIAST BP <80 MM HG: CPT | Performed by: FAMILY MEDICINE

## 2022-12-15 PROCEDURE — 99001 SPECIMEN HANDLING PT-LAB: CPT

## 2022-12-15 PROCEDURE — 1111F DSCHRG MED/CURRENT MED MERGE: CPT | Performed by: FAMILY MEDICINE

## 2022-12-15 PROCEDURE — 3074F SYST BP LT 130 MM HG: CPT | Performed by: FAMILY MEDICINE

## 2022-12-15 PROCEDURE — 1101F PT FALLS ASSESS-DOCD LE1/YR: CPT | Performed by: FAMILY MEDICINE

## 2022-12-15 PROCEDURE — G8417 CALC BMI ABV UP PARAM F/U: HCPCS | Performed by: FAMILY MEDICINE

## 2022-12-15 PROCEDURE — G8510 SCR DEP NEG, NO PLAN REQD: HCPCS | Performed by: FAMILY MEDICINE

## 2022-12-15 PROCEDURE — G8536 NO DOC ELDER MAL SCRN: HCPCS | Performed by: FAMILY MEDICINE

## 2022-12-15 PROCEDURE — 1123F ACP DISCUSS/DSCN MKR DOCD: CPT | Performed by: FAMILY MEDICINE

## 2022-12-15 PROCEDURE — 99214 OFFICE O/P EST MOD 30 MIN: CPT | Performed by: FAMILY MEDICINE

## 2022-12-15 PROCEDURE — G9711 PT HX TOT COL OR COLON CA: HCPCS | Performed by: FAMILY MEDICINE

## 2022-12-15 PROCEDURE — G8427 DOCREV CUR MEDS BY ELIG CLIN: HCPCS | Performed by: FAMILY MEDICINE

## 2022-12-15 RX ORDER — SENNOSIDES 8.6 MG/1
1 TABLET ORAL DAILY
COMMUNITY

## 2022-12-15 RX ORDER — LISINOPRIL 20 MG/1
20 TABLET ORAL DAILY
COMMUNITY

## 2022-12-15 RX ORDER — INSULIN GLARGINE 100 [IU]/ML
15 INJECTION, SOLUTION SUBCUTANEOUS DAILY
COMMUNITY

## 2022-12-15 RX ORDER — AMLODIPINE BESYLATE 10 MG/1
10 TABLET ORAL DAILY
COMMUNITY

## 2022-12-15 RX ORDER — NITROGLYCERIN 0.4 MG/1
0.4 TABLET SUBLINGUAL
COMMUNITY

## 2022-12-15 RX ORDER — DICYCLOMINE HYDROCHLORIDE 20 MG/1
20 TABLET ORAL EVERY 6 HOURS
COMMUNITY

## 2022-12-15 RX ORDER — LANOLIN ALCOHOL/MO/W.PET/CERES
325 CREAM (GRAM) TOPICAL
COMMUNITY

## 2022-12-15 RX ORDER — HYDRALAZINE HYDROCHLORIDE 100 MG/1
100 TABLET, FILM COATED ORAL 3 TIMES DAILY
COMMUNITY

## 2022-12-15 RX ORDER — FLUTICASONE PROPIONATE 50 MCG
2 SPRAY, SUSPENSION (ML) NASAL DAILY
COMMUNITY

## 2022-12-15 RX ORDER — FUROSEMIDE 20 MG/1
20 TABLET ORAL DAILY
COMMUNITY

## 2022-12-15 RX ORDER — CLONIDINE HYDROCHLORIDE 0.3 MG/1
0.3 TABLET ORAL 3 TIMES DAILY
COMMUNITY

## 2022-12-15 RX ORDER — ISOSORBIDE MONONITRATE 60 MG/1
60 TABLET, EXTENDED RELEASE ORAL
COMMUNITY

## 2022-12-15 RX ORDER — PEN NEEDLE, DIABETIC 30 GX3/16"
1 NEEDLE, DISPOSABLE MISCELLANEOUS
COMMUNITY

## 2022-12-15 NOTE — PROGRESS NOTES
Discharged 11/16/2022 from [facility] for Respiratory failure with hypoxia and acute CHF. Hospital admission 10/22/2022 as well for elevated troponin. Dick Gudino presents today for   Chief Complaint   Patient presents with    Hospital Follow Up       Is someone accompanying this pt? Yes, neice    Is the patient using any DME equipment during OV? no    Depression Screening:  3 most recent PHQ Screens 12/15/2022   Little interest or pleasure in doing things Not at all   Feeling down, depressed, irritable, or hopeless Not at all   Total Score PHQ 2 0   Trouble falling or staying asleep, or sleeping too much -   Feeling tired or having little energy -   Poor appetite, weight loss, or overeating -   Feeling bad about yourself - or that you are a failure or have let yourself or your family down -   Trouble concentrating on things such as school, work, reading, or watching TV -   Moving or speaking so slowly that other people could have noticed; or the opposite being so fidgety that others notice -   Thoughts of being better off dead, or hurting yourself in some way -   PHQ 9 Score -   How difficult have these problems made it for you to do your work, take care of your home and get along with others -       Learning Assessment:  Learning Assessment 1/28/2022   PRIMARY LEARNER Patient   HIGHEST LEVEL OF EDUCATION - PRIMARY LEARNER  GRADUATED HIGH SCHOOL OR GED   BARRIERS PRIMARY LEARNER NONE   CO-LEARNER CAREGIVER Yes   CO-LEARNER NAME Sandra   PRIMARY LANGUAGE ENGLISH   LEARNER PREFERENCE PRIMARY DEMONSTRATION   ANSWERED BY daughter   RELATIONSHIP OTHER       Fall Risk  Fall Risk Assessment, last 12 mths 12/15/2022   Able to walk? Yes   Fall in past 12 months? 0   Do you feel unsteady? 0   Are you worried about falling 0   Is TUG test greater than 12 seconds?  -   Is the gait abnormal? -   Number of falls in past 12 months -   Fall with injury? -       ADL  ADL Assessment 12/15/2022   Feeding yourself No Help Needed Getting from bed to chair No Help Needed   Getting dressed No Help Needed   Bathing or showering No Help Needed   Walk across the room (includes cane/walker) No Help Needed   Using the telphone No Help Needed   Taking your medications Help Needed   Preparing meals Help Needed   Managing money (expenses/bills) No Help Needed   Moderately strenuous housework (laundry) Help Needed   Shopping for personal items (toiletries/medicines) Help Needed   Shopping for groceries Help Needed   Driving Help Needed   Climbing a flight of stairs Help Needed   Getting to places beyond walking distances Help Needed       Health Maintenance reviewed and discussed and ordered per Provider. Health Maintenance Due   Topic Date Due    Eye Exam Retinal or Dilated  Never done    Shingrix Vaccine Age 50> (1 of 2) Never done    DTaP/Tdap/Td series (1 - Tdap) Never done    AAA Screening 73-69 YO Male Smoking Patients  Never done    COVID-19 Vaccine (4 - Booster) 12/10/2021   . Coordination of Care:  1. \"Have you been to the ER, urgent care clinic since your last visit? Hospitalized since your last visit? \" Yes Where: [facility]    2. \"Have you seen or consulted any other health care providers outside of the [address] [place] [address] since your last visit? \" No     3. For patients aged 39-70: Has the patient had a colonoscopy?  Yes - no Care Gap present     I

## 2022-12-15 NOTE — PROGRESS NOTES
Jennifer Graff (: 1945) is a 78 y.o. male here for evaluation of the following chief concern(s):  Hospital Follow Up  Chronic condition management     ASSESSMENT/PLAN:  1. Acute on chronic diastolic heart failure ([facility] [address]  2. Hypoxia  3. ARON (acute kidney injury) ([facility] [address]  -     METABOLIC PANEL, BASIC  4. Anemia, unspecified type  -     CBC WITH AUTOMATED DIFF    Mr. Ponce Crocker appears medically stable and euvolemic, normal hemodynamics w/ asymptomatic low range BP, no hypoxia on RA. We reviewed his discharge summary including reason for admission; CHF exacerbation. Nursing provided thorough med rec; multiple med discrepancies (Amlodipine, Sodium bicarb, Metoprolol), corrected directly on medication bottles and updated in our EHR, in addition see personalized AVS.    Continue close monitoring. Estimated Creatinine Clearance: 19.4 mL/min (A) (by C-G formula based on SCr of 3.48 mg/dL (H)). Return in about 4 weeks (around 2023) for with partner for chornic condition management or sooner if needed. Dallas Crocker agrees with plan as above and has no additional questions at this time. +ADDENDUM; 12/15/22 results reviewed; recommend repeat BMP next visit  CBC WBC 7.7, hgb 9.9, platelets WNL  BMP BUN 48 (from 64 on 22), Cr 3.6 (from 3.46, and 4.1 on 21), k+ 5 (from 4.8), CO2 21 (from 17)      SUBJECTIVE/OBJECTIVE:  Mr. Ponce Crocker is accompanied to the office today by his niece/caregiver who assist w/ HPI. Pt is feeling well. No acute concerns. Since last visit, pt has had multiple hospital admissions;  10/22/22 for influenza B and elevated troponin; discharged w/ Tamiflu. Most recently admission to [facility] 11/3-22 for CHF exacerbation w/ hypoxic respiratory failure.   Discharge Diagnosis: Acute hypoxic respiratory failure, rule out post influenza pneumonia superimposed with acute on chronic diastolic CHF exacerbation  Mucous plugging chest x-ray  Elevated D-dimer -V/Q low probability  Elevated troponin due to type II MI  Acute on chronic diastolic heart failure  Acute kidney injury superimposed on stage IV CKD-stable  Hx papillary urothelial carcinoma s/p cystoprostatectomy  Urothelial carcinoma of left kidney s/p ileal conduit  Hx left nephrectomy  Diabetes mellitus, type 2  Coronary artery disease    HTN Urgency-improved  Hyperlipidemia  GERD    Hx DVT LLE  was previously on Eliquis. Unsure of stop date  Repeat lower extremity PVL negative for acute DVT      Discharge Medication List       START taking these medications     lisinopriL 20 mg Tabs  Take 1 Tab by Mouth Once a Day. Dispense: 30 Tab  Refills: 0  Commonly known as: PriniviL    sennosides 8.6 mg Tabs  Take 1 Tab by Mouth Once a Day. Refills: 0  Commonly known as: Senokot      CHANGE how you take these medications     ferrous sulfate 325 mg (65 mg Iron) Tabs  Take 1 Tab by Mouth Once a Day. Refills: 0  Commonly known as: Feosol  What changed: when to take this    furosemide 20 mg Tabs  Take 1 Tab by Mouth Once a Day. Refills: 0  Commonly known as: Lasix  What changed:   medication strength  how much to take    Insulin Glargine 100 unit/mL (3 mL) insulin pen  Inject 15 Units beneath the skin Every Morning. Refills: 0  Commonly known as: Lantus Solostar  What changed: how much to take      CONTINUE taking these medications     allopurinoL 100 mg Tabs  Take 200 mg by Mouth Once a Day. Refills: 0  Commonly known as: Zyloprim    amLODIPine 5 mg Tabs  Take 2 Tabs by Mouth Once a Day. Dispense: 60 Tab  Refills: 0  Commonly known as: Norvasc    aspirin 81 mg Chew  Take 1 Tab by Mouth Once a Day. Dispense: 30 Tab  Refills: 0    atorvastatin 40 mg Tabs  Take 40 mg by Mouth Every Night at Bedtime. Refills: 0  Commonly known as: Lipitor    cloNIDine HCL 0.3 mg Tabs  Take 0.3 mg by Mouth 3 Times Daily.   Refills: 0  Commonly known as: Catapres    dicyclomine 20 mg Tabs  Take 1 Tab by Mouth 4 Times Daily As Needed (abdominal pain). Dispense: 30 Tab  Refills: 0  Commonly known as: BentyL    ergocalciferol 50,000 unit Caps  Take 50,000 Units by Mouth Every Monday. Refills: 0  Commonly known as: Vitamin D2    fluticasone propionate 50 mcg/actuation Spsn  1 Spray by each nostril route Daily as needed. Refills: 0  Commonly known as: Flonase    hydrALAZINE 100 mg Tabs  Take 100 mg by Mouth 3 Times Daily. Refills: 0  Commonly known as: Apresoline    Insulin Pen Needles (Disposable) 31 gauge x 5/16\" Ndle  1 Each by Misc. (Non-Drug; Combo Route) route Three Times Daily with Meals. Please give 1 box of pen needles  Dispense: 1 Each  Refills: 3  Commonly known as: BD Insulin Pen Needle UF    isosorbide mononitrate CR 60 mg Tb24  Take 60 mg by Mouth Once a Day. Refills: 0  Commonly known as: Imdur    metoprolol XL 25 mg Tb24  Take 12.5 mg by Mouth Once a Day. Refills: 0  Commonly known as: Toprol XL    nitroglycerin 0.4 mg Subl  Dissolve 0.4 mg under tongue Every 5 Minutes as Needed. Refills: 0  Commonly known as: Nitrostat    Omeprazole 40 mg Cpdr  Take 40 mg by Mouth Every Morning Before Breakfast.  Refills: 0    Ostomy Supplies Misc  1 Each by Locu. (Non-Drug; Combo Route) route Take As Needed when device needs replacement Urostomy supplies: (Clifford Thames)  10\" Activelife one piece pouch with convex, precut 13/8\", # [id number], 1 box of 10 q month  No stoma paste  Night bag, 2000cc, any brand 2 bags /month  Dispense: 1  Refills: 12    sodium bicarbonate 650 mg Tabs  Take 2 Tabs by Mouth Twice Daily.   Dispense: 60 Tab  Refills: 0    STOP taking these medications     HYDROcodone-acetaminophen 5-325 mg Tabs  Commonly known as: [place] 5     Past Medical History:   Diagnosis Date    Acid reflux     Arrhythmia     Arthritis     Bladder cancer ([facility] [address]     Colon cancer ([facility] [address] 11/2021    Congestive heart failure (HCC)     Deep vein thrombosis (DVT) of proximal vein of both lower extremities ([facility] [address] 9/14/2020    Diabetes mellitus ([facility] [address] Difficult intubation     Narrow airway-per Pulm notes(in media)     GERD (gastroesophageal reflux disease)     Gout     High cholesterol     Hypertension     Kidney carcinoma, left ([facility] [address] 2016    left nephrectomy    Kidney disease     Pituitary mass ([facility] [address]     Pulmonary HTN ([facility] [address] 10/2021    PASP 77 mmHg    Reflux esophagitis     Unspecified deficiency anemia      Past Surgical History:   Procedure Laterality Date    COLONOSCOPY N/A 2021    COLONOSCOPY with Polypectomies, Tattoo & Clip Placement performed by Maryam Villar MD at [facility]  2009    bladder removal/ urostomy bag    HX NEPHRECTOMY Left 2009    Nephroureterectomy by Dr. Anselmo Serna      surgery on pituitary gland     Family History   Problem Relation Age of Onset    Heart Disease Father     Heart Disease Mother        Social History     Socioeconomic History    Marital status: SINGLE     Spouse name: Not on file    Number of children: Not on file    Years of education: Not on file    Highest education level: Not on file   Occupational History    Not on file   Tobacco Use    Smoking status: Former     Packs/day: 0.50     Years: 40.00     Pack years: 20.00     Types: Cigarettes     Quit date: 1999     Years since quittin.0    Smokeless tobacco: Never   Vaping Use    Vaping Use: Never used   Substance and Sexual Activity    Alcohol use: No    Drug use: No    Sexual activity: Yes   Other Topics Concern    Not on file   Social History Narrative    Not on file     Social Determinants of Health     Financial Resource Strain: Not on file   Food Insecurity: Not on file   Transportation Needs: Not on file   Physical Activity: Not on file   Stress: Not on file   Social Connections: Not on file   Intimate Partner Violence: Not on file   Housing Stability: Not on file     Social History     Tobacco Use   Smoking Status Former    Packs/day: 0.50    Years: 40.00    Pack years: 20.00 Types: Cigarettes    Quit date: 1999    Years since quittin.0   Smokeless Tobacco Never       Current Outpatient Medications   Medication Sig Dispense Refill    furosemide (LASIX) 20 mg tablet Take 20 mg by mouth daily. ferrous sulfate 325 mg (65 mg iron) tablet Take 325 mg by mouth Daily (before breakfast). lisinopriL (PRINIVIL, ZESTRIL) 20 mg tablet Take 20 mg by mouth daily. Senna 8.6 mg tablet Take 1 Tablet by mouth daily. insulin glargine (LANTUS,BASAGLAR) 100 unit/mL (3 mL) inpn 15 Units by SubCUTAneous route daily. amLODIPine (NORVASC) 10 mg tablet Take 10 mg by mouth daily. cloNIDine HCL (CATAPRES) 0.3 mg tablet Take 0.3 mg by mouth three (3) times daily. dicyclomine (BENTYL) 20 mg tablet Take 20 mg by mouth every six (6) hours. fluticasone propionate (FLONASE) 50 mcg/actuation nasal spray 2 Sprays by Both Nostrils route daily. hydrALAZINE (APRESOLINE) 100 mg tablet Take 100 mg by mouth three (3) times daily. Insulin Needles, Disposable, (Comfort EZ Pen Needles) 31 gauge x 5/16\" ndle 1 Each by Does Not Apply route. 3 times daily      isosorbide mononitrate ER (IMDUR) 60 mg CR tablet Take 60 mg by mouth every morning. nitroglycerin (NITROSTAT) 0.4 mg SL tablet 0.4 mg by SubLINGual route every five (5) minutes as needed for Chest Pain. Up to 3 doses. ostomy supply (OSTOMY SUPPLIES) by Does Not Apply route.      loratadine (CLARITIN) 10 mg tablet Take 10 mg by mouth. omeprazole (PRILOSEC) 40 mg capsule TAKE 1 CAPSULE BY MOUTH DAILY 90 Capsule 2    metoprolol succinate (TOPROL-XL) 25 mg XL tablet TAKE 1 TABLET BY MOUTH DAILY (Patient taking differently: Take 12.5 mg by mouth daily. Take half tablet daily to equal 12.5 mg) 90 Tablet 0    sodium bicarbonate 650 mg tablet TAKE 1 TABLET BY MOUTH THREE TIMES DAILY (Patient taking differently: Take 650 mg by mouth two (2) times a day.  Take 2 tabs by mouth twice daily) 90 Tablet 5    atorvastatin (LIPITOR) 40 mg tablet Take 40 mg by mouth in the morning. glipiZIDE (GLUCOTROL) 10 mg tablet Take 10 mg by mouth two (2) times a day. Eliquis 5 mg tablet Take 1 Tablet by mouth daily. ergocalciferol (ERGOCALCIFEROL) 1,250 mcg (50,000 unit) capsule TAKE ONE CAPSULE BY MOUTH EVERY 7 DAYS 16 Capsule 1    allopurinoL (ZYLOPRIM) 100 mg tablet Take 2 Tablets by mouth daily for 360 days. 180 Tablet 3    Oxygen 2 LPM nc as needed      albuterol (PROVENTIL HFA, VENTOLIN HFA, PROAIR HFA) 90 mcg/actuation inhaler Take 2 Puffs by inhalation every four (4) hours as needed for Wheezing, Shortness of Breath or Respiratory Distress. 1 Each 1    spironolactone (ALDACTONE) 25 mg tablet Take 1 Tablet by mouth daily. (Patient not taking: Reported on 12/15/2022) 30 Tablet 0    glucose blood VI test strips (Accu-Chek Precious Plus test strp) strip CHECK BLOOD GLUCOSE THREE TIMES DAILY 300 Strip 3     No Known Allergies    BP (!) 105/58 (BP 1 Location: Left upper arm, BP Patient Position: Sitting, BP Cuff Size: Adult)   Pulse 88   Temp 97.3 °F (36.3 °C) (Temporal)   Resp 20   Ht 5' 8\" (1.727 m)   Wt 186 lb 9.6 oz (84.6 kg)   SpO2 98%   BMI 28.37 kg/m²     Physical Exam  Constitutional:       General: He is not in acute distress. Appearance: Normal appearance. He is not ill-appearing. Cardiovascular:      Rate and Rhythm: Normal rate and regular rhythm. Heart sounds: Normal heart sounds. No murmur heard. Pulmonary:      Effort: Pulmonary effort is normal. No respiratory distress. Breath sounds: Normal breath sounds. No wheezing or rales. Abdominal:      General: Bowel sounds are normal.      Palpations: Abdomen is soft. Tenderness: There is no abdominal tenderness. Musculoskeletal:      Right lower leg: No edema. Left lower leg: No edema. Skin:     General: Skin is warm and dry. Findings: No rash. Neurological:      Mental Status: He is alert. Mental status is at baseline. Psychiatric:         Mood and Affect: Mood normal.     Lab Results   Component Value Date/Time    WBC 2.0 (L) 10/25/2022 05:22 AM    HGB 9.4 (L) 10/25/2022 05:22 AM    HCT 29.3 (L) 10/25/2022 05:22 AM    PLATELET 777 19/86/5792 05:22 AM    MCV 90.2 10/25/2022 05:22 AM     Lab Results   Component Value Date/Time    Sodium 142 10/25/2022 05:22 AM    Potassium 4.0 10/25/2022 05:22 AM    Chloride 108 (H) 10/25/2022 05:22 AM    CO2 22 10/25/2022 05:22 AM    Anion gap 12 10/25/2022 05:22 AM    Glucose 142 (H) 10/25/2022 05:22 AM    BUN 54 (H) 10/25/2022 05:22 AM    Creatinine 3.48 (H) 10/25/2022 05:22 AM    BUN/Creatinine ratio 17 10/20/2022 02:32 PM    GFR est AA 22.0 10/25/2022 05:22 AM    GFR est non-AA 18 10/25/2022 05:22 AM    Calcium 8.7 10/25/2022 05:22 AM    Bilirubin, total <0.20 (L) 10/25/2022 05:22 AM    Alk. phosphatase 87 10/25/2022 05:22 AM    Protein, total 6.0 10/25/2022 05:22 AM    Albumin 2.9 (L) 10/25/2022 05:22 AM    Globulin 3.9 10/20/2022 02:32 PM    A-G Ratio 0.8 10/20/2022 02:32 PM    ALT (SGPT) 16 10/25/2022 05:22 AM    AST (SGOT) 26.0 10/25/2022 05:22 AM     Lab Results   Component Value Date/Time    Cholesterol, total 97 10/06/2021 11:25 PM    HDL Cholesterol 31 (L) 10/06/2021 11:25 PM    LDL, calculated 53.8 10/06/2021 11:25 PM    VLDL, calculated 12.2 10/06/2021 11:25 PM    Triglyceride 61 10/06/2021 11:25 PM    CHOL/HDL Ratio 3.1 10/06/2021 11:25 PM     On this date 12/15/22 I have spent >30 minutes reviewing previous notes, test results and face to face with the patient for interview/exam, discussing working diagnosis and treatment plan. Medical decision making complexity: moderate-high.     Jack Stovall MD   Family & Geriatric Medicine

## 2022-12-15 NOTE — PATIENT INSTRUCTIONS
Mr. Carballo Pi,  Based on your discharge records:    Sodium Bicarb 650mg TWICE daily  Amlodipine 10mg total daily dose (either TWO of your 5mg pills, or ONE of the 10mg pills)  Metoprolol 25mg HALF of a tablet ONCE per day  Eliquis 5mg twice daily    Please be sure to follow-up w/ Cardiology, Nephrology, Oncology

## 2022-12-20 ENCOUNTER — TRANSCRIBE ORDER (OUTPATIENT)
Dept: SCHEDULING | Age: 77
End: 2022-12-20

## 2022-12-20 DIAGNOSIS — C18.4 MALIGNANT NEOPLASM OF TRANSVERSE COLON (HCC): ICD-10-CM

## 2022-12-20 DIAGNOSIS — R59.1 GENERALIZED ENLARGED LYMPH NODES: Primary | ICD-10-CM

## 2022-12-28 ENCOUNTER — TELEPHONE (OUTPATIENT)
Dept: FAMILY MEDICINE CLINIC | Age: 77
End: 2022-12-28

## 2022-12-28 DIAGNOSIS — E11.22 TYPE 2 DIABETES MELLITUS WITH STAGE 4 CHRONIC KIDNEY DISEASE, WITH LONG-TERM CURRENT USE OF INSULIN (HCC): ICD-10-CM

## 2022-12-28 DIAGNOSIS — E11.65 TYPE 2 DIABETES MELLITUS WITH HYPERGLYCEMIA, WITH LONG-TERM CURRENT USE OF INSULIN (HCC): Primary | ICD-10-CM

## 2022-12-28 DIAGNOSIS — Z79.4 TYPE 2 DIABETES MELLITUS WITH HYPERGLYCEMIA, WITH LONG-TERM CURRENT USE OF INSULIN (HCC): Primary | ICD-10-CM

## 2022-12-28 DIAGNOSIS — Z79.4 TYPE 2 DIABETES MELLITUS WITH STAGE 4 CHRONIC KIDNEY DISEASE, WITH LONG-TERM CURRENT USE OF INSULIN (HCC): ICD-10-CM

## 2022-12-28 DIAGNOSIS — N18.4 TYPE 2 DIABETES MELLITUS WITH STAGE 4 CHRONIC KIDNEY DISEASE, WITH LONG-TERM CURRENT USE OF INSULIN (HCC): ICD-10-CM

## 2022-12-28 RX ORDER — LANCETS
EACH MISCELLANEOUS
Qty: 400 EACH | Refills: 3 | Status: SHIPPED | OUTPATIENT
Start: 2022-12-28

## 2022-12-28 RX ORDER — INSULIN PUMP SYRINGE, 3 ML
EACH MISCELLANEOUS
Qty: 1 KIT | Refills: 0 | Status: SHIPPED | OUTPATIENT
Start: 2022-12-28

## 2022-12-28 NOTE — TELEPHONE ENCOUNTER
----- Message from Juan Pablo Welch sent at 12/27/2022  2:00 PM EST -----  Subject: Referral Request    Reason for referral request? Please send referral to pharmacy for a new   glucose meter and test strips. Please have the pharmacy fill RX for the   last known meter and strips. Insurance said that the last known meter they   will pay for him to get a new one. [address] [facility],   [address] [place] [address]  Provider patient wants to be referred to(if known):     Provider Phone Number(if known): Additional Information for Provider? Please call Sandra once this is   completed.   ---------------------------------------------------------------------------  --------------  [facility]    [phone]; OK to leave message on voicemail  ---------------------------------------------------------------------------  --------------

## 2023-01-04 ENCOUNTER — TRANSCRIBE ORDER (OUTPATIENT)
Dept: SCHEDULING | Age: 78
End: 2023-01-04

## 2023-01-04 DIAGNOSIS — R07.9 CHEST PAIN: Primary | ICD-10-CM

## 2023-01-13 ENCOUNTER — HOSPITAL ENCOUNTER (OUTPATIENT)
Dept: LAB | Age: 78
Discharge: HOME | End: 2023-01-13

## 2023-01-13 PROCEDURE — 99001 SPECIMEN HANDLING PT-LAB: CPT

## 2023-01-16 ENCOUNTER — OFFICE VISIT (OUTPATIENT)
Dept: NEPHROLOGY | Age: 78
End: 2023-01-16
Payer: MEDICARE

## 2023-01-16 VITALS
WEIGHT: 188.8 LBS | SYSTOLIC BLOOD PRESSURE: 159 MMHG | HEART RATE: 75 BPM | DIASTOLIC BLOOD PRESSURE: 71 MMHG | BODY MASS INDEX: 28.71 KG/M2

## 2023-01-16 DIAGNOSIS — N18.4 CKD (CHRONIC KIDNEY DISEASE), STAGE IV (HCC): Primary | ICD-10-CM

## 2023-01-16 PROCEDURE — 99213 OFFICE O/P EST LOW 20 MIN: CPT | Performed by: INTERNAL MEDICINE

## 2023-01-16 PROCEDURE — G8417 CALC BMI ABV UP PARAM F/U: HCPCS | Performed by: INTERNAL MEDICINE

## 2023-01-16 PROCEDURE — G9711 PT HX TOT COL OR COLON CA: HCPCS | Performed by: INTERNAL MEDICINE

## 2023-01-16 PROCEDURE — G8427 DOCREV CUR MEDS BY ELIG CLIN: HCPCS | Performed by: INTERNAL MEDICINE

## 2023-01-16 PROCEDURE — 1123F ACP DISCUSS/DSCN MKR DOCD: CPT | Performed by: INTERNAL MEDICINE

## 2023-01-16 PROCEDURE — G8510 SCR DEP NEG, NO PLAN REQD: HCPCS | Performed by: INTERNAL MEDICINE

## 2023-01-16 PROCEDURE — 1101F PT FALLS ASSESS-DOCD LE1/YR: CPT | Performed by: INTERNAL MEDICINE

## 2023-01-16 PROCEDURE — G8536 NO DOC ELDER MAL SCRN: HCPCS | Performed by: INTERNAL MEDICINE

## 2023-01-16 NOTE — PROGRESS NOTES
Reason for f/u:  ARON on CKD IV/V     HPI:  The pt is seen for f/u for CKD. He is c/o sob arben on exertion. Noticed to have no  LE swelling also. No N/V/D. No urinary sx.       ROS:  Constitutional   Constitutional: no fatigue, no fever, no night sweats, no significant weight gain, no significant weight loss     Eyes   Eyes: no dry eyes, no vision change     ENMT   Nose: no frequent nosebleeds, no nose/sinus problems   Mouth/Throat: no dry mouth, no bleeding gums, no sore throat, no teeth problems, no snoring     Cardiovascular   Cardiovascular: no swelling in the extremities, no chest pain, no arm pain on exertion, no shortness of breath when walking, no known heart murmur, no shortness of breath when lying down, no palpitations     Respiratory   Respiratory: shortness of breath+, no cough, no wheezing, no coughing up blood     Gastrointestinal   Gastrointestinal: no nausea, no abdominal pain, no vomiting, normal appetite, no diarrhea, not vomiting blood     Genitourinary   Genitourinary: no hematuria, no incontinence, no difficulty urinating, no increased frequency     Musculoskeletal   Musculoskeletal: no back pain, no arthralgias/joint pain, no muscle aches, no muscle weakness     Integumentary   Skin: no rashes, no jaundice     Neurologic   Neurologic: no dizziness, no numbness, no loss of consciousness, no weakness, no seizures, no headaches     Psychiatric   Psych: no depression, no sleep disturbances     Hematologic/Lymphatic   Hematologic/Lymphatic no swollen glands, no bruising     Allergic/Immunologic   Allergy/Immunologic: no runny nose, no hives   ROS as noted in the HPI      PE:  Constitutional   Level of Distress: NAD, no acute illness, no chronic illness   General Appearance: healthy-appearing, well-nourished, well-developed   Ambulation: ambulating normally     Eyes   Pupils: PERRLA   EOM: EOMI     Cardiovascular   Heart Auscultation: RRR, normal S1, normal S2, no murmurs, no rubs, no gallop, no click, no KARI     Lungs   Auscultation: breath sounds normal, good air movement, CTA except as noted, no wheezing, no rales/crackles, no rhonchi     Abdomen   Inspection and Palpation: soft, non-distended, no tenderness, no CVA tenderness     Musculoskeletal System   Extremities: no clubbing, no cyanosis, edema +    Skin   Inspection and Palpation: no rash, no lesions, no ulcer, no induration, no nodule, turgor normal, no jaundice     Neurologic   Cranial Nerves: grossly intact   Gait And Station: normal gait, normal station        A/P:    1. ARON on Chronic kidney disease stage IV in a single kidney:   -worse at 5.2 (2/12/22), has improved to 3.2  (3/05)  -Cr 3.1 (7/05)  -Creatinine is 3.4 based on labs on 9/12.  -Cr is worse at 4.0 (1/13/23). -Cr was at 3.1-3.2  On 11/01-11/02  -his lasix was held but put it back on it for SOB/on NC 2L.   -advised to decrease lasix from 80 to 40 mg daily (3/09). -now advised to hold lasix. -he has unilateral functioning kidney due to left total nephrectomy.  -His urinalysis showed bland sediment and has 4-5 proteinuria.  -A kidney ultrasound surgically absent left kidney and right kidney with no hydronephrosis. -He was advised to avoid any kind of NSAIDs.  -Has mild uremic sx, No volume overload.  -I have explained in detail about the severity of his kidney disease.  -I will see him back in 3 weeks. 2. History of kidney cancer:  -Status post left total nephrectomy 8 years ago. 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  -He follow-up with oncologist.    3. Hypertension:  -Blood pressure is better now.  -will keep hydralazine 100 mg tid   -will keep metoprolol ER 50 mg daily  -off of clonidine and minoxidil  -on amlodipine 10 mg daily       4.  Anemia:  -Hb is 7.8   11/02  -Hb 9.8 (7/05)  -Hemoglobin 9.7 on 9/12.  -Hb 10.2 (1/13)  -TSAT 22->10%, Ferritin 283-->124  -advised to continue taking po FeSO4 325 mg tid.  -recommended to start on Epo 20,000 units every 3rd week (got denied)      5. Renal osteodystrophy.  -His Ca, phosphorus ok  - vitamin D25 hydroxy 18, will order Vit D 50,000 units weekly for 16 weeks.  -has sec HPT   -iPTH 169-->248, ordered parathyroid nuclear scan (pt could not do it this time)  -off  sensipar 30 mg daily. -on calcitriol 0.25 mcg daily    6. Gouty arthritis:  -His Uric acid is 5.0  -will keep on Allopurinol 200 mg daily. 7. Metabolic acidosis:  -CO2  22-->19-->22-->18  -will increase NaHCO3 650 mg 2 tabs tid    8. Proteinuria, nephrotic range:   -has 4-5 g/g per day   -likely from diabetic nephropathy   -Hep B/C, C3/C4, SFL ratio and ANCAs are neg   -unable to add ARB due to # 1 and K 5.0     9. SOB:  -from fluid overload  -sob on exertion and LE edema (better)  -last Echo 03/2020 LVEF 50-55%  -advised to take 40 mg daily (3/09). -will hold lasix due to # 1.      10. DM2:  -takes glipizide 10 mg bid  -already had an episode of severe hypoglycemia  -advised to decrease 10 mg daily and hold if BS < 100 and inform PCP

## 2023-01-16 NOTE — PROGRESS NOTES
Sonia Cordero presents today for   Chief Complaint   Patient presents with    Follow-up     ARON       Is someone accompanying this pt? no  Is the patient using any DME equipment during OV? Yes a cane     Depression Screening:  3 most recent PHQ Screens 1/16/2023   Little interest or pleasure in doing things Not at all   Feeling down, depressed, irritable, or hopeless Not at all   Total Score PHQ 2 0   Trouble falling or staying asleep, or sleeping too much -   Feeling tired or having little energy -   Poor appetite, weight loss, or overeating -   Feeling bad about yourself - or that you are a failure or have let yourself or your family down -   Trouble concentrating on things such as school, work, reading, or watching TV -   Moving or speaking so slowly that other people could have noticed; or the opposite being so fidgety that others notice -   Thoughts of being better off dead, or hurting yourself in some way -   PHQ 9 Score -   How difficult have these problems made it for you to do your work, take care of your home and get along with others -       Learning Assessment:  Learning Assessment 1/28/2022   PRIMARY LEARNER Patient   HIGHEST LEVEL OF EDUCATION - PRIMARY LEARNER  GRADUATED HIGH SCHOOL OR GED   BARRIERS PRIMARY LEARNER NONE   CO-LEARNER CAREGIVER Yes   CO-LEARNER NAME Sandra   PRIMARY LANGUAGE ENGLISH   LEARNER PREFERENCE PRIMARY DEMONSTRATION   ANSWERED BY daughter   RELATIONSHIP OTHER       Fall Risk  Fall Risk Assessment, last 12 mths 1/16/2023   Able to walk? Yes   Fall in past 12 months? 0   Do you feel unsteady? 0   Are you worried about falling 0   Is TUG test greater than 12 seconds? -   Is the gait abnormal? -   Number of falls in past 12 months -   Fall with injury? -       Coordination of Care:  1. Have you been to the ER, urgent care clinic since your last visit? Hospitalized since your last visit? no    2.  Have you seen or consulted any other health care providers outside of the [facility] [address] since your last visit? Include any pap smears or colon screening.  Yes, pcp Caitlyn Nova

## 2023-01-19 ENCOUNTER — HOSPITAL ENCOUNTER (OUTPATIENT)
Dept: CT IMAGING | Age: 78
Discharge: HOME | End: 2023-01-19
Payer: MEDICARE

## 2023-01-19 DIAGNOSIS — R59.1 GENERALIZED ENLARGED LYMPH NODES: ICD-10-CM

## 2023-01-19 DIAGNOSIS — C18.4 MALIGNANT NEOPLASM OF TRANSVERSE COLON (HCC): ICD-10-CM

## 2023-01-19 PROCEDURE — 74176 CT ABD & PELVIS W/O CONTRAST: CPT

## 2023-01-19 PROCEDURE — 74011000250 HC RX REV CODE- 250: Performed by: INTERNAL MEDICINE

## 2023-01-19 RX ORDER — BARIUM SULFATE 20 MG/ML
450 SUSPENSION ORAL
Status: COMPLETED | OUTPATIENT
Start: 2023-01-19 | End: 2023-01-19

## 2023-01-19 RX ADMIN — BARIUM SULFATE 450 ML: 20 SUSPENSION ORAL at 08:30

## 2023-01-19 NOTE — ED TRIAGE NOTES
Pt reports headache since yesterday, denies any nausea or vomiting. States OTC tylenol has not helped much. Information: Selecting Yes will display possible errors in your note based on the variables you have selected. This validation is only offered as a suggestion for you. PLEASE NOTE THAT THE VALIDATION TEXT WILL BE REMOVED WHEN YOU FINALIZE YOUR NOTE. IF YOU WANT TO FAX A PRELIMINARY NOTE YOU WILL NEED TO TOGGLE THIS TO 'NO' IF YOU DO NOT WANT IT IN YOUR FAXED NOTE.

## 2023-01-26 ENCOUNTER — TELEPHONE (OUTPATIENT)
Dept: FAMILY MEDICINE CLINIC | Age: 78
End: 2023-01-26

## 2023-01-26 NOTE — TELEPHONE ENCOUNTER
Received message to call pt to scheduule an appt for shortness of breath at times at rest, no other symptoms, received VM. LM to call me back.

## 2023-02-01 ENCOUNTER — OFFICE VISIT (OUTPATIENT)
Dept: FAMILY MEDICINE CLINIC | Age: 78
End: 2023-02-01
Payer: MEDICARE

## 2023-02-01 VITALS
OXYGEN SATURATION: 97 % | SYSTOLIC BLOOD PRESSURE: 166 MMHG | DIASTOLIC BLOOD PRESSURE: 64 MMHG | RESPIRATION RATE: 14 BRPM | BODY MASS INDEX: 29.35 KG/M2 | WEIGHT: 193 LBS | HEART RATE: 90 BPM | TEMPERATURE: 97.2 F

## 2023-02-01 DIAGNOSIS — I50.33 ACUTE ON CHRONIC DIASTOLIC HEART FAILURE (HCC): Primary | ICD-10-CM

## 2023-02-01 DIAGNOSIS — I82.4Z3 ACUTE DEEP VEIN THROMBOSIS (DVT) OF DISTAL VEIN OF BOTH LOWER EXTREMITIES (HCC): ICD-10-CM

## 2023-02-01 DIAGNOSIS — Z79.4 TYPE 2 DIABETES MELLITUS WITH STAGE 4 CHRONIC KIDNEY DISEASE, WITH LONG-TERM CURRENT USE OF INSULIN (HCC): ICD-10-CM

## 2023-02-01 DIAGNOSIS — I25.118 CORONARY ARTERY DISEASE OF NATIVE ARTERY OF NATIVE HEART WITH STABLE ANGINA PECTORIS (HCC): ICD-10-CM

## 2023-02-01 DIAGNOSIS — E21.0 PRIMARY HYPERPARATHYROIDISM (HCC): ICD-10-CM

## 2023-02-01 DIAGNOSIS — E11.22 TYPE 2 DIABETES MELLITUS WITH STAGE 4 CHRONIC KIDNEY DISEASE, WITH LONG-TERM CURRENT USE OF INSULIN (HCC): ICD-10-CM

## 2023-02-01 DIAGNOSIS — N18.4 TYPE 2 DIABETES MELLITUS WITH STAGE 4 CHRONIC KIDNEY DISEASE, WITH LONG-TERM CURRENT USE OF INSULIN (HCC): ICD-10-CM

## 2023-02-01 DIAGNOSIS — C77.2 SECONDARY AND UNSPECIFIED MALIGNANT NEOPLASM OF INTRA-ABDOMINAL LYMPH NODES (HCC): ICD-10-CM

## 2023-02-01 DIAGNOSIS — R06.02 SOB (SHORTNESS OF BREATH) ON EXERTION: ICD-10-CM

## 2023-02-01 PROCEDURE — 1123F ACP DISCUSS/DSCN MKR DOCD: CPT | Performed by: NURSE PRACTITIONER

## 2023-02-01 PROCEDURE — 3078F DIAST BP <80 MM HG: CPT | Performed by: NURSE PRACTITIONER

## 2023-02-01 PROCEDURE — 2022F DILAT RTA XM EVC RTNOPTHY: CPT | Performed by: NURSE PRACTITIONER

## 2023-02-01 PROCEDURE — G8432 DEP SCR NOT DOC, RNG: HCPCS | Performed by: NURSE PRACTITIONER

## 2023-02-01 PROCEDURE — 99213 OFFICE O/P EST LOW 20 MIN: CPT | Performed by: NURSE PRACTITIONER

## 2023-02-01 PROCEDURE — G9711 PT HX TOT COL OR COLON CA: HCPCS | Performed by: NURSE PRACTITIONER

## 2023-02-01 PROCEDURE — G8536 NO DOC ELDER MAL SCRN: HCPCS | Performed by: NURSE PRACTITIONER

## 2023-02-01 PROCEDURE — 3046F HEMOGLOBIN A1C LEVEL >9.0%: CPT | Performed by: NURSE PRACTITIONER

## 2023-02-01 PROCEDURE — 1101F PT FALLS ASSESS-DOCD LE1/YR: CPT | Performed by: NURSE PRACTITIONER

## 2023-02-01 PROCEDURE — 3077F SYST BP >= 140 MM HG: CPT | Performed by: NURSE PRACTITIONER

## 2023-02-01 PROCEDURE — G8427 DOCREV CUR MEDS BY ELIG CLIN: HCPCS | Performed by: NURSE PRACTITIONER

## 2023-02-01 PROCEDURE — G8417 CALC BMI ABV UP PARAM F/U: HCPCS | Performed by: NURSE PRACTITIONER

## 2023-02-01 RX ORDER — ALBUTEROL SULFATE 90 UG/1
2 AEROSOL, METERED RESPIRATORY (INHALATION)
Qty: 1 EACH | Refills: 1 | Status: SHIPPED | OUTPATIENT
Start: 2023-02-01

## 2023-02-01 NOTE — PROGRESS NOTES
Melanie Parrish (: 1945) is a 78 y.o. male patient, here for evaluation of the following chief complaint(s):  Shortness of Breath (Says has been having problems with his breathing for a while now says he has to take a breathing treatment about twice a week )       ASSESSMENT/PLAN:  Below is the assessment and plan developed based on review of pertinent history, physical exam, labs, studies, and medications. Will refill medication albuterol as patient states this does help him breathe better. Will refer to cardiology as patient states he has not seen cardiologist.  Patient will continue to follow-up and has appointment with his nephrologist in 2 weeks. Chest x-ray ordered today  Per nephrology note patient is to be holding his Lasix due to decrease in worsening renal function      1. Acute on chronic diastolic heart failure ([address]  2. SOB (shortness of breath) on exertion  -     REFERRAL TO CARDIOLOGY  -     albuterol (PROVENTIL HFA, VENTOLIN HFA, PROAIR HFA) 90 mcg/actuation inhaler; Take 2 Puffs by inhalation every four (4) hours as needed for Wheezing, Shortness of Breath or Respiratory Distress., Normal, Disp-1 Each, R-1  -     XR CHEST PA LAT; Future  3. Secondary and unspecified malignant neoplasm of intra-abdominal lymph nodes ([address]  4. Type 2 diabetes mellitus with stage 4 chronic kidney disease, with long-term current use of insulin ([address]  5. Acute deep vein thrombosis (DVT) of distal vein of both lower extremities (HCC)  6. Primary hyperparathyroidism ([address]  7. Coronary artery disease of native artery of native heart with stable angina pectoris ([address]  -     REFERRAL TO CARDIOLOGY  No results found for this visit on 23. Return in about 3 weeks (around 2023). I have discussed the diagnosis with the patient and the intended plan as seen in the above orders. The patient has received an after-visit summary and questions were answered concerning future plans.   I have discussed medication side effects and warnings with the patient as well. I have reviewed the plan of care with the patient, accepted their input and they are in agreement with the treatment goals. Previous lab and imaging results were reviewed by me. SUBJECTIVE/OBJECTIVE:    HPI: 72-year-old male presents to the office with chronic shortness of breath. He states he is here because he is almost out of his albuterol inhaler which she does use and helps him feel like he can get a better breath. Patient states he is short of breath on exertion. Patient has an extensive medical history including diabetes, stage IV chronic kidney disease, kidney carcinoma with a nephrectomy, coronary artery disease, and chronic diastolic heart failure. The patient blood pressure is mildly elevated today. The patient saw nephrologist on January 16, 2023 noted from that visit the patient also complained of shortness of breath, the patient was told to hold his Lasix due to worsening of the renal functions. Patient states he does use oxygen at night for about 1 year. In office today vital signs are stable oxygen sats are 97% on room air  The patient does deny fever, chills, nausea, vomiting, generalized weakness, chest pain, palpitations. He states that his legs do swell and get worse as the days go on. ROS:  Constitutional: Negative for fever, chills ,fatigue, unexplained weight gain/loss  HENT:  Negative for ear pain,postnasal drip, rhinitis,  Eyes:  Negative for visual disturbance,   Respiratory: Positive for shortness of breath   r:  Negative for chest pain, palpitations and leg swelling. Gastrointestinal:  Negative for abdominal pain, constipation, diarrhea, nausea ,vomiting. Musculoskeletal:  Negative for arthralgias, back pain and gait problem.  Joint pain, muscle pain  Skin:  Negative for rash, lesions,  Neurological:  Negative for fainting,dizziness, weakness, headaches,numbness       Current Outpatient Medications   Medication Sig    albuterol (PROVENTIL HFA, VENTOLIN HFA, PROAIR HFA) 90 mcg/actuation inhaler Take 2 Puffs by inhalation every four (4) hours as needed for Wheezing, Shortness of Breath or Respiratory Distress. Blood-Glucose Meter monitoring kit Whichever meter is covered by patient's insurance. Use to test blood sugars four times daily. glucose blood VI test strips strip Whichever is covered by patient's insurance. Use to test blood sugars four times daily. lancets misc Whichever is covered by patient's insurance. Use to test blood sugars four times daily. furosemide (LASIX) 20 mg tablet Take 20 mg by mouth daily. ferrous sulfate 325 mg (65 mg iron) tablet Take 325 mg by mouth Daily (before breakfast). lisinopriL (PRINIVIL, ZESTRIL) 20 mg tablet Take 20 mg by mouth daily. senna (SENOKOT) 8.6 mg tablet Take 1 Tablet by mouth daily. insulin glargine (LANTUS,BASAGLAR) 100 unit/mL (3 mL) inpn 15 Units by SubCUTAneous route daily. amLODIPine (NORVASC) 10 mg tablet Take 10 mg by mouth daily. cloNIDine HCL (CATAPRES) 0.3 mg tablet Take 0.3 mg by mouth three (3) times daily. dicyclomine (BENTYL) 20 mg tablet Take 20 mg by mouth every six (6) hours. fluticasone propionate (FLONASE) 50 mcg/actuation nasal spray 2 Sprays by Both Nostrils route daily. hydrALAZINE (APRESOLINE) 100 mg tablet Take 100 mg by mouth three (3) times daily. Insulin Needles, Disposable, 31 gauge x 5/16\" ndle 1 Each by Does Not Apply route. 3 times daily    isosorbide mononitrate ER (IMDUR) 60 mg CR tablet Take 60 mg by mouth every morning. nitroglycerin (NITROSTAT) 0.4 mg SL tablet 0.4 mg by SubLINGual route every five (5) minutes as needed for Chest Pain. Up to 3 doses. ostomy supply (OSTOMY SUPPLIES) by Does Not Apply route.    loratadine (CLARITIN) 10 mg tablet Take 10 mg by mouth. spironolactone (ALDACTONE) 25 mg tablet Take 1 Tablet by mouth daily.     omeprazole (PRILOSEC) 40 mg capsule TAKE 1 CAPSULE BY MOUTH DAILY    metoprolol succinate (TOPROL-XL) 25 mg XL tablet TAKE 1 TABLET BY MOUTH DAILY (Patient taking differently: Take 12.5 mg by mouth daily. Take half tablet daily to equal 12.5 mg)    sodium bicarbonate 650 mg tablet TAKE 1 TABLET BY MOUTH THREE TIMES DAILY (Patient taking differently: Take 650 mg by mouth two (2) times a day. Take 2 tabs by mouth twice daily)    atorvastatin (LIPITOR) 40 mg tablet Take 40 mg by mouth in the morning. glipiZIDE (GLUCOTROL) 10 mg tablet Take 10 mg by mouth two (2) times a day. Eliquis 5 mg tablet Take 1 Tablet by mouth daily. ergocalciferol (ERGOCALCIFEROL) 1,250 mcg (50,000 unit) capsule TAKE ONE CAPSULE BY MOUTH EVERY 7 DAYS    allopurinoL (ZYLOPRIM) 100 mg tablet Take 2 Tablets by mouth daily for 360 days. glucose blood VI test strips (Accu-Chek Precious Plus test strp) strip CHECK BLOOD GLUCOSE THREE TIMES DAILY    Oxygen 2 LPM nc as needed     No current facility-administered medications for this visit. BP (!) 166/64   Pulse 90   Temp 97.2 °F (36.2 °C)   Resp 14   Wt 193 lb (87.5 kg)   SpO2 97%   BMI 29.35 kg/m²            General:  alert, cooperative, well appearing, in no apparent distress. Eyes:  Pupils are equally round and reactive to light with accommodation. ENT:   The maxillary and frontal sinuses are nontender to palpation. The nasal mucosa is pink . The tongue and mucous membranes are pink and moist without lesions. The pharynx is non-erythematous without exudates. Neck:  There is normal range of motion. The thyroid is normal size without nodules or masses. There is no lymphadenopathy. CV:  The heart sounds are regular in rate and rhythm. There is a normal S1 and S2. There or no murmurs, rubs, or gallops. Distal pulses are intact and equal.  Lungs: Inspiratory and expiratory efforts are full and unlabored.   Lung sounds are clear and equal to auscultation throughout all lung fields without wheezing, rales, or rhonchi. GI: Right lower quadrant there is a urostomy stoma that is pink with surrounding skin without breakdown. Extremities: +1 lower extremity edema ankle to feet area   neurological:  There is no obvious focal sensory or motor deficits. An electronic signature was used to authenticate this note.   -- KATHERINE Wright

## 2023-02-01 NOTE — PROGRESS NOTES
Farheen Cormier presents today for   Chief Complaint   Patient presents with    Shortness of Breath     Says has been having problems with his breathing for a while now says he has to take a breathing treatment about twice a week        Is someone accompanying this pt? No     Is the patient using any DME equipment during OV? Yes cane     Depression Screening:  3 most recent PHQ Screens 2/1/2023   Little interest or pleasure in doing things Not at all   Feeling down, depressed, irritable, or hopeless Not at all   Total Score PHQ 2 0   Trouble falling or staying asleep, or sleeping too much -   Feeling tired or having little energy -   Poor appetite, weight loss, or overeating -   Feeling bad about yourself - or that you are a failure or have let yourself or your family down -   Trouble concentrating on things such as school, work, reading, or watching TV -   Moving or speaking so slowly that other people could have noticed; or the opposite being so fidgety that others notice -   Thoughts of being better off dead, or hurting yourself in some way -   PHQ 9 Score -   How difficult have these problems made it for you to do your work, take care of your home and get along with others -       Learning Assessment:  Learning Assessment 1/28/2022   PRIMARY LEARNER Patient   HIGHEST LEVEL OF EDUCATION - PRIMARY LEARNER  GRADUATED HIGH SCHOOL OR GED   BARRIERS PRIMARY LEARNER NONE   CO-LEARNER CAREGIVER Yes   CO-LEARNER NAME Sandra   PRIMARY LANGUAGE ENGLISH   LEARNER PREFERENCE PRIMARY DEMONSTRATION   ANSWERED BY daughter   RELATIONSHIP OTHER       Fall Risk  Fall Risk Assessment, last 12 mths 2/1/2023   Able to walk? Yes   Fall in past 12 months? 0   Do you feel unsteady? 0   Are you worried about falling 0   Is TUG test greater than 12 seconds?  -   Is the gait abnormal? -   Number of falls in past 12 months -   Fall with injury? -       ADL  ADL Assessment 2/1/2023   Feeding yourself No Help Needed   Getting from bed to chair No Help Needed   Getting dressed No Help Needed   Bathing or showering No Help Needed   Walk across the room (includes cane/walker) No Help Needed   Using the telphone No Help Needed   Taking your medications No Help Needed   Preparing meals No Help Needed   Managing money (expenses/bills) No Help Needed   Moderately strenuous housework (laundry) No Help Needed   Shopping for personal items (toiletries/medicines) No Help Needed   Shopping for groceries No Help Needed   Driving No Help Needed   Climbing a flight of stairs No Help Needed   Getting to places beyond walking distances No Help Needed       Travel Screening:    Travel Screening       Question Response    In the last 10 days, have you been in contact with someone who was confirmed or suspected to have Coronavirus/COVID-19? No / Unsure    Have you had a COVID-19 viral test in the last 10 days? No    Do you have any of the following new or worsening symptoms? None of these    Have you traveled internationally or domestically in the last month? No          Travel History   Travel since 01/01/23    No documented travel since 01/01/23         Health Maintenance reviewed and discussed and ordered per Provider. Health Maintenance Due   Topic Date Due    Eye Exam Retinal or Dilated  Never done    Shingles Vaccine (1 of 2) Never done    DTaP/Tdap/Td series (1 - Tdap) Never done    AAA Screening 73-69 YO Male Smoking Patients  Never done    COVID-19 Vaccine (4 - Booster) 12/10/2021    Medicare Yearly Exam  01/29/2023    A1C test (Diabetic or Prediabetic)  02/11/2023   . Coordination of Care:  1. \"Have you been to the ER, urgent care clinic since your last visit? Hospitalized since your last visit? \" No    2. \"Have you seen or consulted any other health care providers outside of the [address] [place] [address] [place] since your last visit? \" No     3. For patients aged 39-70: Has the patient had a colonoscopy? NA - based on age     If the patient is female:    4. For patients aged 41-77: Has the patient had a mammogram within the past 2 years? NA - based on age/sex    5. For patients aged 21-65: Has the patient had a pap smear?  NA - based on age/sex

## 2023-02-06 ENCOUNTER — HOSPITAL ENCOUNTER (OUTPATIENT)
Dept: LAB | Age: 78
Discharge: HOME | End: 2023-02-06

## 2023-02-06 PROCEDURE — 99001 SPECIMEN HANDLING PT-LAB: CPT

## 2023-02-13 DIAGNOSIS — R06.02 SOB (SHORTNESS OF BREATH) ON EXERTION: Primary | ICD-10-CM

## 2023-02-13 DIAGNOSIS — I25.118 CORONARY ARTERY DISEASE OF NATIVE ARTERY OF NATIVE HEART WITH STABLE ANGINA PECTORIS (HCC): ICD-10-CM

## 2023-03-01 ENCOUNTER — TRANSCRIBE ORDERS (OUTPATIENT)
Facility: HOSPITAL | Age: 78
End: 2023-03-01

## 2023-03-01 DIAGNOSIS — R07.9 CHEST PAIN, UNSPECIFIED TYPE: Primary | ICD-10-CM

## 2023-03-02 ENCOUNTER — TRANSCRIBE ORDERS (OUTPATIENT)
Facility: HOSPITAL | Age: 78
End: 2023-03-02

## 2023-03-02 DIAGNOSIS — R07.9 CHEST PAIN, UNSPECIFIED TYPE: Primary | ICD-10-CM

## 2023-03-08 RX ORDER — BLOOD SUGAR DIAGNOSTIC
STRIP MISCELLANEOUS
Qty: 300 STRIP | Refills: 2 | Status: SHIPPED | OUTPATIENT
Start: 2023-03-08

## 2023-03-09 ENCOUNTER — OFFICE VISIT (OUTPATIENT)
Facility: CLINIC | Age: 78
End: 2023-03-09
Payer: MEDICARE

## 2023-03-09 VITALS
BODY MASS INDEX: 28.43 KG/M2 | HEIGHT: 68 IN | DIASTOLIC BLOOD PRESSURE: 63 MMHG | RESPIRATION RATE: 20 BRPM | HEART RATE: 59 BPM | OXYGEN SATURATION: 98 % | TEMPERATURE: 97.3 F | WEIGHT: 187.6 LBS | SYSTOLIC BLOOD PRESSURE: 137 MMHG

## 2023-03-09 DIAGNOSIS — Z23 NEED FOR IMMUNIZATION AGAINST TETANUS ALONE: ICD-10-CM

## 2023-03-09 DIAGNOSIS — R06.02 SHORTNESS OF BREATH: Primary | ICD-10-CM

## 2023-03-09 PROCEDURE — 99212 OFFICE O/P EST SF 10 MIN: CPT | Performed by: NURSE PRACTITIONER

## 2023-03-09 PROCEDURE — 3078F DIAST BP <80 MM HG: CPT | Performed by: NURSE PRACTITIONER

## 2023-03-09 PROCEDURE — 90471 IMMUNIZATION ADMIN: CPT | Performed by: NURSE PRACTITIONER

## 2023-03-09 PROCEDURE — 3075F SYST BP GE 130 - 139MM HG: CPT | Performed by: NURSE PRACTITIONER

## 2023-03-09 PROCEDURE — 90715 TDAP VACCINE 7 YRS/> IM: CPT | Performed by: NURSE PRACTITIONER

## 2023-03-09 PROCEDURE — 1123F ACP DISCUSS/DSCN MKR DOCD: CPT | Performed by: NURSE PRACTITIONER

## 2023-03-09 RX ORDER — FUROSEMIDE 80 MG
TABLET ORAL
COMMUNITY
Start: 2023-01-06

## 2023-03-09 RX ORDER — SIMVASTATIN 40 MG
TABLET ORAL
COMMUNITY

## 2023-03-09 SDOH — ECONOMIC STABILITY: HOUSING INSECURITY
IN THE LAST 12 MONTHS, WAS THERE A TIME WHEN YOU DID NOT HAVE A STEADY PLACE TO SLEEP OR SLEPT IN A SHELTER (INCLUDING NOW)?: NO

## 2023-03-09 SDOH — ECONOMIC STABILITY: FOOD INSECURITY: WITHIN THE PAST 12 MONTHS, THE FOOD YOU BOUGHT JUST DIDN'T LAST AND YOU DIDN'T HAVE MONEY TO GET MORE.: PATIENT DECLINED

## 2023-03-09 SDOH — ECONOMIC STABILITY: INCOME INSECURITY: HOW HARD IS IT FOR YOU TO PAY FOR THE VERY BASICS LIKE FOOD, HOUSING, MEDICAL CARE, AND HEATING?: HARD

## 2023-03-09 SDOH — ECONOMIC STABILITY: FOOD INSECURITY: WITHIN THE PAST 12 MONTHS, YOU WORRIED THAT YOUR FOOD WOULD RUN OUT BEFORE YOU GOT MONEY TO BUY MORE.: PATIENT DECLINED

## 2023-03-09 ASSESSMENT — PATIENT HEALTH QUESTIONNAIRE - PHQ9
SUM OF ALL RESPONSES TO PHQ QUESTIONS 1-9: 0
SUM OF ALL RESPONSES TO PHQ9 QUESTIONS 1 & 2: 0
SUM OF ALL RESPONSES TO PHQ QUESTIONS 1-9: 0
2. FEELING DOWN, DEPRESSED OR HOPELESS: 0
1. LITTLE INTEREST OR PLEASURE IN DOING THINGS: 0

## 2023-03-09 NOTE — PROGRESS NOTES
Dominic Gan is a 78 y.o. male who presents for routine immunizations. He denies any symptoms , reactions or allergies that would exclude them from being immunized today. Risks and adverse reactions were discussed and the VIS was given to them. All questions were addressed. He refused to stay in the office for observation, was in no distress when they left. Chief Complaint   Patient presents with    Follow-up     3 week follow up         1. \"Have you been to the ER, urgent care clinic since your last visit? Hospitalized since your last visit? \" No    2. \"Have you seen or consulted any other health care providers outside of the [address] [place] [address] since your last visit? \" No     3. For patients aged 39-70: Has the patient had a colonoscopy / FIT/ Cologuard?  NA - based on age

## 2023-03-09 NOTE — PROGRESS NOTES
Dominic Gan (: 1945) is a 78 y.o. male patient, here for evaluation of the following chief complaint(s):  Follow-up (3 week follow up)       ASSESSMENT/PLAN:  Below is the assessment and plan developed based on review of pertinent history, physical exam, labs, studies, and medications. The patient is here for an evaluation of his shortness of breath. The patient did see cardiology and will be a stress test that is managed and ordered by cardiology. Patient may return to our office for any new or worsening or continued symptoms otherwise he can follow-up with his primary care provider as needed. Patient is aware and understands our plan    1. Shortness of breath  2. Need for immunization against tetanus alone  -     Tdap, BOOSTRIX, (age 8 yrs+), IM  No results found for any visits on 23. No follow-ups on file. I have discussed the diagnosis with the patient and the intended plan as seen in the above orders. The patient has received an after-visit summary and questions were answered concerning future plans. I have discussed medication side effects and warnings with the patient as well. I have reviewed the plan of care with the patient, accepted their input and they are in agreement with the treatment goals. Previous lab and imaging results were reviewed by me.       I spent 15 minutes with this patient performing a medically appropriate exam, ordering tests and medications, counseling and educating, and documenting in the EMR       Past Medical History  Past Medical History:   Diagnosis Date    Acid reflux     Arrhythmia     Arthritis     Bladder cancer ([address]     Colon cancer ([address] 2021    Congestive heart failure ([address]     Deep vein thrombosis (DVT) of proximal vein of both lower extremities ([address] 2020    Diabetes mellitus ([address]     Difficult intubation     Narrow airway-per Pulm notes(in media)     GERD (gastroesophageal reflux disease)     Gout     High cholesterol Hypertension     Kidney carcinoma, left ([facility] 2016    left nephrectomy    Kidney disease     Pituitary mass ([facility]     [facility] HTN [facility] 10/2021    PASP 77 mmHg    Reflux esophagitis     Unspecified deficiency anemia           SUBJECTIVE/OBJECTIVE:    HPI: 70-year-old male presents to the office for:    Reevaluation of last office visit for of shortness of breath. Patient did see cardiology and has a stress test coming up soon that will be managed and ordered by cardiology  Patient states today he is feeling very well he states he does not have any issues or concerns and his shortness of breath has resolved. Patient denies fever, chills, sweats, nausea, vomiting, weakness, sore throat, cough, shortness of breath, chest pain. ROS:  Constitutional: Negative for fever, chills ,fatigue, unexplained weight gain/loss  HENT:  Negative for ear pain,postnasal drip, rhinitis,  Eyes:  Negative for visual disturbance,   Respiratory:  Negative for cough and shortness of breath,wheezing ,congestion  Cardiovascular:  Negative for chest pain, palpitations and leg swelling. Gastrointestinal:  Negative for abdominal pain, constipation, diarrhea, nausea ,vomiting. Musculoskeletal:  Negative for arthralgias, back pain and gait problem. Joint pain, muscle pain  Skin:  Negative for rash, lesions,  Neurological:  Negative for fainting,dizziness, weakness, headaches,numbness       Current Outpatient Medications   Medication Sig    simvastatin (ZOCOR) 40 MG tablet simvastatin 40 mg tablet   TAKE 1 TABLET BY MOUTH EVERY NIGHT    furosemide (LASIX) 80 MG tablet TAKE 1 TABLET BY MOUTH DAILY    ACCU-CHEK LASHAE PLUS strip USE TO CHECK BLOOD GLUCOSE THREE TIMES DAILY    Lancets MISC Whichever is covered by patient's insurance. Use to test blood sugars four times daily.     OXYGEN 2 LPM nc as needed    albuterol sulfate HFA (PROVENTIL;VENTOLIN;PROAIR) 108 (90 Base) MCG/ACT inhaler Inhale 2 puffs into the lungs every 4 hours as needed    amLODIPine (NORVASC) 10 MG tablet Take 10 mg by mouth daily    apixaban (ELIQUIS) 5 MG TABS tablet Take 1 tablet by mouth daily    atorvastatin (LIPITOR) 40 MG tablet Take 40 mg by mouth daily    cloNIDine (CATAPRES) 0.3 MG tablet Take 0.3 mg by mouth 3 times daily    dicyclomine (BENTYL) 20 MG tablet Take 20 mg by mouth in the morning and 20 mg at noon and 20 mg in the evening and 20 mg before bedtime. ergocalciferol (ERGOCALCIFEROL) 1.25 MG (19058 UT) capsule TAKE ONE CAPSULE BY MOUTH EVERY 7 DAYS    ferrous sulfate (IRON 325) 325 (65 Fe) MG tablet Take 325 mg by mouth every morning (before breakfast)    fluticasone (FLONASE) 50 MCG/ACT nasal spray 2 sprays by Nasal route daily    furosemide (LASIX) 20 MG tablet Take 20 mg by mouth daily    glipiZIDE (GLUCOTROL) 10 MG tablet Take 10 mg by mouth 2 times daily    hydrALAZINE (APRESOLINE) 100 MG tablet Take 100 mg by mouth 3 times daily    insulin glargine (LANTUS SOLOSTAR) 100 UNIT/ML injection pen Inject 15 Units into the skin daily    isosorbide mononitrate (IMDUR) 60 MG extended release tablet Take 60 mg by mouth    lisinopril (PRINIVIL;ZESTRIL) 20 MG tablet Take 20 mg by mouth daily    loratadine (CLARITIN) 10 MG tablet Take 10 mg by mouth    metoprolol succinate (TOPROL XL) 25 MG extended release tablet Take 25 mg by mouth daily    nitroGLYCERIN (NITROSTAT) 0.4 MG SL tablet Place 0.4 mg under the tongue    omeprazole (PRILOSEC) 40 MG delayed release capsule TAKE 1 CAPSULE BY MOUTH DAILY    senna (SENOKOT) 8.6 MG tablet Take 1 tablet by mouth daily    sodium bicarbonate 650 MG tablet TAKE 1 TABLET BY MOUTH THREE TIMES DAILY    spironolactone (ALDACTONE) 25 MG tablet Take 25 mg by mouth daily    allopurinol (ZYLOPRIM) 100 MG tablet Take 200 mg by mouth daily     No current facility-administered medications for this visit.        /63   Pulse 59   Temp 97.3 °F (36.3 °C)   Resp 20   Ht 5' 8\" (1.727 m)   Wt 187 lb 9.6 oz (85.1 kg)   SpO2 98% BMI 28.52 kg/m²            General:  alert, cooperative, well appearing, in no apparent distress. Eyes:  Pupils are equally round and reactive to light with accommodation. Neck:  There is normal range of motion. The thyroid is normal size without nodules or masses. There is no lymphadenopathy. CV:  The heart sounds are regular in rate and rhythm. There is a normal S1 and S2. There or no murmurs, rubs, or gallops. Distal pulses are intact and equal.  Lungs: Inspiratory and expiratory efforts are full and unlabored. Lung sounds are clear and equal to auscultation throughout all lung fields without wheezing, rales, or rhonchi. Extremities: There is no clubbing, cyanosis, or edema. 3+ peripheral pulses. cap refill less than 2 seconds   Neurological:    There is no obvious focal sensory or motor deficits. Strength is 5/5 in the upper and lower extremity bilaterally. An electronic signature was used to authenticate this note.   -- Nishant Pain, FNP

## 2023-03-13 RX ORDER — GLIPIZIDE 10 MG/1
10 TABLET ORAL 2 TIMES DAILY
Qty: 180 TABLET | Refills: 0 | Status: SHIPPED | OUTPATIENT
Start: 2023-03-13

## 2023-03-13 RX ORDER — LORATADINE 10 MG/1
10 TABLET ORAL DAILY
Qty: 90 TABLET | Refills: 0 | Status: SHIPPED | OUTPATIENT
Start: 2023-03-13

## 2023-03-13 RX ORDER — ALLOPURINOL 100 MG/1
200 TABLET ORAL DAILY
Qty: 180 TABLET | OUTPATIENT
Start: 2023-03-13 | End: 2023-06-11

## 2023-03-13 RX ORDER — AMLODIPINE BESYLATE 10 MG/1
10 TABLET ORAL DAILY
Qty: 90 TABLET | Refills: 0 | Status: SHIPPED | OUTPATIENT
Start: 2023-03-13

## 2023-03-13 RX ORDER — ALLOPURINOL 100 MG/1
200 TABLET ORAL DAILY
Qty: 90 TABLET | Refills: 0 | Status: SHIPPED | OUTPATIENT
Start: 2023-03-13 | End: 2023-06-11

## 2023-03-13 NOTE — TELEPHONE ENCOUNTER
[facility] [place] called for their medication refill. Last refill: refills have not been needed by Dr Stacie Clarke yet  Last Office visit:  2/1/2023 Seth Waterman NP  Last labs:  1/13/2023  Follow up visit:  With Dr Stacie Clarke upon return to office.     Refills sent per verbal order from Seth Waterman NP

## 2023-03-15 ENCOUNTER — TELEPHONE (OUTPATIENT)
Facility: CLINIC | Age: 78
End: 2023-03-15

## 2023-03-15 NOTE — TELEPHONE ENCOUNTER
Girish Floyd and states that Dr Eric Rodriguez is the provider who has been ordering the patients catheter bags and supplies. States they have never actually supplied the patient with the belt.  States that they will fax the request over to Dr Arash Reynoso office

## 2023-05-05 RX ORDER — ALLOPURINOL 100 MG/1
200 TABLET ORAL DAILY
Qty: 90 TABLET | Refills: 0 | OUTPATIENT
Start: 2023-05-05 | End: 2023-08-03

## 2023-05-16 ENCOUNTER — HOSPITAL ENCOUNTER (INPATIENT)
Age: 78
LOS: 1 days | Discharge: HOME | DRG: 682 | End: 2023-05-19
Attending: FAMILY MEDICINE | Admitting: INTERNAL MEDICINE
Payer: MEDICARE

## 2023-05-16 ENCOUNTER — APPOINTMENT (OUTPATIENT)
Age: 78
DRG: 682 | End: 2023-05-16
Payer: MEDICARE

## 2023-05-16 DIAGNOSIS — N17.9 ACUTE KIDNEY INJURY SUPERIMPOSED ON CKD (HCC): ICD-10-CM

## 2023-05-16 DIAGNOSIS — E87.20 METABOLIC ACIDOSIS: ICD-10-CM

## 2023-05-16 DIAGNOSIS — N18.9 ACUTE KIDNEY INJURY SUPERIMPOSED ON CKD (HCC): ICD-10-CM

## 2023-05-16 DIAGNOSIS — R06.02 SHORTNESS OF BREATH: Primary | ICD-10-CM

## 2023-05-16 PROBLEM — R06.00 DYSPNEA: Status: ACTIVE | Noted: 2023-05-16

## 2023-05-16 LAB
ALBUMIN SERPL-MCNC: 3.5 G/DL (ref 3.4–5)
ALBUMIN/GLOB SERPL: 0.7 (ref 0.8–1.7)
ALP SERPL-CCNC: 117 U/L (ref 45–117)
ALT SERPL-CCNC: 55 U/L (ref 16–61)
ANION GAP SERPL CALC-SCNC: 10 MMOL/L (ref 3–18)
AST SERPL W P-5'-P-CCNC: 35 U/L (ref 10–38)
BASOPHILS # BLD: 0 K/UL (ref 0–0.1)
BASOPHILS NFR BLD: 1 % (ref 0–2)
BILIRUB SERPL-MCNC: 0.2 MG/DL (ref 0.2–1)
BNP SERPL-MCNC: 784 PG/ML (ref 0–1800)
BUN SERPL-MCNC: 109 MG/DL (ref 7–18)
BUN/CREAT SERPL: 23 (ref 12–20)
CA-I BLD-MCNC: 8.8 MG/DL (ref 8.5–10.1)
CHLORIDE SERPL-SCNC: 117 MMOL/L (ref 100–111)
CO2 SERPL-SCNC: 14 MMOL/L (ref 21–32)
CREAT SERPL-MCNC: 4.82 MG/DL (ref 0.6–1.3)
DIFFERENTIAL METHOD BLD: ABNORMAL
EOSINOPHIL # BLD: 0.2 K/UL (ref 0–0.4)
EOSINOPHIL NFR BLD: 3 % (ref 0–5)
ERYTHROCYTE [DISTWIDTH] IN BLOOD BY AUTOMATED COUNT: 15.8 % (ref 11.6–14.5)
GLOBULIN SER CALC-MCNC: 4.9 G/DL (ref 2–4)
GLUCOSE BLD STRIP.AUTO-MCNC: 133 MG/DL (ref 70–110)
GLUCOSE SERPL-MCNC: 107 MG/DL (ref 74–99)
HCT VFR BLD AUTO: 34.7 % (ref 36–48)
HGB BLD-MCNC: 10.9 G/DL (ref 13–16)
IMM GRANULOCYTES # BLD AUTO: 0 K/UL (ref 0–0.04)
IMM GRANULOCYTES NFR BLD AUTO: 0 % (ref 0–0.5)
LACTATE SERPL-SCNC: 0.6 MMOL/L (ref 0.4–2)
LYMPHOCYTES # BLD: 0.8 K/UL (ref 0.9–3.6)
LYMPHOCYTES NFR BLD: 15 % (ref 21–52)
MAGNESIUM SERPL-MCNC: 1.6 MG/DL (ref 1.6–2.6)
MCH RBC QN AUTO: 27 PG (ref 24–34)
MCHC RBC AUTO-ENTMCNC: 31.4 G/DL (ref 31–37)
MCV RBC AUTO: 85.9 FL (ref 78–100)
MONOCYTES # BLD: 0.5 K/UL (ref 0.05–1.2)
MONOCYTES NFR BLD: 9 % (ref 3–10)
NEUTS SEG # BLD: 4 K/UL (ref 1.8–8)
NEUTS SEG NFR BLD: 72 % (ref 40–73)
NRBC # BLD: 0 K/UL (ref 0–0.01)
NRBC BLD-RTO: 0 PER 100 WBC
PERFORMED BY:: ABNORMAL
PLATELET # BLD AUTO: 255 K/UL (ref 135–420)
PMV BLD AUTO: 10.3 FL (ref 9.2–11.8)
POTASSIUM SERPL-SCNC: 5.1 MMOL/L (ref 3.5–5.5)
PROT SERPL-MCNC: 8.4 G/DL (ref 6.4–8.2)
RBC # BLD AUTO: 4.04 M/UL (ref 4.35–5.65)
SODIUM SERPL-SCNC: 141 MMOL/L (ref 136–145)
TROPONIN I SERPL HS-MCNC: 51 NG/L (ref 0–78)
WBC # BLD AUTO: 5.5 K/UL (ref 4.6–13.2)

## 2023-05-16 PROCEDURE — 6370000000 HC RX 637 (ALT 250 FOR IP): Performed by: FAMILY MEDICINE

## 2023-05-16 PROCEDURE — 82803 BLOOD GASES ANY COMBINATION: CPT

## 2023-05-16 PROCEDURE — 94640 AIRWAY INHALATION TREATMENT: CPT

## 2023-05-16 PROCEDURE — 83735 ASSAY OF MAGNESIUM: CPT

## 2023-05-16 PROCEDURE — 80053 COMPREHEN METABOLIC PANEL: CPT

## 2023-05-16 PROCEDURE — 71045 X-RAY EXAM CHEST 1 VIEW: CPT

## 2023-05-16 PROCEDURE — 93005 ELECTROCARDIOGRAM TRACING: CPT | Performed by: FAMILY MEDICINE

## 2023-05-16 PROCEDURE — 84484 ASSAY OF TROPONIN QUANT: CPT

## 2023-05-16 PROCEDURE — 36600 WITHDRAWAL OF ARTERIAL BLOOD: CPT

## 2023-05-16 PROCEDURE — 83880 ASSAY OF NATRIURETIC PEPTIDE: CPT

## 2023-05-16 PROCEDURE — 82962 GLUCOSE BLOOD TEST: CPT

## 2023-05-16 PROCEDURE — 74018 RADEX ABDOMEN 1 VIEW: CPT

## 2023-05-16 PROCEDURE — 83605 ASSAY OF LACTIC ACID: CPT

## 2023-05-16 PROCEDURE — 85025 COMPLETE CBC W/AUTO DIFF WBC: CPT

## 2023-05-16 PROCEDURE — 36415 COLL VENOUS BLD VENIPUNCTURE: CPT

## 2023-05-16 PROCEDURE — 99285 EMERGENCY DEPT VISIT HI MDM: CPT

## 2023-05-16 PROCEDURE — 2700000000 HC OXYGEN THERAPY PER DAY

## 2023-05-16 PROCEDURE — 87040 BLOOD CULTURE FOR BACTERIA: CPT

## 2023-05-16 RX ORDER — IPRATROPIUM BROMIDE AND ALBUTEROL SULFATE 2.5; .5 MG/3ML; MG/3ML
1 SOLUTION RESPIRATORY (INHALATION)
Status: COMPLETED | OUTPATIENT
Start: 2023-05-16 | End: 2023-05-16

## 2023-05-16 RX ADMIN — IPRATROPIUM BROMIDE AND ALBUTEROL SULFATE 1 AMPULE: .5; 2.5 SOLUTION RESPIRATORY (INHALATION) at 20:02

## 2023-05-16 ASSESSMENT — PAIN SCALES - GENERAL: PAINLEVEL_OUTOF10: 7

## 2023-05-16 ASSESSMENT — PAIN DESCRIPTION - DESCRIPTORS: DESCRIPTORS: DISCOMFORT

## 2023-05-16 ASSESSMENT — LIFESTYLE VARIABLES
HOW MANY STANDARD DRINKS CONTAINING ALCOHOL DO YOU HAVE ON A TYPICAL DAY: PATIENT DOES NOT DRINK
HOW OFTEN DO YOU HAVE A DRINK CONTAINING ALCOHOL: NEVER

## 2023-05-16 ASSESSMENT — PAIN DESCRIPTION - LOCATION: LOCATION: ABDOMEN

## 2023-05-16 ASSESSMENT — PAIN DESCRIPTION - PAIN TYPE: TYPE: ACUTE PAIN

## 2023-05-16 ASSESSMENT — PAIN - FUNCTIONAL ASSESSMENT: PAIN_FUNCTIONAL_ASSESSMENT: 0-10

## 2023-05-16 NOTE — ED NOTES
Pt stated he has been constipated for 3 days. Pt denies passing gas. Pt denies new medication. Pt denies blood in stool. Pt denies pain.       Mallika Salas RN  05/16/23 [id number]

## 2023-05-16 NOTE — ED PROVIDER NOTES
EMERGENCY DEPARTMENT HISTORY AND PHYSICAL EXAM      Patient Name: Alba Harris  MRN: [id number]  YOB: 1945  Provider: Eliana Reyes DO  PCP: Lisa Dukes MD     History of Presenting Illness     Chief Complaint   Patient presents with    Constipation       History Provided By: Patient     HPI: Alba Harris, 78 y.o. male  presents to the ED with cc of constipation. He is having trouble passing stool and gas. Upon physical examination patient is noted to have increased work of breathing with audible wheezing.     Past History     Past Medical History:  Past Medical History:   Diagnosis Date    Acid reflux     Arrhythmia     Arthritis     Bladder cancer ([facility]     Colon cancer ([facility] 11/2021    Congestive heart failure (HCC)     Deep vein thrombosis (DVT) of proximal vein of both lower extremities ([address] 9/14/2020    Diabetes mellitus ([facility]     Difficult intubation     Narrow airway-per Pulm notes(in media)     GERD (gastroesophageal reflux disease)     Gout     High cholesterol     Hypertension     Kidney carcinoma, left ([address] 2016    left nephrectomy    Kidney disease     Pituitary mass ([address]     Pulmonary HTN ([address] 10/2021    PASP 77 mmHg    Reflux esophagitis     Unspecified deficiency anemia        Past Surgical History:  Past Surgical History:   Procedure Laterality Date    COLONOSCOPY N/A 11/19/2021    COLONOSCOPY with Polypectomies, Tattoo & Clip Placement performed by Corie Vaughan MD at [facility]  12/30/2009    bladder removal/ urostomy bag    KIDNEY REMOVAL Left 5/2009    Nephroureterectomy by Dr. Desir Mantle      surgery on pituitary gland    OTHER SURGICAL HISTORY         Medications:  Current Facility-Administered Medications   Medication Dose Route Frequency Provider Last Rate Last Admin    albuterol sulfate HFA (PROVENTIL;VENTOLIN;PROAIR) 108 (90 Base) MCG/ACT inhaler 2 puff  2 puff Inhalation Q4H PRN KEKE Wagoner

## 2023-05-16 NOTE — ED NOTES
Md stated patient needed to be moved to a room with cardiac monitor. In the check on patient. PT vitals wnl however patient in apparent respiratory distress. Pt lung sounds diminished and wet throughout all fields. Pt moved to bed 6 and placed on cardiac monitor. EKG ordered and performed. Pt started to get tired and out of breath with speaking. Pt lungs sound worsening. Md made aware. RT called. Vitals are as charted. Pt denies chest pain but does endorse shortness of breath. Pt states he has a history of CHF.       Piedad Wheeler RN  05/16/23 [id number]

## 2023-05-16 NOTE — ED TRIAGE NOTES
Pt complain of new onset fatigue, Pt wife stated she noticed some yellowing of sclera and gums (jaundice)the patient also stated he had 23 kidney stones removed.

## 2023-05-17 ENCOUNTER — APPOINTMENT (OUTPATIENT)
Age: 78
DRG: 682 | End: 2023-05-17
Payer: MEDICARE

## 2023-05-17 PROBLEM — E11.22 TYPE 2 DIABETES MELLITUS WITH CHRONIC KIDNEY DISEASE (HCC): Status: ACTIVE | Noted: 2022-03-30

## 2023-05-17 PROBLEM — E78.5 HLD (HYPERLIPIDEMIA): Status: ACTIVE | Noted: 2017-11-23

## 2023-05-17 PROBLEM — N18.4 CHRONIC KIDNEY DISEASE, STAGE IV (SEVERE) (HCC): Status: ACTIVE | Noted: 2021-10-19

## 2023-05-17 PROBLEM — R06.02 SOB (SHORTNESS OF BREATH): Status: ACTIVE | Noted: 2023-05-17

## 2023-05-17 PROBLEM — I50.9 ACUTE ON CHRONIC CONGESTIVE HEART FAILURE (HCC): Status: ACTIVE | Noted: 2021-10-19

## 2023-05-17 LAB
ALBUMIN SERPL-MCNC: 3.1 G/DL (ref 3.4–5)
ALBUMIN/GLOB SERPL: 0.7 (ref 0.8–1.7)
ALP SERPL-CCNC: 105 U/L (ref 45–117)
ALT SERPL-CCNC: 43 U/L (ref 16–61)
ANION GAP SERPL CALC-SCNC: 9 MMOL/L (ref 3–18)
ARTERIAL PATENCY WRIST A: YES
AST SERPL W P-5'-P-CCNC: 28 U/L (ref 10–38)
BASE DEFICIT BLDA-SCNC: 13 MMOL/L
BASE DEFICIT BLDA-SCNC: 13.7 MMOL/L
BASE DEFICIT BLDA-SCNC: 9.8 MMOL/L
BASOPHILS # BLD: 0 K/UL (ref 0–0.1)
BASOPHILS NFR BLD: 1 % (ref 0–2)
BDY SITE: ABNORMAL
BILIRUB SERPL-MCNC: 0.2 MG/DL (ref 0.2–1)
BUN SERPL-MCNC: 106 MG/DL (ref 7–18)
BUN/CREAT SERPL: 23 (ref 12–20)
CA-I BLD-MCNC: 8.6 MG/DL (ref 8.5–10.1)
CHLORIDE SERPL-SCNC: 117 MMOL/L (ref 100–111)
CO2 SERPL-SCNC: 16 MMOL/L (ref 21–32)
COHGB MFR BLD: 0 % (ref 1–2)
COHGB MFR BLD: 0.1 % (ref 1–2)
COHGB MFR BLD: 0.3 % (ref 1–2)
CREAT SERPL-MCNC: 4.71 MG/DL (ref 0.6–1.3)
DIFFERENTIAL METHOD BLD: ABNORMAL
EKG ATRIAL RATE: 59 BPM
EKG ATRIAL RATE: 77 BPM
EKG DIAGNOSIS: NORMAL
EKG DIAGNOSIS: NORMAL
EKG P AXIS: 52 DEGREES
EKG P AXIS: 54 DEGREES
EKG P-R INTERVAL: 232 MS
EKG P-R INTERVAL: 250 MS
EKG Q-T INTERVAL: 404 MS
EKG Q-T INTERVAL: 444 MS
EKG QRS DURATION: 110 MS
EKG QRS DURATION: 116 MS
EKG QTC CALCULATION (BAZETT): 439 MS
EKG QTC CALCULATION (BAZETT): 457 MS
EKG R AXIS: -17 DEGREES
EKG R AXIS: -25 DEGREES
EKG T AXIS: 31 DEGREES
EKG T AXIS: 42 DEGREES
EKG VENTRICULAR RATE: 59 BPM
EKG VENTRICULAR RATE: 77 BPM
EOSINOPHIL # BLD: 0.1 K/UL (ref 0–0.4)
EOSINOPHIL NFR BLD: 1 % (ref 0–5)
ERYTHROCYTE [DISTWIDTH] IN BLOOD BY AUTOMATED COUNT: 15.8 % (ref 11.6–14.5)
FIO2 ON VENT: 21 %
FIO2 ON VENT: 25 %
FIO2 ON VENT: 28 %
GAS FLOW.O2 O2 DELIVERY SYS: 1 L/MIN
GAS FLOW.O2 O2 DELIVERY SYS: 2 L/MIN
GLOBULIN SER CALC-MCNC: 4.2 G/DL (ref 2–4)
GLUCOSE BLD STRIP.AUTO-MCNC: 160 MG/DL (ref 70–110)
GLUCOSE BLD STRIP.AUTO-MCNC: 203 MG/DL (ref 70–110)
GLUCOSE BLD STRIP.AUTO-MCNC: 254 MG/DL (ref 70–110)
GLUCOSE SERPL-MCNC: 112 MG/DL (ref 74–99)
HCO3 BLDA-SCNC: 12 MMOL/L (ref 22–26)
HCO3 BLDA-SCNC: 12 MMOL/L (ref 22–26)
HCO3 BLDA-SCNC: 15 MMOL/L (ref 22–26)
HCT VFR BLD AUTO: 32.2 % (ref 36–48)
HGB BLD-MCNC: 10.5 G/DL (ref 13–16)
IMM GRANULOCYTES # BLD AUTO: 0 K/UL (ref 0–0.04)
IMM GRANULOCYTES NFR BLD AUTO: 0 % (ref 0–0.5)
LYMPHOCYTES # BLD: 1 K/UL (ref 0.9–3.6)
LYMPHOCYTES NFR BLD: 18 % (ref 21–52)
MCH RBC QN AUTO: 27.6 PG (ref 24–34)
MCHC RBC AUTO-ENTMCNC: 32.6 G/DL (ref 31–37)
MCV RBC AUTO: 84.7 FL (ref 78–100)
METHGB MFR BLD: 0 % (ref 0–1.4)
MONOCYTES # BLD: 0.6 K/UL (ref 0.05–1.2)
MONOCYTES NFR BLD: 10 % (ref 3–10)
NEUTS SEG # BLD: 4 K/UL (ref 1.8–8)
NEUTS SEG NFR BLD: 70 % (ref 40–73)
NRBC # BLD: 0 K/UL (ref 0–0.01)
NRBC BLD-RTO: 0 PER 100 WBC
OXYHGB MFR BLD: 93.1 % (ref 95–99)
OXYHGB MFR BLD: 94.8 % (ref 95–99)
OXYHGB MFR BLD: 95 % (ref 95–99)
PCO2 BLDA: 27 MMHG (ref 35–45)
PCO2 BLDA: 28 MMHG (ref 35–45)
PCO2 BLDA: 32 MMHG (ref 35–45)
PERFORMED BY:: ABNORMAL
PH BLDA: 7.26 (ref 7.35–7.45)
PH BLDA: 7.28 (ref 7.35–7.45)
PH BLDA: 7.31 (ref 7.35–7.45)
PLATELET # BLD AUTO: 235 K/UL (ref 135–420)
PMV BLD AUTO: 10.2 FL (ref 9.2–11.8)
PO2 BLDA: 70 MMHG (ref 80–100)
PO2 BLDA: 81 MMHG (ref 80–100)
PO2 BLDA: 83 MMHG (ref 80–100)
POTASSIUM SERPL-SCNC: 4.2 MMOL/L (ref 3.5–5.5)
PROT SERPL-MCNC: 7.3 G/DL (ref 6.4–8.2)
RBC # BLD AUTO: 3.8 M/UL (ref 4.35–5.65)
SAO2 % BLD: 93 % (ref 95–99)
SAO2 % BLD: 95 % (ref 95–99)
SAO2 % BLD: 95 % (ref 95–99)
SAO2% DEVICE SAO2% SENSOR NAME: ABNORMAL
SODIUM SERPL-SCNC: 142 MMOL/L (ref 136–145)
SPECIMEN SITE: ABNORMAL
WBC # BLD AUTO: 5.7 K/UL (ref 4.6–13.2)

## 2023-05-17 PROCEDURE — 2580000003 HC RX 258: Performed by: INTERNAL MEDICINE

## 2023-05-17 PROCEDURE — 2500000003 HC RX 250 WO HCPCS: Performed by: INTERNAL MEDICINE

## 2023-05-17 PROCEDURE — 85025 COMPLETE CBC W/AUTO DIFF WBC: CPT

## 2023-05-17 PROCEDURE — 2700000000 HC OXYGEN THERAPY PER DAY

## 2023-05-17 PROCEDURE — 96375 TX/PRO/DX INJ NEW DRUG ADDON: CPT

## 2023-05-17 PROCEDURE — 96376 TX/PRO/DX INJ SAME DRUG ADON: CPT

## 2023-05-17 PROCEDURE — 94761 N-INVAS EAR/PLS OXIMETRY MLT: CPT

## 2023-05-17 PROCEDURE — 80053 COMPREHEN METABOLIC PANEL: CPT

## 2023-05-17 PROCEDURE — 2500000003 HC RX 250 WO HCPCS: Performed by: FAMILY MEDICINE

## 2023-05-17 PROCEDURE — G0378 HOSPITAL OBSERVATION PER HR: HCPCS

## 2023-05-17 PROCEDURE — 6370000000 HC RX 637 (ALT 250 FOR IP): Performed by: INTERNAL MEDICINE

## 2023-05-17 PROCEDURE — 83970 ASSAY OF PARATHORMONE: CPT

## 2023-05-17 PROCEDURE — 2580000003 HC RX 258: Performed by: NURSE PRACTITIONER

## 2023-05-17 PROCEDURE — 70450 CT HEAD/BRAIN W/O DYE: CPT

## 2023-05-17 PROCEDURE — 6370000000 HC RX 637 (ALT 250 FOR IP): Performed by: NURSE PRACTITIONER

## 2023-05-17 PROCEDURE — 93005 ELECTROCARDIOGRAM TRACING: CPT | Performed by: INTERNAL MEDICINE

## 2023-05-17 PROCEDURE — 82962 GLUCOSE BLOOD TEST: CPT

## 2023-05-17 PROCEDURE — 6360000002 HC RX W HCPCS: Performed by: NURSE PRACTITIONER

## 2023-05-17 PROCEDURE — 36600 WITHDRAWAL OF ARTERIAL BLOOD: CPT

## 2023-05-17 RX ORDER — POLYETHYLENE GLYCOL 3350 17 G/17G
17 POWDER, FOR SOLUTION ORAL DAILY PRN
Status: DISCONTINUED | OUTPATIENT
Start: 2023-05-17 | End: 2023-05-19 | Stop reason: HOSPADM

## 2023-05-17 RX ORDER — AMLODIPINE BESYLATE 5 MG/1
10 TABLET ORAL DAILY
Status: DISCONTINUED | OUTPATIENT
Start: 2023-05-17 | End: 2023-05-19 | Stop reason: HOSPADM

## 2023-05-17 RX ORDER — INSULIN LISPRO 100 [IU]/ML
0-4 INJECTION, SOLUTION INTRAVENOUS; SUBCUTANEOUS
Status: DISCONTINUED | OUTPATIENT
Start: 2023-05-17 | End: 2023-05-19 | Stop reason: HOSPADM

## 2023-05-17 RX ORDER — OMEPRAZOLE 40 MG/1
40 CAPSULE, DELAYED RELEASE ORAL DAILY
Status: DISCONTINUED | OUTPATIENT
Start: 2023-05-17 | End: 2023-05-17 | Stop reason: CLARIF

## 2023-05-17 RX ORDER — DEXTROSE MONOHYDRATE 100 MG/ML
INJECTION, SOLUTION INTRAVENOUS CONTINUOUS PRN
Status: DISCONTINUED | OUTPATIENT
Start: 2023-05-17 | End: 2023-05-19 | Stop reason: HOSPADM

## 2023-05-17 RX ORDER — CLONIDINE HYDROCHLORIDE 0.1 MG/1
0.3 TABLET ORAL 3 TIMES DAILY
Status: DISCONTINUED | OUTPATIENT
Start: 2023-05-17 | End: 2023-05-19 | Stop reason: HOSPADM

## 2023-05-17 RX ORDER — PANTOPRAZOLE SODIUM 40 MG/1
40 TABLET, DELAYED RELEASE ORAL DAILY
Status: DISCONTINUED | OUTPATIENT
Start: 2023-05-17 | End: 2023-05-19 | Stop reason: HOSPADM

## 2023-05-17 RX ORDER — INSULIN LISPRO 100 [IU]/ML
0-4 INJECTION, SOLUTION INTRAVENOUS; SUBCUTANEOUS NIGHTLY
Status: DISCONTINUED | OUTPATIENT
Start: 2023-05-17 | End: 2023-05-19 | Stop reason: HOSPADM

## 2023-05-17 RX ORDER — SODIUM BICARBONATE 650 MG/1
650 TABLET ORAL 3 TIMES DAILY
Status: DISCONTINUED | OUTPATIENT
Start: 2023-05-17 | End: 2023-05-17

## 2023-05-17 RX ORDER — ACETAMINOPHEN 325 MG/1
650 TABLET ORAL EVERY 6 HOURS PRN
Status: DISCONTINUED | OUTPATIENT
Start: 2023-05-17 | End: 2023-05-19 | Stop reason: HOSPADM

## 2023-05-17 RX ORDER — SIMVASTATIN 40 MG
40 TABLET ORAL NIGHTLY
Status: DISCONTINUED | OUTPATIENT
Start: 2023-05-17 | End: 2023-05-17 | Stop reason: SDUPTHER

## 2023-05-17 RX ORDER — SODIUM CHLORIDE 0.9 % (FLUSH) 0.9 %
5-40 SYRINGE (ML) INJECTION PRN
Status: DISCONTINUED | OUTPATIENT
Start: 2023-05-17 | End: 2023-05-19 | Stop reason: HOSPADM

## 2023-05-17 RX ORDER — ACETAMINOPHEN 650 MG/1
650 SUPPOSITORY RECTAL EVERY 6 HOURS PRN
Status: DISCONTINUED | OUTPATIENT
Start: 2023-05-17 | End: 2023-05-19 | Stop reason: HOSPADM

## 2023-05-17 RX ORDER — INSULIN GLARGINE 100 [IU]/ML
15 INJECTION, SOLUTION SUBCUTANEOUS DAILY
Status: DISCONTINUED | OUTPATIENT
Start: 2023-05-17 | End: 2023-05-19 | Stop reason: HOSPADM

## 2023-05-17 RX ORDER — ONDANSETRON 4 MG/1
4 TABLET, ORALLY DISINTEGRATING ORAL EVERY 8 HOURS PRN
Status: DISCONTINUED | OUTPATIENT
Start: 2023-05-17 | End: 2023-05-19 | Stop reason: HOSPADM

## 2023-05-17 RX ORDER — SODIUM CHLORIDE 9 MG/ML
INJECTION, SOLUTION INTRAVENOUS PRN
Status: DISCONTINUED | OUTPATIENT
Start: 2023-05-17 | End: 2023-05-19 | Stop reason: HOSPADM

## 2023-05-17 RX ORDER — METOPROLOL SUCCINATE 25 MG/1
25 TABLET, EXTENDED RELEASE ORAL DAILY
Status: DISCONTINUED | OUTPATIENT
Start: 2023-05-17 | End: 2023-05-19 | Stop reason: HOSPADM

## 2023-05-17 RX ORDER — SODIUM BICARBONATE 650 MG/1
1300 TABLET ORAL 2 TIMES DAILY
Status: DISCONTINUED | OUTPATIENT
Start: 2023-05-17 | End: 2023-05-18

## 2023-05-17 RX ORDER — ATORVASTATIN CALCIUM 40 MG/1
40 TABLET, FILM COATED ORAL DAILY
Status: DISCONTINUED | OUTPATIENT
Start: 2023-05-17 | End: 2023-05-19 | Stop reason: HOSPADM

## 2023-05-17 RX ORDER — HYDRALAZINE HYDROCHLORIDE 25 MG/1
100 TABLET, FILM COATED ORAL 3 TIMES DAILY
Status: DISCONTINUED | OUTPATIENT
Start: 2023-05-17 | End: 2023-05-19 | Stop reason: HOSPADM

## 2023-05-17 RX ORDER — FUROSEMIDE 10 MG/ML
20 INJECTION INTRAMUSCULAR; INTRAVENOUS ONCE
Status: COMPLETED | OUTPATIENT
Start: 2023-05-17 | End: 2023-05-17

## 2023-05-17 RX ORDER — SENNA PLUS 8.6 MG/1
1 TABLET ORAL DAILY
Status: DISCONTINUED | OUTPATIENT
Start: 2023-05-17 | End: 2023-05-19 | Stop reason: HOSPADM

## 2023-05-17 RX ORDER — ISOSORBIDE MONONITRATE 60 MG/1
60 TABLET, EXTENDED RELEASE ORAL DAILY
Status: DISCONTINUED | OUTPATIENT
Start: 2023-05-17 | End: 2023-05-18

## 2023-05-17 RX ORDER — ONDANSETRON 2 MG/ML
4 INJECTION INTRAMUSCULAR; INTRAVENOUS EVERY 6 HOURS PRN
Status: DISCONTINUED | OUTPATIENT
Start: 2023-05-17 | End: 2023-05-19 | Stop reason: HOSPADM

## 2023-05-17 RX ORDER — LISINOPRIL 20 MG/1
20 TABLET ORAL DAILY
Status: DISCONTINUED | OUTPATIENT
Start: 2023-05-17 | End: 2023-05-17

## 2023-05-17 RX ORDER — SODIUM CHLORIDE 0.9 % (FLUSH) 0.9 %
5-40 SYRINGE (ML) INJECTION EVERY 12 HOURS SCHEDULED
Status: DISCONTINUED | OUTPATIENT
Start: 2023-05-17 | End: 2023-05-19 | Stop reason: HOSPADM

## 2023-05-17 RX ORDER — FUROSEMIDE 10 MG/ML
20 INJECTION INTRAMUSCULAR; INTRAVENOUS 2 TIMES DAILY
Status: DISCONTINUED | OUTPATIENT
Start: 2023-05-17 | End: 2023-05-17

## 2023-05-17 RX ORDER — ALBUTEROL SULFATE 90 UG/1
2 AEROSOL, METERED RESPIRATORY (INHALATION) EVERY 4 HOURS PRN
Status: DISCONTINUED | OUTPATIENT
Start: 2023-05-17 | End: 2023-05-19 | Stop reason: HOSPADM

## 2023-05-17 RX ORDER — FUROSEMIDE 10 MG/ML
60 INJECTION INTRAMUSCULAR; INTRAVENOUS 2 TIMES DAILY
Status: ACTIVE | OUTPATIENT
Start: 2023-05-17 | End: 2023-05-19

## 2023-05-17 RX ORDER — IPRATROPIUM BROMIDE AND ALBUTEROL SULFATE 2.5; .5 MG/3ML; MG/3ML
1 SOLUTION RESPIRATORY (INHALATION) EVERY 4 HOURS PRN
Status: DISCONTINUED | OUTPATIENT
Start: 2023-05-17 | End: 2023-05-19 | Stop reason: HOSPADM

## 2023-05-17 RX ORDER — FERROUS SULFATE 325(65) MG
325 TABLET ORAL
Status: DISCONTINUED | OUTPATIENT
Start: 2023-05-17 | End: 2023-05-19 | Stop reason: HOSPADM

## 2023-05-17 RX ORDER — SPIRONOLACTONE 25 MG/1
25 TABLET ORAL DAILY
Status: DISCONTINUED | OUTPATIENT
Start: 2023-05-17 | End: 2023-05-19 | Stop reason: HOSPADM

## 2023-05-17 RX ADMIN — FUROSEMIDE 20 MG: 10 INJECTION, SOLUTION INTRAMUSCULAR; INTRAVENOUS at 01:58

## 2023-05-17 RX ADMIN — AMLODIPINE BESYLATE 10 MG: 5 TABLET ORAL at 09:41

## 2023-05-17 RX ADMIN — PANTOPRAZOLE SODIUM 40 MG: 40 TABLET, DELAYED RELEASE ORAL at 09:40

## 2023-05-17 RX ADMIN — CLONIDINE HYDROCHLORIDE 0.3 MG: 0.1 TABLET ORAL at 09:41

## 2023-05-17 RX ADMIN — SODIUM BICARBONATE 50 MEQ: 84 INJECTION, SOLUTION INTRAVENOUS at 00:32

## 2023-05-17 RX ADMIN — INSULIN GLARGINE 15 UNITS: 100 INJECTION, SOLUTION SUBCUTANEOUS at 09:39

## 2023-05-17 RX ADMIN — APIXABAN 5 MG: 5 TABLET, FILM COATED ORAL at 09:40

## 2023-05-17 RX ADMIN — INSULIN LISPRO 1 UNITS: 100 INJECTION, SOLUTION INTRAVENOUS; SUBCUTANEOUS at 16:38

## 2023-05-17 RX ADMIN — SODIUM BICARBONATE 650 MG: 650 TABLET ORAL at 09:40

## 2023-05-17 RX ADMIN — SENNOSIDES 8.6 MG: 8.6 TABLET, FILM COATED ORAL at 09:41

## 2023-05-17 RX ADMIN — SODIUM BICARBONATE 1300 MG: 650 TABLET ORAL at 20:46

## 2023-05-17 RX ADMIN — SODIUM CHLORIDE, PRESERVATIVE FREE 10 ML: 5 INJECTION INTRAVENOUS at 20:46

## 2023-05-17 RX ADMIN — SODIUM BICARBONATE: 84 INJECTION, SOLUTION INTRAVENOUS at 14:47

## 2023-05-17 RX ADMIN — METOPROLOL SUCCINATE 25 MG: 25 TABLET, EXTENDED RELEASE ORAL at 09:40

## 2023-05-17 RX ADMIN — ISOSORBIDE MONONITRATE 60 MG: 60 TABLET, EXTENDED RELEASE ORAL at 09:40

## 2023-05-17 RX ADMIN — FERROUS SULFATE TAB 325 MG (65 MG ELEMENTAL FE) 325 MG: 325 (65 FE) TAB at 06:13

## 2023-05-17 RX ADMIN — HYDRALAZINE HYDROCHLORIDE 100 MG: 25 TABLET, FILM COATED ORAL at 09:40

## 2023-05-17 RX ADMIN — ATORVASTATIN CALCIUM 40 MG: 40 TABLET, FILM COATED ORAL at 09:41

## 2023-05-17 RX ADMIN — SODIUM CHLORIDE, PRESERVATIVE FREE 10 ML: 5 INJECTION INTRAVENOUS at 09:42

## 2023-05-17 ASSESSMENT — ENCOUNTER SYMPTOMS
SHORTNESS OF BREATH: 1
EYES NEGATIVE: 1
CONSTIPATION: 1
ABDOMINAL PAIN: 0
CHEST TIGHTNESS: 0

## 2023-05-17 NOTE — PROGRESS NOTES
Pt with lethargy and bradycardia and hypotension. Received a lot of bp meds this am, including bb. Arousible, right back to sleep. Check abg stat, and ecg. If no explanation or improvement, stat head ct. - pt seen again in icu, reveiwed abg with metabolic acidosis, suspect from renal failure  And sinus bradycardia with 1st degree block  - hypotensive, meds held until improves.   - start bicarb drip, follow up renal consult  - check ct head   - 2mm pupils, reactive to light  arousible

## 2023-05-17 NOTE — PROGRESS NOTES
Assumed care of pt around 1340. Pt transferred form 2S to ICU bed6. Pt difficult to arouse. STAT EKG and ABG done. VSS.  1403 Pt able to follow commands, full sensation, VALLADARES. Alert and oriented x4. Pt on 2 LNC. Dr. Nain Perry came to bedside STAT head CT ordered. 1530 Pt transported to [address] [place] [address] Pkwy Pt returned from Ct back to ICU bed 6. VSS.   Family came to bedside to visit    Report given to Gera England RN

## 2023-05-17 NOTE — ACP (ADVANCE CARE PLANNING)
Advance Care Planning     General Advance Care Planning (ACP) Conversation    Date of Conversation: 5/16/2023  Conducted with: Patient with Decision Making Capacity    Healthcare Decision Maker:    Supplemental (Other) Decision Maker: Maqruis Castro - Other - [phone]    Supplemental (Other) Decision Maker: Sara Cardenas - Other - [phone]  Click here to complete Healthcare Decision Makers including selection of the Healthcare Decision Maker Relationship (ie \"Primary\").       Content/Action Overview:  Has NO ACP documents/care preferences - information provided, considering goals and options  Reviewed DNR/DNI and patient elects Full Code (Attempt Resuscitation)        Length of Voluntary ACP Conversation in minutes:  <16 minutes (Non-Billable)    Yves Darling

## 2023-05-17 NOTE — ASSESSMENT & PLAN NOTE
Shortness of breath suspected from COPD/CHF  COPD CHF chronic  Patient on 2 L of oxygen at home as needed  No oxygen demand in hospital  BNP slightly elevated patient sounds wet we will give gentle diuresis with 20 mg of Lasix twice daily  Chronic kidney failure we will refer Dr. Jaziel Iyer nephrology  DuoNeb as needed  Albuterol MDI.

## 2023-05-17 NOTE — ASSESSMENT & PLAN NOTE
This is a chronic problem  Diabetic diet  Sliding scale  Hold oral hypoglycemics at this time  Continue Lantus

## 2023-05-17 NOTE — PROGRESS NOTES
Patient is not available to be assessed at this time.       [facility].   [phone]

## 2023-05-17 NOTE — CARE COORDINATION
Case Management Assessment  Initial Evaluation    Date/Time of Evaluation: 5/17/2023 3:45 PM  Assessment Completed by: Jayro Andersen    If patient is discharged prior to next notation, then this note serves as note for discharge by case management. Patient Name: Saray Haque                   YOB: 1945  Diagnosis: Shortness of breath [L32.47]  Metabolic acidosis [B15.24]  Dyspnea [R06.00]  Acute kidney injury superimposed on CKD ([address] [N17.9, N18.9]  SOB (shortness of breath) [R06.02]                   Date / Time: 5/16/2023  6:41 PM    Patient Admission Status: Observation   Readmission Risk (Low < 19, Mod (19-27), High > 27): No data recorded  Current PCP: Glynn Mercado MD  PCP verified by CM? Chart Reviewed: Yes      History Provided by: Patient  Patient Orientation: Alert and Oriented    Patient Cognition: Alert    Hospitalization in the last 30 days (Readmission):  No    If yes, Readmission Assessment in  Navigator will be completed. Advance Directives:      Code Status: Full Code   Patient's Primary Decision Maker is: Legal Next of Kin    Supplemental (Other) Decision Maker: Kiera Cardenas - Other - [phone]    Supplemental (Other) Decision Maker: Sara Cardenas - Other - [phone]    Discharge Planning:    Patient lives with: Family Members Type of Home: House  Primary Care Giver: Self  Patient Support Systems include: Family Members   Current Financial resources:    Current community resources:    Current services prior to admission: Oxygen Therapy            Current DME:              Type of Home Care services:  None    ADLS  Prior functional level:    Current functional level:      PT AM-PAC:   /24  OT AM-PAC:   /24    Family can provide assistance at DC: Yes  Would you like Case Management to discuss the discharge plan with any other family members/significant others, and if so, who?     Plans to Return to Present Housing: Yes  Other Identified Issues/Barriers

## 2023-05-17 NOTE — ASSESSMENT & PLAN NOTE
Chronic kidney disease sees Dr. Leanne Pickard  Creatinine 4.82 today  Patient with exacerbation of CHF Will gently diurese with 20 mg of Lasix twice daily  Nephrology consult

## 2023-05-17 NOTE — CONSULTS
CARDIOLOGY CONSULTATION      REASON FOR CONSULT: h/o CHF, on eliquis    REQUESTING PROVIDER: KEKE Tobin    CHIEF COMPLAINT:  shortness of breath    HISTORY OF PRESENT ILLNESS:  Kaitlynn Lazo is a 78y.o. year-old male  with past medical history significant for CHF, DVT, hypertension, hyperlipidemia, pulmonary hypertension, diabetes, GERD,  arthritis, kidney and bladder cancer s/p left nephrectomy, colon cancer, who was evaluated today due to shortness of breath. He reports shortness of breath had been progressively worsening over the last few days. In the ED, CXR showed lungs clear, proBNP 783. Troponin negative. Creat 4.8. EKG sinus tach with PACs and 1st degree AVB. On exam, he is resting comfortably. On 2l oxygen. Denies chest pain. Breathing improved. Records from hospital admission course thus far reviewed. Telemetry reviewed. No events overnight. INPATIENT MEDICATIONS:  Home medications reviewed.    Current Facility-Administered Medications:     albuterol sulfate HFA (PROVENTIL;VENTOLIN;PROAIR) 108 (90 Base) MCG/ACT inhaler 2 puff, 2 puff, Inhalation, Q4H PRN, KEKE Tobin    amLODIPine (NORVASC) tablet 10 mg, 10 mg, Oral, Daily, Mame Burrell, FNP, 10 mg at 05/17/23 0941    apixaban (ELIQUIS) tablet 5 mg, 5 mg, Oral, Daily, aMme Burrell, FNP, 5 mg at 05/17/23 0940    atorvastatin (LIPITOR) tablet 40 mg, 40 mg, Oral, Daily, Mame Burrell FNP, 40 mg at 05/17/23 0941    cloNIDine (CATAPRES) tablet 0.3 mg, 0.3 mg, Oral, TID, Mame Burrell FNP, 0.3 mg at 05/17/23 0941    ferrous sulfate (IRON 325) tablet 325 mg, 325 mg, Oral, QAM AC, Mame Burrell FNP, 325 mg at 05/17/23 2816    hydrALAZINE (APRESOLINE) tablet 100 mg, 100 mg, Oral, TID, Mame Burrell FNP, 100 mg at 05/17/23 0940    insulin glargine (LANTUS) injection vial 15 Units, 15 Units, SubCUTAneous, Daily, SELENA TobinP, 15 Units at 05/17/23 0939    isosorbide

## 2023-05-17 NOTE — PROGRESS NOTES
0700 - Bedside shift report received from [facility]. Patient laying in bed alert. Denied needs at this time. Pt assessment. AM meds given    1200 - Patient laying in bed with eyes closed resting quietly. Patient easily awoken to name being called. He stated he was aware lunch was delivered but he was not ready to eat. He did decide to eat prior to this nurse leaving room. Pulled up in bed and sitting up with tray set up for him. He denied further needs. 1220 - Notified by OPAL Campbell patient stated he was feeling weak and dizzy. In to check on patient who is responsive but noted change in alertness. He was able to \"mumble\" name and stated he is tired. He follows commands. [facility] made aware and nurse requested her to assess, which she did immediately. BP 98/46 HR 53. Rechecked 109/52 HR 54.     1233 - Dr. Dion Renee was called given SBAR and change in patients status. Question answered patient is really not moving extremities. No orders received. [address] [place] [address], nursing supervisor and OPAL Campbell remain in room with nurse. Patient continues to respond to verbal stimuli and able to follow commands. Will continue to monitor. 1255 - Patient remains the same. BP 93/51 HR 57.    1325 - In to check on patient. He is more alert. BP 95/52. Sat up and drank water. He continues to fall asleep but not while being talked to. Improvement noted. [id number] - This nurse remained in room when Parth Trammell and [place], LPN came to room and stated his HR was in the 30's with pauses. HR in the 50's on vital machine at bedside. Patient once again opens eyes but lethargic. [place], LPN notified Dr. Dion Renee who immediately came to the bedside to assess. Transfer to ICU ordered. Report given to CLAYTON Thakkar at bedside.          End of shift

## 2023-05-17 NOTE — ASSESSMENT & PLAN NOTE
This is a chronic problem  Continue BP meds Norvasc, clonidine, hydralazine, lisinopril, Toprol  Daily labs  Telemetry

## 2023-05-17 NOTE — CONSULTS
Renal Consultation Note    NAME:  Ju Marley   :   1945   MRN:   [id number]     ATTENDING: Katelin Dietz MD  PCP:  Deborah Cason MD    Date/Time:  2023 2:56 PM      Subjective:   REQUESTING PHYSICIAN:  REASON FOR CONSULT:    YOANNA on CKD 4     Patient is a 77-year-old Afro-American gentleman with history of CKD stage IV with baseline creatinine around 3.5, hypertension, CHF, DVT, hyperlipidemia, diabetes, kidney and bladder cancer s/p left nephrectomy, colon cancer who presented to the ER because of shortness of breath. It seems patient's shortness of breath has been progressively worsening. His chest x-ray showed clear lungs proBNP of 783. Creatinine was 4.8 on admission which prompted a renal consultation. Patient seen in the ICU. He is lethargic but responds to questions appropriately. He is followed by Dr. Debby Gupta outpatient. Was last seen in 2023 with a plan to follow-up in 3 weeks. His creatinine at that time was 4.0 but patient did not follow-up. His home medication show he was taking Lasix 80 mg daily at home prior to admission and spironolactone 25 mg daily  Pressure was low on admission.   Still low in the 80s range      Past Medical History:   Diagnosis Date    Acid reflux     Arrhythmia     Arthritis     Bladder cancer ([address]     Colon cancer ([address] 2021    Congestive heart failure (HCC)     Deep vein thrombosis (DVT) of proximal vein of both lower extremities ([address] 2020    Diabetes mellitus ([address]     Difficult intubation     Narrow airway-per Pulm notes(in media)     GERD (gastroesophageal reflux disease)     Gout     High cholesterol     Hypertension     Kidney carcinoma, left ([address] 2016    left nephrectomy    Kidney disease     Pituitary mass ([address]     Pulmonary HTN ([address] 10/2021    PASP 77 mmHg    Reflux esophagitis     Unspecified deficiency anemia       Past Surgical History:   Procedure Laterality Date    COLONOSCOPY N/A 2021

## 2023-05-17 NOTE — H&P
History and Physical      Subjective:     Zoe Leyva is a 78 y.o. male  has a past medical history of Acid reflux, Arrhythmia, Arthritis, Bladder cancer (HCC), Colon cancer ([address] Congestive heart failure ([address] Deep vein thrombosis (DVT) of proximal vein of both lower extremities ([address] Diabetes mellitus ([address] Difficult intubation, GERD (gastroesophageal reflux disease), Gout, High cholesterol, Hypertension, Kidney carcinoma, left ([address] Kidney disease, Pituitary mass ([address] Pulmonary HTN ([address] Reflux esophagitis, and Unspecified deficiency anemia. Patient seen at bedside in emergency department. Patient came to the emergency room today with a feeling of shortness of breath. Patient states shortness of breath for a few days. Patient was found to be in metabolic acidosis. Patient has chronic kidney disease sees Dr. Eris Pearson. Patient has a history of chronic CHF and COPD is on Eliquis. Patient also has a history of shortness of breath with a need for oxygen at home 2 L as needed. Patient be admitted on observation patient's BNP is only 841 but given patient's symptoms and auscultating wet and shortness of breath clinically looks like congestive heart failure will give 20 mg of Lasix twice daily for gentle diuresis. Nephrology consult. Patient be given DuoNeb as needed no sign of infiltrate on x-ray.       Past Medical History:   Diagnosis Date    Acid reflux     Arrhythmia     Arthritis     Bladder cancer ([address]     Colon cancer ([address] 11/2021    Congestive heart failure (HCC)     Deep vein thrombosis (DVT) of proximal vein of both lower extremities ([address] 9/14/2020    Diabetes mellitus ([address]     Difficult intubation     Narrow airway-per Pulm notes(in media)     GERD (gastroesophageal reflux disease)     Gout     High cholesterol     Hypertension     Kidney carcinoma, left ([address] 2016    left nephrectomy    Kidney disease     Pituitary mass ([address]     Pulmonary HTN ([address] 10/2021    PASP 77

## 2023-05-17 NOTE — ASSESSMENT & PLAN NOTE
Acute on chronic exacerbation  BMP slightly high at 784  Patient's symptoms and presentation reflects CHF  Patient sounds wet on auscultation  We will treat with 20 mg of Lasix twice daily gentle diuresis due to chronic kidney disease  Continue patient's meds Norvasc, Eliquis, Lipitor, clonidine, hydralazine, and/or, lisinopril, Toprol, Zocor, Aldactone  1500 mL fluid restriction daily

## 2023-05-18 ENCOUNTER — APPOINTMENT (OUTPATIENT)
Age: 78
DRG: 682 | End: 2023-05-18
Payer: MEDICARE

## 2023-05-18 PROBLEM — E87.20 METABOLIC ACIDOSIS: Status: ACTIVE | Noted: 2023-05-18

## 2023-05-18 LAB
25(OH)D3 SERPL-MCNC: 66.2 NG/ML (ref 30–100)
ANION GAP SERPL CALC-SCNC: 13 MMOL/L (ref 3–18)
BASOPHILS # BLD: 0 K/UL (ref 0–0.1)
BASOPHILS NFR BLD: 0 % (ref 0–2)
BNP SERPL-MCNC: 812 PG/ML (ref 0–1800)
BUN SERPL-MCNC: 106 MG/DL (ref 7–18)
BUN/CREAT SERPL: 22 (ref 12–20)
CA-I BLD-MCNC: 8.2 MG/DL (ref 8.5–10.1)
CA-I BLD-MCNC: 8.3 MG/DL (ref 8.5–10.1)
CHLORIDE SERPL-SCNC: 111 MMOL/L (ref 100–111)
CO2 SERPL-SCNC: 19 MMOL/L (ref 21–32)
CREAT SERPL-MCNC: 4.88 MG/DL (ref 0.6–1.3)
DIFFERENTIAL METHOD BLD: ABNORMAL
EOSINOPHIL # BLD: 0 K/UL (ref 0–0.4)
EOSINOPHIL NFR BLD: 0 % (ref 0–5)
ERYTHROCYTE [DISTWIDTH] IN BLOOD BY AUTOMATED COUNT: 15.4 % (ref 11.6–14.5)
FERRITIN SERPL-MCNC: 318 NG/ML (ref 8–388)
GLUCOSE BLD STRIP.AUTO-MCNC: 108 MG/DL (ref 70–110)
GLUCOSE BLD STRIP.AUTO-MCNC: 134 MG/DL (ref 70–110)
GLUCOSE BLD STRIP.AUTO-MCNC: 136 MG/DL (ref 70–110)
GLUCOSE BLD STRIP.AUTO-MCNC: 150 MG/DL (ref 70–110)
GLUCOSE SERPL-MCNC: 150 MG/DL (ref 74–99)
HCT VFR BLD AUTO: 30.5 % (ref 36–48)
HGB BLD-MCNC: 9.7 G/DL (ref 13–16)
IMM GRANULOCYTES # BLD AUTO: 0 K/UL (ref 0–0.04)
IMM GRANULOCYTES NFR BLD AUTO: 0 % (ref 0–0.5)
IRON SATN MFR SERPL: 10 % (ref 20–50)
IRON SERPL-MCNC: 19 UG/DL (ref 50–175)
LYMPHOCYTES # BLD: 0.8 K/UL (ref 0.9–3.6)
LYMPHOCYTES NFR BLD: 8 % (ref 21–52)
MAGNESIUM SERPL-MCNC: 1.1 MG/DL (ref 1.6–2.6)
MAGNESIUM SERPL-MCNC: 1.6 MG/DL (ref 1.6–2.6)
MCH RBC QN AUTO: 26.7 PG (ref 24–34)
MCHC RBC AUTO-ENTMCNC: 31.8 G/DL (ref 31–37)
MCV RBC AUTO: 84 FL (ref 78–100)
MONOCYTES # BLD: 0.8 K/UL (ref 0.05–1.2)
MONOCYTES NFR BLD: 8 % (ref 3–10)
NEUTS SEG # BLD: 7.9 K/UL (ref 1.8–8)
NEUTS SEG NFR BLD: 84 % (ref 40–73)
NRBC # BLD: 0 K/UL (ref 0–0.01)
NRBC BLD-RTO: 0 PER 100 WBC
PERFORMED BY:: ABNORMAL
PERFORMED BY:: NORMAL
PHOSPHATE SERPL-MCNC: 3.9 MG/DL (ref 2.5–4.9)
PLATELET # BLD AUTO: 242 K/UL (ref 135–420)
PMV BLD AUTO: 10.5 FL (ref 9.2–11.8)
POTASSIUM SERPL-SCNC: 4.3 MMOL/L (ref 3.5–5.5)
PROCALCITONIN SERPL-MCNC: 1.55 NG/ML
PTH-INTACT SERPL-MCNC: 535.1 PG/ML (ref 18.4–88)
RBC # BLD AUTO: 3.63 M/UL (ref 4.35–5.65)
SODIUM SERPL-SCNC: 143 MMOL/L (ref 136–145)
TIBC SERPL-MCNC: 198 UG/DL (ref 250–450)
WBC # BLD AUTO: 9.5 K/UL (ref 4.6–13.2)

## 2023-05-18 PROCEDURE — G0378 HOSPITAL OBSERVATION PER HR: HCPCS

## 2023-05-18 PROCEDURE — 2580000003 HC RX 258: Performed by: NURSE PRACTITIONER

## 2023-05-18 PROCEDURE — 2700000000 HC OXYGEN THERAPY PER DAY

## 2023-05-18 PROCEDURE — 82962 GLUCOSE BLOOD TEST: CPT

## 2023-05-18 PROCEDURE — 84145 PROCALCITONIN (PCT): CPT

## 2023-05-18 PROCEDURE — 96376 TX/PRO/DX INJ SAME DRUG ADON: CPT

## 2023-05-18 PROCEDURE — 6360000002 HC RX W HCPCS: Performed by: INTERNAL MEDICINE

## 2023-05-18 PROCEDURE — 83540 ASSAY OF IRON: CPT

## 2023-05-18 PROCEDURE — 96375 TX/PRO/DX INJ NEW DRUG ADDON: CPT

## 2023-05-18 PROCEDURE — 96365 THER/PROPH/DIAG IV INF INIT: CPT

## 2023-05-18 PROCEDURE — 85025 COMPLETE CBC W/AUTO DIFF WBC: CPT

## 2023-05-18 PROCEDURE — 2500000003 HC RX 250 WO HCPCS: Performed by: INTERNAL MEDICINE

## 2023-05-18 PROCEDURE — 6370000000 HC RX 637 (ALT 250 FOR IP): Performed by: INTERNAL MEDICINE

## 2023-05-18 PROCEDURE — 82306 VITAMIN D 25 HYDROXY: CPT

## 2023-05-18 PROCEDURE — 82728 ASSAY OF FERRITIN: CPT

## 2023-05-18 PROCEDURE — 94761 N-INVAS EAR/PLS OXIMETRY MLT: CPT

## 2023-05-18 PROCEDURE — 6370000000 HC RX 637 (ALT 250 FOR IP): Performed by: NURSE PRACTITIONER

## 2023-05-18 PROCEDURE — 2000000000 HC ICU R&B

## 2023-05-18 PROCEDURE — 2580000003 HC RX 258: Performed by: INTERNAL MEDICINE

## 2023-05-18 PROCEDURE — 80048 BASIC METABOLIC PNL TOTAL CA: CPT

## 2023-05-18 PROCEDURE — 84100 ASSAY OF PHOSPHORUS: CPT

## 2023-05-18 PROCEDURE — 71045 X-RAY EXAM CHEST 1 VIEW: CPT

## 2023-05-18 PROCEDURE — 36415 COLL VENOUS BLD VENIPUNCTURE: CPT

## 2023-05-18 PROCEDURE — 83735 ASSAY OF MAGNESIUM: CPT

## 2023-05-18 PROCEDURE — 83880 ASSAY OF NATRIURETIC PEPTIDE: CPT

## 2023-05-18 RX ORDER — MAGNESIUM SULFATE IN WATER 40 MG/ML
2000 INJECTION, SOLUTION INTRAVENOUS ONCE
Status: COMPLETED | OUTPATIENT
Start: 2023-05-18 | End: 2023-05-18

## 2023-05-18 RX ORDER — SODIUM BICARBONATE 650 MG/1
1300 TABLET ORAL 3 TIMES DAILY
Status: DISCONTINUED | OUTPATIENT
Start: 2023-05-18 | End: 2023-05-19 | Stop reason: HOSPADM

## 2023-05-18 RX ORDER — ISOSORBIDE MONONITRATE 30 MG/1
30 TABLET, EXTENDED RELEASE ORAL DAILY
Status: DISCONTINUED | OUTPATIENT
Start: 2023-05-18 | End: 2023-05-19 | Stop reason: HOSPADM

## 2023-05-18 RX ADMIN — SODIUM BICARBONATE: 84 INJECTION, SOLUTION INTRAVENOUS at 01:03

## 2023-05-18 RX ADMIN — SENNOSIDES 8.6 MG: 8.6 TABLET, FILM COATED ORAL at 08:10

## 2023-05-18 RX ADMIN — SODIUM CHLORIDE, PRESERVATIVE FREE 10 ML: 5 INJECTION INTRAVENOUS at 20:52

## 2023-05-18 RX ADMIN — SODIUM CHLORIDE, PRESERVATIVE FREE 10 ML: 5 INJECTION INTRAVENOUS at 08:11

## 2023-05-18 RX ADMIN — ATORVASTATIN CALCIUM 40 MG: 40 TABLET, FILM COATED ORAL at 08:10

## 2023-05-18 RX ADMIN — SODIUM CHLORIDE 1500 MG: 900 INJECTION INTRAVENOUS at 14:18

## 2023-05-18 RX ADMIN — PANTOPRAZOLE SODIUM 40 MG: 40 TABLET, DELAYED RELEASE ORAL at 08:10

## 2023-05-18 RX ADMIN — ISOSORBIDE MONONITRATE 30 MG: 30 TABLET, EXTENDED RELEASE ORAL at 14:18

## 2023-05-18 RX ADMIN — MAGNESIUM SULFATE HEPTAHYDRATE 2000 MG: 40 INJECTION, SOLUTION INTRAVENOUS at 09:23

## 2023-05-18 RX ADMIN — INSULIN GLARGINE 15 UNITS: 100 INJECTION, SOLUTION SUBCUTANEOUS at 08:10

## 2023-05-18 RX ADMIN — SODIUM BICARBONATE 1300 MG: 650 TABLET ORAL at 20:44

## 2023-05-18 RX ADMIN — APIXABAN 5 MG: 5 TABLET, FILM COATED ORAL at 08:11

## 2023-05-18 RX ADMIN — SODIUM BICARBONATE 1300 MG: 650 TABLET ORAL at 08:10

## 2023-05-18 ASSESSMENT — PAIN SCALES - GENERAL: PAINLEVEL_OUTOF10: 0

## 2023-05-18 NOTE — PROGRESS NOTES
Hospitalist Progress Note             Date of Service:  2023  NAME:  Wilber Haley  :  1945  MRN:  [id number]    Assessment & Plan:     Metabolic encephalopathy due to Metabolic Acidosis due to YOANNA on CKD  - admitted to icu yesterday when he was unresponsive, abg noted metabolic acidosis  - bicarb drip started at that time, acidosis improving today, can likely stop drip but would wait for nephrology input  - defer further mgmt of his renal failure to nephrology, suspect he is approaching need for HD  - holding lisinopril    HTN  - home meds include toprol xl, norvasc, clonidine, hydralazine, imdur and lisinopril and aldactone  - yesterday with bradycardia and hypotension, all meds were held  - now bp and hr improving  - will need to resume in a stepwise fashion, unhold imdur and toprol xl at this time only  - will discuss with him in the am if he is actually taking all of these medicaitons religiously      CHF, systolic, acute on chronici  - hesitent to resume lasix due to level of renal failure at this time  - he is currently on his home 2 liters 0f 02, no reason to be overly aggressive, 6 min walk in am on home 02    Hypomagnesium  - give iv today, recheck in am     DM2  - holding oral meds  - ins ss, lantus    Abnormal cxr  - patchy infiltrates, pna vs edema  - trend bnp and procal to help differentiate, start unasyn      Review of Systems:   Pertinent items are noted in HPI. Awake and alert today, appears to be at his baseline  New wet cough      Vital Signs:    Last 24hrs VS reviewed since prior progress note.  Most recent are:  Vitals:    23 1200   BP: 131/83   Pulse: 89   Resp: 23   Temp: 98.7 °F (37.1 °C)   SpO2: 92%         Intake/Output Summary (Last 24 hours) at 2023 1328  Last data filed at 2023 1112  Gross per 24 hour   Intake --   Output 1900 ml   Net -1900 ml        Physical

## 2023-05-18 NOTE — PROGRESS NOTES
Assumed care of pt 0645. Patient alert and oriented x4. VALLADARES. Pt independent. OOB to [facility]. Bmx1. Pt on 2L NC O2sat>92%. Pt tolerating diet. Pt has urostomy. Pt linen and gown changed. Pt coughing up brown mucous. Pt encourage to use IS. VSS.     Report given to Siddharth Tanner RN

## 2023-05-18 NOTE — PLAN OF CARE
Comprehensive Nutrition Assessment    Type and Reason for Visit:  Initial    Nutrition Recommendations/Plan:   Modified current diet to include low protein (<60g per day) to meet calculated needs 2/2 renal condition     Malnutrition Assessment:  Malnutrition Status:  No malnutrition (05/18/23 1636)    Context:  Chronic Illness     Findings of the 6 clinical characteristics of malnutrition:  Energy Intake:  No significant decrease in energy intake  Weight Loss:  No significant weight loss     Body Fat Loss:  No significant body fat loss     Muscle Mass Loss:  No significant muscle mass loss    Fluid Accumulation:  Unable to assess     Strength:  Not Performed    Nutrition Assessment:    Mr Mitchel Marr is a 77 yo male with pmhx sig for CHF, DVT, hypertension, hyperlipidemia, pulmonary hypertension, diabetes, GERD,  arthritis, kidney and bladder cancer s/p left nephrectomy, colon cancer who admitted for SOB. Appetite and intake is good, no c/o N/V/D, and no trouble with chewing and swallowing. Currently ordered DM and cardiac diet with medical order of 1.5L fluid restriction. Due to current renal status indicating potential for ESRD not on dialysis, will modify diet order to <60g protein daily which will satisfy his calcualted needs. Weight is stable and at low nutritional risk at this time. Will assess PRN. Nutrition Related Findings:    BG ranging from 108-254 past 24 hrs, Mag repleted (1.1-->1.6), eGFR @ 12 (mentioned his understanding of possible HD, and RDN noted that nutrition will change if that happens). Wound Type: None       Current Nutrition Intake & Therapies:    Average Meal Intake: 51-75%  Average Supplements Intake: None Ordered  ADULT DIET; Regular; 3 carb choices (45 gm/meal); Low Fat/Low Chol/High Fiber/2 gm Na;  Less than 60 gm; 1500 ml    Anthropometric Measures:  Height: 5' 8\" (172.7 cm)  Ideal Body Weight (IBW): 154 lbs (70 kg)    Admission Body Weight: 183 lb (83 kg)  Current Body Weight:

## 2023-05-18 NOTE — PROGRESS NOTES
CARDIOLOGY PROGRESS NOTE      Patient Name: Aureliano José  Age: 78 y.o. Gender:male  DUE:6/81/6269  MRN: [id number]        Patient seen and examined. This is a patient who is followed for shortness of breath. He was moved to ICU yesterday afternoon when he became lethargic and difficult to arouse. He was hypotensive HR at 50. Head CT completed which was negative. ABGs showed acidosis. Today he is alert and awake. He is a poor historian over all and has little understanding about his medical issues. CXR showed new airspace disease this am. No other complaints reported. Telemetry reviewed, there were no events noted in the past 24 hours. Pertinent review of systems items noted above, all other systems are negative. Current medications reviewed. Physical Examination    No Known Allergies    Vital signs are stable. Blood pressure 136/81, Pulse 99  No apparent distress. Heart is regular, rate and rhythm. Normal S1, S2, no murmurs are appreciated. Lungs are rhonchi and crackles throughout. Abdomen is soft, nontender, normal bowel sounds. Extremities have no edema.     Vitals:    05/18/23 0800   BP: 136/81   Pulse: 99   Resp: 25   Temp: 98.6 °F (37 °C)   SpO2: 92%         Labs reviewed:   Recent Results (from the past 12 hour(s))   Basic Metabolic Panel    Collection Time: 05/18/23  3:21 AM   Result Value Ref Range    Sodium 143 136 - 145 mmol/L    Potassium 4.3 3.5 - 5.5 mmol/L    Chloride 111 100 - 111 mmol/L    CO2 19 (L) 21 - 32 mmol/L    Anion Gap 13 3.0 - 18.0 mmol/L    Glucose 150 (H) 74 - 99 mg/dL     (H) 7 - 18 mg/dL    Creatinine 4.88 (H) 0.60 - 1.30 mg/dL    Bun/Cre Ratio 22 (H) 12 - 20      Est, Glom Filt Rate 12 (L) >60 ml/min/1.73m2    Calcium 8.2 (L) 8.5 - 10.1 mg/dL   CBC with Auto Differential    Collection Time: 05/18/23  3:21 AM   Result Value Ref Range    WBC 9.5 4.6 - 13.2 K/uL    RBC 3.63 (L) 4.35 - 5.65 M/uL    Hemoglobin 9.7 (L) 13.0 - 16.0 g/dL    Hematocrit 30.5 (L)

## 2023-05-18 NOTE — PROGRESS NOTES
1845- Bedside shift change report given to Marco Antonio Celaya RN (oncoming nurse) by Maria Isabel Ramires RN (offgoing nurse). Report included the following information Nurse Handoff Report, Index, ED Encounter Summary, ED SBAR, Intake/Output, MAR, Recent Results, and Cardiac Rhythm SR 1st AVB . Received patient resting in bed, watching tv. Patient A+Ox4 at this time, bed in lowest and locked position, CBWR.     2015- Patient requests to have BM. Patient up to [facility], denies feeling dizzy or lightheaded, up to [facility] without assistance. Large green soft formed BM. 2045- Scheduled medication given at this time without difficulty. 2215- Patient rests in bed, respirations even and unlabored, watches tv at this time denies pain or needs. 0030- Reassessment completed at this time, no changes noted. VSS    0245- Patient rests in bed, watching tv, intermittent coughs, denies pain or needs. 26- Phlebo at bedside. Patient urostomy emptied at this time. 3488- Bedside shift change report given to CLAYTON Ambrocio (oncoming nurse) by Marco Antonio Celaya RN (offgoing nurse). Report included the following information Nurse Handoff Report, Index, ED Encounter Summary, Intake/Output, MAR, Recent Results, and Cardiac Rhythm SR 1st AVB .

## 2023-05-18 NOTE — PROGRESS NOTES
Renal  Note    NAME:  Alba Harris   :   1945   MRN:   [id number]     ATTENDING: Amarilis Oseguera MD  PCP:  Lisa Dukes MD    Date/Time:  2023 4:38 PM      Subjective:   REQUESTING PHYSICIAN:  REASON FOR CONSULT:    YOANNA on CKD 4     Patient seen in the room. He is resting comfortably. Denies any complaints. Creatinine today stable at 4.8.   Bicarb has improved to 19 with bicarb drip.  proBNP only 812 but patient looks tachypneic      Past Medical History:   Diagnosis Date    Acid reflux     Arrhythmia     Arthritis     Bladder cancer ([address]     [facility]on [facility] 2021    Congestive heart failure (HCC)     Deep vein thrombosis (DVT) of proximal vein of both lower extremities ([address] 2020    Diabetes mellitus ([address]     Difficult intubation     Narrow airway-per Pulm notes(in media)     GERD (gastroesophageal reflux disease)     Gout     High cholesterol     Hypertension     Kidney carcinoma, left ([address] 2016    left nephrectomy    Kidney disease     Pituitary mass ([address]     Pulmonary HTN ([address] 10/2021    PASP 77 mmHg    Reflux esophagitis     Unspecified deficiency anemia       Past Surgical History:   Procedure Laterality Date    COLONOSCOPY N/A 2021    COLONOSCOPY with Polypectomies, Tattoo & Clip Placement performed by Corie Vaughan MD at [facility]  2009    bladder removal/ urostomy bag    KIDNEY REMOVAL Left 2009    Nephroureterectomy by Dr. Desir Mantle      surgery on pituitary gland    OTHER SURGICAL HISTORY       Social History     Tobacco Use    Smoking status: Former     Packs/day: 0.50     Types: Cigarettes     Quit date: 1999     Years since quittin.3    Smokeless tobacco: Never   Substance Use Topics    Alcohol use: No      Family History   Problem Relation Age of Onset    Heart Disease Mother     Heart Disease Father        No Known Allergies   Prior to Admission medications    Medication Sig

## 2023-05-19 VITALS
OXYGEN SATURATION: 96 % | TEMPERATURE: 98.2 F | HEIGHT: 68 IN | RESPIRATION RATE: 20 BRPM | SYSTOLIC BLOOD PRESSURE: 130 MMHG | BODY MASS INDEX: 27.81 KG/M2 | DIASTOLIC BLOOD PRESSURE: 70 MMHG | HEART RATE: 74 BPM | WEIGHT: 183.5 LBS

## 2023-05-19 LAB
ANION GAP SERPL CALC-SCNC: 10 MMOL/L (ref 3–18)
BNP SERPL-MCNC: 994 PG/ML (ref 0–1800)
BUN SERPL-MCNC: 90 MG/DL (ref 7–18)
BUN/CREAT SERPL: 20 (ref 12–20)
CA-I BLD-MCNC: 8.1 MG/DL (ref 8.5–10.1)
CHLORIDE SERPL-SCNC: 111 MMOL/L (ref 100–111)
CO2 SERPL-SCNC: 23 MMOL/L (ref 21–32)
CREAT SERPL-MCNC: 4.53 MG/DL (ref 0.6–1.3)
GLUCOSE BLD STRIP.AUTO-MCNC: 115 MG/DL (ref 70–110)
GLUCOSE BLD STRIP.AUTO-MCNC: 72 MG/DL (ref 70–110)
GLUCOSE SERPL-MCNC: 60 MG/DL (ref 74–99)
PERFORMED BY:: ABNORMAL
PERFORMED BY:: NORMAL
POTASSIUM SERPL-SCNC: 3.6 MMOL/L (ref 3.5–5.5)
PROCALCITONIN SERPL-MCNC: 2.56 NG/ML
SODIUM SERPL-SCNC: 144 MMOL/L (ref 136–145)

## 2023-05-19 PROCEDURE — 2580000003 HC RX 258: Performed by: NURSE PRACTITIONER

## 2023-05-19 PROCEDURE — 2700000000 HC OXYGEN THERAPY PER DAY

## 2023-05-19 PROCEDURE — 6370000000 HC RX 637 (ALT 250 FOR IP): Performed by: NURSE PRACTITIONER

## 2023-05-19 PROCEDURE — 6370000000 HC RX 637 (ALT 250 FOR IP): Performed by: INTERNAL MEDICINE

## 2023-05-19 PROCEDURE — 84145 PROCALCITONIN (PCT): CPT

## 2023-05-19 PROCEDURE — 83880 ASSAY OF NATRIURETIC PEPTIDE: CPT

## 2023-05-19 PROCEDURE — 94761 N-INVAS EAR/PLS OXIMETRY MLT: CPT

## 2023-05-19 PROCEDURE — 36415 COLL VENOUS BLD VENIPUNCTURE: CPT

## 2023-05-19 PROCEDURE — 80048 BASIC METABOLIC PNL TOTAL CA: CPT

## 2023-05-19 PROCEDURE — 82962 GLUCOSE BLOOD TEST: CPT

## 2023-05-19 PROCEDURE — 2580000003 HC RX 258: Performed by: INTERNAL MEDICINE

## 2023-05-19 PROCEDURE — 6360000002 HC RX W HCPCS: Performed by: INTERNAL MEDICINE

## 2023-05-19 RX ORDER — ASPIRIN 81 MG/1
81 TABLET, CHEWABLE ORAL DAILY
Qty: 30 TABLET | Refills: 3 | Status: SHIPPED | OUTPATIENT
Start: 2023-05-20

## 2023-05-19 RX ORDER — ASPIRIN 81 MG/1
81 TABLET, CHEWABLE ORAL DAILY
Status: DISCONTINUED | OUTPATIENT
Start: 2023-05-19 | End: 2023-05-19 | Stop reason: HOSPADM

## 2023-05-19 RX ORDER — AMOXICILLIN AND CLAVULANATE POTASSIUM 500; 125 MG/1; MG/1
1 TABLET, FILM COATED ORAL DAILY
Qty: 6 TABLET | Refills: 0 | Status: SHIPPED | OUTPATIENT
Start: 2023-05-19 | End: 2023-05-25

## 2023-05-19 RX ADMIN — SODIUM BICARBONATE 1300 MG: 650 TABLET ORAL at 08:41

## 2023-05-19 RX ADMIN — METOPROLOL SUCCINATE 25 MG: 25 TABLET, EXTENDED RELEASE ORAL at 08:41

## 2023-05-19 RX ADMIN — SENNOSIDES 8.6 MG: 8.6 TABLET, FILM COATED ORAL at 08:41

## 2023-05-19 RX ADMIN — ISOSORBIDE MONONITRATE 30 MG: 30 TABLET, EXTENDED RELEASE ORAL at 08:41

## 2023-05-19 RX ADMIN — ATORVASTATIN CALCIUM 40 MG: 40 TABLET, FILM COATED ORAL at 08:41

## 2023-05-19 RX ADMIN — SODIUM CHLORIDE 1500 MG: 900 INJECTION INTRAVENOUS at 02:15

## 2023-05-19 RX ADMIN — SODIUM CHLORIDE, PRESERVATIVE FREE 10 ML: 5 INJECTION INTRAVENOUS at 08:41

## 2023-05-19 RX ADMIN — ASPIRIN 81 MG 81 MG: 81 TABLET ORAL at 11:03

## 2023-05-19 RX ADMIN — PANTOPRAZOLE SODIUM 40 MG: 40 TABLET, DELAYED RELEASE ORAL at 08:41

## 2023-05-19 RX ADMIN — INSULIN GLARGINE 15 UNITS: 100 INJECTION, SOLUTION SUBCUTANEOUS at 08:40

## 2023-05-19 RX ADMIN — AMLODIPINE BESYLATE 10 MG: 5 TABLET ORAL at 11:06

## 2023-05-19 RX ADMIN — APIXABAN 5 MG: 5 TABLET, FILM COATED ORAL at 08:41

## 2023-05-19 NOTE — PLAN OF CARE
Problem: Discharge Planning  Goal: Discharge to home or other facility with appropriate resources  Outcome: Adequate for Discharge     Problem: Safety - Adult  Goal: Free from fall injury  Outcome: Adequate for Discharge     Problem: ABCDS Injury Assessment  Goal: Absence of physical injury  Outcome: Adequate for Discharge     Problem: Chronic Conditions and Co-morbidities  Goal: Patient's chronic conditions and co-morbidity symptoms are monitored and maintained or improved  Outcome: Adequate for Discharge     Problem: Pain  Goal: Verbalizes/displays adequate comfort level or baseline comfort level  Outcome: Adequate for Discharge     Problem: Nutrition Deficit:  Goal: Optimize nutritional status  Outcome: Adequate for Discharge

## 2023-05-19 NOTE — PROGRESS NOTES
CARDIOLOGY PROGRESS NOTE      Patient Name: Deo Jeffers  Age: 78 y.o. Gender:male  NHV:8[birth year]15/[birth year]  MRN: [id number]        Patient seen and examined. This is a patient who is followed for shortness of breath. He remains in ICU. Awake and alert. No complaints of chest pain or dyspnea. Seen on room air with O2 sats 100%. BP and HR improved. No other complaints reported. Telemetry reviewed, there were no events noted in the past 24 hours. Pertinent review of systems items noted above, all other systems are negative. Current medications reviewed. Physical Examination    No Known Allergies    Vital signs are stable. Blood pressure 162/80, Pulse 81  No apparent distress. Heart is regular, rate and rhythm. Normal S1, S2, no murmurs are appreciated. Lungs are rhonchi bilateral bases  Abdomen is soft, nontender, normal bowel sounds. Extremities have no edema.     Vitals:    05/19/23 0841   BP: (!) 162/80   Pulse: 81   Resp:    Temp:    SpO2:          Labs reviewed:   Recent Results (from the past 12 hour(s))   Procalcitonin    Collection Time: 05/19/23  5:00 AM   Result Value Ref Range    Procalcitonin 2.56 ng/mL   Brain Natriuretic Peptide    Collection Time: 05/19/23  5:00 AM   Result Value Ref Range    NT Pro- 0 - 1,800 pg/mL   Basic Metabolic Panel    Collection Time: 05/19/23  5:00 AM   Result Value Ref Range    Sodium 144 136 - 145 mmol/L    Potassium 3.6 3.5 - 5.5 mmol/L    Chloride 111 100 - 111 mmol/L    CO2 23 21 - 32 mmol/L    Anion Gap 10 3.0 - 18.0 mmol/L    Glucose 60 (L) 74 - 99 mg/dL    BUN 90 (H) 7 - 18 mg/dL    Creatinine 4.53 (H) 0.60 - 1.30 mg/dL    Bun/Cre Ratio 20 12 - 20      Est, Glom Filt Rate 13 (L) >60 ml/min/1.73m2    Calcium 8.1 (L) 8.5 - 10.1 mg/dL   POCT Glucose    Collection Time: 05/19/23  7:53 AM   Result Value Ref Range    POC Glucose 72 70 - 110 mg/dL    Performed by: Katelyn Pillai           Case discussed with Dr. Nicolas uA and our impression and

## 2023-05-19 NOTE — PROGRESS NOTES
1845- Bedside shift change report given to Ronel Rodriguez, RN (oncoming nurse) by Chantel Nick RN (offgoing nurse). Report included the following information Nurse Handoff Report, Index, ED Encounter Summary, ED SBAR, Intake/Output, MAR, Recent Results, and Cardiac Rhythm SR w/ 1st AVB . Received patient resting in bed, watching tv. Bed in lowest and locked position, CBWR.      1930- Shift assessment completed at this time, patient calm and pleasant, denies pain or needs at this time. 2005- RT at bedside. 2045- Scheduled PO medication administered at this time without difficulty. New dressing applied to PIV. 2145- Patient telemetry off at this time, RN to bedside, patient found attempting to crawl OOB. Patient states he needs to use bathroom for BM. Patient education provided at this time regarding risk of falls, in addition to telemetry, and IV lines adding to safety/fall risk. Patient verbalizes understanding. Patient to [facility] independently. Patient answers all orientations appropriately at this time. Slight incontinence assessed on linens, new linens applied at this time. Patient refuses gown change/bed bath at this time. 0005- Reassessment completed at this time, no changes noted. VSS.     0120- Patient rests in bed with eyes closed, respirations even and unlabored, no evidence of distress or discomfort. 0200- Patient on call bell requesting use of BSC. Patient independently empties urostomy bag, 600mL cloudy yellow malodorous. 0400- Phlebo at bedside, morning labs drawn at this time. 1110- Reassessment completed at this time, no changes noted. Patient offered bedbath/new gown/oral care at this time, refuses all hygiene efforts. VSS    0645- Bedside shift change report given to Wayne Moran LPN (oncoming nurse) by Ronel Rodriguez, CLAYTON (offgoing nurse).  Report included the following information Nurse Handoff Report, Index, ED Encounter Summary, ED SBAR, Intake/Output, MAR, Recent Results, and Cardiac Rhythm 1st AVB

## 2023-05-19 NOTE — PROGRESS NOTES
0700-Report received from KACEY Kebede RN. Assumed care of patient. Patient AxOx4. No c/o pain at this time. Bed in lowest position. CBWR.     1000-Patient walking laps around the nurses station. No distress noted. Patient tolerated well and stated he was ready to go home. Dr. Jose Ramon made aware. 1245-Attempted to schedule Nephro appointment. Office closed for lunch. VM full. 1253-Attempted to schedule PCP follow up. Office closed for lunch. Left VM. 1330-Patient discharged home. Discharge instructions given. Patient verbalized understanding. IV removed. No complications.

## 2023-05-21 LAB
BACTERIA SPEC CULT: NORMAL
BACTERIA SPEC CULT: NORMAL
Lab: NORMAL
Lab: NORMAL

## 2023-05-22 ENCOUNTER — HOSPITAL ENCOUNTER (OUTPATIENT)
Age: 78
Discharge: HOME | End: 2023-05-25

## 2023-05-22 ENCOUNTER — OFFICE VISIT (OUTPATIENT)
Facility: CLINIC | Age: 78
End: 2023-05-22
Payer: MEDICARE

## 2023-05-22 VITALS
BODY MASS INDEX: 27.83 KG/M2 | SYSTOLIC BLOOD PRESSURE: 130 MMHG | DIASTOLIC BLOOD PRESSURE: 63 MMHG | WEIGHT: 183 LBS | HEART RATE: 66 BPM

## 2023-05-22 DIAGNOSIS — N18.4 CHRONIC KIDNEY DISEASE, STAGE IV (SEVERE) (HCC): Primary | ICD-10-CM

## 2023-05-22 DIAGNOSIS — N18.4 CHRONIC KIDNEY DISEASE, STAGE IV (SEVERE) (HCC): ICD-10-CM

## 2023-05-22 LAB
BACTERIA SPEC CULT: NORMAL
BACTERIA SPEC CULT: NORMAL
Lab: NORMAL
Lab: NORMAL
SENTARA SPECIMEN COLLECTION: NORMAL

## 2023-05-22 PROCEDURE — 99214 OFFICE O/P EST MOD 30 MIN: CPT | Performed by: INTERNAL MEDICINE

## 2023-05-22 PROCEDURE — 3078F DIAST BP <80 MM HG: CPT | Performed by: INTERNAL MEDICINE

## 2023-05-22 PROCEDURE — 3075F SYST BP GE 130 - 139MM HG: CPT | Performed by: INTERNAL MEDICINE

## 2023-05-22 PROCEDURE — 1123F ACP DISCUSS/DSCN MKR DOCD: CPT | Performed by: INTERNAL MEDICINE

## 2023-05-22 NOTE — PROGRESS NOTES
Reason for f/u:  YOANNA on CKD IV/V     HPI:  The pt is seen for f/u for CKD. He is noticed to have unintentional weight loss. He admits no appetite. No lower extremity swelling. No shortness of breath.   ROS:  Constitutional   Constitutional: no fatigue, no fever, no night sweats, no significant weight gain, no significant weight loss     Eyes   Eyes: no dry eyes, no vision change     ENMT   Nose: no frequent nosebleeds, no nose/sinus problems   Mouth/Throat: no dry mouth, no bleeding gums, no sore throat, no teeth problems, no snoring     Cardiovascular   Cardiovascular: no swelling in the extremities, no chest pain, no arm pain on exertion, no shortness of breath when walking, no known heart murmur, no shortness of breath when lying down, no palpitations     Respiratory   Respiratory: shortness of breath+, no cough, no wheezing, no coughing up blood     Gastrointestinal   Gastrointestinal: no nausea, no abdominal pain, no vomiting, normal appetite, no diarrhea, not vomiting blood     Genitourinary   Genitourinary: no hematuria, no incontinence, no difficulty urinating, no increased frequency     Musculoskeletal   Musculoskeletal: no back pain, no arthralgias/joint pain, no muscle aches, no muscle weakness     Integumentary   Skin: no rashes, no jaundice     Neurologic   Neurologic: no dizziness, no numbness, no loss of consciousness, no weakness, no seizures, no headaches     Psychiatric   Psych: no depression, no sleep disturbances     Hematologic/Lymphatic   Hematologic/Lymphatic no swollen glands, no bruising     Allergic/Immunologic   Allergy/Immunologic: no runny nose, no hives   ROS as noted in the HPI      PE:  Constitutional   Level of Distress: NAD, no acute illness, no chronic illness   General Appearance: healthy-appearing, well-nourished, well-developed   Ambulation: ambulating normally     Eyes   Pupils: PERRLA   EOM: EOMI     Cardiovascular   Heart Auscultation: RRR, normal S1, normal S2, no

## 2023-05-23 ENCOUNTER — TELEPHONE (OUTPATIENT)
Facility: CLINIC | Age: 78
End: 2023-05-23

## 2023-05-23 NOTE — TELEPHONE ENCOUNTER
Care Transitions Initial Follow Up Call    Outreach made within 2 business days of discharge: Yes    Patient: Jung Lopez Patient : 1945   MRN: [id number]  Reason for Admission: There are no discharge diagnoses documented for the most recent discharge. Discharge Date: 23       Spoke with: Unable to reach patient on [phone](WQK a working number). Called [phone] and voicemail is not set up. Called other alternate number [phone] and went straight to voicemail, left VM for patient to return my call with office phone number. Discharge department/facility: [facility]. TCM Interactive Patient Contact:  Was patient able to fill all prescriptions: unable to contact    Was patient instructed to bring all medications to the follow-up visit: unable to contact patient    Is patient taking all medications as directed in the discharge summary?    Does patient understand their discharge instructions: unable to reach patient    Does patient have questions or concerns that need addressed prior to 7-14 day follow up office visit: unable to reach patient    Scheduled appointment with PCP within 7-14 days    Follow Up  Future Appointments   Date Time Provider Annabella Viramontes   2023 10:00 AM Chandu Lunsford MD IMS BS ELISA Christiansen LPN

## 2023-05-25 ENCOUNTER — TELEPHONE (OUTPATIENT)
Facility: CLINIC | Age: 78
End: 2023-05-25

## 2023-05-25 NOTE — TELEPHONE ENCOUNTER
Have been unable to contact patient on either phone number. Left VM on 5/23/2023 on emergency contact number and have received no return call.

## 2023-05-25 NOTE — TELEPHONE ENCOUNTER
Patient saw Dr. Rosetta Seip on Monday May 22. Dr. Rosetta Seip informed me that patient needs to start dialysis as soon as possible. I moved forward with getting done what was needed  for admission to [place] [address]. Referral was placed for Dr. Jelani Ponce to place perma cath for dialysis. Per Dr. Kadeem Baltazar staff patient already established care with Dr. Wing aCrson. So I spoke with Dr. Cy Boswell surgery scheduler she scheduled patient for May 25 at [facility] in [place] arrival time of 1030 and surgery time for 1130 in the cath lab. Patient is to come through the entrance behind [facility]. The day of the procedure Dr. Cy Boswell called himself @ 1100 am and stated that the patient was not there. Asked if could call him back so could attempt to reach out to the patient. I called the patient's caregiver at [phone] left a vm and still tried calling several times with no answer. I called Dr. Rosetta Seip and let him know what was going on he stated keep trying to call patient to see if they can be rescheduled or then that there isnt much we can do if no one is answering. My supervisor called Dr. Cy Boswell back stated we couldn't get a hold of the patient and will keep trying to get in contact with the caregiver. Dr. Cy Boswell was very nice about the situation and very willing to help the patient. I will do my best to keep trying to get in contact with the patient and/or his caregiver.

## 2023-05-30 ENCOUNTER — TELEPHONE (OUTPATIENT)
Facility: CLINIC | Age: 78
End: 2023-05-30

## 2023-05-30 NOTE — TELEPHONE ENCOUNTER
Patient's niece called stating that she apologized the patient missed his appointment with Dr. Vibha Rios. When she set up the time with transportation she was not aware that they would have him late for his procedure. The number was given to Dr. Sindy Marin office so she could call and reschedule to get a time that better fit her schedule.

## 2023-06-01 ENCOUNTER — TELEPHONE (OUTPATIENT)
Facility: CLINIC | Age: 78
End: 2023-06-01

## 2023-06-01 DIAGNOSIS — N18.4 CHRONIC KIDNEY DISEASE, STAGE IV (SEVERE) (HCC): ICD-10-CM

## 2023-06-01 DIAGNOSIS — N18.4 CHRONIC KIDNEY DISEASE, STAGE IV (SEVERE) (HCC): Primary | ICD-10-CM

## 2023-06-01 NOTE — TELEPHONE ENCOUNTER
Spoke with patient's niece she has been made aware of the lab order to be done. Yari called 's office again, I did get an answer from the office but no answer for the nurse that schedule's the surgeries. Will try again on Monday when I am back in the office.

## 2023-06-01 NOTE — TELEPHONE ENCOUNTER
Patients niece returned the call. Information from the last phone note was relayed. Patients niece verbalized understanding.

## 2023-06-01 NOTE — TELEPHONE ENCOUNTER
I spoke with patient's niece earlier today regarding rescheduling surgery with Dr. Yue Lal office. She stated she called yesterday and they were supposed to call her back but has yet to call her back. I have called Dr. Yue Lal office twice and left a vm on the surgery nurse's phone. Also spoke with Amy Ross from admissions for [facility] regarding the permacath appt and the labs. I placed the right orders for the labs but unsure what happened when the patient had them done. The only lab missing is the Hep B Surface Antibody. I will place a new order for that lab only.

## 2023-06-01 NOTE — TELEPHONE ENCOUNTER
Ms. Nina Galeana called from [facility] (receiving location). Ms. Nina Galeana stated that she needed the patients Hep B series and also stated that the patient needs to have a catheter placed prior to transfer. I informed Dr. Genny Delarosa MA of this and she is aware.

## 2023-06-01 NOTE — TELEPHONE ENCOUNTER
Dr. Rachael Rangel scheduling nurse Roney Philip called back. [facility] Address is [address] [place] [address] [address], [place], [place]. Waiting room is of sports medicine and ortho. Patient is to stop eliquis 3 days prior, dont take diabetic medications morning of procedure and please take all blood pressure medicine. Nothing to eat or drink after midnight. All proper paperwork will be mailed to the patient's address.

## 2023-06-02 NOTE — TELEPHONE ENCOUNTER
Jackson Mon called in regards to this patient and would like a return call. I informed her that you are not in the office on Fridays. She can be reached at [phone] direct ext [phone].

## 2023-06-06 ENCOUNTER — TELEPHONE (OUTPATIENT)
Facility: CLINIC | Age: 78
End: 2023-06-06

## 2023-06-06 DIAGNOSIS — N18.4 CHRONIC KIDNEY DISEASE, STAGE IV (SEVERE) (HCC): Primary | ICD-10-CM

## 2023-06-06 DIAGNOSIS — N18.4 CHRONIC KIDNEY DISEASE, STAGE IV (SEVERE) (HCC): ICD-10-CM

## 2023-06-06 NOTE — TELEPHONE ENCOUNTER
Spoke with Wilma Vincent from [facility]. B Antigen is only good for 30 days and patient last had done on 5/22. Didn't want to run into an issue with that lab when time to admit patient to start dialysis. Order placed for just in case purposes.

## 2023-06-07 ENCOUNTER — OFFICE VISIT (OUTPATIENT)
Facility: CLINIC | Age: 78
End: 2023-06-07

## 2023-06-07 VITALS
DIASTOLIC BLOOD PRESSURE: 64 MMHG | BODY MASS INDEX: 28.22 KG/M2 | OXYGEN SATURATION: 98 % | RESPIRATION RATE: 22 BRPM | HEIGHT: 68 IN | TEMPERATURE: 97.4 F | SYSTOLIC BLOOD PRESSURE: 151 MMHG | WEIGHT: 186.2 LBS | HEART RATE: 68 BPM

## 2023-06-07 DIAGNOSIS — D64.9 ANEMIA, UNSPECIFIED TYPE: ICD-10-CM

## 2023-06-07 DIAGNOSIS — E11.22 TYPE 2 DIABETES MELLITUS WITH STAGE 4 CHRONIC KIDNEY DISEASE, WITH LONG-TERM CURRENT USE OF INSULIN (HCC): ICD-10-CM

## 2023-06-07 DIAGNOSIS — Z79.4 TYPE 2 DIABETES MELLITUS WITH STAGE 4 CHRONIC KIDNEY DISEASE, WITH LONG-TERM CURRENT USE OF INSULIN (HCC): ICD-10-CM

## 2023-06-07 DIAGNOSIS — N18.4 TYPE 2 DIABETES MELLITUS WITH STAGE 4 CHRONIC KIDNEY DISEASE, WITH LONG-TERM CURRENT USE OF INSULIN (HCC): ICD-10-CM

## 2023-06-07 DIAGNOSIS — N18.4 CHRONIC KIDNEY DISEASE, STAGE 4 (SEVERE) (HCC): Primary | ICD-10-CM

## 2023-06-07 DIAGNOSIS — E78.5 HYPERLIPIDEMIA, UNSPECIFIED HYPERLIPIDEMIA TYPE: ICD-10-CM

## 2023-06-07 DIAGNOSIS — I50.43 ACUTE ON CHRONIC COMBINED SYSTOLIC AND DIASTOLIC HEART FAILURE (HCC): ICD-10-CM

## 2023-06-07 ASSESSMENT — PATIENT HEALTH QUESTIONNAIRE - PHQ9
2. FEELING DOWN, DEPRESSED OR HOPELESS: 0
SUM OF ALL RESPONSES TO PHQ QUESTIONS 1-9: 0
1. LITTLE INTEREST OR PLEASURE IN DOING THINGS: 0
SUM OF ALL RESPONSES TO PHQ9 QUESTIONS 1 & 2: 0
SUM OF ALL RESPONSES TO PHQ QUESTIONS 1-9: 0

## 2023-06-07 NOTE — PROGRESS NOTES
Chief Complaint   Patient presents with    Follow-Up from Hospital     Patient does not have Imdur nor Metoprolol in his medicine bag  Verbal order from Gladys Jenkins MD to order labs/sign and draw them in office    Labs were drawn and sent to [facility] by Chantel Robertson LPN:    The following tubes were sent:    1 Lavendar, 0 Red, 1 SST, 0 Urine    Draw site left antecubital.  Patient tolerated draw with no distress. 1. \"Have you been to the ER, urgent care clinic since your last visit? Hospitalized since your last visit? \" Yes Where: [facility]    2. \"Have you seen or consulted any other health care providers outside of the [address] [place] [address] since your last visit? \" No     3. For patients aged 39-70: Has the patient had a colonoscopy / FIT/ Cologuard?  NA - based on age
daily, Disp: 90 tablet, Rfl: 0    glipiZIDE (GLUCOTROL) 10 MG tablet, Take 1 tablet by mouth 2 times daily, Disp: 180 tablet, Rfl: 0    loratadine (CLARITIN) 10 MG tablet, Take 1 tablet by mouth daily, Disp: 90 tablet, Rfl: 0    allopurinol (ZYLOPRIM) 100 MG tablet, Take 2 tablets by mouth daily, Disp: 90 tablet, Rfl: 0    albuterol sulfate HFA (PROVENTIL;VENTOLIN;PROAIR) 108 (90 Base) MCG/ACT inhaler, Inhale 2 puffs into the lungs every 4 hours as needed, Disp: , Rfl:     apixaban (ELIQUIS) 5 MG TABS tablet, Take 1 tablet by mouth 2 times daily, Disp: , Rfl:     atorvastatin (LIPITOR) 40 MG tablet, Take 1 tablet by mouth daily, Disp: , Rfl:     cloNIDine (CATAPRES) 0.3 MG tablet, Take 1 tablet by mouth 3 times daily, Disp: , Rfl:     fluticasone (FLONASE) 50 MCG/ACT nasal spray, 2 sprays by Nasal route daily as needed, Disp: , Rfl:     hydrALAZINE (APRESOLINE) 100 MG tablet, Take 1 tablet by mouth 3 times daily, Disp: , Rfl:     insulin glargine (LANTUS SOLOSTAR) 100 UNIT/ML injection pen, Inject 15 Units into the skin daily, Disp: , Rfl:     nitroGLYCERIN (NITROSTAT) 0.4 MG SL tablet, Place 1 tablet under the tongue, Disp: , Rfl:     omeprazole (PRILOSEC) 40 MG delayed release capsule, TAKE 1 CAPSULE BY MOUTH DAILY, Disp: , Rfl:     sodium bicarbonate 650 MG tablet, Take 1 tablet by mouth 3 times daily, Disp: , Rfl:     ergocalciferol (ERGOCALCIFEROL) 1.25 MG (26970 UT) capsule, TAKE ONE CAPSULE BY MOUTH EVERY 7 DAYS (Patient not taking: Reported on 5/22/2023), Disp: , Rfl:     ferrous sulfate (IRON 325) 325 (65 Fe) MG tablet, Take 1 tablet by mouth 3 times daily (with meals) (Patient not taking: Reported on 5/22/2023), Disp: , Rfl:     isosorbide mononitrate (IMDUR) 60 MG extended release tablet, Take 1 tablet by mouth (Patient not taking: Reported on 5/22/2023), Disp: , Rfl:     metoprolol succinate (TOPROL XL) 25 MG extended release tablet, Take 1 tablet by mouth daily (Patient not taking: Reported on

## 2023-06-07 NOTE — PATIENT INSTRUCTIONS
Mr. Dey Rodolfo,   Based on your discharge summary, you may need to re-start:  Lasix, atorvastatin, clonidine, Imdur, metoprolol, spironalactone  Please check with the pharmacy about these meds- you can wait to start them until our next visit on 6/9/23.    -Dr. Gumaro Yu

## 2023-06-08 LAB
ANION GAP SERPL CALCULATED.3IONS-SCNC: 11 MMOL/L (ref 3–15)
AVERAGE GLUCOSE: 163 MG/DL (ref 91–123)
BASOPHILS # BLD: 1 % (ref 0–2)
BASOPHILS ABSOLUTE: 0.1 K/UL (ref 0–0.2)
BUN BLDV-MCNC: 77 MG/DL (ref 6–22)
CALCIUM SERPL-MCNC: 9.2 MG/DL (ref 8.4–10.5)
CHLORIDE BLD-SCNC: 115 MMOL/L (ref 98–110)
CHOLESTEROL/HDL RATIO: 4.6 (ref 0–5)
CHOLESTEROL: 147 MG/DL (ref 110–200)
CO2: 17 MMOL/L (ref 20–32)
CREAT SERPL-MCNC: 3.8 MG/DL (ref 0.8–1.6)
EOSINOPHIL # BLD: 2 % (ref 0–6)
EOSINOPHILS ABSOLUTE: 0.1 K/UL (ref 0–0.5)
GLOMERULAR FILTRATION RATE: 16 ML/MIN/1.73 SQ.M.
GLUCOSE: 116 MG/DL (ref 70–99)
HBA1C MFR BLD: 7.3 % (ref 4.8–5.6)
HCT VFR BLD CALC: 31.6 % (ref 37.8–52.2)
HDLC SERPL-MCNC: 32 MG/DL
HEMOGLOBIN: 9.6 G/DL (ref 12.6–17.1)
LDL CHOLESTEROL CALCULATED: 89 MG/DL (ref 50–99)
LDL/HDL RATIO: 2.8
LYMPHOCYTES # BLD: 24 % (ref 20–45)
LYMPHOCYTES ABSOLUTE: 1.3 K/UL (ref 1–4.8)
MCH RBC QN AUTO: 27 PG (ref 26–34)
MCHC RBC AUTO-ENTMCNC: 30 G/DL (ref 31–36)
MCV RBC AUTO: 87 FL (ref 80–95)
MONOCYTES ABSOLUTE: 0.5 K/UL (ref 0.1–1)
MONOCYTES: 8 % (ref 3–12)
NEUTROPHILS ABSOLUTE: 3.6 K/UL (ref 1.8–7.7)
NEUTROPHILS: 65 % (ref 40–75)
NON-HDL CHOLESTEROL: 115 MG/DL
PDW BLD-RTO: 15.9 % (ref 10–15.5)
PLATELET # BLD: 309 K/UL (ref 140–440)
PMV BLD AUTO: 10.1 FL (ref 9–13)
POTASSIUM SERPL-SCNC: 4.8 MMOL/L (ref 3.5–5.5)
RBC: 3.62 M/UL (ref 3.8–5.8)
SODIUM BLD-SCNC: 143 MMOL/L (ref 133–145)
TRIGL SERPL-MCNC: 128 MG/DL (ref 40–149)
VLDLC SERPL CALC-MCNC: 26 MG/DL (ref 8–30)
WBC: 5.6 K/UL (ref 4–11)

## 2023-06-09 ENCOUNTER — OFFICE VISIT (OUTPATIENT)
Facility: CLINIC | Age: 78
End: 2023-06-09
Payer: MEDICARE

## 2023-06-09 VITALS
HEART RATE: 70 BPM | DIASTOLIC BLOOD PRESSURE: 69 MMHG | SYSTOLIC BLOOD PRESSURE: 175 MMHG | OXYGEN SATURATION: 97 % | HEIGHT: 68 IN | BODY MASS INDEX: 28.19 KG/M2 | WEIGHT: 186 LBS | TEMPERATURE: 96.8 F | RESPIRATION RATE: 20 BRPM

## 2023-06-09 DIAGNOSIS — D68.69 SECONDARY HYPERCOAGULABLE STATE (HCC): ICD-10-CM

## 2023-06-09 DIAGNOSIS — N18.4 TYPE 2 DIABETES MELLITUS WITH STAGE 4 CHRONIC KIDNEY DISEASE, WITH LONG-TERM CURRENT USE OF INSULIN (HCC): ICD-10-CM

## 2023-06-09 DIAGNOSIS — K21.9 GASTRO-ESOPHAGEAL REFLUX DISEASE WITHOUT ESOPHAGITIS: ICD-10-CM

## 2023-06-09 DIAGNOSIS — I10 ESSENTIAL HYPERTENSION: Primary | ICD-10-CM

## 2023-06-09 DIAGNOSIS — J30.1 SEASONAL ALLERGIC RHINITIS DUE TO POLLEN: ICD-10-CM

## 2023-06-09 DIAGNOSIS — E78.5 HYPERLIPIDEMIA, UNSPECIFIED HYPERLIPIDEMIA TYPE: ICD-10-CM

## 2023-06-09 DIAGNOSIS — I82.4Y3 DEEP VEIN THROMBOSIS (DVT) OF PROXIMAL VEIN OF BOTH LOWER EXTREMITIES, UNSPECIFIED CHRONICITY (HCC): ICD-10-CM

## 2023-06-09 DIAGNOSIS — Z79.01 CHRONIC ANTICOAGULATION: ICD-10-CM

## 2023-06-09 DIAGNOSIS — E21.0 PRIMARY HYPERPARATHYROIDISM (HCC): ICD-10-CM

## 2023-06-09 DIAGNOSIS — N18.4 CHRONIC KIDNEY DISEASE, STAGE 4 (SEVERE) (HCC): ICD-10-CM

## 2023-06-09 DIAGNOSIS — Z79.4 TYPE 2 DIABETES MELLITUS WITH STAGE 4 CHRONIC KIDNEY DISEASE, WITH LONG-TERM CURRENT USE OF INSULIN (HCC): ICD-10-CM

## 2023-06-09 DIAGNOSIS — E11.22 TYPE 2 DIABETES MELLITUS WITH STAGE 4 CHRONIC KIDNEY DISEASE, WITH LONG-TERM CURRENT USE OF INSULIN (HCC): ICD-10-CM

## 2023-06-09 PROCEDURE — 3051F HG A1C>EQUAL 7.0%<8.0%: CPT | Performed by: FAMILY MEDICINE

## 2023-06-09 PROCEDURE — 99215 OFFICE O/P EST HI 40 MIN: CPT | Performed by: FAMILY MEDICINE

## 2023-06-09 PROCEDURE — 1123F ACP DISCUSS/DSCN MKR DOCD: CPT | Performed by: FAMILY MEDICINE

## 2023-06-09 PROCEDURE — 3078F DIAST BP <80 MM HG: CPT | Performed by: FAMILY MEDICINE

## 2023-06-09 PROCEDURE — 3074F SYST BP LT 130 MM HG: CPT | Performed by: FAMILY MEDICINE

## 2023-06-09 ASSESSMENT — PATIENT HEALTH QUESTIONNAIRE - PHQ9
SUM OF ALL RESPONSES TO PHQ QUESTIONS 1-9: 0
1. LITTLE INTEREST OR PLEASURE IN DOING THINGS: 0
SUM OF ALL RESPONSES TO PHQ9 QUESTIONS 1 & 2: 0
SUM OF ALL RESPONSES TO PHQ QUESTIONS 1-9: 0
2. FEELING DOWN, DEPRESSED OR HOPELESS: 0
SUM OF ALL RESPONSES TO PHQ QUESTIONS 1-9: 0
SUM OF ALL RESPONSES TO PHQ QUESTIONS 1-9: 0

## 2023-06-13 RX ORDER — ALLOPURINOL 100 MG/1
200 TABLET ORAL DAILY
Qty: 180 TABLET | Refills: 0 | Status: SHIPPED | OUTPATIENT
Start: 2023-06-13 | End: 2023-06-16 | Stop reason: SDUPTHER

## 2023-06-13 RX ORDER — SODIUM BICARBONATE 650 MG/1
650 TABLET ORAL 3 TIMES DAILY
Qty: 90 TABLET | Refills: 0 | Status: SHIPPED | OUTPATIENT
Start: 2023-06-13 | End: 2023-06-16 | Stop reason: SDUPTHER

## 2023-06-13 RX ORDER — OMEPRAZOLE 40 MG/1
40 CAPSULE, DELAYED RELEASE ORAL DAILY
Qty: 90 CAPSULE | Refills: 0 | Status: SHIPPED | OUTPATIENT
Start: 2023-06-13 | End: 2023-06-16 | Stop reason: SDUPTHER

## 2023-06-13 RX ORDER — LORATADINE 10 MG/1
10 TABLET ORAL DAILY
Qty: 90 TABLET | Refills: 0 | Status: SHIPPED | OUTPATIENT
Start: 2023-06-13 | End: 2023-06-16 | Stop reason: SDUPTHER

## 2023-06-13 RX ORDER — ISOSORBIDE MONONITRATE 30 MG/1
30 TABLET, EXTENDED RELEASE ORAL DAILY
Qty: 90 TABLET | Refills: 0 | Status: SHIPPED | OUTPATIENT
Start: 2023-06-13 | End: 2023-06-16 | Stop reason: SDUPTHER

## 2023-06-13 RX ORDER — AMLODIPINE BESYLATE 10 MG/1
10 TABLET ORAL DAILY
Qty: 90 TABLET | Refills: 0 | Status: SHIPPED | OUTPATIENT
Start: 2023-06-13 | End: 2023-06-16 | Stop reason: SDUPTHER

## 2023-06-13 RX ORDER — FUROSEMIDE 40 MG/1
40 TABLET ORAL DAILY
Qty: 90 TABLET | Refills: 0 | Status: SHIPPED | OUTPATIENT
Start: 2023-06-13 | End: 2023-06-16 | Stop reason: SDUPTHER

## 2023-06-13 RX ORDER — ATORVASTATIN CALCIUM 40 MG/1
40 TABLET, FILM COATED ORAL DAILY
Qty: 90 TABLET | Refills: 0 | Status: SHIPPED | OUTPATIENT
Start: 2023-06-13 | End: 2023-06-16 | Stop reason: SDUPTHER

## 2023-06-13 RX ORDER — HYDRALAZINE HYDROCHLORIDE 100 MG/1
100 TABLET, FILM COATED ORAL 3 TIMES DAILY
Qty: 60 TABLET | Refills: 0 | Status: SHIPPED | OUTPATIENT
Start: 2023-06-13 | End: 2023-06-16 | Stop reason: SDUPTHER

## 2023-06-13 RX ORDER — ASPIRIN 81 MG/1
81 TABLET, CHEWABLE ORAL DAILY
Qty: 90 TABLET | Refills: 0 | Status: SHIPPED | OUTPATIENT
Start: 2023-06-13 | End: 2023-06-16 | Stop reason: SDUPTHER

## 2023-06-13 RX ORDER — GLIPIZIDE 10 MG/1
5 TABLET ORAL DAILY
Qty: 15 TABLET | Refills: 0 | Status: SHIPPED | OUTPATIENT
Start: 2023-06-13 | End: 2023-06-16 | Stop reason: SDUPTHER

## 2023-06-23 ENCOUNTER — OFFICE VISIT (OUTPATIENT)
Facility: CLINIC | Age: 78
End: 2023-06-23
Payer: MEDICARE

## 2023-06-23 VITALS
HEART RATE: 66 BPM | WEIGHT: 186.6 LBS | SYSTOLIC BLOOD PRESSURE: 134 MMHG | RESPIRATION RATE: 20 BRPM | DIASTOLIC BLOOD PRESSURE: 66 MMHG | BODY MASS INDEX: 28.28 KG/M2 | TEMPERATURE: 96.9 F | OXYGEN SATURATION: 97 % | HEIGHT: 68 IN

## 2023-06-23 DIAGNOSIS — I82.4Y3 DEEP VEIN THROMBOSIS (DVT) OF PROXIMAL VEIN OF BOTH LOWER EXTREMITIES, UNSPECIFIED CHRONICITY (HCC): ICD-10-CM

## 2023-06-23 DIAGNOSIS — J30.1 SEASONAL ALLERGIC RHINITIS DUE TO POLLEN: ICD-10-CM

## 2023-06-23 DIAGNOSIS — N18.4 CHRONIC KIDNEY DISEASE, STAGE 4 (SEVERE) (HCC): ICD-10-CM

## 2023-06-23 DIAGNOSIS — E78.5 HYPERLIPIDEMIA, UNSPECIFIED HYPERLIPIDEMIA TYPE: ICD-10-CM

## 2023-06-23 DIAGNOSIS — I10 ESSENTIAL HYPERTENSION: Primary | ICD-10-CM

## 2023-06-23 DIAGNOSIS — D68.69 SECONDARY HYPERCOAGULABLE STATE (HCC): ICD-10-CM

## 2023-06-23 DIAGNOSIS — Z79.01 CHRONIC ANTICOAGULATION: ICD-10-CM

## 2023-06-23 DIAGNOSIS — E55.9 VITAMIN D DEFICIENCY: ICD-10-CM

## 2023-06-23 DIAGNOSIS — K21.9 GASTRO-ESOPHAGEAL REFLUX DISEASE WITHOUT ESOPHAGITIS: ICD-10-CM

## 2023-06-23 PROCEDURE — 99215 OFFICE O/P EST HI 40 MIN: CPT | Performed by: FAMILY MEDICINE

## 2023-06-23 PROCEDURE — 3078F DIAST BP <80 MM HG: CPT | Performed by: FAMILY MEDICINE

## 2023-06-23 PROCEDURE — 3075F SYST BP GE 130 - 139MM HG: CPT | Performed by: FAMILY MEDICINE

## 2023-06-23 PROCEDURE — 1123F ACP DISCUSS/DSCN MKR DOCD: CPT | Performed by: FAMILY MEDICINE

## 2023-06-23 RX ORDER — HYDRALAZINE HYDROCHLORIDE 100 MG/1
100 TABLET, FILM COATED ORAL 3 TIMES DAILY
Qty: 90 TABLET | Refills: 0 | Status: SHIPPED | OUTPATIENT
Start: 2023-06-23

## 2023-06-23 RX ORDER — ASPIRIN 81 MG/1
81 TABLET, CHEWABLE ORAL DAILY
Qty: 90 TABLET | Refills: 0 | Status: SHIPPED | OUTPATIENT
Start: 2023-06-23

## 2023-06-23 RX ORDER — ISOSORBIDE MONONITRATE 30 MG/1
30 TABLET, EXTENDED RELEASE ORAL DAILY
Qty: 90 TABLET | Refills: 0 | Status: SHIPPED | OUTPATIENT
Start: 2023-06-23

## 2023-06-23 RX ORDER — ALLOPURINOL 100 MG/1
200 TABLET ORAL DAILY
Qty: 180 TABLET | Refills: 0 | Status: SHIPPED | OUTPATIENT
Start: 2023-06-23 | End: 2023-09-21

## 2023-06-23 RX ORDER — OMEPRAZOLE 20 MG/1
20 CAPSULE, DELAYED RELEASE ORAL
Qty: 90 CAPSULE | Refills: 0 | Status: SHIPPED | OUTPATIENT
Start: 2023-06-23

## 2023-06-23 RX ORDER — ERGOCALCIFEROL 1.25 MG/1
CAPSULE ORAL
Qty: 12 CAPSULE | Refills: 0 | Status: SHIPPED | OUTPATIENT
Start: 2023-06-23

## 2023-06-23 RX ORDER — ATORVASTATIN CALCIUM 40 MG/1
40 TABLET, FILM COATED ORAL DAILY
Qty: 90 TABLET | Refills: 0 | Status: SHIPPED | OUTPATIENT
Start: 2023-06-23

## 2023-06-23 RX ORDER — SODIUM BICARBONATE 650 MG/1
650 TABLET ORAL 3 TIMES DAILY
Qty: 120 TABLET | Refills: 0 | Status: SHIPPED | OUTPATIENT
Start: 2023-06-23

## 2023-06-23 RX ORDER — FUROSEMIDE 40 MG/1
40 TABLET ORAL DAILY
Qty: 90 TABLET | Refills: 0 | Status: SHIPPED | OUTPATIENT
Start: 2023-06-23

## 2023-06-23 RX ORDER — AMLODIPINE BESYLATE 10 MG/1
TABLET ORAL
Qty: 90 TABLET | Refills: 0 | Status: SHIPPED | OUTPATIENT
Start: 2023-06-23

## 2023-06-23 ASSESSMENT — PATIENT HEALTH QUESTIONNAIRE - PHQ9
SUM OF ALL RESPONSES TO PHQ QUESTIONS 1-9: 0
SUM OF ALL RESPONSES TO PHQ9 QUESTIONS 1 & 2: 0
2. FEELING DOWN, DEPRESSED OR HOPELESS: 0
1. LITTLE INTEREST OR PLEASURE IN DOING THINGS: 0

## 2023-06-23 NOTE — PROGRESS NOTES
David Ambrocio (: 1945) is a 78 y.o. male here for evaluation of the following chief concern(s):  Chronic condition management     ASSESSMENT/PLAN:  1. Essential hypertension  -     Basic Metabolic Panel; Future  -     aspirin 81 MG chewable tablet; Take 1 tablet by mouth daily, Disp-90 tablet, R-0Print  -     furosemide (LASIX) 40 MG tablet; Take 1 tablet by mouth daily In place of furosemide 80mg, Disp-90 tablet, R-0Print  -     hydrALAZINE (APRESOLINE) 100 MG tablet; Take 1 tablet by mouth 3 times daily, Disp-90 tablet, R-0Print  -     isosorbide mononitrate (IMDUR) 30 MG extended release tablet; Take 1 tablet by mouth daily, Disp-90 tablet, R-0Print  -     amLODIPine (NORVASC) 10 MG tablet; Take HALF of a TABLET (5mg) once daily, Disp-90 tablet, R-0Print  2. Chronic kidney disease, stage 4 (severe) (HCC)  -     Basic Metabolic Panel; Future  -     allopurinol (ZYLOPRIM) 100 MG tablet; Take 2 tablets by mouth daily, Disp-180 tablet, R-0Print  -     furosemide (LASIX) 40 MG tablet; Take 1 tablet by mouth daily In place of furosemide 80mg, Disp-90 tablet, R-0Print  -     sodium bicarbonate 650 MG tablet; Take 1 tablet by mouth 3 times daily, Disp-120 tablet, R-0Print  -     CBC with Auto Differential; Future  3. Deep vein thrombosis (DVT) of proximal vein of both lower extremities, unspecified chronicity ([facility])  -     apixaban (ELIQUIS) 5 MG TABS tablet; Take 1 tablet by mouth 2 times daily Notify MD for any signs of bleeding, Disp-180 tablet, R-2Print  4. Secondary hypercoagulable state ([address]  -     apixaban (ELIQUIS) 5 MG TABS tablet; Take 1 tablet by mouth 2 times daily Notify MD for any signs of bleeding, Disp-180 tablet, R-2Print  5. Chronic anticoagulation  -     apixaban (ELIQUIS) 5 MG TABS tablet; Take 1 tablet by mouth 2 times daily Notify MD for any signs of bleeding, Disp-180 tablet, R-2Print  6. Hyperlipidemia, unspecified hyperlipidemia type  -     atorvastatin (LIPITOR) 40 MG tablet;  Take

## 2023-06-23 NOTE — PROGRESS NOTES
Chief Complaint   Patient presents with    Follow-up     1 week         1. \"Have you been to the ER, urgent care clinic since your last visit? Hospitalized since your last visit? \" No    2. \"Have you seen or consulted any other health care providers outside of the [address] [place] [address] since your last visit? \" No     3. For patients aged 39-70: Has the patient had a colonoscopy / FIT/ Cologuard?  NA - based on age

## 2023-06-23 NOTE — PATIENT INSTRUCTIONS
Mr. Khadra Jones,  Your blood pressure today is elevated, goal <140/90. For your blood pressure, please continue taking:  Amlodipine 5mg once daily, Hydralazine 100mg three times per day, Imdur 30mg (previously 60mg), furosemide 40mg (previously 80mg). DISCONTINUE Clonidine and Aldactone for the time being. Please also continue your Aspirin 81mg, Eliquis 5mg twice daily, Atorvastatin 40mg, Sodium bicarb 650 twice a day, Allopurinol 200mg daily, Omeprazole 20mg daily. HOLD your Glipizide. You can use your Lantus insulin 5 units in the morning IF your sugar is more than 150. Please keep a close watch on your sugars. Since there have been medicaiton changes, you may have to re-do your pill liner. Please bring your medicine bottles and pill liner to your next visit.     Lets plan to follow-up in 2 weeks or sooner if needed and plan on lab draw at the hospital 3 days before your visit if possible,  Dr. Nica Kendrick

## 2023-06-24 ENCOUNTER — CLINICAL DOCUMENTATION (OUTPATIENT)
Facility: CLINIC | Age: 78
End: 2023-06-24

## 2023-06-24 NOTE — PROGRESS NOTES
Call to pt's family; Adenike Augustin and ROVERTO who are his caregivers. They are not exactly sure what medications pt is taking by memory at this time. They confirm he has been on the same meds in his pill liner for >1 week and has enough of the same to cover through the weekend. They also picked up scripts provided last visit but have not put them in new pill liner yet. Since at last office visit pt was medically stable, normotensive, I advised that he continue on the same meds he was taking today/tomorrow and that my nurse will call [phone] on Monday to review what exactly he has been taking and we can consider making any changes at that time. If they do not receive a call by noon on Monday, family advised to call to my office. Family also voices concern for pt developing \"dementia\"- I advised to monitor for any significant acute changes, and that health priority at this time is medication management, HTN control. We reviewed ER precautions. Family agree w/ plan above and have no other questions at this time.     ~PCR

## 2023-06-26 ENCOUNTER — TELEPHONE (OUTPATIENT)
Facility: CLINIC | Age: 78
End: 2023-06-26

## 2023-07-07 ENCOUNTER — TELEMEDICINE (OUTPATIENT)
Facility: CLINIC | Age: 78
End: 2023-07-07
Payer: MEDICARE

## 2023-07-07 ENCOUNTER — APPOINTMENT (OUTPATIENT)
Age: 78
End: 2023-07-07
Payer: MEDICARE

## 2023-07-07 ENCOUNTER — HOSPITAL ENCOUNTER (EMERGENCY)
Age: 78
Discharge: HOME | End: 2023-07-07
Attending: FAMILY MEDICINE
Payer: MEDICARE

## 2023-07-07 VITALS
WEIGHT: 180 LBS | BODY MASS INDEX: 27.28 KG/M2 | DIASTOLIC BLOOD PRESSURE: 77 MMHG | SYSTOLIC BLOOD PRESSURE: 162 MMHG | TEMPERATURE: 98.5 F | HEART RATE: 78 BPM | OXYGEN SATURATION: 100 % | RESPIRATION RATE: 20 BRPM | HEIGHT: 68 IN

## 2023-07-07 DIAGNOSIS — I82.4Y3 DEEP VEIN THROMBOSIS (DVT) OF PROXIMAL VEIN OF BOTH LOWER EXTREMITIES, UNSPECIFIED CHRONICITY (HCC): ICD-10-CM

## 2023-07-07 DIAGNOSIS — N18.4 CHRONIC KIDNEY DISEASE, STAGE 4 (SEVERE) (HCC): ICD-10-CM

## 2023-07-07 DIAGNOSIS — E11.22 TYPE 2 DIABETES MELLITUS WITH STAGE 4 CHRONIC KIDNEY DISEASE, WITH LONG-TERM CURRENT USE OF INSULIN (HCC): ICD-10-CM

## 2023-07-07 DIAGNOSIS — N39.0 URINARY TRACT INFECTION ASSOCIATED WITH CYSTOSTOMY CATHETER, INITIAL ENCOUNTER (HCC): ICD-10-CM

## 2023-07-07 DIAGNOSIS — M25.462 EFFUSION OF BURSA OF LEFT KNEE: ICD-10-CM

## 2023-07-07 DIAGNOSIS — R53.1 WEAKNESS: ICD-10-CM

## 2023-07-07 DIAGNOSIS — M19.90 ARTHRITIS: ICD-10-CM

## 2023-07-07 DIAGNOSIS — Z79.01 CHRONIC ANTICOAGULATION: ICD-10-CM

## 2023-07-07 DIAGNOSIS — T83.510A URINARY TRACT INFECTION ASSOCIATED WITH CYSTOSTOMY CATHETER, INITIAL ENCOUNTER (HCC): ICD-10-CM

## 2023-07-07 DIAGNOSIS — K92.1 MELENA: Primary | ICD-10-CM

## 2023-07-07 DIAGNOSIS — I10 ESSENTIAL HYPERTENSION: ICD-10-CM

## 2023-07-07 DIAGNOSIS — R54 FRAILTY SYNDROME IN GERIATRIC PATIENT: ICD-10-CM

## 2023-07-07 DIAGNOSIS — Z79.4 TYPE 2 DIABETES MELLITUS WITH STAGE 4 CHRONIC KIDNEY DISEASE, WITH LONG-TERM CURRENT USE OF INSULIN (HCC): ICD-10-CM

## 2023-07-07 DIAGNOSIS — E55.9 VITAMIN D DEFICIENCY: ICD-10-CM

## 2023-07-07 DIAGNOSIS — N18.6 END STAGE RENAL DISEASE (HCC): ICD-10-CM

## 2023-07-07 DIAGNOSIS — D64.9 ANEMIA, UNSPECIFIED TYPE: ICD-10-CM

## 2023-07-07 DIAGNOSIS — R07.89 ATYPICAL CHEST PAIN: Primary | ICD-10-CM

## 2023-07-07 DIAGNOSIS — N18.4 TYPE 2 DIABETES MELLITUS WITH STAGE 4 CHRONIC KIDNEY DISEASE, WITH LONG-TERM CURRENT USE OF INSULIN (HCC): ICD-10-CM

## 2023-07-07 LAB
ANION GAP SERPL CALC-SCNC: 6 MMOL/L (ref 3–18)
APPEARANCE UR: CLEAR
BACTERIA URNS QL MICRO: ABNORMAL /HPF
BASOPHILS # BLD: 0 K/UL (ref 0–0.1)
BASOPHILS NFR BLD: 0 % (ref 0–2)
BILIRUB UR QL: NEGATIVE
BNP SERPL-MCNC: 887 PG/ML (ref 0–1800)
BUN SERPL-MCNC: 77 MG/DL (ref 7–18)
BUN/CREAT SERPL: 16 (ref 12–20)
CA-I BLD-MCNC: 9 MG/DL (ref 8.5–10.1)
CHLORIDE SERPL-SCNC: 114 MMOL/L (ref 100–111)
CK SERPL-CCNC: 101 U/L (ref 39–308)
CO2 SERPL-SCNC: 18 MMOL/L (ref 21–32)
COLOR UR: YELLOW
CREAT SERPL-MCNC: 4.67 MG/DL (ref 0.6–1.3)
DIFFERENTIAL METHOD BLD: ABNORMAL
EKG ATRIAL RATE: 86 BPM
EKG DIAGNOSIS: NORMAL
EKG P AXIS: 31 DEGREES
EKG P-R INTERVAL: 290 MS
EKG Q-T INTERVAL: 376 MS
EKG QRS DURATION: 114 MS
EKG QTC CALCULATION (BAZETT): 449 MS
EKG R AXIS: -26 DEGREES
EKG T AXIS: 41 DEGREES
EKG VENTRICULAR RATE: 86 BPM
EOSINOPHIL # BLD: 0.1 K/UL (ref 0–0.4)
EOSINOPHIL NFR BLD: 1 % (ref 0–5)
EPITH CASTS URNS QL MICRO: ABNORMAL /LPF (ref 0–20)
ERYTHROCYTE [DISTWIDTH] IN BLOOD BY AUTOMATED COUNT: 15.1 % (ref 11.6–14.5)
GLUCOSE SERPL-MCNC: 217 MG/DL (ref 74–99)
GLUCOSE UR STRIP.AUTO-MCNC: NEGATIVE MG/DL
HCT VFR BLD AUTO: 32.5 % (ref 36–48)
HGB BLD-MCNC: 10.3 G/DL (ref 13–16)
HGB UR QL STRIP: NEGATIVE
IMM GRANULOCYTES # BLD AUTO: 0 K/UL (ref 0–0.04)
IMM GRANULOCYTES NFR BLD AUTO: 0 % (ref 0–0.5)
KETONES UR QL STRIP.AUTO: NEGATIVE MG/DL
LEUKOCYTE ESTERASE UR QL STRIP.AUTO: ABNORMAL
LYMPHOCYTES # BLD: 1 K/UL (ref 0.9–3.6)
LYMPHOCYTES NFR BLD: 11 % (ref 21–52)
MAGNESIUM SERPL-MCNC: 1.4 MG/DL (ref 1.6–2.6)
MCH RBC QN AUTO: 27.4 PG (ref 24–34)
MCHC RBC AUTO-ENTMCNC: 31.7 G/DL (ref 31–37)
MCV RBC AUTO: 86.4 FL (ref 78–100)
MONOCYTES # BLD: 0.9 K/UL (ref 0.05–1.2)
MONOCYTES NFR BLD: 10 % (ref 3–10)
NEUTS SEG # BLD: 7.2 K/UL (ref 1.8–8)
NEUTS SEG NFR BLD: 78 % (ref 40–73)
NITRITE UR QL STRIP.AUTO: NEGATIVE
NRBC # BLD: 0 K/UL (ref 0–0.01)
NRBC BLD-RTO: 0 PER 100 WBC
PH UR STRIP: 7 (ref 5–8)
PHOSPHATE SERPL-MCNC: 3.9 MG/DL (ref 2.5–4.9)
PLATELET # BLD AUTO: 252 K/UL (ref 135–420)
PMV BLD AUTO: 9.7 FL (ref 9.2–11.8)
POTASSIUM SERPL-SCNC: 4 MMOL/L (ref 3.5–5.5)
PROT UR STRIP-MCNC: 100 MG/DL
RBC # BLD AUTO: 3.76 M/UL (ref 4.35–5.65)
RBC #/AREA URNS HPF: ABNORMAL /HPF (ref 0–2)
SODIUM SERPL-SCNC: 138 MMOL/L (ref 136–145)
SP GR UR REFRACTOMETRY: 1.01 (ref 1–1.03)
TROPONIN I SERPL HS-MCNC: 35 NG/L (ref 0–78)
TROPONIN I SERPL HS-MCNC: 37 NG/L (ref 0–78)
UROBILINOGEN UR QL STRIP.AUTO: 0.2 EU/DL (ref 0.2–1)
WBC # BLD AUTO: 9.2 K/UL (ref 4.6–13.2)
WBC URNS QL MICRO: ABNORMAL /HPF (ref 0–4)

## 2023-07-07 PROCEDURE — 99285 EMERGENCY DEPT VISIT HI MDM: CPT

## 2023-07-07 PROCEDURE — 83735 ASSAY OF MAGNESIUM: CPT

## 2023-07-07 PROCEDURE — 87077 CULTURE AEROBIC IDENTIFY: CPT

## 2023-07-07 PROCEDURE — 84100 ASSAY OF PHOSPHORUS: CPT

## 2023-07-07 PROCEDURE — 82550 ASSAY OF CK (CPK): CPT

## 2023-07-07 PROCEDURE — 87086 URINE CULTURE/COLONY COUNT: CPT

## 2023-07-07 PROCEDURE — 71045 X-RAY EXAM CHEST 1 VIEW: CPT

## 2023-07-07 PROCEDURE — 70450 CT HEAD/BRAIN W/O DYE: CPT

## 2023-07-07 PROCEDURE — 73562 X-RAY EXAM OF KNEE 3: CPT

## 2023-07-07 PROCEDURE — 80048 BASIC METABOLIC PNL TOTAL CA: CPT

## 2023-07-07 PROCEDURE — 84484 ASSAY OF TROPONIN QUANT: CPT

## 2023-07-07 PROCEDURE — 81001 URINALYSIS AUTO W/SCOPE: CPT

## 2023-07-07 PROCEDURE — 36415 COLL VENOUS BLD VENIPUNCTURE: CPT

## 2023-07-07 PROCEDURE — 87186 SC STD MICRODIL/AGAR DIL: CPT

## 2023-07-07 PROCEDURE — 73502 X-RAY EXAM HIP UNI 2-3 VIEWS: CPT

## 2023-07-07 PROCEDURE — 85025 COMPLETE CBC W/AUTO DIFF WBC: CPT

## 2023-07-07 PROCEDURE — 93005 ELECTROCARDIOGRAM TRACING: CPT | Performed by: FAMILY MEDICINE

## 2023-07-07 PROCEDURE — 6370000000 HC RX 637 (ALT 250 FOR IP): Performed by: FAMILY MEDICINE

## 2023-07-07 PROCEDURE — 83880 ASSAY OF NATRIURETIC PEPTIDE: CPT

## 2023-07-07 PROCEDURE — 99422 OL DIG E/M SVC 11-20 MIN: CPT | Performed by: FAMILY MEDICINE

## 2023-07-07 RX ORDER — LEVOFLOXACIN 250 MG/1
500 TABLET ORAL
Status: COMPLETED | OUTPATIENT
Start: 2023-07-07 | End: 2023-07-07

## 2023-07-07 RX ORDER — ACETAMINOPHEN 500 MG
1000 TABLET ORAL
Status: COMPLETED | OUTPATIENT
Start: 2023-07-07 | End: 2023-07-07

## 2023-07-07 RX ORDER — CIPROFLOXACIN 500 MG/1
500 TABLET, FILM COATED ORAL DAILY
Qty: 10 TABLET | Refills: 0 | Status: SHIPPED | OUTPATIENT
Start: 2023-07-08 | End: 2023-07-18

## 2023-07-07 RX ORDER — LANOLIN ALCOHOL/MO/W.PET/CERES
400 CREAM (GRAM) TOPICAL DAILY
Status: DISCONTINUED | OUTPATIENT
Start: 2023-07-07 | End: 2023-07-07 | Stop reason: HOSPADM

## 2023-07-07 RX ADMIN — LEVOFLOXACIN 500 MG: 250 TABLET, FILM COATED ORAL at 19:15

## 2023-07-07 RX ADMIN — ACETAMINOPHEN 1000 MG: 500 TABLET ORAL at 17:39

## 2023-07-07 RX ADMIN — Medication 400 MG: at 17:39

## 2023-07-07 ASSESSMENT — PAIN SCALES - GENERAL: PAINLEVEL_OUTOF10: 10

## 2023-07-07 ASSESSMENT — PATIENT HEALTH QUESTIONNAIRE - PHQ9
SUM OF ALL RESPONSES TO PHQ QUESTIONS 1-9: 0
1. LITTLE INTEREST OR PLEASURE IN DOING THINGS: 0
SUM OF ALL RESPONSES TO PHQ9 QUESTIONS 1 & 2: 0
2. FEELING DOWN, DEPRESSED OR HOPELESS: 0
SUM OF ALL RESPONSES TO PHQ QUESTIONS 1-9: 0

## 2023-07-07 ASSESSMENT — PAIN - FUNCTIONAL ASSESSMENT
PAIN_FUNCTIONAL_ASSESSMENT: NONE - DENIES PAIN
PAIN_FUNCTIONAL_ASSESSMENT: 0-10

## 2023-07-07 ASSESSMENT — PAIN DESCRIPTION - LOCATION: LOCATION: ARM;CHEST;LEG

## 2023-07-07 ASSESSMENT — PAIN DESCRIPTION - ORIENTATION: ORIENTATION: LEFT

## 2023-07-07 ASSESSMENT — ENCOUNTER SYMPTOMS: DIARRHEA: 1

## 2023-07-07 NOTE — DISCHARGE INSTRUCTIONS
As we spoke, I have high suspicion that this is a multifaceted problem the first is that you are having some arthritis coupled with a knee effusion on the left side. Your chest pain is atypical.  You can use some Tylenol for this. Does appear that you have a urinary tract infection. You did receive your first dose antibiotics here today. Your next dose will be at approximately 7 PM tomorrow on Saturday. I want you to follow-up with your primary care doctor by calling on Monday for an appointment. You can call Dr. Britta Fuentes for which have given you the name and phone number next week for an appointment for your knee. And follow-up with your nephrologist for your end-stage renal disease next week as well. Return to the emergency department if there is any questions or concerns. Your urines been sent off for culture. If you need a different antibiotic somebody will be calling you in the next 24 to 48 hours.

## 2023-07-07 NOTE — ED TRIAGE NOTES
Pt reports generalized left sided pain all over started this am.  Pt states he has had black loose stools x 3 days. Pt called DR Hugo Howard and was instructed to come to the ER for an eval. Pt appears to be lethargic.

## 2023-07-07 NOTE — PROGRESS NOTES
Angie Acosta (: 1945) is a 78 y.o. male here for evaluation of the following chief concern(s):  Chronic condition management     The services today are being conducted using telemedicine which Angie Acosta has consented to at the time of scheduling. ASSESSMENT/PLAN:  1. Melena  2. Anemia, unspecified type  3. Chronic anticoagulation  4. Deep vein thrombosis (DVT) of proximal vein of both lower extremities, unspecified chronicity ([address])  5. Type 2 diabetes mellitus with stage 4 chronic kidney disease, with long-term current use of insulin ([address])  6. Essential hypertension  7. Weakness  8. Frailty syndrome in geriatric patient    Mr. Cardenas sounds like he needs urgent/emergent medical attention; high risk, frail elderly gentleman w/ multiple co-morbidities. He sound to be having acute melena on Eliquis and is not feeling well; I advised that he be evaluated in the ER, including call to EMS for transportation though family states they may prefer to transport him themselves. Pt has had a lot of changes to his medication- some were not refilled after hospital/SNF discharge, especially antihypertensives, our office has done our best to clarify regimen to safely continue needed prescriptions. Nursing performed thorough med rec today. Pt recently had TDC placed for CKD-4/5, plans for pt to start HD in the near future. Our next visit is scheduled for 23 or sooner if needed. Angie Acosta agrees with plan as above and has no additional questions at this time. SUBJECTIVE/OBJECTIVE:  Overall, pt is feeling \"not good\". Pt is accompanied to the telephone visit today by his niece, Sravan Johnson (this is not his niece who manages medications). +Malaise, fatigue, generalized weakness. Diarrhea x3 days, very dark- pt is taking Eliquis. Pt and niece are both concerned. Per chart review;   Hospital admission: -  Metabolic encephalopathy due to Metabolic

## 2023-07-07 NOTE — ED PROVIDER NOTES
[facility] EMERGENCY DEPT  EMERGENCY DEPARTMENT ENCOUNTER      Pt Name: Kinjal Leon  MRN: [id number]  [address] [facility], [place] 1945  Date of evaluation: 7/7/2023  Provider: Jean-Pierre Bryant DO    CHIEF COMPLAINT       Chief Complaint   Patient presents with    Chest Pain         HISTORY OF PRESENT ILLNESS   (Location/Symptom, Timing/Onset, Context/Setting, Quality, Duration, Modifying Factors, Severity)  Note limiting factors. Kinjal Leon is a 78 y.o. male who presents to the emergency department patient comes in with several complaints the first 1 is having pain from his entire left side from his head all the way down to his feet. He has not take anything for the pain he states it severe. He does state that he recently had a shunt put in for dialysis and scheduled to start dialysis next week. He has a urostomy is how he makes urine now. He had a history of gout before. He states movement makes his leg pains worse as well as his arm pains. He does not tell me about chest pain. But he did tell the nurse about chest pain and when she reminded him in my presence he stated that he was having some chest pain as well he cannot describe it. He is somewhat lethargic. His wife states this is something different from him. He denies any headaches at this time. Wife states he has been somewhat lethargic and lethargy first some time been progressively getting worse. He does take Eliquis. Has a history of DVT    HPI    Nursing Notes were reviewed. REVIEW OF SYSTEMS    (2-9 systems for level 4, 10 or more for level 5)     Review of Systems   Constitutional:  Positive for fatigue. Negative for fever. Cardiovascular:  Positive for chest pain. Gastrointestinal:  Positive for diarrhea. Musculoskeletal:  Positive for arthralgias and myalgias. Neurological:  Negative for headaches. All other systems reviewed and are negative.     Except as noted above the remainder of the review of systems was reviewed and

## 2023-07-07 NOTE — PROGRESS NOTES
Provider spoke with patient and family. I called report over to [facility] ER and spoke to Galilea Georges and notified her patient would be coming in and concerns.

## 2023-07-07 NOTE — ED NOTES
I have reviewed discharge instructions with the patient and caregiver. The patient and caregiver verbalized understanding.        Reviewed medication compliance, follow up with PCP, return to ER for  any new or worrisome concerns     Yohana Yarbrough RN  07/07/23 8986

## 2023-07-07 NOTE — PROGRESS NOTES
Patient complaining of diarrhea, states it was dark black. No nausea nor vomiting. No abdominal pain. States he is hurting from his chest down to his legs. States he can't hardly walk on his left leg, edema in left leg(no redness nor warmth). Niece(Jennifer) is at home with him. Niece states he doesn't seem like his normal self, states she can tell he doesn't feel good. I went through all medications bottles with his niece    Chief Complaint   Patient presents with    Diarrhea     1. \"Have you been to the ER, urgent care clinic since your last visit? Hospitalized since your last visit? \" No    2. \"Have you seen or consulted any other health care providers outside of the [address] [place] [address] since your last visit? \" No     3. For patients aged 43-73: Has the patient had a colonoscopy / FIT/ Cologuard?  Yes - no Care Gap present

## 2023-07-08 LAB
BACTERIA SPEC CULT: ABNORMAL
BACTERIA SPEC CULT: ABNORMAL
COLONY COUNT, CNT: ABNORMAL
Lab: ABNORMAL

## 2023-07-10 LAB
BACTERIA SPEC CULT: ABNORMAL
BACTERIA SPEC CULT: ABNORMAL
COLONY COUNT, CNT: ABNORMAL
EKG ATRIAL RATE: 86 BPM
EKG DIAGNOSIS: NORMAL
EKG P AXIS: 31 DEGREES
EKG P-R INTERVAL: 290 MS
EKG Q-T INTERVAL: 376 MS
EKG QRS DURATION: 114 MS
EKG QTC CALCULATION (BAZETT): 449 MS
EKG R AXIS: -26 DEGREES
EKG T AXIS: 41 DEGREES
EKG VENTRICULAR RATE: 86 BPM
Lab: ABNORMAL

## 2023-07-28 ENCOUNTER — OFFICE VISIT (OUTPATIENT)
Facility: CLINIC | Age: 78
End: 2023-07-28
Payer: MEDICARE

## 2023-07-28 VITALS
SYSTOLIC BLOOD PRESSURE: 136 MMHG | RESPIRATION RATE: 20 BRPM | HEART RATE: 71 BPM | DIASTOLIC BLOOD PRESSURE: 61 MMHG | HEIGHT: 68 IN | BODY MASS INDEX: 27.77 KG/M2 | WEIGHT: 183.2 LBS | OXYGEN SATURATION: 98 %

## 2023-07-28 DIAGNOSIS — Z51.81 ENCOUNTER FOR MONITORING LONG-TERM PROTON PUMP INHIBITOR THERAPY: ICD-10-CM

## 2023-07-28 DIAGNOSIS — I10 ESSENTIAL HYPERTENSION: Primary | ICD-10-CM

## 2023-07-28 DIAGNOSIS — R26.89 IMBALANCE: ICD-10-CM

## 2023-07-28 DIAGNOSIS — N18.4 TYPE 2 DIABETES MELLITUS WITH STAGE 4 CHRONIC KIDNEY DISEASE, WITH LONG-TERM CURRENT USE OF INSULIN (HCC): ICD-10-CM

## 2023-07-28 DIAGNOSIS — R20.2 PARESTHESIAS: ICD-10-CM

## 2023-07-28 DIAGNOSIS — E11.22 TYPE 2 DIABETES MELLITUS WITH STAGE 4 CHRONIC KIDNEY DISEASE, WITH LONG-TERM CURRENT USE OF INSULIN (HCC): ICD-10-CM

## 2023-07-28 DIAGNOSIS — Z79.4 TYPE 2 DIABETES MELLITUS WITH STAGE 4 CHRONIC KIDNEY DISEASE, WITH LONG-TERM CURRENT USE OF INSULIN (HCC): ICD-10-CM

## 2023-07-28 DIAGNOSIS — K59.09 CHRONIC CONSTIPATION: ICD-10-CM

## 2023-07-28 DIAGNOSIS — R41.89 COGNITIVE CHANGES: ICD-10-CM

## 2023-07-28 DIAGNOSIS — Z79.899 ENCOUNTER FOR MONITORING LONG-TERM PROTON PUMP INHIBITOR THERAPY: ICD-10-CM

## 2023-07-28 DIAGNOSIS — Z79.01 CHRONIC ANTICOAGULATION: ICD-10-CM

## 2023-07-28 DIAGNOSIS — E55.9 VITAMIN D DEFICIENCY: ICD-10-CM

## 2023-07-28 DIAGNOSIS — D64.9 ANEMIA, UNSPECIFIED TYPE: ICD-10-CM

## 2023-07-28 PROCEDURE — 1123F ACP DISCUSS/DSCN MKR DOCD: CPT | Performed by: FAMILY MEDICINE

## 2023-07-28 PROCEDURE — 36415 COLL VENOUS BLD VENIPUNCTURE: CPT | Performed by: FAMILY MEDICINE

## 2023-07-28 PROCEDURE — 3051F HG A1C>EQUAL 7.0%<8.0%: CPT | Performed by: FAMILY MEDICINE

## 2023-07-28 PROCEDURE — 99214 OFFICE O/P EST MOD 30 MIN: CPT | Performed by: FAMILY MEDICINE

## 2023-07-28 PROCEDURE — 3078F DIAST BP <80 MM HG: CPT | Performed by: FAMILY MEDICINE

## 2023-07-28 PROCEDURE — 3074F SYST BP LT 130 MM HG: CPT | Performed by: FAMILY MEDICINE

## 2023-07-28 RX ORDER — SENNA AND DOCUSATE SODIUM 50; 8.6 MG/1; MG/1
TABLET, FILM COATED ORAL
Qty: 45 TABLET | Refills: 0 | Status: SHIPPED | OUTPATIENT
Start: 2023-07-28

## 2023-07-28 RX ORDER — HYDRALAZINE HYDROCHLORIDE 100 MG/1
100 TABLET, FILM COATED ORAL 3 TIMES DAILY
Qty: 90 TABLET | Refills: 2 | Status: SHIPPED | OUTPATIENT
Start: 2023-07-28

## 2023-07-28 RX ORDER — LORATADINE 10 MG/1
10 TABLET ORAL DAILY
COMMUNITY

## 2023-07-28 RX ORDER — OMEPRAZOLE 40 MG/1
40 CAPSULE, DELAYED RELEASE ORAL DAILY
COMMUNITY

## 2023-07-28 RX ORDER — ERGOCALCIFEROL 1.25 MG/1
CAPSULE ORAL
Qty: 12 CAPSULE | Refills: 0 | Status: SHIPPED | OUTPATIENT
Start: 2023-07-28

## 2023-07-28 ASSESSMENT — PATIENT HEALTH QUESTIONNAIRE - PHQ9
SUM OF ALL RESPONSES TO PHQ QUESTIONS 1-9: 0
2. FEELING DOWN, DEPRESSED OR HOPELESS: 0
SUM OF ALL RESPONSES TO PHQ9 QUESTIONS 1 & 2: 0
SUM OF ALL RESPONSES TO PHQ QUESTIONS 1-9: 0
1. LITTLE INTEREST OR PLEASURE IN DOING THINGS: 0

## 2023-07-28 NOTE — PROGRESS NOTES
Chief Complaint   Patient presents with    Follow-up     2 week follow up     1. \"Have you been to the ER, urgent care clinic since your last visit? Hospitalized since your last visit? \" No    2. \"Have you seen or consulted any other health care providers outside of the [address] [place] [address] Avenue since your last visit? \" No     3. For patients aged 43-73: Has the patient had a colonoscopy / FIT/ Cologuard? Yes - no Care Gap present    Verbal order from Nancy Parks MD to order labs/sign and draw them in office    Labs were drawn and sent to [facility] by Lincoln Crenshaw LPN:    The following tubes were sent:    1 Lavendar, 0 Red, 1 SST, 0 Urine    Draw site right antecubital.  Patient tolerated draw with no distress.

## 2023-07-28 NOTE — PATIENT INSTRUCTIONS
. Jose Antonio Shah,  Regarding your cuurent meds, your blood pressure looks GREAT! No changes to the pills you are taking at this time. Lets start one new pill \"Senna\"; take one tablet every other night for constipation, HOLD this medication for loose stools. Please cut back on the insulin a bit, you can HOLD the insulin unless your morning sugar is more than 120 in which case only give yourself 5 units rather than 8 units. Please try to call to the Nephrology office, Dr. Elizabeth Butt, to get scheduled for a visit to discuss your plans for dialysis. The number for his office is: [phone].     Lets follow-up in 1 months or sooner if needed.    ~Dr. Jaxson Arguello

## 2023-07-28 NOTE — PROGRESS NOTES
Agnieszka Alas (: 1945) is a 78 y.o. male here for evaluation of the following chief concern(s):  Chronic condition management     ASSESSMENT/PLAN:  1. Essential hypertension  -     Basic Metabolic Panel; Future  -     CBC with Auto Differential; Future  -     hydrALAZINE (APRESOLINE) 100 MG tablet; Take 1 tablet by mouth 3 times daily, Disp-90 tablet, R-2Normal  2. Type 2 diabetes mellitus with stage 4 chronic kidney disease, with long-term current use of insulin ([address])  -     Basic Metabolic Panel; Future  3. Chronic constipation  -     sennosides-docusate sodium (SENOKOT-S) 8.6-50 MG tablet; Take one tablet every other night for constipation, HOLD this medication for loose stools. , Disp-45 tablet, R-0Normal  4. Chronic anticoagulation  -     CBC with Auto Differential; Future  5. Anemia, unspecified type  -     CBC with Auto Differential; Future  6. Paresthesias  -     Vitamin B12; Future  7. Cognitive changes  -     Vitamin B12; Future  8. Imbalance  -     Vitamin B12; Future  9. Vitamin D deficiency  -     Vitamin D 25 Hydroxy; Future  -      - Venipuncture  -     ergocalciferol (ERGOCALCIFEROL) 1.25 MG (05035 UT) capsule; TAKE ONE CAPSULE BY MOUTH EVERY 7 DAYS, Disp-12 capsule, R-0Normal  10. Encounter for monitoring long-term proton pump inhibitor therapy    Mr. Mami Garcia appears medically stable, normal hemodynamics including blood pressure. Personalized AVS;  \"Mr. Penny Shoulders your cuurent meds, your blood pressure looks GREAT! No changes to the pills you are taking at this time. Lets start one new pill \"Senna\"; take one tablet every other night for constipation, HOLD this medication for loose stools. Please cut back on the insulin a bit, you can HOLD the insulin unless your morning sugar is more than 120 in which case only give yourself 5 units rather than 8 units.       Please try to call to the Nephrology office, Dr. Amanda Max, to get scheduled for a visit to discuss your

## 2023-07-29 LAB
ANION GAP SERPL CALCULATED.3IONS-SCNC: 14 MMOL/L (ref 3–15)
BASOPHILS # BLD: 1 % (ref 0–2)
BASOPHILS ABSOLUTE: 0.1 K/UL (ref 0–0.2)
BUN BLDV-MCNC: 84 MG/DL (ref 6–22)
CALCIUM SERPL-MCNC: 9.3 MG/DL (ref 8.4–10.5)
CHLORIDE BLD-SCNC: 105 MMOL/L (ref 98–110)
CO2: 21 MMOL/L (ref 20–32)
CREAT SERPL-MCNC: 4.7 MG/DL (ref 0.8–1.6)
EOSINOPHIL # BLD: 3 % (ref 0–6)
EOSINOPHILS ABSOLUTE: 0.2 K/UL (ref 0–0.5)
GLOMERULAR FILTRATION RATE: 12.4 ML/MIN/1.73 SQ.M.
GLUCOSE: 194 MG/DL (ref 70–99)
HCT VFR BLD CALC: 29.9 % (ref 37.8–52.2)
HEMOGLOBIN: 9.4 G/DL (ref 12.6–17.1)
LYMPHOCYTES # BLD: 21 % (ref 20–45)
LYMPHOCYTES ABSOLUTE: 1.4 K/UL (ref 1–4.8)
MCH RBC QN AUTO: 28 PG (ref 26–34)
MCHC RBC AUTO-ENTMCNC: 31 G/DL (ref 31–36)
MCV RBC AUTO: 87 FL (ref 80–95)
MONOCYTES ABSOLUTE: 0.6 K/UL (ref 0.1–1)
MONOCYTES: 9 % (ref 3–12)
NEUTROPHILS ABSOLUTE: 4.6 K/UL (ref 1.8–7.7)
NEUTROPHILS: 67 % (ref 40–75)
PDW BLD-RTO: 15 % (ref 10–15.5)
PLATELET # BLD: 285 K/UL (ref 140–440)
PMV BLD AUTO: 11.1 FL (ref 9–13)
POTASSIUM SERPL-SCNC: 5.4 MMOL/L (ref 3.5–5.5)
RBC: 3.42 M/UL (ref 3.8–5.8)
SODIUM BLD-SCNC: 140 MMOL/L (ref 133–145)
VITAMIN B-12: 851 PG/ML (ref 211–911)
VITAMIN D 25-HYDROXY: 34.4 NG/ML (ref 32–100)
WBC: 6.8 K/UL (ref 4–11)

## 2023-08-02 ENCOUNTER — TELEPHONE (OUTPATIENT)
Facility: CLINIC | Age: 78
End: 2023-08-02

## 2023-08-02 NOTE — TELEPHONE ENCOUNTER
Patient's family member called 8/1/23. He has his permacath in place and now needs to be set up to go to [facility]. This was closed previously because he did not get the permacath as was scheduled. Please send referral to [facility] so he can get this started asap.

## 2023-08-04 ENCOUNTER — HOSPITAL ENCOUNTER (OUTPATIENT)
Age: 78
Discharge: HOME | End: 2023-08-07

## 2023-08-04 LAB — SENTARA SPECIMEN COLLECTION: NORMAL

## 2023-08-04 NOTE — TELEPHONE ENCOUNTER
Delvin'ts referral is ready to send to [facility]. However, he has not gotten lab work done that Dr Cahrles Joshua had ordered. I contacted patient's family 8/3/23 and told her to have him get the labs done, then call us so we can get the results and then send the referral to [facility].

## 2023-08-09 ENCOUNTER — TELEMEDICINE (OUTPATIENT)
Facility: CLINIC | Age: 78
End: 2023-08-09
Payer: MEDICARE

## 2023-08-09 ENCOUNTER — TELEPHONE (OUTPATIENT)
Facility: CLINIC | Age: 78
End: 2023-08-09

## 2023-08-09 DIAGNOSIS — Z00.00 MEDICARE ANNUAL WELLNESS VISIT, SUBSEQUENT: Primary | ICD-10-CM

## 2023-08-09 DIAGNOSIS — R54 FRAILTY SYNDROME IN GERIATRIC PATIENT: ICD-10-CM

## 2023-08-09 DIAGNOSIS — N18.6 ESRD (END STAGE RENAL DISEASE) (HCC): ICD-10-CM

## 2023-08-09 PROCEDURE — G0439 PPPS, SUBSEQ VISIT: HCPCS | Performed by: FAMILY MEDICINE

## 2023-08-09 PROCEDURE — 1123F ACP DISCUSS/DSCN MKR DOCD: CPT | Performed by: FAMILY MEDICINE

## 2023-08-09 ASSESSMENT — PATIENT HEALTH QUESTIONNAIRE - PHQ9
SUM OF ALL RESPONSES TO PHQ9 QUESTIONS 1 & 2: 0
SUM OF ALL RESPONSES TO PHQ QUESTIONS 1-9: 0
1. LITTLE INTEREST OR PLEASURE IN DOING THINGS: 0
2. FEELING DOWN, DEPRESSED OR HOPELESS: 0

## 2023-08-09 ASSESSMENT — LIFESTYLE VARIABLES: HOW OFTEN DO YOU HAVE A DRINK CONTAINING ALCOHOL: NEVER

## 2023-08-09 NOTE — TELEPHONE ENCOUNTER
Per Dr Yanet Hawthorne called over to Nephrology office regarding patient status and dialysis initiation. Spoke to Vinod who states that patient needed additional test/lab required by [facility] in order to get patient started on dialysis. Patient just recently had these done and they just recently received results and are working on getting him set up for dialysis to be started.

## 2023-08-09 NOTE — PROGRESS NOTES
Jessica Gordon went over medications with me, she stated she did have the bottles current present. States his insulin is 5 to 18 units according to his blood sugars. Told patient the words to remember twice and neither time could he remember all 3. Lena Ronquillo presents today for   Chief Complaint   Patient presents with    Medicare AWV       Risk Factor Screenings with Interventions     Fall Risk:  2 or more falls in past year?: no  Fall with injury in past year?: no    Alcohol:  Alcohol Use: Not At Risk    Frequency of Alcohol Consumption: Never    Average Number of Drinks: Patient does not drink    Frequency of Binge Drinking: Never       Depression:  PHQ-2 Score: 0    Cognitive: Words recalled: 1 Word Recalled  Total Score Interpretation: Abnormal Mini-Cog    Health Risk Assessment:     General  In general, how would you say your health is?: Good  In the past 7 days, have you experienced any of the following: New or Increased Pain, New or Increased Fatigue, Loneliness, Social Isolation, Stress or Anger?: No  Do you get the social and emotional support that you need?: Yes      Health Habits/Nutrition  On average, how many days per week do you engage in moderate to strenuous exercise (like a brisk walk)?: 0 days  On average, how many minutes do you engage in exercise at this level?: 0 min  Have you lost any weight without trying in the past 3 months?: (!) Yes  Have you seen the dentist within the past year?: (!) No  There is no height or weight on file to calculate BMI.       Hearing/Vision  Do you or your family notice any trouble with your hearing that hasn't been managed with hearing aids?: No  Do you have difficulty driving, watching TV, or doing any of your daily activities because of your eyesight?: No  Have you had an eye exam within the past year?: (!) No      Safety  Do you have working smoke detectors?: Yes  Do you have any tripping hazards - loose or unsecured carpets or rugs?: No  Do you have any
Alcohol Consumption: Never    Average Number of Drinks: Patient does not drink    Frequency of Binge Drinking: Never     No flowsheet data found. Amb Fall Risk Assessment and TUG Test 8/9/2023   Do you feel unsteady or are you worried about falling?  no   2 or more falls in past year? no   Fall with injury in past year? no   Fall in past 12 months? -   Able to walk?  -   Total Score -     Patient Care Team:  Christina Truong MD as PCP - Anna Bishop MD as PCP - Empaneled Provider  Shaw Hill MD as Physician  Preico Cho MD as Physician  Jorge Luis Mendez MD as Physician  Anisha Herzog MD as Physician  Maximino Denson MD as Surgeon  Merlin Anderson, APRN - NP as Physician Assistant  Zach Mason MD as Physician    Health Maintenance   Topic Date Due    Shingles vaccine (1 of 2) Never done    COVID-19 Vaccine (6 - Booster) 12/29/2021    Annual Wellness Visit (AWV)  01/29/2023    Flu vaccine (1) 08/01/2023    Depression Screen  07/28/2024    DTaP/Tdap/Td vaccine (3 - Td or Tdap) 03/09/2033    Pneumococcal 65+ years Vaccine  Completed    Hepatitis A vaccine  Aged Out    Hib vaccine  Aged Out    Meningococcal (ACWY) vaccine  Aged Out    Diabetic foot exam  Discontinued    A1C test (Diabetic or Prediabetic)  Discontinued    Lipids  Discontinued    Colorectal Cancer Screen  Discontinued    Hepatitis C screen  Discontinued       Lab Results   Component Value Date/Time    CHOL 147 06/07/2023 11:46 AM    CHOL 97 10/06/2021 11:25 PM    HDL 32 06/07/2023 11:46 AM       Immunization History   Administered Date(s) Administered    COVID-19, PFIZER PURPLE top, DILUTE for use, (age 15 y+), 30mcg/0.3mL 08/03/2021, 09/07/2021, 10/15/2021, 11/03/2021    COVID-19, US Vaccine, Vaccine Unspecified 08/03/2021, 09/07/2021    Influenza Trivalent 12/07/2009, 11/24/2017, 10/01/2020    Influenza Virus Vaccine 11/16/2022    Influenza, FLUAD, (age 72 y+), Adjuvanted, 0.5mL 11/16/2022    Pneumococcal, PCV-13, PREVNAR 15,

## 2023-08-09 NOTE — TELEPHONE ENCOUNTER
Information, notes, labs were faxed to [facility] today, 8/9/23. We had been waiting for patient to get lab work done. He was called last Thursday, 8/3/23 and told to get labs done so we could set up dialysis. We received the lab results Tuesday afternoon 8/8/23. We received fax confirmation that [facility] received the order at 12:18pm 8/9/23. Samson Cooper from [facility] called office and spoke with Latanya Cordova LPN. Patient will be going to [facility]. Shift that is available is 2 to 8:30 pm. WRS#[id number], Representative will be Fay Whelan. Her number is [phone], ext [phone]. Next steps, [facility] will reach out to the patient and to our office with start date for treatment.

## 2023-08-15 ENCOUNTER — TELEPHONE (OUTPATIENT)
Facility: CLINIC | Age: 78
End: 2023-08-15

## 2023-08-15 NOTE — TELEPHONE ENCOUNTER
Zuleima Reeder from [facility] called in reference to patient please return call at [phone] ext [phone]

## 2023-08-16 ENCOUNTER — TELEPHONE (OUTPATIENT)
Facility: CLINIC | Age: 78
End: 2023-08-16

## 2023-08-16 NOTE — TELEPHONE ENCOUNTER
Spoke with Rj Nieves and she is getting kick back from the staff at [facility] on admitting due to no flow sheets, medication lists and recent labs.  I advised her she had the most recent H&P from our provider and I resent labs to her and a medication list. Bfowler,lpn

## 2023-08-17 ENCOUNTER — TELEPHONE (OUTPATIENT)
Age: 78
End: 2023-08-17

## 2023-08-17 NOTE — TELEPHONE ENCOUNTER
Received call from [facility] with a confirmed chair time for patient that will start Tomorrow August 18th. His schedule will be M-W-F. Patient to arrive tomorrow at 2:15pm with a chair time of 3pm. Patient instructed to bring ID, Insurance cards, [facility]. Zaynab Smith contacted patients daughter and gave information and they will have patient their tomorrow.      Thanks   patt Lu

## 2023-08-18 NOTE — TELEPHONE ENCOUNTER
Lab work has been completed. Referral was sent to [facility] and dialysis was set to begin Aug. 18th. Patient was notified 8/17/23.

## 2023-08-22 DIAGNOSIS — K21.9 GASTRO-ESOPHAGEAL REFLUX DISEASE WITHOUT ESOPHAGITIS: Primary | ICD-10-CM

## 2023-08-22 DIAGNOSIS — N18.4 CHRONIC KIDNEY DISEASE, STAGE 4 (SEVERE) (HCC): ICD-10-CM

## 2023-08-22 DIAGNOSIS — I10 ESSENTIAL HYPERTENSION: ICD-10-CM

## 2023-08-22 RX ORDER — FUROSEMIDE 40 MG/1
40 TABLET ORAL DAILY
Qty: 90 TABLET | Refills: 0 | Status: SHIPPED | OUTPATIENT
Start: 2023-08-22

## 2023-08-22 RX ORDER — OMEPRAZOLE 40 MG/1
40 CAPSULE, DELAYED RELEASE ORAL DAILY
Qty: 90 CAPSULE | Refills: 1 | Status: SHIPPED | OUTPATIENT
Start: 2023-08-22

## 2023-09-06 ENCOUNTER — TELEPHONE (OUTPATIENT)
Facility: CLINIC | Age: 78
End: 2023-09-06

## 2023-09-06 NOTE — TELEPHONE ENCOUNTER
----- Message from Kaylan Merrill sent at 9/5/2023  1:51 PM EDT -----  Subject: Appointment Request    Reason for Call: Established Patient Appointment needed: Routine Existing   Condition Follow Up    QUESTIONS    Reason for appointment request? No appointments available during search     Additional Information for Provider? Pts sulaiman Romero Sport called in and states   pt has Dialysis tomorrow so he cannot keep the appt. and needs to schedule   another appt soon. She is requesting next Tuesday.  Please call Kiera to   set up appt  ---------------------------------------------------------------------------  --------------  [address] [place] [address] [place]  [phone]; OK to leave message on voicemail  ---------------------------------------------------------------------------  --------------  SCRIPT ANSWERS

## 2023-09-12 DIAGNOSIS — N18.4 CHRONIC KIDNEY DISEASE, STAGE 4 (SEVERE) (HCC): ICD-10-CM

## 2023-09-12 DIAGNOSIS — E78.5 HYPERLIPIDEMIA, UNSPECIFIED HYPERLIPIDEMIA TYPE: ICD-10-CM

## 2023-09-13 RX ORDER — ALLOPURINOL 100 MG/1
200 TABLET ORAL DAILY
Qty: 180 TABLET | Refills: 0 | Status: SHIPPED | OUTPATIENT
Start: 2023-09-13 | End: 2023-12-12

## 2023-09-13 RX ORDER — ATORVASTATIN CALCIUM 40 MG/1
40 TABLET, FILM COATED ORAL DAILY
Qty: 90 TABLET | Refills: 0 | Status: SHIPPED | OUTPATIENT
Start: 2023-09-13

## 2023-09-18 ENCOUNTER — APPOINTMENT (OUTPATIENT)
Age: 78
End: 2023-09-18
Payer: MEDICARE

## 2023-09-18 ENCOUNTER — TELEPHONE (OUTPATIENT)
Facility: CLINIC | Age: 78
End: 2023-09-18

## 2023-09-18 ENCOUNTER — HOSPITAL ENCOUNTER (OUTPATIENT)
Age: 78
Setting detail: OBSERVATION
Discharge: HOME | End: 2023-09-20
Attending: FAMILY MEDICINE | Admitting: INTERNAL MEDICINE
Payer: MEDICARE

## 2023-09-18 DIAGNOSIS — E11.22 TYPE 2 DIABETES MELLITUS WITH CHRONIC KIDNEY DISEASE, WITH LONG-TERM CURRENT USE OF INSULIN, UNSPECIFIED CKD STAGE (HCC): Primary | ICD-10-CM

## 2023-09-18 DIAGNOSIS — Z79.4 TYPE 2 DIABETES MELLITUS WITH CHRONIC KIDNEY DISEASE, WITH LONG-TERM CURRENT USE OF INSULIN, UNSPECIFIED CKD STAGE (HCC): Primary | ICD-10-CM

## 2023-09-18 DIAGNOSIS — R06.00 DYSPNEA, UNSPECIFIED TYPE: ICD-10-CM

## 2023-09-18 DIAGNOSIS — R53.83 FATIGUE, UNSPECIFIED TYPE: ICD-10-CM

## 2023-09-18 DIAGNOSIS — E87.8: Primary | ICD-10-CM

## 2023-09-18 LAB
GLUCOSE BLD STRIP.AUTO-MCNC: 222 MG/DL (ref 70–110)
PERFORMED BY:: ABNORMAL

## 2023-09-18 PROCEDURE — 85610 PROTHROMBIN TIME: CPT

## 2023-09-18 PROCEDURE — 71045 X-RAY EXAM CHEST 1 VIEW: CPT

## 2023-09-18 PROCEDURE — 83690 ASSAY OF LIPASE: CPT

## 2023-09-18 PROCEDURE — 83735 ASSAY OF MAGNESIUM: CPT

## 2023-09-18 PROCEDURE — 93005 ELECTROCARDIOGRAM TRACING: CPT | Performed by: FAMILY MEDICINE

## 2023-09-18 PROCEDURE — 80053 COMPREHEN METABOLIC PANEL: CPT

## 2023-09-18 PROCEDURE — 99285 EMERGENCY DEPT VISIT HI MDM: CPT

## 2023-09-18 PROCEDURE — 82962 GLUCOSE BLOOD TEST: CPT

## 2023-09-18 PROCEDURE — 84484 ASSAY OF TROPONIN QUANT: CPT

## 2023-09-18 PROCEDURE — 87340 HEPATITIS B SURFACE AG IA: CPT

## 2023-09-18 PROCEDURE — 83880 ASSAY OF NATRIURETIC PEPTIDE: CPT

## 2023-09-18 PROCEDURE — 84443 ASSAY THYROID STIM HORMONE: CPT

## 2023-09-18 PROCEDURE — 85025 COMPLETE CBC W/AUTO DIFF WBC: CPT

## 2023-09-18 PROCEDURE — 36415 COLL VENOUS BLD VENIPUNCTURE: CPT

## 2023-09-18 RX ORDER — SODIUM CHLORIDE 9 MG/ML
INJECTION, SOLUTION INTRAVENOUS CONTINUOUS
Status: DISCONTINUED | OUTPATIENT
Start: 2023-09-19 | End: 2023-09-20 | Stop reason: HOSPADM

## 2023-09-18 ASSESSMENT — PAIN DESCRIPTION - PAIN TYPE: TYPE: ACUTE PAIN

## 2023-09-18 ASSESSMENT — PAIN DESCRIPTION - LOCATION: LOCATION: FLANK

## 2023-09-18 ASSESSMENT — PAIN - FUNCTIONAL ASSESSMENT: PAIN_FUNCTIONAL_ASSESSMENT: 0-10

## 2023-09-18 ASSESSMENT — PAIN SCALES - GENERAL: PAINLEVEL_OUTOF10: 10

## 2023-09-18 ASSESSMENT — PAIN DESCRIPTION - ORIENTATION: ORIENTATION: LEFT

## 2023-09-18 NOTE — TELEPHONE ENCOUNTER
Patient sulaiman called and state that she called the patients pharmacy and he needs a new script for One Touch test strips because they are out of the other prescription. Her callback number is [phone]. Jennifer Moreno Please advise.

## 2023-09-19 ENCOUNTER — APPOINTMENT (OUTPATIENT)
Age: 78
End: 2023-09-19
Payer: MEDICARE

## 2023-09-19 PROBLEM — Z99.2 ESRD (END STAGE RENAL DISEASE) ON DIALYSIS (HCC): Status: ACTIVE | Noted: 2023-09-19

## 2023-09-19 PROBLEM — E11.9 DIABETES (HCC): Status: ACTIVE | Noted: 2023-09-19

## 2023-09-19 PROBLEM — I50.9 CHRONIC HEART FAILURE (HCC): Status: ACTIVE | Noted: 2023-09-19

## 2023-09-19 PROBLEM — E87.8: Status: ACTIVE | Noted: 2023-09-19

## 2023-09-19 PROBLEM — N18.6 ESRD (END STAGE RENAL DISEASE) ON DIALYSIS (HCC): Status: ACTIVE | Noted: 2023-09-19

## 2023-09-19 LAB
ALBUMIN SERPL-MCNC: 3 G/DL (ref 3.4–5)
ALBUMIN SERPL-MCNC: 3.2 G/DL (ref 3.4–5)
ALBUMIN/GLOB SERPL: 0.7 (ref 0.8–1.7)
ALBUMIN/GLOB SERPL: 0.7 (ref 0.8–1.7)
ALP SERPL-CCNC: 115 U/L (ref 45–117)
ALP SERPL-CCNC: 125 U/L (ref 45–117)
ALT SERPL-CCNC: 27 U/L (ref 16–61)
ALT SERPL-CCNC: 29 U/L (ref 16–61)
ANION GAP SERPL CALC-SCNC: 13 MMOL/L (ref 3–18)
ANION GAP SERPL CALC-SCNC: 9 MMOL/L (ref 3–18)
ANION GAP SERPL CALC-SCNC: 9 MMOL/L (ref 3–18)
AST SERPL W P-5'-P-CCNC: 26 U/L (ref 10–38)
AST SERPL W P-5'-P-CCNC: 28 U/L (ref 10–38)
BASOPHILS # BLD: 0 K/UL (ref 0–0.1)
BASOPHILS # BLD: 0.1 K/UL (ref 0–0.1)
BASOPHILS NFR BLD: 0 % (ref 0–2)
BASOPHILS NFR BLD: 0 % (ref 0–2)
BILIRUB SERPL-MCNC: 0.4 MG/DL (ref 0.2–1)
BILIRUB SERPL-MCNC: 0.5 MG/DL (ref 0.2–1)
BNP SERPL-MCNC: 1294 PG/ML (ref 0–1800)
BUN SERPL-MCNC: 32 MG/DL (ref 7–18)
BUN SERPL-MCNC: 35 MG/DL (ref 7–18)
BUN SERPL-MCNC: 42 MG/DL (ref 7–18)
BUN/CREAT SERPL: 10 (ref 12–20)
BUN/CREAT SERPL: 9 (ref 12–20)
BUN/CREAT SERPL: 9 (ref 12–20)
CA-I BLD-MCNC: 10.2 MG/DL (ref 8.5–10.1)
CA-I BLD-MCNC: 9 MG/DL (ref 8.5–10.1)
CA-I BLD-MCNC: 9.2 MG/DL (ref 8.5–10.1)
CHLORIDE SERPL-SCNC: 101 MMOL/L (ref 100–111)
CHLORIDE SERPL-SCNC: 102 MMOL/L (ref 100–111)
CHLORIDE SERPL-SCNC: 105 MMOL/L (ref 100–111)
CO2 SERPL-SCNC: 24 MMOL/L (ref 21–32)
CO2 SERPL-SCNC: 27 MMOL/L (ref 21–32)
CO2 SERPL-SCNC: 28 MMOL/L (ref 21–32)
CREAT SERPL-MCNC: 3.42 MG/DL (ref 0.6–1.3)
CREAT SERPL-MCNC: 3.47 MG/DL (ref 0.6–1.3)
CREAT SERPL-MCNC: 4.58 MG/DL (ref 0.6–1.3)
DIFFERENTIAL METHOD BLD: ABNORMAL
DIFFERENTIAL METHOD BLD: ABNORMAL
EKG ATRIAL RATE: 85 BPM
EKG DIAGNOSIS: NORMAL
EKG P AXIS: 1 DEGREES
EKG P-R INTERVAL: 268 MS
EKG Q-T INTERVAL: 386 MS
EKG QRS DURATION: 108 MS
EKG QTC CALCULATION (BAZETT): 459 MS
EKG R AXIS: -28 DEGREES
EKG T AXIS: 31 DEGREES
EKG VENTRICULAR RATE: 85 BPM
EOSINOPHIL # BLD: 0 K/UL (ref 0–0.4)
EOSINOPHIL # BLD: 0 K/UL (ref 0–0.4)
EOSINOPHIL NFR BLD: 0 % (ref 0–5)
EOSINOPHIL NFR BLD: 0 % (ref 0–5)
ERYTHROCYTE [DISTWIDTH] IN BLOOD BY AUTOMATED COUNT: 14.8 % (ref 11.6–14.5)
ERYTHROCYTE [DISTWIDTH] IN BLOOD BY AUTOMATED COUNT: 15.2 % (ref 11.6–14.5)
GLOBULIN SER CALC-MCNC: 4.1 G/DL (ref 2–4)
GLOBULIN SER CALC-MCNC: 4.4 G/DL (ref 2–4)
GLUCOSE BLD STRIP.AUTO-MCNC: 130 MG/DL (ref 70–110)
GLUCOSE BLD STRIP.AUTO-MCNC: 195 MG/DL (ref 70–110)
GLUCOSE BLD STRIP.AUTO-MCNC: 227 MG/DL (ref 70–110)
GLUCOSE BLD STRIP.AUTO-MCNC: 248 MG/DL (ref 70–110)
GLUCOSE SERPL-MCNC: 152 MG/DL (ref 74–99)
GLUCOSE SERPL-MCNC: 181 MG/DL (ref 74–99)
GLUCOSE SERPL-MCNC: 239 MG/DL (ref 74–99)
HCT VFR BLD AUTO: 29.6 % (ref 36–48)
HCT VFR BLD AUTO: 30.2 % (ref 36–48)
HGB BLD-MCNC: 10.3 G/DL (ref 13–16)
HGB BLD-MCNC: 10.7 G/DL (ref 13–16)
IMM GRANULOCYTES # BLD AUTO: 0 K/UL (ref 0–0.04)
IMM GRANULOCYTES # BLD AUTO: 0.1 K/UL (ref 0–0.04)
IMM GRANULOCYTES NFR BLD AUTO: 0 % (ref 0–0.5)
IMM GRANULOCYTES NFR BLD AUTO: 1 % (ref 0–0.5)
INR PPP: 1.2 (ref 0.9–1.1)
LIPASE SERPL-CCNC: 235 U/L (ref 73–393)
LYMPHOCYTES # BLD: 1.1 K/UL (ref 0.9–3.6)
LYMPHOCYTES # BLD: 1.1 K/UL (ref 0.9–3.6)
LYMPHOCYTES NFR BLD: 9 % (ref 21–52)
LYMPHOCYTES NFR BLD: 9 % (ref 21–52)
MAGNESIUM SERPL-MCNC: 1.6 MG/DL (ref 1.6–2.6)
MCH RBC QN AUTO: 31.4 PG (ref 24–34)
MCH RBC QN AUTO: 31.8 PG (ref 24–34)
MCHC RBC AUTO-ENTMCNC: 34.8 G/DL (ref 31–37)
MCHC RBC AUTO-ENTMCNC: 35.4 G/DL (ref 31–37)
MCV RBC AUTO: 89.6 FL (ref 78–100)
MCV RBC AUTO: 90.2 FL (ref 78–100)
MONOCYTES # BLD: 0.9 K/UL (ref 0.05–1.2)
MONOCYTES # BLD: 0.9 K/UL (ref 0.05–1.2)
MONOCYTES NFR BLD: 7 % (ref 3–10)
MONOCYTES NFR BLD: 7 % (ref 3–10)
NEUTS SEG # BLD: 10.6 K/UL (ref 1.8–8)
NEUTS SEG # BLD: 9.7 K/UL (ref 1.8–8)
NEUTS SEG NFR BLD: 83 % (ref 40–73)
NEUTS SEG NFR BLD: 84 % (ref 40–73)
NRBC # BLD: 0 K/UL (ref 0–0.01)
NRBC # BLD: 0 K/UL (ref 0–0.01)
NRBC BLD-RTO: 0 PER 100 WBC
NRBC BLD-RTO: 0 PER 100 WBC
PERFORMED BY:: ABNORMAL
PLATELET # BLD AUTO: 266 K/UL (ref 135–420)
PLATELET # BLD AUTO: 276 K/UL (ref 135–420)
PMV BLD AUTO: 9.6 FL (ref 9.2–11.8)
PMV BLD AUTO: 9.9 FL (ref 9.2–11.8)
POTASSIUM SERPL-SCNC: 3.6 MMOL/L (ref 3.5–5.5)
POTASSIUM SERPL-SCNC: 3.7 MMOL/L (ref 3.5–5.5)
POTASSIUM SERPL-SCNC: 4.9 MMOL/L (ref 3.5–5.5)
PROT SERPL-MCNC: 7.1 G/DL (ref 6.4–8.2)
PROT SERPL-MCNC: 7.6 G/DL (ref 6.4–8.2)
PROTHROMBIN TIME: 15.2 SEC (ref 11.9–14.7)
RBC # BLD AUTO: 3.28 M/UL (ref 4.35–5.65)
RBC # BLD AUTO: 3.37 M/UL (ref 4.35–5.65)
SODIUM SERPL-SCNC: 137 MMOL/L (ref 136–145)
SODIUM SERPL-SCNC: 139 MMOL/L (ref 136–145)
SODIUM SERPL-SCNC: 142 MMOL/L (ref 136–145)
TROPONIN I SERPL HS-MCNC: 34 NG/L (ref 0–78)
TROPONIN I SERPL HS-MCNC: 37 NG/L (ref 0–78)
TSH SERPL DL<=0.05 MIU/L-ACNC: 2.62 UIU/ML (ref 0.36–3.74)
WBC # BLD AUTO: 11.8 K/UL (ref 4.6–13.2)
WBC # BLD AUTO: 12.7 K/UL (ref 4.6–13.2)

## 2023-09-19 PROCEDURE — 85025 COMPLETE CBC W/AUTO DIFF WBC: CPT

## 2023-09-19 PROCEDURE — 70450 CT HEAD/BRAIN W/O DYE: CPT

## 2023-09-19 PROCEDURE — 94761 N-INVAS EAR/PLS OXIMETRY MLT: CPT

## 2023-09-19 PROCEDURE — 2700000000 HC OXYGEN THERAPY PER DAY

## 2023-09-19 PROCEDURE — 6360000002 HC RX W HCPCS: Performed by: INTERNAL MEDICINE

## 2023-09-19 PROCEDURE — 96361 HYDRATE IV INFUSION ADD-ON: CPT

## 2023-09-19 PROCEDURE — 80048 BASIC METABOLIC PNL TOTAL CA: CPT

## 2023-09-19 PROCEDURE — 6370000000 HC RX 637 (ALT 250 FOR IP): Performed by: NURSE PRACTITIONER

## 2023-09-19 PROCEDURE — P9047 ALBUMIN (HUMAN), 25%, 50ML: HCPCS | Performed by: INTERNAL MEDICINE

## 2023-09-19 PROCEDURE — 36415 COLL VENOUS BLD VENIPUNCTURE: CPT

## 2023-09-19 PROCEDURE — 2580000003 HC RX 258: Performed by: NURSE PRACTITIONER

## 2023-09-19 PROCEDURE — G0378 HOSPITAL OBSERVATION PER HR: HCPCS

## 2023-09-19 PROCEDURE — 84484 ASSAY OF TROPONIN QUANT: CPT

## 2023-09-19 PROCEDURE — 2580000003 HC RX 258: Performed by: FAMILY MEDICINE

## 2023-09-19 PROCEDURE — 90935 HEMODIALYSIS ONE EVALUATION: CPT

## 2023-09-19 PROCEDURE — 82962 GLUCOSE BLOOD TEST: CPT

## 2023-09-19 PROCEDURE — 96365 THER/PROPH/DIAG IV INF INIT: CPT

## 2023-09-19 PROCEDURE — 80053 COMPREHEN METABOLIC PANEL: CPT

## 2023-09-19 PROCEDURE — 96375 TX/PRO/DX INJ NEW DRUG ADDON: CPT

## 2023-09-19 RX ORDER — ACETAMINOPHEN 325 MG/1
650 TABLET ORAL EVERY 6 HOURS PRN
Status: DISCONTINUED | OUTPATIENT
Start: 2023-09-19 | End: 2023-09-20 | Stop reason: HOSPADM

## 2023-09-19 RX ORDER — ISOSORBIDE MONONITRATE 30 MG/1
30 TABLET, EXTENDED RELEASE ORAL DAILY
Status: DISCONTINUED | OUTPATIENT
Start: 2023-09-19 | End: 2023-09-20 | Stop reason: HOSPADM

## 2023-09-19 RX ORDER — ACETAMINOPHEN 650 MG/1
650 SUPPOSITORY RECTAL EVERY 6 HOURS PRN
Status: DISCONTINUED | OUTPATIENT
Start: 2023-09-19 | End: 2023-09-20 | Stop reason: HOSPADM

## 2023-09-19 RX ORDER — CETIRIZINE HYDROCHLORIDE 10 MG/1
10 TABLET ORAL DAILY
Status: DISCONTINUED | OUTPATIENT
Start: 2023-09-19 | End: 2023-09-20 | Stop reason: HOSPADM

## 2023-09-19 RX ORDER — ALLOPURINOL 100 MG/1
200 TABLET ORAL DAILY
Status: DISCONTINUED | OUTPATIENT
Start: 2023-09-19 | End: 2023-09-20 | Stop reason: HOSPADM

## 2023-09-19 RX ORDER — AMLODIPINE BESYLATE 5 MG/1
10 TABLET ORAL DAILY
Status: DISCONTINUED | OUTPATIENT
Start: 2023-09-19 | End: 2023-09-20 | Stop reason: HOSPADM

## 2023-09-19 RX ORDER — SODIUM CHLORIDE 0.9 % (FLUSH) 0.9 %
5-40 SYRINGE (ML) INJECTION EVERY 12 HOURS SCHEDULED
Status: DISCONTINUED | OUTPATIENT
Start: 2023-09-19 | End: 2023-09-20 | Stop reason: HOSPADM

## 2023-09-19 RX ORDER — POLYETHYLENE GLYCOL 3350 17 G/17G
17 POWDER, FOR SOLUTION ORAL DAILY PRN
Status: DISCONTINUED | OUTPATIENT
Start: 2023-09-19 | End: 2023-09-20 | Stop reason: HOSPADM

## 2023-09-19 RX ORDER — SODIUM CHLORIDE 9 MG/ML
INJECTION, SOLUTION INTRAVENOUS CONTINUOUS
Status: DISCONTINUED | OUTPATIENT
Start: 2023-09-19 | End: 2023-09-20 | Stop reason: HOSPADM

## 2023-09-19 RX ORDER — ALBUMIN (HUMAN) 12.5 G/50ML
50 SOLUTION INTRAVENOUS ONCE
Status: COMPLETED | OUTPATIENT
Start: 2023-09-19 | End: 2023-09-19

## 2023-09-19 RX ORDER — FUROSEMIDE 10 MG/ML
40 INJECTION INTRAMUSCULAR; INTRAVENOUS
Status: COMPLETED | OUTPATIENT
Start: 2023-09-19 | End: 2023-09-19

## 2023-09-19 RX ORDER — INSULIN GLARGINE 100 [IU]/ML
8 INJECTION, SOLUTION SUBCUTANEOUS DAILY
Status: DISCONTINUED | OUTPATIENT
Start: 2023-09-19 | End: 2023-09-20 | Stop reason: HOSPADM

## 2023-09-19 RX ORDER — ONDANSETRON 4 MG/1
4 TABLET, ORALLY DISINTEGRATING ORAL EVERY 8 HOURS PRN
Status: DISCONTINUED | OUTPATIENT
Start: 2023-09-19 | End: 2023-09-20 | Stop reason: HOSPADM

## 2023-09-19 RX ORDER — ASPIRIN 81 MG/1
81 TABLET, CHEWABLE ORAL DAILY
Status: DISCONTINUED | OUTPATIENT
Start: 2023-09-19 | End: 2023-09-20 | Stop reason: HOSPADM

## 2023-09-19 RX ORDER — HYDRALAZINE HYDROCHLORIDE 25 MG/1
100 TABLET, FILM COATED ORAL 3 TIMES DAILY
Status: DISCONTINUED | OUTPATIENT
Start: 2023-09-19 | End: 2023-09-20 | Stop reason: HOSPADM

## 2023-09-19 RX ORDER — ATORVASTATIN CALCIUM 40 MG/1
40 TABLET, FILM COATED ORAL DAILY
Status: DISCONTINUED | OUTPATIENT
Start: 2023-09-19 | End: 2023-09-20 | Stop reason: HOSPADM

## 2023-09-19 RX ORDER — ONDANSETRON 2 MG/ML
4 INJECTION INTRAMUSCULAR; INTRAVENOUS EVERY 6 HOURS PRN
Status: DISCONTINUED | OUTPATIENT
Start: 2023-09-19 | End: 2023-09-20 | Stop reason: HOSPADM

## 2023-09-19 RX ORDER — SODIUM BICARBONATE 650 MG/1
650 TABLET ORAL 3 TIMES DAILY
Status: DISCONTINUED | OUTPATIENT
Start: 2023-09-19 | End: 2023-09-20 | Stop reason: HOSPADM

## 2023-09-19 RX ORDER — INSULIN LISPRO 100 [IU]/ML
0-4 INJECTION, SOLUTION INTRAVENOUS; SUBCUTANEOUS NIGHTLY
Status: DISCONTINUED | OUTPATIENT
Start: 2023-09-19 | End: 2023-09-20 | Stop reason: HOSPADM

## 2023-09-19 RX ORDER — INSULIN LISPRO 100 [IU]/ML
0-8 INJECTION, SOLUTION INTRAVENOUS; SUBCUTANEOUS
Status: DISCONTINUED | OUTPATIENT
Start: 2023-09-19 | End: 2023-09-20 | Stop reason: HOSPADM

## 2023-09-19 RX ORDER — DEXTROSE MONOHYDRATE 100 MG/ML
INJECTION, SOLUTION INTRAVENOUS CONTINUOUS PRN
Status: DISCONTINUED | OUTPATIENT
Start: 2023-09-19 | End: 2023-09-20 | Stop reason: HOSPADM

## 2023-09-19 RX ORDER — PANTOPRAZOLE SODIUM 40 MG/1
40 TABLET, DELAYED RELEASE ORAL DAILY
Status: DISCONTINUED | OUTPATIENT
Start: 2023-09-19 | End: 2023-09-20 | Stop reason: HOSPADM

## 2023-09-19 RX ORDER — SODIUM CHLORIDE 0.9 % (FLUSH) 0.9 %
5-40 SYRINGE (ML) INJECTION PRN
Status: DISCONTINUED | OUTPATIENT
Start: 2023-09-19 | End: 2023-09-20 | Stop reason: HOSPADM

## 2023-09-19 RX ORDER — ALBUTEROL SULFATE 2.5 MG/3ML
2.5 SOLUTION RESPIRATORY (INHALATION) EVERY 4 HOURS PRN
Status: DISCONTINUED | OUTPATIENT
Start: 2023-09-19 | End: 2023-09-20 | Stop reason: HOSPADM

## 2023-09-19 RX ORDER — ENOXAPARIN SODIUM 100 MG/ML
30 INJECTION SUBCUTANEOUS DAILY
Status: DISCONTINUED | OUTPATIENT
Start: 2023-09-19 | End: 2023-09-19

## 2023-09-19 RX ORDER — SODIUM CHLORIDE 9 MG/ML
INJECTION, SOLUTION INTRAVENOUS PRN
Status: DISCONTINUED | OUTPATIENT
Start: 2023-09-19 | End: 2023-09-20 | Stop reason: HOSPADM

## 2023-09-19 RX ADMIN — SODIUM BICARBONATE 650 MG: 650 TABLET ORAL at 10:25

## 2023-09-19 RX ADMIN — ISOSORBIDE MONONITRATE 30 MG: 30 TABLET, EXTENDED RELEASE ORAL at 10:25

## 2023-09-19 RX ADMIN — INSULIN LISPRO 2 UNITS: 100 INJECTION, SOLUTION INTRAVENOUS; SUBCUTANEOUS at 12:51

## 2023-09-19 RX ADMIN — PANTOPRAZOLE SODIUM 40 MG: 40 TABLET, DELAYED RELEASE ORAL at 10:25

## 2023-09-19 RX ADMIN — SODIUM BICARBONATE 650 MG: 650 TABLET ORAL at 20:39

## 2023-09-19 RX ADMIN — ALLOPURINOL 200 MG: 100 TABLET ORAL at 10:26

## 2023-09-19 RX ADMIN — SODIUM CHLORIDE: 9 INJECTION, SOLUTION INTRAVENOUS at 00:01

## 2023-09-19 RX ADMIN — ACETAMINOPHEN 650 MG: 325 TABLET ORAL at 19:10

## 2023-09-19 RX ADMIN — SODIUM CHLORIDE, PRESERVATIVE FREE 10 ML: 5 INJECTION INTRAVENOUS at 08:49

## 2023-09-19 RX ADMIN — FUROSEMIDE 40 MG: 10 INJECTION, SOLUTION INTRAMUSCULAR; INTRAVENOUS at 08:49

## 2023-09-19 RX ADMIN — ALBUMIN (HUMAN) 50 G: 0.25 INJECTION, SOLUTION INTRAVENOUS at 17:53

## 2023-09-19 RX ADMIN — AMLODIPINE BESYLATE 10 MG: 5 TABLET ORAL at 10:26

## 2023-09-19 RX ADMIN — APIXABAN 5 MG: 5 TABLET, FILM COATED ORAL at 20:39

## 2023-09-19 RX ADMIN — INSULIN GLARGINE 8 UNITS: 100 INJECTION, SOLUTION SUBCUTANEOUS at 10:25

## 2023-09-19 RX ADMIN — HYDRALAZINE HYDROCHLORIDE 100 MG: 25 TABLET, FILM COATED ORAL at 10:26

## 2023-09-19 RX ADMIN — ASPIRIN 81 MG 81 MG: 81 TABLET ORAL at 10:26

## 2023-09-19 RX ADMIN — ATORVASTATIN CALCIUM 40 MG: 40 TABLET, FILM COATED ORAL at 10:26

## 2023-09-19 RX ADMIN — CETIRIZINE HYDROCHLORIDE 10 MG: 10 TABLET, FILM COATED ORAL at 10:26

## 2023-09-19 RX ADMIN — HYDRALAZINE HYDROCHLORIDE 100 MG: 25 TABLET, FILM COATED ORAL at 16:17

## 2023-09-19 RX ADMIN — APIXABAN 5 MG: 5 TABLET, FILM COATED ORAL at 10:26

## 2023-09-19 RX ADMIN — SODIUM BICARBONATE 650 MG: 650 TABLET ORAL at 16:17

## 2023-09-19 RX ADMIN — SODIUM CHLORIDE: 9 INJECTION, SOLUTION INTRAVENOUS at 04:14

## 2023-09-19 RX ADMIN — SODIUM CHLORIDE, PRESERVATIVE FREE 10 ML: 5 INJECTION INTRAVENOUS at 20:40

## 2023-09-19 ASSESSMENT — PAIN DESCRIPTION - PAIN TYPE: TYPE: ACUTE PAIN

## 2023-09-19 ASSESSMENT — PAIN SCALES - GENERAL
PAINLEVEL_OUTOF10: 5
PAINLEVEL_OUTOF10: 0
PAINLEVEL_OUTOF10: 2
PAINLEVEL_OUTOF10: 0
PAINLEVEL_OUTOF10: 0

## 2023-09-19 ASSESSMENT — PAIN DESCRIPTION - LOCATION: LOCATION: LEG

## 2023-09-19 ASSESSMENT — ENCOUNTER SYMPTOMS
CHEST TIGHTNESS: 0
ABDOMINAL PAIN: 0
SHORTNESS OF BREATH: 0

## 2023-09-19 ASSESSMENT — PAIN - FUNCTIONAL ASSESSMENT: PAIN_FUNCTIONAL_ASSESSMENT: NONE - DENIES PAIN

## 2023-09-19 ASSESSMENT — PAIN DESCRIPTION - ORIENTATION: ORIENTATION: RIGHT;LEFT

## 2023-09-19 NOTE — H&P
History and Physical      Subjective:     Lani Ken is a 70 y.o. 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 Patient seen at bedside. Patient came to the emergency room tonight and stated he had dialysis yesterday and does not feel well. Patient stated he was achy dizzy weak and a little bit short of breath. He did not have any fevers chills chest pain leg swelling cough or any other symptoms. In the emergency room patient was found to have essentially normal work-up chest x-ray was negative head CT was negative EKG had chronic findings. Labs were normal with a slightly elevated BNP at 1294. Patient will be admitted and given IV fluid at 50 mL/h due to patient suspected being euvolemic. Patient also complaining of left-sided arthralgia patient has been complaining of left-sided pain for a long time and has been worked up and will be medicated for pain with Tylenol.   No injuries no new trauma noted      Past Medical History:   Diagnosis Date    Acid reflux     Arrhythmia     Arthritis     Bladder cancer ([address])     Colon cancer ([address]) 11/2021    Congestive heart failure (HCC)     Deep vein thrombosis (DVT) of proximal vein of both lower extremities ([address]) 9/14/2020    Diabetes mellitus ([address])     Difficult intubation     Narrow airway-per Pulm notes(in media)     ESRD (end stage renal disease) ([address])     stage IV/V per renal notes care everywhere 5/22/23    GERD (gastroesophageal reflux disease) limits  Suspicion that patient is euvolemic  Normal saline at 50 mL/h    ESRD (end stage renal disease) on dialysis [facility])  Patient is new to dialysis  Possible euvolemic due to dialysis  Normal saline at 50 mL/h  Nephrology consult    Diabetes [facility])  This is a chronic problem  Diabetic diet  Continue Lantus  Sliding scale ACHS    Chronic heart failure ([address])  This is a chronic problem/stable  Continue Eliquis  Continue isosorbide            Code Status: Full      Prophylaxis: Patient on Eliquis      Electronically Signed : Inocente Smart DNP, ENP-C, FNP-C, Hospital Medicine Service        Dragon voice recognition was used to generate this report, which may have resulted in some phonetic based errors in grammar and contents.  Even though attempts were made to correct all the mistakes, some may have been missed, and remained in the body of the document

## 2023-09-19 NOTE — PLAN OF CARE
Problem: Discharge Planning  Goal: Discharge to home or other facility with appropriate resources  Outcome: Progressing  Flowsheets  Taken 9/19/2023 0430  Discharge to home or other facility with appropriate resources:   Identify barriers to discharge with patient and caregiver   Arrange for needed discharge resources and transportation as appropriate  Taken 9/19/2023 0400  Discharge to home or other facility with appropriate resources: Identify barriers to discharge with patient and caregiver     Problem: ABCDS Injury Assessment  Goal: Absence of physical injury  Outcome: Progressing     Problem: Pain  Goal: Verbalizes/displays adequate comfort level or baseline comfort level  Outcome: Progressing

## 2023-09-19 NOTE — ED PROVIDER NOTES
EMERGENCY DEPARTMENT HISTORY AND PHYSICAL EXAM      Patient Name: Nona Moore  MRN: [id number]  YOB: 1945  Provider: Florencio Tsai DO  PCP: Claudy Schmidt MD     History of Presenting Illness     Chief Complaint   Patient presents with    Shortness of Breath    Abdominal Pain       History Provided By: EMS and Patient     HPI: Nona Moore, 70 y.o. male  presents to the ED with cc of shortness of breath. Patient has a history of CHF, type 2 diabetes mellitus, ESRD on dialysis Monday, Tuesday, Wednesday, last treatment today at 0, pulmonary hypertension, anemia, GERD, hypertension, atrial fibrillation. Per medical chart, patient also has a history of bladder cancer and colon cancer. Patient states that as soon as he got home from dialysis, he started feeling achy, dizzy, weak, and a little bit short of breath. He has not been able to eat or drink anything all day. Finally tonight, he called EMS because he was worried about the way he felt. He has not had any fevers, chills, chest pain, leg swelling, cough, headache, visual changes, falls. He just started dialysis in August and his nephrologist is Dr. Job Simons. Today was his sixth dialysis treatment. He has a Port-A-Cath in the left chest.  They have plans to place an AV fistula in the left arm but he does not know when. States he is on blood thinners but he does not know which one he is on, he forgot his bag of medicines at home.     Past History     Past Medical History:  Past Medical History:   Diagnosis Date    Acid reflux     Arrhythmia     Arthritis     Bladder cancer ([address])     Colon cancer ([address]) 11/2021    Congestive heart failure (HCC)     Deep vein thrombosis (DVT) of proximal vein of both lower extremities ([address]) 9/14/2020    Diabetes mellitus ([address])     Difficult intubation     Narrow airway-per Pulm notes(in media)     ESRD (end stage renal disease) ([address])     stage IV/V per renal notes care everywhere 5/22/23    GERD POC Glucose 222 (H) 70 - 110 mg/dL    Performed by: [facility]    Troponin    Collection Time: 09/19/23  1:10 AM   Result Value Ref Range    Troponin, High Sensitivity 34 0 - 78 ng/L       Radiologic Studies -   CT Head W/O Contrast   Final Result   No acute intracranial findings. Chronic left maxillary sinus   disease. XR CHEST PORTABLE   Final Result   No acute findings. Medical Decision Making     I reviewed the vital signs, available nursing notes, past medical history, past surgical history, family history and social history. Vital Signs-I have reviewed the vital signs that have been made available during the patient's emergency department visit. The vital signs auto-populated below are obtained mostly by electronic means through monitoring devices that have been downloaded into the patient's chart by the nursing staff. Some vital signs are not downloaded into the chart until after the patient has been discharged and this note has been completed, therefore some vital signs may not be available to the physician for review prior to patient's discharge or admission. The physician has reviewed the patient's triage vital signs, monitored the electronic monitoring devices remotely for any significant vital sign abnormalities, and have reviewed vital signs prior to discharge. Some vital signs reviewed at bedside or remotely utilizing electronic monitoring devices may be different than the vital signs downloaded into the electronic medical record. Some vital signs may be erroneous and inaccurate since they are obtained by electronic monitoring devices, and not all vital signs are verified for accuracy by nursing staff prior to downloading into the patient's chart.     Vitals:    09/19/23 0107 09/19/23 0124 09/19/23 0211 09/19/23 0254   BP: (!) 163/94 (!) 156/79 (!) 176/79 (!) 164/79   Pulse: 85 81 77 79   Resp: 23 20 17 19   Temp:       TempSrc:       SpO2: 96% 98% 96% 94%

## 2023-09-19 NOTE — ED TRIAGE NOTES
Received via EMS with complaints of pain in left side mid abdominal area to leg. Also reports dyspnea. States has not felt good since getting home from dialysis today. Recently started dialysis- has only had 6 treatments. Denies chest pain. No nausea/vomiting reported, decreased appetite reported today.

## 2023-09-19 NOTE — DIALYSIS
HEPATITIS:clinic results: 8/18/23 surface antigen negative and surface antibodies less than 10. New hospital lab orders for today    EDUCATION: access care of CVC      PRE: SBAR: report with Liz Anderson RN    POST: SBAR report with Ronda Mayberry RN      PORT DISINFECT: pre treatment disinfect per P and P / aspirates and flushes without  difficulty. No CVC problems during treatment. Post treatment, access disinfected per P and P. Normal saline instilled by orders, capped, and secured tightly. Mid treatment, blood pressure decrease to 105/68. / Dr Jana Woods notified, and provided orders to machine temperature decrease and giving albumin to support blood pressure. Albumin given by IV pump in to primary  line by primary nurse / post treatment, after rinseback of blood, then leg cramping, which alleviated after 20 minutes. PAULINE Trinh supervisor and Ronda Mayberry RN present. No further complaints. Dr Jana Woods notified and aware  of all treatment details.

## 2023-09-19 NOTE — PROGRESS NOTES
Patient was transferred to the unit from the ED at 4am to room 242. Patient A&O x4, denies pain, lung sounds clear, abdomen soft and non distended, urostomy to right lower abdomen draining clear yellow urine, dialysis permacath to left upper chest with dressing noted. Patient was seen by NP,  medications reviewed. Urostomy bag emptied, HOB elevated, bed in lowest position and call light within reach.

## 2023-09-19 NOTE — ED NOTES
TRANSFER - OUT REPORT:    Verbal report given to CLAYTON Chiu on Velasquez Quiñonez  being transferred to [address] [place] [address] for routine progression of patient care       Report consisted of patient's Situation, Background, Assessment and   Recommendations(SBAR). Information from the following report(s) Nurse Handoff Report and ED Encounter Summary was reviewed with the receiving nurse. Grethel Fall Assessment:    Presents to emergency department  because of falls (Syncope, seizure, or loss of consciousness): No  Age > 70: Yes  Altered Mental Status, Intoxication with alcohol or substance confusion (Disorientation, impaired judgment, poor safety awaremess, or inability to follow instructions): No  Impaired Mobility: Ambulates or transfers with assistive devices or assistance; Unable to ambulate or transer.: Yes  Nursing Judgement: No          Lines:   Peripheral IV 09/18/23 Distal;Right Antecubital (Active)   Site Assessment Clean, dry & intact 09/18/23 2345   Line Status Blood return noted; Flushed;Normal saline locked;Specimen collected 09/18/23 2345   Phlebitis Assessment No symptoms 09/18/23 2345   Infiltration Assessment 0 09/18/23 2345   Dressing Status New dressing applied 09/18/23 2345   Dressing Type Transparent 09/18/23 2345   Dressing Intervention New 09/18/23 2345        Opportunity for questions and clarification was provided.       Patient transported with:  Monitor and Tech          Chantal Pham RN  09/19/23 3417

## 2023-09-19 NOTE — ASSESSMENT & PLAN NOTE
Patient is new to dialysis  Possible euvolemic due to dialysis  Normal saline at 50 mL/h  Nephrology consult

## 2023-09-19 NOTE — ASSESSMENT & PLAN NOTE
Patient feeling weak since dialysis  Received bolus of normal saline in ED was suspicious that too much was taken off in dialysis  A.m. labs  Patient's creatinine at baseline  proBNP 1294 within normal limits

## 2023-09-19 NOTE — CONSULTS
Renal Consultation Note    NAME:  Abelino Watson   :   1945   MRN:   [id number]     ATTENDING: Melvi Muller MD  PCP:  Latha Dietrich MD    Date/Time:  2023 11:33 AM      Subjective:   REQUESTING PHYSICIAN:  REASON FOR CONSULT:    esrd pt  Patient is a 68-year-old Afro-American gentleman with history of ESRD on hemodialysis every  at [facility], hypertension, bladder cancer s/p ileostomy, colon cancer, CHF who presented to the hospital because of shortness of breath and feeling unwell postdialysis. According to the history patient went for dialysis on Monday but started feeling weak and tired after. Also developed shortness of breath on admission. His proBNP was only elevated around . However he was started on IV fluids at 50 mils per hour at the time of admission. At the time of my interview patient was very short of breath even in a sitting position.   Had crackles on physical exam.  Wife at bedside      Past Medical History:   Diagnosis Date    Acid reflux     Arrhythmia     Arthritis     Bladder cancer ([address])     Colon cancer ([address]) 2021    Congestive heart failure (HCC)     Deep vein thrombosis (DVT) of proximal vein of both lower extremities ([address]) 2020    Diabetes mellitus ([address])     Difficult intubation     Narrow airway-per Pulm notes(in media)     ESRD (end stage renal disease) ([address])     stage IV/V per renal notes care everywhere 23    GERD (gastroesophageal reflux disease)     Gout     High cholesterol     Hypertension     Hypomagnesemia 2023    Kidney carcinoma, left ([address]) 2016    left nephrectomy    Kidney disease     Pituitary mass ([address])     Pulmonary HTN ([address]) 10/2021    PASP 77 mmHg    Reflux esophagitis     Unspecified deficiency anemia       Past Surgical History:   Procedure Laterality Date    COLONOSCOPY N/A 2021    COLONOSCOPY with Polypectomies, Tattoo & Clip Placement performed by Santana Ozuna MD at

## 2023-09-19 NOTE — PROGRESS NOTES
0700 - assumed care of patient. 0730 - When I went into patients room to check on him and get his morning vitals, I found the patient having a difficult time breathing. The patients lungs sounds were course and wet sounding and the patient was doing a lot of coughing. I stopped the patients IV fluids and placed the patient on 2LPM O2 via NC, the patient was 95% SPO2. I notified RT and DR Edwards. DR Edwards ordered the IV fluids to be stopped. 1200 - patient lying in bed, family at bedside, patient still coughing a lot, call bell within reach. 1400 - DR Sami Soni at bedside to assess patient. She ordered dialysis for fluid removal today and then resume normal dialysis tomorrow. 1700 - Dialysis in room to remove fluids from patient per DR Jean orders. 1900 - shift report given to the on-coming night nurse TERRA Elizondo RN.

## 2023-09-20 VITALS
TEMPERATURE: 98.5 F | OXYGEN SATURATION: 92 % | BODY MASS INDEX: 26.9 KG/M2 | HEART RATE: 79 BPM | HEIGHT: 68 IN | RESPIRATION RATE: 16 BRPM | SYSTOLIC BLOOD PRESSURE: 118 MMHG | WEIGHT: 177.5 LBS | DIASTOLIC BLOOD PRESSURE: 76 MMHG

## 2023-09-20 LAB
GLUCOSE BLD STRIP.AUTO-MCNC: 231 MG/DL (ref 70–110)
GLUCOSE BLD STRIP.AUTO-MCNC: 235 MG/DL (ref 70–110)
PERFORMED BY:: ABNORMAL
PERFORMED BY:: ABNORMAL

## 2023-09-20 PROCEDURE — G0378 HOSPITAL OBSERVATION PER HR: HCPCS

## 2023-09-20 PROCEDURE — 2580000003 HC RX 258: Performed by: NURSE PRACTITIONER

## 2023-09-20 PROCEDURE — 90935 HEMODIALYSIS ONE EVALUATION: CPT

## 2023-09-20 PROCEDURE — 2580000003 HC RX 258: Performed by: INTERNAL MEDICINE

## 2023-09-20 PROCEDURE — 82962 GLUCOSE BLOOD TEST: CPT

## 2023-09-20 PROCEDURE — 6370000000 HC RX 637 (ALT 250 FOR IP): Performed by: NURSE PRACTITIONER

## 2023-09-20 RX ORDER — SODIUM CHLORIDE 9 MG/ML
2000 INJECTION, SOLUTION INTRAVENOUS ONCE
Status: COMPLETED | OUTPATIENT
Start: 2023-09-20 | End: 2023-09-20

## 2023-09-20 RX ADMIN — ISOSORBIDE MONONITRATE 30 MG: 30 TABLET, EXTENDED RELEASE ORAL at 09:07

## 2023-09-20 RX ADMIN — PANTOPRAZOLE SODIUM 40 MG: 40 TABLET, DELAYED RELEASE ORAL at 09:07

## 2023-09-20 RX ADMIN — SODIUM BICARBONATE 650 MG: 650 TABLET ORAL at 09:07

## 2023-09-20 RX ADMIN — INSULIN GLARGINE 8 UNITS: 100 INJECTION, SOLUTION SUBCUTANEOUS at 09:06

## 2023-09-20 RX ADMIN — INSULIN LISPRO 2 UNITS: 100 INJECTION, SOLUTION INTRAVENOUS; SUBCUTANEOUS at 09:06

## 2023-09-20 RX ADMIN — SODIUM CHLORIDE, PRESERVATIVE FREE 10 ML: 5 INJECTION INTRAVENOUS at 09:09

## 2023-09-20 RX ADMIN — INSULIN LISPRO 2 UNITS: 100 INJECTION, SOLUTION INTRAVENOUS; SUBCUTANEOUS at 11:19

## 2023-09-20 RX ADMIN — ASPIRIN 81 MG 81 MG: 81 TABLET ORAL at 09:07

## 2023-09-20 RX ADMIN — APIXABAN 5 MG: 5 TABLET, FILM COATED ORAL at 09:07

## 2023-09-20 RX ADMIN — ALLOPURINOL 200 MG: 100 TABLET ORAL at 09:07

## 2023-09-20 RX ADMIN — CETIRIZINE HYDROCHLORIDE 10 MG: 10 TABLET, FILM COATED ORAL at 09:07

## 2023-09-20 RX ADMIN — SODIUM CHLORIDE 2000 ML: 9 INJECTION, SOLUTION INTRAVENOUS at 14:14

## 2023-09-20 RX ADMIN — ATORVASTATIN CALCIUM 40 MG: 40 TABLET, FILM COATED ORAL at 09:07

## 2023-09-20 ASSESSMENT — PAIN SCALES - GENERAL
PAINLEVEL_OUTOF10: 0

## 2023-09-20 NOTE — PLAN OF CARE
Problem: Discharge Planning  Goal: Discharge to home or other facility with appropriate resources  Outcome: Completed     Problem: ABCDS Injury Assessment  Goal: Absence of physical injury  Outcome: Completed     Problem: Pain  Goal: Verbalizes/displays adequate comfort level or baseline comfort level  Outcome: Completed     Problem: Chronic Conditions and Co-morbidities  Goal: Patient's chronic conditions and co-morbidity symptoms are monitored and maintained or improved  Outcome: Completed     Problem: Safety - Adult  Goal: Free from fall injury  Outcome: Completed

## 2023-09-20 NOTE — DIALYSIS
PRE SBAR: report with SOPHIE Eastman RN    POST SBAR: report with SOPHIE Eastman RN    HEPATITIS: hospital labs pending    EDUCATION:CVC access care    PROCEDURE / PORT DISINFECT:pre treatment, CVC disinfected per P and P / aspirated and flushed without difficulty /patient rested and visited family much of treatment / no distress, no complaints, no SOB, no pain / visiting family in room for few hours / primary nurse rounding often to check on patient. Post treatment, ports disinfected per P and P, all possible blood returned / ports flushed / locked with normal saline, tightly and secure. Dr Yumiko Cody notfied of treatment details.

## 2023-09-20 NOTE — DISCHARGE SUMMARY
Discharge Summary       Patient ID:  King Degroot,   70 y.o., male  1945    PCP:  Kayce Salcedo MD    Admit Date: 9/18/2023 11:26 PM  Discharge Date:  No discharge date for patient encounter. Length of stay: 0 day(s)  Code Status: Full Code  Discharging physician: Rafael Fonseca MD    Admission Diagnoses:   Postdialysis syndrome [E87.8]  Fatigue, unspecified type [R53.83]  Dyspnea, unspecified type [R06.00]    Discharge Medications     Medication List        CONTINUE taking these medications      albuterol sulfate  (90 Base) MCG/ACT inhaler  Commonly known as: PROVENTIL;VENTOLIN;PROAIR     allopurinol 100 MG tablet  Commonly known as: ZYLOPRIM  TAKE 2 TABLETS BY MOUTH DAILY     amLODIPine 10 MG tablet  Commonly known as: NORVASC  Take HALF of a TABLET (5mg) once daily     apixaban 5 MG Tabs tablet  Commonly known as: ELIQUIS  Take 1 tablet by mouth 2 times daily Notify MD for any signs of bleeding     aspirin 81 MG chewable tablet  Take 1 tablet by mouth daily     atorvastatin 40 MG tablet  Commonly known as: LIPITOR  TAKE 1 TABLET BY MOUTH DAILY     blood glucose test strips strip  Commonly known as: ASCENSIA AUTODISC VI;ONE TOUCH ULTRA TEST VI  1 each by In Vitro route daily As needed.      ergocalciferol 1.25 MG (79850 UT) capsule  Commonly known as: ERGOCALCIFEROL  TAKE ONE CAPSULE BY MOUTH EVERY 7 DAYS     furosemide 40 MG tablet  Commonly known as: LASIX  Take 1 tablet by mouth daily In place of furosemide 80mg     hydrALAZINE 100 MG tablet  Commonly known as: APRESOLINE  Take 1 tablet by mouth 3 times daily     isosorbide mononitrate 30 MG extended release tablet  Commonly known as: IMDUR  Take 1 tablet by mouth daily     Lantus SoloStar 100 UNIT/ML injection pen  Generic drug: insulin glargine     loratadine 10 MG tablet  Commonly known as: CLARITIN     nitroGLYCERIN 0.4 MG SL tablet  Commonly known as: NITROSTAT     omeprazole 40 MG delayed release capsule  Commonly known as: Consulting Physician: Selam Perez MD; Consulting Physician: Galina Tomlinson MD; Registered Nurse: David Murdock RN    Disposition:    Home    Follow Up   Inga Mendes MD    Most Recent Labs:  Recent Results (from the past 24 hour(s))   POCT Glucose    Collection Time: 09/19/23 11:33 AM   Result Value Ref Range    POC Glucose 227 (H) 70 - 110 mg/dL    Performed by: Marcelino Weaver    POCT Glucose    Collection Time: 09/19/23  4:10 PM   Result Value Ref Range    POC Glucose 130 (H) 70 - 110 mg/dL    Performed by: Marcelino Weaver    Basic Metabolic Panel    Collection Time: 09/19/23  7:59 PM   Result Value Ref Range    Sodium 142 136 - 145 mmol/L    Potassium 4.9 3.5 - 5.5 mmol/L    Chloride 105 100 - 111 mmol/L    CO2 24 21 - 32 mmol/L    Anion Gap 13 3.0 - 18.0 mmol/L    Glucose 152 (H) 74 - 99 mg/dL    BUN 42 (H) 7 - 18 mg/dL    Creatinine 4.58 (H) 0.60 - 1.30 mg/dL    Bun/Cre Ratio 9 (L) 12 - 20      Est, Glom Filt Rate 13 (L) >60 ml/min/1.73m2    Calcium 10.2 (H) 8.5 - 10.1 mg/dL   POCT Glucose    Collection Time: 09/19/23  8:35 PM   Result Value Ref Range    POC Glucose 248 (H) 70 - 110 mg/dL    Performed by: China Carr    POCT Glucose    Collection Time: 09/20/23  7:21 AM   Result Value Ref Range    POC Glucose 231 (H) 70 - 110 mg/dL    Performed by: David Murdock      Recent Labs     09/19/23  1959   GLUCOSE 152*   BUN 42*   CALCIUM 10.2*      CO2 24     Recent Labs     09/19/23  0500   WBC 11.8   RBC 3.28*   HCT 29.6*   MCV 90.2   MCH 31.4   MCHC 34.8   RDW 14.8*         Last 3 CBC:   Recent Labs     09/18/23  2340 09/19/23  0500   WBC 12.7 11.8   RBC 3.37* 3.28*   HGB 10.7* 10.3*   HCT 30.2* 29.6*   MCV 89.6 90.2   MCH 31.8 31.4   MCHC 35.4 34.8   RDW 15.2* 14.8*    266   MPV 9.9 9.6      Last 3 BMP:   Recent Labs     09/18/23  2340 09/19/23  0500 09/19/23 1959    139 142   K 3.6 3.7 4.9    102 105   CO2 27 28 24   BUN 32* 35* 42*   CREATININE 3.42* 3.47* 4.58*   GLUCOSE

## 2023-09-20 NOTE — PROGRESS NOTES
1910  Received care of pt sitting up on side of bed with dialysis nurse at bedside. Pt is alert and oriented x 4. Pt is currently complaining of leg cramps and is very anxious regarding leg cramps. Pt medicated with tylenol at this time. Pt repositioned for comfort and has settled down at this time. Routine assessment and vs completed. Call bell in reach. 1945  Pt much more relaxed and dinner tray warmed and pt is tolerating without difficulty. 2045  HS meds accepted and pt is complaint free at this time. Bp 122/57 and called to provider and order to hold BP med tonight. 18  Sleeping and arouses easily for VS.  Pt is complaint free at this time. 0230  Pt sleeping and resp even and unlabored. No distress noted. 0330  Pt awakened for VS.  Pt given kirill crackers per request.    0600  Pt watching TV with no complaints verbalized.

## 2023-09-20 NOTE — PROGRESS NOTES
Renal Consultation Note    NAME:  Greta Soria   :   1945   MRN:   [id number]     ATTENDING: Rosa Rhoades MD  PCP:  Quang Esposito MD    Date/Time:  2023 2:01 PM      Subjective:   REQUESTING PHYSICIAN:  REASON FOR CONSULT:    esrd pt    Patient seen in the room. He was dialyzed yesterday for ultrafiltration only with 2.5 L fluid removal.  Shortness of breath is improved today.   Plan for dialysis again today which is his regular day    Past Medical History:   Diagnosis Date    Acid reflux     Arrhythmia     Arthritis     Bladder cancer ([address])     Colon cancer ([address]) 2021    Congestive heart failure (HCC)     Deep vein thrombosis (DVT) of proximal vein of both lower extremities ([address]) 2020    Diabetes mellitus ([address])     Difficult intubation     Narrow airway-per Pulm notes(in media)     ESRD (end stage renal disease) ([address])     stage IV/V per renal notes care everywhere 23    GERD (gastroesophageal reflux disease)     Gout     High cholesterol     Hypertension     Hypomagnesemia 2023    Kidney carcinoma, left ([address]) 2016    left nephrectomy    Kidney disease     Pituitary mass ([address])     Pulmonary HTN ([address]) 10/2021    PASP 77 mmHg    Reflux esophagitis     Unspecified deficiency anemia       Past Surgical History:   Procedure Laterality Date    COLONOSCOPY N/A 2021    COLONOSCOPY with Polypectomies, Tattoo & Clip Placement performed by Clarita Ward MD at [facility]  2009    bladder removal/ urostomy bag    KIDNEY REMOVAL Left 2009    Nephroureterectomy by Dr. Ilan Mathews      surgery on pituitary gland    OTHER SURGICAL HISTORY       Social History     Tobacco Use    Smoking status: Former     Packs/day: 0.50     Years: 15.00     Additional pack years: 0.00     Total pack years: 7.50     Types: Cigarettes     Quit date: 1999     Years since quittin.7    Smokeless tobacco: Never   Substance Use Topics

## 2023-09-21 ENCOUNTER — TELEPHONE (OUTPATIENT)
Facility: CLINIC | Age: 78
End: 2023-09-21

## 2023-09-21 LAB
HBV SURFACE AG SER QL: <0.1 INDEX
HBV SURFACE AG SER QL: NEGATIVE

## 2023-09-21 NOTE — ACP (ADVANCE CARE PLANNING)
Advance Care Planning     General Advance Care Planning (ACP) Conversation    Date of Conversation: 9/18/2023  Conducted with: Patient with Decision Making Capacity    Healthcare Decision Maker:    Primary Decision Maker: Kiera Cardenas - Other - [phone]    Secondary Decision Maker: Sara Cardenas - Other - [phone]  Click here to complete Healthcare Decision Makers including selection of the Healthcare Decision Maker Relationship (ie \"Primary\").       Content/Action Overview:  DECLINED ACP Conversation - will revisit periodically  Reviewed DNR/DNI and patient elects Full Code (Attempt Resuscitation)        Length of Voluntary ACP Conversation in minutes:  <16 minutes (Non-Billable)    Sheryl Del Castillo

## 2023-09-21 NOTE — TELEPHONE ENCOUNTER
Care Transitions Initial Follow Up Call    Outreach made within 2 business days of discharge: Yes    Patient: Maksim Wright Patient : 1945   MRN: [id number]  Reason for Admission: There are no discharge diagnoses documented for the most recent discharge. Discharge Date: 23       Spoke with: Called patient twice both numbers and unable to reach. Spoke with patient at 1:20 PM 2023. States he feels weak and tired, some cough. Patient instructed if he feels worse prior to visit give us a call or go to the ER. States understanding. Discharge department/facility: [facility] Interactive Patient Contact:  Was patient able to fill all prescriptions: Yes, going to  [facility] today     Was patient instructed to bring all medications to the follow-up visit: Yes    Is patient taking all medications as directed in the discharge summary?  Yes    Does patient understand their discharge instructions: Yes    Does patient have questions or concerns that need addressed prior to 7-14 day follow up office visit: no    Scheduled appointment with PCP within 7-14 days    Follow Up  Future Appointments   Date Time Provider [address] [place] [address]   2024  9:30 AM Laverne Canavan, MD IMS BS ELISA Matos LPN

## 2023-09-24 DIAGNOSIS — I10 ESSENTIAL HYPERTENSION: Primary | ICD-10-CM

## 2023-09-24 DIAGNOSIS — J30.1 SEASONAL ALLERGIC RHINITIS DUE TO POLLEN: ICD-10-CM

## 2023-09-25 RX ORDER — ASPIRIN 81 MG/1
TABLET, CHEWABLE ORAL
Qty: 90 TABLET | Refills: 0 | Status: SHIPPED | OUTPATIENT
Start: 2023-09-25

## 2023-09-25 RX ORDER — LORATADINE 10 MG/1
10 TABLET ORAL DAILY
Qty: 90 TABLET | Refills: 0 | Status: SHIPPED | OUTPATIENT
Start: 2023-09-25

## 2023-09-26 ENCOUNTER — OFFICE VISIT (OUTPATIENT)
Facility: CLINIC | Age: 78
End: 2023-09-26

## 2023-09-26 VITALS
TEMPERATURE: 97.3 F | BODY MASS INDEX: 26.55 KG/M2 | HEART RATE: 85 BPM | SYSTOLIC BLOOD PRESSURE: 111 MMHG | DIASTOLIC BLOOD PRESSURE: 55 MMHG | OXYGEN SATURATION: 96 % | RESPIRATION RATE: 22 BRPM | WEIGHT: 175.2 LBS | HEIGHT: 68 IN

## 2023-09-26 DIAGNOSIS — R05.1 ACUTE COUGH: ICD-10-CM

## 2023-09-26 DIAGNOSIS — Z99.2 TYPE 2 DIABETES MELLITUS WITH CHRONIC KIDNEY DISEASE ON CHRONIC DIALYSIS, WITH LONG-TERM CURRENT USE OF INSULIN (HCC): ICD-10-CM

## 2023-09-26 DIAGNOSIS — Z78.9 IMPAIRED MOBILITY AND ADLS: ICD-10-CM

## 2023-09-26 DIAGNOSIS — R29.6 RECURRENT FALLS: ICD-10-CM

## 2023-09-26 DIAGNOSIS — N18.6 ESRD (END STAGE RENAL DISEASE) (HCC): Primary | ICD-10-CM

## 2023-09-26 DIAGNOSIS — N18.6 TYPE 2 DIABETES MELLITUS WITH CHRONIC KIDNEY DISEASE ON CHRONIC DIALYSIS, WITH LONG-TERM CURRENT USE OF INSULIN (HCC): ICD-10-CM

## 2023-09-26 DIAGNOSIS — R53.81 MALAISE AND FATIGUE: ICD-10-CM

## 2023-09-26 DIAGNOSIS — Z79.4 TYPE 2 DIABETES MELLITUS WITH CHRONIC KIDNEY DISEASE ON CHRONIC DIALYSIS, WITH LONG-TERM CURRENT USE OF INSULIN (HCC): ICD-10-CM

## 2023-09-26 DIAGNOSIS — Z91.81 AT HIGH RISK FOR FALLS: ICD-10-CM

## 2023-09-26 DIAGNOSIS — Z74.09 IMPAIRED MOBILITY AND ADLS: ICD-10-CM

## 2023-09-26 DIAGNOSIS — R54 FRAILTY SYNDROME IN GERIATRIC PATIENT: ICD-10-CM

## 2023-09-26 DIAGNOSIS — E11.22 TYPE 2 DIABETES MELLITUS WITH CHRONIC KIDNEY DISEASE ON CHRONIC DIALYSIS, WITH LONG-TERM CURRENT USE OF INSULIN (HCC): ICD-10-CM

## 2023-09-26 DIAGNOSIS — R53.81 PHYSICAL DECONDITIONING: ICD-10-CM

## 2023-09-26 DIAGNOSIS — R53.83 MALAISE AND FATIGUE: ICD-10-CM

## 2023-09-26 LAB
BILIRUBIN, URINE, POC: NEGATIVE
BLOOD URINE, POC: ABNORMAL
EXP DATE SOLUTION: NORMAL
EXP DATE SWAB: NORMAL
EXPIRATION DATE: NORMAL
GLUCOSE URINE, POC: NEGATIVE
HBA1C MFR BLD: 9.9 %
INFLUENZA A ANTIGEN, POC: NEGATIVE
INFLUENZA B ANTIGEN, POC: NEGATIVE
KETONES, URINE, POC: NEGATIVE
LEUKOCYTE ESTERASE, URINE, POC: ABNORMAL
LOT NUMBER POC: NORMAL
LOT NUMBER SOLUTION: NORMAL
LOT NUMBER SWAB: NORMAL
NITRITE, URINE, POC: NEGATIVE
PH, URINE, POC: 8.5 (ref 4.6–8)
PROTEIN,URINE, POC: ABNORMAL
SARS-COV-2 RNA, POC: NEGATIVE
SPECIFIC GRAVITY, URINE, POC: 1.02 (ref 1–1.03)
URINALYSIS CLARITY, POC: ABNORMAL
URINALYSIS COLOR, POC: ABNORMAL
UROBILINOGEN, POC: ABNORMAL
VALID INTERNAL CONTROL, POC: YES

## 2023-09-26 RX ORDER — FLURBIPROFEN SODIUM 0.3 MG/ML
SOLUTION/ DROPS OPHTHALMIC
COMMUNITY
Start: 2023-08-05

## 2023-09-26 ASSESSMENT — PATIENT HEALTH QUESTIONNAIRE - PHQ9
2. FEELING DOWN, DEPRESSED OR HOPELESS: 0
SUM OF ALL RESPONSES TO PHQ QUESTIONS 1-9: 0
SUM OF ALL RESPONSES TO PHQ9 QUESTIONS 1 & 2: 0
SUM OF ALL RESPONSES TO PHQ QUESTIONS 1-9: 0
1. LITTLE INTEREST OR PLEASURE IN DOING THINGS: 0

## 2023-09-26 NOTE — PROGRESS NOTES
Reagan Jara (: 1945) is a 70 y.o. male here for evaluation of the following chief concern(s):  Hospital discharge follow-up  Chronic condition management     ASSESSMENT/PLAN:  1. ESRD (end stage renal disease) ([address])  -     External Referral To Home Health  2. Type 2 diabetes mellitus with chronic kidney disease on chronic dialysis, with long-term current use of insulin (HCC)  -     AMB POC HEMOGLOBIN A1C  -     COLLECTION CAPILLARY BLOOD SPECIMEN  -     External Referral To Home Health  3. Impaired mobility and ADLs  -     External Referral To Home Health  4. Physical deconditioning  -     External Referral To Home Health  5. Frailty syndrome in geriatric patient  -     External Referral To Home Health  6. Recurrent falls  -     External Referral To Home Health  7. At high risk for falls  -     External Referral To Home Health  8. Acute cough  -     XR CHEST (2 VIEWS); Future  -     CBC with Auto Differential; Future  -     Basic Metabolic Panel; Future  -     AMB POC RAPID INFLUENZA TEST  -     AMB POC COVID-19 COV  9. Malaise and fatigue  -     AMB POC RAPID INFLUENZA TEST  -     AMB POC COVID-19 COV  -     AMB POC URINALYSIS DIP STICK AUTO W/O MICRO  -     Culture, Urine; Future    Mr. Leanne Melgoza appears nontoxic but fatigued and not at baseline today, normothermic, normal hemodynamics including blood pressure, mild elevated RR but distress on exam or focal lung findings, no hypoxia. Mr. Leanne Melgoza appears to have full decisional capacity at this time. I shared about my concern for his clinical status at this time and encouraged pt to go to the ER for further/STAT evaluation of cough/malaise, however, he declined and would prefer for close monitoring with strict ER precautions and office follow-up which we have scheduled for 23. He stated that he would not be able to complete CXR or labs recommended today, but will plan to get this done tomorrow.   Flu and COVID in the office today were

## 2023-09-26 NOTE — PROGRESS NOTES
Weakness and SOB with excertion    Fingerstick for HBa1C done in middle finger by Pedro Montana LPN per order of Jarrell Muñoz MD after cleaning area with alcohol wipe. Patient tolerated procedure well. Chief Complaint   Patient presents with    Follow-Up from Hospital     1. \"Have you been to the ER, urgent care clinic since your last visit? Hospitalized since your last visit? \" Yes Where: [facility]    2. \"Have you seen or consulted any other health care providers outside of the [address] [place] [address] since your last visit? \" No     3. For patients aged 43-73: Has the patient had a colonoscopy / FIT/ Cologuard?  NA - based on age

## 2023-09-27 ENCOUNTER — TELEPHONE (OUTPATIENT)
Facility: CLINIC | Age: 78
End: 2023-09-27

## 2023-09-27 DIAGNOSIS — Z99.2 TYPE 2 DIABETES MELLITUS WITH CHRONIC KIDNEY DISEASE ON CHRONIC DIALYSIS, WITH LONG-TERM CURRENT USE OF INSULIN (HCC): Primary | ICD-10-CM

## 2023-09-27 DIAGNOSIS — E11.22 TYPE 2 DIABETES MELLITUS WITH CHRONIC KIDNEY DISEASE ON CHRONIC DIALYSIS, WITH LONG-TERM CURRENT USE OF INSULIN (HCC): Primary | ICD-10-CM

## 2023-09-27 DIAGNOSIS — N18.6 TYPE 2 DIABETES MELLITUS WITH CHRONIC KIDNEY DISEASE ON CHRONIC DIALYSIS, WITH LONG-TERM CURRENT USE OF INSULIN (HCC): Primary | ICD-10-CM

## 2023-09-27 DIAGNOSIS — Z79.4 TYPE 2 DIABETES MELLITUS WITH CHRONIC KIDNEY DISEASE ON CHRONIC DIALYSIS, WITH LONG-TERM CURRENT USE OF INSULIN (HCC): Primary | ICD-10-CM

## 2023-09-27 RX ORDER — GLUCOSAMINE HCL/CHONDROITIN SU 500-400 MG
1 CAPSULE ORAL DAILY
Qty: 100 STRIP | Refills: 3 | Status: SHIPPED | OUTPATIENT
Start: 2023-09-27

## 2023-09-27 NOTE — TELEPHONE ENCOUNTER
Pt's niece called and said wrong strips were sent for his meter. The meter he uses is the Accu [employer] Me so he needs the test strips for that sent to pharmacy.

## 2023-09-28 ENCOUNTER — HOSPITAL ENCOUNTER (OUTPATIENT)
Age: 78
Discharge: HOME | End: 2023-09-28
Payer: MEDICARE

## 2023-09-28 ENCOUNTER — HOSPITAL ENCOUNTER (OUTPATIENT)
Age: 78
End: 2023-09-28
Payer: MEDICARE

## 2023-09-28 DIAGNOSIS — R05.1 ACUTE COUGH: ICD-10-CM

## 2023-09-28 LAB — RESULT: NORMAL

## 2023-09-28 PROCEDURE — 71046 X-RAY EXAM CHEST 2 VIEWS: CPT

## 2023-09-29 ENCOUNTER — TELEPHONE (OUTPATIENT)
Facility: CLINIC | Age: 78
End: 2023-09-29

## 2023-09-29 NOTE — TELEPHONE ENCOUNTER
----- Message from Jeancarlos Mayfield sent at 9/29/2023  9:48 AM EDT -----  Subject: Appointment Request    Reason for Call: Established Patient Appointment needed: Routine Existing   Condition Follow Up    QUESTIONS    Reason for appointment request? No appointments available during search     Additional Information for Provider? Patients sulaiman Arriola calling in to   reschedule appt today.  PLEASE CALL.   ---------------------------------------------------------------------------  --------------  Pa ANAYA  [phone]; OK to leave message on voicemail  ---------------------------------------------------------------------------  --------------  SCRIPT ANSWERS

## 2023-10-01 LAB
ANION GAP SERPL CALCULATED.3IONS-SCNC: 13 MMOL/L (ref 3–15)
BASOPHILS # BLD: 1 % (ref 0–2)
BASOPHILS ABSOLUTE: 0.1 K/UL (ref 0–0.2)
BUN BLDV-MCNC: 44 MG/DL (ref 6–22)
CALCIUM SERPL-MCNC: 10.1 MG/DL (ref 8.4–10.5)
CHLORIDE BLD-SCNC: 98 MMOL/L (ref 98–110)
CO2: 29 MMOL/L (ref 20–32)
CREAT SERPL-MCNC: 4.2 MG/DL (ref 0.8–1.6)
EOSINOPHIL # BLD: 1 % (ref 0–6)
EOSINOPHILS ABSOLUTE: 0.1 K/UL (ref 0–0.5)
GLOMERULAR FILTRATION RATE: 14.1 ML/MIN/1.73 SQ.M.
GLUCOSE: 364 MG/DL (ref 70–99)
HCT VFR BLD CALC: 30.1 % (ref 37.8–52.2)
HEMOGLOBIN: 10 G/DL (ref 12.6–17.1)
LYMPHOCYTES # BLD: 19 % (ref 20–45)
LYMPHOCYTES ABSOLUTE: 1.2 K/UL (ref 1–4.8)
MCH RBC QN AUTO: 31 PG (ref 26–34)
MCHC RBC AUTO-ENTMCNC: 33 G/DL (ref 31–36)
MCV RBC AUTO: 92 FL (ref 80–95)
MONOCYTES ABSOLUTE: 0.5 K/UL (ref 0.1–1)
MONOCYTES: 8 % (ref 3–12)
NEUTROPHILS ABSOLUTE: 4.4 K/UL (ref 1.8–7.7)
NEUTROPHILS: 70 % (ref 40–75)
PDW BLD-RTO: 14.4 % (ref 10–15.5)
PLATELET # BLD: 347 K/UL (ref 140–440)
PMV BLD AUTO: 10.1 FL (ref 9–13)
POTASSIUM SERPL-SCNC: 4.7 MMOL/L (ref 3.5–5.5)
RBC: 3.27 M/UL (ref 3.8–5.8)
RESULT: ABNORMAL
SODIUM BLD-SCNC: 140 MMOL/L (ref 133–145)
WBC: 6.2 K/UL (ref 4–11)

## 2023-10-13 ENCOUNTER — TELEPHONE (OUTPATIENT)
Facility: CLINIC | Age: 78
End: 2023-10-13

## 2023-10-13 ENCOUNTER — OFFICE VISIT (OUTPATIENT)
Facility: CLINIC | Age: 78
End: 2023-10-13
Payer: MEDICARE

## 2023-10-13 VITALS
RESPIRATION RATE: 20 BRPM | HEART RATE: 79 BPM | HEIGHT: 68 IN | WEIGHT: 179.6 LBS | TEMPERATURE: 96.9 F | DIASTOLIC BLOOD PRESSURE: 60 MMHG | BODY MASS INDEX: 27.22 KG/M2 | OXYGEN SATURATION: 98 % | SYSTOLIC BLOOD PRESSURE: 109 MMHG

## 2023-10-13 DIAGNOSIS — E11.22 TYPE 2 DIABETES MELLITUS WITH CHRONIC KIDNEY DISEASE ON CHRONIC DIALYSIS, WITH LONG-TERM CURRENT USE OF INSULIN (HCC): ICD-10-CM

## 2023-10-13 DIAGNOSIS — N18.6 TYPE 2 DIABETES MELLITUS WITH CHRONIC KIDNEY DISEASE ON CHRONIC DIALYSIS, WITH LONG-TERM CURRENT USE OF INSULIN (HCC): ICD-10-CM

## 2023-10-13 DIAGNOSIS — I10 ESSENTIAL HYPERTENSION: ICD-10-CM

## 2023-10-13 DIAGNOSIS — Z78.9 IMPAIRED MOBILITY AND ADLS: ICD-10-CM

## 2023-10-13 DIAGNOSIS — E55.9 VITAMIN D DEFICIENCY: ICD-10-CM

## 2023-10-13 DIAGNOSIS — I10 ESSENTIAL HYPERTENSION: Primary | ICD-10-CM

## 2023-10-13 DIAGNOSIS — Z99.2 TYPE 2 DIABETES MELLITUS WITH CHRONIC KIDNEY DISEASE ON CHRONIC DIALYSIS, WITH LONG-TERM CURRENT USE OF INSULIN (HCC): ICD-10-CM

## 2023-10-13 DIAGNOSIS — Z79.4 TYPE 2 DIABETES MELLITUS WITH CHRONIC KIDNEY DISEASE ON CHRONIC DIALYSIS, WITH LONG-TERM CURRENT USE OF INSULIN (HCC): ICD-10-CM

## 2023-10-13 DIAGNOSIS — Z74.09 IMPAIRED MOBILITY AND ADLS: ICD-10-CM

## 2023-10-13 DIAGNOSIS — R54 FRAILTY SYNDROME IN GERIATRIC PATIENT: ICD-10-CM

## 2023-10-13 PROCEDURE — 3078F DIAST BP <80 MM HG: CPT | Performed by: FAMILY MEDICINE

## 2023-10-13 PROCEDURE — 3074F SYST BP LT 130 MM HG: CPT | Performed by: FAMILY MEDICINE

## 2023-10-13 PROCEDURE — 99214 OFFICE O/P EST MOD 30 MIN: CPT | Performed by: FAMILY MEDICINE

## 2023-10-13 PROCEDURE — 3051F HG A1C>EQUAL 7.0%<8.0%: CPT | Performed by: FAMILY MEDICINE

## 2023-10-13 PROCEDURE — 1123F ACP DISCUSS/DSCN MKR DOCD: CPT | Performed by: FAMILY MEDICINE

## 2023-10-13 RX ORDER — ISOSORBIDE MONONITRATE 30 MG/1
30 TABLET, EXTENDED RELEASE ORAL DAILY
Qty: 90 TABLET | Refills: 0 | Status: SHIPPED | OUTPATIENT
Start: 2023-10-13

## 2023-10-13 RX ORDER — SERTRALINE HYDROCHLORIDE 25 MG/1
25 TABLET, FILM COATED ORAL DAILY
COMMUNITY
End: 2023-10-13

## 2023-10-13 RX ORDER — ERGOCALCIFEROL 1.25 MG/1
CAPSULE ORAL
Qty: 12 CAPSULE | Refills: 0 | Status: SHIPPED | OUTPATIENT
Start: 2023-10-13

## 2023-10-13 RX ORDER — GLIPIZIDE 10 MG/1
5 TABLET ORAL DAILY
COMMUNITY

## 2023-10-13 RX ORDER — SERTRALINE HYDROCHLORIDE 25 MG/1
25 TABLET, FILM COATED ORAL DAILY
Qty: 90 TABLET | Refills: 1 | Status: CANCELLED | OUTPATIENT
Start: 2023-10-13

## 2023-10-13 ASSESSMENT — PATIENT HEALTH QUESTIONNAIRE - PHQ9
SUM OF ALL RESPONSES TO PHQ QUESTIONS 1-9: 0
SUM OF ALL RESPONSES TO PHQ9 QUESTIONS 1 & 2: 0
1. LITTLE INTEREST OR PLEASURE IN DOING THINGS: 0
SUM OF ALL RESPONSES TO PHQ QUESTIONS 1-9: 0
2. FEELING DOWN, DEPRESSED OR HOPELESS: 0

## 2023-10-13 NOTE — TELEPHONE ENCOUNTER
Spoke to sulaiman Dinero and thoroughly went through patient medication list according to medication bottles at home. List in chart is current. Refill needed on Vitamin D and Isosorbide.

## 2023-10-18 ENCOUNTER — TELEPHONE (OUTPATIENT)
Facility: CLINIC | Age: 78
End: 2023-10-18

## 2023-10-18 DIAGNOSIS — N18.6 TYPE 2 DIABETES MELLITUS WITH CHRONIC KIDNEY DISEASE ON CHRONIC DIALYSIS, WITH LONG-TERM CURRENT USE OF INSULIN (HCC): Primary | ICD-10-CM

## 2023-10-18 DIAGNOSIS — Z99.2 TYPE 2 DIABETES MELLITUS WITH CHRONIC KIDNEY DISEASE ON CHRONIC DIALYSIS, WITH LONG-TERM CURRENT USE OF INSULIN (HCC): Primary | ICD-10-CM

## 2023-10-18 DIAGNOSIS — E11.22 TYPE 2 DIABETES MELLITUS WITH CHRONIC KIDNEY DISEASE ON CHRONIC DIALYSIS, WITH LONG-TERM CURRENT USE OF INSULIN (HCC): Primary | ICD-10-CM

## 2023-10-18 DIAGNOSIS — Z79.4 TYPE 2 DIABETES MELLITUS WITH CHRONIC KIDNEY DISEASE ON CHRONIC DIALYSIS, WITH LONG-TERM CURRENT USE OF INSULIN (HCC): Primary | ICD-10-CM

## 2023-10-18 NOTE — TELEPHONE ENCOUNTER
Called and spoke to Anselmo and notified her I sent the exact strips that were requested. I did explain the meter that patient has may be an older model and to call Medicare and find out preferred glucose meter and let me know and we will send to pharmacy. States understanding.

## 2023-10-19 DIAGNOSIS — Z99.2 TYPE 2 DIABETES MELLITUS WITH CHRONIC KIDNEY DISEASE ON CHRONIC DIALYSIS, WITH LONG-TERM CURRENT USE OF INSULIN (HCC): Primary | ICD-10-CM

## 2023-10-19 DIAGNOSIS — N18.6 TYPE 2 DIABETES MELLITUS WITH CHRONIC KIDNEY DISEASE ON CHRONIC DIALYSIS, WITH LONG-TERM CURRENT USE OF INSULIN (HCC): Primary | ICD-10-CM

## 2023-10-19 DIAGNOSIS — Z79.4 TYPE 2 DIABETES MELLITUS WITH CHRONIC KIDNEY DISEASE ON CHRONIC DIALYSIS, WITH LONG-TERM CURRENT USE OF INSULIN (HCC): Primary | ICD-10-CM

## 2023-10-19 DIAGNOSIS — E11.22 TYPE 2 DIABETES MELLITUS WITH CHRONIC KIDNEY DISEASE ON CHRONIC DIALYSIS, WITH LONG-TERM CURRENT USE OF INSULIN (HCC): Primary | ICD-10-CM

## 2023-10-19 RX ORDER — BLOOD-GLUCOSE METER
1 KIT MISCELLANEOUS DAILY
Qty: 1 EACH | Refills: 0 | Status: SHIPPED | OUTPATIENT
Start: 2023-10-19

## 2023-10-19 RX ORDER — LANCETS
1 EACH MISCELLANEOUS 3 TIMES DAILY
Qty: 300 EACH | Refills: 3 | Status: SHIPPED | OUTPATIENT
Start: 2023-10-19

## 2023-10-19 RX ORDER — GLUCOSAMINE HCL/CHONDROITIN SU 500-400 MG
CAPSULE ORAL
Qty: 300 STRIP | Refills: 3 | Status: SHIPPED | OUTPATIENT
Start: 2023-10-19

## 2023-10-19 NOTE — TELEPHONE ENCOUNTER
Carleen Vela called back and said that insurance will pay for Freestyle Lite meter. They will need the strips and lancets that will go with this.

## 2023-10-19 NOTE — TELEPHONE ENCOUNTER
Fred Centeno called for patient and states she talked to Mark Felix and Freestyle Lite covered by insurance.

## 2023-10-29 DIAGNOSIS — N18.4 CHRONIC KIDNEY DISEASE, STAGE 4 (SEVERE) (HCC): ICD-10-CM

## 2023-10-29 RX ORDER — SODIUM BICARBONATE 650 MG/1
650 TABLET ORAL 3 TIMES DAILY
Qty: 90 TABLET | Refills: 1 | Status: SHIPPED | OUTPATIENT
Start: 2023-10-29

## 2023-11-24 ENCOUNTER — HOSPITAL ENCOUNTER (OUTPATIENT)
Age: 78
End: 2023-11-24
Payer: MEDICARE

## 2023-11-24 DIAGNOSIS — Z01.818 PRE-OP EXAM: ICD-10-CM

## 2023-11-24 PROCEDURE — 71046 X-RAY EXAM CHEST 2 VIEWS: CPT

## 2023-11-24 PROCEDURE — 93005 ELECTROCARDIOGRAM TRACING: CPT

## 2023-11-25 LAB
EKG ATRIAL RATE: 65 BPM
EKG DIAGNOSIS: NORMAL
EKG P AXIS: 64 DEGREES
EKG P-R INTERVAL: 260 MS
EKG Q-T INTERVAL: 414 MS
EKG QRS DURATION: 110 MS
EKG QTC CALCULATION (BAZETT): 430 MS
EKG R AXIS: -47 DEGREES
EKG T AXIS: 65 DEGREES
EKG VENTRICULAR RATE: 65 BPM

## 2023-12-02 DIAGNOSIS — N18.4 CHRONIC KIDNEY DISEASE, STAGE 4 (SEVERE) (HCC): ICD-10-CM

## 2023-12-02 DIAGNOSIS — I10 ESSENTIAL HYPERTENSION: ICD-10-CM

## 2023-12-04 RX ORDER — FUROSEMIDE 40 MG/1
TABLET ORAL
Qty: 90 TABLET | Refills: 0 | Status: SHIPPED | OUTPATIENT
Start: 2023-12-04

## 2023-12-22 ENCOUNTER — APPOINTMENT (OUTPATIENT)
Age: 78
End: 2023-12-22
Payer: MEDICARE

## 2023-12-22 ENCOUNTER — HOSPITAL ENCOUNTER (OUTPATIENT)
Age: 78
Setting detail: OBSERVATION
Discharge: HOME | End: 2023-12-24
Attending: FAMILY MEDICINE | Admitting: INTERNAL MEDICINE
Payer: MEDICARE

## 2023-12-22 DIAGNOSIS — G93.40 ENCEPHALOPATHY: Primary | ICD-10-CM

## 2023-12-22 DIAGNOSIS — N18.6 END STAGE RENAL DISEASE (HCC): ICD-10-CM

## 2023-12-22 DIAGNOSIS — N39.0 URINARY TRACT INFECTION IN MALE: ICD-10-CM

## 2023-12-22 DIAGNOSIS — I10 ESSENTIAL HYPERTENSION: ICD-10-CM

## 2023-12-22 LAB
ALBUMIN SERPL-MCNC: 3.6 G/DL (ref 3.4–5)
ALBUMIN/GLOB SERPL: 0.9 (ref 0.8–1.7)
ALP SERPL-CCNC: 103 U/L (ref 45–117)
ALT SERPL-CCNC: 22 U/L (ref 16–61)
ANION GAP SERPL CALC-SCNC: 13 MMOL/L (ref 3–18)
APPEARANCE UR: ABNORMAL
APTT PPP: 31.2 SEC (ref 23–36.4)
AST SERPL W P-5'-P-CCNC: 22 U/L (ref 10–38)
BACTERIA URNS QL MICRO: ABNORMAL /HPF
BASOPHILS # BLD: 0 K/UL (ref 0–0.1)
BASOPHILS NFR BLD: 0 % (ref 0–2)
BILIRUB SERPL-MCNC: 0.4 MG/DL (ref 0.2–1)
BILIRUB UR QL: NEGATIVE
BUN SERPL-MCNC: 29 MG/DL (ref 7–18)
BUN/CREAT SERPL: 8 (ref 12–20)
CA-I BLD-MCNC: 9.2 MG/DL (ref 8.5–10.1)
CHLORIDE SERPL-SCNC: 98 MMOL/L (ref 100–111)
CO2 SERPL-SCNC: 25 MMOL/L (ref 21–32)
COLOR UR: YELLOW
CREAT SERPL-MCNC: 3.56 MG/DL (ref 0.6–1.3)
DIFFERENTIAL METHOD BLD: ABNORMAL
EOSINOPHIL # BLD: 0 K/UL (ref 0–0.4)
EOSINOPHIL NFR BLD: 0 % (ref 0–5)
EPITH CASTS URNS QL MICRO: ABNORMAL /LPF (ref 0–20)
ERYTHROCYTE [DISTWIDTH] IN BLOOD BY AUTOMATED COUNT: 14.8 % (ref 11.6–14.5)
GLOBULIN SER CALC-MCNC: 4.2 G/DL (ref 2–4)
GLUCOSE SERPL-MCNC: 241 MG/DL (ref 74–99)
GLUCOSE UR STRIP.AUTO-MCNC: NEGATIVE MG/DL
HCT VFR BLD AUTO: 34.7 % (ref 36–48)
HGB BLD-MCNC: 11.6 G/DL (ref 13–16)
HGB UR QL STRIP: NEGATIVE
IMM GRANULOCYTES # BLD AUTO: 0 K/UL (ref 0–0.04)
IMM GRANULOCYTES NFR BLD AUTO: 1 % (ref 0–0.5)
INR PPP: 1 (ref 0.9–1.1)
KETONES UR QL STRIP.AUTO: NEGATIVE MG/DL
LACTATE SERPL-SCNC: 1.5 MMOL/L (ref 0.4–2)
LACTATE SERPL-SCNC: 2.8 MMOL/L (ref 0.4–2)
LEUKOCYTE ESTERASE UR QL STRIP.AUTO: ABNORMAL
LYMPHOCYTES # BLD: 0.9 K/UL (ref 0.9–3.6)
LYMPHOCYTES NFR BLD: 12 % (ref 21–52)
MCH RBC QN AUTO: 28.4 PG (ref 24–34)
MCHC RBC AUTO-ENTMCNC: 33.4 G/DL (ref 31–37)
MCV RBC AUTO: 85 FL (ref 78–100)
MONOCYTES # BLD: 0.8 K/UL (ref 0.05–1.2)
MONOCYTES NFR BLD: 9 % (ref 3–10)
NEUTS SEG # BLD: 6.3 K/UL (ref 1.8–8)
NEUTS SEG NFR BLD: 78 % (ref 40–73)
NITRITE UR QL STRIP.AUTO: NEGATIVE
NRBC # BLD: 0 K/UL (ref 0–0.01)
NRBC BLD-RTO: 0 PER 100 WBC
PH UR STRIP: 8.5 (ref 5–8)
PLATELET # BLD AUTO: 246 K/UL (ref 135–420)
PMV BLD AUTO: 9.3 FL (ref 9.2–11.8)
POTASSIUM SERPL-SCNC: 3.7 MMOL/L (ref 3.5–5.5)
PROT SERPL-MCNC: 7.8 G/DL (ref 6.4–8.2)
PROT UR STRIP-MCNC: 300 MG/DL
PROTHROMBIN TIME: 13.5 SEC (ref 11.9–14.7)
RBC # BLD AUTO: 4.08 M/UL (ref 4.35–5.65)
RBC #/AREA URNS HPF: ABNORMAL /HPF (ref 0–2)
SODIUM SERPL-SCNC: 136 MMOL/L (ref 136–145)
SP GR UR REFRACTOMETRY: 1.01 (ref 1–1.03)
THERAPEUTIC RANGE: NORMAL SEC (ref 82–109)
TROPONIN I SERPL HS-MCNC: 31 NG/L (ref 0–78)
TSH SERPL DL<=0.05 MIU/L-ACNC: 2.55 UIU/ML (ref 0.36–3.74)
UROBILINOGEN UR QL STRIP.AUTO: 0.2 EU/DL (ref 0.2–1)
WBC # BLD AUTO: 8.1 K/UL (ref 4.6–13.2)
WBC URNS QL MICRO: >100 /HPF (ref 0–4)

## 2023-12-22 PROCEDURE — 99285 EMERGENCY DEPT VISIT HI MDM: CPT

## 2023-12-22 PROCEDURE — 87086 URINE CULTURE/COLONY COUNT: CPT

## 2023-12-22 PROCEDURE — 84443 ASSAY THYROID STIM HORMONE: CPT

## 2023-12-22 PROCEDURE — 2580000003 HC RX 258: Performed by: FAMILY MEDICINE

## 2023-12-22 PROCEDURE — 6360000002 HC RX W HCPCS: Performed by: FAMILY MEDICINE

## 2023-12-22 PROCEDURE — 81001 URINALYSIS AUTO W/SCOPE: CPT

## 2023-12-22 PROCEDURE — 83605 ASSAY OF LACTIC ACID: CPT

## 2023-12-22 PROCEDURE — 85610 PROTHROMBIN TIME: CPT

## 2023-12-22 PROCEDURE — 84484 ASSAY OF TROPONIN QUANT: CPT

## 2023-12-22 PROCEDURE — 70498 CT ANGIOGRAPHY NECK: CPT

## 2023-12-22 PROCEDURE — 96375 TX/PRO/DX INJ NEW DRUG ADDON: CPT

## 2023-12-22 PROCEDURE — 85730 THROMBOPLASTIN TIME PARTIAL: CPT

## 2023-12-22 PROCEDURE — 96361 HYDRATE IV INFUSION ADD-ON: CPT

## 2023-12-22 PROCEDURE — 6370000000 HC RX 637 (ALT 250 FOR IP): Performed by: FAMILY MEDICINE

## 2023-12-22 PROCEDURE — 85025 COMPLETE CBC W/AUTO DIFF WBC: CPT

## 2023-12-22 PROCEDURE — 80053 COMPREHEN METABOLIC PANEL: CPT

## 2023-12-22 PROCEDURE — 96374 THER/PROPH/DIAG INJ IV PUSH: CPT

## 2023-12-22 PROCEDURE — 70450 CT HEAD/BRAIN W/O DYE: CPT

## 2023-12-22 PROCEDURE — 36415 COLL VENOUS BLD VENIPUNCTURE: CPT

## 2023-12-22 RX ORDER — 0.9 % SODIUM CHLORIDE 0.9 %
250 INTRAVENOUS SOLUTION INTRAVENOUS ONCE
Status: COMPLETED | OUTPATIENT
Start: 2023-12-22 | End: 2023-12-23

## 2023-12-22 RX ORDER — CLONIDINE HYDROCHLORIDE 0.1 MG/1
0.1 TABLET ORAL
Status: COMPLETED | OUTPATIENT
Start: 2023-12-22 | End: 2023-12-22

## 2023-12-22 RX ORDER — ONDANSETRON 2 MG/ML
4 INJECTION INTRAMUSCULAR; INTRAVENOUS
Status: COMPLETED | OUTPATIENT
Start: 2023-12-22 | End: 2023-12-22

## 2023-12-22 RX ORDER — MORPHINE SULFATE 2 MG/ML
2 INJECTION, SOLUTION INTRAMUSCULAR; INTRAVENOUS
Status: COMPLETED | OUTPATIENT
Start: 2023-12-22 | End: 2023-12-22

## 2023-12-22 RX ADMIN — SODIUM CHLORIDE 250 ML: 9 INJECTION, SOLUTION INTRAVENOUS at 22:00

## 2023-12-22 RX ADMIN — CLONIDINE HYDROCHLORIDE 0.1 MG: 0.1 TABLET ORAL at 23:06

## 2023-12-22 RX ADMIN — ONDANSETRON 4 MG: 2 INJECTION INTRAMUSCULAR; INTRAVENOUS at 21:33

## 2023-12-22 RX ADMIN — MORPHINE SULFATE 2 MG: 2 INJECTION, SOLUTION INTRAMUSCULAR; INTRAVENOUS at 21:33

## 2023-12-23 PROBLEM — G93.40 ENCEPHALOPATHY ACUTE: Status: ACTIVE | Noted: 2023-12-23

## 2023-12-23 PROBLEM — G92.9 TOXIC ENCEPHALOPATHY: Status: ACTIVE | Noted: 2023-12-23

## 2023-12-23 LAB
GLUCOSE BLD STRIP.AUTO-MCNC: 141 MG/DL (ref 70–110)
GLUCOSE BLD STRIP.AUTO-MCNC: 160 MG/DL (ref 70–110)
GLUCOSE BLD STRIP.AUTO-MCNC: 176 MG/DL (ref 70–110)
GLUCOSE BLD STRIP.AUTO-MCNC: 241 MG/DL (ref 70–110)
PERFORMED BY:: ABNORMAL
TROPONIN I SERPL HS-MCNC: 31 NG/L (ref 0–78)

## 2023-12-23 PROCEDURE — 6370000000 HC RX 637 (ALT 250 FOR IP): Performed by: NURSE PRACTITIONER

## 2023-12-23 PROCEDURE — 2580000003 HC RX 258: Performed by: FAMILY MEDICINE

## 2023-12-23 PROCEDURE — G0378 HOSPITAL OBSERVATION PER HR: HCPCS

## 2023-12-23 PROCEDURE — 90935 HEMODIALYSIS ONE EVALUATION: CPT

## 2023-12-23 PROCEDURE — 2580000003 HC RX 258: Performed by: NURSE PRACTITIONER

## 2023-12-23 PROCEDURE — 96375 TX/PRO/DX INJ NEW DRUG ADDON: CPT

## 2023-12-23 PROCEDURE — 82962 GLUCOSE BLOOD TEST: CPT

## 2023-12-23 PROCEDURE — 6360000002 HC RX W HCPCS: Performed by: NURSE PRACTITIONER

## 2023-12-23 PROCEDURE — 6360000004 HC RX CONTRAST MEDICATION: Performed by: FAMILY MEDICINE

## 2023-12-23 PROCEDURE — 96374 THER/PROPH/DIAG INJ IV PUSH: CPT

## 2023-12-23 PROCEDURE — 6360000002 HC RX W HCPCS: Performed by: FAMILY MEDICINE

## 2023-12-23 RX ORDER — SEVELAMER CARBONATE 800 MG/1
800 TABLET, FILM COATED ORAL
Status: DISCONTINUED | OUTPATIENT
Start: 2023-12-23 | End: 2023-12-24 | Stop reason: HOSPADM

## 2023-12-23 RX ORDER — ONDANSETRON 2 MG/ML
4 INJECTION INTRAMUSCULAR; INTRAVENOUS EVERY 6 HOURS PRN
Status: DISCONTINUED | OUTPATIENT
Start: 2023-12-23 | End: 2023-12-24 | Stop reason: HOSPADM

## 2023-12-23 RX ORDER — SODIUM BICARBONATE 650 MG/1
650 TABLET ORAL 3 TIMES DAILY
Status: DISCONTINUED | OUTPATIENT
Start: 2023-12-23 | End: 2023-12-24 | Stop reason: HOSPADM

## 2023-12-23 RX ORDER — INSULIN LISPRO 100 [IU]/ML
0-4 INJECTION, SOLUTION INTRAVENOUS; SUBCUTANEOUS NIGHTLY
Status: DISCONTINUED | OUTPATIENT
Start: 2023-12-23 | End: 2023-12-24 | Stop reason: HOSPADM

## 2023-12-23 RX ORDER — ASPIRIN 81 MG/1
81 TABLET, CHEWABLE ORAL DAILY
Status: DISCONTINUED | OUTPATIENT
Start: 2023-12-23 | End: 2023-12-24 | Stop reason: HOSPADM

## 2023-12-23 RX ORDER — INSULIN GLARGINE 100 [IU]/ML
8 INJECTION, SOLUTION SUBCUTANEOUS DAILY
Status: DISCONTINUED | OUTPATIENT
Start: 2023-12-23 | End: 2023-12-24 | Stop reason: HOSPADM

## 2023-12-23 RX ORDER — SEVELAMER CARBONATE 800 MG/1
1 TABLET, FILM COATED ORAL
COMMUNITY
Start: 2023-11-10

## 2023-12-23 RX ORDER — FUROSEMIDE 40 MG/1
40 TABLET ORAL DAILY
Status: DISCONTINUED | OUTPATIENT
Start: 2023-12-23 | End: 2023-12-24 | Stop reason: HOSPADM

## 2023-12-23 RX ORDER — DEXTROSE MONOHYDRATE 100 MG/ML
INJECTION, SOLUTION INTRAVENOUS CONTINUOUS PRN
Status: DISCONTINUED | OUTPATIENT
Start: 2023-12-23 | End: 2023-12-24 | Stop reason: HOSPADM

## 2023-12-23 RX ORDER — HYDRALAZINE HYDROCHLORIDE 25 MG/1
100 TABLET, FILM COATED ORAL 3 TIMES DAILY
Status: DISCONTINUED | OUTPATIENT
Start: 2023-12-23 | End: 2023-12-24 | Stop reason: HOSPADM

## 2023-12-23 RX ORDER — ISOSORBIDE MONONITRATE 30 MG/1
30 TABLET, EXTENDED RELEASE ORAL DAILY
Status: DISCONTINUED | OUTPATIENT
Start: 2023-12-23 | End: 2023-12-24 | Stop reason: HOSPADM

## 2023-12-23 RX ORDER — PANTOPRAZOLE SODIUM 40 MG/1
40 TABLET, DELAYED RELEASE ORAL
Status: DISCONTINUED | OUTPATIENT
Start: 2023-12-23 | End: 2023-12-24 | Stop reason: HOSPADM

## 2023-12-23 RX ORDER — SODIUM CHLORIDE 0.9 % (FLUSH) 0.9 %
5-40 SYRINGE (ML) INJECTION EVERY 12 HOURS SCHEDULED
Status: DISCONTINUED | OUTPATIENT
Start: 2023-12-23 | End: 2023-12-24 | Stop reason: HOSPADM

## 2023-12-23 RX ORDER — ONDANSETRON 4 MG/1
4 TABLET, ORALLY DISINTEGRATING ORAL EVERY 8 HOURS PRN
Status: DISCONTINUED | OUTPATIENT
Start: 2023-12-23 | End: 2023-12-24 | Stop reason: HOSPADM

## 2023-12-23 RX ORDER — AMLODIPINE BESYLATE 5 MG/1
10 TABLET ORAL DAILY
Status: DISCONTINUED | OUTPATIENT
Start: 2023-12-23 | End: 2023-12-24 | Stop reason: HOSPADM

## 2023-12-23 RX ORDER — SODIUM CHLORIDE 0.9 % (FLUSH) 0.9 %
5-40 SYRINGE (ML) INJECTION PRN
Status: DISCONTINUED | OUTPATIENT
Start: 2023-12-23 | End: 2023-12-24 | Stop reason: HOSPADM

## 2023-12-23 RX ORDER — ATORVASTATIN CALCIUM 40 MG/1
40 TABLET, FILM COATED ORAL DAILY
Status: DISCONTINUED | OUTPATIENT
Start: 2023-12-23 | End: 2023-12-24 | Stop reason: HOSPADM

## 2023-12-23 RX ORDER — POLYETHYLENE GLYCOL 3350 17 G/17G
17 POWDER, FOR SOLUTION ORAL DAILY PRN
Status: DISCONTINUED | OUTPATIENT
Start: 2023-12-23 | End: 2023-12-24 | Stop reason: HOSPADM

## 2023-12-23 RX ORDER — INSULIN LISPRO 100 [IU]/ML
0-8 INJECTION, SOLUTION INTRAVENOUS; SUBCUTANEOUS
Status: DISCONTINUED | OUTPATIENT
Start: 2023-12-23 | End: 2023-12-24 | Stop reason: HOSPADM

## 2023-12-23 RX ORDER — ACETAMINOPHEN 325 MG/1
650 TABLET ORAL EVERY 6 HOURS PRN
Status: DISCONTINUED | OUTPATIENT
Start: 2023-12-23 | End: 2023-12-24 | Stop reason: HOSPADM

## 2023-12-23 RX ORDER — ACETAMINOPHEN 650 MG/1
650 SUPPOSITORY RECTAL EVERY 6 HOURS PRN
Status: DISCONTINUED | OUTPATIENT
Start: 2023-12-23 | End: 2023-12-24 | Stop reason: HOSPADM

## 2023-12-23 RX ADMIN — IOPAMIDOL 100 ML: 755 INJECTION, SOLUTION INTRAVENOUS at 00:30

## 2023-12-23 RX ADMIN — SODIUM BICARBONATE 650 MG: 650 TABLET ORAL at 20:39

## 2023-12-23 RX ADMIN — APIXABAN 5 MG: 5 TABLET, FILM COATED ORAL at 12:19

## 2023-12-23 RX ADMIN — WATER 1000 MG: 1 INJECTION INTRAMUSCULAR; INTRAVENOUS; SUBCUTANEOUS at 23:38

## 2023-12-23 RX ADMIN — APIXABAN 5 MG: 5 TABLET, FILM COATED ORAL at 20:39

## 2023-12-23 RX ADMIN — SEVELAMER CARBONATE 800 MG: 800 TABLET, FILM COATED ORAL at 16:04

## 2023-12-23 RX ADMIN — SEVELAMER CARBONATE 800 MG: 800 TABLET, FILM COATED ORAL at 08:24

## 2023-12-23 RX ADMIN — ACETAMINOPHEN 650 MG: 325 TABLET ORAL at 12:19

## 2023-12-23 RX ADMIN — ASPIRIN 81 MG: 81 TABLET, CHEWABLE ORAL at 12:19

## 2023-12-23 RX ADMIN — HYDRALAZINE HYDROCHLORIDE 100 MG: 25 TABLET, FILM COATED ORAL at 20:39

## 2023-12-23 RX ADMIN — HYDRALAZINE HYDROCHLORIDE 100 MG: 25 TABLET, FILM COATED ORAL at 14:00

## 2023-12-23 RX ADMIN — FUROSEMIDE 40 MG: 40 TABLET ORAL at 12:19

## 2023-12-23 RX ADMIN — ATORVASTATIN CALCIUM 40 MG: 40 TABLET, FILM COATED ORAL at 12:19

## 2023-12-23 RX ADMIN — PANTOPRAZOLE SODIUM 40 MG: 40 TABLET, DELAYED RELEASE ORAL at 07:18

## 2023-12-23 RX ADMIN — SODIUM BICARBONATE 650 MG: 650 TABLET ORAL at 12:19

## 2023-12-23 RX ADMIN — SODIUM CHLORIDE, PRESERVATIVE FREE 10 ML: 5 INJECTION INTRAVENOUS at 12:24

## 2023-12-23 RX ADMIN — WATER 1000 MG: 1 INJECTION INTRAMUSCULAR; INTRAVENOUS; SUBCUTANEOUS at 01:23

## 2023-12-23 RX ADMIN — SODIUM CHLORIDE, PRESERVATIVE FREE 10 ML: 5 INJECTION INTRAVENOUS at 20:41

## 2023-12-23 RX ADMIN — SEVELAMER CARBONATE 800 MG: 800 TABLET, FILM COATED ORAL at 12:19

## 2023-12-23 NOTE — DIALYSIS
HEPATITIS: clinic results, surface antigen negative 12/4/23, surface antibodies susceptible 8/18/23    EDUCATION: access care    PRE SBAR: with PAULINE Trinh RN    POST SBAR: with ALIN Dang LPN    PROCEDURE: hospitalist rounding during dialysis / pre treatment, disinfect CVC per P and P, aspirate and flush without difficulty / dressing change 12/23/23 /  rested and watched tv much of treatment / no discomfort, no distress, no pain until last 10 minutes of treatment, stated headache 8/10 and requested to end dialysis / primary nurse notified and preparing medications for patient /  post treatment, disinfect CVC per P and P, all possible blood returned, normal saline instilled to CVC ports, capped tightly and secure.  Dr Itzel High updated with all treatment details

## 2023-12-23 NOTE — ED NOTES
Patient now responsive to ED staff, states that he has had a headache since before he went to dialysis and took Tylenol earlier. Patient reports that the Tylenol has not helped and his headache has gotten worse since then.

## 2023-12-23 NOTE — ED PROVIDER NOTES
[facility] EMERGENCY DEPT  EMERGENCY DEPARTMENT ENCOUNTER      Pt Name: Maksim Wright  MRN: [id number]  [address] 1945  Date of evaluation: 12/22/2023  Provider: Mia Owens DO  2:11 AM    CHIEF COMPLAINT       Chief Complaint   Patient presents with    Code Stroke         HISTORY OF PRESENT ILLNESS    Maksim Wright is a 70 y.o. male who presents to the emergency department patient brought in by EMS to use me majority of information is patient's only saying he has a severe headache and making loud noises. Not following commands. EMS was dispatched to the house for patient having severe headache, normally very talkative just finished dialysis 30 minutes prior to this taking place. No family members are here to elaborate. EMS states that the patient was able to stand and pivot to the stretcher. They did not see anything as far as stroke scale goes. Additional information from the patient after the CT scanner says he has headaches like this on a regular basis and he does take Tylenol. But today it is worse. He did take Tylenol this morning. He does state that he did his normal chair time in dialysis about 3 hours. States he has got some blurry vision now that his dialysis is over states he has some shortness of breath but denies any chest pains. States he does make urine but has not made any today    The history is provided by the EMS personnel. Nursing Notes were reviewed. REVIEW OF SYSTEMS       Review of Systems   Unable to perform ROS: Mental status change   Respiratory:  Positive for shortness of breath. Cardiovascular:  Negative for chest pain. Neurological:  Positive for headaches. Except as noted above the remainder of the review of systems was reviewed and negative.        PAST MEDICAL HISTORY     Past Medical History:   Diagnosis Date    Acid reflux     Arrhythmia     Arthritis     Bladder cancer ([address] )     Colon cancer ([facility]) 11/2021    Congestive heart failure

## 2023-12-23 NOTE — ED NOTES
TRANSFER - OUT REPORT:    Verbal report given to [place]  on Delrae Showers  being transferred to [address] [place] [address]  for routine progression of patient care       Report consisted of patient's Situation, Background, Assessment and   Recommendations(SBAR). Information from the following report(s) Nurse Handoff Report, ED Encounter Summary, ED SBAR, MAR, Recent Results, Neuro Assessment, and Event Log was reviewed with the receiving nurse. Kinder Fall Assessment:                           Lines:   Peripheral IV 12/22/23 Right Antecubital (Active)   Site Assessment Clean, dry & intact 12/22/23 2128   Line Status Blood return noted 12/22/23 2128   Phlebitis Assessment No symptoms 12/22/23 2128   Infiltration Assessment 0 12/22/23 2128   Dressing Status New dressing applied 12/22/23 2128   Dressing Type Transparent 12/22/23 2128   Dressing Intervention New 12/22/23 2128       Hemodialysis Central Access Left Subclavian (Active)        Opportunity for questions and clarification was provided.       Patient transported with:  Monitor and Tech

## 2023-12-23 NOTE — H&P
History and Physical    Subjective:     Freya Temple is a 70 y.o. -American male with a past medical history for end-stage renal disease on hemodialysis Monday Wednesday Fridays, renal carcinoma with a history of a nephrectomy with urostomy in place, hypertension, diabetes mellitus, DVT, and congestive heart failure patient presents to the ED with a chief complaint of severe headache. According to the ED note, EMS was activated to patient's home status post his hemodialysis due to altered mental status and yelling, secondary to a headache. Stroke alert was initially called and teleneurologist was consulted, but due to no focal neural deficits, and patient blood pressure being elevated, they report there is no need to admit for stroke workup, they relating headaches to uncontrolled blood pressure, on arrival to ED patient blood pressure was 191/89, increased to 200/90. Patient at this time is only alert to name and place, and is a poor historian, there is no family members at the bedside, this writer did telephone his emergency contact, but was unable to reach her to assess patient's baseline. In the ED patient found to have elevated blood pressure, UA positive for large leukocyte esterase with 4+ bacteria, lactic acid 2.8, there are no leukocytosis, CT of the head showed no acute intracranial abnormalities, CTA head/neck: No evidence of large vessel occlusion, no evidence of dissection, and left thyroid nodules. Discussed case with ED provider, hospital medicine will admit the patient for further observation, evaluation,  and treatment. Patient assessed in the ED, at the bedside, patient is alert and oriented, there is no acute distress noted. Patient agrees to admission for a diagnosis of acute encephalopathy secondary to UTI , treatment to include IV antibiotics. Admit to medical telemetry for observation.     Past Medical History:   Diagnosis Date    Acid reflux     Arrhythmia

## 2023-12-23 NOTE — PROGRESS NOTES
Received report from [facility], [address]- Assumed care of patient. Patient resting with eyes open. No distress noted, patient has no complaints at this time. Call bell within reach. Bed in lowest position. Bed Alarm is on.     0800- Vital signs obtained. Assessment completed. 0830- Patient taken down to dialysis. 1145- Patient returned from dialysis. Patient complaining of headache. Morning meds given at this time. 1210- PRN tylenol given. CBWR. Asked pt to empty urostomy, patient denied needing to be emptied. 1400- Patient medicated according to [facility]. Patient tolerated well. CBWR.    1600- Patient resting with eyes closed. Vitals obtained. Patient medicated according to [facility]. Patient received dinner tray. CBWR. Asked pt to empty urostomy, patient denied needing to be emptied. 1800- Rounded on patient. Asked pt to empty urostomy, patient denied needing to be emptied. Patient denies needs at this time. CBWR.     5098- Report given to S. Merline Capers, LPN

## 2023-12-23 NOTE — ED TRIAGE NOTES
Patient arrived via EMS after family called stating that the patient came off of dialysis about 30 minutes ago and when he got home he was sitting on the couch when he started screaming about a severe headache. Patient then became non-verbal and would not follow commands for EMS.

## 2023-12-24 VITALS
HEART RATE: 76 BPM | WEIGHT: 180.3 LBS | RESPIRATION RATE: 18 BRPM | BODY MASS INDEX: 27.32 KG/M2 | TEMPERATURE: 98.3 F | SYSTOLIC BLOOD PRESSURE: 129 MMHG | DIASTOLIC BLOOD PRESSURE: 65 MMHG | HEIGHT: 68 IN | OXYGEN SATURATION: 97 %

## 2023-12-24 LAB
ANION GAP SERPL CALC-SCNC: 9 MMOL/L (ref 3–18)
BASOPHILS # BLD: 0.1 K/UL (ref 0–0.1)
BASOPHILS NFR BLD: 1 % (ref 0–2)
BUN SERPL-MCNC: 37 MG/DL (ref 7–18)
BUN/CREAT SERPL: 8 (ref 12–20)
CA-I BLD-MCNC: 9.2 MG/DL (ref 8.5–10.1)
CHLORIDE SERPL-SCNC: 100 MMOL/L (ref 100–111)
CO2 SERPL-SCNC: 28 MMOL/L (ref 21–32)
CREAT SERPL-MCNC: 4.53 MG/DL (ref 0.6–1.3)
DIFFERENTIAL METHOD BLD: ABNORMAL
EOSINOPHIL # BLD: 0.1 K/UL (ref 0–0.4)
EOSINOPHIL NFR BLD: 1 % (ref 0–5)
ERYTHROCYTE [DISTWIDTH] IN BLOOD BY AUTOMATED COUNT: 14.9 % (ref 11.6–14.5)
GLUCOSE BLD STRIP.AUTO-MCNC: 158 MG/DL (ref 70–110)
GLUCOSE BLD STRIP.AUTO-MCNC: 197 MG/DL (ref 70–110)
GLUCOSE SERPL-MCNC: 138 MG/DL (ref 74–99)
HCT VFR BLD AUTO: 36.4 % (ref 36–48)
HGB BLD-MCNC: 11.9 G/DL (ref 13–16)
IMM GRANULOCYTES # BLD AUTO: 0 K/UL (ref 0–0.04)
IMM GRANULOCYTES NFR BLD AUTO: 0 % (ref 0–0.5)
LYMPHOCYTES # BLD: 1.4 K/UL (ref 0.9–3.6)
LYMPHOCYTES NFR BLD: 15 % (ref 21–52)
MCH RBC QN AUTO: 27.7 PG (ref 24–34)
MCHC RBC AUTO-ENTMCNC: 32.7 G/DL (ref 31–37)
MCV RBC AUTO: 84.7 FL (ref 78–100)
MONOCYTES # BLD: 1 K/UL (ref 0.05–1.2)
MONOCYTES NFR BLD: 10 % (ref 3–10)
NEUTS SEG # BLD: 7.4 K/UL (ref 1.8–8)
NEUTS SEG NFR BLD: 73 % (ref 40–73)
NRBC # BLD: 0 K/UL (ref 0–0.01)
NRBC BLD-RTO: 0 PER 100 WBC
PERFORMED BY:: ABNORMAL
PERFORMED BY:: ABNORMAL
PLATELET # BLD AUTO: 272 K/UL (ref 135–420)
PMV BLD AUTO: 9.7 FL (ref 9.2–11.8)
POTASSIUM SERPL-SCNC: 3.8 MMOL/L (ref 3.5–5.5)
RBC # BLD AUTO: 4.3 M/UL (ref 4.35–5.65)
SODIUM SERPL-SCNC: 137 MMOL/L (ref 136–145)
WBC # BLD AUTO: 10 K/UL (ref 4.6–13.2)

## 2023-12-24 PROCEDURE — 2580000003 HC RX 258: Performed by: NURSE PRACTITIONER

## 2023-12-24 PROCEDURE — 80048 BASIC METABOLIC PNL TOTAL CA: CPT

## 2023-12-24 PROCEDURE — 6370000000 HC RX 637 (ALT 250 FOR IP): Performed by: NURSE PRACTITIONER

## 2023-12-24 PROCEDURE — 36415 COLL VENOUS BLD VENIPUNCTURE: CPT

## 2023-12-24 PROCEDURE — G0378 HOSPITAL OBSERVATION PER HR: HCPCS

## 2023-12-24 PROCEDURE — 82962 GLUCOSE BLOOD TEST: CPT

## 2023-12-24 PROCEDURE — 85025 COMPLETE CBC W/AUTO DIFF WBC: CPT

## 2023-12-24 RX ORDER — CIPROFLOXACIN 500 MG/1
500 TABLET, FILM COATED ORAL 2 TIMES DAILY
Qty: 14 TABLET | Refills: 0 | Status: SHIPPED | OUTPATIENT
Start: 2023-12-24 | End: 2023-12-31

## 2023-12-24 RX ADMIN — PANTOPRAZOLE SODIUM 40 MG: 40 TABLET, DELAYED RELEASE ORAL at 05:28

## 2023-12-24 RX ADMIN — ASPIRIN 81 MG: 81 TABLET, CHEWABLE ORAL at 09:28

## 2023-12-24 RX ADMIN — ACETAMINOPHEN 650 MG: 325 TABLET ORAL at 05:27

## 2023-12-24 RX ADMIN — FUROSEMIDE 40 MG: 40 TABLET ORAL at 09:28

## 2023-12-24 RX ADMIN — APIXABAN 5 MG: 5 TABLET, FILM COATED ORAL at 09:28

## 2023-12-24 RX ADMIN — SODIUM BICARBONATE 650 MG: 650 TABLET ORAL at 09:28

## 2023-12-24 RX ADMIN — SODIUM CHLORIDE, PRESERVATIVE FREE 10 ML: 5 INJECTION INTRAVENOUS at 09:00

## 2023-12-24 RX ADMIN — INSULIN GLARGINE 8 UNITS: 100 INJECTION, SOLUTION SUBCUTANEOUS at 09:28

## 2023-12-24 RX ADMIN — ATORVASTATIN CALCIUM 40 MG: 40 TABLET, FILM COATED ORAL at 09:28

## 2023-12-24 RX ADMIN — HYDRALAZINE HYDROCHLORIDE 100 MG: 25 TABLET, FILM COATED ORAL at 09:28

## 2023-12-24 RX ADMIN — AMLODIPINE BESYLATE 10 MG: 5 TABLET ORAL at 09:28

## 2023-12-24 RX ADMIN — SEVELAMER CARBONATE 800 MG: 800 TABLET, FILM COATED ORAL at 09:28

## 2023-12-24 RX ADMIN — ISOSORBIDE MONONITRATE 30 MG: 30 TABLET, EXTENDED RELEASE ORAL at 09:28

## 2023-12-24 NOTE — PROGRESS NOTES
1850 - Assumed care of pt, shift report given    1925 - VSS. Assessment completed. Pt alert to self, situation and place but is disoriented to year. Can state the president but stated he thought is was 2002. LS - Clear. BS - Active. Urostomy - 175 ml's clear yellow urine with sediment emptied, site C/D/I. No edema noted. Pt denies any pain or HA at this time. No needs voiced at this time. CBWR     2044 - HS medication given, pt tolerated well. SSI held for . Pt resting in bed watching TV, denies any needs at this time. CBWR     2345 - VSS. Antibiotic given per orders. Pt resting in bed watching TV, denies any pain, discomfort or needs at this time. CBWR     0200 - Rounded on pt, resting with eyes closed. RR even and unlabored. NAD noted. CBWR     0400 - VSS. 125 ml's yellow urine emptied from urostomy. Assisted with repositioning for comfort. Pt denies any pain, discomfort or needs at this time. CBWR     0529 - Morning medication given and prn tylenol (c/o HA right side) given, pt tolerated well. No further needs voiced at this time. CBWR     0700 - Report given to LEN Eastman RN

## 2023-12-24 NOTE — PROGRESS NOTES
0700- Assumed care of the patient from off going nurse via bedside shift report. Patient resting in bed, equal unlabored respirations, easy to wake. Assessment complete. Vitals obtained. No needs voiced at this time. CBWR    [id number]- Administered scheduled medications per [facility]- patient tolerated. CBWR    56- Family member notified via telephone of patients discharge status. Family member stated that someone would arrive to get patient shortly. Patient notified and awaiting ride. 1145- ED called and informed telemetry tech patient ride was downstairs    1200-DC ORDER RECEIVED    IV REMOVED  DC INSTRUCTIONS REVIEWED WITH PATIENT  All items given to patient    Patient transported/ dc off unit via wheelchair by FLORI Kaiser

## 2023-12-24 NOTE — PLAN OF CARE
Problem: Discharge Planning  Goal: Discharge to home or other facility with appropriate resources  Outcome: Progressing  Flowsheets (Taken 12/23/2023 1915)  Discharge to home or other facility with appropriate resources:   Identify barriers to discharge with patient and caregiver   Arrange for needed discharge resources and transportation as appropriate   Identify discharge learning needs (meds, wound care, etc)   Refer to discharge planning if patient needs post-hospital services based on physician order or complex needs related to functional status, cognitive ability or social support system     Problem: Skin/Tissue Integrity  Goal: Absence of new skin breakdown  Description: 1. Monitor for areas of redness and/or skin breakdown  2.   Assess vascular access sites hourly  Outcome: Progressing     Problem: ABCDS Injury Assessment  Goal: Absence of physical injury  Outcome: Progressing  Flowsheets (Taken 12/23/2023 2301)  Absence of Physical Injury: Implement safety measures based on patient assessment     Problem: Safety - Adult  Goal: Free from fall injury  Outcome: Progressing     Problem: Pain  Goal: Verbalizes/displays adequate comfort level or baseline comfort level  Outcome: Progressing  Flowsheets (Taken 12/23/2023 2301)  Verbalizes/displays adequate comfort level or baseline comfort level:   Encourage patient to monitor pain and request assistance   Assess pain using appropriate pain scale     Problem: Nutrition Deficit:  Goal: Optimize nutritional status  Outcome: Progressing  Flowsheets (Taken 12/23/2023 2301)  Nutrient intake appropriate for improving, restoring, or maintaining nutritional needs: Monitor oral intake, labs, and treatment plans
Requirements: Ideal  Energy (kcal/day): 1750-2100kcal/d (25-30kcal/kgIBW)  Weight Used for Protein Requirements: Ideal  Protein (g/day): 84-98g/d (1.2-1.4g/kgIBW)  Method Used for Fluid Requirements: 1 ml/kcal  Fluid (ml/day): ~2L fluid daily    Nutrition Diagnosis:   No nutrition diagnosis at this time     Nutrition Interventions:   Food and/or Nutrient Delivery: Continue Current Diet  Nutrition Education/Counseling: Education not appropriate  Coordination of Nutrition Care: No recommendation at this time       Goals:     Goals: Meet at least 75% of estimated needs, prior to discharge       Nutrition Monitoring and Evaluation:   Behavioral-Environmental Outcomes: None Identified  Food/Nutrient Intake Outcomes: Food and Nutrient Intake  Physical Signs/Symptoms Outcomes: Biochemical Data, GI Status, Meal Time Behavior, Nutrition Focused Physical Findings, Weight    Discharge Planning:    Recommend pursue outpatient diabetes education     Lashae Montoya MS RDN  Contact: [phone]
maintained or improved  Outcome: Adequate for Discharge     Problem: Pain  Goal: Verbalizes/displays adequate comfort level or baseline comfort level  12/24/2023 1054 by Terrence Parsons RN  Outcome: Adequate for Discharge  12/23/2023 2301 by Sandor Shanks LPN  Outcome: Progressing  Flowsheets (Taken 12/23/2023 2301)  Verbalizes/displays adequate comfort level or baseline comfort level:   Encourage patient to monitor pain and request assistance   Assess pain using appropriate pain scale     Problem: Nutrition Deficit:  Goal: Optimize nutritional status  12/24/2023 1054 by Terrence Parsons RN  Outcome: Adequate for Discharge  12/23/2023 2301 by Sandor Shanks LPN  Outcome: Progressing  Flowsheets (Taken 12/23/2023 2301)  Nutrient intake appropriate for improving, restoring, or maintaining nutritional needs: Monitor oral intake, labs, and treatment plans

## 2023-12-24 NOTE — DISCHARGE SUMMARY
Discharge Summary       Patient ID:  Lani Ken,   70 y.o., male  1945    PCP:  Sarah Hernandez MD    Admit Date: 12/22/2023  9:24 PM  Discharge Date:  No discharge date for patient encounter. Length of stay: 0 day(s)  Code Status: Full Code  Discharging physician: Grupo Johnston MD    Admission Diagnoses:   Toxic encephalopathy [G92.9]  End stage renal disease ([address]) [N18.6]  Essential hypertension [I10]  Encephalopathy acute [G93.40]  Encephalopathy [G93.40]  Urinary tract infection in male [N39.0]    Discharge Medications     Medication List        START taking these medications      ciprofloxacin 500 MG tablet  Commonly known as: CIPRO  Take 1 tablet by mouth 2 times daily for 7 days            CONTINUE taking these medications      Accu-Chek Softclix Lancets Misc  1 each by Does not apply route 3 times daily Use to check blood sugar 3 times daily     albuterol sulfate  (90 Base) MCG/ACT inhaler  Commonly known as: PROVENTIL;VENTOLIN;PROAIR     amLODIPine 10 MG tablet  Commonly known as: NORVASC  Take HALF of a TABLET (5mg) once daily     apixaban 5 MG Tabs tablet  Commonly known as: ELIQUIS  Take 1 tablet by mouth 2 times daily Notify MD for any signs of bleeding     aspirin 81 MG chewable tablet  CHEW AND SWALLOW 1 TABLET BY MOUTH DAILY     atorvastatin 40 MG tablet  Commonly known as: LIPITOR  TAKE 1 TABLET BY MOUTH DAILY     B-D UF III MINI PEN NEEDLES 31G X 5 MM Misc  Generic drug: Insulin Pen Needle     * blood glucose test strips strip  Commonly known as: ASCENSIA AUTODISC VI;ONE TOUCH ULTRA TEST VI  1 each by In Vitro route daily As needed. * blood glucose test strips  1 strip by Other route daily Use Accu Check Guide Me test strips to test blood sugar one time daily     * blood glucose test strips  Use to test blood sugars 3 times a day. Dispense test strips covered by insurance to go with covered meter.  Patient states Freestyle Lite.     ergocalciferol 1.25 MG ([id number]

## 2023-12-25 ENCOUNTER — HOSPITAL ENCOUNTER (EMERGENCY)
Age: 78
Discharge: HOME | End: 2023-12-26
Attending: FAMILY MEDICINE
Payer: MEDICARE

## 2023-12-25 DIAGNOSIS — K59.00 CONSTIPATION, UNSPECIFIED CONSTIPATION TYPE: Primary | ICD-10-CM

## 2023-12-25 LAB
BACTERIA SPEC CULT: NORMAL
COLONY COUNT, CNT: NORMAL
Lab: NORMAL

## 2023-12-25 PROCEDURE — 99283 EMERGENCY DEPT VISIT LOW MDM: CPT

## 2023-12-26 ENCOUNTER — APPOINTMENT (OUTPATIENT)
Age: 78
End: 2023-12-26
Payer: MEDICARE

## 2023-12-26 ENCOUNTER — TELEPHONE (OUTPATIENT)
Facility: CLINIC | Age: 78
End: 2023-12-26

## 2023-12-26 VITALS
WEIGHT: 184.8 LBS | SYSTOLIC BLOOD PRESSURE: 151 MMHG | HEIGHT: 68 IN | RESPIRATION RATE: 18 BRPM | DIASTOLIC BLOOD PRESSURE: 84 MMHG | TEMPERATURE: 97.8 F | BODY MASS INDEX: 28.01 KG/M2 | HEART RATE: 84 BPM | OXYGEN SATURATION: 98 %

## 2023-12-26 PROCEDURE — 6370000000 HC RX 637 (ALT 250 FOR IP): Performed by: FAMILY MEDICINE

## 2023-12-26 PROCEDURE — 74018 RADEX ABDOMEN 1 VIEW: CPT

## 2023-12-26 RX ORDER — BISACODYL 10 MG
10 SUPPOSITORY, RECTAL RECTAL
Status: COMPLETED | OUTPATIENT
Start: 2023-12-26 | End: 2023-12-26

## 2023-12-26 RX ADMIN — BISACODYL 10 MG: 10 SUPPOSITORY RECTAL at 02:02

## 2023-12-26 NOTE — ED PROVIDER NOTES
EMERGENCY DEPARTMENT HISTORY AND PHYSICAL EXAM      Patient Name: Mariano Haley  MRN: [id number]  YOB: 1945  Provider: Nida Frazier DO  PCP: Neri Esparza MD     History of Presenting Illness     Chief Complaint   Patient presents with    Constipation       History Provided By: Patient     HPI: Mariano Haley, 70 y.o. male  presents to the ED with cc of constipation. Patient states that he has not had a bowel movement for the last 2 days. He tried an oral medication today to help with symptoms but he still did not have a bowel movement. He is unsure/cannot recall which medication he took. He denies fevers, chills, chest pain, shortness of breath, abdominal pain, flank pain, dysuria, hematuria, numbness, tingling, neck pain, back pain. No falls, injuries. Of note, patient was hospitalized 12 22-12 24 for UTI, encephalopathy, ESRD, hypertension. He is currently on outpatient Cipro. He is taking the medication as prescribed. He denies any bleeding per rectum or p.o.     Past History     Past Medical History:  Past Medical History:   Diagnosis Date    Acid reflux     Arrhythmia     Arthritis     Bladder cancer ([address])     Colon cancer ([address]) 11/2021    Congestive heart failure (HCC)     Deep vein thrombosis (DVT) of proximal vein of both lower extremities ([address]) 9/14/2020    Diabetes mellitus ([address])     Difficult intubation     Narrow airway-per Pulm notes(in media)     ESRD (end stage renal disease) ([address])     stage IV/V per renal notes care everywhere 5/22/23    GERD (gastroesophageal reflux disease)     Gout     High cholesterol     Hypertension     Hypomagnesemia 05/2023    Kidney carcinoma, left ([address]) 2016    left nephrectomy    Kidney disease     Pituitary mass ([address])     Pulmonary HTN ([address]) 10/2021    PASP 77 mmHg    Reflux esophagitis     Unspecified deficiency anemia        Past Surgical History:  Past Surgical History:   Procedure Laterality Date    COLONOSCOPY N/A 11/19/2021 Area M Indication Text: Tumors in this location are included in Area M (cheek, forehead, scalp, neck, jawline and pretibial skin).  Mohs surgery is indicated for tumors in these anatomic locations.

## 2023-12-26 NOTE — DISCHARGE INSTRUCTIONS
*Call primary doctor today to schedule appointment  *May use OTC magnesium citrate and/or miralax to help with constipation, ask pharmacist for assistance if needed.   *go to all dialysis appointments, make sure you are adhering to diabetes medication schedule

## 2023-12-26 NOTE — ED NOTES
I have reviewed discharge instructions with the patient. The patient verbalized understanding.            Reviewed medication compliance, follow up with PCP, return to ER for any new or worrisome concerns

## 2023-12-26 NOTE — TELEPHONE ENCOUNTER
Care Transitions Initial Follow Up Call    Outreach made within 2 business days of discharge: Yes    Patient: Angie Acosta Patient : 1945   MRN: [id number]  Reason for Admission: There are no discharge diagnoses documented for the most recent discharge. Discharge Date: 23       Spoke with:  10:57 AM left VM for a return call  4:12 PM Spoke to [facility]    Discharge department/facility: [facility] ER for constipation    TCM Interactive Patient Contact:  Was patient able to fill all prescriptions: No: No new prescriptions  Was patient instructed to bring all medications to the follow-up visit: Yes  Is patient taking all medications as directed in the discharge summary?  Yes  Does patient understand their discharge instructions: Yes  Does patient have questions or concerns that need addressed prior to 7-14 day follow up office visit: no    Scheduled appointment with PCP within 7-14 days    Follow Up  Future Appointments   Date Time Provider [address]   2024 10:30 AM MD RESHMA Medina   2024  9:30 AM Inga Mendes MD [facility] YAZ Crenshaw LPN

## 2024-01-02 ENCOUNTER — OFFICE VISIT (OUTPATIENT)
Facility: CLINIC | Age: 79
End: 2024-01-02

## 2024-01-02 VITALS
HEIGHT: 68 IN | RESPIRATION RATE: 18 BRPM | HEART RATE: 77 BPM | WEIGHT: 184 LBS | SYSTOLIC BLOOD PRESSURE: 105 MMHG | TEMPERATURE: 96.9 F | DIASTOLIC BLOOD PRESSURE: 51 MMHG | OXYGEN SATURATION: 97 % | BODY MASS INDEX: 27.89 KG/M2

## 2024-01-02 DIAGNOSIS — E11.22 TYPE 2 DIABETES MELLITUS WITH CHRONIC KIDNEY DISEASE ON CHRONIC DIALYSIS, WITH LONG-TERM CURRENT USE OF INSULIN (HCC): ICD-10-CM

## 2024-01-02 DIAGNOSIS — R53.81 PHYSICAL DECONDITIONING: ICD-10-CM

## 2024-01-02 DIAGNOSIS — Z78.9 IMPAIRED MOBILITY AND ADLS: ICD-10-CM

## 2024-01-02 DIAGNOSIS — Z99.2 TYPE 2 DIABETES MELLITUS WITH CHRONIC KIDNEY DISEASE ON CHRONIC DIALYSIS, WITH LONG-TERM CURRENT USE OF INSULIN (HCC): ICD-10-CM

## 2024-01-02 DIAGNOSIS — Z74.09 IMPAIRED FUNCTIONAL MOBILITY, BALANCE, GAIT, AND ENDURANCE: ICD-10-CM

## 2024-01-02 DIAGNOSIS — Z09 HOSPITAL DISCHARGE FOLLOW-UP: Primary | ICD-10-CM

## 2024-01-02 DIAGNOSIS — N30.00 ACUTE CYSTITIS WITHOUT HEMATURIA: ICD-10-CM

## 2024-01-02 DIAGNOSIS — Z74.09 IMPAIRED MOBILITY AND ADLS: ICD-10-CM

## 2024-01-02 DIAGNOSIS — R54 FRAILTY SYNDROME IN GERIATRIC PATIENT: ICD-10-CM

## 2024-01-02 DIAGNOSIS — N18.6 TYPE 2 DIABETES MELLITUS WITH CHRONIC KIDNEY DISEASE ON CHRONIC DIALYSIS, WITH LONG-TERM CURRENT USE OF INSULIN (HCC): ICD-10-CM

## 2024-01-02 DIAGNOSIS — Z79.4 TYPE 2 DIABETES MELLITUS WITH CHRONIC KIDNEY DISEASE ON CHRONIC DIALYSIS, WITH LONG-TERM CURRENT USE OF INSULIN (HCC): ICD-10-CM

## 2024-01-02 DIAGNOSIS — G93.40 ENCEPHALOPATHY: ICD-10-CM

## 2024-01-02 LAB — HBA1C MFR BLD: 9.7 %

## 2024-01-02 ASSESSMENT — PATIENT HEALTH QUESTIONNAIRE - PHQ9
SUM OF ALL RESPONSES TO PHQ QUESTIONS 1-9: 0
SUM OF ALL RESPONSES TO PHQ9 QUESTIONS 1 & 2: 0
2. FEELING DOWN, DEPRESSED OR HOPELESS: 0
SUM OF ALL RESPONSES TO PHQ QUESTIONS 1-9: 0
1. LITTLE INTEREST OR PLEASURE IN DOING THINGS: 0

## 2024-01-02 NOTE — PROGRESS NOTES
Patient in hosp. 12/22 to 12/24 for Encephalopathy from UTI, went to ER on 12/25/2023 for constipation. Headaches and weakness, states bowel movements back to normal. Low BP noted, patient states when he goes to dialysis they tell him it is low.    Chief Complaint   Patient presents with    Follow-Up from Hospital         1. \"Have you been to the ER, urgent care clinic since your last visit?  Hospitalized since your last visit?\" Yes Where: [facility]    2. \"Have you seen or consulted any other health care providers outside of the [facility] since your last visit?\" No     3. For patients aged 45-75: Has the patient had a colonoscopy / FIT/ Cologuard? NA - based on age    Fingerstick for HBa1C done in ring finger by Velia Leon LPN per order of Collette Nicholas MD after cleaning area with alcohol wipe.  Patient tolerated procedure well.   
each by In Vitro route daily As needed., Disp: 100 each, Rfl: 3    atorvastatin (LIPITOR) 40 MG tablet, TAKE 1 TABLET BY MOUTH DAILY, Disp: 90 tablet, Rfl: 0    omeprazole (PRILOSEC) 40 MG delayed release capsule, Take 1 capsule by mouth daily, Disp: 90 capsule, Rfl: 1    hydrALAZINE (APRESOLINE) 100 MG tablet, Take 1 tablet by mouth 3 times daily, Disp: 90 tablet, Rfl: 2    sennosides-docusate sodium (SENOKOT-S) 8.6-50 MG tablet, Take one tablet every other night for constipation, HOLD this medication for loose stools., Disp: 45 tablet, Rfl: 0    apixaban (ELIQUIS) 5 MG TABS tablet, Take 1 tablet by mouth 2 times daily Notify MD for any signs of bleeding, Disp: 180 tablet, Rfl: 2    amLODIPine (NORVASC) 10 MG tablet, Take HALF of a TABLET (5mg) once daily, Disp: 90 tablet, Rfl: 0    albuterol sulfate HFA (PROVENTIL;VENTOLIN;PROAIR) 108 (90 Base) MCG/ACT inhaler, Inhale 2 puffs into the lungs every 4 hours as needed, Disp: , Rfl:     insulin glargine (LANTUS SOLOSTAR) 100 UNIT/ML injection pen, Inject 8 Units into the skin daily Units according to his blood sugar reading, Disp: , Rfl:     nitroGLYCERIN (NITROSTAT) 0.4 MG SL tablet, Place 1 tablet under the tongue, Disp: , Rfl:     No Known Allergies    BP (!) 105/51   Pulse 77   Temp 96.9 °F (36.1 °C) (Temporal)   Resp 18   Ht 1.727 m (5' 8\")   Wt 83.5 kg (184 lb)   SpO2 97%   BMI 27.98 kg/m²     Physical Exam  Constitutional:       General: He is not in acute distress.     Appearance: Normal appearance. He is not ill-appearing.   Cardiovascular:      Rate and Rhythm: Normal rate and regular rhythm.      Heart sounds: Normal heart sounds.      Comments: Left wrist fistula w/out inflammation, good thrill.   Pulmonary:      Effort: Pulmonary effort is normal.      Breath sounds: Normal breath sounds.   Abdominal:      General: Bowel sounds are normal.      Palpations: Abdomen is soft.   Genitourinary:     Comments: Nephrostomy bag w/ clear urine, small amount

## 2024-01-05 DIAGNOSIS — N18.4 CHRONIC KIDNEY DISEASE, STAGE 4 (SEVERE) (HCC): ICD-10-CM

## 2024-01-05 DIAGNOSIS — I10 ESSENTIAL HYPERTENSION: ICD-10-CM

## 2024-01-05 DIAGNOSIS — J30.1 SEASONAL ALLERGIC RHINITIS DUE TO POLLEN: ICD-10-CM

## 2024-01-05 DIAGNOSIS — E78.5 HYPERLIPIDEMIA, UNSPECIFIED HYPERLIPIDEMIA TYPE: ICD-10-CM

## 2024-01-08 RX ORDER — ATORVASTATIN CALCIUM 40 MG/1
40 TABLET, FILM COATED ORAL DAILY
Qty: 90 TABLET | Refills: 0 | Status: SHIPPED | OUTPATIENT
Start: 2024-01-08

## 2024-01-08 RX ORDER — LORATADINE 10 MG/1
10 TABLET ORAL DAILY
Qty: 90 TABLET | Refills: 0 | Status: SHIPPED | OUTPATIENT
Start: 2024-01-08

## 2024-01-08 RX ORDER — AMLODIPINE BESYLATE 10 MG/1
TABLET ORAL
Qty: 90 TABLET | Refills: 0 | Status: SHIPPED | OUTPATIENT
Start: 2024-01-08

## 2024-01-08 RX ORDER — SODIUM BICARBONATE 650 MG/1
650 TABLET ORAL 3 TIMES DAILY
Qty: 90 TABLET | Refills: 1 | Status: SHIPPED | OUTPATIENT
Start: 2024-01-08

## 2024-01-08 NOTE — TELEPHONE ENCOUNTER
Refilled patient Amlodipine. Will hold off on Allopurinol until find out what he is taking this for and how long he has been out of Allopurinol per PCP. Have been unable to reach patient or family member. Called [facility] and last time Allopurinol was filled was 9/26/2023 #180 for chronic kidney disease.

## 2024-01-08 NOTE — TELEPHONE ENCOUNTER
Tried to call patient several times and unable to reach, tried to call Sara and unable to reach. Need to know if the Allopurinol is for gout or kidneys and how long been out of it?

## 2024-01-10 RX ORDER — ALLOPURINOL 100 MG/1
200 TABLET ORAL DAILY
Qty: 90 TABLET | Refills: 1 | Status: SHIPPED | OUTPATIENT
Start: 2024-01-10 | End: 2024-04-09

## 2024-02-01 ENCOUNTER — OFFICE VISIT (OUTPATIENT)
Facility: CLINIC | Age: 79
End: 2024-02-01
Payer: MEDICARE

## 2024-02-01 VITALS
HEIGHT: 68 IN | WEIGHT: 185 LBS | OXYGEN SATURATION: 96 % | TEMPERATURE: 97.3 F | RESPIRATION RATE: 18 BRPM | DIASTOLIC BLOOD PRESSURE: 49 MMHG | BODY MASS INDEX: 28.04 KG/M2 | HEART RATE: 83 BPM | SYSTOLIC BLOOD PRESSURE: 109 MMHG

## 2024-02-01 DIAGNOSIS — N18.6 TYPE 2 DIABETES MELLITUS WITH CHRONIC KIDNEY DISEASE ON CHRONIC DIALYSIS, WITH LONG-TERM CURRENT USE OF INSULIN (HCC): ICD-10-CM

## 2024-02-01 DIAGNOSIS — Z79.4 TYPE 2 DIABETES MELLITUS WITH CHRONIC KIDNEY DISEASE ON CHRONIC DIALYSIS, WITH LONG-TERM CURRENT USE OF INSULIN (HCC): ICD-10-CM

## 2024-02-01 DIAGNOSIS — R54 FRAILTY SYNDROME IN GERIATRIC PATIENT: ICD-10-CM

## 2024-02-01 DIAGNOSIS — Z99.2 TYPE 2 DIABETES MELLITUS WITH CHRONIC KIDNEY DISEASE ON CHRONIC DIALYSIS, WITH LONG-TERM CURRENT USE OF INSULIN (HCC): ICD-10-CM

## 2024-02-01 DIAGNOSIS — I10 ESSENTIAL HYPERTENSION: Primary | ICD-10-CM

## 2024-02-01 DIAGNOSIS — E11.22 TYPE 2 DIABETES MELLITUS WITH CHRONIC KIDNEY DISEASE ON CHRONIC DIALYSIS, WITH LONG-TERM CURRENT USE OF INSULIN (HCC): ICD-10-CM

## 2024-02-01 PROCEDURE — 1123F ACP DISCUSS/DSCN MKR DOCD: CPT | Performed by: FAMILY MEDICINE

## 2024-02-01 PROCEDURE — 99214 OFFICE O/P EST MOD 30 MIN: CPT | Performed by: FAMILY MEDICINE

## 2024-02-01 PROCEDURE — 3074F SYST BP LT 130 MM HG: CPT | Performed by: FAMILY MEDICINE

## 2024-02-01 PROCEDURE — 3078F DIAST BP <80 MM HG: CPT | Performed by: FAMILY MEDICINE

## 2024-02-01 ASSESSMENT — PATIENT HEALTH QUESTIONNAIRE - PHQ9
SUM OF ALL RESPONSES TO PHQ QUESTIONS 1-9: 0
SUM OF ALL RESPONSES TO PHQ9 QUESTIONS 1 & 2: 0
SUM OF ALL RESPONSES TO PHQ QUESTIONS 1-9: 0
SUM OF ALL RESPONSES TO PHQ QUESTIONS 1-9: 0
2. FEELING DOWN, DEPRESSED OR HOPELESS: 0
1. LITTLE INTEREST OR PLEASURE IN DOING THINGS: 0
SUM OF ALL RESPONSES TO PHQ QUESTIONS 1-9: 0

## 2024-02-01 NOTE — PROGRESS NOTES
Chief Complaint   Patient presents with    Follow-up     Chronic condition   Patient states  he forgot to bring his medications again today, can't tell me all his meds,however,says nothing has changed since last visit    \"Have you been to the ER, urgent care clinic since your last visit?  Hospitalized since your last visit?\"    NO    “Have you seen or consulted any other health care providers outside of [facility] since your last visit?”    Dialysis            
Effort: Pulmonary effort is normal.      Breath sounds: Normal breath sounds.   Abdominal:      General: Bowel sounds are normal.      Palpations: Abdomen is soft.   Genitourinary:     Comments: Nephrostomy bag w/ clear urine, small amount of mucoid discharge around stoma (chronic).   Neurological:      Mental Status: He is alert. Mental status is at baseline.   Psychiatric:         Mood and Affect: Mood normal.       Lab Results   Component Value Date/Time    WBC 10.0 12/24/2023 05:06 AM    HGB 11.9 12/24/2023 05:06 AM    HCT 36.4 12/24/2023 05:06 AM     12/24/2023 05:06 AM    MCV 84.7 12/24/2023 05:06 AM     Lab Results   Component Value Date/Time     12/24/2023 05:06 AM    K 3.8 12/24/2023 05:06 AM     12/24/2023 05:06 AM    CO2 28 12/24/2023 05:06 AM    BUN 37 12/24/2023 05:06 AM    GFRAA 22.0 10/25/2022 05:22 AM    GLOB 4.2 12/22/2023 09:30 PM    ALT 22 12/22/2023 09:30 PM    AST 22 12/22/2023 09:30 PM     Lab Results   Component Value Date/Time    CHOL 147 06/07/2023 11:46 AM    CHOL 97 10/06/2021 11:25 PM    HDL 32 06/07/2023 11:46 AM       On this date 02/02/24 I have spent >30 minutes for chart review, face to face encounter with the patient for interview/exam, discussing working diagnosis and treatment plan.     Medical decision making complexity: moderate-high.    Collette Nicholas MD   Family & Geriatric Medicine

## 2024-02-07 DIAGNOSIS — E55.9 VITAMIN D DEFICIENCY: ICD-10-CM

## 2024-02-07 DIAGNOSIS — I10 ESSENTIAL HYPERTENSION: ICD-10-CM

## 2024-02-07 RX ORDER — ISOSORBIDE MONONITRATE 30 MG/1
30 TABLET, EXTENDED RELEASE ORAL DAILY
Qty: 90 TABLET | Refills: 0 | Status: SHIPPED | OUTPATIENT
Start: 2024-02-07

## 2024-02-07 RX ORDER — ERGOCALCIFEROL 1.25 MG/1
CAPSULE ORAL
Qty: 12 CAPSULE | Refills: 0 | Status: SHIPPED | OUTPATIENT
Start: 2024-02-07

## 2024-03-05 DIAGNOSIS — N18.4 CHRONIC KIDNEY DISEASE, STAGE 4 (SEVERE) (HCC): ICD-10-CM

## 2024-03-05 DIAGNOSIS — I10 ESSENTIAL HYPERTENSION: ICD-10-CM

## 2024-03-05 RX ORDER — FUROSEMIDE 40 MG/1
TABLET ORAL
Qty: 90 TABLET | Refills: 0 | Status: SHIPPED | OUTPATIENT
Start: 2024-03-05

## 2024-03-24 ENCOUNTER — APPOINTMENT (OUTPATIENT)
Age: 79
DRG: 177 | End: 2024-03-24
Payer: MEDICARE

## 2024-03-24 ENCOUNTER — HOSPITAL ENCOUNTER (INPATIENT)
Age: 79
LOS: 1 days | Discharge: LEFT BEFORE DISCHARGE | DRG: 177 | End: 2024-03-25
Attending: EMERGENCY MEDICINE | Admitting: INTERNAL MEDICINE
Payer: MEDICARE

## 2024-03-24 DIAGNOSIS — A41.89 SEPSIS DUE TO COVID-19 (HCC): ICD-10-CM

## 2024-03-24 DIAGNOSIS — U07.1 COVID: Primary | ICD-10-CM

## 2024-03-24 DIAGNOSIS — U07.1 SEPSIS DUE TO COVID-19 (HCC): ICD-10-CM

## 2024-03-24 DIAGNOSIS — R09.02 HYPOXIA: ICD-10-CM

## 2024-03-24 LAB
ALBUMIN SERPL-MCNC: 3.3 G/DL (ref 3.4–5)
ALBUMIN/GLOB SERPL: 0.8 (ref 0.8–1.7)
ALP SERPL-CCNC: 86 U/L (ref 45–117)
ALT SERPL-CCNC: 22 U/L (ref 16–61)
AMORPH CRY URNS QL MICRO: ABNORMAL
ANION GAP SERPL CALC-SCNC: 13 MMOL/L (ref 3–18)
APPEARANCE UR: ABNORMAL
ARTERIAL PATENCY WRIST A: YES
AST SERPL W P-5'-P-CCNC: 17 U/L (ref 10–38)
BACTERIA URNS QL MICRO: ABNORMAL /HPF
BASE DEFICIT BLDA-SCNC: 0.5 MMOL/L
BASOPHILS # BLD: 0.1 K/UL (ref 0–0.1)
BASOPHILS NFR BLD: 1 % (ref 0–2)
BDY SITE: ABNORMAL
BILIRUB SERPL-MCNC: 0.3 MG/DL (ref 0.2–1)
BILIRUB UR QL: NEGATIVE
BUN SERPL-MCNC: 54 MG/DL (ref 7–18)
BUN/CREAT SERPL: 11 (ref 12–20)
CA-I BLD-MCNC: 9.6 MG/DL (ref 8.5–10.1)
CHLORIDE SERPL-SCNC: 106 MMOL/L (ref 100–111)
CO2 SERPL-SCNC: 24 MMOL/L (ref 21–32)
COHGB MFR BLD: 0.8 % (ref 1–2)
COLOR UR: ABNORMAL
CREAT SERPL-MCNC: 4.96 MG/DL (ref 0.6–1.3)
DIFFERENTIAL METHOD BLD: ABNORMAL
EKG DIAGNOSIS: NORMAL
EKG Q-T INTERVAL: 330 MS
EKG QRS DURATION: 110 MS
EKG QTC CALCULATION (BAZETT): 436 MS
EKG R AXIS: -19 DEGREES
EKG T AXIS: 39 DEGREES
EKG VENTRICULAR RATE: 105 BPM
EOSINOPHIL # BLD: 0.1 K/UL (ref 0–0.4)
EOSINOPHIL NFR BLD: 1 % (ref 0–5)
EPITH CASTS URNS QL MICRO: ABNORMAL /LPF (ref 0–20)
ERYTHROCYTE [DISTWIDTH] IN BLOOD BY AUTOMATED COUNT: 13.9 % (ref 11.6–14.5)
FIO2 ON VENT: 28 %
FLUAV AG NPH QL IA: NEGATIVE
FLUBV AG NOSE QL IA: NEGATIVE
GAS FLOW.O2 O2 DELIVERY SYS: 2 L/MIN
GLOBULIN SER CALC-MCNC: 4 G/DL (ref 2–4)
GLUCOSE BLD STRIP.AUTO-MCNC: 287 MG/DL (ref 70–110)
GLUCOSE BLD STRIP.AUTO-MCNC: 289 MG/DL (ref 70–110)
GLUCOSE BLD STRIP.AUTO-MCNC: 369 MG/DL (ref 70–110)
GLUCOSE SERPL-MCNC: 254 MG/DL (ref 74–99)
GLUCOSE UR STRIP.AUTO-MCNC: NEGATIVE MG/DL
HCO3 BLDA-SCNC: 22 MMOL/L (ref 22–26)
HCT VFR BLD AUTO: 33.4 % (ref 36–48)
HGB BLD-MCNC: 11 G/DL (ref 13–16)
HGB UR QL STRIP: NEGATIVE
IMM GRANULOCYTES # BLD AUTO: 0.1 K/UL (ref 0–0.04)
IMM GRANULOCYTES NFR BLD AUTO: 1 % (ref 0–0.5)
KETONES UR QL STRIP.AUTO: NEGATIVE MG/DL
LACTATE SERPL-SCNC: 1.6 MMOL/L (ref 0.4–2)
LACTATE SERPL-SCNC: 2.7 MMOL/L (ref 0.4–2)
LEUKOCYTE ESTERASE UR QL STRIP.AUTO: ABNORMAL
LYMPHOCYTES # BLD: 1.6 K/UL (ref 0.9–3.6)
LYMPHOCYTES NFR BLD: 12 % (ref 21–52)
MCH RBC QN AUTO: 28 PG (ref 24–34)
MCHC RBC AUTO-ENTMCNC: 32.9 G/DL (ref 31–37)
MCV RBC AUTO: 85 FL (ref 78–100)
METHGB MFR BLD: 0 % (ref 0–1.4)
MONOCYTES # BLD: 1 K/UL (ref 0.05–1.2)
MONOCYTES NFR BLD: 8 % (ref 3–10)
NEUTS SEG # BLD: 9.9 K/UL (ref 1.8–8)
NEUTS SEG NFR BLD: 77 % (ref 40–73)
NITRITE UR QL STRIP.AUTO: NEGATIVE
NRBC # BLD: 0 K/UL (ref 0–0.01)
NRBC BLD-RTO: 0 PER 100 WBC
OXYHGB MFR BLD: 88.9 % (ref 95–99)
PCO2 BLDA: 31 MMHG (ref 35–45)
PERFORMED BY:: ABNORMAL
PH BLDA: 7.48 (ref 7.35–7.45)
PH UR STRIP: 8.5 (ref 5–8)
PLATELET # BLD AUTO: 280 K/UL (ref 135–420)
PMV BLD AUTO: 9.9 FL (ref 9.2–11.8)
PO2 BLDA: 56 MMHG (ref 80–100)
POTASSIUM SERPL-SCNC: 3.3 MMOL/L (ref 3.5–5.5)
PROCALCITONIN SERPL-MCNC: 0.43 NG/ML
PROT SERPL-MCNC: 7.3 G/DL (ref 6.4–8.2)
PROT UR STRIP-MCNC: 100 MG/DL
RBC # BLD AUTO: 3.93 M/UL (ref 4.35–5.65)
RBC #/AREA URNS HPF: ABNORMAL /HPF (ref 0–2)
SAO2 % BLD: 90 % (ref 95–99)
SAO2% DEVICE SAO2% SENSOR NAME: ABNORMAL
SARS-COV-2 RDRP RESP QL NAA+PROBE: DETECTED
SODIUM SERPL-SCNC: 143 MMOL/L (ref 136–145)
SP GR UR REFRACTOMETRY: 1.01 (ref 1–1.03)
SPECIMEN SITE: ABNORMAL
TROPONIN I SERPL HS-MCNC: 38 NG/L (ref 0–78)
UROBILINOGEN UR QL STRIP.AUTO: 0.2 EU/DL (ref 0.2–1)
WBC # BLD AUTO: 12.6 K/UL (ref 4.6–13.2)
WBC URNS QL MICRO: ABNORMAL /HPF (ref 0–4)

## 2024-03-24 PROCEDURE — 82803 BLOOD GASES ANY COMBINATION: CPT

## 2024-03-24 PROCEDURE — 80053 COMPREHEN METABOLIC PANEL: CPT

## 2024-03-24 PROCEDURE — 2580000003 HC RX 258: Performed by: NURSE PRACTITIONER

## 2024-03-24 PROCEDURE — 6370000000 HC RX 637 (ALT 250 FOR IP): Performed by: EMERGENCY MEDICINE

## 2024-03-24 PROCEDURE — 87804 INFLUENZA ASSAY W/OPTIC: CPT

## 2024-03-24 PROCEDURE — 84145 PROCALCITONIN (PCT): CPT

## 2024-03-24 PROCEDURE — 94761 N-INVAS EAR/PLS OXIMETRY MLT: CPT

## 2024-03-24 PROCEDURE — 96374 THER/PROPH/DIAG INJ IV PUSH: CPT

## 2024-03-24 PROCEDURE — 6360000002 HC RX W HCPCS: Performed by: EMERGENCY MEDICINE

## 2024-03-24 PROCEDURE — 81001 URINALYSIS AUTO W/SCOPE: CPT

## 2024-03-24 PROCEDURE — 85025 COMPLETE CBC W/AUTO DIFF WBC: CPT

## 2024-03-24 PROCEDURE — 99285 EMERGENCY DEPT VISIT HI MDM: CPT

## 2024-03-24 PROCEDURE — 83605 ASSAY OF LACTIC ACID: CPT

## 2024-03-24 PROCEDURE — 87635 SARS-COV-2 COVID-19 AMP PRB: CPT

## 2024-03-24 PROCEDURE — 36600 WITHDRAWAL OF ARTERIAL BLOOD: CPT

## 2024-03-24 PROCEDURE — 93005 ELECTROCARDIOGRAM TRACING: CPT | Performed by: EMERGENCY MEDICINE

## 2024-03-24 PROCEDURE — 2000000000 HC ICU R&B

## 2024-03-24 PROCEDURE — 6360000002 HC RX W HCPCS: Performed by: NURSE PRACTITIONER

## 2024-03-24 PROCEDURE — 71045 X-RAY EXAM CHEST 1 VIEW: CPT

## 2024-03-24 PROCEDURE — 71250 CT THORAX DX C-: CPT

## 2024-03-24 PROCEDURE — 82962 GLUCOSE BLOOD TEST: CPT

## 2024-03-24 PROCEDURE — 96375 TX/PRO/DX INJ NEW DRUG ADDON: CPT

## 2024-03-24 PROCEDURE — 2580000003 HC RX 258: Performed by: EMERGENCY MEDICINE

## 2024-03-24 PROCEDURE — 94640 AIRWAY INHALATION TREATMENT: CPT

## 2024-03-24 PROCEDURE — 84484 ASSAY OF TROPONIN QUANT: CPT

## 2024-03-24 PROCEDURE — 2700000000 HC OXYGEN THERAPY PER DAY

## 2024-03-24 PROCEDURE — 87040 BLOOD CULTURE FOR BACTERIA: CPT

## 2024-03-24 PROCEDURE — 6370000000 HC RX 637 (ALT 250 FOR IP): Performed by: NURSE PRACTITIONER

## 2024-03-24 PROCEDURE — 83036 HEMOGLOBIN GLYCOSYLATED A1C: CPT

## 2024-03-24 RX ORDER — FUROSEMIDE 40 MG/1
40 TABLET ORAL DAILY
Status: DISCONTINUED | OUTPATIENT
Start: 2024-03-24 | End: 2024-03-26 | Stop reason: HOSPADM

## 2024-03-24 RX ORDER — INSULIN LISPRO 100 [IU]/ML
0-8 INJECTION, SOLUTION INTRAVENOUS; SUBCUTANEOUS
Status: DISCONTINUED | OUTPATIENT
Start: 2024-03-24 | End: 2024-03-26 | Stop reason: HOSPADM

## 2024-03-24 RX ORDER — ACETAMINOPHEN 650 MG/1
650 SUPPOSITORY RECTAL EVERY 6 HOURS PRN
Status: DISCONTINUED | OUTPATIENT
Start: 2024-03-24 | End: 2024-03-26 | Stop reason: HOSPADM

## 2024-03-24 RX ORDER — ALBUTEROL SULFATE 90 UG/1
2 AEROSOL, METERED RESPIRATORY (INHALATION) EVERY 4 HOURS PRN
Status: DISCONTINUED | OUTPATIENT
Start: 2024-03-24 | End: 2024-03-26 | Stop reason: HOSPADM

## 2024-03-24 RX ORDER — INSULIN LISPRO 100 [IU]/ML
0.05 INJECTION, SOLUTION INTRAVENOUS; SUBCUTANEOUS
Status: DISCONTINUED | OUTPATIENT
Start: 2024-03-24 | End: 2024-03-26 | Stop reason: HOSPADM

## 2024-03-24 RX ORDER — DEXTROSE MONOHYDRATE 100 MG/ML
INJECTION, SOLUTION INTRAVENOUS CONTINUOUS PRN
Status: DISCONTINUED | OUTPATIENT
Start: 2024-03-24 | End: 2024-03-24 | Stop reason: SDUPTHER

## 2024-03-24 RX ORDER — INSULIN LISPRO 100 [IU]/ML
0-4 INJECTION, SOLUTION INTRAVENOUS; SUBCUTANEOUS NIGHTLY
Status: DISCONTINUED | OUTPATIENT
Start: 2024-03-24 | End: 2024-03-26 | Stop reason: HOSPADM

## 2024-03-24 RX ORDER — AMLODIPINE BESYLATE 5 MG/1
10 TABLET ORAL DAILY
Status: DISCONTINUED | OUTPATIENT
Start: 2024-03-24 | End: 2024-03-26 | Stop reason: HOSPADM

## 2024-03-24 RX ORDER — HYDRALAZINE HYDROCHLORIDE 25 MG/1
100 TABLET, FILM COATED ORAL 3 TIMES DAILY
Status: DISCONTINUED | OUTPATIENT
Start: 2024-03-24 | End: 2024-03-26 | Stop reason: HOSPADM

## 2024-03-24 RX ORDER — ACETAMINOPHEN 325 MG/1
650 TABLET ORAL
Status: COMPLETED | OUTPATIENT
Start: 2024-03-24 | End: 2024-03-24

## 2024-03-24 RX ORDER — SODIUM BICARBONATE 650 MG/1
650 TABLET ORAL 3 TIMES DAILY
Status: DISCONTINUED | OUTPATIENT
Start: 2024-03-24 | End: 2024-03-26 | Stop reason: HOSPADM

## 2024-03-24 RX ORDER — ONDANSETRON 2 MG/ML
4 INJECTION INTRAMUSCULAR; INTRAVENOUS
Status: COMPLETED | OUTPATIENT
Start: 2024-03-24 | End: 2024-03-24

## 2024-03-24 RX ORDER — IPRATROPIUM BROMIDE AND ALBUTEROL SULFATE 2.5; .5 MG/3ML; MG/3ML
1 SOLUTION RESPIRATORY (INHALATION)
Status: COMPLETED | OUTPATIENT
Start: 2024-03-24 | End: 2024-03-24

## 2024-03-24 RX ORDER — DEXAMETHASONE SODIUM PHOSPHATE 10 MG/ML
6 INJECTION, SOLUTION INTRAMUSCULAR; INTRAVENOUS EVERY 24 HOURS
Status: DISCONTINUED | OUTPATIENT
Start: 2024-03-24 | End: 2024-03-26 | Stop reason: HOSPADM

## 2024-03-24 RX ORDER — ONDANSETRON 2 MG/ML
4 INJECTION INTRAMUSCULAR; INTRAVENOUS EVERY 6 HOURS PRN
Status: DISCONTINUED | OUTPATIENT
Start: 2024-03-24 | End: 2024-03-26 | Stop reason: HOSPADM

## 2024-03-24 RX ORDER — ALLOPURINOL 100 MG/1
200 TABLET ORAL DAILY
Status: DISCONTINUED | OUTPATIENT
Start: 2024-03-24 | End: 2024-03-26 | Stop reason: HOSPADM

## 2024-03-24 RX ORDER — ONDANSETRON 4 MG/1
4 TABLET, ORALLY DISINTEGRATING ORAL EVERY 8 HOURS PRN
Status: DISCONTINUED | OUTPATIENT
Start: 2024-03-24 | End: 2024-03-26 | Stop reason: HOSPADM

## 2024-03-24 RX ORDER — SEVELAMER CARBONATE 800 MG/1
800 TABLET, FILM COATED ORAL
Status: DISCONTINUED | OUTPATIENT
Start: 2024-03-24 | End: 2024-03-26 | Stop reason: HOSPADM

## 2024-03-24 RX ORDER — SENNA AND DOCUSATE SODIUM 50; 8.6 MG/1; MG/1
1 TABLET, FILM COATED ORAL DAILY
Status: DISCONTINUED | OUTPATIENT
Start: 2024-03-24 | End: 2024-03-26 | Stop reason: HOSPADM

## 2024-03-24 RX ORDER — BENZONATATE 100 MG/1
200 CAPSULE ORAL
Status: COMPLETED | OUTPATIENT
Start: 2024-03-24 | End: 2024-03-24

## 2024-03-24 RX ORDER — ASPIRIN 81 MG/1
81 TABLET, CHEWABLE ORAL DAILY
Status: DISCONTINUED | OUTPATIENT
Start: 2024-03-24 | End: 2024-03-26 | Stop reason: HOSPADM

## 2024-03-24 RX ORDER — ISOSORBIDE MONONITRATE 30 MG/1
30 TABLET, EXTENDED RELEASE ORAL DAILY
Status: DISCONTINUED | OUTPATIENT
Start: 2024-03-24 | End: 2024-03-26 | Stop reason: HOSPADM

## 2024-03-24 RX ORDER — POLYETHYLENE GLYCOL 3350 17 G/17G
17 POWDER, FOR SOLUTION ORAL DAILY PRN
Status: DISCONTINUED | OUTPATIENT
Start: 2024-03-24 | End: 2024-03-26 | Stop reason: HOSPADM

## 2024-03-24 RX ORDER — SODIUM CHLORIDE 9 MG/ML
INJECTION, SOLUTION INTRAVENOUS PRN
Status: DISCONTINUED | OUTPATIENT
Start: 2024-03-24 | End: 2024-03-26 | Stop reason: HOSPADM

## 2024-03-24 RX ORDER — POTASSIUM CHLORIDE 750 MG/1
40 TABLET, EXTENDED RELEASE ORAL ONCE
Status: COMPLETED | OUTPATIENT
Start: 2024-03-24 | End: 2024-03-24

## 2024-03-24 RX ORDER — ACETAMINOPHEN 325 MG/1
650 TABLET ORAL EVERY 6 HOURS PRN
Status: DISCONTINUED | OUTPATIENT
Start: 2024-03-24 | End: 2024-03-26 | Stop reason: HOSPADM

## 2024-03-24 RX ORDER — ATORVASTATIN CALCIUM 40 MG/1
40 TABLET, FILM COATED ORAL DAILY
Status: DISCONTINUED | OUTPATIENT
Start: 2024-03-24 | End: 2024-03-26 | Stop reason: HOSPADM

## 2024-03-24 RX ORDER — SODIUM CHLORIDE 0.9 % (FLUSH) 0.9 %
5-40 SYRINGE (ML) INJECTION EVERY 12 HOURS SCHEDULED
Status: DISCONTINUED | OUTPATIENT
Start: 2024-03-24 | End: 2024-03-26 | Stop reason: HOSPADM

## 2024-03-24 RX ORDER — PANTOPRAZOLE SODIUM 40 MG/1
40 TABLET, DELAYED RELEASE ORAL
Status: DISCONTINUED | OUTPATIENT
Start: 2024-03-24 | End: 2024-03-26 | Stop reason: HOSPADM

## 2024-03-24 RX ORDER — CETIRIZINE HYDROCHLORIDE 10 MG/1
5 TABLET ORAL DAILY
Status: DISCONTINUED | OUTPATIENT
Start: 2024-03-24 | End: 2024-03-26 | Stop reason: HOSPADM

## 2024-03-24 RX ORDER — DEXTROSE MONOHYDRATE 100 MG/ML
INJECTION, SOLUTION INTRAVENOUS CONTINUOUS PRN
Status: DISCONTINUED | OUTPATIENT
Start: 2024-03-24 | End: 2024-03-26 | Stop reason: HOSPADM

## 2024-03-24 RX ORDER — SODIUM CHLORIDE 0.9 % (FLUSH) 0.9 %
5-40 SYRINGE (ML) INJECTION PRN
Status: DISCONTINUED | OUTPATIENT
Start: 2024-03-24 | End: 2024-03-26 | Stop reason: HOSPADM

## 2024-03-24 RX ORDER — VITAMIN B COMPLEX
2000 TABLET ORAL DAILY
Status: DISCONTINUED | OUTPATIENT
Start: 2024-03-24 | End: 2024-03-26 | Stop reason: HOSPADM

## 2024-03-24 RX ORDER — BENZONATATE 100 MG/1
100 CAPSULE ORAL 3 TIMES DAILY PRN
Status: DISCONTINUED | OUTPATIENT
Start: 2024-03-24 | End: 2024-03-26 | Stop reason: HOSPADM

## 2024-03-24 RX ADMIN — ONDANSETRON 4 MG: 2 INJECTION INTRAMUSCULAR; INTRAVENOUS at 01:36

## 2024-03-24 RX ADMIN — SENNOSIDES AND DOCUSATE SODIUM 1 TABLET: 50; 8.6 TABLET ORAL at 20:09

## 2024-03-24 RX ADMIN — Medication 2000 UNITS: at 08:14

## 2024-03-24 RX ADMIN — AZITHROMYCIN MONOHYDRATE 500 MG: 500 INJECTION, POWDER, LYOPHILIZED, FOR SOLUTION INTRAVENOUS at 08:45

## 2024-03-24 RX ADMIN — AMLODIPINE BESYLATE 10 MG: 5 TABLET ORAL at 08:15

## 2024-03-24 RX ADMIN — IPRATROPIUM BROMIDE AND ALBUTEROL SULFATE 1 DOSE: .5; 3 SOLUTION RESPIRATORY (INHALATION) at 02:43

## 2024-03-24 RX ADMIN — SEVELAMER CARBONATE 800 MG: 800 TABLET, FILM COATED ORAL at 11:05

## 2024-03-24 RX ADMIN — BENZONATATE 200 MG: 100 CAPSULE ORAL at 01:37

## 2024-03-24 RX ADMIN — PANTOPRAZOLE SODIUM 40 MG: 40 TABLET, DELAYED RELEASE ORAL at 08:15

## 2024-03-24 RX ADMIN — SODIUM CHLORIDE, PRESERVATIVE FREE 10 ML: 5 INJECTION INTRAVENOUS at 20:38

## 2024-03-24 RX ADMIN — HYDRALAZINE HYDROCHLORIDE 100 MG: 25 TABLET ORAL at 13:00

## 2024-03-24 RX ADMIN — SODIUM BICARBONATE 650 MG: 650 TABLET ORAL at 20:09

## 2024-03-24 RX ADMIN — HYDRALAZINE HYDROCHLORIDE 100 MG: 25 TABLET ORAL at 20:09

## 2024-03-24 RX ADMIN — DEXAMETHASONE SODIUM PHOSPHATE 6 MG: 10 INJECTION, SOLUTION INTRAMUSCULAR; INTRAVENOUS at 08:14

## 2024-03-24 RX ADMIN — HYDRALAZINE HYDROCHLORIDE 100 MG: 25 TABLET ORAL at 08:14

## 2024-03-24 RX ADMIN — SEVELAMER CARBONATE 800 MG: 800 TABLET, FILM COATED ORAL at 08:14

## 2024-03-24 RX ADMIN — ATORVASTATIN CALCIUM 40 MG: 40 TABLET, FILM COATED ORAL at 08:15

## 2024-03-24 RX ADMIN — CETIRIZINE HYDROCHLORIDE 5 MG: 10 TABLET, FILM COATED ORAL at 08:14

## 2024-03-24 RX ADMIN — INSULIN LISPRO 4 UNITS: 100 INJECTION, SOLUTION INTRAVENOUS; SUBCUTANEOUS at 16:23

## 2024-03-24 RX ADMIN — INSULIN LISPRO 8 UNITS: 100 INJECTION, SOLUTION INTRAVENOUS; SUBCUTANEOUS at 11:05

## 2024-03-24 RX ADMIN — SODIUM BICARBONATE 650 MG: 650 TABLET ORAL at 13:00

## 2024-03-24 RX ADMIN — ISOSORBIDE MONONITRATE 30 MG: 30 TABLET, EXTENDED RELEASE ORAL at 08:15

## 2024-03-24 RX ADMIN — WATER 80 MG: 1 INJECTION INTRAMUSCULAR; INTRAVENOUS; SUBCUTANEOUS at 01:37

## 2024-03-24 RX ADMIN — POTASSIUM CHLORIDE 40 MEQ: 750 TABLET, EXTENDED RELEASE ORAL at 08:15

## 2024-03-24 RX ADMIN — SODIUM BICARBONATE 650 MG: 650 TABLET ORAL at 08:14

## 2024-03-24 RX ADMIN — INSULIN LISPRO 4 UNITS: 100 INJECTION, SOLUTION INTRAVENOUS; SUBCUTANEOUS at 11:05

## 2024-03-24 RX ADMIN — ACETAMINOPHEN 650 MG: 325 TABLET ORAL at 01:36

## 2024-03-24 RX ADMIN — APIXABAN 5 MG: 5 TABLET, FILM COATED ORAL at 20:09

## 2024-03-24 RX ADMIN — SEVELAMER CARBONATE 800 MG: 800 TABLET, FILM COATED ORAL at 16:23

## 2024-03-24 RX ADMIN — ALLOPURINOL 200 MG: 100 TABLET ORAL at 08:14

## 2024-03-24 ASSESSMENT — PAIN SCALES - GENERAL
PAINLEVEL_OUTOF10: 0

## 2024-03-24 NOTE — PROGRESS NOTES
1900  Bedside and Verbal shift change report given to FLORI Murphy RN (oncoming nurse) by RAUL Aguilar RN (offgoing nurse). Report included the following information Nurse Handoff Report, Intake/Output, MAR, Recent Results, and Med Rec Status.     1930 PM assessment completed. Pt resting in bed aaox3. Skin w/d. SOB on exertion. Pt on heated high flow 02 @ 25%,40liters. Sat's 100%. Pt has a persist productive cough. Pt voices no c/o pain or discomfort. Emptied urostomy of 100ml of yellow cloudy urine with sediments foul smelling.  Pt on Droplet Plus Isolation for Covid 19 +.     2009 PM meds and HS snack given. Connected pt's urostomy bag to a drainage bag.     0150 Tessalon 100mg and Robitussin liquid 200mg po given fo c/o cough.     0300  Patient resting quietly in bed awake.  Resp easy and nonlabored. No distress noted. CBWR.    0430  Rounding complete at this time. Pt. Resting quietly with call bell in reach. VSS.Blood pressure (!) 175/77, pulse 69, temperature 97.8 °F (36.6 °C), temperature source Oral, resp. rate 15, height 1.727 m (5' 8\"), weight 85.3 kg (188 lb), SpO2 100 %.Pt urostomy bag leaked in bed and on pt's gown. Cleaned pt up and new linen and gown applied.      0700  Shift report given to oncoming nurse.

## 2024-03-24 NOTE — PROGRESS NOTES
Patient placed on 100% NRB mask at 15 lpm for transport to CT.  Placed back on HFNC upon returning to ICU.  Flow and FIO2 lowered.  Will continue to titrate as tolerated.       03/24/24 0734   Oxygen Therapy/Pulse Ox   O2 Therapy Oxygen   O2 Device Heated high flow cannula  (35L/40%)   O2 Flow Rate (L/min) (S)  35 L/min   FiO2  (S)  40 %   Pulse 98   SpO2 99 %

## 2024-03-24 NOTE — ED TRIAGE NOTES
Patient arrived via EMS complaining of cough x 1 week. Patient states he started coughing up blood tinged sputum today. Patient febrile en route at 101.   
PROCEDURES:  Laparoscopic assisted low anterior resection 06-Mar-2023 16:16:10  Felix HOLLOWAY

## 2024-03-24 NOTE — ED NOTES
TRANSFER - OUT REPORT:    Verbal report given to CLAYTON Bills on Ann-Marie Cardenas  being transferred to ICU for routine progression of patient care       Report consisted of patient's Situation, Background, Assessment and   Recommendations(SBAR).     Information from the following report(s) Nurse Handoff Report, ED Encounter Summary, ED SBAR, MAR, Recent Results, and Neuro Assessment was reviewed with the receiving nurse.    [place] Fall Assessment:                           Lines:   Peripheral IV 03/24/24 Left Forearm (Active)   Site Assessment Clean, dry & intact 03/24/24 0129   Line Status Blood return noted;Flushed;Specimen collected;Normal saline locked 03/24/24 0129   Phlebitis Assessment No symptoms 03/24/24 0129   Infiltration Assessment 0 03/24/24 0129   Dressing Status Clean, dry & intact 03/24/24 0129   Dressing Type Transparent 03/24/24 0129       Hemodialysis Central Access Left Subclavian (Active)       Hemodialysis Central Access Left (Active)        Opportunity for questions and clarification was provided.      Patient transported with:  Monitor, O2 @ (high flow) lpm, and Tech

## 2024-03-24 NOTE — PROGRESS NOTES
TRANSFER - IN REPORT:    Verbal report received from VA Rodriguez RN on Ann-Marie Cardenas  being received from ED for routine progression of patient care      Report consisted of patient's Situation, Background, Assessment and   Recommendations(SBAR).     Information from the following report(s) Nurse Handoff Report, Intake/Output, MAR, and Recent Results was reviewed with the receiving nurse.    Opportunity for questions and clarification was provided.      Assessment completed upon patient's arrival to   unit and care assumed.     0500 Pt frequently coughing pink/frothy sputum. KRISTIAN Dunbar NP in to see pt. Chest CT w/o contrast ordered.     0702. Pt off the floor to CT.    Bedside and Verbal shift change report given to RAUL Aguilar RN (oncoming nurse) by Negar Ho RN  (offgoing nurse). Report included the following information Nurse Handoff Report, Intake/Output, MAR, and Recent Results.

## 2024-03-24 NOTE — PROGRESS NOTES
Pt having increased O2 demand. BBS reveal crackles throughout. Pt coughing up pink, bloody, frothy sputum. Pt placed on HFNC 40L,50%. SPO2 wagner to 96%. Pt tolerating well. NAD. Will continue to monitor.

## 2024-03-24 NOTE — ED PROVIDER NOTES
[facility] ICU  EMERGENCY DEPARTMENT ENCOUNTER       Pt Name: Ann-Marie Cardenas  MRN: [id number]  Birthdate 1945  Date of evaluation: 3/24/2024  Provider: Alfonso Hodge MD   PCP: Collette Nicholas MD  Note Started: 7:21 AM 3/24/24     CHIEF COMPLAINT       Chief Complaint   Patient presents with    Cough        HISTORY OF PRESENT ILLNESS: 1 or more elements      History From: Patient and EMS  History limited by: Acuity of Condition     Ann-Marie Cardenas is a 78 y.o. male who presents to the ED via EMS called at scene by patient's brother who gave history to EMS of patient coughing since about a week ago and began having nausea vomiting last night.  Patient very poor historian at arrival due to persistent cough, shortness of breath.  EMS report patient is on respiratory distress due to cough and persistent vomiting at scene with pulse ox 90 to 91% on room air.  EMS also noted patient coughing up blood-tinged sputum.  Patient is on dialysis, M/W/F, last dialyzed this past Friday.  He also has history of hypertension, CHF, bladder and colon cancer, diabetes, hypertension, history of pituitary mass and s/p craniotomy, gout, DVTs and is on Eliquis.     Nursing Notes were all reviewed and agreed with or any disagreements were addressed in the HPI.     REVIEW OF SYSTEMS      Review of Systems     Positives and Pertinent negatives as per HPI.    PAST HISTORY     Past Medical History:  Past Medical History:   Diagnosis Date    Acid reflux     Arrhythmia     Arthritis     Bladder cancer (HCC)     Colon cancer (HCC) 11/2021    Congestive heart failure (HCC)     Deep vein thrombosis (DVT) of proximal vein of both lower extremities (HCC) 9/14/2020    Diabetes mellitus (HCC)     Difficult intubation     Narrow airway-per Pulm notes(in media)     ESRD (end stage renal disease) (HCC)     stage IV/V per renal notes care everywhere 5/22/23    GERD (gastroesophageal reflux disease)     Gout     High cholesterol

## 2024-03-24 NOTE — FLOWSHEET NOTE
0640-CT tech present on unit to take Patient down for CT of chest    0650-Bedside shift report received from Off-going RN, Per RT request;patient placed on 15L Non-rebreather; patient accompanied to CT by Nurse.    0721-patient returned to unit from CT, patient placed back on HF NC at 35L; Patient assessment completed; patient is a/o x 4( alert but drowsy),verbal, follows commands, Vitals attached/ patient denies pain, afebrile, NSR on cardiac monitor, crackles noted throughout lungs fields, ABD soft and tender with active bowel sounds,  RQ urostomy( 50cc cloudy urine with foul odor emptied),patient repositioned, POC reviewed, bed in lowest position, call light in reach, bed alarm on.       03/24/24 0721   Vitals   Temp 97.9 °F (36.6 °C)   Temp Source Axillary   Pulse 95   Heart Rate Source Monitor   Respirations 18   BP (!) 142/74   MAP (Calculated) 97   MAP (mmHg) 93   Pain Assessment   Pain Assessment None - Denies Pain   Opioid-Induced Sedation   POSS Score 1   RASS   [place] Agitation Sedation Scale (RASS) -1   Oxygen Therapy   SpO2 100 %   Pulse via Oximetry 97 beats per minute   O2 Device Heated high flow cannula   FiO2  40 %   O2 Flow Rate (L/min) 35 L/min       0815-Scheduled morning medications given; patient tolerated well    0830-MD at bedside for rounding; plan of care on reviewed ; urine sample collected and sent to lab    0840-patient assisted with breakfast tray setup; patient eating without distress    0845-IV azithromycin started     0930-patient resting with eyes closed, no distress noted, patient easily awakens, call light in reach.    1015-assisted patient to BSC, patient has one small loose brown BM; pericare performed, patient assisted back to bed, no distress voiced from patient, items and call light in reach,    1104-glucose checked; noted at 369  1105- sliding scale insulin given per orders    1130-patient assisted with lunch tray setup.    1330-patient resting with eyes closed, patient

## 2024-03-24 NOTE — PROGRESS NOTES
Still sleepy and alter    - ICU  - Titrate O2   - IV antibiotic  - IV steroid  - Isolation  - renal monitor   - watch for fluid overload while off lasix       Bruno Chung MD

## 2024-03-24 NOTE — H&P
PHYSICAL EXAM:  Positive = Red  Constitutional: Alert and oriented x 3 and no noted acute distress appears to be stated age.   HENT: Atraumatic, nose midline, oropharynx clear and moist, trachea midline, no supraclavicular   Eyes: Conjunctiva normal and pupils equal   Skin: Dry, intact, warm, and dry   Cardiovascular: Regular rate and rhythm, normal heart sounds, no murmurs, pulses palpable, trace BLE edema   Respiratory: Lungs with crackles, no wheezes, rales, or rhonchi, productive cough   Gastrointestinal: Appearance normal, soft and non-tender, bowel sounds are normal, positive x 4 quadrants   Genitourinary: Urostomy   Musculoskeletal: Normal ROM, generalized weakness   Neurological: Alert and oriented x 3, awake. No facial droop. No slurred speech. Hand grasps equal. Intact sensations   Psychiatric: Affect normal, Answers questions appropriately     __________________________________________________  Care Plan discussed with:    Comments   Patient X    Family      RN     Care Manager                    Consultant:      _______________________________________________________________________  Expected  Disposition:   Home with Family X   HH/PT/OT/RN    SNF/LTC    SABRINA    ________________________________________________________________________    Labs:  Recent Results (from the past 24 hour(s))   CBC with Auto Differential    Collection Time: 03/24/24  1:16 AM   Result Value Ref Range    WBC 12.6 4.6 - 13.2 K/uL    RBC 3.93 (L) 4.35 - 5.65 M/uL    Hemoglobin 11.0 (L) 13.0 - 16.0 g/dL    Hematocrit 33.4 (L) 36.0 - 48.0 %    MCV 85.0 78.0 - 100.0 FL    MCH 28.0 24.0 - 34.0 PG    MCHC 32.9 31.0 - 37.0 g/dL    RDW 13.9 11.6 - 14.5 %    Platelets 280 135 - 420 K/uL    MPV 9.9 9.2 - 11.8 FL    Nucleated RBCs 0.0 0.0  WBC    nRBC 0.00 0.00 - 0.01 K/uL    Neutrophils % 77 (H) 40 - 73 %    Lymphocytes % 12 (L) 21 - 52 %    Monocytes % 8 3 - 10 %    Eosinophils % 1 0 - 5 %    Basophils % 1 0 - 2 %    Immature

## 2024-03-24 NOTE — ED NOTES
Pt sats 87 percent on 3L. Pt increased to 5L NC. MD made aware. PT resting comfortably. Crackles heard throughout all lobes.

## 2024-03-25 VITALS
BODY MASS INDEX: 28.49 KG/M2 | HEART RATE: 91 BPM | SYSTOLIC BLOOD PRESSURE: 182 MMHG | OXYGEN SATURATION: 99 % | DIASTOLIC BLOOD PRESSURE: 84 MMHG | HEIGHT: 68 IN | TEMPERATURE: 97.7 F | WEIGHT: 188 LBS | RESPIRATION RATE: 18 BRPM

## 2024-03-25 LAB
25(OH)D3 SERPL-MCNC: 103.4 NG/ML (ref 30–100)
ALBUMIN SERPL-MCNC: 2.7 G/DL (ref 3.4–5)
ALBUMIN/GLOB SERPL: 0.7 (ref 0.8–1.7)
ALP SERPL-CCNC: 64 U/L (ref 45–117)
ALT SERPL-CCNC: 16 U/L (ref 16–61)
ANION GAP SERPL CALC-SCNC: 11 MMOL/L (ref 3–18)
AST SERPL W P-5'-P-CCNC: 16 U/L (ref 10–38)
BASOPHILS # BLD: 0 K/UL (ref 0–0.1)
BASOPHILS NFR BLD: 0 % (ref 0–2)
BILIRUB SERPL-MCNC: 0.4 MG/DL (ref 0.2–1)
BUN SERPL-MCNC: 74 MG/DL (ref 7–18)
BUN/CREAT SERPL: 13 (ref 12–20)
CA-I BLD-MCNC: 9.4 MG/DL (ref 8.5–10.1)
CHLORIDE SERPL-SCNC: 103 MMOL/L (ref 100–111)
CO2 SERPL-SCNC: 24 MMOL/L (ref 21–32)
CREAT SERPL-MCNC: 5.61 MG/DL (ref 0.6–1.3)
CRP SERPL-MCNC: 11.6 MG/DL (ref 0–0.3)
DIFFERENTIAL METHOD BLD: ABNORMAL
EOSINOPHIL # BLD: 0 K/UL (ref 0–0.4)
EOSINOPHIL NFR BLD: 0 % (ref 0–5)
ERYTHROCYTE [DISTWIDTH] IN BLOOD BY AUTOMATED COUNT: 14.2 % (ref 11.6–14.5)
EST. AVERAGE GLUCOSE BLD GHB EST-MCNC: 226 MG/DL
GLOBULIN SER CALC-MCNC: 4.1 G/DL (ref 2–4)
GLUCOSE BLD STRIP.AUTO-MCNC: 246 MG/DL (ref 70–110)
GLUCOSE BLD STRIP.AUTO-MCNC: 260 MG/DL (ref 70–110)
GLUCOSE BLD STRIP.AUTO-MCNC: 383 MG/DL (ref 70–110)
GLUCOSE BLD STRIP.AUTO-MCNC: 427 MG/DL (ref 70–110)
GLUCOSE SERPL-MCNC: 289 MG/DL (ref 74–99)
HBA1C MFR BLD: 9.5 % (ref 4.2–5.6)
HCT VFR BLD AUTO: 29.9 % (ref 36–48)
HGB BLD-MCNC: 9.9 G/DL (ref 13–16)
IMM GRANULOCYTES # BLD AUTO: 0.1 K/UL (ref 0–0.04)
IMM GRANULOCYTES NFR BLD AUTO: 1 % (ref 0–0.5)
LYMPHOCYTES # BLD: 0.7 K/UL (ref 0.9–3.6)
LYMPHOCYTES NFR BLD: 5 % (ref 21–52)
MCH RBC QN AUTO: 27.7 PG (ref 24–34)
MCHC RBC AUTO-ENTMCNC: 33.1 G/DL (ref 31–37)
MCV RBC AUTO: 83.8 FL (ref 78–100)
MONOCYTES # BLD: 1.1 K/UL (ref 0.05–1.2)
MONOCYTES NFR BLD: 7 % (ref 3–10)
NEUTS SEG # BLD: 13.2 K/UL (ref 1.8–8)
NEUTS SEG NFR BLD: 87 % (ref 40–73)
NRBC # BLD: 0 K/UL (ref 0–0.01)
NRBC BLD-RTO: 0 PER 100 WBC
PERFORMED BY:: ABNORMAL
PLATELET # BLD AUTO: 262 K/UL (ref 135–420)
PMV BLD AUTO: 10.5 FL (ref 9.2–11.8)
POTASSIUM SERPL-SCNC: 4.3 MMOL/L (ref 3.5–5.5)
PROT SERPL-MCNC: 6.8 G/DL (ref 6.4–8.2)
RBC # BLD AUTO: 3.57 M/UL (ref 4.35–5.65)
SODIUM SERPL-SCNC: 138 MMOL/L (ref 136–145)
WBC # BLD AUTO: 15.2 K/UL (ref 4.6–13.2)

## 2024-03-25 PROCEDURE — 86140 C-REACTIVE PROTEIN: CPT

## 2024-03-25 PROCEDURE — 2700000000 HC OXYGEN THERAPY PER DAY

## 2024-03-25 PROCEDURE — 6370000000 HC RX 637 (ALT 250 FOR IP): Performed by: NURSE PRACTITIONER

## 2024-03-25 PROCEDURE — 5A1D70Z PERFORMANCE OF URINARY FILTRATION, INTERMITTENT, LESS THAN 6 HOURS PER DAY: ICD-10-PCS | Performed by: INTERNAL MEDICINE

## 2024-03-25 PROCEDURE — 94761 N-INVAS EAR/PLS OXIMETRY MLT: CPT

## 2024-03-25 PROCEDURE — 82962 GLUCOSE BLOOD TEST: CPT

## 2024-03-25 PROCEDURE — 85025 COMPLETE CBC W/AUTO DIFF WBC: CPT

## 2024-03-25 PROCEDURE — 36415 COLL VENOUS BLD VENIPUNCTURE: CPT

## 2024-03-25 PROCEDURE — 2580000003 HC RX 258: Performed by: NURSE PRACTITIONER

## 2024-03-25 PROCEDURE — 2500000003 HC RX 250 WO HCPCS: Performed by: NURSE PRACTITIONER

## 2024-03-25 PROCEDURE — 6360000002 HC RX W HCPCS: Performed by: NURSE PRACTITIONER

## 2024-03-25 PROCEDURE — 80053 COMPREHEN METABOLIC PANEL: CPT

## 2024-03-25 PROCEDURE — 82306 VITAMIN D 25 HYDROXY: CPT

## 2024-03-25 RX ORDER — DEXAMETHASONE 2 MG/1
6 TABLET ORAL
Qty: 24 TABLET | Refills: 0 | Status: SHIPPED | OUTPATIENT
Start: 2024-03-25 | End: 2024-04-02

## 2024-03-25 RX ORDER — DEXAMETHASONE 2 MG/1
6 TABLET ORAL EVERY 6 HOURS
Qty: 24 TABLET | Refills: 0 | Status: SHIPPED | OUTPATIENT
Start: 2024-03-25 | End: 2024-03-25

## 2024-03-25 RX ORDER — AMOXICILLIN AND CLAVULANATE POTASSIUM 875; 125 MG/1; MG/1
1 TABLET, FILM COATED ORAL 2 TIMES DAILY
Qty: 14 TABLET | Refills: 0 | Status: SHIPPED | OUTPATIENT
Start: 2024-03-25 | End: 2024-03-25 | Stop reason: HOSPADM

## 2024-03-25 RX ORDER — GUAIFENESIN 200 MG/10ML
200 LIQUID ORAL EVERY 6 HOURS PRN
Status: DISCONTINUED | OUTPATIENT
Start: 2024-03-25 | End: 2024-03-26 | Stop reason: HOSPADM

## 2024-03-25 RX ORDER — AMOXICILLIN AND CLAVULANATE POTASSIUM 500; 125 MG/1; MG/1
1 TABLET, FILM COATED ORAL DAILY
Qty: 10 TABLET | Refills: 0 | Status: SHIPPED | OUTPATIENT
Start: 2024-03-25 | End: 2024-04-04

## 2024-03-25 RX ADMIN — Medication 2000 UNITS: at 09:14

## 2024-03-25 RX ADMIN — ISOSORBIDE MONONITRATE 30 MG: 30 TABLET, EXTENDED RELEASE ORAL at 09:14

## 2024-03-25 RX ADMIN — BENZONATATE 100 MG: 100 CAPSULE ORAL at 16:43

## 2024-03-25 RX ADMIN — APIXABAN 5 MG: 5 TABLET, FILM COATED ORAL at 09:14

## 2024-03-25 RX ADMIN — BENZONATATE 100 MG: 100 CAPSULE ORAL at 01:50

## 2024-03-25 RX ADMIN — AMLODIPINE BESYLATE 10 MG: 5 TABLET ORAL at 09:14

## 2024-03-25 RX ADMIN — INSULIN LISPRO 8 UNITS: 100 INJECTION, SOLUTION INTRAVENOUS; SUBCUTANEOUS at 11:38

## 2024-03-25 RX ADMIN — SODIUM BICARBONATE 650 MG: 650 TABLET ORAL at 09:14

## 2024-03-25 RX ADMIN — INSULIN LISPRO 4 UNITS: 100 INJECTION, SOLUTION INTRAVENOUS; SUBCUTANEOUS at 16:35

## 2024-03-25 RX ADMIN — CETIRIZINE HYDROCHLORIDE 5 MG: 10 TABLET, FILM COATED ORAL at 09:13

## 2024-03-25 RX ADMIN — INSULIN LISPRO 4 UNITS: 100 INJECTION, SOLUTION INTRAVENOUS; SUBCUTANEOUS at 11:39

## 2024-03-25 RX ADMIN — PANTOPRAZOLE SODIUM 40 MG: 40 TABLET, DELAYED RELEASE ORAL at 05:55

## 2024-03-25 RX ADMIN — INSULIN LISPRO 4 UNITS: 100 INJECTION, SOLUTION INTRAVENOUS; SUBCUTANEOUS at 07:40

## 2024-03-25 RX ADMIN — HYDRALAZINE HYDROCHLORIDE 100 MG: 25 TABLET ORAL at 09:13

## 2024-03-25 RX ADMIN — SEVELAMER CARBONATE 800 MG: 800 TABLET, FILM COATED ORAL at 11:39

## 2024-03-25 RX ADMIN — INSULIN LISPRO 2 UNITS: 100 INJECTION, SOLUTION INTRAVENOUS; SUBCUTANEOUS at 16:34

## 2024-03-25 RX ADMIN — ATORVASTATIN CALCIUM 40 MG: 40 TABLET, FILM COATED ORAL at 09:18

## 2024-03-25 RX ADMIN — DEXAMETHASONE SODIUM PHOSPHATE 6 MG: 10 INJECTION, SOLUTION INTRAMUSCULAR; INTRAVENOUS at 05:55

## 2024-03-25 RX ADMIN — ASPIRIN 81 MG: 81 TABLET, CHEWABLE ORAL at 09:18

## 2024-03-25 RX ADMIN — SEVELAMER CARBONATE 800 MG: 800 TABLET, FILM COATED ORAL at 09:14

## 2024-03-25 RX ADMIN — SENNOSIDES AND DOCUSATE SODIUM 1 TABLET: 50; 8.6 TABLET ORAL at 09:18

## 2024-03-25 RX ADMIN — ALLOPURINOL 200 MG: 100 TABLET ORAL at 09:14

## 2024-03-25 RX ADMIN — SODIUM CHLORIDE, PRESERVATIVE FREE 10 ML: 5 INJECTION INTRAVENOUS at 09:18

## 2024-03-25 RX ADMIN — GUAIFENESIN 200 MG: 100 SOLUTION ORAL at 01:50

## 2024-03-25 RX ADMIN — AZITHROMYCIN MONOHYDRATE 500 MG: 500 INJECTION, POWDER, LYOPHILIZED, FOR SOLUTION INTRAVENOUS at 09:13

## 2024-03-25 ASSESSMENT — PAIN SCALES - GENERAL
PAINLEVEL_OUTOF10: 0
PAINLEVEL_OUTOF10: 0

## 2024-03-25 NOTE — PROGRESS NOTES
0650 - Received report from night nurse NIESHA Murphy RN. Patient in isolation for COVID. No complaints currently.     0725 - Head to toe assessment completed and documented in the flowsheets. Respiratory in and decreased Oxygen to 3.5L NC.     0735 -     0740 - 8 units given for sugar of 260. Patient eating breakfast.     0840 - Patient up to bedside commode with large BM.     1030 - Dr. Chung in rounding on patient. Plan to D/c home after Dialysis.     1230 - Patient up to bedside commode. Large BM noted.     Patient remains on RA with oxygen SATs remain in the mid to high 90s.     Still planning to D/C after dialysis.     1800 - Dialysis nurse starting dialysis.     Bedside shift change report given to Millie ARNOLD (oncoming nurse) by ALBERTO Shaikh Rn (offgoing nurse). Report included the following information Nurse Handoff Report, Index, Intake/Output, MAR, and Recent Results.

## 2024-03-25 NOTE — CARE COORDINATION
Pt to be discharged today, has OP dialysis with [facility] in [place], called them to inform of COVID, will still go to center as normal and will follow facility protocols for COVID.  No change in appointment for pt.

## 2024-03-25 NOTE — DIALYSIS
Primary RN SBAR: LILO Warren RN   Patient Education: procedural / access care / fluid monitoring / importance of adherence to treatment orders  Hospital associated wait time; reason:   Hepatitis B Surface Ag   Date/Time Value Ref Range Status   09/18/2023 11:40 PM <0.10 <1.00 Index Final     Primary RN SBAR: ELSIE Pereira RN and PAULINE Trinh RN  Comments: patient declines treatment tonight, stating his ride will be here to pick him up. Hospitalist offered to keep patient additional night. Patient declined. Delay related to machine malfunctions / and working with biomed to resolve. Patient unwilling to wait for machine to be changed out to another machine. Dr Arrieta updated / Luisa scheduling office updated.     On arrival, IV needle noted to be placed directly in AVF of right extremity. Primary nurse notified / Dr Arrieta notified. IV needle removed immediately by primary nurse. Bruit and thrill noted / no s/s of infection, no edema, \"LIMB ALERT\" band placed on wrist.Patient educated to remind all hospital staff of AVF location

## 2024-03-25 NOTE — PROGRESS NOTES
Due to patients current isolation status, pharmacy is unable to conduct a medication reconciliation.  However, a medication reconciliation was signed off by Remedios Rodriguez RN on 3/24/2024 at 1:07 AM .

## 2024-03-25 NOTE — DISCHARGE SUMMARY
discussion / orders with the nurse and staff.     Cristina Mendez, ACNP  Hospitalist [facility]

## 2024-03-25 NOTE — CONSULTS
Bilirubin 0.4 0.2 - 1.0 mg/dL    AST 16 10 - 38 U/L    ALT 16 16 - 61 U/L    Alk Phosphatase 64 45 - 117 U/L    Total Protein 6.8 6.4 - 8.2 g/dL    Albumin 2.7 (L) 3.4 - 5.0 g/dL    Globulin 4.1 (H) 2.0 - 4.0 g/dL    Albumin/Globulin Ratio 0.7 (L) 0.8 - 1.7     POCT Glucose    Collection Time: 03/25/24  7:35 AM   Result Value Ref Range    POC Glucose 260 (H) 70 - 110 mg/dL    Performed by: Neel Romero    POCT Glucose    Collection Time: 03/25/24 11:02 AM   Result Value Ref Range    POC Glucose 427 (HH) 70 - 110 mg/dL    Performed by: Kelvin Francis    POCT Glucose    Collection Time: 03/25/24 11:03 AM   Result Value Ref Range    POC Glucose 383 (H) 70 - 110 mg/dL    Performed by: Kelvin Francis        Assessment and plan:     End-stage renal disease on hemodialysis: on MWF at [facility]  Last hemodialysis as outpatient was on friday  No complaints of any fluid overload, no complaints of uremia like nausea or vomiting  Stable electrolytes    Inpatient hemodialysis arranged for today  Hemodynamically stable  We will continue maintenance hemodialysis on MWF    2. Hypertension: Fluctuating blood sugars noted  Continue current blood pressure medications and continue to monitor blood pressures  Advised salt restricted diet    3. Anemia: Stable hemoglobin at 9.9  Continue Epogen with hemodialysis  Continue to monitor H&H    4. Renal osteodystrophy:   Stable calcium level  Hyperphosphatemia: continue phosphate binders  We will check phosphorus level and adjust binders as needed  Secondary hyperparathyroidism: Continue calcitriol/Sensipar as per outpatient protocol    5. SOB: covid pneumonia  Clinically stable  Medical management as per primary service.     DC plan: pt stable for dc from renal standpoint      Signed: Miladis Stevens, APRN - CNP    Addendum:  Rounds made along with saida NP  Patient seen and examined at bedside,    Labs and treatments reviewed   Plan discussed with patient and NP   Reviewed

## 2024-03-25 NOTE — PLAN OF CARE
Problem: Discharge Planning  Goal: Discharge to home or other facility with appropriate resources  Outcome: Adequate for Discharge     Problem: Safety - Adult  Goal: Free from fall injury  Outcome: Adequate for Discharge     Problem: Chronic Conditions and Co-morbidities  Goal: Patient's chronic conditions and co-morbidity symptoms are monitored and maintained or improved  Outcome: Adequate for Discharge     Problem: Skin/Tissue Integrity  Goal: Absence of new skin breakdown  Description: 1.  Monitor for areas of redness and/or skin breakdown  2.  Assess vascular access sites hourly  3.  Every 4-6 hours minimum:  Change oxygen saturation probe site  4.  Every 4-6 hours:  If on nasal continuous positive airway pressure, respiratory therapy assess nares and determine need for appliance change or resting period.  Outcome: Adequate for Discharge

## 2024-03-25 NOTE — PROGRESS NOTES
Bedside shift change report given to RICK Pereira, RN (oncoming nurse) by KACEY Warren, RN and ALBERTO Shaikh RN (offgoing nurse). Report included the following information SBAR, Intake/Output, MAR, Recent Results, Med Rec Status, and Quality Measures.    1930  Shift assessment completed. Pt A&Ox4 and denies pain. TDC to LUQ and mediport to RUQ (not being used at this time) Lungs have Rhonchi throughout and pt states he has a cough. BS present. Urostomy to RLQ. 350ml of yellow, cloudy malodorous urine drained.   Skin W/D.   Dialysis nurse at bedside stating there is a problem with the dialysis machine and all errors but one have been resolved. Nurse offered to get another dialysis machine to set up, but pt is declining. Pt states his niece Sara is going to be here at 2100 to pick him up and he is leaving at that time.   Writer and dialysis nurse discussed the reasons and need for him to stay, but pt continues to refuse.   Provider called to advise.     2005  Provider in room to discuss continuing tx and dialysis. Pt advised he would have to sign out AMA if he wants to leave at this time.     2015  AMA form signed by pt after one last attempt by writer to get pt to stay.   Pt declining HS medication. He said he would take his meds at home.     2115  Nursing supervisor taking pt down to [facility] car for AMA discharge.

## 2024-03-25 NOTE — PROGRESS NOTES
1530-Spoke with patient's sister Sara, she stated she will arrange for someone to take the patient home after dialysis.

## 2024-03-26 ENCOUNTER — CARE COORDINATION (OUTPATIENT)
Facility: CLINIC | Age: 79
End: 2024-03-26

## 2024-03-26 NOTE — CARE COORDINATION
Care Transitions Initial Follow Up Call    Call within 2 business days of discharge: Yes    Patient Current Location:  [place]    Care Transition Nurse contacted the patient by telephone to perform post hospital discharge assessment. Verified name and  with patient as identifiers. Provided introduction to self, and explanation of the Care Transition Nurse role.     Patient: Ann-Marie Cardenas Patient : 1945   MRN: [id number]      Reason for Admission,  per chart review:   -  Covid-19       Discharge Date: 3/25/24 RARS: Readmission Risk Score: 22.3      Last Discharge Facility       Date Complaint Diagnosis Description Type Department Provider    3/24/24 Cough COVID ... ED to Hosp-Admission (Discharged) (ADMITTED) [id number] Bruno Chung MD; Ayesha...            Was this an external facility discharge? No     Discharge Facility: [facility]     Challenges to be reviewed by the provider   Additional needs identified to be addressed with provider: No  none               Method of communication with provider: none.    Patient reports:   - He is feeling all right   - He has a cough       Care Transition Nurse reviewed discharge instructions, medical action plan, and red flags with patient who verbalized understanding. The patient was given an opportunity to ask questions and does not have any further questions or concerns at this time. Were discharge instructions available to patient? N/a - per chart review, patient left AMA. No discharge after visit summary is noted at this time. Reviewed appropriate site of care based on symptoms and resources available to patient including:   PCP  Specialist  After hours contact number-medical provider office phone number.   When to call 911. The patient agrees to contact the PCP office for questions related to their healthcare.     Advance Care Planning:   Does patient have an Advance Directive:  not noted to be on file at this time.  .    Medication

## 2024-03-27 ENCOUNTER — CARE COORDINATION (OUTPATIENT)
Facility: CLINIC | Age: 79
End: 2024-03-27

## 2024-03-27 NOTE — CARE COORDINATION
Care Transitions Follow Up Call    Patient Current Location:  [place]    Care Transition Nurse contacted the patient by telephone to follow up after admission on 3- .  Verified name and  with patient as identifiers.    Patient: Ann-Marie Cardenas  Patient : 1945   MRN: [id number]      Reason for Admission: Covid-19     Discharge Date: 3/25/24 RARS: Readmission Risk Score: 22.3      Needs to be reviewed by the provider   Additional needs identified to be addressed with provider: No  none             Method of communication with provider: none.    Addressed changes since last contact:        It was difficult for CTN to understand patient at times via phone call.   CTN apologized to patient for asking him to repeat information at times.     Patient reports:   - He is feeling weak     CTN referred patient to PCP, after hours provider for non-emergency concerns or questions, and ED as needed or for worsening of symptoms.          Discussed follow-up appointments. If no appointment was previously scheduled, appointment scheduling offered: n/a .   Is follow up appointment scheduled within 7 days of discharge? No.  CTN has previously submitted a request for PCP Transition of Care follow up appointment.     Follow Up  Future Appointments   Date Time Provider Department Center   2024 10:30 AM Vale Villegas, [facility] BS AMB   4/3/2024  2:30 PM Vale Villegas [facility] BS AMB   2024  9:30 AM Collette Nicholas MD [facility] BS AMB       Care Transition Nurse reviewed medical action plan with patient and discussed any barriers to care and/or understanding of plan of care after discharge. Discussed appropriate site of care based on symptoms and resources available to patient including: PCP  Specialist  After hours contact number-medical office provider phone number.   When to call 911. The patient agrees to contact the PCP office for questions related to their healthcare.     Patients top risk factors

## 2024-03-27 NOTE — CARE COORDINATION
Attempted to contact patient for Complex Care Management. MSW attempted twice to reach patient. No answer, voicemail full and unable to leave message. Will attempt contact at a later time.    MILVIA Rahman  Care Management

## 2024-03-28 ENCOUNTER — CARE COORDINATION (OUTPATIENT)
Facility: CLINIC | Age: 79
End: 2024-03-28

## 2024-03-28 DIAGNOSIS — I10 ESSENTIAL HYPERTENSION: ICD-10-CM

## 2024-03-28 NOTE — CARE COORDINATION
Second attempt to contact patient for Complex Care Management. No answer, left message and provided contact information. Will attempt contact at a later time.    MILVIA Rahman  Care Management

## 2024-03-29 RX ORDER — AMLODIPINE BESYLATE 10 MG/1
TABLET ORAL
Qty: 90 TABLET | Refills: 0 | Status: SHIPPED | OUTPATIENT
Start: 2024-03-29

## 2024-03-30 LAB
BACTERIA SPEC CULT: NORMAL
BACTERIA SPEC CULT: NORMAL
Lab: NORMAL
Lab: NORMAL

## 2024-04-01 ENCOUNTER — CARE COORDINATION (OUTPATIENT)
Facility: CLINIC | Age: 79
End: 2024-04-01

## 2024-04-01 NOTE — CARE COORDINATION
MSW contacted patient for outreach. Spoke with patient and confirmed name and date of birth. MSW introduced self, role and purpose of call. Patient placed niece, Brenda Doe, on phone. MSW confirmed consent with patient to speak with niece.     Ms. Cardenas explained she is having challenges ordering and obtaining patient urostomy supplies from [employer]. Ms. Cardenas request assistance navigating insurance system. MSW, patient and Ms. Cardenas contacted [employer] who stated they are waiting for a pre-authorization from [employer]. MSW, patient and Ms. Cardenas contacted [employer] and spoke with a representative. Left a message with Care Coordination for assistance.     Patient is independent with ADL's and need support with housekeeping, meal preparation and transportation. Niece supports with housekeeping and meal preparation. She also schedules all transportation to and from medical appointments and dialysis. Patient attend dialysis MWF, 2:00 pm for approximately 3 hours. Patient receives Medicare & SNAP benefits.     Per niemerita, patient has not seen [facility] [place] since 2018. MSW encouraged niece to make an appointment.     MSW received a call from [employer] stating authorization was received and the order is being processed at this time. Shipping is 3-5 days.     MSW scheduled second outreach and will support as needed for supplies.     MILVIA Rahman  Care Management

## 2024-04-03 ENCOUNTER — OFFICE VISIT (OUTPATIENT)
Facility: CLINIC | Age: 79
End: 2024-04-03
Payer: MEDICARE

## 2024-04-03 ENCOUNTER — CARE COORDINATION (OUTPATIENT)
Facility: CLINIC | Age: 79
End: 2024-04-03

## 2024-04-03 VITALS
OXYGEN SATURATION: 95 % | HEIGHT: 68 IN | WEIGHT: 192.2 LBS | HEART RATE: 76 BPM | TEMPERATURE: 97.6 F | DIASTOLIC BLOOD PRESSURE: 69 MMHG | BODY MASS INDEX: 29.13 KG/M2 | RESPIRATION RATE: 16 BRPM | SYSTOLIC BLOOD PRESSURE: 158 MMHG

## 2024-04-03 DIAGNOSIS — N18.4 TYPE 2 DIABETES MELLITUS WITH STAGE 4 CHRONIC KIDNEY DISEASE, WITH LONG-TERM CURRENT USE OF INSULIN (HCC): ICD-10-CM

## 2024-04-03 DIAGNOSIS — N18.6 ESRD (END STAGE RENAL DISEASE) ON DIALYSIS (HCC): ICD-10-CM

## 2024-04-03 DIAGNOSIS — E11.22 TYPE 2 DIABETES MELLITUS WITH STAGE 4 CHRONIC KIDNEY DISEASE, WITH LONG-TERM CURRENT USE OF INSULIN (HCC): ICD-10-CM

## 2024-04-03 DIAGNOSIS — Z79.4 LONG TERM (CURRENT) USE OF INSULIN (HCC): ICD-10-CM

## 2024-04-03 DIAGNOSIS — Z99.2 ESRD (END STAGE RENAL DISEASE) ON DIALYSIS (HCC): ICD-10-CM

## 2024-04-03 DIAGNOSIS — I10 PRIMARY HYPERTENSION: Primary | ICD-10-CM

## 2024-04-03 DIAGNOSIS — Z79.4 TYPE 2 DIABETES MELLITUS WITH STAGE 4 CHRONIC KIDNEY DISEASE, WITH LONG-TERM CURRENT USE OF INSULIN (HCC): ICD-10-CM

## 2024-04-03 PROCEDURE — 3046F HEMOGLOBIN A1C LEVEL >9.0%: CPT | Performed by: NURSE PRACTITIONER

## 2024-04-03 PROCEDURE — 3077F SYST BP >= 140 MM HG: CPT | Performed by: NURSE PRACTITIONER

## 2024-04-03 PROCEDURE — 3078F DIAST BP <80 MM HG: CPT | Performed by: NURSE PRACTITIONER

## 2024-04-03 PROCEDURE — 1123F ACP DISCUSS/DSCN MKR DOCD: CPT | Performed by: NURSE PRACTITIONER

## 2024-04-03 PROCEDURE — 99213 OFFICE O/P EST LOW 20 MIN: CPT | Performed by: NURSE PRACTITIONER

## 2024-04-03 NOTE — CARE COORDINATION
Care Transitions     Care Transitions Nurse ( CTN)  reviewed chart prior to outreach to patient   Per chart review, patient followed up with PCP office visit on this date.   CTN to follow up with patient / family on another day for Transition of Care outreach via phone call.

## 2024-04-03 NOTE — PROGRESS NOTES
Chief Complaint   Patient presents with    Follow-up       \"Have you been to the ER, urgent care clinic since your last visit?  Hospitalized since your last visit?\"    YES - When: approximately 1  weeks ago.  Where and Why: Covid.    “Have you seen or consulted any other health care providers outside of [facility] since your last visit?”    NO            
    20 minutes, I have performed a medically appropriate exam, ordering tests and medications, counseling and educating, and documenting in the EMR     I recommend this patient have regular follow-up appointments every   months for general health evaluations and management   Sooner if indicated     Past Medical History  Past Medical History:   Diagnosis Date    Acid reflux     Arrhythmia     Arthritis     Bladder cancer (HCC)     Colon cancer (HCC) 11/2021    Congestive heart failure (HCC)     Deep vein thrombosis (DVT) of proximal vein of both lower extremities (HCC) 9/14/2020    Diabetes mellitus (HCC)     Difficult intubation     Narrow airway-per Pulm notes(in media)     ESRD (end stage renal disease) (HCC)     stage IV/V per renal notes care everywhere 5/22/23    GERD (gastroesophageal reflux disease)     Gout     High cholesterol     Hypertension     Hypomagnesemia 05/2023    Kidney carcinoma, left (HCC) 2016    left nephrectomy    Kidney disease     Pituitary mass (HCC)     Pulmonary HTN (HCC) 10/2021    PASP 77 mmHg    Reflux esophagitis     Unspecified deficiency anemia           SUBJECTIVE/OBJECTIVE:    HPI: 78-year-old male presents to the office for hospital admission and discharge  Patient was admitted to the hospital on March 24  Discharged from the hospital March 25  Admission diagnosis was COVID pneumonia    Patient states he is greatly improved and reports he has much less coughing, and no shortness of breath chest pain weakness fevers or chills.    Patient does get hemodialysis on Tuesday Thursdays and Saturdays  Patient states his blood pressures are monitored during these days he states usually his blood pressure is a little high before dialysis but afterwards it is more of a normal level    Noted patient had a hemoglobin A1c on March 24, 2024 while hospitalized.  This number is 9.5.  The patient states he uses the medication Lantus he states he adjust this dose according to his morning blood

## 2024-04-04 ENCOUNTER — CARE COORDINATION (OUTPATIENT)
Facility: CLINIC | Age: 79
End: 2024-04-04

## 2024-04-04 NOTE — CARE COORDINATION
Received referral from Geraldine CALVIN, requesting outreach to patient for assistance with insurance and ostomy supplies.  SW provided support to patient. By assisting with navigating the insurance company authorizations. Ostomy supplies were approved via insurance and delivered to patient yesterday.Discussed with patient and niece other social work needs (transportation, meals and nutrition, care aide). Niece supports patient with some ADL/IADL as patient can still complete task independently. No further needs identified at this time.   Patient verbalized understanding.  No further outreached scheduled at this time.       MILVIA Rahman  Care Management

## 2024-04-04 NOTE — CARE COORDINATION
Care Transitions     Per chart review, the MSW with [facility] Care Coordination Department has reached out to patient / family on this date.  CTN will attempt to reach out to patient on another day.

## 2024-04-04 NOTE — CARE COORDINATION
MSW completed outreach to inquire if ostomy supplies were shipped. Spoke with Floresita who stated supplies were shipped and delivered yesterday, 04/03 at 11:33 am.     MSW contacted patient and spoke with sulaiman. Confirmed name and date of birth. Brenda Alatorre, confirmed patient ostomy supplies were delivered yesterday.     MSW inquired if any other services were needed from MSW. Niemerita stated no, I am satisfied and appreciative of your help. MSW inquired on care aide. Niece stated she provided care to patient for task he is not able to complete.     MILVIA Rahman  Care Management

## 2024-04-05 ENCOUNTER — CARE COORDINATION (OUTPATIENT)
Facility: CLINIC | Age: 79
End: 2024-04-05

## 2024-04-05 NOTE — CARE COORDINATION
Care Transitions Outreach Attempt    Attempted to reach patient for transitions of care follow up. Unable to reach patient.    Care Transitions Nurse ( CTN) attempted to contact patient via telephone call.   There was no response. A voicemail message was left advising this was not an emergency phone call. CTN contact information provided.  No patient medical information was left on the voicemail message.       Patient: Ann-Marie Cardenas Patient : 1945 MRN: [id number]    Last Discharge Facility       Date Complaint Diagnosis Description Type Department Provider    3/24/24 Cough COVID ... ED to Hosp-Admission (Discharged) (ADMITTED) [id number] rBuno Chung MD; [facility]...              Noted following upcoming appointments from discharge chart review:   [facility] follow up appointment(s):   Future Appointments   Date Time Provider Department Center   2024  9:00 AM Vale Villegas FNP [facility] BS AMB   2024  9:30 AM Collette Nicholas MD [facility] BS AMB

## 2024-04-11 ENCOUNTER — CARE COORDINATION (OUTPATIENT)
Facility: CLINIC | Age: 79
End: 2024-04-11

## 2024-04-11 NOTE — CARE COORDINATION
Care Transitions Follow Up Call    Patient Current Location:  [place]    Care Transition Nurse contacted the patient by telephone to follow up after admission on 3-24-25 .  Verified name and  with patient as identifiers.    Patient: Ann-Marie Cardenas  Patient : 1945   MRN: [id number]  Discharge Date: 3/25/24 RARS: Readmission Risk Score: 22.3      Needs to be reviewed by the provider   Additional needs identified to be addressed with provider: No  none             Method of communication with provider: none.    Patient reports:  - He is feeling allright   - Dialysis is going ok   - He does not have any concerns, questions, or needs at this time.       Follow Up  Future Appointments   Date Time Provider Department Center   2024  9:00 AM Vale Villegas FNP [facility]   2024  9:30 AM Collette Nicholas MD [facility] AMB       Patients top risk factors for readmission:   - Medical Condition     Interventions to address risk factors:   Patient gave CTN verbal permission to speak with his niece Brenda   CTN spoke with patient's family member Brenda and followed up with any remaining medication questions.  Brenda related that patient is taking the following medications as ordered:   - Senokot  - Albuterol inhaler as needed   - Lantus   - Nitroglycerin as needed   - Renvela     Offered patient enrollment in the Remote Patient Monitoring (RPM) program for in-home monitoring:  n/a  .     Care Transitions Subsequent and Final Call    Subsequent and Final Calls  Care Transitions Interventions  Other Interventions:             Care Transition Nurse provided contact information for future needs. Plan for follow-up call in 7-10 days based on severity of symptoms and risk factors.  Plan for next call:   Follow up with condition of patient   Follow up with appointments as needed   Attempt to follow up with advanced care planning as needed within approximately 30 days of hospital discharge    Assist as needed

## 2024-04-15 DIAGNOSIS — I82.4Y3 DEEP VEIN THROMBOSIS (DVT) OF PROXIMAL VEIN OF BOTH LOWER EXTREMITIES, UNSPECIFIED CHRONICITY (HCC): ICD-10-CM

## 2024-04-15 DIAGNOSIS — D68.69 SECONDARY HYPERCOAGULABLE STATE (HCC): ICD-10-CM

## 2024-04-15 DIAGNOSIS — E78.5 HYPERLIPIDEMIA, UNSPECIFIED HYPERLIPIDEMIA TYPE: ICD-10-CM

## 2024-04-15 DIAGNOSIS — Z79.01 CHRONIC ANTICOAGULATION: ICD-10-CM

## 2024-04-15 DIAGNOSIS — J30.1 SEASONAL ALLERGIC RHINITIS DUE TO POLLEN: ICD-10-CM

## 2024-04-17 RX ORDER — ATORVASTATIN CALCIUM 40 MG/1
40 TABLET, FILM COATED ORAL DAILY
Qty: 90 TABLET | Refills: 0 | Status: SHIPPED | OUTPATIENT
Start: 2024-04-17

## 2024-04-17 RX ORDER — LORATADINE 10 MG/1
10 TABLET ORAL DAILY
Qty: 90 TABLET | Refills: 0 | Status: SHIPPED | OUTPATIENT
Start: 2024-04-17

## 2024-04-17 RX ORDER — APIXABAN 5 MG/1
TABLET, FILM COATED ORAL
Qty: 180 TABLET | Refills: 0 | Status: SHIPPED | OUTPATIENT
Start: 2024-04-17

## 2024-04-18 ENCOUNTER — CARE COORDINATION (OUTPATIENT)
Facility: CLINIC | Age: 79
End: 2024-04-18

## 2024-04-18 NOTE — CARE COORDINATION
Care Transitions Outreach Attempt      Attempted to reach patient for transitions of care follow up. Unable to reach patient.  Maryann's family member, Ms. Kiera Doe ansered the phone call.   Ms. ALONDRA Doe as above advises that patient is not available.   Patient medical information was not shared  during the phone outreach.     Patient: Ann-Marie Cardenas Patient : 1945 MRN: [id number]    Last Discharge Facility       Date Complaint Diagnosis Description Type Department Provider    3/24/24 Cough COVID ... ED to Hosp-Admission (Discharged) (ADMITTED) [id number] Calvni MD; [place]...              Noted following upcoming appointments from discharge chart review:   [facility] follow up appointment(s):   Future Appointments   Date Time Provider Department Center   2024 10:30 AM Vale Villegas FNP [facility] BS AMB   5/3/2024 10:30 AM [facility] ECHO 1 [facility]   2024  9:30 AM Collette Nicholas MD [facility] BS AMB

## 2024-04-25 ENCOUNTER — CARE COORDINATION (OUTPATIENT)
Facility: CLINIC | Age: 79
End: 2024-04-25

## 2024-04-25 NOTE — CARE COORDINATION
Not on medication  TSH 0.42/T4 0.84   Patient has graduated from the Care Transitions  program on 4- .  Patient/family has the ability to self-manage at this time.      Goals Addressed    None         Patient has Care Transition Nurse's contact information for any further questions, concerns, or needs.  Patients upcoming visits:    Future Appointments   Date Time Provider Department Center   4/30/2024 10:30 AM Vale Villegas FNP [facility] BS AMB   5/3/2024 10:30 AM [facility] ECHO 1 [facility]   8/13/2024  9:30 AM Collette Nicholas MD [facility] BS AMB

## 2024-04-25 NOTE — CARE COORDINATION
Care Transitions Outreach Attempt       Attempted to reach patient for transitions of care follow up. Unable to reach patient.  Care Transitions Nurse ( CTN) attempted to contact patient via telephone call.   There was no response. A voicemail message was left advising this was not an emergency phone call.   CTN contact information provided.   Voicemail message included:  - Please contact  ( CTN) for assistance as needed.    -  For medical concerns or questions please contact medical providers or emergency services as needed     No specific patient medical information was left on the voicemail message.        Patient: Ann-Marie Cardenas Patient : 1945 MRN: [id number]    Last Discharge Facility       Date Complaint Diagnosis Description Type Department Provider    3/24/24 Cough COVID ... ED to Hosp-Admission (Discharged) (ADMITTED) [id number] Calvin MD; Ayesha...              Noted following upcoming appointments from discharge chart review:   [facility] follow up appointment(s):   Future Appointments   Date Time Provider Department Center   2024 10:30 AM Vale Villegas FNP [facility] BS AMB   5/3/2024 10:30 AM [facility] ECHO 1 [facility]   2024  9:30 AM Collette Nicholas MD [facility] BS AMB

## 2024-04-29 DIAGNOSIS — N18.4 CHRONIC KIDNEY DISEASE, STAGE 4 (SEVERE) (HCC): ICD-10-CM

## 2024-04-30 RX ORDER — ALLOPURINOL 100 MG/1
200 TABLET ORAL DAILY
Qty: 180 TABLET | OUTPATIENT
Start: 2024-04-30 | End: 2024-07-29

## 2024-04-30 RX ORDER — GLIPIZIDE 10 MG/1
5 TABLET ORAL DAILY
Qty: 45 TABLET | OUTPATIENT
Start: 2024-04-30

## 2024-04-30 NOTE — TELEPHONE ENCOUNTER
Patient should have refill on Allopurinol at pharmacy. Patient has appointment today and will address med refills at appointment.

## 2024-05-01 RX ORDER — SODIUM BICARBONATE 650 MG/1
650 TABLET ORAL 3 TIMES DAILY
Qty: 270 TABLET | Refills: 1 | Status: SHIPPED | OUTPATIENT
Start: 2024-05-01

## 2024-06-04 ENCOUNTER — TELEPHONE (OUTPATIENT)
Facility: CLINIC | Age: 79
End: 2024-06-04

## 2024-06-04 DIAGNOSIS — J30.1 SEASONAL ALLERGIC RHINITIS DUE TO POLLEN: ICD-10-CM

## 2024-06-04 DIAGNOSIS — E55.9 VITAMIN D DEFICIENCY: ICD-10-CM

## 2024-06-04 DIAGNOSIS — I10 ESSENTIAL HYPERTENSION: ICD-10-CM

## 2024-06-04 DIAGNOSIS — K21.9 GASTRO-ESOPHAGEAL REFLUX DISEASE WITHOUT ESOPHAGITIS: ICD-10-CM

## 2024-06-04 RX ORDER — HYDRALAZINE HYDROCHLORIDE 100 MG/1
100 TABLET, FILM COATED ORAL 3 TIMES DAILY
Qty: 90 TABLET | Refills: 0 | Status: SHIPPED | OUTPATIENT
Start: 2024-06-04

## 2024-06-04 RX ORDER — ASPIRIN 81 MG/1
TABLET, CHEWABLE ORAL
Qty: 90 TABLET | Refills: 0 | Status: SHIPPED | OUTPATIENT
Start: 2024-06-04

## 2024-06-04 RX ORDER — ISOSORBIDE MONONITRATE 30 MG/1
30 TABLET, EXTENDED RELEASE ORAL DAILY
Qty: 90 TABLET | Refills: 0 | Status: SHIPPED | OUTPATIENT
Start: 2024-06-04

## 2024-06-04 RX ORDER — ERGOCALCIFEROL 1.25 MG/1
CAPSULE ORAL
Qty: 12 CAPSULE | Refills: 0 | Status: SHIPPED | OUTPATIENT
Start: 2024-06-04

## 2024-06-04 RX ORDER — LORATADINE 10 MG/1
10 TABLET ORAL DAILY
Qty: 90 TABLET | Refills: 0 | Status: SHIPPED | OUTPATIENT
Start: 2024-06-04

## 2024-06-04 RX ORDER — OMEPRAZOLE 40 MG/1
40 CAPSULE, DELAYED RELEASE ORAL DAILY
Qty: 90 CAPSULE | Refills: 0 | Status: SHIPPED | OUTPATIENT
Start: 2024-06-04

## 2024-06-18 ENCOUNTER — HOSPITAL ENCOUNTER (EMERGENCY)
Age: 79
Discharge: HOME | End: 2024-06-18
Attending: FAMILY MEDICINE
Payer: MEDICARE

## 2024-06-18 VITALS
OXYGEN SATURATION: 99 % | HEART RATE: 70 BPM | BODY MASS INDEX: 28.59 KG/M2 | TEMPERATURE: 98.1 F | SYSTOLIC BLOOD PRESSURE: 164 MMHG | DIASTOLIC BLOOD PRESSURE: 71 MMHG | WEIGHT: 188 LBS | RESPIRATION RATE: 18 BRPM

## 2024-06-18 DIAGNOSIS — M79.18 MYALGIA, MULTIPLE SITES: Primary | ICD-10-CM

## 2024-06-18 LAB
ALBUMIN SERPL-MCNC: 3.2 G/DL (ref 3.4–5)
ALBUMIN/GLOB SERPL: 0.8 (ref 0.8–1.7)
ALP SERPL-CCNC: 63 U/L (ref 45–117)
ALT SERPL-CCNC: 22 U/L (ref 16–61)
ANION GAP SERPL CALC-SCNC: 9 MMOL/L (ref 3–18)
AST SERPL W P-5'-P-CCNC: 35 U/L (ref 10–38)
BASOPHILS # BLD: 0 K/UL (ref 0–0.1)
BASOPHILS NFR BLD: 0 % (ref 0–2)
BILIRUB SERPL-MCNC: 0.6 MG/DL (ref 0.2–1)
BUN SERPL-MCNC: 40 MG/DL (ref 7–18)
BUN/CREAT SERPL: 8 (ref 12–20)
CA-I BLD-MCNC: 9.5 MG/DL (ref 8.5–10.1)
CHLORIDE SERPL-SCNC: 104 MMOL/L (ref 100–111)
CK SERPL-CCNC: 168 U/L (ref 39–308)
CO2 SERPL-SCNC: 28 MMOL/L (ref 21–32)
CREAT SERPL-MCNC: 5.16 MG/DL (ref 0.6–1.3)
DIFFERENTIAL METHOD BLD: ABNORMAL
EOSINOPHIL # BLD: 0.1 K/UL (ref 0–0.4)
EOSINOPHIL NFR BLD: 1 % (ref 0–5)
ERYTHROCYTE [DISTWIDTH] IN BLOOD BY AUTOMATED COUNT: 15.8 % (ref 11.6–14.5)
GLOBULIN SER CALC-MCNC: 4.1 G/DL (ref 2–4)
GLUCOSE SERPL-MCNC: 100 MG/DL (ref 74–99)
HCT VFR BLD AUTO: 33 % (ref 36–48)
HGB BLD-MCNC: 10.7 G/DL (ref 13–16)
IMM GRANULOCYTES # BLD AUTO: 0 K/UL (ref 0–0.04)
IMM GRANULOCYTES NFR BLD AUTO: 0 % (ref 0–0.5)
LYMPHOCYTES # BLD: 1.4 K/UL (ref 0.9–3.6)
LYMPHOCYTES NFR BLD: 18 % (ref 21–52)
MAGNESIUM SERPL-MCNC: 1.9 MG/DL (ref 1.6–2.6)
MCH RBC QN AUTO: 28.5 PG (ref 24–34)
MCHC RBC AUTO-ENTMCNC: 32.4 G/DL (ref 31–37)
MCV RBC AUTO: 87.8 FL (ref 78–100)
MONOCYTES # BLD: 1 K/UL (ref 0.05–1.2)
MONOCYTES NFR BLD: 12 % (ref 3–10)
NEUTS SEG # BLD: 5.5 K/UL (ref 1.8–8)
NEUTS SEG NFR BLD: 69 % (ref 40–73)
NRBC # BLD: 0 K/UL (ref 0–0.01)
NRBC BLD-RTO: 0 PER 100 WBC
PLATELET # BLD AUTO: 237 K/UL (ref 135–420)
PMV BLD AUTO: 10.2 FL (ref 9.2–11.8)
POTASSIUM SERPL-SCNC: 4.6 MMOL/L (ref 3.5–5.5)
PROT SERPL-MCNC: 7.3 G/DL (ref 6.4–8.2)
RBC # BLD AUTO: 3.76 M/UL (ref 4.35–5.65)
SODIUM SERPL-SCNC: 141 MMOL/L (ref 136–145)
WBC # BLD AUTO: 8 K/UL (ref 4.6–13.2)

## 2024-06-18 PROCEDURE — 82550 ASSAY OF CK (CPK): CPT

## 2024-06-18 PROCEDURE — 6370000000 HC RX 637 (ALT 250 FOR IP): Performed by: FAMILY MEDICINE

## 2024-06-18 PROCEDURE — 83735 ASSAY OF MAGNESIUM: CPT

## 2024-06-18 PROCEDURE — 80053 COMPREHEN METABOLIC PANEL: CPT

## 2024-06-18 PROCEDURE — 85025 COMPLETE CBC W/AUTO DIFF WBC: CPT

## 2024-06-18 PROCEDURE — 99283 EMERGENCY DEPT VISIT LOW MDM: CPT

## 2024-06-18 PROCEDURE — 36415 COLL VENOUS BLD VENIPUNCTURE: CPT

## 2024-06-18 RX ORDER — ACETAMINOPHEN 500 MG
1000 TABLET ORAL
Status: COMPLETED | OUTPATIENT
Start: 2024-06-18 | End: 2024-06-18

## 2024-06-18 RX ADMIN — ACETAMINOPHEN 1000 MG: 500 TABLET ORAL at 02:11

## 2024-06-18 ASSESSMENT — ENCOUNTER SYMPTOMS
EYES NEGATIVE: 1
GASTROINTESTINAL NEGATIVE: 1
RESPIRATORY NEGATIVE: 1

## 2024-06-18 NOTE — ED PROVIDER NOTES
[facility] EMERGENCY DEPT  EMERGENCY DEPARTMENT ENCOUNTER      Pt Name: Ann-Marie Cardenas  MRN: [id number]  Birthdate 1945  Date of evaluation: 6/18/2024  Provider: Bc Rodriguez DO  2:43 AM    CHIEF COMPLAINT       Chief Complaint   Patient presents with    Pain         HISTORY OF PRESENT ILLNESS    Ann-Marie Cardenas is a 78 y.o. male who presents to the emergency department pain all over     Patient presents to the ED via EMS for \"pain all over\". Pain started approx 5hr ago, no inciting event. Patient reports the pain woke him from sleep so he called EMS. Initially pain was 4/10. While en route to the ED, pain increased to 8/10 without specific inciting event. He denies falls or recent medication changes. He did have dialysis today. Nothing makes his pain better or worse. He did not take any medication for his pain.    The history is provided by the patient, the EMS personnel and medical records.       Nursing Notes were reviewed.    REVIEW OF SYSTEMS       Review of Systems   Constitutional: Negative.    HENT: Negative.     Eyes: Negative.    Respiratory: Negative.     Cardiovascular: Negative.    Gastrointestinal: Negative.    Musculoskeletal:  Positive for myalgias. Negative for arthralgias.   Skin: Negative.    Neurological: Negative.    All other systems reviewed and are negative.      Except as noted above the remainder of the review of systems was reviewed and negative.       PAST MEDICAL HISTORY     Past Medical History:   Diagnosis Date    Acid reflux     Arrhythmia     Arthritis     Bladder cancer (HCC)     Colon cancer (HCC) 11/2021    Congestive heart failure (HCC)     Deep vein thrombosis (DVT) of proximal vein of both lower extremities (HCC) 9/14/2020    Diabetes mellitus (HCC)     Difficult intubation     Narrow airway-per Pulm notes(in media)     ESRD (end stage renal disease) (HCC)     stage IV/V per renal notes care everywhere 5/22/23    GERD (gastroesophageal reflux disease)     Gout      Island Pedicle Flap Text: The defect edges were debeveled with a #15 scalpel blade.  Given the location of the defect, shape of the defect and the proximity to free margins an island pedicle advancement flap was deemed most appropriate.  Using a sterile surgical marker, an appropriate advancement flap was drawn incorporating the defect, outlining the appropriate donor tissue and placing the expected incisions within the relaxed skin tension lines where possible.    The area thus outlined was incised deep to adipose tissue with a #15 scalpel blade.  The skin margins were undermined to an appropriate distance in all directions around the primary defect and laterally outward around the island pedicle utilizing iris scissors.  There was minimal undermining beneath the pedicle flap.

## 2024-06-18 NOTE — DISCHARGE INSTRUCTIONS
Take Tylenol as needed for pain. Follow up with your Primary Doctor. Return to the ED for new or worsening symptoms

## 2024-06-20 ENCOUNTER — OFFICE VISIT (OUTPATIENT)
Facility: CLINIC | Age: 79
End: 2024-06-20
Payer: MEDICARE

## 2024-06-20 VITALS
HEIGHT: 68 IN | BODY MASS INDEX: 28.79 KG/M2 | RESPIRATION RATE: 18 BRPM | SYSTOLIC BLOOD PRESSURE: 111 MMHG | HEART RATE: 76 BPM | TEMPERATURE: 96.9 F | WEIGHT: 190 LBS | OXYGEN SATURATION: 95 % | DIASTOLIC BLOOD PRESSURE: 65 MMHG

## 2024-06-20 DIAGNOSIS — Z79.4 TYPE 2 DIABETES MELLITUS WITH CHRONIC KIDNEY DISEASE ON CHRONIC DIALYSIS, WITH LONG-TERM CURRENT USE OF INSULIN (HCC): ICD-10-CM

## 2024-06-20 DIAGNOSIS — R53.1 WEAKNESS: Primary | ICD-10-CM

## 2024-06-20 DIAGNOSIS — I10 PRIMARY HYPERTENSION: ICD-10-CM

## 2024-06-20 DIAGNOSIS — N18.6 TYPE 2 DIABETES MELLITUS WITH CHRONIC KIDNEY DISEASE ON CHRONIC DIALYSIS, WITH LONG-TERM CURRENT USE OF INSULIN (HCC): ICD-10-CM

## 2024-06-20 DIAGNOSIS — I50.32 CHRONIC DIASTOLIC CONGESTIVE HEART FAILURE (HCC): ICD-10-CM

## 2024-06-20 DIAGNOSIS — E11.22 TYPE 2 DIABETES MELLITUS WITH CHRONIC KIDNEY DISEASE ON CHRONIC DIALYSIS, WITH LONG-TERM CURRENT USE OF INSULIN (HCC): ICD-10-CM

## 2024-06-20 DIAGNOSIS — Z99.2 TYPE 2 DIABETES MELLITUS WITH CHRONIC KIDNEY DISEASE ON CHRONIC DIALYSIS, WITH LONG-TERM CURRENT USE OF INSULIN (HCC): ICD-10-CM

## 2024-06-20 PROCEDURE — 3074F SYST BP LT 130 MM HG: CPT | Performed by: NURSE PRACTITIONER

## 2024-06-20 PROCEDURE — 3046F HEMOGLOBIN A1C LEVEL >9.0%: CPT | Performed by: NURSE PRACTITIONER

## 2024-06-20 PROCEDURE — 99213 OFFICE O/P EST LOW 20 MIN: CPT | Performed by: NURSE PRACTITIONER

## 2024-06-20 PROCEDURE — 3078F DIAST BP <80 MM HG: CPT | Performed by: NURSE PRACTITIONER

## 2024-06-20 PROCEDURE — 1123F ACP DISCUSS/DSCN MKR DOCD: CPT | Performed by: NURSE PRACTITIONER

## 2024-06-20 NOTE — PROGRESS NOTES
Chief Complaint   Patient presents with    Follow-up     ER follow up [facility] 6-18-24 for Myalgia, states pain is better but is fatigued, poor appetite     Dyalisis 3 days a week  \"Have you been to the ER, urgent care clinic since your last visit?  Hospitalized since your last visit?\"    Yes F for Myalgia 6-18-24    “Have you seen or consulted any other health care providers [facility] since [facility] last visit?”    NO            
glucose monitor strips Use to test blood sugars 3 times a day. Dispense test strips covered by insurance to go with covered meter. Patient states Freestyle Lite.    Accu-Chek Softclix Lancets MISC 1 each by Does not apply route 3 times daily Use to check blood sugar 3 times daily    glipiZIDE (GLUCOTROL) 10 MG tablet Take 0.5 tablets by mouth daily    blood glucose monitor strips 1 strip by Other route daily Use Accu Check Guide Me test strips to test blood sugar one time daily    B-D UF III MINI PEN NEEDLES 31G X 5 MM MISC USE TO INJECT AS DIRECTED THREE TIMES DAILY    blood glucose test strips (ASCENSIA AUTODISC VI;ONE TOUCH ULTRA TEST VI) strip 1 each by In Vitro route daily As needed.    albuterol sulfate HFA (PROVENTIL;VENTOLIN;PROAIR) 108 (90 Base) MCG/ACT inhaler Inhale 2 puffs into the lungs every 4 hours as needed    insulin glargine (LANTUS SOLOSTAR) 100 UNIT/ML injection pen Inject 8 Units into the skin daily Units according to his blood sugar reading    nitroGLYCERIN (NITROSTAT) 0.4 MG SL tablet Place 1 tablet under the tongue    allopurinol (ZYLOPRIM) 100 MG tablet TAKE 2 TABLETS BY MOUTH DAILY    sennosides-docusate sodium (SENOKOT-S) 8.6-50 MG tablet Take one tablet every other night for constipation, HOLD this medication for loose stools. (Patient not taking: Reported on 6/20/2024)     No current facility-administered medications for this visit.       /65 (Site: Left Upper Arm, Position: Sitting)   Pulse 76   Temp 96.9 °F (36.1 °C) (Temporal)   Resp 18   Ht 1.727 m (5' 8\")   Wt 86.2 kg (190 lb)   SpO2 95%   BMI 28.89 kg/m²          Physical Exam   General:  alert, cooperative, well appearing, in no apparent distress.  Eyes:  Pupils are equally round and reactive to light with accommodation.   ENT:    The tongue and mucous membranes are pink and moist without lesions.  The pharynx is non-erythematous without exudates.  Neck:  There is normal range of motion.  The thyroid is normal size

## 2024-07-03 DIAGNOSIS — N18.4 CHRONIC KIDNEY DISEASE, STAGE 4 (SEVERE) (HCC): ICD-10-CM

## 2024-07-03 DIAGNOSIS — I10 ESSENTIAL HYPERTENSION: ICD-10-CM

## 2024-07-03 RX ORDER — AMLODIPINE BESYLATE 10 MG/1
TABLET ORAL
Qty: 90 TABLET | Refills: 0 | Status: SHIPPED | OUTPATIENT
Start: 2024-07-03

## 2024-07-03 RX ORDER — FUROSEMIDE 40 MG/1
TABLET ORAL
Qty: 90 TABLET | Refills: 0 | Status: SHIPPED | OUTPATIENT
Start: 2024-07-03

## 2024-07-13 ENCOUNTER — APPOINTMENT (OUTPATIENT)
Age: 79
End: 2024-07-13
Payer: MEDICARE

## 2024-07-13 ENCOUNTER — HOSPITAL ENCOUNTER (EMERGENCY)
Age: 79
Discharge: HOME | End: 2024-07-13
Attending: FAMILY MEDICINE
Payer: MEDICARE

## 2024-07-13 VITALS
DIASTOLIC BLOOD PRESSURE: 67 MMHG | HEIGHT: 68 IN | BODY MASS INDEX: 28.43 KG/M2 | WEIGHT: 187.6 LBS | HEART RATE: 58 BPM | RESPIRATION RATE: 14 BRPM | TEMPERATURE: 98 F | OXYGEN SATURATION: 98 % | SYSTOLIC BLOOD PRESSURE: 146 MMHG

## 2024-07-13 DIAGNOSIS — N18.6 END STAGE RENAL DISEASE (HCC): ICD-10-CM

## 2024-07-13 DIAGNOSIS — R07.89 ATYPICAL CHEST PAIN: Primary | ICD-10-CM

## 2024-07-13 LAB
ALBUMIN SERPL-MCNC: 3.3 G/DL (ref 3.4–5)
ALBUMIN/GLOB SERPL: 0.9 (ref 0.8–1.7)
ALP SERPL-CCNC: 68 U/L (ref 45–117)
ALT SERPL-CCNC: 20 U/L (ref 16–61)
AMPHET UR QL SCN: NEGATIVE
ANION GAP SERPL CALC-SCNC: 12 MMOL/L (ref 3–18)
APPEARANCE UR: CLEAR
AST SERPL W P-5'-P-CCNC: 18 U/L (ref 10–38)
BACTERIA URNS QL MICRO: NEGATIVE /HPF
BARBITURATES UR QL SCN: NEGATIVE
BASOPHILS # BLD: 0 K/UL (ref 0–0.1)
BASOPHILS NFR BLD: 1 % (ref 0–2)
BENZODIAZ UR QL: NEGATIVE
BILIRUB SERPL-MCNC: 0.3 MG/DL (ref 0.2–1)
BILIRUB UR QL: NEGATIVE
BUN SERPL-MCNC: 32 MG/DL (ref 7–18)
BUN/CREAT SERPL: 6 (ref 12–20)
CA-I BLD-MCNC: 9.7 MG/DL (ref 8.5–10.1)
CANNABINOIDS UR QL SCN: NEGATIVE
CHLORIDE SERPL-SCNC: 100 MMOL/L (ref 100–111)
CO2 SERPL-SCNC: 28 MMOL/L (ref 21–32)
COCAINE UR QL SCN: NEGATIVE
COLOR UR: YELLOW
CREAT SERPL-MCNC: 4.95 MG/DL (ref 0.6–1.3)
DIFFERENTIAL METHOD BLD: ABNORMAL
EOSINOPHIL # BLD: 0.1 K/UL (ref 0–0.4)
EOSINOPHIL NFR BLD: 1 % (ref 0–5)
EPITH CASTS URNS QL MICRO: NORMAL /LPF (ref 0–20)
ERYTHROCYTE [DISTWIDTH] IN BLOOD BY AUTOMATED COUNT: 14.1 % (ref 11.6–14.5)
FENTANYL UR QL SCN: NEGATIVE
GLOBULIN SER CALC-MCNC: 3.8 G/DL (ref 2–4)
GLUCOSE SERPL-MCNC: 204 MG/DL (ref 74–99)
GLUCOSE UR STRIP.AUTO-MCNC: NEGATIVE MG/DL
HCT VFR BLD AUTO: 33.7 % (ref 36–48)
HGB BLD-MCNC: 11 G/DL (ref 13–16)
HGB UR QL STRIP: NEGATIVE
IMM GRANULOCYTES # BLD AUTO: 0 K/UL (ref 0–0.04)
IMM GRANULOCYTES NFR BLD AUTO: 0 % (ref 0–0.5)
KETONES UR QL STRIP.AUTO: NEGATIVE MG/DL
LACTATE SERPL-SCNC: 1.9 MMOL/L (ref 0.4–2)
LEUKOCYTE ESTERASE UR QL STRIP.AUTO: ABNORMAL
LYMPHOCYTES # BLD: 1.8 K/UL (ref 0.9–3.6)
LYMPHOCYTES NFR BLD: 26 % (ref 21–52)
Lab: NORMAL
MCH RBC QN AUTO: 28.8 PG (ref 24–34)
MCHC RBC AUTO-ENTMCNC: 32.6 G/DL (ref 31–37)
MCV RBC AUTO: 88.2 FL (ref 78–100)
METHADONE UR QL: NEGATIVE
MONOCYTES # BLD: 0.9 K/UL (ref 0.05–1.2)
MONOCYTES NFR BLD: 12 % (ref 3–10)
NEUTS SEG # BLD: 4.3 K/UL (ref 1.8–8)
NEUTS SEG NFR BLD: 60 % (ref 40–73)
NITRITE UR QL STRIP.AUTO: NEGATIVE
NRBC # BLD: 0 K/UL (ref 0–0.01)
NRBC BLD-RTO: 0 PER 100 WBC
OPIATES UR QL: NEGATIVE
OXYCODONE UR QL SCN: NEGATIVE
PCP UR QL: NEGATIVE
PH UR STRIP: >8.5 [PH] (ref 5–8)
PLATELET # BLD AUTO: 257 K/UL (ref 135–420)
PMV BLD AUTO: 10 FL (ref 9.2–11.8)
POTASSIUM SERPL-SCNC: 3.5 MMOL/L (ref 3.5–5.5)
PROPOXYPH UR QL: NEGATIVE
PROT SERPL-MCNC: 7.1 G/DL (ref 6.4–8.2)
PROT UR STRIP-MCNC: 100 MG/DL
RBC # BLD AUTO: 3.82 M/UL (ref 4.35–5.65)
RBC #/AREA URNS HPF: NORMAL /HPF (ref 0–2)
SODIUM SERPL-SCNC: 140 MMOL/L (ref 136–145)
SP GR UR REFRACTOMETRY: 1.01 (ref 1–1.03)
TRICYCLICS UR QL: NEGATIVE
TROPONIN I SERPL HS-MCNC: 35 NG/L (ref 0–78)
TROPONIN I SERPL HS-MCNC: 38 NG/L (ref 0–78)
UROBILINOGEN UR QL STRIP.AUTO: 0.2 EU/DL (ref 0.2–1)
WBC # BLD AUTO: 7 K/UL (ref 4.6–13.2)
WBC URNS QL MICRO: NORMAL /HPF (ref 0–4)

## 2024-07-13 PROCEDURE — 87088 URINE BACTERIA CULTURE: CPT

## 2024-07-13 PROCEDURE — 81001 URINALYSIS AUTO W/SCOPE: CPT

## 2024-07-13 PROCEDURE — 99285 EMERGENCY DEPT VISIT HI MDM: CPT

## 2024-07-13 PROCEDURE — 83605 ASSAY OF LACTIC ACID: CPT

## 2024-07-13 PROCEDURE — 85025 COMPLETE CBC W/AUTO DIFF WBC: CPT

## 2024-07-13 PROCEDURE — 93005 ELECTROCARDIOGRAM TRACING: CPT | Performed by: FAMILY MEDICINE

## 2024-07-13 PROCEDURE — 87086 URINE CULTURE/COLONY COUNT: CPT

## 2024-07-13 PROCEDURE — 80053 COMPREHEN METABOLIC PANEL: CPT

## 2024-07-13 PROCEDURE — 71045 X-RAY EXAM CHEST 1 VIEW: CPT

## 2024-07-13 PROCEDURE — 84484 ASSAY OF TROPONIN QUANT: CPT

## 2024-07-13 PROCEDURE — 6370000000 HC RX 637 (ALT 250 FOR IP): Performed by: FAMILY MEDICINE

## 2024-07-13 PROCEDURE — 80307 DRUG TEST PRSMV CHEM ANLYZR: CPT

## 2024-07-13 PROCEDURE — 87186 SC STD MICRODIL/AGAR DIL: CPT

## 2024-07-13 PROCEDURE — 36415 COLL VENOUS BLD VENIPUNCTURE: CPT

## 2024-07-13 RX ORDER — HYDRALAZINE HYDROCHLORIDE 20 MG/ML
10 INJECTION INTRAMUSCULAR; INTRAVENOUS
Status: DISCONTINUED | OUTPATIENT
Start: 2024-07-13 | End: 2024-07-13

## 2024-07-13 RX ORDER — CLONIDINE HYDROCHLORIDE 0.1 MG/1
0.1 TABLET ORAL
Status: COMPLETED | OUTPATIENT
Start: 2024-07-13 | End: 2024-07-13

## 2024-07-13 RX ADMIN — CLONIDINE HYDROCHLORIDE 0.1 MG: 0.1 TABLET ORAL at 02:49

## 2024-07-13 ASSESSMENT — ENCOUNTER SYMPTOMS
SHORTNESS OF BREATH: 1
CHEST TIGHTNESS: 1

## 2024-07-13 ASSESSMENT — PAIN - FUNCTIONAL ASSESSMENT: PAIN_FUNCTIONAL_ASSESSMENT: 0-10

## 2024-07-13 ASSESSMENT — PAIN SCALES - GENERAL: PAINLEVEL_OUTOF10: 0

## 2024-07-13 NOTE — ED TRIAGE NOTES
Patient arrived via ambulance. Patient reports shortness of breath that started this evening. Patient wears 02 2 L NC PRN. Patient is a dialysis patient on Mon/Wed/Fri and reports he had a complete dialysis run Friday 07/12/2024

## 2024-07-13 NOTE — ED NOTES
Patient family member, Kiera Cardenas, called for patient a ride home. She reports she will get his brother to come and get him.

## 2024-07-13 NOTE — DISCHARGE INSTRUCTIONS
As we spoke, EKG appears appropriate and similar to previous EKGs, a chemical given off by your heart called troponin is negative x 2.  Low suspicion that this is cardiac in origin.  I have sent your urine off for culture, if a antibiotic is warranted for that somebody will call you in the next 48 to 72 hours.  Be sure to go home and take your blood pressure medicines.  Including your hydralazine.  Return to the emergency department if you have any questions or concerns be sure to follow-up with your primary care provider regarding this chest pain.

## 2024-07-13 NOTE — ED PROVIDER NOTES
[facility] EMERGENCY DEPT  EMERGENCY DEPARTMENT ENCOUNTER      Pt Name: Ann-Marie Cardenas  MRN: [id number]  Birthdate 1945  Date of evaluation: 7/13/2024  Provider: Oliver Ruth DO  3:50 AM    CHIEF COMPLAINT       Chief Complaint   Patient presents with    Shortness of Breath         HISTORY OF PRESENT ILLNESS    Ann-Marie Cardenas is a 78 y.o. male who presents to the emergency department patient comes in stating he was lying in bed when he had a sudden attack of shortness of breath.  States he had little bit of chest pain the left side of his chest as well.  He called out of pressure.  He states he gave him several breathing treatment he is doing much better.  He is a end-stage renal dialysis patient who did his full chair time today.  He does produce some urine.  But has a ostomy secondary to bladder cancer.  But is also had brain cancer and lung cancer.  States he does not wear oxygen all the time.  He did himself a breathing treatment that did help.  Does have a history of DVT.  Does take Eliquis.  He does state that the outside of his head feels like it is full.  But denies any headaches he does state that he took all of his blood pressure medicines today    HPI    Nursing Notes were reviewed.    REVIEW OF SYSTEMS       Review of Systems   Respiratory:  Positive for chest tightness and shortness of breath.    Cardiovascular:  Positive for chest pain.   All other systems reviewed and are negative.      Except as noted above the remainder of the review of systems was reviewed and negative.       PAST MEDICAL HISTORY     Past Medical History:   Diagnosis Date    Acid reflux     Arrhythmia     Arthritis     Bladder cancer (HCC)     Colon cancer (HCC) 11/2021    Congestive heart failure (HCC)     Deep vein thrombosis (DVT) of proximal vein of both lower extremities (HCC) 9/14/2020    Diabetes mellitus (HCC)     Difficult intubation     Narrow airway-per Pulm notes(in media)     ESRD (end stage renal disease)  (HCC)     stage IV/V per renal notes care everywhere 5/22/23    GERD (gastroesophageal reflux disease)     Gout     High cholesterol     Hypertension     Hypomagnesemia 05/2023    Kidney carcinoma, left (HCC) 2016    left nephrectomy    Kidney disease     Pituitary mass (HCC)     Pulmonary HTN (HCC) 10/2021    PASP 77 mmHg    Reflux esophagitis     Unspecified deficiency anemia          SURGICAL HISTORY       Past Surgical History:   Procedure Laterality Date    AV FISTULA CREATION Left     COLONOSCOPY N/A 11/19/2021    COLONOSCOPY with Polypectomies, Tattoo & Clip Placement performed by Jimmy Mercedes MD at [facility] ENDOSCOPY    HX CYSTECTOMY  12/30/2009    bladder removal/ urostomy bag    KIDNEY REMOVAL Left 5/2009    Nephroureterectomy by Dr. Hodge    OTHER SURGICAL HISTORY      surgery on pituitary gland    OTHER SURGICAL HISTORY           CURRENT MEDICATIONS       Previous Medications    ACCU-CHEK SOFTCLIX LANCETS MISC    1 each by Does not apply route 3 times daily Use to check blood sugar 3 times daily    ALBUTEROL SULFATE HFA (PROVENTIL;VENTOLIN;PROAIR) 108 (90 BASE) MCG/ACT INHALER    Inhale 2 puffs into the lungs every 4 hours as needed    ALLOPURINOL (ZYLOPRIM) 100 MG TABLET    TAKE 2 TABLETS BY MOUTH DAILY    AMLODIPINE (NORVASC) 10 MG TABLET    TAKE 1 TABLET BY MOUTH DAILY    APIXABAN (ELIQUIS) 5 MG TABS TABLET    TAKE 1 TABLET BY MOUTH TWICE DAILY. NOTIFY MD IF ANY SIGNS OF BLEEDED    ATORVASTATIN (LIPITOR) 40 MG TABLET    TAKE 1 TABLET BY MOUTH DAILY    B-D UF III MINI PEN NEEDLES 31G X 5 MM MISC    USE TO INJECT AS DIRECTED THREE TIMES DAILY    BLOOD GLUCOSE MONITOR STRIPS    1 strip by Other route daily Use Accu Check Guide Me test strips to test blood sugar one time daily    BLOOD GLUCOSE MONITOR STRIPS    Use to test blood sugars 3 times a day. Dispense test strips covered by insurance to go with covered meter. Patient states Freestyle Lite.    BLOOD GLUCOSE MONITORING SUPPL (FREESTYLE LITE) VINI

## 2024-07-14 LAB
BACTERIA SPEC CULT: ABNORMAL
COLONY COUNT, CNT: ABNORMAL
EKG ATRIAL RATE: 62 BPM
EKG DIAGNOSIS: NORMAL
EKG P AXIS: 51 DEGREES
EKG P-R INTERVAL: 248 MS
EKG Q-T INTERVAL: 436 MS
EKG QRS DURATION: 114 MS
EKG QTC CALCULATION (BAZETT): 442 MS
EKG R AXIS: -24 DEGREES
EKG T AXIS: 40 DEGREES
EKG VENTRICULAR RATE: 62 BPM
Lab: ABNORMAL

## 2024-07-15 ENCOUNTER — TELEPHONE (OUTPATIENT)
Dept: FAMILY MEDICINE CLINIC | Facility: CLINIC | Age: 79
End: 2024-07-15

## 2024-07-15 DIAGNOSIS — N39.0 URINARY TRACT INFECTION WITHOUT HEMATURIA, SITE UNSPECIFIED: Primary | ICD-10-CM

## 2024-07-15 RX ORDER — CIPROFLOXACIN 250 MG/1
250 TABLET, FILM COATED ORAL 2 TIMES DAILY
Qty: 20 TABLET | Refills: 0 | Status: SHIPPED | OUTPATIENT
Start: 2024-07-15 | End: 2024-07-17 | Stop reason: ALTCHOICE

## 2024-07-15 NOTE — ED PROVIDER NOTES
Patient has a positive urine culture.  I reviewed the chart and he was seen for chest pain.  He has a history of bladder cancer with ostomy.  Attempted to call the patient today but he was not available for discussion.  Rather than just give antibiotics, I spoke with the nurse practitioner Abdulkadir, covering for the primary care provider today.  She will follow-up with the patient to ensure he is not having fevers or other symptoms to suggest UTI and treat accordingly.     Mikhail Mccray MD  07/15/24 [id number]

## 2024-07-15 NOTE — TELEPHONE ENCOUNTER
Received orders from GONZALO Rosas to send in 250mg BID x 10 days for patient due to recent culture results that she was contacted about from ER physician to treat. Attempted to contact patient and left multiple messages with no answer and emergency contact phone number is out of service. Bfpatt [place]

## 2024-07-16 DIAGNOSIS — I82.4Y3 DEEP VEIN THROMBOSIS (DVT) OF PROXIMAL VEIN OF BOTH LOWER EXTREMITIES, UNSPECIFIED CHRONICITY (HCC): ICD-10-CM

## 2024-07-16 DIAGNOSIS — E78.5 HYPERLIPIDEMIA, UNSPECIFIED HYPERLIPIDEMIA TYPE: ICD-10-CM

## 2024-07-16 DIAGNOSIS — Z79.01 CHRONIC ANTICOAGULATION: ICD-10-CM

## 2024-07-16 DIAGNOSIS — D68.69 SECONDARY HYPERCOAGULABLE STATE (HCC): ICD-10-CM

## 2024-07-16 DIAGNOSIS — I10 ESSENTIAL HYPERTENSION: ICD-10-CM

## 2024-07-17 RX ORDER — HYDRALAZINE HYDROCHLORIDE 100 MG/1
100 TABLET, FILM COATED ORAL 3 TIMES DAILY
Qty: 90 TABLET | Refills: 0 | Status: SHIPPED | OUTPATIENT
Start: 2024-07-17

## 2024-07-17 RX ORDER — ATORVASTATIN CALCIUM 40 MG/1
40 TABLET, FILM COATED ORAL DAILY
Qty: 90 TABLET | Refills: 0 | Status: SHIPPED | OUTPATIENT
Start: 2024-07-17

## 2024-07-17 RX ORDER — ALLOPURINOL 100 MG/1
200 TABLET ORAL DAILY
Qty: 180 TABLET | Refills: 0 | OUTPATIENT
Start: 2024-07-17 | End: 2024-10-15

## 2024-07-17 RX ORDER — GLIPIZIDE 10 MG/1
5 TABLET ORAL DAILY
Qty: 45 TABLET | Refills: 0 | Status: SHIPPED | OUTPATIENT
Start: 2024-07-17

## 2024-07-17 RX ORDER — APIXABAN 5 MG/1
TABLET, FILM COATED ORAL
Qty: 180 TABLET | Refills: 0 | Status: SHIPPED | OUTPATIENT
Start: 2024-07-17

## 2024-07-17 NOTE — TELEPHONE ENCOUNTER
Patient requested Allopurinol,however,I did not see Gout in any problem list nor a uric acid level. Please advise.

## 2024-07-18 RX ORDER — ALLOPURINOL 100 MG/1
200 TABLET ORAL DAILY
Qty: 90 TABLET | Refills: 1 | Status: SHIPPED | OUTPATIENT
Start: 2024-07-18 | End: 2024-10-16

## 2024-07-18 NOTE — TELEPHONE ENCOUNTER
The medication that GONZALO Rosas prescribed has been discontinued per this encounter.  She was taking care of this as she was on call Monday while PCP was not in office.  They are unable to reach patient for treatment.

## 2024-07-19 ENCOUNTER — TELEPHONE (OUTPATIENT)
Facility: CLINIC | Age: 79
End: 2024-07-19

## 2024-07-19 NOTE — TELEPHONE ENCOUNTER
Called and left voice message for pt to call office and ask for me so I can work him in an appointment via Dr. Yin' request.

## 2024-07-23 ENCOUNTER — OFFICE VISIT (OUTPATIENT)
Facility: CLINIC | Age: 79
End: 2024-07-23
Payer: MEDICARE

## 2024-07-23 VITALS
OXYGEN SATURATION: 97 % | TEMPERATURE: 97 F | BODY MASS INDEX: 28.58 KG/M2 | HEART RATE: 70 BPM | DIASTOLIC BLOOD PRESSURE: 56 MMHG | RESPIRATION RATE: 18 BRPM | WEIGHT: 188.6 LBS | SYSTOLIC BLOOD PRESSURE: 105 MMHG | HEIGHT: 68 IN

## 2024-07-23 DIAGNOSIS — I82.4Y3 DEEP VEIN THROMBOSIS (DVT) OF PROXIMAL VEIN OF BOTH LOWER EXTREMITIES, UNSPECIFIED CHRONICITY (HCC): ICD-10-CM

## 2024-07-23 DIAGNOSIS — N18.6 TYPE 2 DIABETES MELLITUS WITH CHRONIC KIDNEY DISEASE ON CHRONIC DIALYSIS, WITH LONG-TERM CURRENT USE OF INSULIN (HCC): ICD-10-CM

## 2024-07-23 DIAGNOSIS — E78.5 HYPERLIPIDEMIA, UNSPECIFIED HYPERLIPIDEMIA TYPE: ICD-10-CM

## 2024-07-23 DIAGNOSIS — Z79.4 TYPE 2 DIABETES MELLITUS WITH CHRONIC KIDNEY DISEASE ON CHRONIC DIALYSIS, WITH LONG-TERM CURRENT USE OF INSULIN (HCC): ICD-10-CM

## 2024-07-23 DIAGNOSIS — D68.69 SECONDARY HYPERCOAGULABLE STATE (HCC): ICD-10-CM

## 2024-07-23 DIAGNOSIS — R06.00 DYSPNEA, UNSPECIFIED TYPE: Primary | ICD-10-CM

## 2024-07-23 DIAGNOSIS — Z99.2 TYPE 2 DIABETES MELLITUS WITH CHRONIC KIDNEY DISEASE ON CHRONIC DIALYSIS, WITH LONG-TERM CURRENT USE OF INSULIN (HCC): ICD-10-CM

## 2024-07-23 DIAGNOSIS — Z79.01 CHRONIC ANTICOAGULATION: ICD-10-CM

## 2024-07-23 DIAGNOSIS — E11.22 TYPE 2 DIABETES MELLITUS WITH CHRONIC KIDNEY DISEASE ON CHRONIC DIALYSIS, WITH LONG-TERM CURRENT USE OF INSULIN (HCC): ICD-10-CM

## 2024-07-23 DIAGNOSIS — I10 ESSENTIAL HYPERTENSION: ICD-10-CM

## 2024-07-23 DIAGNOSIS — Z71.89 ACP (ADVANCE CARE PLANNING): ICD-10-CM

## 2024-07-23 PROCEDURE — 99214 OFFICE O/P EST MOD 30 MIN: CPT | Performed by: FAMILY MEDICINE

## 2024-07-23 PROCEDURE — 3078F DIAST BP <80 MM HG: CPT | Performed by: FAMILY MEDICINE

## 2024-07-23 PROCEDURE — 1123F ACP DISCUSS/DSCN MKR DOCD: CPT | Performed by: FAMILY MEDICINE

## 2024-07-23 PROCEDURE — 3046F HEMOGLOBIN A1C LEVEL >9.0%: CPT | Performed by: FAMILY MEDICINE

## 2024-07-23 PROCEDURE — 3074F SYST BP LT 130 MM HG: CPT | Performed by: FAMILY MEDICINE

## 2024-07-23 RX ORDER — ATORVASTATIN CALCIUM 40 MG/1
40 TABLET, FILM COATED ORAL DAILY
Qty: 90 TABLET | Refills: 1 | Status: SHIPPED | OUTPATIENT
Start: 2024-07-23

## 2024-07-23 RX ORDER — HYDRALAZINE HYDROCHLORIDE 100 MG/1
100 TABLET, FILM COATED ORAL 3 TIMES DAILY
Qty: 270 TABLET | Refills: 1 | Status: SHIPPED | OUTPATIENT
Start: 2024-07-23

## 2024-07-23 RX ORDER — ALLOPURINOL 100 MG/1
200 TABLET ORAL DAILY
Qty: 90 TABLET | Refills: 1 | Status: SHIPPED | OUTPATIENT
Start: 2024-07-23 | End: 2024-10-21

## 2024-07-23 RX ORDER — GLIPIZIDE 10 MG/1
5 TABLET ORAL DAILY
Qty: 45 TABLET | Refills: 1 | Status: SHIPPED | OUTPATIENT
Start: 2024-07-23

## 2024-07-23 NOTE — PROGRESS NOTES
Ann-Mraie Cardenas (: 1945) is a 78 y.o. male here for evaluation of the following chief concern(s):  Chronic condition management       ASSESSMENT/PLAN:  1. Dyspnea, unspecified type  -     CT CHEST WO CONTRAST; Future  -     Pulmonary function test; Future  -     [facility] - Jcarlos Corado MD, Pulmonology, [facility])  2. Essential hypertension  -     hydrALAZINE (APRESOLINE) 100 MG tablet; Take 1 tablet by mouth 3 times daily, Disp-270 tablet, R-1Normal  3. Type 2 diabetes mellitus with chronic kidney disease on chronic dialysis, with long-term current use of insulin (HCC)  -     allopurinol (ZYLOPRIM) 100 MG tablet; Take 2 tablets by mouth daily, Disp-90 tablet, R-1Normal  -     glipiZIDE (GLUCOTROL) 10 MG tablet; Take 0.5 tablets by mouth daily, Disp-45 tablet, R-1Normal  4. Hyperlipidemia, unspecified hyperlipidemia type  -     atorvastatin (LIPITOR) 40 MG tablet; Take 1 tablet by mouth daily, Disp-90 tablet, R-1Normal  5. Chronic anticoagulation  -     apixaban (ELIQUIS) 5 MG TABS tablet; TAKE 1 TABLET BY MOUTH TWICE DAILY. NOTIFY MD IF ANY SIGNS OF BLEEDING, Disp-180 tablet, R-1Normal  6. Secondary hypercoagulable state (HCC)  -     apixaban (ELIQUIS) 5 MG TABS tablet; TAKE 1 TABLET BY MOUTH TWICE DAILY. NOTIFY MD IF ANY SIGNS OF BLEEDING, Disp-180 tablet, R-1Normal  7. Deep vein thrombosis (DVT) of proximal vein of both lower extremities, unspecified chronicity (HCC)  -     apixaban (ELIQUIS) 5 MG TABS tablet; TAKE 1 TABLET BY MOUTH TWICE DAILY. NOTIFY MD IF ANY SIGNS OF BLEEDING, Disp-180 tablet, R-1Normal  8. ACP (advance care planning)    Mr. Cardenas appears medically stable and to be at/near baseline, persistent borderline low blood pressure.    Will check CT chest- hx last pneumonia (3/2024) did not show on CXR but was seen on CT chest.   PFTs to eval for possible RAD.   Appreciate Pulmonology w/ complexity of comorbidities, recurrent ER eval for dyspnea.    Pt to call Cardiology

## 2024-07-23 NOTE — PATIENT INSTRUCTIONS

## 2024-07-23 NOTE — PROGRESS NOTES
Patient states he keeps having shortness of breath episodes and when he goes to ER he is told no acute findings. Patient has been to Er several times.    Chief Complaint   Patient presents with    Follow-up     Chronic disease       \"Have you been to the ER, urgent care clinic since your last visit?  Hospitalized since your last visit?\"    Multiple times    “Have you seen or consulted any other health care providers outside of [facility] since your last visit?”    Dialysis

## 2024-08-01 ENCOUNTER — TRANSCRIBE ORDERS (OUTPATIENT)
Facility: HOSPITAL | Age: 79
End: 2024-08-01

## 2024-08-01 DIAGNOSIS — R06.00 DYSPNEA, UNSPECIFIED TYPE: Primary | ICD-10-CM

## 2024-08-06 ENCOUNTER — TELEPHONE (OUTPATIENT)
Facility: CLINIC | Age: 79
End: 2024-08-06

## 2024-08-06 DIAGNOSIS — R06.00 DYSPNEA, UNSPECIFIED TYPE: Primary | ICD-10-CM

## 2024-08-06 NOTE — TELEPHONE ENCOUNTER
Carmelo Singh Scheduling called and the Pulmonary Function Test order that was entered is incorrect. It needs to be PFT 20 or 30.

## 2024-08-07 ENCOUNTER — TELEPHONE (OUTPATIENT)
Facility: CLINIC | Age: 79
End: 2024-08-07

## 2024-08-07 ENCOUNTER — HOSPITAL ENCOUNTER (EMERGENCY)
Age: 79
Discharge: HOME | End: 2024-08-07
Attending: EMERGENCY MEDICINE
Payer: MEDICARE

## 2024-08-07 ENCOUNTER — HOSPITAL ENCOUNTER (EMERGENCY)
Age: 79
Discharge: HOME | End: 2024-08-07
Attending: FAMILY MEDICINE
Payer: MEDICARE

## 2024-08-07 VITALS
DIASTOLIC BLOOD PRESSURE: 89 MMHG | RESPIRATION RATE: 17 BRPM | SYSTOLIC BLOOD PRESSURE: 173 MMHG | BODY MASS INDEX: 27.28 KG/M2 | HEIGHT: 68 IN | WEIGHT: 180 LBS | HEART RATE: 67 BPM | TEMPERATURE: 97.7 F | OXYGEN SATURATION: 100 %

## 2024-08-07 VITALS
DIASTOLIC BLOOD PRESSURE: 94 MMHG | HEART RATE: 69 BPM | BODY MASS INDEX: 27.28 KG/M2 | OXYGEN SATURATION: 100 % | WEIGHT: 180 LBS | SYSTOLIC BLOOD PRESSURE: 151 MMHG | TEMPERATURE: 98.1 F | RESPIRATION RATE: 18 BRPM | HEIGHT: 68 IN

## 2024-08-07 DIAGNOSIS — K04.7 DENTAL INFECTION: ICD-10-CM

## 2024-08-07 DIAGNOSIS — K06.8 PAIN IN GUMS: Primary | ICD-10-CM

## 2024-08-07 DIAGNOSIS — K08.89 DENTALGIA: Primary | ICD-10-CM

## 2024-08-07 PROCEDURE — 99283 EMERGENCY DEPT VISIT LOW MDM: CPT

## 2024-08-07 PROCEDURE — 6370000000 HC RX 637 (ALT 250 FOR IP): Performed by: EMERGENCY MEDICINE

## 2024-08-07 PROCEDURE — 6370000000 HC RX 637 (ALT 250 FOR IP): Performed by: FAMILY MEDICINE

## 2024-08-07 RX ORDER — PENICILLIN V POTASSIUM 250 MG/1
500 TABLET ORAL
Status: COMPLETED | OUTPATIENT
Start: 2024-08-07 | End: 2024-08-07

## 2024-08-07 RX ORDER — PENICILLIN V POTASSIUM 500 MG/1
500 TABLET ORAL 3 TIMES DAILY
Qty: 30 TABLET | Refills: 0 | Status: SHIPPED | OUTPATIENT
Start: 2024-08-07 | End: 2024-08-17

## 2024-08-07 RX ORDER — LIDOCAINE HYDROCHLORIDE 20 MG/ML
SOLUTION OROPHARYNGEAL
Qty: 20 ML | Refills: 0 | Status: SHIPPED | OUTPATIENT
Start: 2024-08-07 | End: 2024-08-07

## 2024-08-07 RX ORDER — LIDOCAINE HYDROCHLORIDE 20 MG/ML
15 SOLUTION OROPHARYNGEAL
Status: COMPLETED | OUTPATIENT
Start: 2024-08-07 | End: 2024-08-07

## 2024-08-07 RX ORDER — HYDROCODONE BITARTRATE AND ACETAMINOPHEN 5; 325 MG/1; MG/1
1 TABLET ORAL
Status: COMPLETED | OUTPATIENT
Start: 2024-08-07 | End: 2024-08-07

## 2024-08-07 RX ORDER — LIDOCAINE HYDROCHLORIDE 20 MG/ML
SOLUTION OROPHARYNGEAL
Qty: 20 ML | Refills: 0 | Status: SHIPPED | OUTPATIENT
Start: 2024-08-07

## 2024-08-07 RX ORDER — ONDANSETRON 4 MG/1
4 TABLET, ORALLY DISINTEGRATING ORAL
Status: COMPLETED | OUTPATIENT
Start: 2024-08-07 | End: 2024-08-07

## 2024-08-07 RX ADMIN — HYDROCODONE BITARTRATE AND ACETAMINOPHEN 1 TABLET: 5; 325 TABLET ORAL at 17:30

## 2024-08-07 RX ADMIN — PENICILLIN V POTASSIUM 500 MG: 250 TABLET, FILM COATED ORAL at 17:30

## 2024-08-07 RX ADMIN — ONDANSETRON 4 MG: 4 TABLET, ORALLY DISINTEGRATING ORAL at 17:30

## 2024-08-07 RX ADMIN — LIDOCAINE HYDROCHLORIDE 15 ML: 20 SOLUTION ORAL at 03:44

## 2024-08-07 ASSESSMENT — ENCOUNTER SYMPTOMS: TROUBLE SWALLOWING: 0

## 2024-08-07 ASSESSMENT — PAIN - FUNCTIONAL ASSESSMENT
PAIN_FUNCTIONAL_ASSESSMENT: 0-10
PAIN_FUNCTIONAL_ASSESSMENT: 0-10

## 2024-08-07 ASSESSMENT — PAIN SCALES - GENERAL
PAINLEVEL_OUTOF10: 0
PAINLEVEL_OUTOF10: 10
PAINLEVEL_OUTOF10: 10

## 2024-08-07 ASSESSMENT — PAIN DESCRIPTION - LOCATION
LOCATION: TEETH
LOCATION: TEETH

## 2024-08-07 ASSESSMENT — PAIN DESCRIPTION - DESCRIPTORS: DESCRIPTORS: ACHING

## 2024-08-07 ASSESSMENT — PAIN DESCRIPTION - ORIENTATION: ORIENTATION: LEFT;UPPER

## 2024-08-07 NOTE — ED PROVIDER NOTES
EMERGENCY DEPARTMENT HISTORY AND PHYSICAL EXAM    Date: 8/7/2024  Patient Name: Ann-Marie Cardenas    History of Presenting Illness     Chief Complaint   Patient presents with    Dental Pain         History Provided By: Patient    Additional History (Context):   5:23 PM EDT  Ann-Marie Cardenas is a 78 y.o. male with PMHX of many medical comorbidities including high blood pressure, reflux, kidney disease, DVT, high cholesterol, renal cancer and bladder cancer who presents to the emergency department C/O dental pain.  Patient was seen here yesterday complaining of dental pain was not able to  his antibiotics.  There is a history of some potential pain related problems as prescription including lidocaine wash and penicillin.    Social History  Past smoking history, none current.  No alcohol or drug use.    Family History  Heart disease history listed below.    PCP: Collette Nicholas MD    Current Facility-Administered Medications   Medication Dose Route Frequency Provider Last Rate Last Admin    penicillin v potassium (VEETID) tablet 500 mg  500 mg Oral NOW Claus Merritt MD        HYDROcodone-acetaminophen (NORCO) 5-325 MG per tablet 1 tablet  1 tablet Oral NOW Claus Merritt MD        ondansetron (ZOFRAN-ODT) disintegrating tablet 4 mg  4 mg Oral NOW Claus Merritt MD         Current Outpatient Medications   Medication Sig Dispense Refill    penicillin v potassium (VEETID) 500 MG tablet Take 1 tablet by mouth 3 times daily for 10 days 30 tablet 0    lidocaine viscous hcl (XYLOCAINE) 2 % SOLN solution Use a cotton swab through spread across your upper gums every hour as needed for pain control 20 mL 0    hydrALAZINE (APRESOLINE) 100 MG tablet Take 1 tablet by mouth 3 times daily 270 tablet 1    atorvastatin (LIPITOR) 40 MG tablet Take 1 tablet by mouth daily 90 tablet 1    apixaban (ELIQUIS) 5 MG TABS tablet TAKE 1 TABLET BY MOUTH TWICE DAILY. NOTIFY MD IF ANY SIGNS OF BLEEDING 180 tablet 1     No   Physical Activity: Inactive (8/9/2023)    Exercise Vital Sign     Days of Exercise per Week: 0 days     Minutes of Exercise per Session: 0 min   Stress: Not on file   Social Connections: Not on file   Intimate Partner Violence: Not on file   Depression: Not at risk (7/23/2024)    PHQ-2     PHQ-2 Score: 0   Housing Stability: Low Risk  (7/23/2024)    Housing Stability Vital Sign     Unable to Pay for Housing in the Last Year: No     Number of Places Lived in the Last Year: 1     Unstable Housing in the Last Year: No   Interpersonal Safety: Not At Risk (6/18/2024)    Interpersonal Safety Domain Source: IP Abuse Screening     Physical abuse: Denies     Verbal abuse: Denies     Emotional abuse: Denies     Financial abuse: Denies     Sexual abuse: Denies   Utilities: Not At Risk (3/24/2024)    [facility] Utilities     Threatened with loss of utilities: No         Procedures:  Procedures    ED Course:   5:23 PM EDT: Initial assessment performed. The patients presenting problems have been discussed, and they are in agreement with the care plan formulated and outlined with them.  I have encouraged them to ask questions as they arise throughout their visit.         Diagnosis and Disposition       DISCHARGE NOTE:  5:37 PM  Ann-Marie Cardenas's  results have been reviewed with him.  He has been counseled regarding his diagnosis, treatment, and plan.  He verbally conveys understanding and agreement of the signs, symptoms, diagnosis, treatment and prognosis and additionally agrees to follow up as discussed.  He also agrees with the care-plan and conveys that all of his questions have been answered.  I have also provided discharge instructions for him that include: educational information regarding their diagnosis and treatment, and list of reasons why they would want to return to the ED prior to their follow-up appointment, should his condition change. He has been provided with education for proper emergency department

## 2024-08-07 NOTE — ED TRIAGE NOTES
Patient presented to the ed with dental pain. Patient was evaluated on yesterday for dental pain and was supposed to go retreive prescriptions for lidocaine and penicillin, however, didn't go to get the medications but decided to return to the ed.

## 2024-08-07 NOTE — TELEPHONE ENCOUNTER
Kiera called and said that the pt was in the ER yesterday. The doctor prescribed 2 medicines for him to take. She wants to make sure ok for him to take. He does have an appointment for Tuesday already (2 week fu and Medicare Wellness).

## 2024-08-07 NOTE — DISCHARGE INSTRUCTIONS
As we spoke, I want you to follow-up with your primary care doctor for additional treatment.  It does appear that you have some irritation of your gums they do not look infected.  Since the viscous lidocaine worked.  I have given you a syringe with some viscous lidocaine use a Q-tip to spread this across your upper gums to help with the pain.  Continue to take Tylenol as needed for additional pain relief.  It is imperative that you follow-up with a dentist.  I am going to put you on some penicillin.  Take this as prescribed for questionable infectious process.  I was not able to call these prescriptions and for what ever reason the Internet is not working.  Return to the emergency department if your pain becomes too unbearable.  Difficulties with breathing or swallowing or if there is any other questions or concerns

## 2024-08-07 NOTE — ED PROVIDER NOTES
[facility] EMERGENCY DEPT  EMERGENCY DEPARTMENT ENCOUNTER      Pt Name: Ann-Marie Cardenas  MRN: [id number]  Birthdate 1945  Date of evaluation: 8/7/2024  Provider: Oliver Ruth DO  4:11 AM    CHIEF COMPLAINT       Chief Complaint   Patient presents with    Dental Pain     Pt to ED reports dental pain left upper started yesterday worse this am pain 10/10         HISTORY OF PRESENT ILLNESS    Ann-Marie Cardenas is a 78 y.o. male who presents to the emergency department patient comes in stating he is having some upper dental pain that started yesterday been progressively getting worse he is taken some Tylenol has not helped.  Denies any difficulties with swallowing or breathing.  He is scheduled for dialysis later on this morning.  He is on anticoagulation he does not know why.  But does have a history of congestive heart failure.  He states he does not know what brought this on.  Admits that he has not seen a dentist in quite some time.  A cousin stated it looks like he has got some sores on the inside of his upper lips and gum when they looked yesterday.  He calls it a burning sensation on the inside of his lip.  Denies any fevers    HPI    Nursing Notes were reviewed.    REVIEW OF SYSTEMS       Review of Systems   Constitutional:  Negative for fever.   HENT:  Positive for dental problem. Negative for trouble swallowing.        Except as noted above the remainder of the review of systems was reviewed and negative.       PAST MEDICAL HISTORY     Past Medical History:   Diagnosis Date    Acid reflux     Arrhythmia     Arthritis     Bladder cancer (HCC)     Colon cancer (HCC) 11/2021    Congestive heart failure (HCC)     Deep vein thrombosis (DVT) of proximal vein of both lower extremities (HCC) 9/14/2020    Diabetes mellitus (HCC)     Difficult intubation     Narrow airway-per Pulm notes(in media)     ESRD (end stage renal disease) (HCC)     stage IV/V per renal notes care everywhere 5/22/23    GERD        Physical Exam  Vitals and nursing note reviewed.   Constitutional:       General: He is in acute distress.   HENT:      Head: Normocephalic and atraumatic.      Nose: Nose normal.      Mouth/Throat:      Comments: Patient has severe dentition with several missing and broken teeth.  Palpation of his frontal ethmoid and maxillary sinus yields no tenderness.  Palpation of his maxilla and mandible yield no tenderness.  However when I rolled back the upper lip.  Appears that he has got some sores/gingivitis of the gums that it looks erythematous.  But not infected.  His face is not hot erythematous.  No followed are noted on his breath.  Palpation of his remaining teeth in the mouth healed no tenderness.  Tongue is not swollen uvula is not swollen  Cardiovascular:      Rate and Rhythm: Normal rate.   Pulmonary:      Effort: Pulmonary effort is normal.   Lymphadenopathy:      Cervical: No cervical adenopathy.   Skin:     General: Skin is warm and dry.   Neurological:      General: No focal deficit present.      Mental Status: He is alert and oriented to person, place, and time.         DIAGNOSTIC RESULTS     EKG: All EKG's are interpreted by the Emergency Department Physician who either signs or Co-signs this chart in the absence of a cardiologist.        RADIOLOGY:   Non-plain film images such as CT, Ultrasound and MRI are read by the radiologist. Plain radiographic images are visualized and preliminarily interpreted by the emergency physician with the below findings:        Interpretation per the Radiologist below, if available at the time of this note:    No orders to display         ED BEDSIDE ULTRASOUND:   Performed by ED Physician - none    LABS:  Labs Reviewed - No data to display    All other labs were within normal range or not returned as of this dictation.    EMERGENCY DEPARTMENT COURSE and DIFFERENTIAL DIAGNOSIS/MDM:   Vitals:    Vitals:    08/07/24 0334 08/07/24 0347   BP: (!) 170/83 (!) 151/94   Pulse:

## 2024-08-07 NOTE — TELEPHONE ENCOUNTER
Called patient/Kiera and no answer, mailbox is full. It is fine for patient to take medication prescribed whie at ER.

## 2024-08-13 ENCOUNTER — OFFICE VISIT (OUTPATIENT)
Facility: CLINIC | Age: 79
End: 2024-08-13
Payer: MEDICARE

## 2024-08-13 VITALS
TEMPERATURE: 97 F | SYSTOLIC BLOOD PRESSURE: 133 MMHG | HEART RATE: 75 BPM | DIASTOLIC BLOOD PRESSURE: 52 MMHG | BODY MASS INDEX: 28.42 KG/M2 | WEIGHT: 187.5 LBS | HEIGHT: 68 IN | RESPIRATION RATE: 20 BRPM | OXYGEN SATURATION: 97 %

## 2024-08-13 VITALS
BODY MASS INDEX: 28.37 KG/M2 | WEIGHT: 187.2 LBS | HEART RATE: 75 BPM | HEIGHT: 68 IN | OXYGEN SATURATION: 97 % | RESPIRATION RATE: 20 BRPM | SYSTOLIC BLOOD PRESSURE: 133 MMHG | TEMPERATURE: 97.1 F | DIASTOLIC BLOOD PRESSURE: 52 MMHG

## 2024-08-13 DIAGNOSIS — Z99.2 TYPE 2 DIABETES MELLITUS WITH CHRONIC KIDNEY DISEASE ON CHRONIC DIALYSIS, WITH LONG-TERM CURRENT USE OF INSULIN (HCC): ICD-10-CM

## 2024-08-13 DIAGNOSIS — K12.2 ORAL ABSCESS: ICD-10-CM

## 2024-08-13 DIAGNOSIS — K12.2 ORAL ABSCESS: Primary | ICD-10-CM

## 2024-08-13 DIAGNOSIS — R54 FRAILTY SYNDROME IN GERIATRIC PATIENT: ICD-10-CM

## 2024-08-13 DIAGNOSIS — Z00.00 MEDICARE ANNUAL WELLNESS VISIT, SUBSEQUENT: Primary | ICD-10-CM

## 2024-08-13 DIAGNOSIS — Z99.2 ESRD ON HEMODIALYSIS (HCC): ICD-10-CM

## 2024-08-13 DIAGNOSIS — N18.6 ESRD ON HEMODIALYSIS (HCC): ICD-10-CM

## 2024-08-13 DIAGNOSIS — Z71.89 ADVANCED CARE PLANNING/COUNSELING DISCUSSION: ICD-10-CM

## 2024-08-13 DIAGNOSIS — N18.6 TYPE 2 DIABETES MELLITUS WITH CHRONIC KIDNEY DISEASE ON CHRONIC DIALYSIS, WITH LONG-TERM CURRENT USE OF INSULIN (HCC): ICD-10-CM

## 2024-08-13 DIAGNOSIS — E11.22 TYPE 2 DIABETES MELLITUS WITH CHRONIC KIDNEY DISEASE ON CHRONIC DIALYSIS, WITH LONG-TERM CURRENT USE OF INSULIN (HCC): ICD-10-CM

## 2024-08-13 DIAGNOSIS — I10 ESSENTIAL HYPERTENSION: ICD-10-CM

## 2024-08-13 DIAGNOSIS — Z79.4 TYPE 2 DIABETES MELLITUS WITH CHRONIC KIDNEY DISEASE ON CHRONIC DIALYSIS, WITH LONG-TERM CURRENT USE OF INSULIN (HCC): ICD-10-CM

## 2024-08-13 DIAGNOSIS — R05.3 CHRONIC COUGH: ICD-10-CM

## 2024-08-13 DIAGNOSIS — R06.00 DYSPNEA, UNSPECIFIED TYPE: ICD-10-CM

## 2024-08-13 LAB — HBA1C MFR BLD: 9.3 %

## 2024-08-13 PROCEDURE — 99214 OFFICE O/P EST MOD 30 MIN: CPT | Performed by: FAMILY MEDICINE

## 2024-08-13 PROCEDURE — 1123F ACP DISCUSS/DSCN MKR DOCD: CPT | Performed by: FAMILY MEDICINE

## 2024-08-13 PROCEDURE — 83036 HEMOGLOBIN GLYCOSYLATED A1C: CPT | Performed by: FAMILY MEDICINE

## 2024-08-13 PROCEDURE — 3078F DIAST BP <80 MM HG: CPT | Performed by: FAMILY MEDICINE

## 2024-08-13 PROCEDURE — G0439 PPPS, SUBSEQ VISIT: HCPCS | Performed by: FAMILY MEDICINE

## 2024-08-13 PROCEDURE — 3075F SYST BP GE 130 - 139MM HG: CPT | Performed by: FAMILY MEDICINE

## 2024-08-13 PROCEDURE — 3046F HEMOGLOBIN A1C LEVEL >9.0%: CPT | Performed by: FAMILY MEDICINE

## 2024-08-13 ASSESSMENT — LIFESTYLE VARIABLES: HOW OFTEN DO YOU HAVE A DRINK CONTAINING ALCOHOL: NEVER

## 2024-08-13 ASSESSMENT — PATIENT HEALTH QUESTIONNAIRE - PHQ9
SUM OF ALL RESPONSES TO PHQ QUESTIONS 1-9: 0
SUM OF ALL RESPONSES TO PHQ QUESTIONS 1-9: 0
1. LITTLE INTEREST OR PLEASURE IN DOING THINGS: NOT AT ALL
SUM OF ALL RESPONSES TO PHQ QUESTIONS 1-9: 0
SUM OF ALL RESPONSES TO PHQ9 QUESTIONS 1 & 2: 0
2. FEELING DOWN, DEPRESSED OR HOPELESS: NOT AT ALL
SUM OF ALL RESPONSES TO PHQ QUESTIONS 1-9: 0

## 2024-08-13 NOTE — PROGRESS NOTES
This is the Subsequent Medicare Annual Wellness Exam, performed 12 months or more after the Initial AWV or the last Subsequent AWV     I have reviewed the patient's medical history in detail and updated the computerized patient record.     ASSESSMENT/PLAN:  1. Medicare annual wellness visit, subsequent  2. ESRD on hemodialysis (HCC)  3. Frailty syndrome in geriatric patient  4. Advanced care planning/counseling discussion  Comments:  Full capacity. mPOA: Zully Escudero.  Pt wants natural passing at EOL, NO resuscitative procedures for cardiopulm ARREST, but treat reversible conditions    In addition, please see separate nurse note for details of screening questionnaires.   Screening for general health risk assessment, depression, alcohol/substance use, safety, function/falls: Normal.  Screening for cognition: ABNORMAL; patient had 24-hour caregiver support, no safety concerns related to memory at this time.  He was encouraged to seek care with a dentist, advised that he/family may call to [facility] to see if they may have financial support and/or accept his insurance.  He is currently on Penicillin for oral abscess.      Vaccines appear up to date except shingles.    Medicare recommended screening and prevention reviewed:   10/27/20: HCV ab negative  Prostate cancer screening (ages 55-69 yr): aged out  Pt quit tobacco in ; ~7 pack yr hx; aged out of AAA screen  Colorectal cancer screening: aged out  Diabetes screening (Q3yrs until 70 yr): 24 9.3%, ongoing care w/ insulin therapy.  Lipid screenin2023 LDL 89  Statin use: yes  Aspirin use: no, pt takes Eliquis    Advance Care Planning: Not previously completed.  Pt appears to have full decisional capacity.  mPOA: Zully Escudero (niece) as primary  He elects for natural passing at the end of life, would NOT want resuscitative procedures.  If his heart was still beating/he was still breathing then he WOULD want for treatment of any

## 2024-08-13 NOTE — PROGRESS NOTES
Cognitive word test done twice, only remembers 1 word    Ann-Marie Cardenas presents today for   Chief Complaint   Patient presents with    Medicare AWV     Is someone accompanying this pt? Yes, niece    Is the patient using any DME equipment during OV? Yes, cane    Risk Factor Screenings with Interventions     Fall Risk:  2 or more falls in past year?: no  Fall with injury in past year?: no    Alcohol:  Alcohol Use: Not At Risk (8/13/2024)    AUDIT-C     Frequency of Alcohol Consumption: Never     Average Number of Drinks: Patient does not drink     Frequency of Binge Drinking: Never       Depression:       Cognitive:  Words recalled: 1 Word Recalled  Total Score Interpretation: Abnormal Mini-Cog    Health Risk Assessment:     General  In general, how would you say your health is?: Fair  In the past 7 days, have you experienced any of the following: New or Increased Pain, New or Increased Fatigue, Loneliness, Social Isolation, Stress or Anger?: No      Health Habits/Nutrition  On average, how many days per week do you engage in moderate to strenuous exercise (like a brisk walk)?: 0 days  On average, how many minutes do you engage in exercise at this level?: 0 min  Do you eat balanced/healthy meals regularly?: Yes  Have you seen the dentist within the past year?: (!) No  Body mass index is 28.51 kg/m².      Hearing/Vision  Have you had an eye exam within the past year?: Yes  Do you have difficulty driving, watching TV, or doing any of your daily activities because of your eyesight?: No  Do you or your family notice any trouble with your hearing that hasn't been managed with hearing aids?: No      Safety  Do you have working smoke detectors?: Yes  Do you have any tripping hazards - loose or unsecured carpets or rugs?: No  Do you have non-slip mats or non-slip surfaces or shower bars or grab bars in your shower or bathtub?: Yes  Do you always fasten your seatbelt when you are in a car?: Yes      ADLs  In the past 7

## 2024-08-13 NOTE — PROGRESS NOTES
Patient has had 2 ER visits since last PCP visit: 8/7/24 dental infection  8/9/24 abdominal pain. Also went to [facility] as well since last visit.    Here today for 2 week follow up on SOB, chest CT done performed yet.    Chief Complaint   Patient presents with    Follow-up     Chronic disease       \"Have you been to the ER, urgent care clinic since your last visit?  Hospitalized since your last visit?\"    Yes    “Have you seen or consulted any other health care providers outside of [facility] since your last visit?”    NO     Fingerstick for HBa1C done in middle finger by Velia Leon LPN per order of Collette Nicholas MD after cleaning area with alcohol wipe.  Patient tolerated procedure well.        
tablet, Take 1 tablet by mouth daily, Disp: 90 tablet, Rfl: 0    loratadine (CLARITIN) 10 MG tablet, Take 1 tablet by mouth daily, Disp: 90 tablet, Rfl: 0    sodium bicarbonate 650 MG tablet, TAKE 1 TABLET BY MOUTH THREE TIMES DAILY, Disp: 270 tablet, Rfl: 1    sevelamer (RENVELA) 800 MG tablet, Take 1 tablet by mouth 3 times daily (with meals), Disp: , Rfl:     Blood Glucose Monitoring Suppl (FREESTYLE LITE) VINI, 1 Device by Does not apply route daily Use to check blood sugar three times daily. Please dispense meter covered by insurance. Thank You., Disp: 1 each, Rfl: 0    blood glucose monitor strips, Use to test blood sugars 3 times a day. Dispense test strips covered by insurance to go with covered meter. Patient states Freestyle Lite., Disp: 300 strip, Rfl: 3    Accu-Chek Softclix Lancets MISC, 1 each by Does not apply route 3 times daily Use to check blood sugar 3 times daily, Disp: 300 each, Rfl: 3    blood glucose monitor strips, 1 strip by Other route daily Use Accu Check Guide Me test strips to test blood sugar one time daily, Disp: 100 strip, Rfl: 3    B-D UF III MINI PEN NEEDLES 31G X 5 MM MISC, USE TO INJECT AS DIRECTED THREE TIMES DAILY, Disp: , Rfl:     blood glucose test strips (ASCENSIA AUTODISC VI;ONE TOUCH ULTRA TEST VI) strip, 1 each by In Vitro route daily As needed., Disp: 100 each, Rfl: 3    albuterol sulfate HFA (PROVENTIL;VENTOLIN;PROAIR) 108 (90 Base) MCG/ACT inhaler, Inhale 2 puffs into the lungs every 4 hours as needed, Disp: , Rfl:     insulin glargine (LANTUS SOLOSTAR) 100 UNIT/ML injection pen, Inject 8 Units into the skin daily Units according to his blood sugar reading, Disp: , Rfl:     nitroGLYCERIN (NITROSTAT) 0.4 MG SL tablet, Place 1 tablet under the tongue, Disp: , Rfl:     No Known Allergies    BP (!) 133/52   Pulse 75   Temp 97.1 °F (36.2 °C) (Temporal)   Resp 20   Ht 1.727 m (5' 8\")   Wt 84.9 kg (187 lb 3.2 oz)   SpO2 97%   BMI 28.46 kg/m²     Physical

## 2024-08-15 LAB
RESULT: ABNORMAL
RESULT: NORMAL

## 2024-08-27 DIAGNOSIS — I10 ESSENTIAL HYPERTENSION: ICD-10-CM

## 2024-08-27 DIAGNOSIS — E55.9 VITAMIN D DEFICIENCY: ICD-10-CM

## 2024-08-27 RX ORDER — ISOSORBIDE MONONITRATE 30 MG/1
30 TABLET, EXTENDED RELEASE ORAL DAILY
Qty: 90 TABLET | Refills: 1 | Status: ON HOLD | OUTPATIENT
Start: 2024-08-27

## 2024-08-28 DIAGNOSIS — K21.9 GASTRO-ESOPHAGEAL REFLUX DISEASE WITHOUT ESOPHAGITIS: ICD-10-CM

## 2024-08-28 RX ORDER — OMEPRAZOLE 40 MG/1
40 CAPSULE, DELAYED RELEASE ORAL DAILY
Qty: 90 CAPSULE | Refills: 0 | Status: ON HOLD | OUTPATIENT
Start: 2024-08-28

## 2024-08-30 DIAGNOSIS — E55.9 VITAMIN D DEFICIENCY: Primary | ICD-10-CM

## 2024-08-30 RX ORDER — ERGOCALCIFEROL 1.25 MG/1
CAPSULE, LIQUID FILLED ORAL
Qty: 12 CAPSULE | Refills: 0 | OUTPATIENT
Start: 2024-08-30

## 2024-08-30 RX ORDER — CHOLECALCIFEROL (VITAMIN D3) 25 MCG
1000 TABLET ORAL DAILY
Qty: 30 TABLET | Refills: 0 | Status: ON HOLD | OUTPATIENT
Start: 2024-08-30

## 2024-08-30 NOTE — PROGRESS NOTES
Most recent Vit D lvl >100; discontinue D2 weekly supplement.  Transition to Vit D3 1,000IU daily.    ~PCR

## 2024-09-01 ENCOUNTER — HOSPITAL ENCOUNTER (OUTPATIENT)
Age: 79
Setting detail: OBSERVATION
Discharge: HOME | End: 2024-09-03
Attending: FAMILY MEDICINE | Admitting: FAMILY MEDICINE
Payer: MEDICARE

## 2024-09-01 ENCOUNTER — APPOINTMENT (OUTPATIENT)
Age: 79
End: 2024-09-01
Payer: MEDICARE

## 2024-09-01 DIAGNOSIS — R06.00 DYSPNEA, UNSPECIFIED TYPE: ICD-10-CM

## 2024-09-01 DIAGNOSIS — R74.8 ELEVATED LIPASE: ICD-10-CM

## 2024-09-01 DIAGNOSIS — R07.9 CHEST PAIN, UNSPECIFIED TYPE: Primary | ICD-10-CM

## 2024-09-01 DIAGNOSIS — I42.9 CARDIOMYOPATHY, UNSPECIFIED TYPE (HCC): ICD-10-CM

## 2024-09-01 LAB
ALBUMIN SERPL-MCNC: 3.5 G/DL (ref 3.4–5)
ALBUMIN/GLOB SERPL: 0.9 (ref 0.8–1.7)
ALP SERPL-CCNC: 77 U/L (ref 45–117)
ALT SERPL-CCNC: 23 U/L (ref 16–61)
ANION GAP SERPL CALC-SCNC: 12 MMOL/L (ref 3–18)
APPEARANCE UR: CLEAR
AST SERPL W P-5'-P-CCNC: 24 U/L (ref 10–38)
BACTERIA URNS QL MICRO: ABNORMAL /HPF
BASOPHILS # BLD: 0 K/UL (ref 0–0.1)
BASOPHILS NFR BLD: 0 % (ref 0–2)
BILIRUB SERPL-MCNC: 0.3 MG/DL (ref 0.2–1)
BILIRUB UR QL: NEGATIVE
BNP SERPL-MCNC: 1292 PG/ML (ref 0–1800)
BUN SERPL-MCNC: 75 MG/DL (ref 7–18)
BUN/CREAT SERPL: 11 (ref 12–20)
CA-I BLD-MCNC: 10.1 MG/DL (ref 8.5–10.1)
CHLORIDE SERPL-SCNC: 107 MMOL/L (ref 100–111)
CO2 SERPL-SCNC: 21 MMOL/L (ref 21–32)
COLOR UR: YELLOW
CREAT SERPL-MCNC: 7.13 MG/DL (ref 0.6–1.3)
DIFFERENTIAL METHOD BLD: ABNORMAL
EOSINOPHIL # BLD: 0.2 K/UL (ref 0–0.4)
EOSINOPHIL NFR BLD: 2 % (ref 0–5)
EPITH CASTS URNS QL MICRO: ABNORMAL /LPF (ref 0–20)
ERYTHROCYTE [DISTWIDTH] IN BLOOD BY AUTOMATED COUNT: 14.1 % (ref 11.6–14.5)
FLUAV RNA SPEC QL NAA+PROBE: NOT DETECTED
FLUBV RNA SPEC QL NAA+PROBE: NOT DETECTED
GLOBULIN SER CALC-MCNC: 3.7 G/DL (ref 2–4)
GLUCOSE SERPL-MCNC: 163 MG/DL (ref 74–99)
GLUCOSE UR STRIP.AUTO-MCNC: NEGATIVE MG/DL
HCT VFR BLD AUTO: 33.7 % (ref 36–48)
HGB BLD-MCNC: 11.4 G/DL (ref 13–16)
HGB UR QL STRIP: NEGATIVE
IMM GRANULOCYTES # BLD AUTO: 0 K/UL (ref 0–0.04)
IMM GRANULOCYTES NFR BLD AUTO: 0 % (ref 0–0.5)
KETONES UR QL STRIP.AUTO: NEGATIVE MG/DL
LACTATE SERPL-SCNC: 1.3 MMOL/L (ref 0.4–2)
LEUKOCYTE ESTERASE UR QL STRIP.AUTO: ABNORMAL
LIPASE SERPL-CCNC: 131 U/L (ref 13–75)
LYMPHOCYTES # BLD: 1.6 K/UL (ref 0.9–3.6)
LYMPHOCYTES NFR BLD: 17 % (ref 21–52)
MAGNESIUM SERPL-MCNC: 2 MG/DL (ref 1.6–2.6)
MCH RBC QN AUTO: 28.6 PG (ref 24–34)
MCHC RBC AUTO-ENTMCNC: 33.8 G/DL (ref 31–37)
MCV RBC AUTO: 84.5 FL (ref 78–100)
MONOCYTES # BLD: 0.6 K/UL (ref 0.05–1.2)
MONOCYTES NFR BLD: 7 % (ref 3–10)
NEUTS SEG # BLD: 6.6 K/UL (ref 1.8–8)
NEUTS SEG NFR BLD: 74 % (ref 40–73)
NITRITE UR QL STRIP.AUTO: NEGATIVE
NRBC # BLD: 0 K/UL (ref 0–0.01)
NRBC BLD-RTO: 0 PER 100 WBC
PH UR STRIP: 7.5 (ref 5–8)
PLATELET # BLD AUTO: 285 K/UL (ref 135–420)
PMV BLD AUTO: 9.7 FL (ref 9.2–11.8)
POTASSIUM SERPL-SCNC: 4.7 MMOL/L (ref 3.5–5.5)
PROCALCITONIN SERPL-MCNC: 0.29 NG/ML
PROT SERPL-MCNC: 7.2 G/DL (ref 6.4–8.2)
PROT UR STRIP-MCNC: 100 MG/DL
RBC # BLD AUTO: 3.99 M/UL (ref 4.35–5.65)
RBC #/AREA URNS HPF: ABNORMAL /HPF (ref 0–2)
SARS-COV-2 RNA RESP QL NAA+PROBE: NOT DETECTED
SODIUM SERPL-SCNC: 140 MMOL/L (ref 136–145)
SP GR UR REFRACTOMETRY: 1.01 (ref 1–1.03)
TROPONIN I SERPL HS-MCNC: 34 NG/L (ref 0–78)
TROPONIN I SERPL HS-MCNC: 37 NG/L (ref 0–78)
UROBILINOGEN UR QL STRIP.AUTO: 0.2 EU/DL (ref 0.2–1)
WBC # BLD AUTO: 9 K/UL (ref 4.6–13.2)
WBC URNS QL MICRO: ABNORMAL /HPF (ref 0–4)

## 2024-09-01 PROCEDURE — 96374 THER/PROPH/DIAG INJ IV PUSH: CPT

## 2024-09-01 PROCEDURE — 71045 X-RAY EXAM CHEST 1 VIEW: CPT

## 2024-09-01 PROCEDURE — 87636 SARSCOV2 & INF A&B AMP PRB: CPT

## 2024-09-01 PROCEDURE — 93005 ELECTROCARDIOGRAM TRACING: CPT | Performed by: FAMILY MEDICINE

## 2024-09-01 PROCEDURE — 84484 ASSAY OF TROPONIN QUANT: CPT

## 2024-09-01 PROCEDURE — 83690 ASSAY OF LIPASE: CPT

## 2024-09-01 PROCEDURE — 84145 PROCALCITONIN (PCT): CPT

## 2024-09-01 PROCEDURE — 36415 COLL VENOUS BLD VENIPUNCTURE: CPT

## 2024-09-01 PROCEDURE — 96375 TX/PRO/DX INJ NEW DRUG ADDON: CPT

## 2024-09-01 PROCEDURE — 6370000000 HC RX 637 (ALT 250 FOR IP): Performed by: FAMILY MEDICINE

## 2024-09-01 PROCEDURE — G0378 HOSPITAL OBSERVATION PER HR: HCPCS

## 2024-09-01 PROCEDURE — 87086 URINE CULTURE/COLONY COUNT: CPT

## 2024-09-01 PROCEDURE — 81001 URINALYSIS AUTO W/SCOPE: CPT

## 2024-09-01 PROCEDURE — 2580000003 HC RX 258: Performed by: FAMILY MEDICINE

## 2024-09-01 PROCEDURE — 71250 CT THORAX DX C-: CPT

## 2024-09-01 PROCEDURE — 85025 COMPLETE CBC W/AUTO DIFF WBC: CPT

## 2024-09-01 PROCEDURE — 83735 ASSAY OF MAGNESIUM: CPT

## 2024-09-01 PROCEDURE — 83880 ASSAY OF NATRIURETIC PEPTIDE: CPT

## 2024-09-01 PROCEDURE — 87040 BLOOD CULTURE FOR BACTERIA: CPT

## 2024-09-01 PROCEDURE — 6360000002 HC RX W HCPCS: Performed by: FAMILY MEDICINE

## 2024-09-01 PROCEDURE — 83605 ASSAY OF LACTIC ACID: CPT

## 2024-09-01 PROCEDURE — 99285 EMERGENCY DEPT VISIT HI MDM: CPT

## 2024-09-01 PROCEDURE — 80053 COMPREHEN METABOLIC PANEL: CPT

## 2024-09-01 RX ORDER — HEPARIN SODIUM 5000 [USP'U]/ML
5000 INJECTION, SOLUTION INTRAVENOUS; SUBCUTANEOUS EVERY 8 HOURS SCHEDULED
Status: DISCONTINUED | OUTPATIENT
Start: 2024-09-02 | End: 2024-09-02

## 2024-09-01 RX ORDER — ASPIRIN 325 MG
325 TABLET ORAL
Status: COMPLETED | OUTPATIENT
Start: 2024-09-01 | End: 2024-09-01

## 2024-09-01 RX ORDER — ACETAMINOPHEN 325 MG/1
650 TABLET ORAL EVERY 6 HOURS PRN
Status: DISCONTINUED | OUTPATIENT
Start: 2024-09-01 | End: 2024-09-03 | Stop reason: HOSPADM

## 2024-09-01 RX ORDER — SODIUM CHLORIDE 9 MG/ML
INJECTION, SOLUTION INTRAVENOUS PRN
Status: DISCONTINUED | OUTPATIENT
Start: 2024-09-01 | End: 2024-09-03 | Stop reason: HOSPADM

## 2024-09-01 RX ORDER — ONDANSETRON 4 MG/1
4 TABLET, ORALLY DISINTEGRATING ORAL EVERY 8 HOURS PRN
Status: DISCONTINUED | OUTPATIENT
Start: 2024-09-01 | End: 2024-09-03 | Stop reason: HOSPADM

## 2024-09-01 RX ORDER — MORPHINE SULFATE 2 MG/ML
1 INJECTION, SOLUTION INTRAMUSCULAR; INTRAVENOUS
Status: COMPLETED | OUTPATIENT
Start: 2024-09-01 | End: 2024-09-01

## 2024-09-01 RX ORDER — ONDANSETRON 2 MG/ML
4 INJECTION INTRAMUSCULAR; INTRAVENOUS EVERY 6 HOURS PRN
Status: DISCONTINUED | OUTPATIENT
Start: 2024-09-01 | End: 2024-09-03 | Stop reason: HOSPADM

## 2024-09-01 RX ORDER — ACETAMINOPHEN 650 MG/1
650 SUPPOSITORY RECTAL EVERY 6 HOURS PRN
Status: DISCONTINUED | OUTPATIENT
Start: 2024-09-01 | End: 2024-09-03 | Stop reason: HOSPADM

## 2024-09-01 RX ORDER — SODIUM CHLORIDE 0.9 % (FLUSH) 0.9 %
5-40 SYRINGE (ML) INJECTION EVERY 12 HOURS SCHEDULED
Status: DISCONTINUED | OUTPATIENT
Start: 2024-09-02 | End: 2024-09-03 | Stop reason: HOSPADM

## 2024-09-01 RX ORDER — SODIUM CHLORIDE 0.9 % (FLUSH) 0.9 %
5-40 SYRINGE (ML) INJECTION PRN
Status: DISCONTINUED | OUTPATIENT
Start: 2024-09-01 | End: 2024-09-03 | Stop reason: HOSPADM

## 2024-09-01 RX ORDER — POLYETHYLENE GLYCOL 3350 17 G/17G
17 POWDER, FOR SOLUTION ORAL DAILY PRN
Status: DISCONTINUED | OUTPATIENT
Start: 2024-09-01 | End: 2024-09-03 | Stop reason: HOSPADM

## 2024-09-01 RX ORDER — 0.9 % SODIUM CHLORIDE 0.9 %
500 INTRAVENOUS SOLUTION INTRAVENOUS ONCE
Status: DISCONTINUED | OUTPATIENT
Start: 2024-09-01 | End: 2024-09-01

## 2024-09-01 RX ADMIN — MORPHINE SULFATE 1 MG: 2 INJECTION, SOLUTION INTRAMUSCULAR; INTRAVENOUS at 22:37

## 2024-09-01 RX ADMIN — WATER 1000 MG: 1 INJECTION INTRAMUSCULAR; INTRAVENOUS; SUBCUTANEOUS at 22:39

## 2024-09-01 RX ADMIN — ASPIRIN 325 MG: 325 TABLET ORAL at 21:09

## 2024-09-01 RX ADMIN — NITROGLYCERIN 0.5 INCH: 20 OINTMENT TOPICAL at 21:08

## 2024-09-01 ASSESSMENT — PAIN DESCRIPTION - LOCATION
LOCATION: ABDOMEN
LOCATION: ABDOMEN;CHEST
LOCATION: ABDOMEN
LOCATION: CHEST

## 2024-09-01 ASSESSMENT — PAIN SCALES - GENERAL
PAINLEVEL_OUTOF10: 10
PAINLEVEL_OUTOF10: 2
PAINLEVEL_OUTOF10: 10
PAINLEVEL_OUTOF10: 10

## 2024-09-01 ASSESSMENT — PAIN DESCRIPTION - DESCRIPTORS
DESCRIPTORS: ACHING
DESCRIPTORS: ACHING
DESCRIPTORS: PRESSURE
DESCRIPTORS: ACHING

## 2024-09-01 ASSESSMENT — HEART SCORE: ECG: NON-SPECIFC REPOLARIZATION DISTURBANCE/LBTB/PM

## 2024-09-01 ASSESSMENT — PAIN DESCRIPTION - ORIENTATION
ORIENTATION: LEFT;LOWER
ORIENTATION: LEFT
ORIENTATION: LEFT;LOWER

## 2024-09-01 ASSESSMENT — PAIN - FUNCTIONAL ASSESSMENT: PAIN_FUNCTIONAL_ASSESSMENT: 0-10

## 2024-09-02 LAB
ANION GAP SERPL CALC-SCNC: 10 MMOL/L (ref 3–18)
BASOPHILS # BLD: 0.1 K/UL (ref 0–0.1)
BASOPHILS NFR BLD: 1 % (ref 0–2)
BUN SERPL-MCNC: 78 MG/DL (ref 7–18)
BUN/CREAT SERPL: 11 (ref 12–20)
CA-I BLD-MCNC: 9.9 MG/DL (ref 8.5–10.1)
CHLORIDE SERPL-SCNC: 108 MMOL/L (ref 100–111)
CO2 SERPL-SCNC: 23 MMOL/L (ref 21–32)
CREAT SERPL-MCNC: 6.97 MG/DL (ref 0.6–1.3)
DIFFERENTIAL METHOD BLD: ABNORMAL
EOSINOPHIL # BLD: 0.2 K/UL (ref 0–0.4)
EOSINOPHIL NFR BLD: 2 % (ref 0–5)
ERYTHROCYTE [DISTWIDTH] IN BLOOD BY AUTOMATED COUNT: 14.1 % (ref 11.6–14.5)
GLUCOSE BLD STRIP.AUTO-MCNC: 129 MG/DL (ref 70–110)
GLUCOSE BLD STRIP.AUTO-MCNC: 131 MG/DL (ref 70–110)
GLUCOSE BLD STRIP.AUTO-MCNC: 177 MG/DL (ref 70–110)
GLUCOSE BLD STRIP.AUTO-MCNC: 185 MG/DL (ref 70–110)
GLUCOSE SERPL-MCNC: 156 MG/DL (ref 74–99)
HCT VFR BLD AUTO: 32.6 % (ref 36–48)
HGB BLD-MCNC: 10.5 G/DL (ref 13–16)
IMM GRANULOCYTES # BLD AUTO: 0 K/UL (ref 0–0.04)
IMM GRANULOCYTES NFR BLD AUTO: 1 % (ref 0–0.5)
LYMPHOCYTES # BLD: 1.5 K/UL (ref 0.9–3.6)
LYMPHOCYTES NFR BLD: 19 % (ref 21–52)
MCH RBC QN AUTO: 27.3 PG (ref 24–34)
MCHC RBC AUTO-ENTMCNC: 32.2 G/DL (ref 31–37)
MCV RBC AUTO: 84.9 FL (ref 78–100)
MONOCYTES # BLD: 0.8 K/UL (ref 0.05–1.2)
MONOCYTES NFR BLD: 9 % (ref 3–10)
NEUTS SEG # BLD: 5.4 K/UL (ref 1.8–8)
NEUTS SEG NFR BLD: 68 % (ref 40–73)
NRBC # BLD: 0 K/UL (ref 0–0.01)
NRBC BLD-RTO: 0 PER 100 WBC
PERFORMED BY:: ABNORMAL
PLATELET # BLD AUTO: 275 K/UL (ref 135–420)
PMV BLD AUTO: 9.6 FL (ref 9.2–11.8)
POTASSIUM SERPL-SCNC: 4.3 MMOL/L (ref 3.5–5.5)
RBC # BLD AUTO: 3.84 M/UL (ref 4.35–5.65)
SODIUM SERPL-SCNC: 141 MMOL/L (ref 136–145)
TROPONIN I SERPL HS-MCNC: 27 NG/L (ref 0–78)
TROPONIN I SERPL HS-MCNC: 37 NG/L (ref 0–78)
WBC # BLD AUTO: 8 K/UL (ref 4.6–13.2)

## 2024-09-02 PROCEDURE — 84484 ASSAY OF TROPONIN QUANT: CPT

## 2024-09-02 PROCEDURE — 96376 TX/PRO/DX INJ SAME DRUG ADON: CPT

## 2024-09-02 PROCEDURE — 6360000002 HC RX W HCPCS: Performed by: NURSE PRACTITIONER

## 2024-09-02 PROCEDURE — 96372 THER/PROPH/DIAG INJ SC/IM: CPT

## 2024-09-02 PROCEDURE — 85025 COMPLETE CBC W/AUTO DIFF WBC: CPT

## 2024-09-02 PROCEDURE — G0378 HOSPITAL OBSERVATION PER HR: HCPCS

## 2024-09-02 PROCEDURE — 82962 GLUCOSE BLOOD TEST: CPT

## 2024-09-02 PROCEDURE — 2580000003 HC RX 258: Performed by: NURSE PRACTITIONER

## 2024-09-02 PROCEDURE — 6370000000 HC RX 637 (ALT 250 FOR IP): Performed by: NURSE PRACTITIONER

## 2024-09-02 PROCEDURE — 36415 COLL VENOUS BLD VENIPUNCTURE: CPT

## 2024-09-02 PROCEDURE — 80048 BASIC METABOLIC PNL TOTAL CA: CPT

## 2024-09-02 PROCEDURE — 90935 HEMODIALYSIS ONE EVALUATION: CPT

## 2024-09-02 RX ORDER — PANTOPRAZOLE SODIUM 40 MG/1
40 TABLET, DELAYED RELEASE ORAL
Status: DISCONTINUED | OUTPATIENT
Start: 2024-09-02 | End: 2024-09-03 | Stop reason: HOSPADM

## 2024-09-02 RX ORDER — ALBUTEROL SULFATE 0.83 MG/ML
2.5 SOLUTION RESPIRATORY (INHALATION) EVERY 4 HOURS PRN
Status: DISCONTINUED | OUTPATIENT
Start: 2024-09-02 | End: 2024-09-03 | Stop reason: HOSPADM

## 2024-09-02 RX ORDER — SODIUM BICARBONATE 650 MG/1
650 TABLET ORAL 3 TIMES DAILY
Status: DISCONTINUED | OUTPATIENT
Start: 2024-09-02 | End: 2024-09-03 | Stop reason: HOSPADM

## 2024-09-02 RX ORDER — HYDRALAZINE HYDROCHLORIDE 25 MG/1
100 TABLET, FILM COATED ORAL 3 TIMES DAILY
Status: DISCONTINUED | OUTPATIENT
Start: 2024-09-02 | End: 2024-09-03 | Stop reason: HOSPADM

## 2024-09-02 RX ORDER — SEVELAMER CARBONATE 800 MG/1
800 TABLET, FILM COATED ORAL
Status: DISCONTINUED | OUTPATIENT
Start: 2024-09-02 | End: 2024-09-03 | Stop reason: HOSPADM

## 2024-09-02 RX ORDER — GLIPIZIDE 5 MG/1
5 TABLET ORAL DAILY
Status: DISCONTINUED | OUTPATIENT
Start: 2024-09-02 | End: 2024-09-02

## 2024-09-02 RX ORDER — INSULIN LISPRO 100 [IU]/ML
0-4 INJECTION, SOLUTION INTRAVENOUS; SUBCUTANEOUS NIGHTLY
Status: DISCONTINUED | OUTPATIENT
Start: 2024-09-02 | End: 2024-09-03 | Stop reason: HOSPADM

## 2024-09-02 RX ORDER — DEXTROSE MONOHYDRATE 100 MG/ML
INJECTION, SOLUTION INTRAVENOUS CONTINUOUS PRN
Status: DISCONTINUED | OUTPATIENT
Start: 2024-09-02 | End: 2024-09-03 | Stop reason: HOSPADM

## 2024-09-02 RX ORDER — CETIRIZINE HYDROCHLORIDE 10 MG/1
10 TABLET ORAL DAILY
Status: DISCONTINUED | OUTPATIENT
Start: 2024-09-02 | End: 2024-09-03 | Stop reason: HOSPADM

## 2024-09-02 RX ORDER — FUROSEMIDE 40 MG
40 TABLET ORAL DAILY
Status: DISCONTINUED | OUTPATIENT
Start: 2024-09-02 | End: 2024-09-03 | Stop reason: HOSPADM

## 2024-09-02 RX ORDER — VITAMIN B COMPLEX
1000 TABLET ORAL DAILY
Status: DISCONTINUED | OUTPATIENT
Start: 2024-09-02 | End: 2024-09-03 | Stop reason: HOSPADM

## 2024-09-02 RX ORDER — AMLODIPINE BESYLATE 5 MG/1
10 TABLET ORAL DAILY
Status: DISCONTINUED | OUTPATIENT
Start: 2024-09-02 | End: 2024-09-03 | Stop reason: HOSPADM

## 2024-09-02 RX ORDER — ISOSORBIDE MONONITRATE 30 MG/1
30 TABLET, EXTENDED RELEASE ORAL DAILY
Status: DISCONTINUED | OUTPATIENT
Start: 2024-09-02 | End: 2024-09-03 | Stop reason: HOSPADM

## 2024-09-02 RX ORDER — INSULIN LISPRO 100 [IU]/ML
0-8 INJECTION, SOLUTION INTRAVENOUS; SUBCUTANEOUS
Status: DISCONTINUED | OUTPATIENT
Start: 2024-09-02 | End: 2024-09-03 | Stop reason: HOSPADM

## 2024-09-02 RX ORDER — ATORVASTATIN CALCIUM 40 MG/1
40 TABLET, FILM COATED ORAL DAILY
Status: DISCONTINUED | OUTPATIENT
Start: 2024-09-02 | End: 2024-09-03 | Stop reason: HOSPADM

## 2024-09-02 RX ORDER — ALLOPURINOL 100 MG/1
200 TABLET ORAL DAILY
Status: DISCONTINUED | OUTPATIENT
Start: 2024-09-02 | End: 2024-09-03 | Stop reason: HOSPADM

## 2024-09-02 RX ORDER — ALBUTEROL SULFATE 90 UG/1
2 AEROSOL, METERED RESPIRATORY (INHALATION) EVERY 4 HOURS PRN
Status: DISCONTINUED | OUTPATIENT
Start: 2024-09-02 | End: 2024-09-02

## 2024-09-02 RX ADMIN — FUROSEMIDE 40 MG: 40 TABLET ORAL at 08:00

## 2024-09-02 RX ADMIN — WATER 1000 MG: 1 INJECTION INTRAMUSCULAR; INTRAVENOUS; SUBCUTANEOUS at 20:58

## 2024-09-02 RX ADMIN — APIXABAN 5 MG: 5 TABLET, FILM COATED ORAL at 20:58

## 2024-09-02 RX ADMIN — HYDRALAZINE HYDROCHLORIDE 100 MG: 25 TABLET ORAL at 08:00

## 2024-09-02 RX ADMIN — SEVELAMER CARBONATE 800 MG: 800 TABLET, FILM COATED ORAL at 17:41

## 2024-09-02 RX ADMIN — AMLODIPINE BESYLATE 10 MG: 5 TABLET ORAL at 08:00

## 2024-09-02 RX ADMIN — SODIUM CHLORIDE, PRESERVATIVE FREE 10 ML: 5 INJECTION INTRAVENOUS at 08:01

## 2024-09-02 RX ADMIN — PANTOPRAZOLE SODIUM 40 MG: 40 TABLET, DELAYED RELEASE ORAL at 06:07

## 2024-09-02 RX ADMIN — APIXABAN 5 MG: 5 TABLET, FILM COATED ORAL at 08:00

## 2024-09-02 RX ADMIN — SEVELAMER CARBONATE 800 MG: 800 TABLET, FILM COATED ORAL at 07:57

## 2024-09-02 RX ADMIN — HEPARIN SODIUM 5000 UNITS: 5000 INJECTION INTRAVENOUS; SUBCUTANEOUS at 00:09

## 2024-09-02 RX ADMIN — ALLOPURINOL 200 MG: 100 TABLET ORAL at 08:00

## 2024-09-02 RX ADMIN — CETIRIZINE HYDROCHLORIDE 10 MG: 10 TABLET, FILM COATED ORAL at 08:00

## 2024-09-02 RX ADMIN — SODIUM BICARBONATE 650 MG: 650 TABLET ORAL at 20:58

## 2024-09-02 RX ADMIN — SODIUM BICARBONATE 650 MG: 650 TABLET ORAL at 07:59

## 2024-09-02 RX ADMIN — ATORVASTATIN CALCIUM 40 MG: 40 TABLET, FILM COATED ORAL at 08:00

## 2024-09-02 RX ADMIN — SODIUM CHLORIDE, PRESERVATIVE FREE 10 ML: 5 INJECTION INTRAVENOUS at 20:59

## 2024-09-02 RX ADMIN — ISOSORBIDE MONONITRATE 30 MG: 30 TABLET, EXTENDED RELEASE ORAL at 08:00

## 2024-09-02 RX ADMIN — HYDRALAZINE HYDROCHLORIDE 100 MG: 25 TABLET ORAL at 20:58

## 2024-09-02 RX ADMIN — SEVELAMER CARBONATE 800 MG: 800 TABLET, FILM COATED ORAL at 12:49

## 2024-09-02 RX ADMIN — Medication 1000 UNITS: at 08:00

## 2024-09-02 ASSESSMENT — PAIN DESCRIPTION - ORIENTATION: ORIENTATION: LEFT

## 2024-09-02 ASSESSMENT — PAIN SCALES - GENERAL: PAINLEVEL_OUTOF10: 8

## 2024-09-02 ASSESSMENT — PAIN DESCRIPTION - DESCRIPTORS: DESCRIPTORS: SHARP

## 2024-09-02 ASSESSMENT — PAIN - FUNCTIONAL ASSESSMENT: PAIN_FUNCTIONAL_ASSESSMENT: PREVENTS OR INTERFERES SOME ACTIVE ACTIVITIES AND ADLS

## 2024-09-02 ASSESSMENT — PAIN SCALES - WONG BAKER: WONGBAKER_NUMERICALRESPONSE: HURTS A LITTLE BIT

## 2024-09-02 ASSESSMENT — PAIN DESCRIPTION - ONSET: ONSET: PROGRESSIVE

## 2024-09-02 ASSESSMENT — PAIN DESCRIPTION - LOCATION: LOCATION: ABDOMEN

## 2024-09-02 ASSESSMENT — PAIN DESCRIPTION - PAIN TYPE: TYPE: ACUTE PAIN

## 2024-09-02 ASSESSMENT — PAIN DESCRIPTION - FREQUENCY: FREQUENCY: INTERMITTENT

## 2024-09-02 NOTE — ED PROVIDER NOTES
Incidental coronary artery disease, ectatic thoracic ascending aorta,   cholelithiasis         Electronically signed by Elvia Salas      XR CHEST 1 VIEW   Final Result   1. No acute disease         Electronically signed by Junie Camarena            ED BEDSIDE ULTRASOUND:   Performed by ED Physician - none    LABS:  Labs Reviewed   COMPREHENSIVE METABOLIC PANEL - Abnormal; Notable for the following components:       Result Value    Glucose 163 (*)     BUN 75 (*)     Creatinine 7.13 (*)     BUN/Creatinine Ratio 11 (*)     Est, Glom Filt Rate 7 (*)     All other components within normal limits   CBC WITH AUTO DIFFERENTIAL - Abnormal; Notable for the following components:    RBC 3.99 (*)     Hemoglobin 11.4 (*)     Hematocrit 33.7 (*)     Neutrophils % 74 (*)     Lymphocytes % 17 (*)     All other components within normal limits   LIPASE - Abnormal; Notable for the following components:    Lipase 131 (*)     All other components within normal limits   URINALYSIS - Abnormal; Notable for the following components:    Protein,  (*)     Leukocyte Esterase, Urine Moderate (*)     All other components within normal limits   URINALYSIS, MICRO - Abnormal; Notable for the following components:    BACTERIA, URINE 1+ (*)     All other components within normal limits   COVID-19 & INFLUENZA COMBO   CULTURE, BLOOD 1   CULTURE, BLOOD 2   CULTURE, URINE   MAGNESIUM   TROPONIN   TROPONIN   BRAIN NATRIURETIC PEPTIDE   LACTIC ACID   PROCALCITONIN       All other labs were within normal range or not returned as of this dictation.    EMERGENCY DEPARTMENT COURSE and DIFFERENTIAL DIAGNOSIS/MDM:   Vitals:    Vitals:    09/01/24 2200 09/01/24 2215 09/01/24 2230 09/01/24 2245   BP: (!) 178/75 (!) 174/83 (!) 165/90 (!) 149/81   Pulse: 67 65 69 68   Resp: 22 20 20 22   Temp:       TempSrc:       SpO2: 100% 100% 94% 99%   Weight:       Height:           Medical Decision Making  Ddx including ACS, CHF, COPD, anemia, electrolyte disturbance,

## 2024-09-02 NOTE — ED NOTES
TRANSFER - OUT REPORT:    Verbal report given to Millie Pereira RN on Ann-Marie Cardenas  being transferred to ICU for routine progression of patient care       Report consisted of patient's Situation, Background, Assessment and   Recommendations(SBAR).     Information from the following report(s) ED Encounter Summary, ED SBAR, and MAR was reviewed with the receiving nurse.    [place] Fall Assessment:    Presents to emergency department  because of falls (Syncope, seizure, or loss of consciousness): No  Age > 70: Yes  Altered Mental Status, Intoxication with alcohol or substance confusion (Disorientation, impaired judgment, poor safety awaremess, or inability to follow instructions): No  Impaired Mobility: Ambulates or transfers with assistive devices or assistance; Unable to ambulate or transer.: Yes  Nursing Judgement: Yes          Lines:   Peripheral IV 09/01/24 Right Antecubital (Active)       Hemodialysis Central Access Left Subclavian (Active)       Hemodialysis Central Access Left (Active)       Hemodialysis Central Access Left (Active)        Opportunity for questions and clarification was provided.      Patient transported with:  Monitor and Tech

## 2024-09-02 NOTE — DIALYSIS
Primary RN SBAR: SONIA Aguilar RN  Patient Education provided: verbal, importance of attending clinic treatments for dialysis  Incapacitated Nurse manuel. provided: verbal, primary nurse and patient  Preferred Education method and Primary language: english,verbal  Hospital associated wait time; reason: n/a    PATIENT IS A MONDAY - WEDNESDAY - FRIDAY PATIENT AT [facility], ACCORDING TO CLINIC NOTES. LAST TREATMENT 8/28/24    HEPATITIS RESULTS  FROM CLINIC;    SURFACE ANTIGEN NEGATIVE 8/28/24    SURFACE ANTIBODIES SUSCEPTIBLE 5/29/24      Hepatitis B Surface Ag   Date/Time Value Ref Range Status   09/18/2023 11:40 PM <0.10 <1.00 Index Final     Hep B S Ag Interp   Date/Time Value Ref Range Status   09/18/2023 11:40 PM Negative Negative   Final        09/02/24 1315   Observations & Evaluations   Level of Consciousness 0   Oriented X 4   Heart Rhythm Regular   Respiratory Quality/Effort Unlabored   O2 Device None (Room air)   Bilateral Breath Sounds Clear   Skin Color   (appropriate for ethnicity)   Skin Condition/Temp Dry;Warm   Appetite Good   Bowel Sounds (All Quadrants) Audible   Edema   (trace low extremities)   Vital Signs   BP (!) 141/73   Temp 97.7 °F (36.5 °C)   Pulse 72   Respirations 16   Pain Assessment   Pain Assessment None - Denies Pain   Technical Checks   Dialysis Machine No. [id number]   RO Machine Number [id number]   Dialyzer Lot No. [id number]   Tubing Lot Number [id number]   All Connections Secure Yes   NS Bag Yes   ICEBOAT I;C;E;B;O;A;T   RO Machine Log Sheet Completed Yes   Machine Alarm Self Test Completed;Passed   Air Foam Detector Tested;Proper Function;pH Reading   Extracorporeal Circuit Tested for Integrity Yes   Machine Conductivity 13.8   Manual Ph 7.2   Bleach Test (Neg) Yes   Bath Temperature 96.8 °F (36 °C)   Dialysis Bath   K+ (Potassium) 2   Ca+ (Calcium) 2.5   Na+ (Sodium) 138   HCO3 (Bicarb) 37   Bicarbonate Concentrate Lot No. [id number]   Acid Concentrate Lot No. [id number]      09/02/24 1320

## 2024-09-02 NOTE — PLAN OF CARE
Problem: Discharge Planning  Goal: Discharge to home or other facility with appropriate resources  9/2/2024 0748 by Ivis Aguilar RN  Outcome: Progressing  9/2/2024 0108 by Millie Pereira RN  Outcome: Progressing     Problem: Pain  Goal: Verbalizes/displays adequate comfort level or baseline comfort level  9/2/2024 0748 by Ivis Aguilar RN  Outcome: Progressing  9/2/2024 0108 by Millie Pereira RN  Outcome: Progressing     Problem: Skin/Tissue Integrity  Goal: Absence of new skin breakdown  Description: 1.  Monitor for areas of redness and/or skin breakdown  2.  Assess vascular access sites hourly  3.  Every 4-6 hours minimum:  Change oxygen saturation probe site  4.  Every 4-6 hours:  If on nasal continuous positive airway pressure, respiratory therapy assess nares and determine need for appliance change or resting period.  9/2/2024 0748 by Ivis Aguilar RN  Outcome: Progressing  9/2/2024 0108 by Millie Pereira RN  Outcome: Progressing     Problem: Safety - Adult  Goal: Free from fall injury  9/2/2024 0748 by Ivis Aguilar RN  Outcome: Progressing  9/2/2024 0108 by Millie Pereira RN  Outcome: Progressing     Problem: ABCDS Injury Assessment  Goal: Absence of physical injury  9/2/2024 0748 by Ivsi Aguilar RN  Outcome: Progressing  9/2/2024 0108 by Millie Pereira RN  Outcome: Progressing

## 2024-09-02 NOTE — H&P
each by Does not apply route 3 times daily Use to check blood sugar 3 times daily 10/19/23   Collette Nicholas MD   blood glucose monitor strips 1 strip by Other route daily Use Accu Check Guide Me test strips to test blood sugar one time daily 9/27/23   Collette Nicholas MD   B-D UF III MINI PEN NEEDLES 31G X 5 MM MISC USE TO INJECT AS DIRECTED THREE TIMES DAILY 8/5/23   Provider, MD Danna   blood glucose test strips (ASCENSIA AUTODISC VI;ONE TOUCH ULTRA TEST VI) strip 1 each by In Vitro route daily As needed. 9/18/23   Collette Nicholas MD   albuterol sulfate HFA (PROVENTIL;VENTOLIN;PROAIR) 108 (90 Base) MCG/ACT inhaler Inhale 2 puffs into the lungs every 4 hours as needed 9/30/21   Automatic Reconciliation, Ar   insulin glargine (LANTUS SOLOSTAR) 100 UNIT/ML injection pen Inject 8 Units into the skin daily Units according to his blood sugar reading    Automatic Reconciliation, Ar   nitroGLYCERIN (NITROSTAT) 0.4 MG SL tablet Place 1 tablet under the tongue    Automatic Reconciliation, Ar       REVIEW OF SYSTEMS:     I am not able to complete the review of systems because:   The patient is intubated and sedated    The patient has altered mental status due to his acute medical problems    The patient has baseline aphasia from prior stroke(s)    The patient has baseline dementia and is not reliable historian    The patient is in acute medical distress and unable to provide information           Total of 12 systems reviewed as follows:       POSITIVE= underlined text  Negative = text not underlined  General:  fever, chills, sweats, generalized weakness, weight loss/gain,      loss of appetite   Eyes:    blurred vision, eye pain, loss of vision, double vision  ENT:    rhinorrhea, pharyngitis   Respiratory:   cough, sputum production, SOB, ALVAREZ, wheezing, pleuritic pain   Cardiology:   chest pain to left side of chest, palpitations, orthopnea, PND, edema, syncope   Gastrointestinal:  abdominal pain , N/V, diarrhea,

## 2024-09-02 NOTE — ED TRIAGE NOTES
Patient brought to room via wheelchair. Patient reports chest pain and shortness of breath that started this evening. Patient reports he went to dialysis yesterday but they didn't run him his whole session and he isn't sure why.

## 2024-09-03 ENCOUNTER — APPOINTMENT (OUTPATIENT)
Age: 79
End: 2024-09-03
Attending: INTERNAL MEDICINE
Payer: MEDICARE

## 2024-09-03 ENCOUNTER — APPOINTMENT (OUTPATIENT)
Age: 79
End: 2024-09-03
Payer: MEDICARE

## 2024-09-03 VITALS
TEMPERATURE: 98 F | OXYGEN SATURATION: 100 % | WEIGHT: 180 LBS | BODY MASS INDEX: 27.28 KG/M2 | HEIGHT: 68 IN | HEART RATE: 70 BPM | RESPIRATION RATE: 22 BRPM | SYSTOLIC BLOOD PRESSURE: 142 MMHG | DIASTOLIC BLOOD PRESSURE: 70 MMHG

## 2024-09-03 LAB
ANION GAP SERPL CALC-SCNC: 9 MMOL/L (ref 3–18)
BACTERIA SPEC CULT: NORMAL
BUN SERPL-MCNC: 45 MG/DL (ref 7–18)
BUN/CREAT SERPL: 9 (ref 12–20)
CA-I BLD-MCNC: 9.7 MG/DL (ref 8.5–10.1)
CHLORIDE SERPL-SCNC: 100 MMOL/L (ref 100–111)
CO2 SERPL-SCNC: 29 MMOL/L (ref 21–32)
COLONY COUNT, CNT: NORMAL
CREAT SERPL-MCNC: 5.05 MG/DL (ref 0.6–1.3)
ECHO AO ASC DIAM: 4 CM
ECHO AO ASCENDING AORTA INDEX: 2.05 CM/M2
ECHO AO ROOT DIAM: 4 CM
ECHO AO ROOT INDEX: 2.05 CM/M2
ECHO AV AREA PEAK VELOCITY: 3 CM2
ECHO AV AREA VTI: 3 CM2
ECHO AV AREA/BSA PEAK VELOCITY: 1.5 CM2/M2
ECHO AV AREA/BSA VTI: 1.5 CM2/M2
ECHO AV MEAN GRADIENT: 6 MMHG
ECHO AV MEAN VELOCITY: 1.1 M/S
ECHO AV PEAK GRADIENT: 10 MMHG
ECHO AV PEAK VELOCITY: 1.6 M/S
ECHO AV VELOCITY RATIO: 0.69
ECHO AV VTI: 30.8 CM
ECHO BSA: 1.98 M2
ECHO EST RA PRESSURE: 8 MMHG
ECHO IVC PROX: 2.3 CM
ECHO LA AREA 2C: 29.5 CM2
ECHO LA AREA 4C: 25.4 CM2
ECHO LA DIAMETER INDEX: 2.36 CM/M2
ECHO LA DIAMETER: 4.6 CM
ECHO LA MAJOR AXIS: 5.9 CM
ECHO LA MINOR AXIS: 6.2 CM
ECHO LA TO AORTIC ROOT RATIO: 1.15
ECHO LA VOL BP: 103 ML (ref 18–58)
ECHO LA VOL MOD A2C: 115 ML (ref 18–58)
ECHO LA VOL MOD A4C: 89 ML (ref 18–58)
ECHO LA VOL/BSA BIPLANE: 53 ML/M2 (ref 16–34)
ECHO LA VOLUME INDEX MOD A2C: 59 ML/M2 (ref 16–34)
ECHO LA VOLUME INDEX MOD A4C: 46 ML/M2 (ref 16–34)
ECHO LV E' LATERAL VELOCITY: 6 CM/S
ECHO LV E' SEPTAL VELOCITY: 3 CM/S
ECHO LV EDV A2C: 36 ML
ECHO LV EDV A4C: 58 ML
ECHO LV EDV INDEX A4C: 30 ML/M2
ECHO LV EDV NDEX A2C: 18 ML/M2
ECHO LV EF PHYSICIAN: 65 %
ECHO LV EJECTION FRACTION A2C: 63 %
ECHO LV EJECTION FRACTION A4C: 63 %
ECHO LV EJECTION FRACTION BIPLANE: 63 % (ref 55–100)
ECHO LV ESV A2C: 14 ML
ECHO LV ESV A4C: 22 ML
ECHO LV ESV INDEX A2C: 7 ML/M2
ECHO LV ESV INDEX A4C: 11 ML/M2
ECHO LV FRACTIONAL SHORTENING: 36 % (ref 28–44)
ECHO LV GLOBAL LONGITUDINAL STRAIN (GLS): -22.4 %
ECHO LV INTERNAL DIMENSION DIASTOLE INDEX: 2.41 CM/M2
ECHO LV INTERNAL DIMENSION DIASTOLIC: 4.7 CM (ref 4.2–5.9)
ECHO LV INTERNAL DIMENSION SYSTOLIC INDEX: 1.54 CM/M2
ECHO LV INTERNAL DIMENSION SYSTOLIC: 3 CM
ECHO LV IVSD: 1.7 CM (ref 0.6–1)
ECHO LV MASS 2D: 373 G (ref 88–224)
ECHO LV MASS INDEX 2D: 191.3 G/M2 (ref 49–115)
ECHO LV POSTERIOR WALL DIASTOLIC: 1.8 CM (ref 0.6–1)
ECHO LV RELATIVE WALL THICKNESS RATIO: 0.77
ECHO LVOT AREA: 4.2 CM2
ECHO LVOT AV VTI INDEX: 0.71
ECHO LVOT DIAM: 2.3 CM
ECHO LVOT MEAN GRADIENT: 2 MMHG
ECHO LVOT PEAK GRADIENT: 5 MMHG
ECHO LVOT PEAK VELOCITY: 1.1 M/S
ECHO LVOT STROKE VOLUME INDEX: 46.9 ML/M2
ECHO LVOT SV: 91.4 ML
ECHO LVOT VTI: 22 CM
ECHO MAIN PULMONARY ARTERY DIAMETER: 2.2 CM
ECHO MV A VELOCITY: 0.89 M/S
ECHO MV AREA VTI: 4.7 CM2
ECHO MV E DECELERATION TIME (DT): 362 MS
ECHO MV E VELOCITY: 0.53 M/S
ECHO MV E/A RATIO: 0.6
ECHO MV E/E' LATERAL: 8.83
ECHO MV E/E' RATIO (AVERAGED): 13.25
ECHO MV E/E' SEPTAL: 17.67
ECHO MV LVOT VTI INDEX: 0.88
ECHO MV MAX VELOCITY: 0.8 M/S
ECHO MV MEAN GRADIENT: 1 MMHG
ECHO MV MEAN VELOCITY: 0.5 M/S
ECHO MV PEAK GRADIENT: 3 MMHG
ECHO MV VTI: 19.4 CM
ECHO PULMONARY ARTERY END DIASTOLIC PRESSURE: 11 MMHG
ECHO PULMONARY ARTERY END DIASTOLIC PRESSURE: 18 MMHG
ECHO PV MAX VELOCITY: 1.1 M/S
ECHO PV MAX VELOCITY: 2.1 M/S
ECHO PV PEAK GRADIENT: 5 MMHG
ECHO PV REGURGITANT MAX VELOCITY: 1.7 M/S
ECHO RA AREA 4C: 21 CM2
ECHO RA END SYSTOLIC VOLUME APICAL 4 CHAMBER INDEX BSA: 28 ML/M2
ECHO RA VOLUME: 55 ML
ECHO RIGHT VENTRICULAR SYSTOLIC PRESSURE (RVSP): 46 MMHG
ECHO RV BASAL DIMENSION: 4.1 CM
ECHO RV LONGITUDINAL DIMENSION: 6.9 CM
ECHO RV MID DIMENSION: 2.7 CM
ECHO RV TAPSE: 1.9 CM (ref 1.7–?)
ECHO TV REGURGITANT MAX VELOCITY: 3.08 M/S
ECHO TV REGURGITANT PEAK GRADIENT: 38 MMHG
EKG ATRIAL RATE: 68 BPM
EKG DIAGNOSIS: NORMAL
EKG P AXIS: 52 DEGREES
EKG P-R INTERVAL: 214 MS
EKG Q-T INTERVAL: 394 MS
EKG QRS DURATION: 102 MS
EKG QTC CALCULATION (BAZETT): 418 MS
EKG R AXIS: -22 DEGREES
EKG T AXIS: 17 DEGREES
EKG VENTRICULAR RATE: 68 BPM
ERYTHROCYTE [DISTWIDTH] IN BLOOD BY AUTOMATED COUNT: 14 % (ref 11.6–14.5)
GLUCOSE BLD STRIP.AUTO-MCNC: 134 MG/DL (ref 70–110)
GLUCOSE BLD STRIP.AUTO-MCNC: 194 MG/DL (ref 70–110)
GLUCOSE SERPL-MCNC: 193 MG/DL (ref 74–99)
HCT VFR BLD AUTO: 34.2 % (ref 36–48)
HGB BLD-MCNC: 11.4 G/DL (ref 13–16)
Lab: NORMAL
MCH RBC QN AUTO: 28.3 PG (ref 24–34)
MCHC RBC AUTO-ENTMCNC: 33.3 G/DL (ref 31–37)
MCV RBC AUTO: 84.9 FL (ref 78–100)
NRBC # BLD: 0 K/UL (ref 0–0.01)
NRBC BLD-RTO: 0 PER 100 WBC
PERFORMED BY:: ABNORMAL
PERFORMED BY:: ABNORMAL
PLATELET # BLD AUTO: 280 K/UL (ref 135–420)
PMV BLD AUTO: 9.5 FL (ref 9.2–11.8)
POTASSIUM SERPL-SCNC: 4 MMOL/L (ref 3.5–5.5)
RBC # BLD AUTO: 4.03 M/UL (ref 4.35–5.65)
SODIUM SERPL-SCNC: 138 MMOL/L (ref 136–145)
WBC # BLD AUTO: 7.7 K/UL (ref 4.6–13.2)

## 2024-09-03 PROCEDURE — 6370000000 HC RX 637 (ALT 250 FOR IP)

## 2024-09-03 PROCEDURE — 36415 COLL VENOUS BLD VENIPUNCTURE: CPT

## 2024-09-03 PROCEDURE — 82962 GLUCOSE BLOOD TEST: CPT

## 2024-09-03 PROCEDURE — G0378 HOSPITAL OBSERVATION PER HR: HCPCS

## 2024-09-03 PROCEDURE — 2580000003 HC RX 258: Performed by: NURSE PRACTITIONER

## 2024-09-03 PROCEDURE — 93306 TTE W/DOPPLER COMPLETE: CPT

## 2024-09-03 PROCEDURE — 80048 BASIC METABOLIC PNL TOTAL CA: CPT

## 2024-09-03 PROCEDURE — 85027 COMPLETE CBC AUTOMATED: CPT

## 2024-09-03 PROCEDURE — 6370000000 HC RX 637 (ALT 250 FOR IP): Performed by: NURSE PRACTITIONER

## 2024-09-03 PROCEDURE — 94761 N-INVAS EAR/PLS OXIMETRY MLT: CPT

## 2024-09-03 RX ORDER — ASPIRIN 81 MG/1
81 TABLET ORAL DAILY
Qty: 30 TABLET | Refills: 0 | Status: SHIPPED | OUTPATIENT
Start: 2024-09-03

## 2024-09-03 RX ORDER — LEVOFLOXACIN 250 MG/1
250 TABLET, FILM COATED ORAL DAILY
Qty: 7 TABLET | Refills: 0 | Status: SHIPPED | OUTPATIENT
Start: 2024-09-03 | End: 2024-09-10

## 2024-09-03 RX ORDER — ASPIRIN 81 MG/1
81 TABLET ORAL DAILY
Status: DISCONTINUED | OUTPATIENT
Start: 2024-09-03 | End: 2024-09-03 | Stop reason: HOSPADM

## 2024-09-03 RX ADMIN — PANTOPRAZOLE SODIUM 40 MG: 40 TABLET, DELAYED RELEASE ORAL at 06:03

## 2024-09-03 RX ADMIN — ASPIRIN 81 MG: 81 TABLET, COATED ORAL at 11:03

## 2024-09-03 RX ADMIN — Medication 1000 UNITS: at 08:54

## 2024-09-03 RX ADMIN — ALLOPURINOL 200 MG: 100 TABLET ORAL at 08:55

## 2024-09-03 RX ADMIN — SODIUM BICARBONATE 650 MG: 650 TABLET ORAL at 08:54

## 2024-09-03 RX ADMIN — SODIUM CHLORIDE, PRESERVATIVE FREE 10 ML: 5 INJECTION INTRAVENOUS at 11:04

## 2024-09-03 RX ADMIN — ATORVASTATIN CALCIUM 40 MG: 40 TABLET, FILM COATED ORAL at 08:55

## 2024-09-03 RX ADMIN — HYDRALAZINE HYDROCHLORIDE 100 MG: 25 TABLET ORAL at 08:54

## 2024-09-03 RX ADMIN — ISOSORBIDE MONONITRATE 30 MG: 30 TABLET, EXTENDED RELEASE ORAL at 08:55

## 2024-09-03 RX ADMIN — SEVELAMER CARBONATE 800 MG: 800 TABLET, FILM COATED ORAL at 07:35

## 2024-09-03 RX ADMIN — POLYETHYLENE GLYCOL 3350 17 G: 17 POWDER, FOR SOLUTION ORAL at 01:35

## 2024-09-03 RX ADMIN — CETIRIZINE HYDROCHLORIDE 10 MG: 10 TABLET, FILM COATED ORAL at 08:54

## 2024-09-03 RX ADMIN — AMLODIPINE BESYLATE 10 MG: 5 TABLET ORAL at 08:55

## 2024-09-03 RX ADMIN — FUROSEMIDE 40 MG: 40 TABLET ORAL at 08:55

## 2024-09-03 RX ADMIN — APIXABAN 5 MG: 5 TABLET, FILM COATED ORAL at 08:55

## 2024-09-03 ASSESSMENT — PAIN SCALES - GENERAL
PAINLEVEL_OUTOF10: 0
PAINLEVEL_OUTOF10: 0

## 2024-09-03 NOTE — PLAN OF CARE
Problem: Discharge Planning  Goal: Discharge to home or other facility with appropriate resources  9/3/2024 0841 by Paula Bailey, RN  Outcome: Progressing  Flowsheets (Taken 9/3/2024 0835)  Discharge to home or other facility with appropriate resources:   Identify barriers to discharge with patient and caregiver   Identify discharge learning needs (meds, wound care, etc)   Arrange for needed discharge resources and transportation as appropriate  9/2/2024 2220 by Millie Pereira, RN  Outcome: Progressing     Problem: Pain  Goal: Verbalizes/displays adequate comfort level or baseline comfort level  9/3/2024 0841 by Paula Bailey, RN  Outcome: Progressing  Flowsheets (Taken 9/3/2024 0737)  Verbalizes/displays adequate comfort level or baseline comfort level: Encourage patient to monitor pain and request assistance  9/2/2024 2220 by Millie Pereira, RN  Outcome: Progressing     Problem: Skin/Tissue Integrity  Goal: Absence of new skin breakdown  Description: 1.  Monitor for areas of redness and/or skin breakdown  2.  Assess vascular access sites hourly  3.  Every 4-6 hours minimum:  Change oxygen saturation probe site  4.  Every 4-6 hours:  If on nasal continuous positive airway pressure, respiratory therapy assess nares and determine need for appliance change or resting period.  9/3/2024 0841 by Paula Bailey, RN  Outcome: Progressing  9/2/2024 2220 by Millie Pereira, RN  Outcome: Progressing     Problem: Safety - Adult  Goal: Free from fall injury  9/3/2024 0841 by Paula Bailey, RN  Outcome: Progressing  9/2/2024 2220 by Millie Pereira, RN  Outcome: Progressing     Problem: ABCDS Injury Assessment  Goal: Absence of physical injury  9/3/2024 0841 by Paula Bailey, RN  Outcome: Progressing  9/2/2024 2220 by Millie Pereira, RN  Outcome: Progressing

## 2024-09-03 NOTE — CONSULTS
CARDIOLOGY CONSULTATION    REASON FOR CONSULT: Chest pain    REQUESTING PROVIDER: RAVI Wick     CHIEF COMPLAINT:  Shortness of breath and left-sided chest pain    HISTORY OF PRESENT ILLNESS:  Ann-Marie Cardenas is a 78 y.o. year-old male with past medical history significant for CHF, DVT, hypertension, hyperlipidemia, pulmonary hypertension, diabetes, GERD, arthritis, kidney and bladder cancer s/p left nephrectomy, and colon cancer,  who was evaluated today due to chest pain and shortness of breath.  Patient presented over the weekend to the ED with initial complaints of left-sided chest pain described as a dull ache and shortness of breath.  States he had dialysis on Friday that was cut short and only completed 2 out of 4 hours.  At bedside this morning states chest pain has resolved.  Endorses mild ALVAREZ.  No palpitations.  No nausea or vomiting.  No fever chills or bodyaches.    In the ED creatinine 7.13.  BNP 1292.  Troponin 34->37. CXR showed no acute cardiopulmonary process.  EKG nonischemic.     Records from hospital admission course thus far reviewed.      Telemetry reviewed.  No events overnight. SR rate 70's.     INPATIENT MEDICATIONS:  Home medications reviewed.    Current Facility-Administered Medications:     allopurinol (ZYLOPRIM) tablet 200 mg, 200 mg, Oral, Daily, Jigna Stallings APRN - NP, 200 mg at 09/03/24 0855    amLODIPine (NORVASC) tablet 10 mg, 10 mg, Oral, Daily, Jigna Stallings APRN - NP, 10 mg at 09/03/24 0855    atorvastatin (LIPITOR) tablet 40 mg, 40 mg, Oral, Daily, Jigna Stallings APRN - NP, 40 mg at 09/03/24 0855    furosemide (LASIX) tablet 40 mg, 40 mg, Oral, Daily, Jigna Stallings APRN - NP, 40 mg at 09/03/24 0855    hydrALAZINE (APRESOLINE) tablet 100 mg, 100 mg, Oral, TID, Jigna Stallings APRN - NP, 100 mg at 09/03/24 0854    isosorbide mononitrate (IMDUR) extended release tablet 30 mg, 30 mg, Oral, Daily, Jigna Stallings APRN - NP, 30 mg at 09/03/24 0855

## 2024-09-03 NOTE — DISCHARGE SUMMARY
Discharge Summary       PATIENT ID: Ann-Marie Cardenas  MRN: [id number]   YOB: 1945    DATE OF ADMISSION: 9/1/2024  8:23 PM    DATE OF DISCHARGE: 09/03/24    PRIMARY CARE PROVIDER: Collette Nicholas MD     ATTENDING PHYSICIAN: Bruno Chung MD  DISCHARGING PROVIDER: Bruno Chung MD        CONSULTATIONS: IP CONSULT TO NEPHROLOGY  IP CONSULT TO CARDIOLOGY    PROCEDURES/SURGERIES: * No surgery found *    Admission Diagnoses:   Chest pain [R07.9]  Elevated lipase [R74.8]  Dyspnea, unspecified type [R06.00]  Chest pain, unspecified type [R07.9]    Discharge Medications     Medication List        START taking these medications      aspirin 81 MG EC tablet  Take 1 tablet by mouth daily     levoFLOXacin 250 MG tablet  Commonly known as: LEVAQUIN  Take 1 tablet by mouth daily for 7 days            CONTINUE taking these medications      Accu-Chek Softclix Lancets Misc  1 each by Does not apply route 3 times daily Use to check blood sugar 3 times daily     albuterol sulfate  (90 Base) MCG/ACT inhaler  Commonly known as: PROVENTIL;VENTOLIN;PROAIR     allopurinol 100 MG tablet  Commonly known as: ZYLOPRIM  Take 2 tablets by mouth daily     amLODIPine 10 MG tablet  Commonly known as: NORVASC  TAKE 1 TABLET BY MOUTH DAILY     apixaban 5 MG Tabs tablet  Commonly known as: Eliquis  TAKE 1 TABLET BY MOUTH TWICE DAILY. NOTIFY MD IF ANY SIGNS OF BLEEDING     atorvastatin 40 MG tablet  Commonly known as: LIPITOR  Take 1 tablet by mouth daily     B-D UF III MINI PEN NEEDLES 31G X 5 MM Misc  Generic drug: Insulin Pen Needle     * blood glucose test strips strip  Commonly known as: ASCENSIA AUTODISC VI;ONE TOUCH ULTRA TEST VI  1 each by In Vitro route daily As needed.     * blood glucose test strips  1 strip by Other route daily Use Accu Check Guide Me test strips to test blood sugar one time daily     * blood glucose test strips  Use to test blood sugars 3 times a day. Dispense test strips covered by

## 2024-09-03 NOTE — PROGRESS NOTES
0700 -  Accepted care of pt from RICK Pereira RN. Pt resting with eyes closed. VSS.   0720 -  . Pt awake and pleasant. No complaints at this time  0735 - Pt sitting up eating breakfast.   0800 -  Pt sitting up in chair. Bath done, linen and gown changed. Oral care done. Pt  state he feels much better.   0830 - Pt remains sitting up in chair watching TV.   0910 -  Dr. Chung at bedside. Rounds complete. Pt to be D/C home.   0950 -  ECHO in Progress  1030 -  Discussed D/C instructions with patient and follow up appointments. . Assisted pt with getting dressed. PIV d/c and dressing placed. PT ready for d/c, awaiting niece for .   1100 -  Dr. Weber put in orders to HD today. Called and discussed need for HD. No availability at [facility] for OP HD today, can come tomorrow at regularly scheduled time. Dr. Chung and patient aware.   1120 - Pt assisted to POV with family. D/C to home.  
07:30- , no SSI needed. Pt sitting up in bed eating breakfast. Urostomy emptied. Pt frequent cough noted.     08:00- Pt swallowed PO meds without difficulty. Pt educated on FR.     10:00- Visitor at bedside.     11:30- Urostomy emptied. Pt sitting up eating lunch, BGM WNL.     13:15- Pt transported via bed to HD.     17:20- Pt arrives back to ICU bed 6 from HD. VSS. Confirmed M/W/F HD outpatient. . Pt sitting up eating dinner at this time. AVF dsg C/D/I.     18:40-  Pt assisted stand by assist to BR. Pt passed flatus, no BM. Susy care provided independently. Pt wearing brief.   
Bedside shift change report given to RICK Pereira RN (oncoming nurse) by LEN Aguilar RN (offgoing nurse). Report included the following information SBAR, Intake/Output, MAR, Recent Results, Med Rec Status, and Quality Measures.    1930  Shift assessment completed. Pt A&Ox3 and denies pain.  Lungs clear and diminished in all lobes. BS present. Skin W/D. Urostomy to RLQ with light yellow, clear urine. AV fistula to left FA. Good thrill and bruit.  #18 gauge PIV in R AC patent with blood return. Pt positions self. CBWR.    2100  HS medications administered without difficulty. Pt requested a sandwich and it was given. FSBG 177. CBWR. .    2215  Pt OOB to the bathroom with standby assist due to some weakness.  Pt thought he had to have a BM but only passed gas. Pt back to bed and positioned self for comfort. CBWR.    0130  Pt OOB for the third time to try to have a BM without success. Pt given a dose of Polyethylene glycol in diet ginger ale to assist.     0345  Pt OOB to the bathroom and had a moderate, hard stool.  Pt returned to bed. Discussed OTC colace. CBWR.    0645  Bedside shift change report given to VA Bailey RN (oncoming nurse) by RICK Pereira RN (offgoing nurse). Report included the following information SBAR, Intake/Output, MAR, Recent Results, Med Rec Status, and Quality Measures.           
OT orders received. Pt in testing, but nursing reports pt planning to be d/patria to home after testing. Reports pt has been up in recliner this morning. Agreeable to D/C OT orders.   Jen Tom MS, OTR/L     
Pharmacy is unable to do a medication reconciliation due to patients isolation status.   A medication reconciliation was however signed off by Mark Parada RN on 9/1/2024 at 8:40 PM  upon admission.   
Physical Therapy  Orders received for P.T., pt sitting in recliner upon entry and states he is at his baseline for mobility. He agrees to discontinue order at this time.  Kendall Moore, PT, DPT    
TRANSFER - IN REPORT:    Verbal report received from VA Parada RN on Ann-Marie Cardenas  being received from ED for routine progression of patient care      Report consisted of patient's Situation, Background, Assessment and   Recommendations(SBAR).     Information from the following report(s) Nurse Handoff Report, ED Encounter Summary, ED SBAR, Adult Overview, Recent Results, Quality Measures, and Neuro Assessment was reviewed with the receiving nurse.    Opportunity for questions and clarification was provided.      Assessment completed upon patient's arrival to unit and care assumed.     0120  FSBG 131. No insulin coverage needed.     0400  Pt emptied his urostomy. 350ml of light yellow. Clear urine.   Discussed pt's dialysis days per provider request. Pt states he is a M/W/F  and last had dialysis on last Wednesday. That Friday they could not dialyze him for an unknown reason.     0645 Bedside shift change report given to LEN Aguilar RN (oncoming nurse) by RICK Pereira RN (offgoing nurse). Report included the following information Nurse Handoff Report, ED Encounter Summary, ED SBAR, Intake/Output, MAR, Recent Results, Quality Measures, and Neuro Assessment.     
U/L    ALT 23 16 - 61 U/L    Alk Phosphatase 77 45 - 117 U/L    Total Protein 7.2 6.4 - 8.2 g/dL    Albumin 3.5 3.4 - 5.0 g/dL    Globulin 3.7 2.0 - 4.0 g/dL    Albumin/Globulin Ratio 0.9 0.8 - 1.7     CBC with Auto Differential    Collection Time: 09/01/24  8:45 PM   Result Value Ref Range    WBC 9.0 4.6 - 13.2 K/uL    RBC 3.99 (L) 4.35 - 5.65 M/uL    Hemoglobin 11.4 (L) 13.0 - 16.0 g/dL    Hematocrit 33.7 (L) 36.0 - 48.0 %    MCV 84.5 78.0 - 100.0 FL    MCH 28.6 24.0 - 34.0 PG    MCHC 33.8 31.0 - 37.0 g/dL    RDW 14.1 11.6 - 14.5 %    Platelets 285 135 - 420 K/uL    MPV 9.7 9.2 - 11.8 FL    Nucleated RBCs 0.0 0.0  WBC    nRBC 0.00 0.00 - 0.01 K/uL    Neutrophils % 74 (H) 40 - 73 %    Lymphocytes % 17 (L) 21 - 52 %    Monocytes % 7 3 - 10 %    Eosinophils % 2 0 - 5 %    Basophils % 0 0 - 2 %    Immature Granulocytes % 0 0 - 0.5 %    Neutrophils Absolute 6.6 1.8 - 8.0 K/UL    Lymphocytes Absolute 1.6 0.9 - 3.6 K/UL    Monocytes Absolute 0.6 0.05 - 1.2 K/UL    Eosinophils Absolute 0.2 0.0 - 0.4 K/UL    Basophils Absolute 0.0 0.0 - 0.1 K/UL    Immature Granulocytes Absolute 0.0 0.00 - 0.04 K/UL    Differential Type AUTOMATED     Magnesium    Collection Time: 09/01/24  8:45 PM   Result Value Ref Range    Magnesium 2.0 1.6 - 2.6 mg/dL   Troponin    Collection Time: 09/01/24  8:45 PM   Result Value Ref Range    Troponin, High Sensitivity 37 0 - 78 ng/L   Brain Natriuretic Peptide    Collection Time: 09/01/24  8:45 PM   Result Value Ref Range    NT Pro-BNP 1,292 0 - 1,800 pg/mL   Lipase    Collection Time: 09/01/24  8:45 PM   Result Value Ref Range    Lipase 131 (H) 13 - 75 U/L   Lactic Acid    Collection Time: 09/01/24  8:45 PM   Result Value Ref Range    Lactic Acid, Plasma 1.3 0.4 - 2.0 mmol/L   Procalcitonin    Collection Time: 09/01/24  8:45 PM   Result Value Ref Range    Procalcitonin 0.29 ng/mL   Urinalysis    Collection Time: 09/01/24  9:05 PM   Result Value Ref Range    Color, UA Yellow      Appearance

## 2024-09-03 NOTE — PLAN OF CARE
Problem: Discharge Planning  Goal: Discharge to home or other facility with appropriate resources  9/3/2024 1151 by Paula Bailey, RN  Outcome: Completed  9/3/2024 0841 by Paula Bailey RN  Outcome: Progressing  Flowsheets (Taken 9/3/2024 0835)  Discharge to home or other facility with appropriate resources:   Identify barriers to discharge with patient and caregiver   Identify discharge learning needs (meds, wound care, etc)   Arrange for needed discharge resources and transportation as appropriate  9/2/2024 2220 by Millie Pereira, RN  Outcome: Progressing     Problem: Pain  Goal: Verbalizes/displays adequate comfort level or baseline comfort level  9/3/2024 1151 by Paula Bailey, RN  Outcome: Completed  9/3/2024 0841 by Paula Bailey RN  Outcome: Progressing  Flowsheets (Taken 9/3/2024 0737)  Verbalizes/displays adequate comfort level or baseline comfort level: Encourage patient to monitor pain and request assistance  9/2/2024 2220 by Millie Pereira, RN  Outcome: Progressing     Problem: Skin/Tissue Integrity  Goal: Absence of new skin breakdown  Description: 1.  Monitor for areas of redness and/or skin breakdown  2.  Assess vascular access sites hourly  3.  Every 4-6 hours minimum:  Change oxygen saturation probe site  4.  Every 4-6 hours:  If on nasal continuous positive airway pressure, respiratory therapy assess nares and determine need for appliance change or resting period.  9/3/2024 1151 by Paula Bailey, RN  Outcome: Completed  9/3/2024 0841 by Paula Bailey, RN  Outcome: Progressing  9/2/2024 2220 by Millie Pereira, RN  Outcome: Progressing     Problem: Safety - Adult  Goal: Free from fall injury  9/3/2024 1151 by Paula Bailey, RN  Outcome: Completed  9/3/2024 0841 by Paula Bailey, RN  Outcome: Progressing  9/2/2024 2220 by Millie Pereira, RN  Outcome: Progressing     Problem: ABCDS Injury Assessment  Goal: Absence of physical injury  9/3/2024 1151 by Paula Bailey, RN  Outcome: Completed  9/3/2024 0841 by

## 2024-09-03 NOTE — CONSULTS
Consult was placed for nephrology this morning.  However the patient had already been discharged by the time I came to evaluate him.  Reviewed  chart, he was admitted over the weekend and he had dialysis yesterday and it was uneventful as per dialysis nurse.

## 2024-09-04 ENCOUNTER — APPOINTMENT (OUTPATIENT)
Age: 79
End: 2024-09-04
Payer: MEDICARE

## 2024-09-04 ENCOUNTER — HOSPITAL ENCOUNTER (EMERGENCY)
Age: 79
Discharge: HOME | End: 2024-09-05
Attending: EMERGENCY MEDICINE
Payer: MEDICARE

## 2024-09-04 DIAGNOSIS — R11.2 NAUSEA AND VOMITING, UNSPECIFIED VOMITING TYPE: ICD-10-CM

## 2024-09-04 DIAGNOSIS — J40 BRONCHITIS: ICD-10-CM

## 2024-09-04 DIAGNOSIS — R10.9 ABDOMINAL PAIN, UNSPECIFIED ABDOMINAL LOCATION: Primary | ICD-10-CM

## 2024-09-04 DIAGNOSIS — N39.0 URINARY TRACT INFECTION WITHOUT HEMATURIA, SITE UNSPECIFIED: ICD-10-CM

## 2024-09-04 LAB
ALBUMIN SERPL-MCNC: 3.3 G/DL (ref 3.4–5)
ALBUMIN/GLOB SERPL: 0.7 (ref 0.8–1.7)
ALP SERPL-CCNC: 73 U/L (ref 45–117)
ALT SERPL-CCNC: 17 U/L (ref 16–61)
ANION GAP SERPL CALC-SCNC: 13 MMOL/L (ref 3–18)
APPEARANCE UR: CLEAR
AST SERPL W P-5'-P-CCNC: 17 U/L (ref 10–38)
BACTERIA URNS QL MICRO: ABNORMAL /HPF
BASOPHILS # BLD: 0 K/UL (ref 0–0.1)
BASOPHILS NFR BLD: 0 % (ref 0–2)
BILIRUB DIRECT SERPL-MCNC: <0.1 MG/DL (ref 0–0.2)
BILIRUB SERPL-MCNC: 0.4 MG/DL (ref 0.2–1)
BILIRUB UR QL: NEGATIVE
BUN SERPL-MCNC: 41 MG/DL (ref 7–18)
BUN/CREAT SERPL: 8 (ref 12–20)
CA-I BLD-MCNC: 9.7 MG/DL (ref 8.5–10.1)
CHLORIDE SERPL-SCNC: 97 MMOL/L (ref 100–111)
CO2 SERPL-SCNC: 25 MMOL/L (ref 21–32)
COLOR UR: YELLOW
CREAT SERPL-MCNC: 5.41 MG/DL (ref 0.6–1.3)
DIFFERENTIAL METHOD BLD: ABNORMAL
EOSINOPHIL # BLD: 0.1 K/UL (ref 0–0.4)
EOSINOPHIL NFR BLD: 1 % (ref 0–5)
EPITH CASTS URNS QL MICRO: ABNORMAL /LPF (ref 0–20)
ERYTHROCYTE [DISTWIDTH] IN BLOOD BY AUTOMATED COUNT: 14 % (ref 11.6–14.5)
GLOBULIN SER CALC-MCNC: 4.6 G/DL (ref 2–4)
GLUCOSE SERPL-MCNC: 243 MG/DL (ref 74–99)
GLUCOSE UR STRIP.AUTO-MCNC: NEGATIVE MG/DL
HCT VFR BLD AUTO: 33.3 % (ref 36–48)
HGB BLD-MCNC: 11.2 G/DL (ref 13–16)
HGB UR QL STRIP: NEGATIVE
IMM GRANULOCYTES # BLD AUTO: 0.1 K/UL (ref 0–0.04)
IMM GRANULOCYTES NFR BLD AUTO: 1 % (ref 0–0.5)
KETONES UR QL STRIP.AUTO: NEGATIVE MG/DL
LEUKOCYTE ESTERASE UR QL STRIP.AUTO: ABNORMAL
LIPASE SERPL-CCNC: 89 U/L (ref 13–75)
LYMPHOCYTES # BLD: 1.8 K/UL (ref 0.9–3.6)
LYMPHOCYTES NFR BLD: 20 % (ref 21–52)
MCH RBC QN AUTO: 27.7 PG (ref 24–34)
MCHC RBC AUTO-ENTMCNC: 33.6 G/DL (ref 31–37)
MCV RBC AUTO: 82.4 FL (ref 78–100)
MONOCYTES # BLD: 0.9 K/UL (ref 0.05–1.2)
MONOCYTES NFR BLD: 10 % (ref 3–10)
NEUTS SEG # BLD: 6.5 K/UL (ref 1.8–8)
NEUTS SEG NFR BLD: 68 % (ref 40–73)
NITRITE UR QL STRIP.AUTO: NEGATIVE
NRBC # BLD: 0 K/UL (ref 0–0.01)
NRBC BLD-RTO: 0 PER 100 WBC
PH UR STRIP: 8 (ref 5–8)
PLATELET # BLD AUTO: 278 K/UL (ref 135–420)
PMV BLD AUTO: 9.2 FL (ref 9.2–11.8)
POTASSIUM SERPL-SCNC: 4.2 MMOL/L (ref 3.5–5.5)
PROT SERPL-MCNC: 7.9 G/DL (ref 6.4–8.2)
PROT UR STRIP-MCNC: 100 MG/DL
RBC # BLD AUTO: 4.04 M/UL (ref 4.35–5.65)
RBC #/AREA URNS HPF: ABNORMAL /HPF (ref 0–2)
SODIUM SERPL-SCNC: 135 MMOL/L (ref 136–145)
SP GR UR REFRACTOMETRY: 1.01 (ref 1–1.03)
TROPONIN I SERPL HS-MCNC: 31 NG/L (ref 0–78)
TROPONIN I SERPL HS-MCNC: 32 NG/L (ref 0–78)
UROBILINOGEN UR QL STRIP.AUTO: 0.2 EU/DL (ref 0.2–1)
WBC # BLD AUTO: 9.4 K/UL (ref 4.6–13.2)
WBC URNS QL MICRO: ABNORMAL /HPF (ref 0–4)

## 2024-09-04 PROCEDURE — 2580000003 HC RX 258: Performed by: EMERGENCY MEDICINE

## 2024-09-04 PROCEDURE — 71045 X-RAY EXAM CHEST 1 VIEW: CPT

## 2024-09-04 PROCEDURE — 2500000003 HC RX 250 WO HCPCS: Performed by: EMERGENCY MEDICINE

## 2024-09-04 PROCEDURE — 84484 ASSAY OF TROPONIN QUANT: CPT

## 2024-09-04 PROCEDURE — 85025 COMPLETE CBC W/AUTO DIFF WBC: CPT

## 2024-09-04 PROCEDURE — 93005 ELECTROCARDIOGRAM TRACING: CPT | Performed by: EMERGENCY MEDICINE

## 2024-09-04 PROCEDURE — 6360000002 HC RX W HCPCS: Performed by: EMERGENCY MEDICINE

## 2024-09-04 PROCEDURE — 99285 EMERGENCY DEPT VISIT HI MDM: CPT

## 2024-09-04 PROCEDURE — 96374 THER/PROPH/DIAG INJ IV PUSH: CPT

## 2024-09-04 PROCEDURE — 81001 URINALYSIS AUTO W/SCOPE: CPT

## 2024-09-04 PROCEDURE — 96375 TX/PRO/DX INJ NEW DRUG ADDON: CPT

## 2024-09-04 PROCEDURE — 83690 ASSAY OF LIPASE: CPT

## 2024-09-04 PROCEDURE — 80076 HEPATIC FUNCTION PANEL: CPT

## 2024-09-04 PROCEDURE — 36415 COLL VENOUS BLD VENIPUNCTURE: CPT

## 2024-09-04 PROCEDURE — 80048 BASIC METABOLIC PNL TOTAL CA: CPT

## 2024-09-04 RX ORDER — SULFAMETHOXAZOLE/TRIMETHOPRIM 800-160 MG
1 TABLET ORAL ONCE
Status: COMPLETED | OUTPATIENT
Start: 2024-09-05 | End: 2024-09-05

## 2024-09-04 RX ORDER — SULFAMETHOXAZOLE/TRIMETHOPRIM 800-160 MG
0.5 TABLET ORAL 2 TIMES DAILY
Qty: 7 TABLET | Refills: 0 | Status: SHIPPED | OUTPATIENT
Start: 2024-09-04 | End: 2024-09-11

## 2024-09-04 RX ORDER — ONDANSETRON 4 MG/1
4 TABLET, ORALLY DISINTEGRATING ORAL EVERY 12 HOURS PRN
Qty: 10 TABLET | Refills: 0 | Status: SHIPPED | OUTPATIENT
Start: 2024-09-04

## 2024-09-04 RX ORDER — ONDANSETRON 2 MG/ML
4 INJECTION INTRAMUSCULAR; INTRAVENOUS
Status: COMPLETED | OUTPATIENT
Start: 2024-09-04 | End: 2024-09-04

## 2024-09-04 RX ADMIN — FAMOTIDINE 20 MG: 10 INJECTION, SOLUTION INTRAVENOUS at 20:48

## 2024-09-04 RX ADMIN — ONDANSETRON 4 MG: 2 INJECTION INTRAMUSCULAR; INTRAVENOUS at 20:47

## 2024-09-04 ASSESSMENT — PAIN SCALES - GENERAL: PAINLEVEL_OUTOF10: 10

## 2024-09-04 ASSESSMENT — LIFESTYLE VARIABLES
HOW OFTEN DO YOU HAVE A DRINK CONTAINING ALCOHOL: NEVER
HOW MANY STANDARD DRINKS CONTAINING ALCOHOL DO YOU HAVE ON A TYPICAL DAY: PATIENT DOES NOT DRINK

## 2024-09-04 ASSESSMENT — PAIN DESCRIPTION - LOCATION: LOCATION: ABDOMEN;CHEST

## 2024-09-04 ASSESSMENT — PAIN - FUNCTIONAL ASSESSMENT: PAIN_FUNCTIONAL_ASSESSMENT: 0-10

## 2024-09-04 ASSESSMENT — PAIN DESCRIPTION - DESCRIPTORS: DESCRIPTORS: ACHING

## 2024-09-04 ASSESSMENT — PAIN DESCRIPTION - ORIENTATION: ORIENTATION: LEFT

## 2024-09-05 ENCOUNTER — TELEPHONE (OUTPATIENT)
Facility: CLINIC | Age: 79
End: 2024-09-05

## 2024-09-05 VITALS
HEART RATE: 73 BPM | SYSTOLIC BLOOD PRESSURE: 157 MMHG | BODY MASS INDEX: 27.49 KG/M2 | DIASTOLIC BLOOD PRESSURE: 83 MMHG | RESPIRATION RATE: 14 BRPM | HEIGHT: 68 IN | WEIGHT: 181.4 LBS | TEMPERATURE: 98.4 F | OXYGEN SATURATION: 93 %

## 2024-09-05 LAB
EKG ATRIAL RATE: 79 BPM
EKG DIAGNOSIS: NORMAL
EKG P AXIS: 55 DEGREES
EKG P-R INTERVAL: 224 MS
EKG Q-T INTERVAL: 400 MS
EKG QRS DURATION: 106 MS
EKG QTC CALCULATION (BAZETT): 458 MS
EKG R AXIS: -24 DEGREES
EKG T AXIS: 38 DEGREES
EKG VENTRICULAR RATE: 79 BPM

## 2024-09-05 PROCEDURE — 6370000000 HC RX 637 (ALT 250 FOR IP): Performed by: EMERGENCY MEDICINE

## 2024-09-05 RX ADMIN — SULFAMETHOXAZOLE AND TRIMETHOPRIM 1 TABLET: 800; 160 TABLET ORAL at 00:08

## 2024-09-05 NOTE — ED TRIAGE NOTES
Patient arrived by EMS. Patient reports Left upper abdominal pain. Patient reports he was discharged from here and went to [facility]. Patient also reports intermittent chest pain and took one Nitro at home prior to EMS arrival and and EMS gave 1 nitro and Aspirin 324mg. Patient reports he vomited once and that made his stomach feel better.

## 2024-09-05 NOTE — ED NOTES
I have reviewed discharge instructions with the patient.  The patient verbalized understanding. Patient instructed to follow up with PCP and encouraged to follow up with any other concerns or emergent care needed. Patient verbalized understanding.

## 2024-09-05 NOTE — ED NOTES
Spoke with patient's niece, Zully Escudero, on the phone and informed her that patient is being discharged and will need transportation home. She states that she will arrange for someone to pick him up.

## 2024-09-05 NOTE — TELEPHONE ENCOUNTER
Care Transitions Initial Follow Up Call    Outreach made within 2 business days of discharge: Yes    Patient: Ann-Marie Cardenas Patient : 1945   MRN: [id number]  Reason: for chest pain ICU    Admission: 24  Discharge Date: 9/3/24       Spoke with: 24 no answer 8:27 AM  Spoke to niece and she stated he did go to ER again yesterday, then he went to [facility] afterwards, niece is unsure if he is home or at hospital or [facility]. She will find out and call me back.  8:55 AM Spoke to patient and states he feels ok this morning.    Discharge department/facility: [facility] Interactive Patient Contact:  Was patient able to fill all prescriptions: Yes still needs to pick  Was patient instructed to bring all medications to the follow-up visit: Yes  Is patient taking all medications as directed in the discharge summary? Yes  Does patient understand their discharge instructions: Yes  Does patient have questions or concerns that need addressed prior to 7-14 day follow up office visit: no    Additional needs identified to be addressed with provider  Multiple ER visits recently[facility]             Scheduled appointment with PCP within 7-14 days    Follow Up  Future Appointments   Date Time Provider Department Center   9/10/2024 11:30 AM Collette Nicholas MD [facility] DEP   8/15/2025  9:30 AM Collette Nicholas MD [facility] DEP       Velia Leon LPN

## 2024-09-05 NOTE — ED PROVIDER NOTES
[facility] EMERGENCY DEPT  EMERGENCY DEPARTMENT ENCOUNTER      Pt Name: Ann-Marie Cardenas  MRN: [id number]  Birthdate 1945  Date of evaluation: 9/4/2024  Provider: Claus Shaikh MD    CHIEF COMPLAINT       Chief Complaint   Patient presents with    Abdominal Pain       HPI:  Ann-Marie Cardenas is a 78 y.o. male who presents to the emergency department pt c/o left upper abd pain, x one months, off and on , says admitted here for same 4 days ago and at [facility] at [place].  Sx's off and on, had again pta, then n/v x one and now sx's resolved. Denies cp today, says does get chest pain frequently though. No sob. No fever  H/o urostomy rt side. H/o crf on hd, had yest as planned.   Denies back pain.  No swelling.    + cough x one day, mild non productive, makes pain worse.     HPI    Nursing Notes were reviewed.    REVIEW OF SYSTEMS    (2-9 systems for level 4, 10 or more for level 5)     Review of Systems    Except as noted above the remainder of the review of systems was reviewed and negative.       PAST MEDICAL HISTORY     Past Medical History:   Diagnosis Date    Acid reflux     Arrhythmia     Arthritis     Bladder cancer (HCC)     Colon cancer (HCC) 11/2021    Congestive heart failure (HCC)     Deep vein thrombosis (DVT) of proximal vein of both lower extremities (HCC) 9/14/2020    Diabetes mellitus (HCC)     Dialysis patient (HCC)     Tues, Thurs, Sat    Difficult intubation     Narrow airway-per Pulm notes(in media)     ESRD (end stage renal disease) (HCC)     stage IV/V per renal notes care everywhere 5/22/23    GERD (gastroesophageal reflux disease)     Gout     High cholesterol     Hypertension     Hypomagnesemia 05/2023    Kidney carcinoma, left (HCC) 2016    left nephrectomy    Kidney disease     Pituitary mass (HCC)     Pulmonary HTN (HCC) 10/2021    PASP 77 mmHg    Reflux esophagitis     Unspecified deficiency anemia          SURGICAL HISTORY       Past Surgical History:   Procedure Laterality Date    AV FISTULA

## 2024-09-07 LAB
BACTERIA SPEC CULT: NORMAL
BACTERIA SPEC CULT: NORMAL
Lab: NORMAL
Lab: NORMAL

## 2024-09-10 ENCOUNTER — TELEMEDICINE (OUTPATIENT)
Facility: CLINIC | Age: 79
End: 2024-09-10

## 2024-09-10 ENCOUNTER — TELEPHONE (OUTPATIENT)
Age: 79
End: 2024-09-10

## 2024-09-10 DIAGNOSIS — D68.69 SECONDARY HYPERCOAGULABLE STATE (HCC): ICD-10-CM

## 2024-09-10 DIAGNOSIS — I10 ESSENTIAL HYPERTENSION: ICD-10-CM

## 2024-09-10 DIAGNOSIS — Z09 HOSPITAL DISCHARGE FOLLOW-UP: Primary | ICD-10-CM

## 2024-09-10 DIAGNOSIS — Z99.2 ESRD ON HEMODIALYSIS (HCC): ICD-10-CM

## 2024-09-10 DIAGNOSIS — J98.01 BRONCHOSPASM: ICD-10-CM

## 2024-09-10 DIAGNOSIS — N18.6 ESRD ON HEMODIALYSIS (HCC): ICD-10-CM

## 2024-09-10 DIAGNOSIS — Z79.01 CHRONIC ANTICOAGULATION: ICD-10-CM

## 2024-09-10 DIAGNOSIS — Z78.9 IMPAIRED MOBILITY AND ADLS: ICD-10-CM

## 2024-09-10 DIAGNOSIS — Z74.09 IMPAIRED MOBILITY AND ADLS: ICD-10-CM

## 2024-09-10 DIAGNOSIS — E55.9 VITAMIN D DEFICIENCY: ICD-10-CM

## 2024-09-10 DIAGNOSIS — R53.81 PHYSICAL DECONDITIONING: ICD-10-CM

## 2024-09-10 DIAGNOSIS — N39.0 URINARY TRACT INFECTION WITHOUT HEMATURIA, SITE UNSPECIFIED: ICD-10-CM

## 2024-09-10 DIAGNOSIS — I82.4Y3 DEEP VEIN THROMBOSIS (DVT) OF PROXIMAL VEIN OF BOTH LOWER EXTREMITIES, UNSPECIFIED CHRONICITY (HCC): ICD-10-CM

## 2024-09-10 RX ORDER — SEVELAMER CARBONATE 800 MG/1
1 TABLET, FILM COATED ORAL
Qty: 90 TABLET | Refills: 1 | Status: SHIPPED | OUTPATIENT
Start: 2024-09-10

## 2024-09-10 RX ORDER — ALBUTEROL SULFATE 90 UG/1
2 AEROSOL, METERED RESPIRATORY (INHALATION) EVERY 4 HOURS PRN
Qty: 18 G | Refills: 1 | Status: SHIPPED | OUTPATIENT
Start: 2024-09-10

## 2024-09-10 RX ORDER — ISOSORBIDE MONONITRATE 30 MG/1
30 TABLET, EXTENDED RELEASE ORAL DAILY
Qty: 90 TABLET | Refills: 1 | Status: SHIPPED | OUTPATIENT
Start: 2024-09-10

## 2024-09-10 RX ORDER — CHOLECALCIFEROL (VITAMIN D3) 25 MCG
1000 TABLET ORAL DAILY
Qty: 90 TABLET | Refills: 1 | Status: SHIPPED | OUTPATIENT
Start: 2024-09-10

## 2024-09-10 ASSESSMENT — PATIENT HEALTH QUESTIONNAIRE - PHQ9
1. LITTLE INTEREST OR PLEASURE IN DOING THINGS: NOT AT ALL
2. FEELING DOWN, DEPRESSED OR HOPELESS: NOT AT ALL
SUM OF ALL RESPONSES TO PHQ QUESTIONS 1-9: 0
SUM OF ALL RESPONSES TO PHQ9 QUESTIONS 1 & 2: 0
SUM OF ALL RESPONSES TO PHQ QUESTIONS 1-9: 0

## 2024-09-18 ENCOUNTER — HOSPITAL ENCOUNTER (OUTPATIENT)
Age: 79
Setting detail: OBSERVATION
Discharge: HOME | End: 2024-09-19
Attending: FAMILY MEDICINE | Admitting: FAMILY MEDICINE
Payer: MEDICARE

## 2024-09-18 ENCOUNTER — APPOINTMENT (OUTPATIENT)
Age: 79
End: 2024-09-18
Payer: MEDICARE

## 2024-09-18 DIAGNOSIS — R07.9 CHEST PAIN, UNSPECIFIED TYPE: Primary | ICD-10-CM

## 2024-09-18 DIAGNOSIS — R06.02 SHORTNESS OF BREATH: ICD-10-CM

## 2024-09-18 LAB
ALBUMIN SERPL-MCNC: 3.3 G/DL (ref 3.4–5)
ALBUMIN/GLOB SERPL: 0.8 (ref 0.8–1.7)
ALP SERPL-CCNC: 70 U/L (ref 45–117)
ALT SERPL-CCNC: 18 U/L (ref 16–61)
ANION GAP SERPL CALC-SCNC: 11 MMOL/L (ref 3–18)
AST SERPL W P-5'-P-CCNC: 18 U/L (ref 10–38)
BASOPHILS # BLD: 0 K/UL (ref 0–0.1)
BASOPHILS NFR BLD: 0 % (ref 0–2)
BILIRUB SERPL-MCNC: 0.4 MG/DL (ref 0.2–1)
BNP SERPL-MCNC: 686 PG/ML (ref 0–1800)
BUN SERPL-MCNC: 39 MG/DL (ref 7–18)
BUN/CREAT SERPL: 7 (ref 12–20)
CA-I BLD-MCNC: 10.3 MG/DL (ref 8.5–10.1)
CHLORIDE SERPL-SCNC: 97 MMOL/L (ref 100–111)
CO2 SERPL-SCNC: 27 MMOL/L (ref 21–32)
CREAT SERPL-MCNC: 5.31 MG/DL (ref 0.6–1.3)
DIFFERENTIAL METHOD BLD: ABNORMAL
EOSINOPHIL # BLD: 0.1 K/UL (ref 0–0.4)
EOSINOPHIL NFR BLD: 1 % (ref 0–5)
ERYTHROCYTE [DISTWIDTH] IN BLOOD BY AUTOMATED COUNT: 14.6 % (ref 11.6–14.5)
GLOBULIN SER CALC-MCNC: 3.9 G/DL (ref 2–4)
GLUCOSE SERPL-MCNC: 286 MG/DL (ref 74–99)
HCT VFR BLD AUTO: 32.3 % (ref 36–48)
HGB BLD-MCNC: 10.6 G/DL (ref 13–16)
IMM GRANULOCYTES # BLD AUTO: 0 K/UL (ref 0–0.04)
IMM GRANULOCYTES NFR BLD AUTO: 0 % (ref 0–0.5)
LYMPHOCYTES # BLD: 1.6 K/UL (ref 0.9–3.6)
LYMPHOCYTES NFR BLD: 21 % (ref 21–52)
MAGNESIUM SERPL-MCNC: 2.1 MG/DL (ref 1.6–2.6)
MCH RBC QN AUTO: 28 PG (ref 24–34)
MCHC RBC AUTO-ENTMCNC: 32.8 G/DL (ref 31–37)
MCV RBC AUTO: 85.4 FL (ref 78–100)
MONOCYTES # BLD: 0.9 K/UL (ref 0.05–1.2)
MONOCYTES NFR BLD: 12 % (ref 3–10)
NEUTS SEG # BLD: 4.8 K/UL (ref 1.8–8)
NEUTS SEG NFR BLD: 66 % (ref 40–73)
NRBC # BLD: 0 K/UL (ref 0–0.01)
NRBC BLD-RTO: 0 PER 100 WBC
PLATELET # BLD AUTO: 220 K/UL (ref 135–420)
PMV BLD AUTO: 9.4 FL (ref 9.2–11.8)
POTASSIUM SERPL-SCNC: 3.9 MMOL/L (ref 3.5–5.5)
PROT SERPL-MCNC: 7.2 G/DL (ref 6.4–8.2)
RBC # BLD AUTO: 3.78 M/UL (ref 4.35–5.65)
SODIUM SERPL-SCNC: 135 MMOL/L (ref 136–145)
TROPONIN I SERPL HS-MCNC: 31 NG/L (ref 0–78)
TROPONIN I SERPL HS-MCNC: 32 NG/L (ref 0–78)
WBC # BLD AUTO: 7.4 K/UL (ref 4.6–13.2)

## 2024-09-18 PROCEDURE — 96374 THER/PROPH/DIAG INJ IV PUSH: CPT

## 2024-09-18 PROCEDURE — 99285 EMERGENCY DEPT VISIT HI MDM: CPT

## 2024-09-18 PROCEDURE — 36415 COLL VENOUS BLD VENIPUNCTURE: CPT

## 2024-09-18 PROCEDURE — 85025 COMPLETE CBC W/AUTO DIFF WBC: CPT

## 2024-09-18 PROCEDURE — 84484 ASSAY OF TROPONIN QUANT: CPT

## 2024-09-18 PROCEDURE — 83880 ASSAY OF NATRIURETIC PEPTIDE: CPT

## 2024-09-18 PROCEDURE — 83735 ASSAY OF MAGNESIUM: CPT

## 2024-09-18 PROCEDURE — 93005 ELECTROCARDIOGRAM TRACING: CPT | Performed by: FAMILY MEDICINE

## 2024-09-18 PROCEDURE — 71045 X-RAY EXAM CHEST 1 VIEW: CPT

## 2024-09-18 PROCEDURE — 6360000002 HC RX W HCPCS: Performed by: FAMILY MEDICINE

## 2024-09-18 PROCEDURE — 96375 TX/PRO/DX INJ NEW DRUG ADDON: CPT

## 2024-09-18 PROCEDURE — 80053 COMPREHEN METABOLIC PANEL: CPT

## 2024-09-18 RX ORDER — INSULIN GLARGINE 100 [IU]/ML
8 INJECTION, SOLUTION SUBCUTANEOUS NIGHTLY
Status: DISCONTINUED | OUTPATIENT
Start: 2024-09-19 | End: 2024-09-19 | Stop reason: HOSPADM

## 2024-09-18 RX ORDER — DEXTROSE MONOHYDRATE 100 MG/ML
INJECTION, SOLUTION INTRAVENOUS CONTINUOUS PRN
Status: DISCONTINUED | OUTPATIENT
Start: 2024-09-18 | End: 2024-09-19 | Stop reason: HOSPADM

## 2024-09-18 RX ORDER — ACETAMINOPHEN 325 MG/1
650 TABLET ORAL EVERY 6 HOURS PRN
Status: DISCONTINUED | OUTPATIENT
Start: 2024-09-18 | End: 2024-09-19 | Stop reason: HOSPADM

## 2024-09-18 RX ORDER — ATORVASTATIN CALCIUM 40 MG/1
40 TABLET, FILM COATED ORAL DAILY
Status: DISCONTINUED | OUTPATIENT
Start: 2024-09-19 | End: 2024-09-19 | Stop reason: HOSPADM

## 2024-09-18 RX ORDER — PANTOPRAZOLE SODIUM 40 MG/1
40 TABLET, DELAYED RELEASE ORAL
Status: DISCONTINUED | OUTPATIENT
Start: 2024-09-19 | End: 2024-09-19 | Stop reason: HOSPADM

## 2024-09-18 RX ORDER — SODIUM BICARBONATE 650 MG/1
650 TABLET ORAL 3 TIMES DAILY
Status: DISCONTINUED | OUTPATIENT
Start: 2024-09-19 | End: 2024-09-18

## 2024-09-18 RX ORDER — ONDANSETRON 2 MG/ML
4 INJECTION INTRAMUSCULAR; INTRAVENOUS ONCE
Status: COMPLETED | OUTPATIENT
Start: 2024-09-18 | End: 2024-09-18

## 2024-09-18 RX ORDER — SEVELAMER CARBONATE 800 MG/1
800 TABLET, FILM COATED ORAL
Status: DISCONTINUED | OUTPATIENT
Start: 2024-09-19 | End: 2024-09-19 | Stop reason: HOSPADM

## 2024-09-18 RX ORDER — SODIUM CHLORIDE 9 MG/ML
INJECTION, SOLUTION INTRAVENOUS PRN
Status: DISCONTINUED | OUTPATIENT
Start: 2024-09-18 | End: 2024-09-19 | Stop reason: HOSPADM

## 2024-09-18 RX ORDER — ALLOPURINOL 100 MG/1
200 TABLET ORAL DAILY
Status: DISCONTINUED | OUTPATIENT
Start: 2024-09-19 | End: 2024-09-19 | Stop reason: HOSPADM

## 2024-09-18 RX ORDER — POLYETHYLENE GLYCOL 3350 17 G/17G
17 POWDER, FOR SOLUTION ORAL DAILY PRN
Status: DISCONTINUED | OUTPATIENT
Start: 2024-09-18 | End: 2024-09-19 | Stop reason: HOSPADM

## 2024-09-18 RX ORDER — ALBUTEROL SULFATE 90 UG/1
2 INHALANT RESPIRATORY (INHALATION) EVERY 4 HOURS PRN
Status: DISCONTINUED | OUTPATIENT
Start: 2024-09-18 | End: 2024-09-19 | Stop reason: CLARIF

## 2024-09-18 RX ORDER — SODIUM CHLORIDE 0.9 % (FLUSH) 0.9 %
5-40 SYRINGE (ML) INJECTION PRN
Status: DISCONTINUED | OUTPATIENT
Start: 2024-09-18 | End: 2024-09-19 | Stop reason: HOSPADM

## 2024-09-18 RX ORDER — CETIRIZINE HYDROCHLORIDE 10 MG/1
5 TABLET ORAL DAILY
Status: DISCONTINUED | OUTPATIENT
Start: 2024-09-19 | End: 2024-09-19 | Stop reason: HOSPADM

## 2024-09-18 RX ORDER — HYDRALAZINE HYDROCHLORIDE 25 MG/1
100 TABLET, FILM COATED ORAL 3 TIMES DAILY
Status: DISCONTINUED | OUTPATIENT
Start: 2024-09-19 | End: 2024-09-19 | Stop reason: HOSPADM

## 2024-09-18 RX ORDER — INSULIN LISPRO 100 [IU]/ML
0-4 INJECTION, SOLUTION INTRAVENOUS; SUBCUTANEOUS NIGHTLY
Status: DISCONTINUED | OUTPATIENT
Start: 2024-09-19 | End: 2024-09-19 | Stop reason: HOSPADM

## 2024-09-18 RX ORDER — VITAMIN B COMPLEX
1000 TABLET ORAL DAILY
Status: DISCONTINUED | OUTPATIENT
Start: 2024-09-19 | End: 2024-09-19 | Stop reason: HOSPADM

## 2024-09-18 RX ORDER — ONDANSETRON 4 MG/1
4 TABLET, ORALLY DISINTEGRATING ORAL EVERY 8 HOURS PRN
Status: DISCONTINUED | OUTPATIENT
Start: 2024-09-18 | End: 2024-09-19 | Stop reason: HOSPADM

## 2024-09-18 RX ORDER — ACETAMINOPHEN 650 MG/1
650 SUPPOSITORY RECTAL EVERY 6 HOURS PRN
Status: DISCONTINUED | OUTPATIENT
Start: 2024-09-18 | End: 2024-09-19 | Stop reason: HOSPADM

## 2024-09-18 RX ORDER — MORPHINE SULFATE 2 MG/ML
2 INJECTION, SOLUTION INTRAMUSCULAR; INTRAVENOUS
Status: COMPLETED | OUTPATIENT
Start: 2024-09-18 | End: 2024-09-18

## 2024-09-18 RX ORDER — ASPIRIN 81 MG/1
81 TABLET ORAL DAILY
Status: DISCONTINUED | OUTPATIENT
Start: 2024-09-19 | End: 2024-09-19 | Stop reason: HOSPADM

## 2024-09-18 RX ORDER — ONDANSETRON 2 MG/ML
4 INJECTION INTRAMUSCULAR; INTRAVENOUS EVERY 6 HOURS PRN
Status: DISCONTINUED | OUTPATIENT
Start: 2024-09-18 | End: 2024-09-19 | Stop reason: HOSPADM

## 2024-09-18 RX ORDER — SODIUM CHLORIDE 0.9 % (FLUSH) 0.9 %
5-40 SYRINGE (ML) INJECTION EVERY 12 HOURS SCHEDULED
Status: DISCONTINUED | OUTPATIENT
Start: 2024-09-19 | End: 2024-09-19 | Stop reason: HOSPADM

## 2024-09-18 RX ORDER — AMLODIPINE BESYLATE 5 MG/1
10 TABLET ORAL DAILY
Status: DISCONTINUED | OUTPATIENT
Start: 2024-09-19 | End: 2024-09-19 | Stop reason: HOSPADM

## 2024-09-18 RX ORDER — INSULIN LISPRO 100 [IU]/ML
0-4 INJECTION, SOLUTION INTRAVENOUS; SUBCUTANEOUS
Status: DISCONTINUED | OUTPATIENT
Start: 2024-09-19 | End: 2024-09-19 | Stop reason: HOSPADM

## 2024-09-18 RX ORDER — ISOSORBIDE MONONITRATE 30 MG/1
30 TABLET, EXTENDED RELEASE ORAL DAILY
Status: DISCONTINUED | OUTPATIENT
Start: 2024-09-19 | End: 2024-09-19

## 2024-09-18 RX ORDER — FUROSEMIDE 40 MG
40 TABLET ORAL DAILY
Status: DISCONTINUED | OUTPATIENT
Start: 2024-09-19 | End: 2024-09-19 | Stop reason: HOSPADM

## 2024-09-18 RX ADMIN — MORPHINE SULFATE 2 MG: 2 INJECTION, SOLUTION INTRAMUSCULAR; INTRAVENOUS at 21:16

## 2024-09-18 RX ADMIN — ONDANSETRON 4 MG: 2 INJECTION INTRAMUSCULAR; INTRAVENOUS at 21:13

## 2024-09-18 ASSESSMENT — PAIN DESCRIPTION - LOCATION: LOCATION: ABDOMEN;CHEST

## 2024-09-18 ASSESSMENT — HEART SCORE: ECG: NON-SPECIFC REPOLARIZATION DISTURBANCE/LBTB/PM

## 2024-09-18 ASSESSMENT — PAIN DESCRIPTION - ORIENTATION: ORIENTATION: LEFT

## 2024-09-18 ASSESSMENT — LIFESTYLE VARIABLES
HOW MANY STANDARD DRINKS CONTAINING ALCOHOL DO YOU HAVE ON A TYPICAL DAY: PATIENT DOES NOT DRINK
HOW OFTEN DO YOU HAVE A DRINK CONTAINING ALCOHOL: NEVER
HOW MANY STANDARD DRINKS CONTAINING ALCOHOL DO YOU HAVE ON A TYPICAL DAY: PATIENT DOES NOT DRINK

## 2024-09-18 ASSESSMENT — PAIN SCALES - GENERAL: PAINLEVEL_OUTOF10: 10

## 2024-09-18 ASSESSMENT — PAIN DESCRIPTION - DESCRIPTORS: DESCRIPTORS: PRESSURE

## 2024-09-18 ASSESSMENT — PAIN - FUNCTIONAL ASSESSMENT: PAIN_FUNCTIONAL_ASSESSMENT: 0-10

## 2024-09-19 ENCOUNTER — TELEPHONE (OUTPATIENT)
Facility: CLINIC | Age: 79
End: 2024-09-19

## 2024-09-19 VITALS
SYSTOLIC BLOOD PRESSURE: 146 MMHG | WEIGHT: 175.9 LBS | BODY MASS INDEX: 26.66 KG/M2 | OXYGEN SATURATION: 98 % | TEMPERATURE: 97.4 F | HEART RATE: 60 BPM | RESPIRATION RATE: 15 BRPM | HEIGHT: 68 IN | DIASTOLIC BLOOD PRESSURE: 65 MMHG

## 2024-09-19 LAB
ANION GAP SERPL CALC-SCNC: 8 MMOL/L (ref 3–18)
BASOPHILS # BLD: 0 K/UL (ref 0–0.1)
BASOPHILS NFR BLD: 1 % (ref 0–2)
BUN SERPL-MCNC: 45 MG/DL (ref 7–18)
BUN/CREAT SERPL: 8 (ref 12–20)
CA-I BLD-MCNC: 10 MG/DL (ref 8.5–10.1)
CHLORIDE SERPL-SCNC: 98 MMOL/L (ref 100–111)
CHOLEST SERPL-MCNC: 115 MG/DL
CO2 SERPL-SCNC: 31 MMOL/L (ref 21–32)
CREAT SERPL-MCNC: 5.66 MG/DL (ref 0.6–1.3)
DIFFERENTIAL METHOD BLD: ABNORMAL
EKG ATRIAL RATE: 68 BPM
EKG DIAGNOSIS: NORMAL
EKG P AXIS: 14 DEGREES
EKG P-R INTERVAL: 248 MS
EKG Q-T INTERVAL: 412 MS
EKG QRS DURATION: 110 MS
EKG QTC CALCULATION (BAZETT): 438 MS
EKG R AXIS: -46 DEGREES
EKG T AXIS: 49 DEGREES
EKG VENTRICULAR RATE: 68 BPM
EOSINOPHIL # BLD: 0.1 K/UL (ref 0–0.4)
EOSINOPHIL NFR BLD: 2 % (ref 0–5)
ERYTHROCYTE [DISTWIDTH] IN BLOOD BY AUTOMATED COUNT: 14.6 % (ref 11.6–14.5)
GLUCOSE BLD STRIP.AUTO-MCNC: 119 MG/DL (ref 70–110)
GLUCOSE BLD STRIP.AUTO-MCNC: 213 MG/DL (ref 70–110)
GLUCOSE SERPL-MCNC: 162 MG/DL (ref 74–99)
HCT VFR BLD AUTO: 32.3 % (ref 36–48)
HDLC SERPL-MCNC: 33 MG/DL (ref 40–60)
HDLC SERPL: 3.5 (ref 0–5)
HGB BLD-MCNC: 10.6 G/DL (ref 13–16)
IMM GRANULOCYTES # BLD AUTO: 0 K/UL (ref 0–0.04)
IMM GRANULOCYTES NFR BLD AUTO: 0 % (ref 0–0.5)
LDLC SERPL CALC-MCNC: 59.8 MG/DL (ref 0–100)
LIPID PANEL: ABNORMAL
LYMPHOCYTES # BLD: 1.6 K/UL (ref 0.9–3.6)
LYMPHOCYTES NFR BLD: 27 % (ref 21–52)
MCH RBC QN AUTO: 28 PG (ref 24–34)
MCHC RBC AUTO-ENTMCNC: 32.8 G/DL (ref 31–37)
MCV RBC AUTO: 85.4 FL (ref 78–100)
MONOCYTES # BLD: 0.8 K/UL (ref 0.05–1.2)
MONOCYTES NFR BLD: 14 % (ref 3–10)
NEUTS SEG # BLD: 3.2 K/UL (ref 1.8–8)
NEUTS SEG NFR BLD: 56 % (ref 40–73)
NRBC # BLD: 0 K/UL (ref 0–0.01)
NRBC BLD-RTO: 0 PER 100 WBC
PERFORMED BY:: ABNORMAL
PERFORMED BY:: ABNORMAL
PLATELET # BLD AUTO: 200 K/UL (ref 135–420)
PMV BLD AUTO: 9.2 FL (ref 9.2–11.8)
POTASSIUM SERPL-SCNC: 4 MMOL/L (ref 3.5–5.5)
RBC # BLD AUTO: 3.78 M/UL (ref 4.35–5.65)
SODIUM SERPL-SCNC: 137 MMOL/L (ref 136–145)
TRIGL SERPL-MCNC: 111 MG/DL
VLDLC SERPL CALC-MCNC: 22.2 MG/DL
WBC # BLD AUTO: 5.7 K/UL (ref 4.6–13.2)

## 2024-09-19 PROCEDURE — G0378 HOSPITAL OBSERVATION PER HR: HCPCS

## 2024-09-19 PROCEDURE — 80048 BASIC METABOLIC PNL TOTAL CA: CPT

## 2024-09-19 PROCEDURE — 94761 N-INVAS EAR/PLS OXIMETRY MLT: CPT

## 2024-09-19 PROCEDURE — 85025 COMPLETE CBC W/AUTO DIFF WBC: CPT

## 2024-09-19 PROCEDURE — 80061 LIPID PANEL: CPT

## 2024-09-19 PROCEDURE — 6370000000 HC RX 637 (ALT 250 FOR IP): Performed by: STUDENT IN AN ORGANIZED HEALTH CARE EDUCATION/TRAINING PROGRAM

## 2024-09-19 PROCEDURE — 82962 GLUCOSE BLOOD TEST: CPT

## 2024-09-19 PROCEDURE — 2580000003 HC RX 258: Performed by: STUDENT IN AN ORGANIZED HEALTH CARE EDUCATION/TRAINING PROGRAM

## 2024-09-19 RX ORDER — ISOSORBIDE MONONITRATE 60 MG/1
60 TABLET, EXTENDED RELEASE ORAL DAILY
Status: DISCONTINUED | OUTPATIENT
Start: 2024-09-20 | End: 2024-09-19 | Stop reason: HOSPADM

## 2024-09-19 RX ORDER — ALBUTEROL SULFATE 0.83 MG/ML
2.5 SOLUTION RESPIRATORY (INHALATION) EVERY 4 HOURS PRN
Status: DISCONTINUED | OUTPATIENT
Start: 2024-09-19 | End: 2024-09-19 | Stop reason: HOSPADM

## 2024-09-19 RX ORDER — NITROGLYCERIN 0.4 MG/1
0.4 TABLET SUBLINGUAL EVERY 5 MIN PRN
Status: DISCONTINUED | OUTPATIENT
Start: 2024-09-19 | End: 2024-09-19 | Stop reason: HOSPADM

## 2024-09-19 RX ADMIN — ALLOPURINOL 200 MG: 100 TABLET ORAL at 07:56

## 2024-09-19 RX ADMIN — APIXABAN 5 MG: 2.5 TABLET, FILM COATED ORAL at 08:57

## 2024-09-19 RX ADMIN — CETIRIZINE HYDROCHLORIDE 5 MG: 10 TABLET, FILM COATED ORAL at 07:55

## 2024-09-19 RX ADMIN — SEVELAMER CARBONATE 800 MG: 800 TABLET, FILM COATED ORAL at 07:55

## 2024-09-19 RX ADMIN — ATORVASTATIN CALCIUM 40 MG: 40 TABLET, FILM COATED ORAL at 07:56

## 2024-09-19 RX ADMIN — FUROSEMIDE 40 MG: 40 TABLET ORAL at 07:56

## 2024-09-19 RX ADMIN — SODIUM CHLORIDE, PRESERVATIVE FREE 5 ML: 5 INJECTION INTRAVENOUS at 07:57

## 2024-09-19 RX ADMIN — PANTOPRAZOLE SODIUM 40 MG: 40 TABLET, DELAYED RELEASE ORAL at 06:29

## 2024-09-19 RX ADMIN — AMLODIPINE BESYLATE 10 MG: 5 TABLET ORAL at 07:56

## 2024-09-19 RX ADMIN — ISOSORBIDE MONONITRATE 30 MG: 30 TABLET, EXTENDED RELEASE ORAL at 07:55

## 2024-09-19 RX ADMIN — HYDRALAZINE HYDROCHLORIDE 100 MG: 25 TABLET ORAL at 07:55

## 2024-09-19 RX ADMIN — ASPIRIN 81 MG: 81 TABLET, COATED ORAL at 07:56

## 2024-09-19 RX ADMIN — Medication 1000 UNITS: at 07:56

## 2024-09-19 ASSESSMENT — PAIN SCALES - GENERAL
PAINLEVEL_OUTOF10: 0

## 2024-09-19 ASSESSMENT — ENCOUNTER SYMPTOMS
DIARRHEA: 0
CHEST TIGHTNESS: 0
COUGH: 0
VOMITING: 0
TROUBLE SWALLOWING: 0
SHORTNESS OF BREATH: 1
NAUSEA: 1
NAUSEA: 0
BACK PAIN: 0
RHINORRHEA: 0
ALLERGIC/IMMUNOLOGIC NEGATIVE: 1
SORE THROAT: 0
EYES NEGATIVE: 1
WHEEZING: 0
ABDOMINAL PAIN: 0

## 2024-09-20 ENCOUNTER — TELEPHONE (OUTPATIENT)
Facility: CLINIC | Age: 79
End: 2024-09-20

## 2024-09-20 ENCOUNTER — HOSPITAL ENCOUNTER (EMERGENCY)
Age: 79
Discharge: HOME | End: 2024-09-20
Attending: FAMILY MEDICINE
Payer: MEDICARE

## 2024-09-20 VITALS
DIASTOLIC BLOOD PRESSURE: 72 MMHG | SYSTOLIC BLOOD PRESSURE: 148 MMHG | OXYGEN SATURATION: 100 % | TEMPERATURE: 97.6 F | HEART RATE: 80 BPM | HEIGHT: 68 IN | BODY MASS INDEX: 26.52 KG/M2 | RESPIRATION RATE: 16 BRPM | WEIGHT: 175 LBS

## 2024-09-20 VITALS
WEIGHT: 179 LBS | HEART RATE: 76 BPM | DIASTOLIC BLOOD PRESSURE: 76 MMHG | TEMPERATURE: 98.6 F | BODY MASS INDEX: 27.13 KG/M2 | OXYGEN SATURATION: 97 % | HEIGHT: 68 IN | RESPIRATION RATE: 18 BRPM | SYSTOLIC BLOOD PRESSURE: 147 MMHG

## 2024-09-20 DIAGNOSIS — K59.00 CONSTIPATION, UNSPECIFIED CONSTIPATION TYPE: Primary | ICD-10-CM

## 2024-09-20 LAB
ALBUMIN SERPL-MCNC: 3.8 G/DL (ref 3.4–5)
ALBUMIN/GLOB SERPL: 1.1 (ref 0.8–1.7)
ALP SERPL-CCNC: 82 U/L (ref 45–117)
ALT SERPL-CCNC: 20 U/L (ref 16–61)
ANION GAP SERPL CALC-SCNC: 9 MMOL/L (ref 3–18)
APPEARANCE UR: ABNORMAL
AST SERPL W P-5'-P-CCNC: 21 U/L (ref 10–38)
BACTERIA URNS QL MICRO: ABNORMAL /HPF
BASOPHILS # BLD: 0 K/UL (ref 0–0.1)
BASOPHILS NFR BLD: 0 % (ref 0–2)
BILIRUB SERPL-MCNC: 0.6 MG/DL (ref 0.2–1)
BILIRUB UR QL: NEGATIVE
BUN SERPL-MCNC: 31 MG/DL (ref 7–18)
BUN/CREAT SERPL: 7 (ref 12–20)
CA-I BLD-MCNC: 9.9 MG/DL (ref 8.5–10.1)
CHLORIDE SERPL-SCNC: 96 MMOL/L (ref 100–111)
CO2 SERPL-SCNC: 31 MMOL/L (ref 21–32)
COLOR UR: YELLOW
CREAT SERPL-MCNC: 4.59 MG/DL (ref 0.6–1.3)
DIFFERENTIAL METHOD BLD: ABNORMAL
EOSINOPHIL # BLD: 0 K/UL (ref 0–0.4)
EOSINOPHIL NFR BLD: 0 % (ref 0–5)
EPITH CASTS URNS QL MICRO: ABNORMAL /LPF (ref 0–20)
ERYTHROCYTE [DISTWIDTH] IN BLOOD BY AUTOMATED COUNT: 14.6 % (ref 11.6–14.5)
GLOBULIN SER CALC-MCNC: 3.5 G/DL (ref 2–4)
GLUCOSE SERPL-MCNC: 114 MG/DL (ref 74–99)
GLUCOSE UR STRIP.AUTO-MCNC: NEGATIVE MG/DL
HCT VFR BLD AUTO: 36 % (ref 36–48)
HGB BLD-MCNC: 11.8 G/DL (ref 13–16)
HGB UR QL STRIP: ABNORMAL
IMM GRANULOCYTES # BLD AUTO: 0 K/UL (ref 0–0.04)
IMM GRANULOCYTES NFR BLD AUTO: 0 % (ref 0–0.5)
KETONES UR QL STRIP.AUTO: NEGATIVE MG/DL
LEUKOCYTE ESTERASE UR QL STRIP.AUTO: ABNORMAL
LYMPHOCYTES # BLD: 1.5 K/UL (ref 0.9–3.6)
LYMPHOCYTES NFR BLD: 14 % (ref 21–52)
MCH RBC QN AUTO: 28 PG (ref 24–34)
MCHC RBC AUTO-ENTMCNC: 32.8 G/DL (ref 31–37)
MCV RBC AUTO: 85.5 FL (ref 78–100)
MONOCYTES # BLD: 1 K/UL (ref 0.05–1.2)
MONOCYTES NFR BLD: 9 % (ref 3–10)
NEUTS SEG # BLD: 8.1 K/UL (ref 1.8–8)
NEUTS SEG NFR BLD: 77 % (ref 40–73)
NITRITE UR QL STRIP.AUTO: NEGATIVE
NRBC # BLD: 0 K/UL (ref 0–0.01)
NRBC BLD-RTO: 0 PER 100 WBC
PH UR STRIP: 8.5 (ref 5–8)
PLATELET # BLD AUTO: 247 K/UL (ref 135–420)
PMV BLD AUTO: 9.2 FL (ref 9.2–11.8)
POTASSIUM SERPL-SCNC: 4.1 MMOL/L (ref 3.5–5.5)
PROT SERPL-MCNC: 7.3 G/DL (ref 6.4–8.2)
PROT UR STRIP-MCNC: 100 MG/DL
RBC # BLD AUTO: 4.21 M/UL (ref 4.35–5.65)
RBC #/AREA URNS HPF: ABNORMAL /HPF (ref 0–2)
SODIUM SERPL-SCNC: 136 MMOL/L (ref 136–145)
SP GR UR REFRACTOMETRY: 1.01 (ref 1–1.03)
UROBILINOGEN UR QL STRIP.AUTO: 0.2 EU/DL (ref 0.2–1)
WBC # BLD AUTO: 10.6 K/UL (ref 4.6–13.2)
WBC URNS QL MICRO: ABNORMAL /HPF (ref 0–4)

## 2024-09-20 PROCEDURE — 81001 URINALYSIS AUTO W/SCOPE: CPT

## 2024-09-20 PROCEDURE — 80053 COMPREHEN METABOLIC PANEL: CPT

## 2024-09-20 PROCEDURE — 6370000000 HC RX 637 (ALT 250 FOR IP): Performed by: FAMILY MEDICINE

## 2024-09-20 PROCEDURE — 99283 EMERGENCY DEPT VISIT LOW MDM: CPT

## 2024-09-20 PROCEDURE — 85025 COMPLETE CBC W/AUTO DIFF WBC: CPT

## 2024-09-20 RX ORDER — DOCUSATE SODIUM 100 MG/1
200 CAPSULE, LIQUID FILLED ORAL
Status: COMPLETED | OUTPATIENT
Start: 2024-09-20 | End: 2024-09-20

## 2024-09-20 RX ADMIN — MINERAL OIL 1 ENEMA: 100 ENEMA RECTAL at 18:22

## 2024-09-20 RX ADMIN — GLYCERIN 2 G: 2 SUPPOSITORY RECTAL at 16:30

## 2024-09-20 RX ADMIN — DOCUSATE SODIUM 200 MG: 100 CAPSULE, LIQUID FILLED ORAL at 16:30

## 2024-09-20 ASSESSMENT — PAIN - FUNCTIONAL ASSESSMENT
PAIN_FUNCTIONAL_ASSESSMENT: 0-10
PAIN_FUNCTIONAL_ASSESSMENT: 0-10

## 2024-09-20 ASSESSMENT — PAIN SCALES - GENERAL
PAINLEVEL_OUTOF10: 10
PAINLEVEL_OUTOF10: 0

## 2024-09-20 ASSESSMENT — PAIN DESCRIPTION - LOCATION: LOCATION: RECTUM

## 2024-09-20 ASSESSMENT — LIFESTYLE VARIABLES
HOW OFTEN DO YOU HAVE A DRINK CONTAINING ALCOHOL: NEVER
HOW MANY STANDARD DRINKS CONTAINING ALCOHOL DO YOU HAVE ON A TYPICAL DAY: PATIENT DOES NOT DRINK
HOW MANY STANDARD DRINKS CONTAINING ALCOHOL DO YOU HAVE ON A TYPICAL DAY: PATIENT DOES NOT DRINK
HOW OFTEN DO YOU HAVE A DRINK CONTAINING ALCOHOL: NEVER

## 2024-09-22 ENCOUNTER — APPOINTMENT (OUTPATIENT)
Age: 79
End: 2024-09-22
Payer: MEDICARE

## 2024-09-22 ENCOUNTER — HOSPITAL ENCOUNTER (EMERGENCY)
Age: 79
Discharge: HOME | End: 2024-09-22
Attending: EMERGENCY MEDICINE
Payer: MEDICARE

## 2024-09-22 VITALS
HEART RATE: 70 BPM | RESPIRATION RATE: 16 BRPM | HEIGHT: 68 IN | TEMPERATURE: 97.6 F | DIASTOLIC BLOOD PRESSURE: 75 MMHG | BODY MASS INDEX: 27.28 KG/M2 | OXYGEN SATURATION: 100 % | SYSTOLIC BLOOD PRESSURE: 170 MMHG | WEIGHT: 180 LBS

## 2024-09-22 DIAGNOSIS — M17.12 PRIMARY OSTEOARTHRITIS OF LEFT KNEE: Primary | ICD-10-CM

## 2024-09-22 DIAGNOSIS — J06.9 ACUTE UPPER RESPIRATORY INFECTION: ICD-10-CM

## 2024-09-22 DIAGNOSIS — N18.6 ESRD (END STAGE RENAL DISEASE) ON DIALYSIS (HCC): ICD-10-CM

## 2024-09-22 DIAGNOSIS — Z99.2 ESRD (END STAGE RENAL DISEASE) ON DIALYSIS (HCC): ICD-10-CM

## 2024-09-22 LAB
FLUAV RNA SPEC QL NAA+PROBE: NOT DETECTED
FLUBV RNA SPEC QL NAA+PROBE: NOT DETECTED
SARS-COV-2 RNA RESP QL NAA+PROBE: NOT DETECTED

## 2024-09-22 PROCEDURE — 99285 EMERGENCY DEPT VISIT HI MDM: CPT

## 2024-09-22 PROCEDURE — 71045 X-RAY EXAM CHEST 1 VIEW: CPT

## 2024-09-22 PROCEDURE — 6370000000 HC RX 637 (ALT 250 FOR IP): Performed by: EMERGENCY MEDICINE

## 2024-09-22 PROCEDURE — 87636 SARSCOV2 & INF A&B AMP PRB: CPT

## 2024-09-22 PROCEDURE — 93005 ELECTROCARDIOGRAM TRACING: CPT | Performed by: EMERGENCY MEDICINE

## 2024-09-22 PROCEDURE — 73564 X-RAY EXAM KNEE 4 OR MORE: CPT

## 2024-09-22 RX ORDER — PREDNISONE 20 MG/1
20 TABLET ORAL 2 TIMES DAILY
Qty: 10 TABLET | Refills: 0 | Status: SHIPPED | OUTPATIENT
Start: 2024-09-22 | End: 2024-09-27

## 2024-09-22 RX ORDER — OXYCODONE AND ACETAMINOPHEN 5; 325 MG/1; MG/1
1 TABLET ORAL EVERY 8 HOURS PRN
Qty: 20 TABLET | Refills: 0 | Status: SHIPPED | OUTPATIENT
Start: 2024-09-22 | End: 2024-09-29

## 2024-09-22 RX ORDER — CYCLOBENZAPRINE HCL 10 MG
10 TABLET ORAL 3 TIMES DAILY PRN
Qty: 21 TABLET | Refills: 0 | Status: SHIPPED | OUTPATIENT
Start: 2024-09-22 | End: 2024-10-02

## 2024-09-22 RX ORDER — CYCLOBENZAPRINE HCL 10 MG
10 TABLET ORAL
Status: COMPLETED | OUTPATIENT
Start: 2024-09-22 | End: 2024-09-22

## 2024-09-22 RX ORDER — PREDNISONE 20 MG/1
40 TABLET ORAL DAILY
Status: DISCONTINUED | OUTPATIENT
Start: 2024-09-22 | End: 2024-09-22 | Stop reason: HOSPADM

## 2024-09-22 RX ORDER — GUAIFENESIN 600 MG/1
600 TABLET, EXTENDED RELEASE ORAL 2 TIMES DAILY
Qty: 30 TABLET | Refills: 0 | Status: SHIPPED | OUTPATIENT
Start: 2024-09-22 | End: 2024-10-07

## 2024-09-22 RX ADMIN — CYCLOBENZAPRINE 10 MG: 10 TABLET, FILM COATED ORAL at 19:47

## 2024-09-22 RX ADMIN — PREDNISONE 40 MG: 20 TABLET ORAL at 19:47

## 2024-09-22 ASSESSMENT — PAIN DESCRIPTION - DESCRIPTORS: DESCRIPTORS: ACHING

## 2024-09-22 ASSESSMENT — PAIN SCALES - GENERAL: PAINLEVEL_OUTOF10: 10

## 2024-09-22 ASSESSMENT — PAIN DESCRIPTION - PAIN TYPE: TYPE: ACUTE PAIN

## 2024-09-22 ASSESSMENT — PAIN DESCRIPTION - ORIENTATION: ORIENTATION: LEFT

## 2024-09-22 ASSESSMENT — PAIN - FUNCTIONAL ASSESSMENT: PAIN_FUNCTIONAL_ASSESSMENT: 0-10

## 2024-09-22 ASSESSMENT — PAIN DESCRIPTION - LOCATION: LOCATION: KNEE

## 2024-09-23 LAB
EKG ATRIAL RATE: 71 BPM
EKG DIAGNOSIS: NORMAL
EKG P AXIS: 54 DEGREES
EKG P-R INTERVAL: 238 MS
EKG Q-T INTERVAL: 410 MS
EKG QRS DURATION: 118 MS
EKG QTC CALCULATION (BAZETT): 445 MS
EKG R AXIS: -25 DEGREES
EKG T AXIS: 52 DEGREES
EKG VENTRICULAR RATE: 71 BPM

## 2024-09-26 ENCOUNTER — HOSPITAL ENCOUNTER (OUTPATIENT)
Facility: HOSPITAL | Age: 79
Discharge: HOME | End: 2024-09-26
Attending: STUDENT IN AN ORGANIZED HEALTH CARE EDUCATION/TRAINING PROGRAM | Admitting: STUDENT IN AN ORGANIZED HEALTH CARE EDUCATION/TRAINING PROGRAM
Payer: MEDICARE

## 2024-09-26 VITALS
TEMPERATURE: 98 F | HEART RATE: 63 BPM | DIASTOLIC BLOOD PRESSURE: 59 MMHG | SYSTOLIC BLOOD PRESSURE: 142 MMHG | OXYGEN SATURATION: 98 % | WEIGHT: 180 LBS | RESPIRATION RATE: 17 BRPM | BODY MASS INDEX: 27.28 KG/M2 | HEIGHT: 68 IN

## 2024-09-26 DIAGNOSIS — I20.0 UNSTABLE ANGINA (HCC): ICD-10-CM

## 2024-09-26 LAB
ALBUMIN SERPL-MCNC: 3.6 G/DL (ref 3.5–5)
ALBUMIN/GLOB SERPL: 0.9 (ref 1.1–2.2)
ALP SERPL-CCNC: 73 U/L (ref 45–117)
ALT SERPL-CCNC: 31 U/L (ref 12–78)
ANION GAP SERPL CALC-SCNC: 5 MMOL/L (ref 2–12)
APTT PPP: 26.2 SEC (ref 21.2–34.1)
AST SERPL W P-5'-P-CCNC: 28 U/L (ref 15–37)
BILIRUB SERPL-MCNC: 0.4 MG/DL (ref 0.2–1)
BUN SERPL-MCNC: 42 MG/DL (ref 6–20)
BUN/CREAT SERPL: 7 (ref 12–20)
CA-I BLD-MCNC: 9.7 MG/DL (ref 8.5–10.1)
CHLORIDE SERPL-SCNC: 100 MMOL/L (ref 97–108)
CO2 SERPL-SCNC: 32 MMOL/L (ref 21–32)
CREAT SERPL-MCNC: 6.2 MG/DL (ref 0.7–1.3)
ECHO BSA: 1.98 M2
ERYTHROCYTE [DISTWIDTH] IN BLOOD BY AUTOMATED COUNT: 14.3 % (ref 11.5–14.5)
GLOBULIN SER CALC-MCNC: 3.9 G/DL (ref 2–4)
GLUCOSE BLD STRIP.AUTO-MCNC: 114 MG/DL (ref 65–100)
GLUCOSE SERPL-MCNC: 141 MG/DL (ref 65–100)
HCT VFR BLD AUTO: 34.9 % (ref 36.6–50.3)
HGB BLD-MCNC: 11.3 G/DL (ref 12.1–17)
INR PPP: 1 (ref 0.9–1.1)
MCH RBC QN AUTO: 27.9 PG (ref 26–34)
MCHC RBC AUTO-ENTMCNC: 32.4 G/DL (ref 30–36.5)
MCV RBC AUTO: 86.2 FL (ref 80–99)
NRBC # BLD: 0 K/UL (ref 0–0.01)
NRBC BLD-RTO: 0 PER 100 WBC
PERFORMED BY:: ABNORMAL
PLATELET # BLD AUTO: 305 K/UL (ref 150–400)
PMV BLD AUTO: 9.2 FL (ref 8.9–12.9)
POTASSIUM SERPL-SCNC: 4.3 MMOL/L (ref 3.5–5.1)
PROT SERPL-MCNC: 7.5 G/DL (ref 6.4–8.2)
PROTHROMBIN TIME: 13.3 SEC (ref 11.9–14.6)
RBC # BLD AUTO: 4.05 M/UL (ref 4.1–5.7)
SODIUM SERPL-SCNC: 137 MMOL/L (ref 136–145)
THERAPEUTIC RANGE: NORMAL SEC (ref 82–109)
WBC # BLD AUTO: 7.5 K/UL (ref 4.1–11.1)

## 2024-09-26 PROCEDURE — C1769 GUIDE WIRE: HCPCS | Performed by: STUDENT IN AN ORGANIZED HEALTH CARE EDUCATION/TRAINING PROGRAM

## 2024-09-26 PROCEDURE — 6360000002 HC RX W HCPCS: Performed by: STUDENT IN AN ORGANIZED HEALTH CARE EDUCATION/TRAINING PROGRAM

## 2024-09-26 PROCEDURE — 7100000010 HC PHASE II RECOVERY - FIRST 15 MIN: Performed by: STUDENT IN AN ORGANIZED HEALTH CARE EDUCATION/TRAINING PROGRAM

## 2024-09-26 PROCEDURE — 6370000000 HC RX 637 (ALT 250 FOR IP): Performed by: STUDENT IN AN ORGANIZED HEALTH CARE EDUCATION/TRAINING PROGRAM

## 2024-09-26 PROCEDURE — 36415 COLL VENOUS BLD VENIPUNCTURE: CPT

## 2024-09-26 PROCEDURE — 7100000011 HC PHASE II RECOVERY - ADDTL 15 MIN: Performed by: STUDENT IN AN ORGANIZED HEALTH CARE EDUCATION/TRAINING PROGRAM

## 2024-09-26 PROCEDURE — 2580000003 HC RX 258: Performed by: STUDENT IN AN ORGANIZED HEALTH CARE EDUCATION/TRAINING PROGRAM

## 2024-09-26 PROCEDURE — 76937 US GUIDE VASCULAR ACCESS: CPT | Performed by: STUDENT IN AN ORGANIZED HEALTH CARE EDUCATION/TRAINING PROGRAM

## 2024-09-26 PROCEDURE — 2500000003 HC RX 250 WO HCPCS: Performed by: STUDENT IN AN ORGANIZED HEALTH CARE EDUCATION/TRAINING PROGRAM

## 2024-09-26 PROCEDURE — 93005 ELECTROCARDIOGRAM TRACING: CPT | Performed by: STUDENT IN AN ORGANIZED HEALTH CARE EDUCATION/TRAINING PROGRAM

## 2024-09-26 PROCEDURE — 82962 GLUCOSE BLOOD TEST: CPT

## 2024-09-26 PROCEDURE — 80053 COMPREHEN METABOLIC PANEL: CPT

## 2024-09-26 PROCEDURE — 85027 COMPLETE CBC AUTOMATED: CPT

## 2024-09-26 PROCEDURE — 2709999900 HC NON-CHARGEABLE SUPPLY: Performed by: STUDENT IN AN ORGANIZED HEALTH CARE EDUCATION/TRAINING PROGRAM

## 2024-09-26 PROCEDURE — 7100000000 HC PACU RECOVERY - FIRST 15 MIN: Performed by: STUDENT IN AN ORGANIZED HEALTH CARE EDUCATION/TRAINING PROGRAM

## 2024-09-26 PROCEDURE — 85610 PROTHROMBIN TIME: CPT

## 2024-09-26 PROCEDURE — 93458 L HRT ARTERY/VENTRICLE ANGIO: CPT | Performed by: STUDENT IN AN ORGANIZED HEALTH CARE EDUCATION/TRAINING PROGRAM

## 2024-09-26 PROCEDURE — C1894 INTRO/SHEATH, NON-LASER: HCPCS | Performed by: STUDENT IN AN ORGANIZED HEALTH CARE EDUCATION/TRAINING PROGRAM

## 2024-09-26 PROCEDURE — 85730 THROMBOPLASTIN TIME PARTIAL: CPT

## 2024-09-26 PROCEDURE — 6360000004 HC RX CONTRAST MEDICATION: Performed by: STUDENT IN AN ORGANIZED HEALTH CARE EDUCATION/TRAINING PROGRAM

## 2024-09-26 PROCEDURE — 99152 MOD SED SAME PHYS/QHP 5/>YRS: CPT | Performed by: STUDENT IN AN ORGANIZED HEALTH CARE EDUCATION/TRAINING PROGRAM

## 2024-09-26 PROCEDURE — 7100000001 HC PACU RECOVERY - ADDTL 15 MIN: Performed by: STUDENT IN AN ORGANIZED HEALTH CARE EDUCATION/TRAINING PROGRAM

## 2024-09-26 RX ORDER — FENTANYL CITRATE 50 UG/ML
INJECTION, SOLUTION INTRAMUSCULAR; INTRAVENOUS PRN
Status: DISCONTINUED | OUTPATIENT
Start: 2024-09-26 | End: 2024-09-26 | Stop reason: HOSPADM

## 2024-09-26 RX ORDER — VERAPAMIL HYDROCHLORIDE 2.5 MG/ML
INJECTION, SOLUTION INTRAVENOUS PRN
Status: DISCONTINUED | OUTPATIENT
Start: 2024-09-26 | End: 2024-09-26 | Stop reason: HOSPADM

## 2024-09-26 RX ORDER — LIDOCAINE HYDROCHLORIDE 10 MG/ML
INJECTION, SOLUTION INFILTRATION; PERINEURAL PRN
Status: DISCONTINUED | OUTPATIENT
Start: 2024-09-26 | End: 2024-09-26 | Stop reason: HOSPADM

## 2024-09-26 RX ORDER — ACETAMINOPHEN 325 MG/1
650 TABLET ORAL EVERY 4 HOURS PRN
Status: DISCONTINUED | OUTPATIENT
Start: 2024-09-26 | End: 2024-09-26 | Stop reason: HOSPADM

## 2024-09-26 RX ORDER — HEPARIN SODIUM 200 [USP'U]/100ML
INJECTION, SOLUTION INTRAVENOUS CONTINUOUS PRN
Status: COMPLETED | OUTPATIENT
Start: 2024-09-26 | End: 2024-09-26

## 2024-09-26 RX ORDER — SODIUM CHLORIDE 9 MG/ML
INJECTION, SOLUTION INTRAVENOUS PRN
Status: DISCONTINUED | OUTPATIENT
Start: 2024-09-26 | End: 2024-09-26 | Stop reason: HOSPADM

## 2024-09-26 RX ORDER — SODIUM CHLORIDE 0.9 % (FLUSH) 0.9 %
5-40 SYRINGE (ML) INJECTION PRN
Status: DISCONTINUED | OUTPATIENT
Start: 2024-09-26 | End: 2024-09-26 | Stop reason: HOSPADM

## 2024-09-26 RX ORDER — HYDRALAZINE HYDROCHLORIDE 50 MG/1
100 TABLET, FILM COATED ORAL ONCE
Status: COMPLETED | OUTPATIENT
Start: 2024-09-26 | End: 2024-09-26

## 2024-09-26 RX ORDER — HEPARIN SODIUM 1000 [USP'U]/ML
INJECTION, SOLUTION INTRAVENOUS; SUBCUTANEOUS PRN
Status: DISCONTINUED | OUTPATIENT
Start: 2024-09-26 | End: 2024-09-26 | Stop reason: HOSPADM

## 2024-09-26 RX ORDER — SODIUM CHLORIDE 0.9 % (FLUSH) 0.9 %
5-40 SYRINGE (ML) INJECTION EVERY 12 HOURS SCHEDULED
Status: DISCONTINUED | OUTPATIENT
Start: 2024-09-26 | End: 2024-09-26 | Stop reason: HOSPADM

## 2024-09-26 RX ORDER — IOPAMIDOL 755 MG/ML
INJECTION, SOLUTION INTRAVASCULAR PRN
Status: DISCONTINUED | OUTPATIENT
Start: 2024-09-26 | End: 2024-09-26 | Stop reason: HOSPADM

## 2024-09-26 RX ORDER — MIDAZOLAM HYDROCHLORIDE 1 MG/ML
INJECTION INTRAMUSCULAR; INTRAVENOUS PRN
Status: DISCONTINUED | OUTPATIENT
Start: 2024-09-26 | End: 2024-09-26 | Stop reason: HOSPADM

## 2024-09-26 RX ORDER — AMLODIPINE BESYLATE 5 MG/1
10 TABLET ORAL ONCE
Status: COMPLETED | OUTPATIENT
Start: 2024-09-26 | End: 2024-09-26

## 2024-09-26 RX ORDER — SODIUM CHLORIDE 9 MG/ML
INJECTION, SOLUTION INTRAVENOUS CONTINUOUS
Status: DISCONTINUED | OUTPATIENT
Start: 2024-09-26 | End: 2024-09-26 | Stop reason: HOSPADM

## 2024-09-26 RX ADMIN — HYDRALAZINE HYDROCHLORIDE 100 MG: 50 TABLET ORAL at 11:58

## 2024-09-26 RX ADMIN — AMLODIPINE BESYLATE 10 MG: 5 TABLET ORAL at 11:58

## 2024-09-26 RX ADMIN — SODIUM CHLORIDE: 9 INJECTION, SOLUTION INTRAVENOUS at 08:18

## 2024-09-26 ASSESSMENT — PAIN - FUNCTIONAL ASSESSMENT
PAIN_FUNCTIONAL_ASSESSMENT: NONE - DENIES PAIN

## 2024-09-27 DIAGNOSIS — R07.9 CHEST PAIN, UNSPECIFIED TYPE: Primary | ICD-10-CM

## 2024-09-27 LAB
EKG ATRIAL RATE: 61 BPM
EKG DIAGNOSIS: NORMAL
EKG P AXIS: 7 DEGREES
EKG P-R INTERVAL: 256 MS
EKG Q-T INTERVAL: 442 MS
EKG QRS DURATION: 118 MS
EKG QTC CALCULATION (BAZETT): 444 MS
EKG R AXIS: -32 DEGREES
EKG T AXIS: -24 DEGREES
EKG VENTRICULAR RATE: 61 BPM

## 2024-09-27 RX ORDER — NITROGLYCERIN 0.4 MG/1
0.4 TABLET SUBLINGUAL EVERY 5 MIN PRN
Qty: 25 TABLET | Refills: 0 | Status: SHIPPED | OUTPATIENT
Start: 2024-09-27

## 2024-10-01 ENCOUNTER — TELEPHONE (OUTPATIENT)
Facility: CLINIC | Age: 79
End: 2024-10-01

## 2024-10-01 ENCOUNTER — HOSPITAL ENCOUNTER (EMERGENCY)
Age: 79
Discharge: HOME | End: 2024-10-01
Attending: EMERGENCY MEDICINE
Payer: MEDICARE

## 2024-10-01 ENCOUNTER — APPOINTMENT (OUTPATIENT)
Age: 79
End: 2024-10-01
Payer: MEDICARE

## 2024-10-01 VITALS
TEMPERATURE: 98.7 F | RESPIRATION RATE: 18 BRPM | BODY MASS INDEX: 26 KG/M2 | OXYGEN SATURATION: 97 % | SYSTOLIC BLOOD PRESSURE: 159 MMHG | DIASTOLIC BLOOD PRESSURE: 76 MMHG | HEART RATE: 91 BPM | WEIGHT: 171 LBS

## 2024-10-01 DIAGNOSIS — K59.00 CONSTIPATION, UNSPECIFIED CONSTIPATION TYPE: Primary | ICD-10-CM

## 2024-10-01 PROCEDURE — 74018 RADEX ABDOMEN 1 VIEW: CPT

## 2024-10-01 PROCEDURE — 99283 EMERGENCY DEPT VISIT LOW MDM: CPT

## 2024-10-01 RX ORDER — SENNA AND DOCUSATE SODIUM 50; 8.6 MG/1; MG/1
2 TABLET, FILM COATED ORAL
Qty: 60 TABLET | Refills: 0 | Status: SHIPPED | OUTPATIENT
Start: 2024-10-01 | End: 2024-10-31

## 2024-10-01 RX ORDER — POLYETHYLENE GLYCOL 3350 17 G/17G
17 POWDER, FOR SOLUTION ORAL 2 TIMES DAILY
Qty: 510 G | Refills: 0 | Status: SHIPPED | OUTPATIENT
Start: 2024-10-01 | End: 2024-10-03 | Stop reason: SDUPTHER

## 2024-10-01 NOTE — PROGRESS NOTES
10/1/2024 Guerline ROSALES at [facility] called and request an order for patient to get a tub transfer bench for taking a shower.   Multiple ER visits  Patient complaining of dizziness with walking, Blood pressure noted dropping with going from sitting to standing.  Chief Complaint   Patient presents with    Follow-Up from Hospital       \"Have you been to the ER, urgent care clinic since your last visit?  Hospitalized since your last visit?\"    Multiple ER visits    “Have you seen or consulted any other health care providers outside of [facility] since your last visit?”      Patient unsure when he has had a follow up with his kidney doctor

## 2024-10-01 NOTE — TELEPHONE ENCOUNTER
Guerline ROSALES at [facility] , patient needs a tub transfer bench for taking a shower. Notified her that patient has a follow up appointment scheduled for 2 days from now for a hospital follow up and we will address this at his follow up appointment.

## 2024-10-01 NOTE — TELEPHONE ENCOUNTER
[facility] called to let us know per their policy that patient cancelled his therapy appointment with them today.

## 2024-10-02 NOTE — DISCHARGE INSTRUCTIONS
He was seen and evaluated for constipation.  You do have some constipation but still a bit higher up.  You are given MiraLAX to use twice a day as well as a Senokot S.  Take as directed.  Follow-up with your primary care doctor.  Return to emergency room for worsening symptoms or any other concerns.

## 2024-10-02 NOTE — ED PROVIDER NOTES
EMERGENCY DEPARTMENT HISTORY AND PHYSICAL EXAM      Patient Name: Ann-Marie Cardenas  MRN: [id number]  YOB: 1945  Provider: Brice Lebron DO  PCP: Collette Nicholas MD   Time/Date of evaluation: 8:14 PM EDT on 10/1/24    History of Presenting Illness     Chief Complaint   Patient presents with    Constipation       History Provided By: Patient     History (Narative):   Ann-Marie Cardenas is a 79 y.o. male with a PMHX of bladder cancer, colon cancer, CHF, DVT, diabetes, ESRD with dialysis on T/T/F, hypertension, hyperlipidemia, GERD  who presents to the emergency department  by POV C/O constipation onset 2 days.  Patient states that he has frequent bouts of constipation.  Patient states that this episode started 2 days ago.  Patient denies any nausea or vomiting.  Patient denies any fever or chills.  Patient states he tried some over-the-counter medicines 2 days ago but did not help.    Past History     Past Medical History:  Past Medical History:   Diagnosis Date    Acid reflux     Arrhythmia     Arthritis     Bladder cancer (HCC)     Colon cancer (HCC) 11/2021    Congestive heart failure (HCC)     Deep vein thrombosis (DVT) of proximal vein of both lower extremities (HCC) 9/14/2020    Diabetes mellitus (HCC)     Dialysis patient (HCC)     Tues, Thurs, Fri    Difficult intubation     Narrow airway-per Pulm notes(in media)     ESRD (end stage renal disease) (HCC)     stage IV/V per renal notes care everywhere 5/22/23    GERD (gastroesophageal reflux disease)     Gout     High cholesterol     Hypertension     Hypomagnesemia 05/2023    Kidney carcinoma, left (HCC) 2016    left nephrectomy    Kidney disease     Pituitary mass (HCC)     Pulmonary HTN (HCC) 10/2021    PASP 77 mmHg    Reflux esophagitis     Unspecified deficiency anemia        Past Surgical History:  Past Surgical History:   Procedure Laterality Date    AV FISTULA CREATION Left     CARDIAC PROCEDURE N/A 9/26/2024    Left heart cath /

## 2024-10-03 ENCOUNTER — OFFICE VISIT (OUTPATIENT)
Facility: CLINIC | Age: 79
End: 2024-10-03
Payer: MEDICARE

## 2024-10-03 VITALS
HEIGHT: 68 IN | HEART RATE: 92 BPM | SYSTOLIC BLOOD PRESSURE: 73 MMHG | TEMPERATURE: 96.8 F | BODY MASS INDEX: 26.83 KG/M2 | DIASTOLIC BLOOD PRESSURE: 43 MMHG | RESPIRATION RATE: 18 BRPM | WEIGHT: 177 LBS

## 2024-10-03 DIAGNOSIS — I95.9 HYPOTENSION, UNSPECIFIED HYPOTENSION TYPE: Primary | ICD-10-CM

## 2024-10-03 DIAGNOSIS — Z99.2 ESRD ON HEMODIALYSIS (HCC): ICD-10-CM

## 2024-10-03 DIAGNOSIS — N18.6 ESRD ON HEMODIALYSIS (HCC): ICD-10-CM

## 2024-10-03 DIAGNOSIS — K59.00 CONSTIPATION, UNSPECIFIED CONSTIPATION TYPE: ICD-10-CM

## 2024-10-03 DIAGNOSIS — Z78.9 IMPAIRED MOBILITY AND ADLS: ICD-10-CM

## 2024-10-03 DIAGNOSIS — Z74.09 IMPAIRED MOBILITY AND ADLS: ICD-10-CM

## 2024-10-03 DIAGNOSIS — R53.81 PHYSICAL DECONDITIONING: ICD-10-CM

## 2024-10-03 PROCEDURE — 3074F SYST BP LT 130 MM HG: CPT | Performed by: FAMILY MEDICINE

## 2024-10-03 PROCEDURE — 1123F ACP DISCUSS/DSCN MKR DOCD: CPT | Performed by: FAMILY MEDICINE

## 2024-10-03 PROCEDURE — 1126F AMNT PAIN NOTED NONE PRSNT: CPT | Performed by: FAMILY MEDICINE

## 2024-10-03 PROCEDURE — 3078F DIAST BP <80 MM HG: CPT | Performed by: FAMILY MEDICINE

## 2024-10-03 PROCEDURE — 99214 OFFICE O/P EST MOD 30 MIN: CPT | Performed by: FAMILY MEDICINE

## 2024-10-03 RX ORDER — POLYETHYLENE GLYCOL 3350 17 G/17G
17 POWDER, FOR SOLUTION ORAL 2 TIMES DAILY
Qty: 510 G | Refills: 0 | Status: SHIPPED | OUTPATIENT
Start: 2024-10-03 | End: 2024-11-02

## 2024-10-04 ENCOUNTER — TELEPHONE (OUTPATIENT)
Facility: CLINIC | Age: 79
End: 2024-10-04

## 2024-10-04 NOTE — TELEPHONE ENCOUNTER
Changes to medication made this morning per Dr Nicholas and corrected on medication list, scanned into Media as well. Those changes were documented on med list sent to Dialysis also. Called niece to notify and no answer, called patient mobile phone and no answer, left VM to return my call. Called home number and no answer. Called nurse Angelika at Dialysis (pa has dialysis this morning) to ask her to please also make patient aware of 2 medication changes. Angelika states that she saw on his med list that patient is taking Lasix 40 mg and he was told to discontinue that medication by Nephrologist around end of August/ beginning of September 2024. As of today per Angelika and Nephrologist patient should not be taking Lasix. Patient is there at Dialysis at this time and Angelika (dialysis nurse) will give patient updated correct med list with changes highlighted( stop Lasix, decrease Amlodipine to 5 mg and decrease Hydralazine to BID).

## 2024-10-04 NOTE — TELEPHONE ENCOUNTER
Called [facility](Dialysis) and made nurse Angelika aware of patient's Orthostatic Hypotension yesterday in office. She requested to fax over a medication list as she has been unable to get a definite list from patient and or family. Medication list faxed to [phone].

## 2024-10-14 DIAGNOSIS — I10 ESSENTIAL HYPERTENSION: ICD-10-CM

## 2024-10-14 DIAGNOSIS — N18.4 CHRONIC KIDNEY DISEASE, STAGE 4 (SEVERE) (HCC): ICD-10-CM

## 2024-10-15 RX ORDER — AMLODIPINE BESYLATE 10 MG/1
TABLET ORAL
Qty: 90 TABLET | Refills: 0 | OUTPATIENT
Start: 2024-10-15

## 2024-10-15 RX ORDER — FUROSEMIDE 40 MG
TABLET ORAL
Qty: 90 TABLET | Refills: 0 | OUTPATIENT
Start: 2024-10-15

## 2024-10-15 NOTE — TELEPHONE ENCOUNTER
per Angelika and Nephrologist patient should not be taking Lasix. Patient is there at Dialysis at this time and Angelika (dialysis nurse) will give patient updated correct med list with changes highlighted( stop Lasix, decrease Amlodipine to 5 mg and decrease Hydralazine to BID).

## 2024-10-17 ENCOUNTER — TELEPHONE (OUTPATIENT)
Facility: CLINIC | Age: 79
End: 2024-10-17

## 2024-10-17 NOTE — TELEPHONE ENCOUNTER
Jarrett from [facility] called and they are discharging him for non-compliance. He has missed too many appointments. He is either not home or will not answer the phone for his appointment when they call.

## 2024-11-04 ENCOUNTER — TELEPHONE (OUTPATIENT)
Facility: CLINIC | Age: 79
End: 2024-11-04

## 2024-11-04 DIAGNOSIS — E11.22 TYPE 2 DIABETES MELLITUS WITH CHRONIC KIDNEY DISEASE ON CHRONIC DIALYSIS, WITH LONG-TERM CURRENT USE OF INSULIN (HCC): ICD-10-CM

## 2024-11-04 DIAGNOSIS — Z79.4 TYPE 2 DIABETES MELLITUS WITH CHRONIC KIDNEY DISEASE ON CHRONIC DIALYSIS, WITH LONG-TERM CURRENT USE OF INSULIN (HCC): ICD-10-CM

## 2024-11-04 DIAGNOSIS — N18.6 TYPE 2 DIABETES MELLITUS WITH CHRONIC KIDNEY DISEASE ON CHRONIC DIALYSIS, WITH LONG-TERM CURRENT USE OF INSULIN (HCC): ICD-10-CM

## 2024-11-04 DIAGNOSIS — E78.5 HYPERLIPIDEMIA, UNSPECIFIED HYPERLIPIDEMIA TYPE: ICD-10-CM

## 2024-11-04 DIAGNOSIS — Z99.2 TYPE 2 DIABETES MELLITUS WITH CHRONIC KIDNEY DISEASE ON CHRONIC DIALYSIS, WITH LONG-TERM CURRENT USE OF INSULIN (HCC): ICD-10-CM

## 2024-11-04 DIAGNOSIS — N18.4 CHRONIC KIDNEY DISEASE, STAGE 4 (SEVERE) (HCC): ICD-10-CM

## 2024-11-04 DIAGNOSIS — I10 ESSENTIAL HYPERTENSION: ICD-10-CM

## 2024-11-04 RX ORDER — AMLODIPINE BESYLATE 10 MG/1
5 TABLET ORAL DAILY
Qty: 45 TABLET | Refills: 0 | Status: SHIPPED | OUTPATIENT
Start: 2024-11-04

## 2024-11-04 RX ORDER — ASPIRIN 81 MG/1
81 TABLET ORAL DAILY
Qty: 90 TABLET | Refills: 0 | Status: SHIPPED | OUTPATIENT
Start: 2024-11-04

## 2024-11-04 RX ORDER — FUROSEMIDE 40 MG/1
40 TABLET ORAL DAILY
Qty: 90 TABLET | Refills: 0 | Status: SHIPPED | OUTPATIENT
Start: 2024-11-04

## 2024-11-04 RX ORDER — ATORVASTATIN CALCIUM 40 MG/1
40 TABLET, FILM COATED ORAL DAILY
Qty: 90 TABLET | Refills: 0 | Status: SHIPPED | OUTPATIENT
Start: 2024-11-04

## 2024-11-04 RX ORDER — ALLOPURINOL 100 MG/1
200 TABLET ORAL DAILY
Qty: 180 TABLET | Refills: 0 | Status: SHIPPED | OUTPATIENT
Start: 2024-11-04 | End: 2025-02-02

## 2024-11-04 NOTE — TELEPHONE ENCOUNTER
Patient was supposed to have a follow up appointment at his last visit for chronic follow up, however, had to change it to Hospital FU appointment, so he needs to be scheduled for his follow up as soon as possible.

## 2024-11-19 ENCOUNTER — OFFICE VISIT (OUTPATIENT)
Facility: CLINIC | Age: 79
End: 2024-11-19
Payer: MEDICARE

## 2024-11-19 VITALS
DIASTOLIC BLOOD PRESSURE: 67 MMHG | TEMPERATURE: 97.4 F | OXYGEN SATURATION: 93 % | BODY MASS INDEX: 27.96 KG/M2 | RESPIRATION RATE: 18 BRPM | WEIGHT: 184.5 LBS | SYSTOLIC BLOOD PRESSURE: 151 MMHG | HEART RATE: 70 BPM | HEIGHT: 68 IN

## 2024-11-19 DIAGNOSIS — Z79.4 TYPE 2 DIABETES MELLITUS WITH CHRONIC KIDNEY DISEASE ON CHRONIC DIALYSIS, WITH LONG-TERM CURRENT USE OF INSULIN (HCC): Primary | ICD-10-CM

## 2024-11-19 DIAGNOSIS — I10 ESSENTIAL HYPERTENSION: ICD-10-CM

## 2024-11-19 DIAGNOSIS — I25.10 CORONARY ARTERY DISEASE INVOLVING NATIVE CORONARY ARTERY OF NATIVE HEART WITHOUT ANGINA PECTORIS: ICD-10-CM

## 2024-11-19 DIAGNOSIS — E11.22 TYPE 2 DIABETES MELLITUS WITH CHRONIC KIDNEY DISEASE ON CHRONIC DIALYSIS, WITH LONG-TERM CURRENT USE OF INSULIN (HCC): Primary | ICD-10-CM

## 2024-11-19 DIAGNOSIS — Z86.718 HISTORY OF DVT (DEEP VEIN THROMBOSIS): ICD-10-CM

## 2024-11-19 DIAGNOSIS — N18.6 TYPE 2 DIABETES MELLITUS WITH CHRONIC KIDNEY DISEASE ON CHRONIC DIALYSIS, WITH LONG-TERM CURRENT USE OF INSULIN (HCC): Primary | ICD-10-CM

## 2024-11-19 DIAGNOSIS — Z23 NEED FOR IMMUNIZATION AGAINST INFLUENZA: ICD-10-CM

## 2024-11-19 DIAGNOSIS — Z99.2 TYPE 2 DIABETES MELLITUS WITH CHRONIC KIDNEY DISEASE ON CHRONIC DIALYSIS, WITH LONG-TERM CURRENT USE OF INSULIN (HCC): Primary | ICD-10-CM

## 2024-11-19 DIAGNOSIS — I50.32 CHRONIC DIASTOLIC CONGESTIVE HEART FAILURE (HCC): ICD-10-CM

## 2024-11-19 DIAGNOSIS — J98.01 BRONCHOSPASM: ICD-10-CM

## 2024-11-19 DIAGNOSIS — E21.0 PRIMARY HYPERPARATHYROIDISM (HCC): ICD-10-CM

## 2024-11-19 LAB — HBA1C MFR BLD: 8.2 %

## 2024-11-19 PROCEDURE — 1123F ACP DISCUSS/DSCN MKR DOCD: CPT | Performed by: FAMILY MEDICINE

## 2024-11-19 PROCEDURE — 3077F SYST BP >= 140 MM HG: CPT | Performed by: FAMILY MEDICINE

## 2024-11-19 PROCEDURE — 99214 OFFICE O/P EST MOD 30 MIN: CPT | Performed by: FAMILY MEDICINE

## 2024-11-19 PROCEDURE — 3078F DIAST BP <80 MM HG: CPT | Performed by: FAMILY MEDICINE

## 2024-11-19 PROCEDURE — 1126F AMNT PAIN NOTED NONE PRSNT: CPT | Performed by: FAMILY MEDICINE

## 2024-11-19 PROCEDURE — 83036 HEMOGLOBIN GLYCOSYLATED A1C: CPT | Performed by: FAMILY MEDICINE

## 2024-11-19 PROCEDURE — 90653 IIV ADJUVANT VACCINE IM: CPT | Performed by: FAMILY MEDICINE

## 2024-11-19 PROCEDURE — G0008 ADMIN INFLUENZA VIRUS VAC: HCPCS | Performed by: FAMILY MEDICINE

## 2024-11-19 PROCEDURE — 3046F HEMOGLOBIN A1C LEVEL >9.0%: CPT | Performed by: FAMILY MEDICINE

## 2024-11-19 RX ORDER — ALBUTEROL SULFATE 90 UG/1
2 INHALANT RESPIRATORY (INHALATION) EVERY 4 HOURS PRN
Qty: 18 G | Refills: 1 | Status: SHIPPED | OUTPATIENT
Start: 2024-11-19

## 2024-11-19 ASSESSMENT — PATIENT HEALTH QUESTIONNAIRE - PHQ9
SUM OF ALL RESPONSES TO PHQ QUESTIONS 1-9: 0
2. FEELING DOWN, DEPRESSED OR HOPELESS: NOT AT ALL
SUM OF ALL RESPONSES TO PHQ9 QUESTIONS 1 & 2: 0
SUM OF ALL RESPONSES TO PHQ QUESTIONS 1-9: 0
1. LITTLE INTEREST OR PLEASURE IN DOING THINGS: NOT AT ALL

## 2024-11-19 NOTE — PATIENT INSTRUCTIONS
(including HIV or AIDS).  People who are pregnant.  People who have any condition that can make it hard to breathe or swallow (such as a brain injury or muscle disorders).  People who can give the flu to others who are at high risk for problems from the flu. This includes all health care workers and close contacts of people age 50 or older.  Who should not get the flu vaccine?  The person who gives the vaccine may tell you not to get it if you:  Have a severe allergy to eggs or any part of the vaccine.  Have had a severe reaction to a flu vaccine in the past.  Have had Guillain-Barré syndrome (GBS).  Are sick with a fever. (Get the vaccine when symptoms are gone.)  How can you care for yourself at home?  If you or your child has a sore arm or a slight fever after the vaccine, take an over-the-counter pain medicine, such as acetaminophen (Tylenol) or ibuprofen (Advil, Motrin). Read and follow all instructions on the label. Do not give aspirin to anyone younger than 20. It has been linked to Reye syndrome, a serious illness.  Do not take two or more pain medicines at the same time unless the doctor told you to. Many pain medicines have acetaminophen, which is Tylenol. Too much acetaminophen (Tylenol) can be harmful.  When should you call for help?   Call 911 anytime you think you may need emergency care. For example, call if after getting the flu vaccine:    You have symptoms of a severe reaction to the flu vaccine. Symptoms of a severe reaction may include:  Severe difficulty breathing.  Sudden raised, red areas (hives) all over your body.  Severe lightheadedness.   Call your doctor now or seek immediate medical care if after getting the flu vaccine:    You think you are having a reaction to the flu vaccine, such as a new fever.   Watch closely for changes in your health, and be sure to contact your doctor if you have any problems.  Where can you learn more?  Go to https://www.healthwise.net/patientEd and enter N880

## 2024-11-19 NOTE — PROGRESS NOTES
Fill out diabetic testing supplies form, in nurse folder.    Chief Complaint   Patient presents with    Follow-up     Chronic disease       \"Have you been to the ER, urgent care clinic since your last visit?  Hospitalized since your last visit?\"    NO    “Have you seen or consulted any other health care providers outside our system since your last visit?”    NO    Fingerstick for HBa1C done in middle finger by Velia Leon LPN per order of Collette Nicholas MD after cleaning area with alcohol wipe.  Patient tolerated procedure well.       
treatment plan as well as documenting on the day of the visit.     Medical decision making complexity: moderate-high.    Collette Nicholas MD   Family & Geriatric Medicine

## 2024-11-20 PROBLEM — I82.4Z3 ACUTE DEEP VEIN THROMBOSIS (DVT) OF DISTAL VEIN OF BOTH LOWER EXTREMITIES (HCC): Status: RESOLVED | Noted: 2020-10-13 | Resolved: 2024-11-20

## 2024-11-20 PROBLEM — I50.9 CHRONIC HEART FAILURE (HCC): Status: RESOLVED | Noted: 2023-09-19 | Resolved: 2024-11-20

## 2024-11-20 PROBLEM — I50.43 ACUTE ON CHRONIC COMBINED SYSTOLIC AND DIASTOLIC ACC/AHA STAGE C CONGESTIVE HEART FAILURE (HCC): Status: RESOLVED | Noted: 2021-10-07 | Resolved: 2024-11-20

## 2024-11-20 PROBLEM — N17.9 ACUTE RENAL FAILURE SUPERIMPOSED ON CHRONIC KIDNEY DISEASE (HCC): Status: RESOLVED | Noted: 2017-11-23 | Resolved: 2024-11-20

## 2024-11-20 PROBLEM — N18.9 ACUTE RENAL FAILURE SUPERIMPOSED ON CHRONIC KIDNEY DISEASE (HCC): Status: RESOLVED | Noted: 2017-11-23 | Resolved: 2024-11-20

## 2024-11-20 PROBLEM — Z86.718 HISTORY OF DVT (DEEP VEIN THROMBOSIS): Status: ACTIVE | Noted: 2020-09-14

## 2024-11-20 PROBLEM — E11.22 TYPE 2 DIABETES MELLITUS WITH CHRONIC KIDNEY DISEASE (HCC): Status: RESOLVED | Noted: 2022-03-30 | Resolved: 2024-11-20

## 2024-11-20 PROBLEM — I25.10 CORONARY ARTERY DISEASE INVOLVING NATIVE CORONARY ARTERY OF NATIVE HEART WITHOUT ANGINA PECTORIS: Status: ACTIVE | Noted: 2020-03-20

## 2024-11-20 PROBLEM — I50.9 CHF (CONGESTIVE HEART FAILURE) (HCC): Status: RESOLVED | Noted: 2020-09-15 | Resolved: 2024-11-20

## 2024-11-20 PROBLEM — I50.9 ACUTE ON CHRONIC CONGESTIVE HEART FAILURE (HCC): Status: RESOLVED | Noted: 2021-10-19 | Resolved: 2024-11-20

## 2024-11-20 PROBLEM — I50.9 ACUTE ON CHRONIC HEART FAILURE (HCC): Status: RESOLVED | Noted: 2021-01-13 | Resolved: 2024-11-20

## 2024-11-20 PROBLEM — N18.4 CHRONIC KIDNEY DISEASE, STAGE 4 (SEVERE) (HCC): Status: RESOLVED | Noted: 2021-11-01 | Resolved: 2024-11-20

## 2024-11-20 PROBLEM — I50.9 CHF EXACERBATION (HCC): Status: RESOLVED | Noted: 2021-10-06 | Resolved: 2024-11-20

## 2024-11-20 PROBLEM — N18.4 CHRONIC KIDNEY DISEASE, STAGE IV (SEVERE) (HCC): Status: RESOLVED | Noted: 2021-10-19 | Resolved: 2024-11-20

## 2024-11-20 PROBLEM — E11.9 DIABETES (HCC): Status: RESOLVED | Noted: 2023-09-19 | Resolved: 2024-11-20

## 2024-11-21 ENCOUNTER — TELEPHONE (OUTPATIENT)
Facility: CLINIC | Age: 79
End: 2024-11-21

## 2024-11-21 NOTE — TELEPHONE ENCOUNTER
Called [facility] back and notified them it is correct he is injecting insulin once daily not twice daily as they have.

## 2024-11-21 NOTE — TELEPHONE ENCOUNTER
[facility] called about DME form faxed for testing supplies. It was sent for testing 1 time a day and they have that he test 2 times a day so they need clarification. Please call them--[phone]

## 2024-11-23 ENCOUNTER — HOSPITAL ENCOUNTER (EMERGENCY)
Age: 79
Discharge: HOME | End: 2024-11-24
Attending: FAMILY MEDICINE
Payer: MEDICARE

## 2024-11-23 DIAGNOSIS — J00 ACUTE RHINITIS: ICD-10-CM

## 2024-11-23 DIAGNOSIS — R05.1 ACUTE COUGH: Primary | ICD-10-CM

## 2024-11-23 PROCEDURE — 99285 EMERGENCY DEPT VISIT HI MDM: CPT

## 2024-11-23 ASSESSMENT — PAIN - FUNCTIONAL ASSESSMENT: PAIN_FUNCTIONAL_ASSESSMENT: NONE - DENIES PAIN

## 2024-11-24 ENCOUNTER — APPOINTMENT (OUTPATIENT)
Age: 79
End: 2024-11-24
Payer: MEDICARE

## 2024-11-24 VITALS
HEIGHT: 68 IN | WEIGHT: 140 LBS | HEART RATE: 57 BPM | SYSTOLIC BLOOD PRESSURE: 182 MMHG | TEMPERATURE: 97.8 F | DIASTOLIC BLOOD PRESSURE: 74 MMHG | BODY MASS INDEX: 21.22 KG/M2 | RESPIRATION RATE: 20 BRPM | OXYGEN SATURATION: 100 %

## 2024-11-24 LAB
ALBUMIN SERPL-MCNC: 3.1 G/DL (ref 3.4–5)
ALBUMIN/GLOB SERPL: 0.9 (ref 0.8–1.7)
ALP SERPL-CCNC: 86 U/L (ref 45–117)
ALT SERPL-CCNC: 21 U/L (ref 16–61)
ANION GAP SERPL CALC-SCNC: 9 MMOL/L (ref 3–18)
AST SERPL W P-5'-P-CCNC: 21 U/L (ref 10–38)
BASOPHILS # BLD: 0 K/UL (ref 0–0.1)
BASOPHILS NFR BLD: 1 % (ref 0–2)
BILIRUB SERPL-MCNC: 0.4 MG/DL (ref 0.2–1)
BNP SERPL-MCNC: 937 PG/ML (ref 0–1800)
BUN SERPL-MCNC: 50 MG/DL (ref 7–18)
BUN/CREAT SERPL: 10 (ref 12–20)
CA-I BLD-MCNC: 8.9 MG/DL (ref 8.5–10.1)
CHLORIDE SERPL-SCNC: 106 MMOL/L (ref 100–111)
CO2 SERPL-SCNC: 27 MMOL/L (ref 21–32)
CREAT SERPL-MCNC: 5.12 MG/DL (ref 0.6–1.3)
DIFFERENTIAL METHOD BLD: ABNORMAL
EKG ATRIAL RATE: 56 BPM
EKG DIAGNOSIS: NORMAL
EKG P AXIS: -10 DEGREES
EKG P-R INTERVAL: 252 MS
EKG Q-T INTERVAL: 450 MS
EKG QRS DURATION: 118 MS
EKG QTC CALCULATION (BAZETT): 434 MS
EKG R AXIS: -23 DEGREES
EKG T AXIS: 12 DEGREES
EKG VENTRICULAR RATE: 56 BPM
EOSINOPHIL # BLD: 0.1 K/UL (ref 0–0.4)
EOSINOPHIL NFR BLD: 2 % (ref 0–5)
ERYTHROCYTE [DISTWIDTH] IN BLOOD BY AUTOMATED COUNT: 14.7 % (ref 11.6–14.5)
GLOBULIN SER CALC-MCNC: 3.6 G/DL (ref 2–4)
GLUCOSE SERPL-MCNC: 166 MG/DL (ref 74–99)
HCT VFR BLD AUTO: 30.1 % (ref 36–48)
HGB BLD-MCNC: 9.8 G/DL (ref 13–16)
IMM GRANULOCYTES # BLD AUTO: 0 K/UL (ref 0–0.04)
IMM GRANULOCYTES NFR BLD AUTO: 1 % (ref 0–0.5)
LACTATE SERPL-SCNC: 1.4 MMOL/L (ref 0.4–2)
LYMPHOCYTES # BLD: 1.7 K/UL (ref 0.9–3.6)
LYMPHOCYTES NFR BLD: 32 % (ref 21–52)
MCH RBC QN AUTO: 28.2 PG (ref 24–34)
MCHC RBC AUTO-ENTMCNC: 32.6 G/DL (ref 31–37)
MCV RBC AUTO: 86.5 FL (ref 78–100)
MONOCYTES # BLD: 0.7 K/UL (ref 0.05–1.2)
MONOCYTES NFR BLD: 12 % (ref 3–10)
NEUTS SEG # BLD: 2.8 K/UL (ref 1.8–8)
NEUTS SEG NFR BLD: 52 % (ref 40–73)
NRBC # BLD: 0 K/UL (ref 0–0.01)
NRBC BLD-RTO: 0 PER 100 WBC
PLATELET # BLD AUTO: 216 K/UL (ref 135–420)
PMV BLD AUTO: 9.5 FL (ref 9.2–11.8)
POTASSIUM SERPL-SCNC: 3.7 MMOL/L (ref 3.5–5.5)
PROCALCITONIN SERPL-MCNC: 0.33 NG/ML
PROT SERPL-MCNC: 6.7 G/DL (ref 6.4–8.2)
RBC # BLD AUTO: 3.48 M/UL (ref 4.35–5.65)
SODIUM SERPL-SCNC: 142 MMOL/L (ref 136–145)
TROPONIN I SERPL HS-MCNC: 34 NG/L (ref 0–78)
TROPONIN I SERPL HS-MCNC: 37 NG/L (ref 0–78)
WBC # BLD AUTO: 5.4 K/UL (ref 4.6–13.2)

## 2024-11-24 PROCEDURE — 83605 ASSAY OF LACTIC ACID: CPT

## 2024-11-24 PROCEDURE — 71046 X-RAY EXAM CHEST 2 VIEWS: CPT

## 2024-11-24 PROCEDURE — 80053 COMPREHEN METABOLIC PANEL: CPT

## 2024-11-24 PROCEDURE — 83880 ASSAY OF NATRIURETIC PEPTIDE: CPT

## 2024-11-24 PROCEDURE — 94640 AIRWAY INHALATION TREATMENT: CPT

## 2024-11-24 PROCEDURE — 84145 PROCALCITONIN (PCT): CPT

## 2024-11-24 PROCEDURE — 93005 ELECTROCARDIOGRAM TRACING: CPT | Performed by: FAMILY MEDICINE

## 2024-11-24 PROCEDURE — 87040 BLOOD CULTURE FOR BACTERIA: CPT

## 2024-11-24 PROCEDURE — 84484 ASSAY OF TROPONIN QUANT: CPT

## 2024-11-24 PROCEDURE — 6370000000 HC RX 637 (ALT 250 FOR IP): Performed by: FAMILY MEDICINE

## 2024-11-24 PROCEDURE — 85025 COMPLETE CBC W/AUTO DIFF WBC: CPT

## 2024-11-24 RX ORDER — IPRATROPIUM BROMIDE AND ALBUTEROL SULFATE 2.5; .5 MG/3ML; MG/3ML
1 SOLUTION RESPIRATORY (INHALATION)
Status: COMPLETED | OUTPATIENT
Start: 2024-11-24 | End: 2024-11-24

## 2024-11-24 RX ORDER — FLUTICASONE PROPIONATE 50 MCG
1 SPRAY, SUSPENSION (ML) NASAL DAILY
Qty: 32 G | Refills: 1 | Status: SHIPPED | OUTPATIENT
Start: 2024-11-24

## 2024-11-24 RX ADMIN — IPRATROPIUM BROMIDE AND ALBUTEROL SULFATE 1 DOSE: .5; 2.5 SOLUTION RESPIRATORY (INHALATION) at 00:24

## 2024-11-24 ASSESSMENT — HEART SCORE: ECG: NORMAL

## 2024-11-24 ASSESSMENT — PAIN - FUNCTIONAL ASSESSMENT: PAIN_FUNCTIONAL_ASSESSMENT: NONE - DENIES PAIN

## 2024-11-24 NOTE — ED NOTES
I have reviewed discharge instructions with the patient and family.  The patient and family verbalized understanding.         Reviewed medication compliance, follow up with PCP, return to ER for any new or worrisome concerns.

## 2024-11-24 NOTE — ED PROVIDER NOTES
Problem: Potential for Falls  Goal: Patient will remain free of falls  Description: INTERVENTIONS:  - Educate patient/family on patient safety including physical limitations  - Instruct patient to call for assistance with activity   - Consult OT/PT to assist with strengthening/mobility   - Keep Call bell within reach  - Keep bed low and locked with side rails adjusted as appropriate  - Keep care items and personal belongings within reach  - Initiate and maintain comfort rounds  - Make Fall Risk Sign visible to staff  - Offer Toileting every 1 Hours, in advance of need  - Apply yellow socks and bracelet for high fall risk patients  - Consider moving patient to room near nurses station  Outcome: Progressing by Xin Benites MD at [facility] CARDIAC CATH LAB    COLONOSCOPY N/A 2021    COLONOSCOPY with Polypectomies, Tattoo & Clip Placement performed by Jimmy Mercedes MD at [facility] ENDOSCOPY    HX CYSTECTOMY  2009    bladder removal/ urostomy bag    INVASIVE VASCULAR N/A 2024    Ultrasound guided vascular access performed by Xin Benites MD at [facility] CARDIAC CATH LAB    KIDNEY REMOVAL Left 2009    Nephroureterectomy by Dr. Hodge    OTHER SURGICAL HISTORY      surgery on pituitary gland    OTHER SURGICAL HISTORY         Family History:  Family History   Problem Relation Age of Onset    Heart Disease Mother     Heart Disease Father        Social History:  Social History     Tobacco Use    Smoking status: Former     Current packs/day: 0.00     Average packs/day: 0.5 packs/day for 15.0 years (7.5 ttl pk-yrs)     Types: Cigarettes     Start date: 1984     Quit date: 1999     Years since quittin.9    Smokeless tobacco: Never   Vaping Use    Vaping status: Never Used   Substance Use Topics    Alcohol use: No    Drug use: No       Allergies:  Allergies   Allergen Reactions    No Known Allergies        PCP: Collette Nicholas MD    Current Meds:   No current facility-administered medications for this encounter.     Current Outpatient Medications   Medication Sig Dispense Refill    fluticasone (FLONASE) 50 MCG/ACT nasal spray 1 spray by Each Nostril route daily 32 g 1    albuterol sulfate HFA (PROVENTIL;VENTOLIN;PROAIR) 108 (90 Base) MCG/ACT inhaler Inhale 2 puffs into the lungs every 4 hours as needed for Wheezing or Shortness of Breath 18 g 1    atorvastatin (LIPITOR) 40 MG tablet TAKE 1 TABLET BY MOUTH DAILY (Patient taking differently: Take 2 tablets by mouth daily) 90 tablet 0    allopurinol (ZYLOPRIM) 100 MG tablet TAKE 2 TABLETS BY MOUTH DAILY 180 tablet 0    aspirin 81 MG EC tablet Take 1 tablet by mouth daily 90 tablet 0    furosemide (LASIX) 40 MG tablet Take 1 tablet by mouth daily 90

## 2024-11-24 NOTE — DISCHARGE INSTRUCTIONS
As we spoke, probably some nasal drainage is causing your cough.  I have called in some Flonase for you.  Use this 1 spray each nostril.  Could take up to 5 days to improve.  Keep your dialysis appointment on Monday, I want you to follow-up with your primary care provider as well.  Return to the emergency department if you have any questions or concerns.  Be sure to take your blood pressure medicine in the morning your blood pressure slightly elevated.  But acceptable at this time.  Your last blood pressure was 158/61.  Return to the emergency department start having shortness of breath chest pains coughing or if there is any other questions or concerns.

## 2024-11-24 NOTE — ED TRIAGE NOTES
Reports deep cough that began earlier this evening, is productive of thick brown mucus. No known fever.

## 2024-11-27 ENCOUNTER — CLINICAL DOCUMENTATION (OUTPATIENT)
Age: 79
End: 2024-11-27

## 2024-11-27 DIAGNOSIS — K21.9 GASTRO-ESOPHAGEAL REFLUX DISEASE WITHOUT ESOPHAGITIS: ICD-10-CM

## 2024-11-27 RX ORDER — OMEPRAZOLE 40 MG/1
40 CAPSULE, DELAYED RELEASE ORAL DAILY
Qty: 90 CAPSULE | Refills: 0 | Status: SHIPPED | OUTPATIENT
Start: 2024-11-27

## 2024-11-30 LAB
BACTERIA SPEC CULT: NORMAL
BACTERIA SPEC CULT: NORMAL
Lab: NORMAL
Lab: NORMAL

## 2024-12-10 DIAGNOSIS — J30.1 SEASONAL ALLERGIC RHINITIS DUE TO POLLEN: ICD-10-CM

## 2024-12-11 ENCOUNTER — OFFICE VISIT (OUTPATIENT)
Facility: CLINIC | Age: 79
End: 2024-12-11
Payer: MEDICARE

## 2024-12-11 VITALS
RESPIRATION RATE: 20 BRPM | WEIGHT: 176.6 LBS | BODY MASS INDEX: 26.76 KG/M2 | OXYGEN SATURATION: 97 % | DIASTOLIC BLOOD PRESSURE: 66 MMHG | TEMPERATURE: 97.8 F | HEART RATE: 86 BPM | HEIGHT: 68 IN | SYSTOLIC BLOOD PRESSURE: 114 MMHG

## 2024-12-11 DIAGNOSIS — Z99.2 TYPE 2 DIABETES MELLITUS WITH CHRONIC KIDNEY DISEASE ON CHRONIC DIALYSIS, WITH LONG-TERM CURRENT USE OF INSULIN (HCC): Primary | ICD-10-CM

## 2024-12-11 DIAGNOSIS — I25.10 CORONARY ARTERY DISEASE INVOLVING NATIVE CORONARY ARTERY OF NATIVE HEART WITHOUT ANGINA PECTORIS: ICD-10-CM

## 2024-12-11 DIAGNOSIS — I10 ESSENTIAL HYPERTENSION: ICD-10-CM

## 2024-12-11 DIAGNOSIS — Z86.718 HISTORY OF DVT (DEEP VEIN THROMBOSIS): ICD-10-CM

## 2024-12-11 DIAGNOSIS — N18.6 TYPE 2 DIABETES MELLITUS WITH CHRONIC KIDNEY DISEASE ON CHRONIC DIALYSIS, WITH LONG-TERM CURRENT USE OF INSULIN (HCC): Primary | ICD-10-CM

## 2024-12-11 DIAGNOSIS — J30.1 SEASONAL ALLERGIC RHINITIS DUE TO POLLEN: ICD-10-CM

## 2024-12-11 DIAGNOSIS — E11.22 TYPE 2 DIABETES MELLITUS WITH CHRONIC KIDNEY DISEASE ON CHRONIC DIALYSIS, WITH LONG-TERM CURRENT USE OF INSULIN (HCC): Primary | ICD-10-CM

## 2024-12-11 DIAGNOSIS — E55.9 VITAMIN D DEFICIENCY: ICD-10-CM

## 2024-12-11 DIAGNOSIS — Z79.4 TYPE 2 DIABETES MELLITUS WITH CHRONIC KIDNEY DISEASE ON CHRONIC DIALYSIS, WITH LONG-TERM CURRENT USE OF INSULIN (HCC): Primary | ICD-10-CM

## 2024-12-11 DIAGNOSIS — I50.32 CHRONIC DIASTOLIC CONGESTIVE HEART FAILURE (HCC): ICD-10-CM

## 2024-12-11 PROCEDURE — 1159F MED LIST DOCD IN RCRD: CPT | Performed by: FAMILY MEDICINE

## 2024-12-11 PROCEDURE — 99214 OFFICE O/P EST MOD 30 MIN: CPT | Performed by: FAMILY MEDICINE

## 2024-12-11 PROCEDURE — 3074F SYST BP LT 130 MM HG: CPT | Performed by: FAMILY MEDICINE

## 2024-12-11 PROCEDURE — 1123F ACP DISCUSS/DSCN MKR DOCD: CPT | Performed by: FAMILY MEDICINE

## 2024-12-11 PROCEDURE — 1160F RVW MEDS BY RX/DR IN RCRD: CPT | Performed by: FAMILY MEDICINE

## 2024-12-11 PROCEDURE — 3046F HEMOGLOBIN A1C LEVEL >9.0%: CPT | Performed by: FAMILY MEDICINE

## 2024-12-11 PROCEDURE — 1126F AMNT PAIN NOTED NONE PRSNT: CPT | Performed by: FAMILY MEDICINE

## 2024-12-11 PROCEDURE — 3078F DIAST BP <80 MM HG: CPT | Performed by: FAMILY MEDICINE

## 2024-12-11 RX ORDER — FLASH GLUCOSE SENSOR
1 KIT MISCELLANEOUS CONTINUOUS
Qty: 1 EACH | Refills: 0 | Status: SHIPPED | OUTPATIENT
Start: 2024-12-11

## 2024-12-11 RX ORDER — LORATADINE 10 MG/1
10 TABLET ORAL DAILY
Qty: 90 TABLET | Refills: 0 | Status: SHIPPED | OUTPATIENT
Start: 2024-12-11 | End: 2024-12-11 | Stop reason: SDUPTHER

## 2024-12-11 RX ORDER — ISOPROPYL ALCOHOL 0.7 ML/ML
SWAB TOPICAL
COMMUNITY
Start: 2024-11-21

## 2024-12-11 RX ORDER — LORATADINE 10 MG/1
10 TABLET ORAL DAILY
Qty: 90 TABLET | Refills: 1 | Status: SHIPPED | OUTPATIENT
Start: 2024-12-11

## 2024-12-11 RX ORDER — CHOLECALCIFEROL (VITAMIN D3) 25 MCG
1000 TABLET ORAL DAILY
Qty: 90 TABLET | Refills: 1 | Status: SHIPPED | OUTPATIENT
Start: 2024-12-11

## 2024-12-11 RX ORDER — FLASH GLUCOSE SENSOR
1 KIT MISCELLANEOUS CONTINUOUS
Qty: 2 EACH | Refills: 2 | Status: SHIPPED | OUTPATIENT
Start: 2024-12-11

## 2024-12-11 NOTE — PROGRESS NOTES
Chief Complaint   Patient presents with    Follow-up     Chronic disease/ 3 week   Spoke with Kiera on the phone and did another medication reconciliation with medication bottles.    \"Have you been to the ER, urgent care clinic since your last visit?  Hospitalized since your last visit?\"    NO    “Have you seen or consulted any other health care providers outside our system since your last visit?”    NO          
per renal notes care everywhere 23    GERD (gastroesophageal reflux disease)     Gout     High cholesterol     Hypertension     Hypomagnesemia 2023    Kidney carcinoma, left (HCC) 2016    left nephrectomy    Kidney disease     Pituitary mass (HCC)     Pulmonary HTN (HCC) 10/2021    PASP 77 mmHg    Reflux esophagitis     Unspecified deficiency anemia      Past Surgical History:   Procedure Laterality Date    AV FISTULA CREATION Left     CARDIAC PROCEDURE N/A 2024    Left heart cath / coronary angiography performed by Xin Benites MD at [facility] CARDIAC CATH LAB    COLONOSCOPY N/A 2021    COLONOSCOPY with Polypectomies, Tattoo & Clip Placement performed by Jimmy Mercedes MD at [facility] ENDOSCOPY    HX CYSTECTOMY  2009    bladder removal/ urostomy bag    INVASIVE VASCULAR N/A 2024    Ultrasound guided vascular access performed by Xin Benites MD at [facility] CARDIAC CATH LAB    KIDNEY REMOVAL Left 2009    Nephroureterectomy by Dr. Hodge    OTHER SURGICAL HISTORY      surgery on pituitary gland    OTHER SURGICAL HISTORY       Family History   Problem Relation Age of Onset    Heart Disease Mother     Heart Disease Father        Social History     Socioeconomic History    Marital status: Single     Spouse name: Not on file    Number of children: Not on file    Years of education: Not on file    Highest education level: Not on file   Occupational History    Not on file   Tobacco Use    Smoking status: Former     Current packs/day: 0.00     Average packs/day: 0.5 packs/day for 15.0 years (7.5 ttl pk-yrs)     Types: Cigarettes     Start date: 1984     Quit date: 1999     Years since quittin.9    Smokeless tobacco: Never   Vaping Use    Vaping status: Never Used   Substance and Sexual Activity    Alcohol use: No    Drug use: No    Sexual activity: Not on file   Other Topics Concern    Not on file   Social History Narrative    Not on file     Social Determinants of Health

## 2024-12-12 ENCOUNTER — TELEPHONE (OUTPATIENT)
Facility: CLINIC | Age: 79
End: 2024-12-12

## 2024-12-12 NOTE — TELEPHONE ENCOUNTER
Tried to call patient to notify he will need to be seen to discuss these new concerns not mentioned during visit yesterday and no answer.

## 2024-12-12 NOTE — TELEPHONE ENCOUNTER
Called [facility] to notify that patient does not have Renvela and it is unknown how long he has not been taking this medication. Spoke to nurse Angelika who states he is supposed to be taking Renvela 3 tabs QID with meals and a snack for total of 1600 MG. Phos level is 3.3, states she doesn't even see when it was filled last. States she will talk to the Dietician and let her know that decreased appetite and not taking Renvela to see what needs to be done with this medication. States that starting January 1, 2025 the patient's will start to receive the \"bundle\" at home through mail order. States that he will start to receive the \"Bundle\" (phos binder) at home. Nurse also states that he is NOT supposed to be taking the Lasix Discontinued on 11/8/2024  Amlodipine D/C' d 11/8/24   States his BP's there have been running really good at Dialysis.   The Renvela should be shipped to patient home by last week of December or first week of January. States that Phos level is checked Q 2 weeks.

## 2024-12-12 NOTE — TELEPHONE ENCOUNTER
Kiera called and said she doesn't know why pt didn't let us know yesterday during his appt but he said he forgot. States when he gets up in the morning and goes to walk it \"feels like he is going to fall out\" and also he has \"a lot of cold\" in his chest. I advised I would send a message.

## 2024-12-13 ENCOUNTER — CLINICAL DOCUMENTATION (OUTPATIENT)
Age: 79
End: 2024-12-13

## 2024-12-13 NOTE — TELEPHONE ENCOUNTER
Called patient and spoke to sulaiman Andrea and she put Kiera on 3 way call. Notified Kiera that if patient is not feeling well and or gets worse he needs to be evaluated in the ER, otherwise, he needs a sick appointment with Dr. Nicholas. Kiera stated understanding.

## 2025-01-03 DIAGNOSIS — E78.5 HYPERLIPIDEMIA, UNSPECIFIED HYPERLIPIDEMIA TYPE: ICD-10-CM

## 2025-01-06 RX ORDER — ATORVASTATIN CALCIUM 40 MG/1
40 TABLET, FILM COATED ORAL DAILY
Qty: 90 TABLET | Refills: 0 | OUTPATIENT
Start: 2025-01-06

## 2025-01-08 ENCOUNTER — CLINICAL DOCUMENTATION (OUTPATIENT)
Age: 80
End: 2025-01-08

## 2025-02-10 ENCOUNTER — APPOINTMENT (OUTPATIENT)
Age: 80
End: 2025-02-10
Payer: MEDICARE

## 2025-02-10 ENCOUNTER — HOSPITAL ENCOUNTER (EMERGENCY)
Age: 80
Discharge: HOME | End: 2025-02-11
Attending: EMERGENCY MEDICINE
Payer: MEDICARE

## 2025-02-10 DIAGNOSIS — S86.911A STRAIN OF RIGHT KNEE, INITIAL ENCOUNTER: ICD-10-CM

## 2025-02-10 DIAGNOSIS — S80.11XA CONTUSION OF RIGHT LOWER EXTREMITY, INITIAL ENCOUNTER: Primary | ICD-10-CM

## 2025-02-10 PROCEDURE — 99283 EMERGENCY DEPT VISIT LOW MDM: CPT

## 2025-02-10 PROCEDURE — 73590 X-RAY EXAM OF LOWER LEG: CPT

## 2025-02-10 PROCEDURE — 73552 X-RAY EXAM OF FEMUR 2/>: CPT

## 2025-02-10 ASSESSMENT — PAIN SCALES - GENERAL: PAINLEVEL_OUTOF10: 10

## 2025-02-10 ASSESSMENT — PAIN DESCRIPTION - DESCRIPTORS: DESCRIPTORS: SHARP

## 2025-02-10 ASSESSMENT — PAIN DESCRIPTION - LOCATION: LOCATION: KNEE

## 2025-02-10 ASSESSMENT — PAIN DESCRIPTION - ORIENTATION: ORIENTATION: RIGHT

## 2025-02-10 ASSESSMENT — PAIN - FUNCTIONAL ASSESSMENT: PAIN_FUNCTIONAL_ASSESSMENT: 0-10

## 2025-02-11 VITALS
WEIGHT: 180 LBS | TEMPERATURE: 98 F | RESPIRATION RATE: 16 BRPM | HEIGHT: 68 IN | SYSTOLIC BLOOD PRESSURE: 174 MMHG | OXYGEN SATURATION: 95 % | BODY MASS INDEX: 27.28 KG/M2 | HEART RATE: 75 BPM | DIASTOLIC BLOOD PRESSURE: 81 MMHG

## 2025-02-11 DIAGNOSIS — N18.4 CHRONIC KIDNEY DISEASE, STAGE 4 (SEVERE) (HCC): ICD-10-CM

## 2025-02-11 DIAGNOSIS — E11.22 TYPE 2 DIABETES MELLITUS WITH CHRONIC KIDNEY DISEASE ON CHRONIC DIALYSIS, WITH LONG-TERM CURRENT USE OF INSULIN (HCC): ICD-10-CM

## 2025-02-11 DIAGNOSIS — Z79.4 TYPE 2 DIABETES MELLITUS WITH CHRONIC KIDNEY DISEASE ON CHRONIC DIALYSIS, WITH LONG-TERM CURRENT USE OF INSULIN (HCC): ICD-10-CM

## 2025-02-11 DIAGNOSIS — I50.32 CHRONIC DIASTOLIC CONGESTIVE HEART FAILURE (HCC): Primary | ICD-10-CM

## 2025-02-11 DIAGNOSIS — E78.5 HYPERLIPIDEMIA, UNSPECIFIED HYPERLIPIDEMIA TYPE: ICD-10-CM

## 2025-02-11 DIAGNOSIS — I10 ESSENTIAL HYPERTENSION: ICD-10-CM

## 2025-02-11 DIAGNOSIS — N18.6 TYPE 2 DIABETES MELLITUS WITH CHRONIC KIDNEY DISEASE ON CHRONIC DIALYSIS, WITH LONG-TERM CURRENT USE OF INSULIN (HCC): ICD-10-CM

## 2025-02-11 DIAGNOSIS — Z99.2 TYPE 2 DIABETES MELLITUS WITH CHRONIC KIDNEY DISEASE ON CHRONIC DIALYSIS, WITH LONG-TERM CURRENT USE OF INSULIN (HCC): ICD-10-CM

## 2025-02-11 NOTE — ED PROVIDER NOTES
[facility] EMERGENCY DEPARTMENT  EMERGENCY DEPARTMENT ENCOUNTER      Pt Name: Ann-Marie Cardenas  MRN: [id number]  Birthdate 1945  Date of evaluation: 2/10/2025  Provider: Claus Shaikh MD    CHIEF COMPLAINT       Chief Complaint   Patient presents with    Fall    Leg Pain       HPI:  Ann-Marie Cardenas is a 79 y.o. male who presents to the emergency department pt c/o rt leg lyssa, says tripped on 2 steps and landed on back of rt leg.  No weakness or numbness. No swelling. No wound. No loc no neck or back pain     HPI    Nursing Notes were reviewed.    REVIEW OF SYSTEMS    (2-9 systems for level 4, 10 or more for level 5)     Review of Systems    Except as noted above the remainder of the review of systems was reviewed and negative.       PAST MEDICAL HISTORY     Past Medical History:   Diagnosis Date    Acid reflux     Acute deep vein thrombosis (DVT) of distal vein of both lower extremities (HCC) 10/13/2020    Acute on chronic heart failure (HCC) 01/13/2021    Arrhythmia     Arthritis     Bladder cancer (HCC)     Colon cancer (HCC) 11/2021    Congestive heart failure (HCC)     Deep vein thrombosis (DVT) of proximal vein of both lower extremities (HCC) 9/14/2020    Diabetes mellitus (HCC)     Dialysis patient ([facility])     Tues, Thurs, Fri    Difficult intubation     Narrow airway-per Pulm notes(in media)     ESRD (end stage renal disease) ([facility])     stage IV/V per renal notes care everywhere 5/22/23    GERD (gastroesophageal reflux disease)     Gout     High cholesterol     Hypertension     Hypomagnesemia 05/2023    Kidney carcinoma, left (HCC) 2016    left nephrectomy    Kidney disease     Pituitary mass (HCC)     Pulmonary HTN (HCC) 10/2021    PASP 77 mmHg    Reflux esophagitis     Unspecified deficiency anemia          SURGICAL HISTORY       Past Surgical History:   Procedure Laterality Date    AV FISTULA CREATION Left     CARDIAC PROCEDURE N/A 9/26/2024    Left heart cath / coronary

## 2025-02-11 NOTE — ED TRIAGE NOTES
Patient reports he was walking down the back steps and slipped and fell down the last 2 steps. Patient was able to get up and walk into the house. Denied hitting his head. States he has had pain to the back of his right leg since. Small knot about the size of golf ball noted by EMS

## 2025-02-12 RX ORDER — ALLOPURINOL 100 MG/1
200 TABLET ORAL DAILY
Qty: 180 TABLET | Refills: 0 | Status: SHIPPED | OUTPATIENT
Start: 2025-02-12 | End: 2025-05-13

## 2025-02-12 RX ORDER — ATORVASTATIN CALCIUM 40 MG/1
40 TABLET, FILM COATED ORAL DAILY
Qty: 90 TABLET | Refills: 0 | Status: SHIPPED | OUTPATIENT
Start: 2025-02-12

## 2025-02-12 RX ORDER — ASPIRIN 81 MG/1
81 TABLET, COATED ORAL DAILY
Qty: 90 TABLET | Refills: 0 | Status: SHIPPED | OUTPATIENT
Start: 2025-02-12

## 2025-02-12 RX ORDER — FUROSEMIDE 40 MG/1
40 TABLET ORAL DAILY
Qty: 90 TABLET | Refills: 0 | Status: SHIPPED | OUTPATIENT
Start: 2025-02-12

## 2025-02-12 RX ORDER — GLIPIZIDE 10 MG/1
5 TABLET ORAL DAILY
Qty: 45 TABLET | Refills: 0 | Status: SHIPPED | OUTPATIENT
Start: 2025-02-12

## 2025-02-21 DIAGNOSIS — Z99.2 TYPE 2 DIABETES MELLITUS WITH CHRONIC KIDNEY DISEASE ON CHRONIC DIALYSIS, WITH LONG-TERM CURRENT USE OF INSULIN (HCC): ICD-10-CM

## 2025-02-21 DIAGNOSIS — E11.22 TYPE 2 DIABETES MELLITUS WITH CHRONIC KIDNEY DISEASE ON CHRONIC DIALYSIS, WITH LONG-TERM CURRENT USE OF INSULIN (HCC): ICD-10-CM

## 2025-02-21 DIAGNOSIS — N18.6 TYPE 2 DIABETES MELLITUS WITH CHRONIC KIDNEY DISEASE ON CHRONIC DIALYSIS, WITH LONG-TERM CURRENT USE OF INSULIN (HCC): ICD-10-CM

## 2025-02-21 DIAGNOSIS — Z79.4 TYPE 2 DIABETES MELLITUS WITH CHRONIC KIDNEY DISEASE ON CHRONIC DIALYSIS, WITH LONG-TERM CURRENT USE OF INSULIN (HCC): ICD-10-CM

## 2025-02-24 RX ORDER — ALLOPURINOL 100 MG/1
200 TABLET ORAL DAILY
Qty: 90 TABLET | Refills: 1 | OUTPATIENT
Start: 2025-02-24 | End: 2025-05-25

## 2025-02-25 ENCOUNTER — OFFICE VISIT (OUTPATIENT)
Facility: CLINIC | Age: 80
End: 2025-02-25
Payer: MEDICARE

## 2025-02-25 ENCOUNTER — TELEPHONE (OUTPATIENT)
Facility: CLINIC | Age: 80
End: 2025-02-25

## 2025-02-25 VITALS
HEART RATE: 76 BPM | HEIGHT: 68 IN | WEIGHT: 181.4 LBS | TEMPERATURE: 97.7 F | OXYGEN SATURATION: 97 % | BODY MASS INDEX: 27.49 KG/M2 | RESPIRATION RATE: 18 BRPM | SYSTOLIC BLOOD PRESSURE: 99 MMHG | DIASTOLIC BLOOD PRESSURE: 42 MMHG

## 2025-02-25 DIAGNOSIS — Z74.09 IMPAIRED MOBILITY AND ADLS: ICD-10-CM

## 2025-02-25 DIAGNOSIS — Z91.81 AT HIGH RISK FOR FALLS: ICD-10-CM

## 2025-02-25 DIAGNOSIS — R29.6 RECURRENT FALLS: ICD-10-CM

## 2025-02-25 DIAGNOSIS — Z79.4 TYPE 2 DIABETES MELLITUS WITH CHRONIC KIDNEY DISEASE ON CHRONIC DIALYSIS, WITH LONG-TERM CURRENT USE OF INSULIN (HCC): Primary | ICD-10-CM

## 2025-02-25 DIAGNOSIS — N18.4 CHRONIC KIDNEY DISEASE, STAGE 4 (SEVERE) (HCC): ICD-10-CM

## 2025-02-25 DIAGNOSIS — Z78.9 IMPAIRED MOBILITY AND ADLS: ICD-10-CM

## 2025-02-25 DIAGNOSIS — R54 FRAILTY SYNDROME IN GERIATRIC PATIENT: ICD-10-CM

## 2025-02-25 DIAGNOSIS — N18.6 TYPE 2 DIABETES MELLITUS WITH CHRONIC KIDNEY DISEASE ON CHRONIC DIALYSIS, WITH LONG-TERM CURRENT USE OF INSULIN (HCC): Primary | ICD-10-CM

## 2025-02-25 DIAGNOSIS — E11.22 TYPE 2 DIABETES MELLITUS WITH CHRONIC KIDNEY DISEASE ON CHRONIC DIALYSIS, WITH LONG-TERM CURRENT USE OF INSULIN (HCC): Primary | ICD-10-CM

## 2025-02-25 DIAGNOSIS — R53.81 PHYSICAL DECONDITIONING: ICD-10-CM

## 2025-02-25 DIAGNOSIS — Z99.2 TYPE 2 DIABETES MELLITUS WITH CHRONIC KIDNEY DISEASE ON CHRONIC DIALYSIS, WITH LONG-TERM CURRENT USE OF INSULIN (HCC): Primary | ICD-10-CM

## 2025-02-25 LAB — HBA1C MFR BLD: 7.4 %

## 2025-02-25 PROCEDURE — 1126F AMNT PAIN NOTED NONE PRSNT: CPT | Performed by: FAMILY MEDICINE

## 2025-02-25 PROCEDURE — 3051F HG A1C>EQUAL 7.0%<8.0%: CPT | Performed by: FAMILY MEDICINE

## 2025-02-25 PROCEDURE — 83036 HEMOGLOBIN GLYCOSYLATED A1C: CPT | Performed by: FAMILY MEDICINE

## 2025-02-25 PROCEDURE — 1123F ACP DISCUSS/DSCN MKR DOCD: CPT | Performed by: FAMILY MEDICINE

## 2025-02-25 PROCEDURE — 1160F RVW MEDS BY RX/DR IN RCRD: CPT | Performed by: FAMILY MEDICINE

## 2025-02-25 PROCEDURE — 3074F SYST BP LT 130 MM HG: CPT | Performed by: FAMILY MEDICINE

## 2025-02-25 PROCEDURE — 3078F DIAST BP <80 MM HG: CPT | Performed by: FAMILY MEDICINE

## 2025-02-25 PROCEDURE — 1159F MED LIST DOCD IN RCRD: CPT | Performed by: FAMILY MEDICINE

## 2025-02-25 PROCEDURE — 99214 OFFICE O/P EST MOD 30 MIN: CPT | Performed by: FAMILY MEDICINE

## 2025-02-25 RX ORDER — GLUCOSAMINE HCL/CHONDROITIN SU 500-400 MG
CAPSULE ORAL
Qty: 200 STRIP | Refills: 3 | Status: SHIPPED | OUTPATIENT
Start: 2025-02-25

## 2025-02-25 RX ORDER — SODIUM BICARBONATE 650 MG/1
650 TABLET ORAL 3 TIMES DAILY
Qty: 270 TABLET | Refills: 1 | Status: SHIPPED | OUTPATIENT
Start: 2025-02-25

## 2025-02-25 RX ORDER — FLURBIPROFEN SODIUM 0.3 MG/ML
1 SOLUTION/ DROPS OPHTHALMIC 2 TIMES DAILY
Qty: 200 EACH | Refills: 2 | Status: SHIPPED | OUTPATIENT
Start: 2025-02-25

## 2025-02-25 RX ORDER — BLOOD-GLUCOSE METER
1 KIT MISCELLANEOUS DAILY
Qty: 1 KIT | Refills: 0 | Status: SHIPPED | OUTPATIENT
Start: 2025-02-25

## 2025-02-25 ASSESSMENT — PATIENT HEALTH QUESTIONNAIRE - PHQ9
2. FEELING DOWN, DEPRESSED OR HOPELESS: NOT AT ALL
SUM OF ALL RESPONSES TO PHQ QUESTIONS 1-9: 0
1. LITTLE INTEREST OR PLEASURE IN DOING THINGS: NOT AT ALL
SUM OF ALL RESPONSES TO PHQ QUESTIONS 1-9: 0

## 2025-02-25 NOTE — PROGRESS NOTES
Ann-Marie Cardenas (: 1945) is a 79 y.o. male here for evaluation of the following chief concern(s):  Chronic condition management     ASSESSMENT/PLAN:  1. Type 2 diabetes mellitus with chronic kidney disease on chronic dialysis, with long-term current use of insulin (HCC)  -     AMB POC HEMOGLOBIN A1C  -     COLLECTION CAPILLARY BLOOD SPECIMEN  -     B-D UF III MINI PEN NEEDLES 31G X 5 MM MISC; 2 TIMES DAILY Starting 2025, Disp-200 each, R-2, HARJINDER, Normal  -     glucose monitoring kit; DAILY Starting 2025, Disp-1 kit, R-0, Normal  -     blood glucose monitor strips; Use one strip twice a day to check blood sugar. Please dispense covered strips for his insurance to go with insurance covered glucometer., Disp-200 strip, R-3, Normal  -     External Referral To Home Health  2. Chronic kidney disease, stage 4 (severe) ([facility])  -     sodium bicarbonate 650 MG tablet; Take 1 tablet by mouth 3 times daily, Disp-270 tablet, R-1**Patient requests 90 days supply**Normal  -     External Referral To Home Health  3. Frailty syndrome in geriatric patient  -     External Referral To Home Health  4. Impaired mobility and ADLs  -     External Referral To Home Health  5. At high risk for falls  -     External Referral To Home Health  6. Physical deconditioning  -     External Referral To Home Health  7. Recurrent falls  -     External Referral To Home Health    Mr. Cardenas appears medically stable at this time.  Low range blood pressure; pt forgot his meds so we are not completely sure what he is taking, pt agrees to close follow-up to see if we may be able to de-escalate some of his antihypertensives.    Diabetes w/ fair control, continue current regimen.    We are hoping for a CGMD to use in addition to traditional fingerstick for improved comfort and safety.    CHF/CAD appear compensated.  Pt advised to maintain close Cardiology follow-up and move visit up if having more chest pain.      We discussed RTO

## 2025-02-25 NOTE — PROGRESS NOTES
Chief Complaint   Patient presents with    Follow-up     Chronic disease       \"Have you been to the ER, urgent care clinic since your last visit?  Hospitalized since your last visit?\"    Yes, fall 2/10/25    “Have you seen or consulted any other health care providers outside our system since your last visit?”    NO

## 2025-03-04 DIAGNOSIS — K21.9 GASTRO-ESOPHAGEAL REFLUX DISEASE WITHOUT ESOPHAGITIS: ICD-10-CM

## 2025-03-04 RX ORDER — OMEPRAZOLE 40 MG/1
40 CAPSULE, DELAYED RELEASE ORAL DAILY
Qty: 90 CAPSULE | Refills: 0 | Status: SHIPPED | OUTPATIENT
Start: 2025-03-04

## 2025-03-25 DIAGNOSIS — K21.9 GASTRO-ESOPHAGEAL REFLUX DISEASE WITHOUT ESOPHAGITIS: ICD-10-CM

## 2025-03-25 RX ORDER — OMEPRAZOLE 40 MG/1
40 CAPSULE, DELAYED RELEASE ORAL DAILY
Qty: 90 CAPSULE | Refills: 0 | Status: SHIPPED | OUTPATIENT
Start: 2025-03-25

## 2025-03-29 DIAGNOSIS — R07.9 CHEST PAIN, UNSPECIFIED TYPE: ICD-10-CM

## 2025-03-31 RX ORDER — NITROGLYCERIN 0.4 MG/1
TABLET SUBLINGUAL
Qty: 25 TABLET | Refills: 0 | Status: SHIPPED | OUTPATIENT
Start: 2025-03-31

## 2025-04-11 DIAGNOSIS — I10 ESSENTIAL HYPERTENSION: ICD-10-CM

## 2025-04-14 DIAGNOSIS — R07.9 CHEST PAIN, UNSPECIFIED TYPE: ICD-10-CM

## 2025-04-14 RX ORDER — NITROGLYCERIN 0.4 MG/1
TABLET SUBLINGUAL
Qty: 25 TABLET | Refills: 0 | OUTPATIENT
Start: 2025-04-14

## 2025-04-14 RX ORDER — HYDRALAZINE HYDROCHLORIDE 100 MG/1
100 TABLET, FILM COATED ORAL 2 TIMES DAILY
Qty: 180 TABLET | Refills: 0 | Status: SHIPPED | OUTPATIENT
Start: 2025-04-14

## 2025-04-18 ENCOUNTER — HOSPITAL ENCOUNTER (EMERGENCY)
Age: 80
Discharge: HOME | End: 2025-04-18
Attending: EMERGENCY MEDICINE
Payer: MEDICARE

## 2025-04-18 ENCOUNTER — APPOINTMENT (OUTPATIENT)
Age: 80
End: 2025-04-18
Payer: MEDICARE

## 2025-04-18 VITALS
TEMPERATURE: 98.2 F | OXYGEN SATURATION: 95 % | BODY MASS INDEX: 27.69 KG/M2 | WEIGHT: 182.7 LBS | HEIGHT: 68 IN | HEART RATE: 65 BPM | DIASTOLIC BLOOD PRESSURE: 76 MMHG | RESPIRATION RATE: 18 BRPM | SYSTOLIC BLOOD PRESSURE: 158 MMHG

## 2025-04-18 DIAGNOSIS — R51.9 ACUTE NONINTRACTABLE HEADACHE, UNSPECIFIED HEADACHE TYPE: ICD-10-CM

## 2025-04-18 DIAGNOSIS — R06.00 DYSPNEA, UNSPECIFIED TYPE: Primary | ICD-10-CM

## 2025-04-18 DIAGNOSIS — R11.2 NAUSEA AND VOMITING, UNSPECIFIED VOMITING TYPE: ICD-10-CM

## 2025-04-18 DIAGNOSIS — Z99.2 ESRD ON HEMODIALYSIS (HCC): ICD-10-CM

## 2025-04-18 DIAGNOSIS — N18.6 ESRD ON HEMODIALYSIS (HCC): ICD-10-CM

## 2025-04-18 LAB
ALBUMIN SERPL-MCNC: 3.3 G/DL (ref 3.4–5)
ALBUMIN/GLOB SERPL: 0.8 (ref 0.8–1.7)
ALP SERPL-CCNC: 120 U/L (ref 45–117)
ALT SERPL-CCNC: 23 U/L (ref 16–61)
ANION GAP SERPL CALC-SCNC: 8 MMOL/L (ref 3–18)
AST SERPL W P-5'-P-CCNC: 23 U/L (ref 10–38)
BASOPHILS # BLD: 0.04 K/UL (ref 0–0.1)
BASOPHILS NFR BLD: 0.6 % (ref 0–2)
BILIRUB SERPL-MCNC: 0.6 MG/DL (ref 0.2–1)
BUN SERPL-MCNC: 26 MG/DL (ref 7–18)
BUN/CREAT SERPL: 7 (ref 12–20)
CA-I BLD-MCNC: 9.2 MG/DL (ref 8.5–10.1)
CHLORIDE SERPL-SCNC: 99 MMOL/L (ref 100–111)
CO2 SERPL-SCNC: 31 MMOL/L (ref 21–32)
CREAT SERPL-MCNC: 3.7 MG/DL (ref 0.6–1.3)
DIFFERENTIAL METHOD BLD: ABNORMAL
EOSINOPHIL # BLD: 0.05 K/UL (ref 0–0.4)
EOSINOPHIL NFR BLD: 0.7 % (ref 0–5)
ERYTHROCYTE [DISTWIDTH] IN BLOOD BY AUTOMATED COUNT: 14.9 % (ref 11.6–14.5)
FLUAV RNA SPEC QL NAA+PROBE: NOT DETECTED
FLUBV RNA SPEC QL NAA+PROBE: NOT DETECTED
GLOBULIN SER CALC-MCNC: 4.4 G/DL (ref 2–4)
GLUCOSE BLD STRIP.AUTO-MCNC: 105 MG/DL (ref 70–110)
GLUCOSE SERPL-MCNC: 122 MG/DL (ref 74–99)
HCT VFR BLD AUTO: 35.6 % (ref 36–48)
HGB BLD-MCNC: 11.5 G/DL (ref 13–16)
IMM GRANULOCYTES # BLD AUTO: 0.02 K/UL (ref 0–0.04)
IMM GRANULOCYTES NFR BLD AUTO: 0.3 % (ref 0–0.5)
LYMPHOCYTES # BLD: 1.46 K/UL (ref 0.9–3.6)
LYMPHOCYTES NFR BLD: 20.2 % (ref 21–52)
MAGNESIUM SERPL-MCNC: 1.7 MG/DL (ref 1.6–2.6)
MCH RBC QN AUTO: 27.8 PG (ref 24–34)
MCHC RBC AUTO-ENTMCNC: 32.3 G/DL (ref 31–37)
MCV RBC AUTO: 86 FL (ref 78–100)
MONOCYTES # BLD: 0.7 K/UL (ref 0.05–1.2)
MONOCYTES NFR BLD: 9.7 % (ref 3–10)
NEUTS SEG # BLD: 4.97 K/UL (ref 1.8–8)
NEUTS SEG NFR BLD: 68.5 % (ref 40–73)
NRBC # BLD: 0 K/UL (ref 0–0.01)
NRBC BLD-RTO: 0 PER 100 WBC
PERFORMED BY:: NORMAL
PLATELET # BLD AUTO: 267 K/UL (ref 135–420)
PMV BLD AUTO: 9 FL (ref 9.2–11.8)
POTASSIUM SERPL-SCNC: 3.2 MMOL/L (ref 3.5–5.5)
PROT SERPL-MCNC: 7.7 G/DL (ref 6.4–8.2)
RBC # BLD AUTO: 4.14 M/UL (ref 4.35–5.65)
SARS-COV-2 RNA RESP QL NAA+PROBE: NOT DETECTED
SODIUM SERPL-SCNC: 138 MMOL/L (ref 136–145)
TROPONIN I SERPL HS-MCNC: 39 NG/L (ref 0–78)
TSH SERPL DL<=0.05 MIU/L-ACNC: 2.4 UIU/ML (ref 0.36–3.74)
WBC # BLD AUTO: 7.2 K/UL (ref 4.6–13.2)

## 2025-04-18 PROCEDURE — 87636 SARSCOV2 & INF A&B AMP PRB: CPT

## 2025-04-18 PROCEDURE — 80053 COMPREHEN METABOLIC PANEL: CPT

## 2025-04-18 PROCEDURE — 85025 COMPLETE CBC W/AUTO DIFF WBC: CPT

## 2025-04-18 PROCEDURE — 99285 EMERGENCY DEPT VISIT HI MDM: CPT

## 2025-04-18 PROCEDURE — 84443 ASSAY THYROID STIM HORMONE: CPT

## 2025-04-18 PROCEDURE — 93005 ELECTROCARDIOGRAM TRACING: CPT | Performed by: EMERGENCY MEDICINE

## 2025-04-18 PROCEDURE — 71045 X-RAY EXAM CHEST 1 VIEW: CPT

## 2025-04-18 PROCEDURE — 84484 ASSAY OF TROPONIN QUANT: CPT

## 2025-04-18 PROCEDURE — 83735 ASSAY OF MAGNESIUM: CPT

## 2025-04-18 PROCEDURE — 82962 GLUCOSE BLOOD TEST: CPT

## 2025-04-18 RX ORDER — ONDANSETRON 4 MG/1
4 TABLET, ORALLY DISINTEGRATING ORAL 3 TIMES DAILY PRN
Qty: 21 TABLET | Refills: 0 | Status: SHIPPED | OUTPATIENT
Start: 2025-04-18

## 2025-04-18 ASSESSMENT — PAIN DESCRIPTION - LOCATION: LOCATION: CHEST

## 2025-04-18 ASSESSMENT — PAIN - FUNCTIONAL ASSESSMENT: PAIN_FUNCTIONAL_ASSESSMENT: 0-10

## 2025-04-18 ASSESSMENT — PAIN DESCRIPTION - DESCRIPTORS: DESCRIPTORS: TIGHTNESS

## 2025-04-18 ASSESSMENT — PAIN SCALES - GENERAL: PAINLEVEL_OUTOF10: 10

## 2025-04-18 NOTE — ED PROVIDER NOTES
[facility] EMERGENCY DEPARTMENT  EMERGENCY DEPARTMENT ENCOUNTER    Patient Name: Ann-Marie Cradenas  MRN: [id number]  YOB: 1945  Provider: Geovanny Pineda DO  PCP: Collette Nicholas MD   Time/Date of evaluation: 6:31 PM EDT on 4/18/25    History of Presenting Illness     History Provided by: Patient  History is limited by: Nothing     HISTORY:   Ann-Marie Cardenas is a 79 y.o. male , presents with the chief complaint of \"hard to breathe.\" The patient reports that he experienced difficulty breathing today after returning from dialysis around 2:30 PM. He mentions that he was getting ready to eat some meat when he couldn't breathe. Mr. Cardenas also reports throwing up while at the dialysis center and experiencing a headache.  The patient has been on dialysis for approximately three years. He is unsure about his pre and post-dialysis weights from today's session. Mr. Cardenas lives with his niece and brother.  Past Medical History:  End-stage renal disease (on dialysis for about 3 years)  Congestive heart failure  Pulmonary hypertension  Bladder cancer  Kidney cancer, status post left nephrectomy in 2016  Acute DVT of bilateral lower extremity diagnosed October 13th, 2021  High cholesterol  GERD  Difficult intubation, narrow airway per pulmonary notes  Unspecified deficiency anemia  Diabetes  Hypertension  Surgical History:  Left nephrectomy in 2016  Urostomy (bag in place for about 20 years)  Fistula placement in left forearm (timing unclear)  Medications:  Atorvastatin 40 mg daily  Isosorbide mononitrate 60 mg extended-release daily  Sodium bicarbonate 10 grams three times a day  Aspirin 81 mg daily  Eliquis 5 mg daily  Vitamin D3 25 micrograms daily  Allopurinol 100 mg, two tablets by mouth daily  Omeprazole 40 mg daily  Furosemide 40 mg daily  Amlodipine 5 mg daily (half of 10 mg tablet)  Loratadine (Claritin) 10 mg daily  Glipizide 10 mg daily  Insulin (type unknown)  Social

## 2025-04-18 NOTE — ED NOTES
Patient sats 88-90% on room air, placed on O2 2L NC, sats up to 98%. Patient states he does wear oxygen at home sometimes.

## 2025-04-18 NOTE — ED TRIAGE NOTES
Per EMS patient has been feeling bad since around 1400, weakness/dizziness. Patient's niece called EMS. Patient had HD this morning.

## 2025-04-18 NOTE — ED NOTES
Patient reports chest pain, SOB and vomited once at dialysis this morning and once at home. His symptoms began at 1430 once he got home. Patient is alert to self, place and situation but not time.

## 2025-04-19 LAB
EKG ATRIAL RATE: 64 BPM
EKG DIAGNOSIS: NORMAL
EKG P AXIS: 56 DEGREES
EKG P-R INTERVAL: 256 MS
EKG Q-T INTERVAL: 464 MS
EKG QRS DURATION: 110 MS
EKG QTC CALCULATION (BAZETT): 478 MS
EKG R AXIS: -13 DEGREES
EKG T AXIS: 29 DEGREES
EKG VENTRICULAR RATE: 64 BPM

## 2025-04-24 ENCOUNTER — HOSPITAL ENCOUNTER (OUTPATIENT)
Age: 80
Setting detail: OBSERVATION
Discharge: HOME | End: 2025-04-26
Attending: EMERGENCY MEDICINE | Admitting: INTERNAL MEDICINE
Payer: MEDICARE

## 2025-04-24 ENCOUNTER — APPOINTMENT (OUTPATIENT)
Age: 80
End: 2025-04-24
Payer: MEDICARE

## 2025-04-24 DIAGNOSIS — R06.02 SHORTNESS OF BREATH: Primary | ICD-10-CM

## 2025-04-24 DIAGNOSIS — U07.1 COVID-19: ICD-10-CM

## 2025-04-24 DIAGNOSIS — J18.9 COMMUNITY ACQUIRED PNEUMONIA OF RIGHT LOWER LOBE OF LUNG: ICD-10-CM

## 2025-04-24 DIAGNOSIS — J96.01 ACUTE RESPIRATORY FAILURE WITH HYPOXIA (HCC): ICD-10-CM

## 2025-04-24 PROBLEM — R09.02 HYPOXIA: Status: ACTIVE | Noted: 2025-04-24

## 2025-04-24 PROBLEM — J15.9 COMMUNITY ACQUIRED BACTERIAL PNEUMONIA: Status: ACTIVE | Noted: 2025-04-24

## 2025-04-24 LAB
ALBUMIN SERPL-MCNC: 3.2 G/DL (ref 3.4–5)
ALBUMIN/GLOB SERPL: 0.8 (ref 0.8–1.7)
ALP SERPL-CCNC: 108 U/L (ref 45–117)
ALT SERPL-CCNC: 16 U/L (ref 16–61)
ANION GAP SERPL CALC-SCNC: 10 MMOL/L (ref 3–18)
AST SERPL W P-5'-P-CCNC: 13 U/L (ref 10–38)
BASOPHILS # BLD: 0.03 K/UL (ref 0–0.1)
BASOPHILS NFR BLD: 0.4 % (ref 0–2)
BILIRUB SERPL-MCNC: 0.5 MG/DL (ref 0.2–1)
BUN SERPL-MCNC: 53 MG/DL (ref 7–18)
BUN/CREAT SERPL: 9 (ref 12–20)
CA-I BLD-MCNC: 8.9 MG/DL (ref 8.5–10.1)
CHLORIDE SERPL-SCNC: 101 MMOL/L (ref 100–111)
CO2 SERPL-SCNC: 29 MMOL/L (ref 21–32)
CREAT SERPL-MCNC: 5.98 MG/DL (ref 0.6–1.3)
DIFFERENTIAL METHOD BLD: ABNORMAL
EOSINOPHIL # BLD: 0.04 K/UL (ref 0–0.4)
EOSINOPHIL NFR BLD: 0.5 % (ref 0–5)
ERYTHROCYTE [DISTWIDTH] IN BLOOD BY AUTOMATED COUNT: 15.6 % (ref 11.6–14.5)
FLUAV RNA SPEC QL NAA+PROBE: NOT DETECTED
FLUBV RNA SPEC QL NAA+PROBE: NOT DETECTED
GLOBULIN SER CALC-MCNC: 4 G/DL (ref 2–4)
GLUCOSE BLD STRIP.AUTO-MCNC: 92 MG/DL (ref 70–110)
GLUCOSE SERPL-MCNC: 161 MG/DL (ref 74–99)
HCT VFR BLD AUTO: 34 % (ref 36–48)
HGB BLD-MCNC: 11.1 G/DL (ref 13–16)
IMM GRANULOCYTES # BLD AUTO: 0.04 K/UL (ref 0–0.04)
IMM GRANULOCYTES NFR BLD AUTO: 0.5 % (ref 0–0.5)
LACTATE SERPL-SCNC: 1.7 MMOL/L (ref 0.4–2)
LYMPHOCYTES # BLD: 1.01 K/UL (ref 0.9–3.6)
LYMPHOCYTES NFR BLD: 12.2 % (ref 21–52)
MAGNESIUM SERPL-MCNC: 1.8 MG/DL (ref 1.6–2.6)
MCH RBC QN AUTO: 27.9 PG (ref 24–34)
MCHC RBC AUTO-ENTMCNC: 32.6 G/DL (ref 31–37)
MCV RBC AUTO: 85.4 FL (ref 78–100)
MONOCYTES # BLD: 0.78 K/UL (ref 0.05–1.2)
MONOCYTES NFR BLD: 9.4 % (ref 3–10)
NEUTS SEG # BLD: 6.38 K/UL (ref 1.8–8)
NEUTS SEG NFR BLD: 77 % (ref 40–73)
NRBC # BLD: 0 K/UL (ref 0–0.01)
NRBC BLD-RTO: 0 PER 100 WBC
PERFORMED BY:: NORMAL
PLATELET # BLD AUTO: 248 K/UL (ref 135–420)
PMV BLD AUTO: 9.3 FL (ref 9.2–11.8)
POTASSIUM SERPL-SCNC: 3.5 MMOL/L (ref 3.5–5.5)
PROCALCITONIN SERPL-MCNC: 0.42 NG/ML
PROT SERPL-MCNC: 7.2 G/DL (ref 6.4–8.2)
RBC # BLD AUTO: 3.98 M/UL (ref 4.35–5.65)
SARS-COV-2 RNA RESP QL NAA+PROBE: DETECTED
SODIUM SERPL-SCNC: 140 MMOL/L (ref 136–145)
TROPONIN I SERPL HS-MCNC: 40 NG/L (ref 0–78)
TROPONIN I SERPL HS-MCNC: 40 NG/L (ref 0–78)
WBC # BLD AUTO: 8.3 K/UL (ref 4.6–13.2)

## 2025-04-24 PROCEDURE — 2500000003 HC RX 250 WO HCPCS: Performed by: EMERGENCY MEDICINE

## 2025-04-24 PROCEDURE — 2700000000 HC OXYGEN THERAPY PER DAY

## 2025-04-24 PROCEDURE — 85025 COMPLETE CBC W/AUTO DIFF WBC: CPT

## 2025-04-24 PROCEDURE — 96365 THER/PROPH/DIAG IV INF INIT: CPT

## 2025-04-24 PROCEDURE — 96366 THER/PROPH/DIAG IV INF ADDON: CPT

## 2025-04-24 PROCEDURE — 83735 ASSAY OF MAGNESIUM: CPT

## 2025-04-24 PROCEDURE — 87636 SARSCOV2 & INF A&B AMP PRB: CPT

## 2025-04-24 PROCEDURE — 6360000002 HC RX W HCPCS: Performed by: EMERGENCY MEDICINE

## 2025-04-24 PROCEDURE — 93005 ELECTROCARDIOGRAM TRACING: CPT | Performed by: EMERGENCY MEDICINE

## 2025-04-24 PROCEDURE — 80053 COMPREHEN METABOLIC PANEL: CPT

## 2025-04-24 PROCEDURE — 96375 TX/PRO/DX INJ NEW DRUG ADDON: CPT

## 2025-04-24 PROCEDURE — G0378 HOSPITAL OBSERVATION PER HR: HCPCS

## 2025-04-24 PROCEDURE — 83605 ASSAY OF LACTIC ACID: CPT

## 2025-04-24 PROCEDURE — 84145 PROCALCITONIN (PCT): CPT

## 2025-04-24 PROCEDURE — 87040 BLOOD CULTURE FOR BACTERIA: CPT

## 2025-04-24 PROCEDURE — 2580000003 HC RX 258: Performed by: EMERGENCY MEDICINE

## 2025-04-24 PROCEDURE — 82962 GLUCOSE BLOOD TEST: CPT

## 2025-04-24 PROCEDURE — 94761 N-INVAS EAR/PLS OXIMETRY MLT: CPT

## 2025-04-24 PROCEDURE — 99285 EMERGENCY DEPT VISIT HI MDM: CPT

## 2025-04-24 PROCEDURE — 2500000003 HC RX 250 WO HCPCS: Performed by: NURSE PRACTITIONER

## 2025-04-24 PROCEDURE — 84484 ASSAY OF TROPONIN QUANT: CPT

## 2025-04-24 PROCEDURE — 71045 X-RAY EXAM CHEST 1 VIEW: CPT

## 2025-04-24 PROCEDURE — 6370000000 HC RX 637 (ALT 250 FOR IP): Performed by: NURSE PRACTITIONER

## 2025-04-24 RX ORDER — ACETAMINOPHEN 650 MG/1
650 SUPPOSITORY RECTAL EVERY 6 HOURS PRN
Status: DISCONTINUED | OUTPATIENT
Start: 2025-04-24 | End: 2025-04-26 | Stop reason: HOSPADM

## 2025-04-24 RX ORDER — INSULIN GLARGINE 100 [IU]/ML
8 INJECTION, SOLUTION SUBCUTANEOUS DAILY
Status: DISCONTINUED | OUTPATIENT
Start: 2025-04-25 | End: 2025-04-26 | Stop reason: HOSPADM

## 2025-04-24 RX ORDER — HYDRALAZINE HYDROCHLORIDE 25 MG/1
100 TABLET, FILM COATED ORAL 2 TIMES DAILY
Status: DISCONTINUED | OUTPATIENT
Start: 2025-04-24 | End: 2025-04-26 | Stop reason: HOSPADM

## 2025-04-24 RX ORDER — SODIUM CHLORIDE 0.9 % (FLUSH) 0.9 %
5-40 SYRINGE (ML) INJECTION PRN
Status: DISCONTINUED | OUTPATIENT
Start: 2025-04-24 | End: 2025-04-26 | Stop reason: HOSPADM

## 2025-04-24 RX ORDER — ALLOPURINOL 100 MG/1
200 TABLET ORAL DAILY
Status: DISCONTINUED | OUTPATIENT
Start: 2025-04-25 | End: 2025-04-26 | Stop reason: HOSPADM

## 2025-04-24 RX ORDER — ATORVASTATIN CALCIUM 40 MG/1
40 TABLET, FILM COATED ORAL DAILY
Status: DISCONTINUED | OUTPATIENT
Start: 2025-04-25 | End: 2025-04-26 | Stop reason: HOSPADM

## 2025-04-24 RX ORDER — ONDANSETRON 4 MG/1
4 TABLET, ORALLY DISINTEGRATING ORAL EVERY 8 HOURS PRN
Status: DISCONTINUED | OUTPATIENT
Start: 2025-04-24 | End: 2025-04-26 | Stop reason: HOSPADM

## 2025-04-24 RX ORDER — SODIUM CHLORIDE 0.9 % (FLUSH) 0.9 %
5-40 SYRINGE (ML) INJECTION EVERY 12 HOURS SCHEDULED
Status: DISCONTINUED | OUTPATIENT
Start: 2025-04-24 | End: 2025-04-26 | Stop reason: HOSPADM

## 2025-04-24 RX ORDER — SODIUM CHLORIDE 9 MG/ML
INJECTION, SOLUTION INTRAVENOUS PRN
Status: DISCONTINUED | OUTPATIENT
Start: 2025-04-24 | End: 2025-04-26 | Stop reason: HOSPADM

## 2025-04-24 RX ORDER — FUROSEMIDE 40 MG/1
40 TABLET ORAL DAILY
Status: DISCONTINUED | OUTPATIENT
Start: 2025-04-24 | End: 2025-04-26 | Stop reason: HOSPADM

## 2025-04-24 RX ORDER — POLYETHYLENE GLYCOL 3350 17 G/17G
17 POWDER, FOR SOLUTION ORAL DAILY PRN
Status: DISCONTINUED | OUTPATIENT
Start: 2025-04-24 | End: 2025-04-26 | Stop reason: HOSPADM

## 2025-04-24 RX ORDER — SEVELAMER CARBONATE 800 MG/1
800 TABLET, FILM COATED ORAL
Status: DISCONTINUED | OUTPATIENT
Start: 2025-04-25 | End: 2025-04-26 | Stop reason: HOSPADM

## 2025-04-24 RX ORDER — ISOSORBIDE MONONITRATE 60 MG/1
60 TABLET, EXTENDED RELEASE ORAL DAILY
Status: DISCONTINUED | OUTPATIENT
Start: 2025-04-25 | End: 2025-04-26 | Stop reason: HOSPADM

## 2025-04-24 RX ORDER — SODIUM BICARBONATE 650 MG/1
650 TABLET ORAL 3 TIMES DAILY
Status: DISCONTINUED | OUTPATIENT
Start: 2025-04-24 | End: 2025-04-26 | Stop reason: HOSPADM

## 2025-04-24 RX ORDER — ONDANSETRON 2 MG/ML
4 INJECTION INTRAMUSCULAR; INTRAVENOUS EVERY 6 HOURS PRN
Status: DISCONTINUED | OUTPATIENT
Start: 2025-04-24 | End: 2025-04-26 | Stop reason: HOSPADM

## 2025-04-24 RX ORDER — PANTOPRAZOLE SODIUM 40 MG/1
40 TABLET, DELAYED RELEASE ORAL
Status: DISCONTINUED | OUTPATIENT
Start: 2025-04-25 | End: 2025-04-26 | Stop reason: HOSPADM

## 2025-04-24 RX ORDER — CETIRIZINE HYDROCHLORIDE 10 MG/1
5 TABLET ORAL DAILY
Status: DISCONTINUED | OUTPATIENT
Start: 2025-04-25 | End: 2025-04-26 | Stop reason: HOSPADM

## 2025-04-24 RX ORDER — ACETAMINOPHEN 325 MG/1
650 TABLET ORAL EVERY 6 HOURS PRN
Status: DISCONTINUED | OUTPATIENT
Start: 2025-04-24 | End: 2025-04-26 | Stop reason: HOSPADM

## 2025-04-24 RX ORDER — ASPIRIN 81 MG/1
81 TABLET ORAL DAILY
Status: DISCONTINUED | OUTPATIENT
Start: 2025-04-25 | End: 2025-04-26 | Stop reason: HOSPADM

## 2025-04-24 RX ORDER — INSULIN LISPRO 100 [IU]/ML
0-8 INJECTION, SOLUTION INTRAVENOUS; SUBCUTANEOUS
Status: DISCONTINUED | OUTPATIENT
Start: 2025-04-24 | End: 2025-04-26 | Stop reason: HOSPADM

## 2025-04-24 RX ORDER — DEXTROSE MONOHYDRATE 100 MG/ML
INJECTION, SOLUTION INTRAVENOUS CONTINUOUS PRN
Status: DISCONTINUED | OUTPATIENT
Start: 2025-04-24 | End: 2025-04-26 | Stop reason: HOSPADM

## 2025-04-24 RX ADMIN — SODIUM CHLORIDE, PRESERVATIVE FREE 10 ML: 5 INJECTION INTRAVENOUS at 21:44

## 2025-04-24 RX ADMIN — SODIUM BICARBONATE 650 MG: 650 TABLET ORAL at 22:00

## 2025-04-24 RX ADMIN — ACETAMINOPHEN 650 MG: 325 TABLET ORAL at 22:00

## 2025-04-24 RX ADMIN — AZITHROMYCIN MONOHYDRATE 500 MG: 500 INJECTION, POWDER, LYOPHILIZED, FOR SOLUTION INTRAVENOUS at 20:22

## 2025-04-24 RX ADMIN — HYDRALAZINE HYDROCHLORIDE 100 MG: 25 TABLET ORAL at 21:58

## 2025-04-24 RX ADMIN — APIXABAN 2.5 MG: 2.5 TABLET, FILM COATED ORAL at 21:59

## 2025-04-24 RX ADMIN — FUROSEMIDE 40 MG: 40 TABLET ORAL at 22:00

## 2025-04-24 RX ADMIN — WATER 1000 MG: 1 INJECTION INTRAMUSCULAR; INTRAVENOUS; SUBCUTANEOUS at 20:17

## 2025-04-24 ASSESSMENT — PAIN SCALES - GENERAL
PAINLEVEL_OUTOF10: 10
PAINLEVEL_OUTOF10: 10
PAINLEVEL_OUTOF10: 0
PAINLEVEL_OUTOF10: 5
PAINLEVEL_OUTOF10: 5

## 2025-04-24 ASSESSMENT — PAIN DESCRIPTION - LOCATION
LOCATION: CHEST
LOCATION: HEAD
LOCATION: HEAD
LOCATION: CHEST

## 2025-04-24 ASSESSMENT — PAIN DESCRIPTION - DESCRIPTORS
DESCRIPTORS: SHARP
DESCRIPTORS: SHARP

## 2025-04-24 ASSESSMENT — PAIN DESCRIPTION - ORIENTATION
ORIENTATION: ANTERIOR
ORIENTATION: ANTERIOR
ORIENTATION: LEFT

## 2025-04-24 ASSESSMENT — PAIN - FUNCTIONAL ASSESSMENT
PAIN_FUNCTIONAL_ASSESSMENT: 0-10
PAIN_FUNCTIONAL_ASSESSMENT: 0-10

## 2025-04-24 NOTE — ED PROVIDER NOTES
[facility] EMERGENCY DEPT  EMERGENCY DEPARTMENT HISTORY AND PHYSICAL EXAM      Date of evaluation: 4/24/2025  Patient Name: Ann-Marie Cardenas  Birthdate 1945  MRN: [id number]  ED Provider: Cliff Valenzuela DO   Note Started: 7:38 PM EDT 4/24/25    HISTORY OF PRESENT ILLNESS     Chief Complaint   Patient presents with    Chest Pain    Shortness of Breath       History Provided By: Patient, EMS     HPI:   Patient is a 79-year-old male who presents with a chief complaint of cough shortness of breath and chest pain.  It began earlier today.  He said been coughing 2 days ago.  He last had dialysis on Wednesday.  He says that he is prescribed home oxygen to wear at night sometimes he wears it, does not typically wear it during the daytime.  He says the pain in his chest is intermittent, worse with inspiration, he does not recall having a fever.  He denies any current chest pain.  He is tachypneic.  He says his last dialysis was on Wednesday he is unable to specify if it was a complete treatment or not.  He is unable to specify who his kidney doctor is.          PAST MEDICAL HISTORY   Past Medical History:  Past Medical History:   Diagnosis Date    Acid reflux     Acute deep vein thrombosis (DVT) of distal vein of both lower extremities ([facility]) 10/13/2020    Acute on chronic heart failure (HCC) 01/13/2021    Arrhythmia     Arthritis     Bladder cancer (HCC)     Colon cancer (HCC) 11/2021    Congestive heart failure (HCC)     Deep vein thrombosis (DVT) of proximal vein of both lower extremities ([facility]) 9/14/2020    Diabetes mellitus ([facility])     Dialysis patient     Tues, Thurs, Fri    Difficult intubation     Narrow airway-per Pulm notes(in media)     ESRD (end stage renal disease) ([facility])     stage IV/V per renal notes care everywhere 5/22/23    GERD (gastroesophageal reflux disease)     Gout     High cholesterol     Hypertension     Hypomagnesemia 05/2023    Kidney carcinoma, left (HCC) 2016    left nephrectomy    Kidney disease

## 2025-04-25 LAB
ANION GAP SERPL CALC-SCNC: 9 MMOL/L (ref 3–18)
BUN SERPL-MCNC: 51 MG/DL (ref 7–18)
BUN/CREAT SERPL: 8 (ref 12–20)
CA-I BLD-MCNC: 8.8 MG/DL (ref 8.5–10.1)
CHLORIDE SERPL-SCNC: 104 MMOL/L (ref 100–111)
CO2 SERPL-SCNC: 29 MMOL/L (ref 21–32)
CREAT SERPL-MCNC: 6.47 MG/DL (ref 0.6–1.3)
EKG ATRIAL RATE: 67 BPM
EKG DIAGNOSIS: NORMAL
EKG P AXIS: 56 DEGREES
EKG P-R INTERVAL: 220 MS
EKG Q-T INTERVAL: 438 MS
EKG QRS DURATION: 110 MS
EKG QTC CALCULATION (BAZETT): 462 MS
EKG R AXIS: -33 DEGREES
EKG T AXIS: 55 DEGREES
EKG VENTRICULAR RATE: 67 BPM
ERYTHROCYTE [DISTWIDTH] IN BLOOD BY AUTOMATED COUNT: 15.4 % (ref 11.6–14.5)
GLUCOSE BLD STRIP.AUTO-MCNC: 122 MG/DL (ref 70–110)
GLUCOSE BLD STRIP.AUTO-MCNC: 147 MG/DL (ref 70–110)
GLUCOSE BLD STRIP.AUTO-MCNC: 231 MG/DL (ref 70–110)
GLUCOSE SERPL-MCNC: 159 MG/DL (ref 74–99)
HBV SURFACE AG SER QL: <0.1 INDEX
HBV SURFACE AG SER QL: NEGATIVE
HCT VFR BLD AUTO: 37.9 % (ref 36–48)
HGB BLD-MCNC: 12.4 G/DL (ref 13–16)
MCH RBC QN AUTO: 27.7 PG (ref 24–34)
MCHC RBC AUTO-ENTMCNC: 32.7 G/DL (ref 31–37)
MCV RBC AUTO: 84.8 FL (ref 78–100)
NRBC # BLD: 0 K/UL (ref 0–0.01)
NRBC BLD-RTO: 0 PER 100 WBC
PERFORMED BY:: ABNORMAL
PHOSPHATE SERPL-MCNC: 6.2 MG/DL (ref 2.5–4.9)
PLATELET # BLD AUTO: 247 K/UL (ref 135–420)
PMV BLD AUTO: 9.8 FL (ref 9.2–11.8)
POTASSIUM SERPL-SCNC: 3.5 MMOL/L (ref 3.5–5.5)
RBC # BLD AUTO: 4.47 M/UL (ref 4.35–5.65)
SODIUM SERPL-SCNC: 142 MMOL/L (ref 136–145)
WBC # BLD AUTO: 5.9 K/UL (ref 4.6–13.2)

## 2025-04-25 PROCEDURE — G0378 HOSPITAL OBSERVATION PER HR: HCPCS

## 2025-04-25 PROCEDURE — 85027 COMPLETE CBC AUTOMATED: CPT

## 2025-04-25 PROCEDURE — 6360000002 HC RX W HCPCS: Performed by: NURSE PRACTITIONER

## 2025-04-25 PROCEDURE — 96366 THER/PROPH/DIAG IV INF ADDON: CPT

## 2025-04-25 PROCEDURE — 80048 BASIC METABOLIC PNL TOTAL CA: CPT

## 2025-04-25 PROCEDURE — 96375 TX/PRO/DX INJ NEW DRUG ADDON: CPT

## 2025-04-25 PROCEDURE — 84100 ASSAY OF PHOSPHORUS: CPT

## 2025-04-25 PROCEDURE — 36415 COLL VENOUS BLD VENIPUNCTURE: CPT

## 2025-04-25 PROCEDURE — 2580000003 HC RX 258: Performed by: NURSE PRACTITIONER

## 2025-04-25 PROCEDURE — 6370000000 HC RX 637 (ALT 250 FOR IP): Performed by: NURSE PRACTITIONER

## 2025-04-25 PROCEDURE — 94761 N-INVAS EAR/PLS OXIMETRY MLT: CPT

## 2025-04-25 PROCEDURE — 2700000000 HC OXYGEN THERAPY PER DAY

## 2025-04-25 PROCEDURE — 2500000003 HC RX 250 WO HCPCS: Performed by: NURSE PRACTITIONER

## 2025-04-25 PROCEDURE — 87340 HEPATITIS B SURFACE AG IA: CPT

## 2025-04-25 PROCEDURE — 96376 TX/PRO/DX INJ SAME DRUG ADON: CPT

## 2025-04-25 PROCEDURE — 82962 GLUCOSE BLOOD TEST: CPT

## 2025-04-25 PROCEDURE — 86706 HEP B SURFACE ANTIBODY: CPT

## 2025-04-25 PROCEDURE — 90935 HEMODIALYSIS ONE EVALUATION: CPT

## 2025-04-25 RX ORDER — DEXAMETHASONE SODIUM PHOSPHATE 4 MG/ML
4 INJECTION, SOLUTION INTRA-ARTICULAR; INTRALESIONAL; INTRAMUSCULAR; INTRAVENOUS; SOFT TISSUE DAILY
Status: DISCONTINUED | OUTPATIENT
Start: 2025-04-25 | End: 2025-04-26 | Stop reason: HOSPADM

## 2025-04-25 RX ADMIN — SODIUM CHLORIDE, PRESERVATIVE FREE 10 ML: 5 INJECTION INTRAVENOUS at 08:51

## 2025-04-25 RX ADMIN — ASPIRIN 81 MG: 81 TABLET, COATED ORAL at 08:45

## 2025-04-25 RX ADMIN — INSULIN GLARGINE 8 UNITS: 100 INJECTION, SOLUTION SUBCUTANEOUS at 08:47

## 2025-04-25 RX ADMIN — FUROSEMIDE 40 MG: 40 TABLET ORAL at 08:46

## 2025-04-25 RX ADMIN — APIXABAN 2.5 MG: 2.5 TABLET, FILM COATED ORAL at 08:46

## 2025-04-25 RX ADMIN — ALLOPURINOL 200 MG: 100 TABLET ORAL at 08:46

## 2025-04-25 RX ADMIN — ISOSORBIDE MONONITRATE 60 MG: 60 TABLET, EXTENDED RELEASE ORAL at 08:45

## 2025-04-25 RX ADMIN — CETIRIZINE HYDROCHLORIDE 5 MG: 10 TABLET, FILM COATED ORAL at 08:45

## 2025-04-25 RX ADMIN — HYDRALAZINE HYDROCHLORIDE 100 MG: 25 TABLET ORAL at 08:45

## 2025-04-25 RX ADMIN — ONDANSETRON 4 MG: 2 INJECTION, SOLUTION INTRAMUSCULAR; INTRAVENOUS at 19:26

## 2025-04-25 RX ADMIN — DEXAMETHASONE SODIUM PHOSPHATE 4 MG: 4 INJECTION INTRA-ARTICULAR; INTRALESIONAL; INTRAMUSCULAR; INTRAVENOUS; SOFT TISSUE at 09:03

## 2025-04-25 RX ADMIN — WATER 1000 MG: 1 INJECTION INTRAMUSCULAR; INTRAVENOUS; SUBCUTANEOUS at 21:17

## 2025-04-25 RX ADMIN — SEVELAMER CARBONATE 800 MG: 800 TABLET, FILM COATED ORAL at 08:45

## 2025-04-25 RX ADMIN — AZITHROMYCIN MONOHYDRATE 500 MG: 500 INJECTION, POWDER, LYOPHILIZED, FOR SOLUTION INTRAVENOUS at 21:16

## 2025-04-25 RX ADMIN — INSULIN LISPRO 4 UNITS: 100 INJECTION, SOLUTION INTRAVENOUS; SUBCUTANEOUS at 11:39

## 2025-04-25 RX ADMIN — PANTOPRAZOLE SODIUM 40 MG: 40 TABLET, DELAYED RELEASE ORAL at 06:29

## 2025-04-25 RX ADMIN — SODIUM BICARBONATE 650 MG: 650 TABLET ORAL at 08:45

## 2025-04-25 RX ADMIN — SODIUM CHLORIDE, PRESERVATIVE FREE 10 ML: 5 INJECTION INTRAVENOUS at 19:27

## 2025-04-25 RX ADMIN — ATORVASTATIN CALCIUM 40 MG: 40 TABLET, FILM COATED ORAL at 08:45

## 2025-04-25 RX ADMIN — SEVELAMER CARBONATE 800 MG: 800 TABLET, FILM COATED ORAL at 11:40

## 2025-04-25 RX ADMIN — APIXABAN 2.5 MG: 2.5 TABLET, FILM COATED ORAL at 21:17

## 2025-04-25 RX ADMIN — SODIUM BICARBONATE 650 MG: 650 TABLET ORAL at 21:17

## 2025-04-25 ASSESSMENT — PAIN SCALES - GENERAL
PAINLEVEL_OUTOF10: 0

## 2025-04-25 NOTE — ED NOTES
Patient resting on stretcher with nurse practitioner at bedside assessing for admission. No distress noted. Patient has NC 02 at 2 L on and denies shortness of breath at this time.

## 2025-04-25 NOTE — H&P
History and Physical    Subjective:     Ann-Marie Cardenas is a 79 y.o. -American male with a past medical history for heart failure, end-stage renal disease on dialysis Monday Wednesday Fridays, hypertension, hyperlipidemia, diabetes mellitus type 2, history of DVT on Eliquis, and bladder cancer history of cystectomy with a urostomy in place, patient presents to the ED with a chief complaint of shortness of breath.  Patient reports shortness of breath started earlier today, worsening with movement, other accompanying symptoms included a productive cough, unsure of sputum color, patient denies any fever, chills, chest pain, palpitations, wheezing, abdominal pain, nausea, vomiting, and diarrhea. In the ED labs and imaging reviewed and shown a BUN of 53, creatinine 5.98, glucose 161, no leukocytosis, hemoglobin stable at 11.1, chest x-ray Increase in right basilar and perihilar opacities which could reflect developing pneumonia, hypoxia in the ED oxygen level on room air dropped to 88%, patient was placed on 2 L.  Talking with the patient patient endorses he should be wearing oxygen at home, but has not done it in over a year, but has it available to him if needed.  While in the ED patient did receive 1 g of Rocephin via IV and 500 mg of azithromycin via IV, patient assessed at the bedside in the ED he is alert and oriented x 3, there are some periods of confusion which he had endorses has been ongoing and family is aware, stable on 2 L.  Patient agrees to admission for diagnosis of hypoxia likely secondary to pneumonia, treatment to include supplemental oxygen, IV antibiotics, and consult nephrology for continuation of his hemodialysis.    Discussed case with ED provider, hospital medicine will admit the patient for further evaluation and treatment.    Admit to medical telemetry unit.      Past Medical History:   Diagnosis Date    Acid reflux     Acute deep vein thrombosis (DVT) of distal vein of both  Units according to his blood sugar reading   Yes Automatic Reconciliation, Ar   ondansetron (ZOFRAN-ODT) 4 MG disintegrating tablet Take 1 tablet by mouth 3 times daily as needed for Nausea or Vomiting 4/18/25   Geovanny Pineda DO   nitroGLYCERIN (NITROSTAT) 0.4 MG SL tablet PLACE 1 TABLET UNDER THE TONGUE EVERY 5 MINUTES AS NEEDED FOR CHEST PAIN  X 3 DOSES. IF AFTER 3 DOSES NO RELIEF CALL EMS OR REPORT TO EMERGENCY ROOM IMMEDIATELY 3/31/25   Collette Nicholas MD   B-D UF III MINI PEN NEEDLES 31G X 5 MM MISC Inject 1 each as directed 2 times daily 2/25/25   Collette Nicholas MD   glucose monitoring kit 1 kit by Does not apply route daily 2/25/25   Collette Nicholas MD   blood glucose monitor strips Use one strip twice a day to check blood sugar. Please dispense covered strips for his insurance to go with insurance covered glucometer. 2/25/25   Collette Nicholas MD   Alcohol Swabs (PHARMACIST CHOICE ALCOHOL) PADS PRIOR TO LANCING, USE 1 NEW PAD TO CLEAN SITE THREE TIMES DAILY AND USE ONE NEW PAD TO CLEAN SITE BEFORE INJECTING ONCE Daily 11/21/24   Provider, MD Danna   fluticasone (FLONASE) 50 MCG/ACT nasal spray 1 spray by Each Nostril route daily 11/24/24   Oliver Ruth DO   albuterol sulfate HFA (PROVENTIL;VENTOLIN;PROAIR) 108 (90 Base) MCG/ACT inhaler Inhale 2 puffs into the lungs every 4 hours as needed for Wheezing or Shortness of Breath 11/19/24   Collette Nicholas MD   Accu-Chek Softclix Lancets MISC 1 each by Does not apply route 3 times daily Use to check blood sugar 3 times daily 10/19/23   Collette Nicholas MD     Allergies   Allergen Reactions    No Known Allergies           REVIEW OF SYSTEMS:       Total of 12 systems reviewed as follows:    Positive = Red  Constitutional: Negative for malaise/fatigue and weakness, negative for fever and chills   HENT: Negative for ear pain, headaches, negative for loss of sense of taste and smell   Eyes: Negative for blurred vision and double vision   Skin:

## 2025-04-25 NOTE — PLAN OF CARE
Problem: Chronic Conditions and Co-morbidities  Goal: Patient's chronic conditions and co-morbidity symptoms are monitored and maintained or improved  4/25/2025 0953 by Maren Morrison RN  Outcome: Progressing  Flowsheets (Taken 4/25/2025 0842)  Care Plan - Patient's Chronic Conditions and Co-Morbidity Symptoms are Monitored and Maintained or Improved:   Monitor and assess patient's chronic conditions and comorbid symptoms for stability, deterioration, or improvement   Collaborate with multidisciplinary team to address chronic and comorbid conditions and prevent exacerbation or deterioration   Update acute care plan with appropriate goals if chronic or comorbid symptoms are exacerbated and prevent overall improvement and discharge  4/25/2025 0454 by Lorna Boateng RN  Outcome: Not Progressing     Problem: Metabolic/Fluid and Electrolytes - Adult  Goal: Electrolytes maintained within normal limits  4/25/2025 0953 by Maren Morrison RN  Outcome: Progressing  Flowsheets (Taken 4/25/2025 0842)  Electrolytes maintained within normal limits:   Monitor labs and assess patient for signs and symptoms of electrolyte imbalances   Administer electrolyte replacement as ordered   Monitor response to electrolyte replacements, including repeat lab results as appropriate  4/25/2025 0454 by Lorna Boateng RN  Outcome: Not Progressing  Goal: Hemodynamic stability and optimal renal function maintained  4/25/2025 0953 by Maren Morrison RN  Outcome: Progressing  Flowsheets (Taken 4/25/2025 0842)  Hemodynamic stability and optimal renal function maintained:   Monitor labs and assess for signs and symptoms of volume excess or deficit   Monitor intake, output and patient weight   Encourage oral intake as appropriate   Monitor urine specific gravity, serum osmolarity and serum sodium as indicated or ordered   Monitor response to interventions for patient's volume status, including labs, urine output, blood pressure (other

## 2025-04-25 NOTE — PROGRESS NOTES
0720-Bedside and Verbal shift change report given to CLAYTON Araya (oncoming nurse) by Lorna FONTAINE RN (offgoing nurse). Report included the following information Nurse Handoff Report, Adult Overview, MAR, Recent Results, Neuro Assessment, and Event Log- Assumed care- Whiteboard updated- Safety measures implemented- Pt shows no signs of distress at this time    VSS- Shift assessment completed- Pt shows no signs of distress at this time    Medications administered per MAR protocol- Pt tolerated well- Pt expresses no concerns at this time    Dialysis at pt bedside- Pt expresses no concerns at this time    Bedside and Verbal shift change report given to CLAYTON Kraus (oncoming nurse) by CLAYTON Araya (offgoing nurse). Report included the following information Nurse Handoff Report, Adult Overview, Cardiac Rhythm  , Neuro Assessment, and Event Log.

## 2025-04-25 NOTE — DIALYSIS
Primary RN SBAR: DIAN Rees RN  Incapacitated Nurse edu. provided: yes  Patient Education provided: access care / fluid monitoring  Preferred Education method and Primary language: english,verbal  Dialysis consent: chronic unit  Hospital General Consent Verified: signed on admission  Hospital associated wait time; reason: n/a        HEPATITIS CLINIC LAB  / PREVIOUS HOSPITAL LAB FOR HEPATITIS ALSO   / NEW HOSPITAL LAB ORDER PLACED PRE-DIALYSIS TREATMENT.        Hepatitis B Surface Ag   Date/Time Value Ref Range Status   2023 11:40 PM <0.10 <1.00 Index Final     Hep B S Ag Interp   Date/Time Value Ref Range Status   2023 11:40 PM Negative Negative   Final        25 1420   Observations & Evaluations   Level of Consciousness 0   Oriented X 3   Heart Rhythm Regular   Respiratory Quality/Effort Unlabored   O2 Device Nasal cannula   Bilateral Breath Sounds   (coarse)   Skin Color   (slightly gray)   Skin Condition/Temp Dry;Warm   Abdomen Inspection Soft   Bowel Sounds (All Quadrants) Audible   Edema None   Vital Signs   BP (!) 122/59   Temp 98.5 °F (36.9 °C)   Pulse 85   Respirations 18   SpO2 98 %   Pain Assessment   Pain Assessment None - Denies Pain   Technical Checks   Dialysis Machine No. [id number]   RO Machine Number [id number]   Dialyzer Lot No. [id number]   Tubing Lot Number [id number]   All Connections Secure Yes   NS Bag Yes   Saline Line Double Clamped Yes   Dialyzer Nipro ELISIO   Prime Volume (mL) 0 mL   ICEBOAT I;C;E;B;O;A;T   RO Machine Log Sheet Completed Yes   Machine Alarm Self Test Completed;Passed   Air Foam Detector Tested;Proper Function;pH Reading   Extracorporeal Circuit Tested for Integrity Yes   Machine Conductivity 13.9   Manual Ph 7   Bleach Test (Neg) Yes   Bath Temperature 96.8 °F (36 °C)   Hemodialysis Fistula/Graft Arteriovenous vein graft Left Arm   Placement Date: 25   Present on Admission/Arrival: Yes  Access Type: Arteriovenous vein graft  Orientation: Left     Post-Treatment Procedures Blood returned;Access bleeding time < 10 minutes   Machine Disinfection Process Acid/Vinegar Clean;Heat Disinfect;Exterior Machine Disinfection   Rinseback Volume (ml) 300 ml   Blood Volume Processed (Liters) 62 L   Dialyzer Clearance Moderately streaked   Hemodialysis Intake (ml) 300 ml   Hemodialysis Output (ml) 2300 ml   NET Removed (ml) 2000   Tolerated Treatment Good   Physician Notified   (present)   Patient Disposition Return to room       Primary RN SBAR: ELSIE Morrison RN  Comments: no distress or pain or discomfort or complaints / rested and watched tv much of treatment  / post treatment, all possible blood returned / hemostasis / primary nurse present often

## 2025-04-25 NOTE — ED NOTES
TRANSFER - OUT REPORT:    Verbal report given to Lorna Chaidez RN on Ann-Marie Cardenas  being transferred to [address] [place] [address] for routine progression of patient care       Report consisted of patient's Situation, Background, Assessment and   Recommendations(SBAR).     Information from the following report(s) Nurse Handoff Report, ED Encounter Summary, ED SBAR, and MAR was reviewed with the receiving nurse.    Holder Fall Assessment:    Presents to emergency department  because of falls (Syncope, seizure, or loss of consciousness): No  Age > 70: No  Altered Mental Status, Intoxication with alcohol or substance confusion (Disorientation, impaired judgment, poor safety awaremess, or inability to follow instructions): No  Impaired Mobility: Ambulates or transfers with assistive devices or assistance; Unable to ambulate or transer.: No  Nursing Judgement: No          Lines:   Peripheral IV 04/24/25 Distal;Right;Anterior Antecubital (Active)   Site Assessment Clean, dry & intact 04/24/25 1718   Line Status Blood return noted 04/24/25 1718       Hemodialysis Central Access Left Subclavian (Active)       Hemodialysis Central Access Left (Active)       Hemodialysis Central Access Left (Active)        Opportunity for questions and clarification was provided.      Patient transported with:  Monitor, O2 @ 2lpm, and Tech

## 2025-04-25 NOTE — PROGRESS NOTES
Hospitalist Progress Note    Daily Progress Note: 2025 1:37 PM    Ann-Marie Cardenas                                            MRN: [id number]                                  :1945      Admission Summary    79 y.o. male -American male with a past medical history for heart failure, end-stage renal disease on dialysis , hypertension, hyperlipidemia, diabetes mellitus type 2, history of DVT on Eliquis, and bladder cancer history of cystectomy with a urostomy in place, patient presents to the ED with a chief complaint of shortness of breath. Patient reports shortness of breath started earlier today, worsening with movement, other accompanying symptoms included a productive cough.  Patient was eventually diagnosed with pneumonia and started on IV antibiotics, due to ongoing respiratory issues COVID test was performed and show positive for COVID-19 infection.      Subjective:     Pt examined and seen at bedside.  Patient is alert and oriented x 3, there are no acute signs or symptoms of distress noted at this time, patient is currently on 1 L, wean from 2 L, plans to continue to wean as tolerated.  Patient currently reports an improvement of shortness of breath, he denies any chest pain, palpitations, abdominal pain, nausea vomiting and diarrhea.  No acute events reported overnight.    Today's Plan: Patient normally receives hemodialysis for end-stage renal disease on [facility], nephrology was consulted for continuation of dialysis while hospitalized, plans is for patient to dialyze later on today.  If patient continues to do well with respiratory status, wean off of oxygen, patient should be able to be discharged tomorrow.    Objective:     /62   Pulse 86   Temp 98.2 °F (36.8 °C) (Oral)   Resp 17   Ht 1.727 m (5' 8\")   Wt 82.6 kg (182 lb)   SpO2 99%   BMI 27.67 kg/m²         Temp (24hrs), Av.6 °F (37 °C), Min:98.2 °F (36.8 °C), Max:99 °F (37.2 °C)        he is currently on 1 L satting 98 to 99%, plans to continue weaning  - This is likely secondary to pneumonia versus COVID-19 infection    ESRD  -Nephrology was consulted, plans is for patient to dialyze later on today  -HD on MWF, Left AVF positive bruit (weak) and thrill  -daily BMP  -Decreased urine output, urostomy in place to right lower quadrant due to history of bladder cancer and having a cystectomy       Hypertension  -chronic, better controlled today, continue Imdur and Hydralazine  -monitor BP closely     Diabetes Mellitus Type 2  -holding Glipizide, continue Lantus 8 units per home dosing, implement sliding scale  -POC glucose before meals     History of DVT  -continue Eliquis     Disposition    Disposition: Home tomorrow as long as patient is stable on room air.    DVT Prophylaxis: Eliquis    Code Status: Full    Care Plan discussed with: Patient and nursing    Clinical time 25 minutes with >50% of visit spent in counseling and coordination of care    Signed by: LAURA Simms 4/25/2025

## 2025-04-25 NOTE — PLAN OF CARE
Problem: Chronic Conditions and Co-morbidities  Goal: Patient's chronic conditions and co-morbidity symptoms are monitored and maintained or improved  4/25/2025 1952 by Efra Roque, RN  Outcome: Progressing  Flowsheets (Taken 4/25/2025 1952)  Care Plan - Patient's Chronic Conditions and Co-Morbidity Symptoms are Monitored and Maintained or Improved:   Update acute care plan with appropriate goals if chronic or comorbid symptoms are exacerbated and prevent overall improvement and discharge   Collaborate with multidisciplinary team to address chronic and comorbid conditions and prevent exacerbation or deterioration   Monitor and assess patient's chronic conditions and comorbid symptoms for stability, deterioration, or improvement  4/25/2025 0953 by Maren Morrison RN  Outcome: Progressing  Flowsheets (Taken 4/25/2025 0842)  Care Plan - Patient's Chronic Conditions and Co-Morbidity Symptoms are Monitored and Maintained or Improved:   Monitor and assess patient's chronic conditions and comorbid symptoms for stability, deterioration, or improvement   Collaborate with multidisciplinary team to address chronic and comorbid conditions and prevent exacerbation or deterioration   Update acute care plan with appropriate goals if chronic or comorbid symptoms are exacerbated and prevent overall improvement and discharge     Problem: Discharge Planning  Goal: Discharge to home or other facility with appropriate resources  4/25/2025 1952 by Efra Roque, RN  Outcome: Progressing  Flowsheets (Taken 4/25/2025 1952)  Discharge to home or other facility with appropriate resources:   Identify barriers to discharge with patient and caregiver   Arrange for needed discharge resources and transportation as appropriate   Identify discharge learning needs (meds, wound care, etc)   Refer to discharge planning if patient needs post-hospital services based on physician order or complex needs related to functional status, cognitive  prescribed  4/25/2025 0953 by Maren Morrison RN  Outcome: Progressing  Flowsheets (Taken 4/25/2025 0842)  Maintains optimal cardiac output and hemodynamic stability:   Monitor blood pressure and heart rate   Administer vasoactive medications as ordered   Assess for signs of decreased cardiac output   Monitor urine output and notify Licensed Independent Practitioner for values outside of normal range  Goal: Absence of cardiac dysrhythmias or at baseline  4/25/2025 1952 by Efra Roque RN  Outcome: Progressing  4/25/2025 0953 by Maren Morrison RN  Outcome: Progressing  Flowsheets (Taken 4/25/2025 0842)  Absence of cardiac dysrhythmias or at baseline:   Monitor cardiac rate and rhythm   Assess for signs of decreased cardiac output   Administer antiarrhythmia medication and electrolyte replacement as ordered     Problem: Genitourinary - Adult  Goal: Absence of urinary retention  4/25/2025 1952 by Efra Roque RN  Outcome: Progressing  4/25/2025 0953 by Maren Morrison RN  Outcome: Progressing  Flowsheets (Taken 4/25/2025 0842)  Absence of urinary retention: Assess patient’s ability to void and empty bladder  Goal: Urinary catheter remains patent  4/25/2025 1952 by Efra Roque RN  Outcome: Progressing  4/25/2025 0953 by Maren Morrison RN  Outcome: Progressing     Problem: Metabolic/Fluid and Electrolytes - Adult  Goal: Electrolytes maintained within normal limits  4/25/2025 1952 by Efra Roque RN  Outcome: Progressing  4/25/2025 0953 by Maren Morrison RN  Outcome: Progressing  Flowsheets (Taken 4/25/2025 0842)  Electrolytes maintained within normal limits:   Monitor labs and assess patient for signs and symptoms of electrolyte imbalances   Administer electrolyte replacement as ordered   Monitor response to electrolyte replacements, including repeat lab results as appropriate  Goal: Hemodynamic stability and optimal renal function maintained  4/25/2025 1952 by Efra Roque RN  Outcome:

## 2025-04-25 NOTE — PROGRESS NOTES
80 Y/o male full code    Arrived 04/24/25    Isolation droplet + COVID-19 and MDRO in urine    History: Heart failure HTN, diabetes, DVT, bladder cancer, cystectomy with urostomy, End stage renal disease on dialysis (Monday, Wednesday and Friday) dialyzed yesterday (Fri 04/25/25)     Neuro Alert and oriented, denies pain, slightly unsteady gait stand by assistance when ambulating, uses a cane at home    Respiratory does not wear prescribed oxygen at home    Cardiac Tele box 2 first degree heart block, blood pressures soft since returning from dialysis, held hydralazine with order    GI vomited at shift change after ambulating to the restroom      Urostomy bag and a bm 04/25/25      Skin intact    Lines dialysis fistula Left arm, IV 20 gauge Right AC and a port to the right side of his chest not accessed    Plan: Discharge today      1905 Used call bell to have a bowel movement in the bathroom. Did not want to use the bedside commode.  Informed to use call bell  in the bathroom, showed him the red cord and he agreed to use it.Had a bowel movement    1915 assisted back to bed, pt stated \"I feel like I am going to faint\". Assisted onto the bed and then he began to vomit a liquid green and yellow substance.    1930 assessed vitals and informed pt to put his feet up and relax in bed for a little while and to not drink or eat for a little while to let the Zofran work.    2030 assessment completed  Blood sugar checked  2100 pain and needs reassessed as well as vitals for his BP    2140 Called and spoke with NP Jigna r/t patients blood pressure and was given a verbal order to hold hydralazine, pt returned from dialysis with systolic below 100, checked several times.    2145  medications given after assessing his nausea, pt informed this RN it had passed, given some water and medications, denies all pain    2230 hourly round denies all pain and no needs at this time, changed all cardiac leads as he was moving a lot in bed

## 2025-04-25 NOTE — PLAN OF CARE
Problem: Chronic Conditions and Co-morbidities  Goal: Patient's chronic conditions and co-morbidity symptoms are monitored and maintained or improved  Outcome: Not Progressing     Problem: Metabolic/Fluid and Electrolytes - Adult  Goal: Electrolytes maintained within normal limits  Outcome: Not Progressing  Goal: Hemodynamic stability and optimal renal function maintained  Outcome: Not Progressing     Problem: Pain  Goal: Verbalizes/displays adequate comfort level or baseline comfort level  Outcome: Progressing     Problem: Respiratory - Adult  Goal: Achieves optimal ventilation and oxygenation  Outcome: Progressing     Problem: Cardiovascular - Adult  Goal: Maintains optimal cardiac output and hemodynamic stability  Outcome: Progressing  Goal: Absence of cardiac dysrhythmias or at baseline  Outcome: Progressing     Problem: Genitourinary - Adult  Goal: Absence of urinary retention  Outcome: Progressing  Goal: Urinary catheter remains patent  Outcome: Progressing     Problem: Metabolic/Fluid and Electrolytes - Adult  Goal: Glucose maintained within prescribed range  Outcome: Progressing     Problem: Hematologic - Adult  Goal: Maintains hematologic stability  Outcome: Progressing     Problem: Chronic Conditions and Co-morbidities  Goal: Patient's chronic conditions and co-morbidity symptoms are monitored and maintained or improved  Outcome: Not Progressing     Problem: Metabolic/Fluid and Electrolytes - Adult  Goal: Electrolytes maintained within normal limits  Outcome: Not Progressing  Goal: Hemodynamic stability and optimal renal function maintained  Outcome: Not Progressing

## 2025-04-25 NOTE — CONSULTS
NAME:  Ann-Marie Cardenas   :   1945   MRN:   [id number]     ATTENDING: Onel Meredith MD  PCP:  Collette Nicholas MD    Date/Time:  2025       Subjective:   REQUESTING PHYSICIAN: Dr Meredith  REASON FOR CONSULT:   ESRD on HD     History of presenting illness:  Ann-Marie Cardenas is a 79 y.o. male -American male with a past medical history for heart failure,  hypertension, hyperlipidemia, diabetes mellitus type 2, history of DVT on Eliquis, bladder cancer history of cystectomy with a urostomy in place, and  end-stage renal disease on dialysis  at [facility]. He presented to the ED with complaints of shortness of breath. Reports no missed HD treatments. Patient was eventually diagnosed with pneumonia and started on IV antibiotics, due to ongoing respiratory issues COVID test was performed and show positive for COVID-19 infection.   He has been admitted to hospitalist service for management of respiratory distress  Nephrology is consulted for HD    Past Medical History:   Diagnosis Date    Acid reflux     Acute deep vein thrombosis (DVT) of distal vein of both lower extremities ([facility]) 10/13/2020    Acute on chronic heart failure (HCC) 2021    Arrhythmia     Arthritis     Bladder cancer (HCC)     Colon cancer (HCC) 2021    Congestive heart failure (HCC)     Deep vein thrombosis (DVT) of proximal vein of both lower extremities ([facility]) 2020    Diabetes mellitus ([facility])     Dialysis patient     Tu, Th, Fri    Difficult intubation     Narrow airway-per Pulm notes(in media)     ESRD (end stage renal disease) ([facility])     stage IV/V per renal notes care everywhere 23    GERD (gastroesophageal reflux disease)     Gout     High cholesterol     Hypertension     Hypomagnesemia 2023    Kidney carcinoma, left (HCC) 2016    left nephrectomy    Kidney disease     Pituitary mass     Pulmonary HTN ([facility]) 10/2021    PASP 77 mmHg    Reflux esophagitis     Unspecified

## 2025-04-25 NOTE — ASSESSMENT & PLAN NOTE
-Clinically stable on 2 L NC (continue supplemental O2), patient endorses that he has oxygen at home but is unsure of the liters that he should be wearing, he endorses that he has not worn oxygen in over a year  -Covid ordered  -CXR: Increase in right basilar and perihilar opacities which could reflect developing pneumonia  -keep O2 saturation greater than 92%

## 2025-04-25 NOTE — ASSESSMENT & PLAN NOTE
-procalcitonin ordered  -lactic acid 1.7  -CXR: Increase in right basilar and perihilar opacities which could reflect developing pneumonia  -blood cultures: pending  -ordered Azithromycin 500 mg daily and Rocephin 1 gram every 24 hours   -EKG: SR  -PRN Tylenol for temp.>100.3  -daily CBC and BMP

## 2025-04-25 NOTE — CARE COORDINATION
04/25/25 0830   Service Assessment   Patient Orientation Alert and Oriented   Cognition Alert   History Provided By Patient   Primary Caregiver Self   Accompanied By/Relationship Alone in room   Support Systems Family Members   Patient's Healthcare Decision Maker is: Legal Next of Kin   PCP Verified by CM Yes   Last Visit to PCP Within last 3 months   Prior Functional Level Independent in ADLs/IADLs   Current Functional Level Independent in ADLs/IADLs   Can patient return to prior living arrangement Yes   Ability to make needs known: Good   Family able to assist with home care needs: Yes   Would you like for me to discuss the discharge plan with any other family members/significant others, and if so, who? No   Financial Resources Medicaid;Medicare   Community Resources Transportation   CM/SW Referral Disease Management Education     Pt lives with brother and niece, [facility] transportation, he can not drive due to vision, family will transport home when ready for DC, CM following for DC needs, has home 02 already at home.

## 2025-04-25 NOTE — PROGRESS NOTES
-2041 TRANSFER - IN REPORT:    Verbal report received from [facility] RN on Ann-Marie Cardenas  being received from ED for routine progression of patient care      Report consisted of patient's Situation, Background, Assessment and   Recommendations(SBAR).     Information from the following report(s) ED SBAR, MAR, and Recent Results was reviewed with the receiving nurse.    Opportunity for questions and clarification was provided.      Assessment completed upon patient's arrival to unit and care assumed.      -2110 Received care of pt via stretcher from ED.  Pt transferred self to bed.  A&OX4.  SOB on exertion.  Lung sounds clear in upper lobes, diminished in bases.  SATS 100% on 2 lpm via n/c.  AV fistula to left forearm intact. Positive bruit/positive thrill. Skin warm and dry.  Skin pallor dusky. Urostomy appliance intact, yahaira colored urine noted in drainage bag. Pt c/o sharp pain to head.  Pt rated pain @ \"5\" on pain scale. Pt  oriented to call bell system and bed controls.  -2130 Covid/flu swab done.       -2200 BS 92, no sliding scale coverage required.  Tylenol 650 mg po given.     -2230 Pt denies any complaints at present.    -0030 Hourly rounding done at this time.  Pt resting quietly in bed.  VSS.      -6013  into draw am labs.     -0715  Report given to oncoming nurse.

## 2025-04-26 VITALS
RESPIRATION RATE: 20 BRPM | TEMPERATURE: 97.7 F | OXYGEN SATURATION: 100 % | HEIGHT: 68 IN | BODY MASS INDEX: 27.54 KG/M2 | WEIGHT: 181.7 LBS | HEART RATE: 68 BPM | SYSTOLIC BLOOD PRESSURE: 143 MMHG | DIASTOLIC BLOOD PRESSURE: 67 MMHG

## 2025-04-26 LAB
GLUCOSE BLD STRIP.AUTO-MCNC: 157 MG/DL (ref 70–110)
GLUCOSE BLD STRIP.AUTO-MCNC: 172 MG/DL (ref 70–110)
PERFORMED BY:: ABNORMAL
PERFORMED BY:: ABNORMAL

## 2025-04-26 PROCEDURE — 2500000003 HC RX 250 WO HCPCS: Performed by: NURSE PRACTITIONER

## 2025-04-26 PROCEDURE — 94761 N-INVAS EAR/PLS OXIMETRY MLT: CPT

## 2025-04-26 PROCEDURE — 6370000000 HC RX 637 (ALT 250 FOR IP): Performed by: NURSE PRACTITIONER

## 2025-04-26 PROCEDURE — 6360000002 HC RX W HCPCS: Performed by: NURSE PRACTITIONER

## 2025-04-26 PROCEDURE — G0378 HOSPITAL OBSERVATION PER HR: HCPCS

## 2025-04-26 PROCEDURE — 96376 TX/PRO/DX INJ SAME DRUG ADON: CPT

## 2025-04-26 RX ORDER — AMOXICILLIN AND CLAVULANATE POTASSIUM 500; 125 MG/1; MG/1
1 TABLET, FILM COATED ORAL 2 TIMES DAILY
Qty: 10 TABLET | Refills: 0 | Status: SHIPPED | OUTPATIENT
Start: 2025-04-26 | End: 2025-05-01

## 2025-04-26 RX ADMIN — INSULIN GLARGINE 8 UNITS: 100 INJECTION, SOLUTION SUBCUTANEOUS at 08:58

## 2025-04-26 RX ADMIN — FUROSEMIDE 40 MG: 40 TABLET ORAL at 08:58

## 2025-04-26 RX ADMIN — ALLOPURINOL 200 MG: 100 TABLET ORAL at 08:57

## 2025-04-26 RX ADMIN — APIXABAN 2.5 MG: 2.5 TABLET, FILM COATED ORAL at 08:58

## 2025-04-26 RX ADMIN — CETIRIZINE HYDROCHLORIDE 5 MG: 10 TABLET, FILM COATED ORAL at 08:57

## 2025-04-26 RX ADMIN — ISOSORBIDE MONONITRATE 60 MG: 60 TABLET, EXTENDED RELEASE ORAL at 08:57

## 2025-04-26 RX ADMIN — ASPIRIN 81 MG: 81 TABLET, COATED ORAL at 08:57

## 2025-04-26 RX ADMIN — SODIUM CHLORIDE, PRESERVATIVE FREE 10 ML: 5 INJECTION INTRAVENOUS at 08:59

## 2025-04-26 RX ADMIN — PANTOPRAZOLE SODIUM 40 MG: 40 TABLET, DELAYED RELEASE ORAL at 06:20

## 2025-04-26 RX ADMIN — HYDRALAZINE HYDROCHLORIDE 100 MG: 25 TABLET ORAL at 08:58

## 2025-04-26 RX ADMIN — ATORVASTATIN CALCIUM 40 MG: 40 TABLET, FILM COATED ORAL at 08:58

## 2025-04-26 RX ADMIN — SEVELAMER CARBONATE 800 MG: 800 TABLET, FILM COATED ORAL at 08:57

## 2025-04-26 RX ADMIN — DEXAMETHASONE SODIUM PHOSPHATE 4 MG: 4 INJECTION INTRA-ARTICULAR; INTRALESIONAL; INTRAMUSCULAR; INTRAVENOUS; SOFT TISSUE at 08:58

## 2025-04-26 RX ADMIN — SODIUM BICARBONATE 650 MG: 650 TABLET ORAL at 08:57

## 2025-04-26 ASSESSMENT — PAIN SCALES - GENERAL: PAINLEVEL_OUTOF10: 0

## 2025-04-26 NOTE — PROGRESS NOTES
0710- Received report from off going shift    0745- Patient up to BSC with standby assist    1040- Discharge instructions provided.

## 2025-04-26 NOTE — DISCHARGE SUMMARY
a day to check blood sugar. Please dispense covered strips for his insurance to go with insurance covered glucometer.  Qty: 200 strip, Refills: 3    Comments: (1) Identify specific brand and product.  (2) Include specific quantity.  (3) Include frequency.  Associated Diagnoses: Type 2 diabetes mellitus with chronic kidney disease on chronic dialysis, with long-term current use of insulin (HCC)      Alcohol Swabs (PHARMACIST CHOICE ALCOHOL) PADS PRIOR TO LANCING, USE 1 NEW PAD TO CLEAN SITE THREE TIMES DAILY AND USE ONE NEW PAD TO CLEAN SITE BEFORE INJECTING ONCE Daily      fluticasone (FLONASE) 50 MCG/ACT nasal spray 1 spray by Each Nostril route daily  Qty: 32 g, Refills: 1      albuterol sulfate HFA (PROVENTIL;VENTOLIN;PROAIR) 108 (90 Base) MCG/ACT inhaler Inhale 2 puffs into the lungs every 4 hours as needed for Wheezing or Shortness of Breath  Qty: 18 g, Refills: 1    Associated Diagnoses: Bronchospasm      Accu-Chek Softclix Lancets MISC 1 each by Does not apply route 3 times daily Use to check blood sugar 3 times daily  Qty: 300 each, Refills: 3    Associated Diagnoses: Type 2 diabetes mellitus with chronic kidney disease on chronic dialysis, with long-term current use of insulin (HCC)               NOTIFY YOUR PHYSICIAN FOR ANY OF THE FOLLOWING:   Fever over 101 degrees for 24 hours.   Chest pain, shortness of breath, fever, chills, nausea, vomiting, diarrhea, change in mentation, falling, weakness, bleeding. Severe pain or pain not relieved by medications.  Or, any other signs or symptoms that you may have questions about.    DISPOSITION:home    Home With:   OT  PT  HH  RN       Long term SNF/Inpatient Rehab    Independent/assisted living    Hospice    Other:       PATIENT CONDITION AT DISCHARGE:     Functional status    Poor     Deconditioned    x Independent      Cognition   x  Lucid     Forgetful     Dementia      Catheters/lines (plus indication)    Romero     PICC     PEG    x None      Code status   x   Full code     DNR      PHYSICAL EXAMINATION AT DISCHARGE:  General:          Alert, cooperative, no distress, appears stated age.     HEENT:           Atraumatic, anicteric sclerae, pink conjunctivae                          No oral ulcers, mucosa moist, throat clear, dentition fair  Neck:               Supple, symmetrical  Lungs:             coarse bs  Chest wall:      No tenderness  No Accessory muscle use.  Heart:              Regular  rhythm,  No  murmur   No edema  Abdomen:        Soft, non-tender. Not distended.  Bowel sounds normal  Extremities:     No cyanosis.  No clubbing,                            Skin turgor normal, Capillary refill normal  Skin:                Not pale.  Not Jaundiced  No rashes   Psych:             Not anxious or agitated.  Neurologic:      Alert, moves all extremities, answers questions appropriately and responds to commands   + fistula      CHRONIC MEDICAL DIAGNOSES:      Greater than 35 minutes were spent with the patient on counseling and coordination of care    Signed:   Onel Meredith MD  4/26/2025  9:48 AM

## 2025-04-27 LAB
BACTERIA SPEC CULT: NORMAL
BACTERIA SPEC CULT: NORMAL
Lab: NORMAL
Lab: NORMAL

## 2025-04-28 ENCOUNTER — HOSPITAL ENCOUNTER (EMERGENCY)
Age: 80
Discharge: HOME | End: 2025-04-29
Attending: EMERGENCY MEDICINE
Payer: MEDICARE

## 2025-04-28 ENCOUNTER — APPOINTMENT (OUTPATIENT)
Age: 80
End: 2025-04-28
Payer: MEDICARE

## 2025-04-28 ENCOUNTER — TELEPHONE (OUTPATIENT)
Facility: CLINIC | Age: 80
End: 2025-04-28

## 2025-04-28 DIAGNOSIS — U07.1 COVID-19: ICD-10-CM

## 2025-04-28 DIAGNOSIS — R06.00 DYSPNEA, UNSPECIFIED TYPE: Primary | ICD-10-CM

## 2025-04-28 LAB
ALBUMIN SERPL-MCNC: 3.1 G/DL (ref 3.4–5)
ALBUMIN/GLOB SERPL: 0.7 (ref 0.8–1.7)
ALP SERPL-CCNC: 102 U/L (ref 45–117)
ALT SERPL-CCNC: 24 U/L (ref 16–61)
ANION GAP SERPL CALC-SCNC: 11 MMOL/L (ref 3–18)
ARTERIAL PATENCY WRIST A: YES
AST SERPL W P-5'-P-CCNC: 31 U/L (ref 10–38)
BASE EXCESS BLDA CALC-SCNC: 1 MMOL/L (ref 0–3)
BASOPHILS # BLD: 0.01 K/UL (ref 0–0.1)
BASOPHILS NFR BLD: 0.2 % (ref 0–2)
BDY SITE: ABNORMAL
BILIRUB SERPL-MCNC: 0.5 MG/DL (ref 0.2–1)
BUN SERPL-MCNC: 49 MG/DL (ref 7–18)
BUN/CREAT SERPL: 9 (ref 12–20)
CA-I BLD-MCNC: 8.9 MG/DL (ref 8.5–10.1)
CHLORIDE SERPL-SCNC: 102 MMOL/L (ref 100–111)
CO2 SERPL-SCNC: 22 MMOL/L (ref 21–32)
COHGB MFR BLD: 0.5 % (ref 1–2)
CREAT SERPL-MCNC: 5.25 MG/DL (ref 0.6–1.3)
DIFFERENTIAL METHOD BLD: ABNORMAL
EOSINOPHIL # BLD: 0.07 K/UL (ref 0–0.4)
EOSINOPHIL NFR BLD: 1.1 % (ref 0–5)
ERYTHROCYTE [DISTWIDTH] IN BLOOD BY AUTOMATED COUNT: 15.1 % (ref 11.6–14.5)
FIO2 ON VENT: 28 %
GLOBULIN SER CALC-MCNC: 4.5 G/DL (ref 2–4)
GLUCOSE SERPL-MCNC: 167 MG/DL (ref 74–99)
HBV SURFACE AB SER QL IA: NEGATIVE
HBV SURFACE AB SERPL IA-ACNC: <3.1 MIU/ML
HCO3 BLDA-SCNC: 25 MMOL/L (ref 22–26)
HCT VFR BLD AUTO: 36.4 % (ref 36–48)
HEP BS AB COMMENT: ABNORMAL
HGB BLD-MCNC: 11.5 G/DL (ref 13–16)
IMM GRANULOCYTES # BLD AUTO: 0.02 K/UL (ref 0–0.04)
IMM GRANULOCYTES NFR BLD AUTO: 0.3 % (ref 0–0.5)
LYMPHOCYTES # BLD: 1.57 K/UL (ref 0.9–3.6)
LYMPHOCYTES NFR BLD: 25.1 % (ref 21–52)
MAGNESIUM SERPL-MCNC: 1.6 MG/DL (ref 1.6–2.6)
MCH RBC QN AUTO: 27.6 PG (ref 24–34)
MCHC RBC AUTO-ENTMCNC: 31.6 G/DL (ref 31–37)
MCV RBC AUTO: 87.3 FL (ref 78–100)
METHGB MFR BLD: 0.3 % (ref 0–1.4)
MONOCYTES # BLD: 0.69 K/UL (ref 0.05–1.2)
MONOCYTES NFR BLD: 11 % (ref 3–10)
NEUTS SEG # BLD: 3.89 K/UL (ref 1.8–8)
NEUTS SEG NFR BLD: 62.3 % (ref 40–73)
NRBC # BLD: 0 K/UL (ref 0–0.01)
NRBC BLD-RTO: 0 PER 100 WBC
OXYHGB MFR BLD: 93.6 % (ref 95–99)
PCO2 BLDA: 37 MMHG (ref 35–45)
PERFORMED BY:: ABNORMAL
PH BLDA: 7.45 (ref 7.35–7.45)
PLATELET # BLD AUTO: 217 K/UL (ref 135–420)
PMV BLD AUTO: 9.8 FL (ref 9.2–11.8)
PO2 BLDA: 73 MMHG (ref 80–100)
POTASSIUM SERPL-SCNC: 3.2 MMOL/L (ref 3.5–5.5)
PROT SERPL-MCNC: 7.6 G/DL (ref 6.4–8.2)
RBC # BLD AUTO: 4.17 M/UL (ref 4.35–5.65)
SAO2 % BLD: 94 % (ref 95–99)
SAO2% DEVICE SAO2% SENSOR NAME: ABNORMAL
SODIUM SERPL-SCNC: 135 MMOL/L (ref 136–145)
SPECIMEN SITE: ABNORMAL
TROPONIN I SERPL HS-MCNC: 48 NG/L (ref 0–78)
TROPONIN I SERPL HS-MCNC: 49 NG/L (ref 0–78)
WBC # BLD AUTO: 6.3 K/UL (ref 4.6–13.2)

## 2025-04-28 PROCEDURE — 94640 AIRWAY INHALATION TREATMENT: CPT

## 2025-04-28 PROCEDURE — 82803 BLOOD GASES ANY COMBINATION: CPT

## 2025-04-28 PROCEDURE — 99285 EMERGENCY DEPT VISIT HI MDM: CPT

## 2025-04-28 PROCEDURE — 36600 WITHDRAWAL OF ARTERIAL BLOOD: CPT

## 2025-04-28 PROCEDURE — 80053 COMPREHEN METABOLIC PANEL: CPT

## 2025-04-28 PROCEDURE — 93005 ELECTROCARDIOGRAM TRACING: CPT | Performed by: EMERGENCY MEDICINE

## 2025-04-28 PROCEDURE — 85025 COMPLETE CBC W/AUTO DIFF WBC: CPT

## 2025-04-28 PROCEDURE — 6370000000 HC RX 637 (ALT 250 FOR IP): Performed by: EMERGENCY MEDICINE

## 2025-04-28 PROCEDURE — 96374 THER/PROPH/DIAG INJ IV PUSH: CPT

## 2025-04-28 PROCEDURE — 84484 ASSAY OF TROPONIN QUANT: CPT

## 2025-04-28 PROCEDURE — 6360000002 HC RX W HCPCS: Performed by: EMERGENCY MEDICINE

## 2025-04-28 PROCEDURE — 71045 X-RAY EXAM CHEST 1 VIEW: CPT

## 2025-04-28 PROCEDURE — 36415 COLL VENOUS BLD VENIPUNCTURE: CPT

## 2025-04-28 PROCEDURE — 83735 ASSAY OF MAGNESIUM: CPT

## 2025-04-28 PROCEDURE — 2500000003 HC RX 250 WO HCPCS: Performed by: EMERGENCY MEDICINE

## 2025-04-28 RX ORDER — IPRATROPIUM BROMIDE AND ALBUTEROL SULFATE 2.5; .5 MG/3ML; MG/3ML
1 SOLUTION RESPIRATORY (INHALATION)
Status: DISCONTINUED | OUTPATIENT
Start: 2025-04-28 | End: 2025-04-29 | Stop reason: HOSPADM

## 2025-04-28 RX ORDER — ALBUTEROL SULFATE 2.5 MG/.5ML
2.5 SOLUTION RESPIRATORY (INHALATION)
Status: COMPLETED | OUTPATIENT
Start: 2025-04-28 | End: 2025-04-28

## 2025-04-28 RX ORDER — IPRATROPIUM BROMIDE AND ALBUTEROL SULFATE 2.5; .5 MG/3ML; MG/3ML
1 SOLUTION RESPIRATORY (INHALATION)
Status: COMPLETED | OUTPATIENT
Start: 2025-04-28 | End: 2025-04-28

## 2025-04-28 RX ADMIN — ALBUTEROL SULFATE 2.5 MG: 2.5 SOLUTION RESPIRATORY (INHALATION) at 18:20

## 2025-04-28 RX ADMIN — IPRATROPIUM BROMIDE AND ALBUTEROL SULFATE 1 DOSE: 2.5; .5 SOLUTION RESPIRATORY (INHALATION) at 18:19

## 2025-04-28 RX ADMIN — IPRATROPIUM BROMIDE AND ALBUTEROL SULFATE 1 DOSE: .5; 3 SOLUTION RESPIRATORY (INHALATION) at 18:08

## 2025-04-28 RX ADMIN — WATER 125 MG: 1 INJECTION INTRAMUSCULAR; INTRAVENOUS; SUBCUTANEOUS at 18:18

## 2025-04-28 RX ADMIN — ALBUTEROL SULFATE 2.5 MG: 2.5 SOLUTION RESPIRATORY (INHALATION) at 18:19

## 2025-04-28 ASSESSMENT — PAIN - FUNCTIONAL ASSESSMENT: PAIN_FUNCTIONAL_ASSESSMENT: NONE - DENIES PAIN

## 2025-04-28 NOTE — TELEPHONE ENCOUNTER
Care Transitions Initial Follow Up Call    Outreach made within 2 business days of discharge: Yes    Patient: Ann-Marie Cardenas Patient : 1945   MRN: [id number]  Reason for Admission: Acute Respiratory distress/ Covid/ possible Pneumonia  Discharge Date: 25       Spoke with: Niece/ patient and Nidhi. Patient states he don't fell good, went to dialysis and just doesn't feel well from that. States his shortness of breath is getting worse. Patient coughing and sounding terrible, sounding worse and feeling worse. Kiera states he is not well and Zully, patient's niece states he sounds worse than when she spoke with him Saturday.    Discharge department/facility: [facility] Interactive Patient Contact:  Was patient able to fill all prescriptions: No: Does not have the antibiotics ordered.  Was patient instructed to bring all medications to the follow-up visit:   Is patient taking all medications as directed in the discharge summary?   Does patient understand their discharge instructions:   Does patient have questions or concerns that need addressed prior to 7-14 day follow up office visit:     Additional needs identified to be addressed with provider  I have notified PCP aswe ll that I have advised patient to returned to the ER for closer evaluation. Patient having worsening symptoms, worsening shortness of breath and states he does not feel well. Patient does n ot sound like himself on the telephone either.             Scheduled appointment with PCP within 7-14 days    Follow Up  Future Appointments   Date Time Provider Department Center   2025 11:00 AM Collette Nicholas MD [facility] DEP       Velia Leon LPN

## 2025-04-28 NOTE — PROGRESS NOTES
RRT Called to give breathing tx in ER, started 1 Duoneb and Dr Sarkar asked for 2 more Albuterol Back to back, pt had low sats 89-90, he does have 02 at home PRN and he said he used it last week, based on when he is Short of breath, did not seem to know how much 02 he used or how low sats are when he placed 02 at home.   Exp wheezes noted RR 24 HR 86. No resp distress he said he had Covid last week and was treated as well.    04/28/25 8757   Treatment   Treatment Type HHN   $Treatment Type $Inhaled Therapy/Meds   Medications Albuterol/Ipratropium   Pre-Tx Pulse 88   Pre-Tx Resps 24   Breath Sounds Pre-Tx RJ Expiratory wheezes;Diminished   Breath Sounds Pre-Tx LLL Expiratory wheezes;Diminished   Breath Sounds Pre-Tx RUL Expiratory wheezes;Diminished   Breath Sounds Pre-Tx RML Expiratory wheezes;Diminished   Breath Sounds Pre-Tx RLL Expiratory wheezes;Diminished   Delivery Source Mouthpiece   Position Lilian's   Treatment Tolerance Well   Duration 10   Is patient on O2? Y   Oxygen Therapy/Pulse Ox   O2 Therapy Oxygen   O2 Device Nasal cannula  (2L/ PRN AT HOME)   O2 Flow Rate (L/min) 2 L/min   Skin Assessment Clean, dry, & intact   Skin Protection for O2 Device N/A   Pulse Oximeter Device Mode Continuous

## 2025-04-28 NOTE — ED TRIAGE NOTES
EMS called to residence for pt that c/o sob   Pt states that he was here a few days ago and was treated for bronchitis, went to dialysis today and when he got back home he started feeling sob,

## 2025-04-29 ENCOUNTER — TELEMEDICINE (OUTPATIENT)
Facility: CLINIC | Age: 80
End: 2025-04-29

## 2025-04-29 VITALS
SYSTOLIC BLOOD PRESSURE: 134 MMHG | HEART RATE: 104 BPM | TEMPERATURE: 98.9 F | DIASTOLIC BLOOD PRESSURE: 66 MMHG | WEIGHT: 181 LBS | RESPIRATION RATE: 17 BRPM | HEIGHT: 68 IN | BODY MASS INDEX: 27.43 KG/M2 | OXYGEN SATURATION: 94 %

## 2025-04-29 DIAGNOSIS — Z09 HOSPITAL DISCHARGE FOLLOW-UP: Primary | ICD-10-CM

## 2025-04-29 DIAGNOSIS — R07.9 CHEST PAIN, UNSPECIFIED TYPE: ICD-10-CM

## 2025-04-29 DIAGNOSIS — J18.9 COMMUNITY ACQUIRED PNEUMONIA OF RIGHT LOWER LOBE OF LUNG: ICD-10-CM

## 2025-04-29 DIAGNOSIS — Z99.2 ESRD (END STAGE RENAL DISEASE) ON DIALYSIS (HCC): ICD-10-CM

## 2025-04-29 DIAGNOSIS — N18.6 ESRD (END STAGE RENAL DISEASE) ON DIALYSIS (HCC): ICD-10-CM

## 2025-04-29 DIAGNOSIS — M62.838 MUSCLE SPASM: ICD-10-CM

## 2025-04-29 DIAGNOSIS — R06.00 DYSPNEA, UNSPECIFIED TYPE: ICD-10-CM

## 2025-04-29 DIAGNOSIS — E87.6 HYPOKALEMIA: ICD-10-CM

## 2025-04-29 LAB
EKG ATRIAL RATE: 87 BPM
EKG DIAGNOSIS: NORMAL
EKG P AXIS: 65 DEGREES
EKG P-R INTERVAL: 236 MS
EKG Q-T INTERVAL: 404 MS
EKG QRS DURATION: 112 MS
EKG QTC CALCULATION (BAZETT): 486 MS
EKG R AXIS: -12 DEGREES
EKG T AXIS: 75 DEGREES
EKG VENTRICULAR RATE: 87 BPM

## 2025-04-29 RX ORDER — LORATADINE 10 MG/1
10 TABLET ORAL DAILY
COMMUNITY

## 2025-04-29 RX ORDER — POTASSIUM CHLORIDE 1500 MG/1
20 TABLET, EXTENDED RELEASE ORAL
Qty: 2 TABLET | Refills: 0 | Status: SHIPPED | OUTPATIENT
Start: 2025-04-29

## 2025-04-29 RX ORDER — NITROGLYCERIN 0.4 MG/1
0.4 TABLET SUBLINGUAL EVERY 5 MIN PRN
Qty: 10 TABLET | Refills: 1 | Status: SHIPPED | OUTPATIENT
Start: 2025-04-29

## 2025-04-29 SDOH — ECONOMIC STABILITY: FOOD INSECURITY: WITHIN THE PAST 12 MONTHS, YOU WORRIED THAT YOUR FOOD WOULD RUN OUT BEFORE YOU GOT MONEY TO BUY MORE.: NEVER TRUE

## 2025-04-29 SDOH — ECONOMIC STABILITY: FOOD INSECURITY: WITHIN THE PAST 12 MONTHS, THE FOOD YOU BOUGHT JUST DIDN'T LAST AND YOU DIDN'T HAVE MONEY TO GET MORE.: NEVER TRUE

## 2025-04-29 ASSESSMENT — PATIENT HEALTH QUESTIONNAIRE - PHQ9
SUM OF ALL RESPONSES TO PHQ QUESTIONS 1-9: 0
SUM OF ALL RESPONSES TO PHQ QUESTIONS 1-9: 0
1. LITTLE INTEREST OR PLEASURE IN DOING THINGS: NOT AT ALL
SUM OF ALL RESPONSES TO PHQ QUESTIONS 1-9: 0
2. FEELING DOWN, DEPRESSED OR HOPELESS: NOT AT ALL
SUM OF ALL RESPONSES TO PHQ QUESTIONS 1-9: 0

## 2025-04-29 NOTE — PROGRESS NOTES
Patient D/C'd from hospital 4/26/2025 and instructed to go back to ER yesterday due to still not feeling well and shortness of breath. Patient was assessed and discharged, not admitted. Patient states he feels better than he did yesterday.     Chief Complaint   Patient presents with    Follow-Up from Hospital       \"Have you been to the ER, urgent care clinic since your last visit?  Hospitalized since your last visit?\"    Multiple visits    “Have you seen or consulted any other health care providers outside our system since your last visit?”    Dialysis

## 2025-04-29 NOTE — PROGRESS NOTES
Ann-Marie Cardenas (: 1945) is a 79 y.o. male here for evaluation of the following chief concern(s):  Chronic condition management   Hospital follow-up; 25-25 for RLL CAP, hypoxia, ESRD on HD  JR call 25   ER follow-up 25 for dyspnea/COVID-19    ASSESSMENT/PLAN:  1. Hospital discharge follow-up  -     SC DISCHARGE MEDS RECONCILED W/ CURRENT OUTPATIENT MED LIST  2. Community acquired pneumonia of right lower lobe of lung  3. Dyspnea, unspecified type  4. Hypokalemia  -     potassium chloride (KLOR-CON M) 20 MEQ extended release tablet; Take 1 tablet by mouth Daily with lunch, Disp-2 tablet, R-0Normal  5. Muscle spasm  6. Chest pain, unspecified type  -     nitroGLYCERIN (NITROSTAT) 0.4 MG SL tablet; Place 1 tablet under the tongue every 5 minutes as needed for Chest pain up to max of 3 total doses. If no relief after 1 dose, call 911., Disp-10 tablet, R-1Normal  7. ESRD (end stage renal disease) on dialysis (HCC)    Mr. Cardenas sounds medically stable at this time.  Gently replete potassium.  Continue antibiotic.     to call to [facility] to help w/ continuity of care/scheduling follow-up for recurrent chest pains- though do not sound anginal based on 2024 consult/cath.  I advised need for cautious use of sublingual nitro w/ concurrent long-acting nitrate- risk for hypotension.    ER precautions reviewed.     Return in about 2 weeks (around 2025) for follow-up chronic conditions or sooner if needed- extended.    Ann-Marie Cardenas agrees with plan as above and has no additional questions at this time.       SUBJECTIVE/OBJECTIVE:  Overall, pt is feeling \"alright\".    Left arm/hand cramping- started yesterday.    Pt does not appear to have been repleted.  Meds Given in ED:  Medications   ipratropium 0.5 mg-albuterol 2.5 mg (DUONEB) nebulizer solution 1 Dose (0 Doses Inhalation Held 25 1820)   ipratropium 0.5 mg-albuterol 2.5 mg (DUONEB) nebulizer

## 2025-04-29 NOTE — ED PROVIDER NOTES
Assumed care of this patient from Dr. Sarkar and I was Askit to review labs and reassess patient.  Patient recently discharged from the hospital with COVID.  He had dialysis today and came to ED due to shortness of breath.  Chest x-ray showed no evidence of pneumonia or significant pulmonary edema.  Patient has been given some nebulizer treatments.  He is chronically on O2 at home 2 L.  He denies any chest pain or shortness of breath with my exam.  Lungs sound clear.  Reviewed his labs which appear at baseline.  He was asleep when I initially saw him.  I obtained ABG which is reassuring.  He appears stable for discharge and I gave him precautions for returning to ED.     Alfonso Hodge MD  04/28/25 8301    
  Brice Sarkar MD  04/28/25 1922

## 2025-04-30 LAB
BACTERIA SPEC CULT: NORMAL
BACTERIA SPEC CULT: NORMAL
Lab: NORMAL
Lab: NORMAL

## 2025-05-23 DIAGNOSIS — N18.4 CHRONIC KIDNEY DISEASE, STAGE 4 (SEVERE) (HCC): ICD-10-CM

## 2025-05-23 DIAGNOSIS — N18.6 TYPE 2 DIABETES MELLITUS WITH CHRONIC KIDNEY DISEASE ON CHRONIC DIALYSIS, WITH LONG-TERM CURRENT USE OF INSULIN (HCC): ICD-10-CM

## 2025-05-23 DIAGNOSIS — Z99.2 TYPE 2 DIABETES MELLITUS WITH CHRONIC KIDNEY DISEASE ON CHRONIC DIALYSIS, WITH LONG-TERM CURRENT USE OF INSULIN (HCC): ICD-10-CM

## 2025-05-23 DIAGNOSIS — I10 ESSENTIAL HYPERTENSION: ICD-10-CM

## 2025-05-23 DIAGNOSIS — Z79.4 TYPE 2 DIABETES MELLITUS WITH CHRONIC KIDNEY DISEASE ON CHRONIC DIALYSIS, WITH LONG-TERM CURRENT USE OF INSULIN (HCC): ICD-10-CM

## 2025-05-23 DIAGNOSIS — E11.22 TYPE 2 DIABETES MELLITUS WITH CHRONIC KIDNEY DISEASE ON CHRONIC DIALYSIS, WITH LONG-TERM CURRENT USE OF INSULIN (HCC): ICD-10-CM

## 2025-05-24 DIAGNOSIS — Z99.2 TYPE 2 DIABETES MELLITUS WITH CHRONIC KIDNEY DISEASE ON CHRONIC DIALYSIS, WITH LONG-TERM CURRENT USE OF INSULIN (HCC): ICD-10-CM

## 2025-05-24 DIAGNOSIS — I50.32 CHRONIC DIASTOLIC CONGESTIVE HEART FAILURE (HCC): ICD-10-CM

## 2025-05-24 DIAGNOSIS — E78.5 HYPERLIPIDEMIA, UNSPECIFIED HYPERLIPIDEMIA TYPE: ICD-10-CM

## 2025-05-24 DIAGNOSIS — E11.22 TYPE 2 DIABETES MELLITUS WITH CHRONIC KIDNEY DISEASE ON CHRONIC DIALYSIS, WITH LONG-TERM CURRENT USE OF INSULIN (HCC): ICD-10-CM

## 2025-05-24 DIAGNOSIS — Z79.4 TYPE 2 DIABETES MELLITUS WITH CHRONIC KIDNEY DISEASE ON CHRONIC DIALYSIS, WITH LONG-TERM CURRENT USE OF INSULIN (HCC): ICD-10-CM

## 2025-05-24 DIAGNOSIS — N18.6 TYPE 2 DIABETES MELLITUS WITH CHRONIC KIDNEY DISEASE ON CHRONIC DIALYSIS, WITH LONG-TERM CURRENT USE OF INSULIN (HCC): ICD-10-CM

## 2025-06-04 NOTE — TELEPHONE ENCOUNTER
Patient has missed last 2 appointments, called and spoke to patient and he will keep him appointment tomorrow at 1130. Will refill all medications at appointment tomorrow.

## 2025-06-05 RX ORDER — ALLOPURINOL 100 MG/1
200 TABLET ORAL DAILY
Qty: 180 TABLET | Refills: 0 | Status: SHIPPED | OUTPATIENT
Start: 2025-06-05 | End: 2025-09-03

## 2025-06-05 RX ORDER — ASPIRIN 81 MG/1
81 TABLET, COATED ORAL DAILY
Qty: 90 TABLET | Refills: 0 | Status: SHIPPED | OUTPATIENT
Start: 2025-06-05

## 2025-06-05 RX ORDER — ATORVASTATIN CALCIUM 40 MG/1
40 TABLET, FILM COATED ORAL DAILY
Qty: 90 TABLET | Refills: 0 | Status: SHIPPED | OUTPATIENT
Start: 2025-06-05

## 2025-06-05 RX ORDER — FUROSEMIDE 40 MG/1
40 TABLET ORAL DAILY
Qty: 90 TABLET | Refills: 0 | Status: SHIPPED | OUTPATIENT
Start: 2025-06-05

## 2025-06-05 RX ORDER — GLIPIZIDE 10 MG/1
5 TABLET ORAL DAILY
Qty: 45 TABLET | Refills: 0 | Status: SHIPPED | OUTPATIENT
Start: 2025-06-05

## 2025-08-01 ENCOUNTER — HOSPITAL ENCOUNTER (OUTPATIENT)
Age: 80
Setting detail: OBSERVATION
Discharge: HOME | End: 2025-08-02
Attending: FAMILY MEDICINE | Admitting: INTERNAL MEDICINE
Payer: MEDICARE

## 2025-08-01 ENCOUNTER — APPOINTMENT (OUTPATIENT)
Age: 80
End: 2025-08-01
Payer: MEDICARE

## 2025-08-01 DIAGNOSIS — R51.9 ACUTE INTRACTABLE HEADACHE, UNSPECIFIED HEADACHE TYPE: ICD-10-CM

## 2025-08-01 DIAGNOSIS — R09.02 HYPOXIA: ICD-10-CM

## 2025-08-01 DIAGNOSIS — N18.6 END STAGE RENAL DISEASE (HCC): ICD-10-CM

## 2025-08-01 DIAGNOSIS — U07.1 COVID: Primary | ICD-10-CM

## 2025-08-01 DIAGNOSIS — R06.00 DYSPNEA, UNSPECIFIED TYPE: ICD-10-CM

## 2025-08-01 LAB
ALBUMIN SERPL-MCNC: 3.4 G/DL (ref 3.4–5)
ALBUMIN/GLOB SERPL: 1
ALP SERPL-CCNC: 83 U/L (ref 45–117)
ALT SERPL-CCNC: 20 U/L (ref 10–50)
ANION GAP SERPL CALC-SCNC: 15 MMOL/L
AST SERPL W P-5'-P-CCNC: 32 U/L (ref 10–38)
BASOPHILS # BLD: 0.04 K/UL (ref 0–0.1)
BASOPHILS NFR BLD: 0.5 % (ref 0–2)
BILIRUB SERPL-MCNC: 0.3 MG/DL (ref 0.2–1)
BNP SERPL-MCNC: 1405 PG/ML (ref 36–1800)
BUN SERPL-MCNC: 54 MG/DL (ref 6–23)
BUN/CREAT SERPL: 10
CA-I BLD-MCNC: 9.7 MG/DL (ref 8.5–10.1)
CHLORIDE SERPL-SCNC: 100 MMOL/L (ref 98–107)
CO2 SERPL-SCNC: 24 MMOL/L (ref 21–32)
CREAT SERPL-MCNC: 5.25 MG/DL (ref 0.6–1.3)
DIFFERENTIAL METHOD BLD: ABNORMAL
EKG ATRIAL RATE: 61 BPM
EKG DIAGNOSIS: NORMAL
EKG P AXIS: 53 DEGREES
EKG P-R INTERVAL: 236 MS
EKG Q-T INTERVAL: 456 MS
EKG QRS DURATION: 110 MS
EKG QTC CALCULATION (BAZETT): 459 MS
EKG R AXIS: -25 DEGREES
EKG T AXIS: 35 DEGREES
EKG VENTRICULAR RATE: 61 BPM
EOSINOPHIL # BLD: 0.16 K/UL (ref 0–0.4)
EOSINOPHIL NFR BLD: 1.9 % (ref 0–5)
ERYTHROCYTE [DISTWIDTH] IN BLOOD BY AUTOMATED COUNT: 14.6 % (ref 11.6–14.5)
FLUAV RNA SPEC QL NAA+PROBE: NOT DETECTED
FLUBV RNA SPEC QL NAA+PROBE: NOT DETECTED
GLOBULIN SER CALC-MCNC: 3.3 G/DL
GLUCOSE BLD STRIP.AUTO-MCNC: 184 MG/DL (ref 70–110)
GLUCOSE SERPL-MCNC: 137 MG/DL (ref 74–108)
HCT VFR BLD AUTO: 33.5 % (ref 36–48)
HGB BLD-MCNC: 11.2 G/DL (ref 13–16)
IMM GRANULOCYTES # BLD AUTO: 0.02 K/UL (ref 0–0.04)
IMM GRANULOCYTES NFR BLD AUTO: 0.2 % (ref 0–0.5)
LYMPHOCYTES # BLD: 2.05 K/UL (ref 0.9–3.6)
LYMPHOCYTES NFR BLD: 25 % (ref 21–52)
MAGNESIUM SERPL-MCNC: 2 MG/DL (ref 1.6–2.6)
MCH RBC QN AUTO: 28.4 PG (ref 24–34)
MCHC RBC AUTO-ENTMCNC: 33.4 G/DL (ref 31–37)
MCV RBC AUTO: 85 FL (ref 78–100)
MONOCYTES # BLD: 0.99 K/UL (ref 0.05–1.2)
MONOCYTES NFR BLD: 12.1 % (ref 3–10)
NEUTS SEG # BLD: 4.95 K/UL (ref 1.8–8)
NEUTS SEG NFR BLD: 60.3 % (ref 40–73)
NRBC # BLD: 0 K/UL (ref 0–0.01)
NRBC BLD-RTO: 0 PER 100 WBC
PERFORMED BY:: ABNORMAL
PLATELET # BLD AUTO: 274 K/UL (ref 135–420)
PMV BLD AUTO: 9.6 FL (ref 9.2–11.8)
POTASSIUM SERPL-SCNC: 4.3 MMOL/L (ref 3.5–5.5)
PROT SERPL-MCNC: 6.7 G/DL (ref 6.4–8.2)
RBC # BLD AUTO: 3.94 M/UL (ref 4.35–5.65)
SARS-COV-2 RNA RESP QL NAA+PROBE: POSITIVE
SODIUM SERPL-SCNC: 139 MMOL/L (ref 136–145)
TROPONIN T SERPL HS-MCNC: 148 NG/L (ref 0–22)
TROPONIN T SERPL HS-MCNC: 149 NG/L (ref 0–22)
TROPONIN T SERPL HS-MCNC: 162 NG/L (ref 0–22)
WBC # BLD AUTO: 8.2 K/UL (ref 4.6–13.2)

## 2025-08-01 PROCEDURE — 99285 EMERGENCY DEPT VISIT HI MDM: CPT

## 2025-08-01 PROCEDURE — 6370000000 HC RX 637 (ALT 250 FOR IP): Performed by: FAMILY MEDICINE

## 2025-08-01 PROCEDURE — 6370000000 HC RX 637 (ALT 250 FOR IP): Performed by: INTERNAL MEDICINE

## 2025-08-01 PROCEDURE — 82962 GLUCOSE BLOOD TEST: CPT

## 2025-08-01 PROCEDURE — G0378 HOSPITAL OBSERVATION PER HR: HCPCS

## 2025-08-01 PROCEDURE — 94761 N-INVAS EAR/PLS OXIMETRY MLT: CPT

## 2025-08-01 PROCEDURE — 71045 X-RAY EXAM CHEST 1 VIEW: CPT

## 2025-08-01 PROCEDURE — 96374 THER/PROPH/DIAG INJ IV PUSH: CPT

## 2025-08-01 PROCEDURE — 83735 ASSAY OF MAGNESIUM: CPT

## 2025-08-01 PROCEDURE — 90935 HEMODIALYSIS ONE EVALUATION: CPT

## 2025-08-01 PROCEDURE — 93005 ELECTROCARDIOGRAM TRACING: CPT | Performed by: FAMILY MEDICINE

## 2025-08-01 PROCEDURE — 86706 HEP B SURFACE ANTIBODY: CPT

## 2025-08-01 PROCEDURE — 6360000002 HC RX W HCPCS: Performed by: FAMILY MEDICINE

## 2025-08-01 PROCEDURE — 96375 TX/PRO/DX INJ NEW DRUG ADDON: CPT

## 2025-08-01 PROCEDURE — 2700000000 HC OXYGEN THERAPY PER DAY

## 2025-08-01 PROCEDURE — 84484 ASSAY OF TROPONIN QUANT: CPT

## 2025-08-01 PROCEDURE — 85025 COMPLETE CBC W/AUTO DIFF WBC: CPT

## 2025-08-01 PROCEDURE — 83880 ASSAY OF NATRIURETIC PEPTIDE: CPT

## 2025-08-01 PROCEDURE — 87636 SARSCOV2 & INF A&B AMP PRB: CPT

## 2025-08-01 PROCEDURE — 70450 CT HEAD/BRAIN W/O DYE: CPT

## 2025-08-01 PROCEDURE — 2500000003 HC RX 250 WO HCPCS: Performed by: INTERNAL MEDICINE

## 2025-08-01 PROCEDURE — 93005 ELECTROCARDIOGRAM TRACING: CPT | Performed by: NURSE PRACTITIONER

## 2025-08-01 PROCEDURE — 96376 TX/PRO/DX INJ SAME DRUG ADON: CPT

## 2025-08-01 PROCEDURE — G0257 UNSCHED DIALYSIS ESRD PT HOS: HCPCS

## 2025-08-01 PROCEDURE — 94640 AIRWAY INHALATION TREATMENT: CPT

## 2025-08-01 PROCEDURE — 80053 COMPREHEN METABOLIC PANEL: CPT

## 2025-08-01 RX ORDER — NITROGLYCERIN 0.4 MG/1
0.4 TABLET SUBLINGUAL EVERY 5 MIN PRN
Status: DISCONTINUED | OUTPATIENT
Start: 2025-08-01 | End: 2025-08-02 | Stop reason: HOSPADM

## 2025-08-01 RX ORDER — PANTOPRAZOLE SODIUM 40 MG/1
40 TABLET, DELAYED RELEASE ORAL
Status: DISCONTINUED | OUTPATIENT
Start: 2025-08-02 | End: 2025-08-02 | Stop reason: HOSPADM

## 2025-08-01 RX ORDER — HYDRALAZINE HYDROCHLORIDE 25 MG/1
100 TABLET, FILM COATED ORAL 2 TIMES DAILY
Status: DISCONTINUED | OUTPATIENT
Start: 2025-08-01 | End: 2025-08-02 | Stop reason: HOSPADM

## 2025-08-01 RX ORDER — 0.9 % SODIUM CHLORIDE 0.9 %
100 INTRAVENOUS SOLUTION INTRAVENOUS PRN
Status: DISCONTINUED | OUTPATIENT
Start: 2025-08-01 | End: 2025-08-01 | Stop reason: SDUPTHER

## 2025-08-01 RX ORDER — IPRATROPIUM BROMIDE AND ALBUTEROL SULFATE 2.5; .5 MG/3ML; MG/3ML
1 SOLUTION RESPIRATORY (INHALATION)
Status: COMPLETED | OUTPATIENT
Start: 2025-08-01 | End: 2025-08-01

## 2025-08-01 RX ORDER — DEXTROSE MONOHYDRATE 100 MG/ML
INJECTION, SOLUTION INTRAVENOUS CONTINUOUS PRN
Status: DISCONTINUED | OUTPATIENT
Start: 2025-08-01 | End: 2025-08-02 | Stop reason: HOSPADM

## 2025-08-01 RX ORDER — HYDRALAZINE HYDROCHLORIDE 20 MG/ML
5 INJECTION INTRAMUSCULAR; INTRAVENOUS
Status: COMPLETED | OUTPATIENT
Start: 2025-08-01 | End: 2025-08-01

## 2025-08-01 RX ORDER — ONDANSETRON 2 MG/ML
4 INJECTION INTRAMUSCULAR; INTRAVENOUS EVERY 6 HOURS PRN
Status: DISCONTINUED | OUTPATIENT
Start: 2025-08-01 | End: 2025-08-02 | Stop reason: HOSPADM

## 2025-08-01 RX ORDER — ALBUMIN (HUMAN) 12.5 G/50ML
25 SOLUTION INTRAVENOUS
Status: DISCONTINUED | OUTPATIENT
Start: 2025-08-01 | End: 2025-08-01 | Stop reason: SDUPTHER

## 2025-08-01 RX ORDER — FLUTICASONE PROPIONATE 50 MCG
1 SPRAY, SUSPENSION (ML) NASAL DAILY
Status: DISCONTINUED | OUTPATIENT
Start: 2025-08-01 | End: 2025-08-02 | Stop reason: HOSPADM

## 2025-08-01 RX ORDER — SODIUM CHLORIDE 0.9 % (FLUSH) 0.9 %
5-40 SYRINGE (ML) INJECTION PRN
Status: DISCONTINUED | OUTPATIENT
Start: 2025-08-01 | End: 2025-08-02 | Stop reason: HOSPADM

## 2025-08-01 RX ORDER — ACETAMINOPHEN 650 MG/1
650 SUPPOSITORY RECTAL EVERY 6 HOURS PRN
Status: DISCONTINUED | OUTPATIENT
Start: 2025-08-01 | End: 2025-08-02 | Stop reason: HOSPADM

## 2025-08-01 RX ORDER — HYDRALAZINE HYDROCHLORIDE 20 MG/ML
20 INJECTION INTRAMUSCULAR; INTRAVENOUS EVERY 6 HOURS PRN
Status: DISCONTINUED | OUTPATIENT
Start: 2025-08-01 | End: 2025-08-02 | Stop reason: HOSPADM

## 2025-08-01 RX ORDER — ISOSORBIDE MONONITRATE 30 MG/1
30 TABLET, EXTENDED RELEASE ORAL DAILY
Status: DISCONTINUED | OUTPATIENT
Start: 2025-08-01 | End: 2025-08-02 | Stop reason: HOSPADM

## 2025-08-01 RX ORDER — INSULIN LISPRO 100 [IU]/ML
0-8 INJECTION, SOLUTION INTRAVENOUS; SUBCUTANEOUS
Status: DISCONTINUED | OUTPATIENT
Start: 2025-08-01 | End: 2025-08-02 | Stop reason: HOSPADM

## 2025-08-01 RX ORDER — SODIUM BICARBONATE 650 MG/1
650 TABLET ORAL 3 TIMES DAILY
Status: DISCONTINUED | OUTPATIENT
Start: 2025-08-01 | End: 2025-08-02 | Stop reason: HOSPADM

## 2025-08-01 RX ORDER — ATORVASTATIN CALCIUM 40 MG/1
40 TABLET, FILM COATED ORAL DAILY
Status: DISCONTINUED | OUTPATIENT
Start: 2025-08-01 | End: 2025-08-02 | Stop reason: HOSPADM

## 2025-08-01 RX ORDER — HEPARIN SODIUM 1000 [USP'U]/ML
1000 INJECTION, SOLUTION INTRAVENOUS; SUBCUTANEOUS ONCE
Status: DISCONTINUED | OUTPATIENT
Start: 2025-08-01 | End: 2025-08-02 | Stop reason: HOSPADM

## 2025-08-01 RX ORDER — SODIUM CHLORIDE 9 MG/ML
INJECTION, SOLUTION INTRAVENOUS PRN
Status: DISCONTINUED | OUTPATIENT
Start: 2025-08-01 | End: 2025-08-02 | Stop reason: HOSPADM

## 2025-08-01 RX ORDER — HYDRALAZINE HYDROCHLORIDE 20 MG/ML
10 INJECTION INTRAMUSCULAR; INTRAVENOUS
Status: COMPLETED | OUTPATIENT
Start: 2025-08-01 | End: 2025-08-01

## 2025-08-01 RX ORDER — ACETAMINOPHEN 500 MG
1000 TABLET ORAL
Status: COMPLETED | OUTPATIENT
Start: 2025-08-01 | End: 2025-08-01

## 2025-08-01 RX ORDER — ALBUMIN (HUMAN) 12.5 G/50ML
25 SOLUTION INTRAVENOUS
Status: DISCONTINUED | OUTPATIENT
Start: 2025-08-01 | End: 2025-08-02 | Stop reason: HOSPADM

## 2025-08-01 RX ORDER — ALLOPURINOL 100 MG/1
200 TABLET ORAL DAILY
Status: DISCONTINUED | OUTPATIENT
Start: 2025-08-02 | End: 2025-08-02 | Stop reason: HOSPADM

## 2025-08-01 RX ORDER — SODIUM CHLORIDE 0.9 % (FLUSH) 0.9 %
5-40 SYRINGE (ML) INJECTION EVERY 12 HOURS SCHEDULED
Status: DISCONTINUED | OUTPATIENT
Start: 2025-08-01 | End: 2025-08-02 | Stop reason: HOSPADM

## 2025-08-01 RX ORDER — ONDANSETRON 2 MG/ML
4 INJECTION INTRAMUSCULAR; INTRAVENOUS ONCE
Status: COMPLETED | OUTPATIENT
Start: 2025-08-01 | End: 2025-08-01

## 2025-08-01 RX ORDER — ASPIRIN 81 MG/1
81 TABLET ORAL DAILY
Status: DISCONTINUED | OUTPATIENT
Start: 2025-08-02 | End: 2025-08-02 | Stop reason: HOSPADM

## 2025-08-01 RX ORDER — 0.9 % SODIUM CHLORIDE 0.9 %
100 INTRAVENOUS SOLUTION INTRAVENOUS PRN
Status: DISCONTINUED | OUTPATIENT
Start: 2025-08-01 | End: 2025-08-02 | Stop reason: HOSPADM

## 2025-08-01 RX ORDER — ACETAMINOPHEN 325 MG/1
650 TABLET ORAL EVERY 6 HOURS PRN
Status: DISCONTINUED | OUTPATIENT
Start: 2025-08-01 | End: 2025-08-02 | Stop reason: HOSPADM

## 2025-08-01 RX ORDER — ONDANSETRON 4 MG/1
4 TABLET, ORALLY DISINTEGRATING ORAL EVERY 8 HOURS PRN
Status: DISCONTINUED | OUTPATIENT
Start: 2025-08-01 | End: 2025-08-02 | Stop reason: HOSPADM

## 2025-08-01 RX ORDER — FUROSEMIDE 40 MG/1
40 TABLET ORAL DAILY
Status: DISCONTINUED | OUTPATIENT
Start: 2025-08-02 | End: 2025-08-02 | Stop reason: HOSPADM

## 2025-08-01 RX ORDER — POLYETHYLENE GLYCOL 3350 17 G/17G
17 POWDER, FOR SOLUTION ORAL DAILY PRN
Status: DISCONTINUED | OUTPATIENT
Start: 2025-08-01 | End: 2025-08-02 | Stop reason: HOSPADM

## 2025-08-01 RX ORDER — ASPIRIN 325 MG
325 TABLET ORAL
Status: COMPLETED | OUTPATIENT
Start: 2025-08-01 | End: 2025-08-01

## 2025-08-01 RX ADMIN — ACETAMINOPHEN 1000 MG: 500 TABLET ORAL at 03:50

## 2025-08-01 RX ADMIN — IPRATROPIUM BROMIDE AND ALBUTEROL SULFATE 1 DOSE: .5; 2.5 SOLUTION RESPIRATORY (INHALATION) at 02:56

## 2025-08-01 RX ADMIN — HYDRALAZINE HYDROCHLORIDE 10 MG: 20 INJECTION INTRAMUSCULAR; INTRAVENOUS at 04:38

## 2025-08-01 RX ADMIN — SODIUM CHLORIDE, PRESERVATIVE FREE 10 ML: 5 INJECTION INTRAVENOUS at 20:53

## 2025-08-01 RX ADMIN — SODIUM BICARBONATE 650 MG: 650 TABLET ORAL at 20:51

## 2025-08-01 RX ADMIN — HYDRALAZINE HYDROCHLORIDE 100 MG: 25 TABLET ORAL at 20:50

## 2025-08-01 RX ADMIN — ISOSORBIDE MONONITRATE 30 MG: 30 TABLET, EXTENDED RELEASE ORAL at 17:15

## 2025-08-01 RX ADMIN — INSULIN LISPRO 2 UNITS: 100 INJECTION, SOLUTION INTRAVENOUS; SUBCUTANEOUS at 20:55

## 2025-08-01 RX ADMIN — Medication 0.4 MG: at 15:13

## 2025-08-01 RX ADMIN — ONDANSETRON 4 MG: 2 INJECTION, SOLUTION INTRAMUSCULAR; INTRAVENOUS at 02:55

## 2025-08-01 RX ADMIN — ASPIRIN 325 MG: 325 TABLET ORAL at 02:54

## 2025-08-01 RX ADMIN — ATORVASTATIN CALCIUM 40 MG: 40 TABLET, FILM COATED ORAL at 17:15

## 2025-08-01 RX ADMIN — APIXABAN 5 MG: 5 TABLET, FILM COATED ORAL at 20:51

## 2025-08-01 RX ADMIN — HYDRALAZINE HYDROCHLORIDE 5 MG: 20 INJECTION INTRAMUSCULAR; INTRAVENOUS at 03:46

## 2025-08-01 ASSESSMENT — PAIN SCALES - GENERAL
PAINLEVEL_OUTOF10: 0
PAINLEVEL_OUTOF10: 9
PAINLEVEL_OUTOF10: 10
PAINLEVEL_OUTOF10: 8
PAINLEVEL_OUTOF10: 0
PAINLEVEL_OUTOF10: 0
PAINLEVEL_OUTOF10: 10
PAINLEVEL_OUTOF10: 0

## 2025-08-01 ASSESSMENT — PAIN DESCRIPTION - LOCATION
LOCATION: CHEST
LOCATION: CHEST
LOCATION: RIB CAGE
LOCATION: RIB CAGE
LOCATION: CHEST;HEAD
LOCATION: CHEST

## 2025-08-01 ASSESSMENT — PAIN DESCRIPTION - DESCRIPTORS
DESCRIPTORS: SHARP
DESCRIPTORS: TIGHTNESS
DESCRIPTORS: SHARP
DESCRIPTORS: ACHING
DESCRIPTORS: SHARP

## 2025-08-01 ASSESSMENT — PAIN DESCRIPTION - ORIENTATION
ORIENTATION: LEFT
ORIENTATION: LEFT;LOWER
ORIENTATION: LEFT;LOWER

## 2025-08-01 ASSESSMENT — ENCOUNTER SYMPTOMS
COUGH: 1
CHEST TIGHTNESS: 1
GASTROINTESTINAL NEGATIVE: 1
EYES NEGATIVE: 1
SHORTNESS OF BREATH: 1

## 2025-08-01 ASSESSMENT — PAIN - FUNCTIONAL ASSESSMENT
PAIN_FUNCTIONAL_ASSESSMENT: 0-10

## 2025-08-01 ASSESSMENT — PAIN DESCRIPTION - FREQUENCY
FREQUENCY: INTERMITTENT
FREQUENCY: INTERMITTENT
FREQUENCY: CONTINUOUS
FREQUENCY: INTERMITTENT

## 2025-08-01 NOTE — FLOWSHEET NOTE
08/01/25 1201   Vital Signs   Temp 97.5 °F (36.4 °C)   Temp Source Oral   Pulse 69   Heart Rate Source Monitor   Respirations 16   BP (!) 196/77   MAP (Calculated) 117     NP notified of BP

## 2025-08-01 NOTE — ED TRIAGE NOTES
Received via EMS with complaints of dyspnea, chest pain and headache. States symptoms began around 1800 yesterday. Does have cough productive of clear sputum, has had that for several days has reported cough to dialysis staff. Is scheduled for dialysis today did not miss any treatments this week.   Has been taking medications as prescribed

## 2025-08-01 NOTE — DIALYSIS
Hep B verification performed for (RN): ANJALI Simms RN    Hep B results, dates, and Primary Source: negative 07/09/2025, Susceptible 10/16/2024, CWOW      Machine disinfect process:HEAT

## 2025-08-01 NOTE — H&P
Hospitalist Admission Note    NAME: Ann-Marie Cardenas   :  1945   MRN:  [id number]     Date/Time:  2025 12:42 PM    Patient PCP: Collette Nicholas MD  ______________________________________________________________________  Given the patient's current clinical presentation, I have a high level of concern for decompensation if discharged from the emergency department.  Complex decision making was performed, which includes reviewing the patient's available past medical records, laboratory results, and x-ray films.       My assessment of this patient's clinical condition and my plan of care is as follows.    Assessment / Plan:  Chest pain with elevated trop  - suspect related to covid 19 resp infection not acs  - cont tele  - repeat ecg in am  - trop elevation due to stress from infection as well as esrd  - outpt cardio follow up for stress testing if not done within year    Covid 19 resp infection- without hypoxia  - symptomatic treatment, isolation    ESRD  - neprhology consulted for hd today    Htn  - resume hydralazine, hd, imdur, lasix    DM  - ins ss, dm diet      Hx DVT  - cont eliquis            Subjective:   CHIEF COMPLAINT: sob and chest discomfort    HISTORY OF PRESENT ILLNESS:     Patient presenting to the emergency department via EMS for shortness of breath that woke him from sleep. He denies specific inciting event. He states he has been feeling bad over the last few days but not to this extent. He states he has a prescribed inhaler at home but did not use it because he felt so bad. He reports cough productive of scant white sputum. Chest pain is substernal, does not radiate and is intermittent. Nothing makes it better or worse. He is not currently experiencing chest pain. He reports headache that is all over, nothing makes it better or worse, he has not tried any medication for his symptoms. He also reported intermittent nausea and reports vomiting prior to EMS arrival. He denies      HEENT: Atraumatic, anicteric sclerae, pink conjunctivae     No oral ulcers, mucosa moist, throat clear, dentition fair  Neck:  Supple, symmetrical,  thyroid: non tender  Lungs:   Coarse bs,  Chest wall:  No tenderness  No Accessory muscle use.  Heart:   Regular  rhythm,  No  murmur   No edema  Abdomen:   Soft, non-tender. Not distended.  Bowel sounds normal  Extremities: No cyanosis.  No clubbing,      Skin turgor normal, Capillary refill normal, Radial dial pulse 2+  Skin:     Not pale.  Not Jaundiced  No rashes   Psych:  Good insight.  Not depressed.  Not anxious or agitated.  Neurologic: EOMs intact. No facial asymmetry. No aphasia or slurred speech. Symmetrical strength, Sensation grossly intact. Alert and oriented X 4.     Fistula to l forearm      _______________________________________________________________________  Care Plan discussed with:    Comments   Patient x    Family      RN     Care Manager                    Consultant:      _______________________________________________________________________  Expected  Disposition:   Home with Family x   HH/PT/OT/RN    SNF/LTC    SABRINA    ________________________________________________________________________  TOTAL TIME:  45 Minutes    Critical Care Provided     Minutes non procedure based      Comments    x Reviewed previous records   >50% of visit spent in counseling and coordination of care  Discussion with patient and/or family and questions answered       ________________________________________________________________________  Signed: Onel Meredith MD    Procedures: see electronic medical records for all procedures/Xrays and details which were not copied into this note but were reviewed prior to creation of Plan.    LAB DATA REVIEWED:    Recent Results (from the past 24 hours)   CBC with Auto Differential    Collection Time: 08/01/25  2:30 AM   Result Value Ref Range    WBC 8.2 4.6 - 13.2 K/uL    RBC 3.94 (L) 4.35 - 5.65 M/uL    Hemoglobin 11.2 (L)

## 2025-08-01 NOTE — PROGRESS NOTES
Call to bedside due to patient complaining of left lateral chest pain, while receiving HD (only 1 liter removed) non radiating, rating pain 10/10, per telemetry review no noted changes on the monitor. Admitted for Covid19 with troponin elevated, patient endorses coughing, denies shortness of breath, abdominal pain, back pain, nausea and vomiting. HD nurse stopped dialysis, reviewed BP (current 158/70) patient to received 1 SL Nitroglycerin, EKG reviewed no new changes when compared to admission EKG, stat troponin ordered previous Troponin T trending down peaked at 162, repeat 148. Status post nitroglycerin patient reports that chest pain is improving some.

## 2025-08-01 NOTE — ED NOTES
TRANSFER - OUT REPORT:    Verbal report given to CLAYTON Erazo on Ann-Marie Cardenas  being transferred to [facility] for routine progression of patient care       Report consisted of patient's Situation, Background, Assessment and   Recommendations(SBAR).     Information from the following report(s) Nurse Handoff Report, ED Encounter Summary, ED SBAR, and MAR was reviewed with the receiving nurse.    [place] Assessment:    Presents to emergency department  because of falls (Syncope, seizure, or loss of consciousness): No  Age > 70: Yes  Altered Mental Status, Intoxication with alcohol or substance confusion (Disorientation, impaired judgment, poor safety awaremess, or inability to follow instructions): No  Impaired Mobility: Ambulates or transfers with assistive devices or assistance; Unable to ambulate or transer.: Yes  Nursing Judgement: Yes          Lines:   Peripheral IV 08/01/25 Right Antecubital (Active)   Site Assessment Clean, dry & intact 08/01/25 0250   Line Status Blood return noted 08/01/25 0250   Phlebitis Assessment No symptoms 08/01/25 0250   Infiltration Assessment 0 08/01/25 0250   Alcohol Cap Used No 08/01/25 0250   Dressing Status Clean, dry & intact 08/01/25 0250   Dressing Type Transparent 08/01/25 0250        Opportunity for questions and clarification was provided.      Patient transported with:  Monitor and Registered Nurse

## 2025-08-01 NOTE — ED PROVIDER NOTES
[facility] EMERGENCY DEPARTMENT  EMERGENCY DEPARTMENT ENCOUNTER      Pt Name: Ann-Marie Cardenas  MRN: [id number]  Birthdate 1945  Date of evaluation: 8/1/2025  Provider: Bc Rodriguez DO  6:07 AM    CHIEF COMPLAINT       Chief Complaint   Patient presents with    Shortness of Breath         HISTORY OF PRESENT ILLNESS    Ann-Marie Cardenas is a 79 y.o. male who presents to the emergency department shortness of breath    Patient presenting to the emergency department via EMS for shortness of breath that woke him from sleep.  He denies specific inciting event.  He states he has been feeling bad over the last few days but not to this extent.  He states he has a prescribed inhaler at home but did not use it because he felt so bad.  He reports cough productive of scant white sputum.  Chest pain is substernal, does not radiate and is intermittent.  Nothing makes it better or worse.  He is not currently experiencing chest pain.  He reports headache that is all over, nothing makes it better or worse, he has not tried any medication for his symptoms.  He also reported intermittent nausea and reports vomiting prior to EMS arrival.  He denies black or bloody content to his vomit. He denies urinary symptoms or change in bowel movements.    The history is provided by the patient, the EMS personnel and medical records.       Nursing Notes were reviewed.    REVIEW OF SYSTEMS       Review of Systems   Constitutional: Negative.    HENT: Negative.     Eyes: Negative.    Respiratory:  Positive for cough, chest tightness and shortness of breath.    Cardiovascular:  Positive for chest pain. Negative for palpitations and leg swelling.   Gastrointestinal: Negative.    Genitourinary: Negative.    Musculoskeletal: Negative.    Neurological:  Positive for headaches. Negative for dizziness, seizures, syncope, speech difficulty, weakness and numbness.   All other systems reviewed and are negative.      Except as  DAILY    POTASSIUM CHLORIDE (KLOR-CON M) 20 MEQ EXTENDED RELEASE TABLET    Take 1 tablet by mouth Daily with lunch    SODIUM BICARBONATE 650 MG TABLET    Take 1 tablet by mouth 3 times daily    VITAMIN D3 (CHOLECALCIFEROL) 25 MCG (1000 UT) TABS TABLET    Take 1 tablet by mouth daily .  DOSE ADJUSTMENT.  Take in place of D2 50,000 units weekly.       ALLERGIES     No known allergies    FAMILY HISTORY       Family History   Problem Relation Age of Onset    Heart Disease Mother     Heart Disease Father           SOCIAL HISTORY       Social History     Socioeconomic History    Marital status: Single     Spouse name: None    Number of children: None    Years of education: None    Highest education level: None   Tobacco Use    Smoking status: Former     Current packs/day: 0.00     Average packs/day: 0.5 packs/day for 15.0 years (7.5 ttl pk-yrs)     Types: Cigarettes     Start date: 1984     Quit date: 1999     Years since quittin.6    Smokeless tobacco: Never   Vaping Use    Vaping status: Never Used   Substance and Sexual Activity    Alcohol use: No    Drug use: No     Social Drivers of Health     Financial Resource Strain: Low Risk  (2024)    Overall Financial Resource Strain (CARDIA)     Difficulty of Paying Living Expenses: Not hard at all   Food Insecurity: No Food Insecurity (2025)    Hunger Vital Sign     Worried About Running Out of Food in the Last Year: Never true     Ran Out of Food in the Last Year: Never true   Transportation Needs: No Transportation Needs (2025)    PRAPARE - Transportation     Lack of Transportation (Medical): No     Lack of Transportation (Non-Medical): No   Physical Activity: Inactive (2024)    Exercise Vital Sign     Days of Exercise per Week: 0 days     Minutes of Exercise per Session: 0 min   Housing Stability: Low Risk  (2025)    Housing Stability Vital Sign     Unable to Pay for Housing in the Last Year: No     Number of Times Moved in the Last  other components within normal limits   TROPONIN - Abnormal; Notable for the following components:    Troponin T 148.0 (*)     All other components within normal limits   BRAIN NATRIURETIC PEPTIDE   MAGNESIUM       All other labs were within normal range or not returned as of this dictation.    EMERGENCY DEPARTMENT COURSE and DIFFERENTIAL DIAGNOSIS/MDM:   Vitals:    Vitals:    08/01/25 0417 08/01/25 0437 08/01/25 0516 08/01/25 0559   BP:  (!) 205/73 (!) 166/63 (!) 166/59   Pulse:  74 78 71   Resp:  13 15 14   Temp: 97.6 °F (36.4 °C)      TempSrc: Oral      SpO2:  100% 100% 100%   Weight:       Height:           Medical Decision Making  Differential diagnosis including CHF, COPD, flu, COVID, pneumonia, anemia, electrolyte disturbance, volume overload, ACS.  Patient presenting with chest pain, shortness of breath and headache.  He is currently chest pain-free.  Initially not hypoxic on room air but noted to be 85% while at rest, 2 L nasal cannula placed.  No acute ST changes on EKG.  Troponin elevated but decreasing on repeat, suspect this is related to patient's renal function.  Blood pressure mildly improved after 2 doses of hydralazine.  CT head unremarkable, chest x-ray showing right hemidiaphragm elevation which is new compared to previous chest x-ray.  COVID-positive.  Labs otherwise at baseline for patient.  He is due for dialysis today.  Spoke with PM hospitalist who accepts the patient for admission    Amount and/or Complexity of Data Reviewed  Independent Historian: EMS  External Data Reviewed: labs, radiology, ECG and notes.  Labs: ordered.  Radiology: ordered.  ECG/medicine tests: ordered and independent interpretation performed.    Risk  OTC drugs.  Prescription drug management.  Decision regarding hospitalization.            REASSESSMENT     ED Course as of 08/01/25 0607   Fri Aug 01, 2025   0403 Went into patient room to discuss lab results and noted he was satting 85% with a good pleth, improved to 92%

## 2025-08-01 NOTE — FLOWSHEET NOTE
Primary RN SBAR: Fly Milian RN  Patient Education provided: Missing treatment/ procedural/ dietary   Preferred Education method and Primary language: Engih  verbal  Dialysis consent: yes  Hospital General Consent Verified: yes  Hospital associated wait time; reason: upon arrival to hospital, there is no dialysis order for this patient, I have to communicate to 2 different nephrology to get orders put in.      Hep B results, dates, and Primary Source: Hep B S Ag- Negative 07/09/2025  Hep B S Ab- Susceptible 10/16/2024- [facility] ( Pt established [facility] chronic pt)  Hep B dual verification performed by (RN): Teodoar ERAZO RN  Machine disinfect process: Standard Acid/heat         08/01/25 1247   Treatment   Treatment Goal 3.5H/3L   Observations & Evaluations   Level of Consciousness 0   Oriented X x4   Heart Rhythm   (on floor tele in progress)   Respiratory Quality/Effort Unlabored   O2 Device None (Room air)   Skin Color   (appropriate for ethnicity)   Skin Condition/Temp Dry;Warm   Appetite Good   Abdomen Inspection Soft   Bowel Sounds (All Quadrants) Present   Edema None   Vital Signs   BP (!) 207/98   Temp 97.5 °F (36.4 °C)   Pulse 61   Respirations 18   SpO2 100 %   Pain Assessment   Pain Assessment None - Denies Pain   Technical Checks   Dialysis Machine No. [id number]   RO Machine Number [id number]   Dialyzer Lot No. [id number]   Tubing Lot Number [id number]   All Connections Secure Yes   NS Bag Yes   Saline Line Double Clamped Yes   Dialyzer Nipro ELISIO   Prime Volume (mL) 250 mL   ICEBOAT I;C;E;B;O;A;T   RO Machine Log Sheet Completed Yes   Machine Alarm Self Test Completed;Passed   Air Foam Detector Tested;Proper Function;pH Reading   Extracorporeal Circuit Tested for Integrity Yes   Machine Conductivity 13.7   Manual Ph 7.4   Bleach Test (Neg) Yes   Bath Temperature 96.8 °F (36 °C)   Treatment Initiation   Dialyze Hours 3.5   Treatment  Initiation Universal Precautions maintained;Lines secured to  terminated NP made aware)      08/01/25 1500   Observations & Evaluations   Level of Consciousness   (lethargic)   Heart Rhythm   (on floor tele in progress)   O2 Device Nasal cannula   Vital Signs   /71   Pulse 70   Respirations 24   SpO2 100 %   Pain Assessment   Pain Assessment 0-10   Pain Level 10   Pain Location Chest   Pain Orientation Left   Pain Descriptors Tightness   Post-Hemodialysis Assessment   Post-Treatment Procedures Blood returned;Access bleeding time < 10 minutes   Machine Disinfection Process Acid/Vinegar Clean;Heat Disinfect;Exterior Machine Disinfection   Rinseback Volume (ml) 250 ml   Blood Volume Processed (Liters) 35.8 L   Dialyzer Clearance Lightly streaked   Duration of Treatment (minutes) 120 minutes   Hemodialysis Intake (ml) 500 ml   Hemodialysis Output (ml) 1568 ml   NET Removed (ml) 1068   Tolerated Treatment Fair   Patient Response to Treatment fairly tolerated  (symptomatic with chest pain x10)   Physician Notified Yes   Patient Disposition   (remains in room)   Provider Notification   Reason for Communication Evaluate   Provider Name Miladis Stevens NP   Method of Communication   (secured comunication method)   Response See orders   Handoff   Communication Given   (post HD)   Handoff Given To   (Fly Milian, RN)   Handoff Received From   (CLAYTON Camacho)   Handoff Communication Face to Face     Treatment terminate 1.5 hrs remain of treatment time , pt symptomatic with chest pain reported intensity 10, lethargic and weak. Primary RN at BS and NP. Stat EKD done. Patient vitals were stable, GONZALO Redding made aware and agreed to terminate treatment. Patient to run again tomorrow for 2 hours, orders in. [facility]  aware.    All possible blood returned to the patient. De-cannulated and pressure held until hemostasis was achieved. No bleeding noted. Dressing applied. +bruit/thrill.   Needle limbs cleaned, all possible blood rinsed back to patient, limbs flushed with 10cc

## 2025-08-02 ENCOUNTER — HOSPITAL ENCOUNTER (EMERGENCY)
Age: 80
Discharge: HOME | End: 2025-08-03
Attending: EMERGENCY MEDICINE
Payer: MEDICARE

## 2025-08-02 VITALS
HEART RATE: 82 BPM | WEIGHT: 176.8 LBS | HEIGHT: 68 IN | DIASTOLIC BLOOD PRESSURE: 80 MMHG | TEMPERATURE: 98.6 F | BODY MASS INDEX: 26.8 KG/M2 | RESPIRATION RATE: 18 BRPM | SYSTOLIC BLOOD PRESSURE: 153 MMHG | OXYGEN SATURATION: 94 %

## 2025-08-02 DIAGNOSIS — K59.00 CONSTIPATION, UNSPECIFIED CONSTIPATION TYPE: Primary | ICD-10-CM

## 2025-08-02 LAB
ANION GAP SERPL CALC-SCNC: 13 MMOL/L
BASOPHILS # BLD: 0.04 K/UL (ref 0–0.1)
BASOPHILS NFR BLD: 0.6 % (ref 0–2)
BUN SERPL-MCNC: 42 MG/DL (ref 6–23)
BUN/CREAT SERPL: 9
CA-I BLD-MCNC: 9.7 MG/DL (ref 8.5–10.1)
CHLORIDE SERPL-SCNC: 100 MMOL/L (ref 98–107)
CO2 SERPL-SCNC: 26 MMOL/L (ref 21–32)
CREAT SERPL-MCNC: 4.63 MG/DL (ref 0.6–1.3)
DIFFERENTIAL METHOD BLD: ABNORMAL
EOSINOPHIL # BLD: 0.16 K/UL (ref 0–0.4)
EOSINOPHIL NFR BLD: 2.3 % (ref 0–5)
ERYTHROCYTE [DISTWIDTH] IN BLOOD BY AUTOMATED COUNT: 14.6 % (ref 11.6–14.5)
GLUCOSE BLD STRIP.AUTO-MCNC: 169 MG/DL (ref 70–110)
GLUCOSE SERPL-MCNC: 180 MG/DL (ref 74–108)
HCT VFR BLD AUTO: 36.8 % (ref 36–48)
HGB BLD-MCNC: 11.9 G/DL (ref 13–16)
IMM GRANULOCYTES # BLD AUTO: 0.02 K/UL (ref 0–0.04)
IMM GRANULOCYTES NFR BLD AUTO: 0.3 % (ref 0–0.5)
LYMPHOCYTES # BLD: 1.27 K/UL (ref 0.9–3.6)
LYMPHOCYTES NFR BLD: 18.2 % (ref 21–52)
MAGNESIUM SERPL-MCNC: 2 MG/DL (ref 1.6–2.6)
MCH RBC QN AUTO: 27.9 PG (ref 24–34)
MCHC RBC AUTO-ENTMCNC: 32.3 G/DL (ref 31–37)
MCV RBC AUTO: 86.4 FL (ref 78–100)
MONOCYTES # BLD: 0.77 K/UL (ref 0.05–1.2)
MONOCYTES NFR BLD: 11.1 % (ref 3–10)
NEUTS SEG # BLD: 4.7 K/UL (ref 1.8–8)
NEUTS SEG NFR BLD: 67.5 % (ref 40–73)
NRBC # BLD: 0 K/UL (ref 0–0.01)
NRBC BLD-RTO: 0 PER 100 WBC
PERFORMED BY:: ABNORMAL
PHOSPHATE SERPL-MCNC: 5.8 MG/DL (ref 2.5–4.9)
PLATELET # BLD AUTO: 272 K/UL (ref 135–420)
PMV BLD AUTO: 9 FL (ref 9.2–11.8)
POTASSIUM SERPL-SCNC: 4.2 MMOL/L (ref 3.5–5.5)
RBC # BLD AUTO: 4.26 M/UL (ref 4.35–5.65)
SODIUM SERPL-SCNC: 139 MMOL/L (ref 136–145)
WBC # BLD AUTO: 7 K/UL (ref 4.6–13.2)

## 2025-08-02 PROCEDURE — 85025 COMPLETE CBC W/AUTO DIFF WBC: CPT

## 2025-08-02 PROCEDURE — 80048 BASIC METABOLIC PNL TOTAL CA: CPT

## 2025-08-02 PROCEDURE — 36415 COLL VENOUS BLD VENIPUNCTURE: CPT

## 2025-08-02 PROCEDURE — 84100 ASSAY OF PHOSPHORUS: CPT

## 2025-08-02 PROCEDURE — 2700000000 HC OXYGEN THERAPY PER DAY

## 2025-08-02 PROCEDURE — 6370000000 HC RX 637 (ALT 250 FOR IP): Performed by: INTERNAL MEDICINE

## 2025-08-02 PROCEDURE — G0257 UNSCHED DIALYSIS ESRD PT HOS: HCPCS

## 2025-08-02 PROCEDURE — 83735 ASSAY OF MAGNESIUM: CPT

## 2025-08-02 PROCEDURE — 82962 GLUCOSE BLOOD TEST: CPT

## 2025-08-02 PROCEDURE — 94761 N-INVAS EAR/PLS OXIMETRY MLT: CPT

## 2025-08-02 PROCEDURE — G0378 HOSPITAL OBSERVATION PER HR: HCPCS

## 2025-08-02 PROCEDURE — 90935 HEMODIALYSIS ONE EVALUATION: CPT

## 2025-08-02 PROCEDURE — 99283 EMERGENCY DEPT VISIT LOW MDM: CPT

## 2025-08-02 RX ADMIN — APIXABAN 5 MG: 5 TABLET, FILM COATED ORAL at 08:50

## 2025-08-02 RX ADMIN — SODIUM BICARBONATE 650 MG: 650 TABLET ORAL at 08:50

## 2025-08-02 RX ADMIN — ASPIRIN 81 MG: 81 TABLET, COATED ORAL at 08:50

## 2025-08-02 RX ADMIN — ATORVASTATIN CALCIUM 40 MG: 40 TABLET, FILM COATED ORAL at 08:50

## 2025-08-02 RX ADMIN — ALLOPURINOL 200 MG: 100 TABLET ORAL at 08:50

## 2025-08-02 RX ADMIN — PANTOPRAZOLE SODIUM 40 MG: 40 TABLET, DELAYED RELEASE ORAL at 06:30

## 2025-08-02 ASSESSMENT — PAIN SCALES - GENERAL
PAINLEVEL_OUTOF10: 0
PAINLEVEL_OUTOF10: 0

## 2025-08-02 NOTE — DIALYSIS
Primary RN SBAR: LILO Caba RN and DIAN Rees RN  Incapacitated Nurse [facility]. provided: yes  Patient Education provided: access, procedural  Preferred Education method and Primary language: english,verbal  Dialysis consent: chronic unit  Hospital General Consent Verified: yes  Hospital associated wait time; reason: n/a    Hepatitis B Surface Ag   Date/Time Value Ref Range Status   04/25/2025 01:50 PM <0.10 <1.00 Index Final     Hep B S Ag Interp   Date/Time Value Ref Range Status   04/25/2025 01:50 PM Negative Negative   Final     Hep B S Ab   Date/Time Value Ref Range Status   04/25/2025 01:50 PM <3.10 (L) >10.0 mIU/mL Final     Hep B S Ab Interp   Date/Time Value Ref Range Status   04/25/2025 01:50 PM Negative (A) Positive   Final        08/02/25 0925   Observations & Evaluations   Level of Consciousness 0   Oriented X 3   Heart Rhythm Regular   Respiratory Quality/Effort Unlabored   O2 Device None (Room air)   Bilateral Breath Sounds Clear   Skin Color   (appropriate)   Skin Condition/Temp Dry;Warm   Abdomen Inspection Soft   Bowel Sounds (All Quadrants) Audible   Edema None   Comments /90, HR 80, respirations 21   Vital Signs   Temp 98.7 °F (37.1 °C)   SpO2 95 %   Pain Assessment   Pain Assessment None - Denies Pain   Technical Checks   Dialysis Machine No. [id number]   RO Machine Number [id number]   Dialyzer Lot No. [id number]   Tubing Lot Number [id number]   All Connections Secure Yes   NS Bag Yes   Saline Line Double Clamped Yes   Dialyzer Nipro ELISIO   Prime Volume (mL) 0 mL   ICEBOAT I;C;E;B;O;A;T   RO Machine Log Sheet Completed Yes   Machine Alarm Self Test Completed;Passed   Air Foam Detector Tested;Proper Function;pH Reading   Extracorporeal Circuit Tested for Integrity Yes   Machine Conductivity 14   Manual Ph 7.2   Bleach Test (Neg) Yes   Bath Temperature 96.8 °F (36 °C)   Hemodialysis Fistula/Graft Left Forearm   No placement date or time found.   Orientation: Left  Access Location: Forearm   Site  Assessment Clean, dry & intact   Thrill Present   Bruit Present   Status Accessed   Venous Needle Size 15 G   Arterial Needle Size 15 G   Accessed By: REGLA Aburto RN   Access Attempts  1   Dialysis Bath   K+ (Potassium) 2   Ca+ (Calcium) 2.5   Na+ (Sodium) 138   HCO3 (Bicarb) 38   Bicarbonate Concentrate Lot No. [id number]   Acid Concentrate Lot No. [id number]                08/02/25 0940   Observations & Evaluations   Comments /83, HR 81, respirations 15,   Vital Signs   SpO2 94 %   Treatment Initiation   Dialyze Hours 2   Treatment  Initiation Universal Precautions maintained;Lines secured to patient;Connections secured;Prime given;Venous Parameters set;Arterial Parameters set;Air foam detector engaged;Dialysate   During Hemodialysis Assessment   Blood Flow Rate (ml/min) 200 ml/min   Arterial Pressure (mmHg) -40 mmHg   Venous Pressure (mmHg) 40   TMP 50      Access Visible Yes   Ultrafiltration Rate (ml/hr) 1200 ml/hr      08/02/25 1140   Observations & Evaluations   Level of Consciousness 0   Oriented X 3   Heart Rhythm Regular   Respiratory Quality/Effort Unlabored   O2 Device None (Room air)   Skin Color   (appropriate)   Skin Condition/Temp Dry;Warm   Comments /89,HR 81, respirations 15  (02 saturation 96)   Vital Signs   Temp 98.6 °F (37 °C)   Pain Assessment   Pain Assessment 0-10   During Hemodialysis Assessment   Ultrafiltration Removed (ml) 1300 ml   Hemodialysis Fistula/Graft Left Forearm   No placement date or time found.   Orientation: Left  Access Location: Forearm   Site Assessment Clean, dry & intact   Thrill Present   Bruit Present   Post-Hemodialysis Assessment   Post-Treatment Procedures Blood returned   Machine Disinfection Process Acid/Vinegar Clean;Heat Disinfect;Exterior Machine Disinfection   Rinseback Volume (ml) 300 ml   Blood Volume Processed (Liters) 44 L   Dialyzer Clearance Clear   Duration of Treatment (minutes) 120 minutes   Hemodialysis Intake (ml) 300 ml   Hemodialysis

## 2025-08-02 NOTE — PLAN OF CARE
Problem: Chronic Conditions and Co-morbidities  Goal: Patient's chronic conditions and co-morbidity symptoms are monitored and maintained or improved  8/2/2025 0755 by Mabel Rees RN  Outcome: Progressing  8/1/2025 2121 by Sarah Caba RN  Outcome: Progressing     Problem: Discharge Planning  Goal: Discharge to home or other facility with appropriate resources  8/2/2025 0755 by Mabel Rees RN  Outcome: Progressing  8/1/2025 2121 by Sarah Caba RN  Outcome: Progressing     Problem: Pain  Goal: Verbalizes/displays adequate comfort level or baseline comfort level  8/2/2025 0755 by Mabel Rees RN  Outcome: Progressing  8/1/2025 2121 by Sarah Caba RN  Outcome: Progressing     Problem: Risk for Elopement  Goal: Patient will not exit the unit/facility without proper excort  8/2/2025 0755 by Mabel Rees RN  Outcome: Progressing  8/1/2025 2121 by Sarah Caba RN  Outcome: Progressing

## 2025-08-02 NOTE — DIALYSIS
Hep B verification performed for (RN): Jamie Aburto RN  Hep B results, dates, and Primary Source: HepBsAG negative as of 7/9/25 [facility] and HepBsAB susceptible as of 4/25/25  Hepatitis B Surface Ag   Date/Time Value Ref Range Status   04/25/2025 01:50 PM <0.10 <1.00 Index Final     Hep B S Ag Interp   Date/Time Value Ref Range Status   04/25/2025 01:50 PM Negative Negative   Final     Hep B S Ab   Date/Time Value Ref Range Status   04/25/2025 01:50 PM <3.10 (L) >10.0 mIU/mL Final     Hep B S Ab Interp   Date/Time Value Ref Range Status   04/25/2025 01:50 PM Negative (A) Positive   Final     Machine disinfect process:Heat

## 2025-08-02 NOTE — PROGRESS NOTES
-1900 Bedside and Verbal shift change report given to KACEY Caba RN (oncoming nurse) by LEN Milian RN (offgoing nurse). Report included the following information Nurse Handoff Report, ED Encounter Summary, ED SBAR, Adult Overview, Intake/Output, MAR, Recent Results, Cardiac Rhythm 1st AVB, Quality Measures, and Event Log.      -2021 Care assumed and Pt assessed. Pt is A&OX4, able to make needs known. Denies pain/discomfort/lightheadedness/SOB. Pt states he has dizziness occasionally, but not presently. Oxygen in progress via NC. Skin is warm and dry. Urostomy functional and patent. Pt able to self-empty. (L) UE AVF is intact with bruit/thrill noted. Dressing applied. CBWR. Snack given. Pt ambulated to bathroom with standby assist, gait slow and steady. Coughing noted x 2.    -2354 Vital signs taken. No voiced needs at this time. No coughing noted. CBWR.    -0427 Vital signs taken. No voiced needs at this time. No coughing noted.CBWR.    -0700 Bedside and Verbal shift change report given to VINITA Rees RN (oncoming nurse) by KACEY Caba RN(offgoing nurse). Report included the following information Nurse Handoff Report, ED Encounter Summary, ED SBAR, Adult Overview, Intake/Output, MAR, Recent Results, Cardiac Rhythm 1st AVB, Quality Measures, and Event Log.

## 2025-08-02 NOTE — PLAN OF CARE
Problem: Chronic Conditions and Co-morbidities  Goal: Patient's chronic conditions and co-morbidity symptoms are monitored and maintained or improved  8/2/2025 1135 by Mabel Rees RN  Outcome: Adequate for Discharge  8/2/2025 0755 by Mabel Rees RN  Outcome: Progressing     Problem: Discharge Planning  Goal: Discharge to home or other facility with appropriate resources  8/2/2025 1135 by Mabel Rees RN  Outcome: Adequate for Discharge  8/2/2025 0755 by Mabel Rees RN  Outcome: Progressing     Problem: Pain  Goal: Verbalizes/displays adequate comfort level or baseline comfort level  8/2/2025 1135 by Mabel Rees RN  Outcome: Adequate for Discharge  8/2/2025 0755 by Mabel Rees RN  Outcome: Progressing     Problem: Risk for Elopement  Goal: Patient will not exit the unit/facility without proper excort  8/2/2025 1135 by Mabel Rees RN  Outcome: Adequate for Discharge  8/2/2025 0755 by Mabel Rees RN  Outcome: Progressing

## 2025-08-02 NOTE — DISCHARGE SUMMARY
Discharge Summary       PATIENT ID: Ann-Marie Cardenas  MRN: [id number]   YOB: 1945    DATE OF ADMISSION: 8/1/2025  2:26 AM    DATE OF DISCHARGE: 08/02/25    PRIMARY CARE PROVIDER: Collette Nicholas MD     ATTENDING PHYSICIAN: Onel Meredith MD  DISCHARGING PROVIDER: Onel Meredith MD        CONSULTATIONS: IP CONSULT TO NEPHROLOGY    PROCEDURES/SURGERIES: * No surgery found *    ADMITTING DIAGNOSES & HOSPITAL COURSE:   Patient presenting to the emergency department via EMS for shortness of breath that woke him from sleep. He denies specific inciting event. He states he has been feeling bad over the last few days but not to this extent. He states he has a prescribed inhaler at home but did not use it because he felt so bad. He reports cough productive of scant white sputum. Chest pain is substernal, does not radiate and is intermittent. Nothing makes it better or worse. He is not currently experiencing chest pain. He reports headache that is all over, nothing makes it better or worse, he has not tried any medication for his symptoms. He also reported intermittent nausea and reports vomiting prior to EMS arrival. He denies black or bloody content to his vomit. He denies urinary symptoms or change in bowel movements.         DISCHARGE DIAGNOSES / PLAN:      Chest pain with elevated trop  - suspect related to covid 19 resp infection not acs  - cont tele  - repeat ecg in am  - trop elevation due to stress from infection as well as esrd  - outpt cardio follow up for stress testing if not done within year     Covid 19 resp infection- without hypoxia  - symptomatic treatment, isolation     ESRD  - neprhology consulted for hd today     Htn  - resume hydralazine, hd, imdur, lasix     DM  - ins ss, dm diet        Hx DVT  - cont eliquis        Day of dc, summary:  Pt admitted with chest discomfort and sob, found to have covid 19 and elevated trop, no spike in trop, chest discomfort resolved with HD, had neg  cath < 1 year ago, will recover from covid at home and follow up with carddiology in 2-3 weeks.   Inofmred that we suggest isolation for 10 days from start of symptoms for covid.  DISCHARGE MEDICATIONS:     Medication List        CONTINUE taking these medications      Accu-Chek Softclix Lancets Misc  1 each by Does not apply route 3 times daily Use to check blood sugar 3 times daily     albuterol sulfate  (90 Base) MCG/ACT inhaler  Commonly known as: PROVENTIL;VENTOLIN;PROAIR  Inhale 2 puffs into the lungs every 4 hours as needed for Wheezing or Shortness of Breath     allopurinol 100 MG tablet  Commonly known as: ZYLOPRIM  TAKE 2 TABLETS BY MOUTH DAILY     apixaban 5 MG Tabs tablet  Commonly known as: Eliquis  TAKE 1 TABLET BY MOUTH TWICE DAILY. NOTIFY MD IF ANY SIGNS OF BLEEDING     Aspirin EC Adult Low Dose 81 MG EC tablet  Generic drug: aspirin  TAKE 1 TABLET BY MOUTH DAILY     atorvastatin 40 MG tablet  Commonly known as: LIPITOR  TAKE 1 TABLET BY MOUTH DAILY     B-D UF III MINI PEN NEEDLES 31G X 5 MM Misc  Generic drug: Insulin Pen Needle  Inject 1 each as directed 2 times daily     blood glucose test strips  Use one strip twice a day to check blood sugar. Please dispense covered strips for his insurance to go with insurance covered glucometer.     Claritin 10 MG tablet  Generic drug: loratadine     fluticasone 50 MCG/ACT nasal spray  Commonly known as: FLONASE  1 spray by Each Nostril route daily     furosemide 40 MG tablet  Commonly known as: LASIX  TAKE 1 TABLET BY MOUTH DAILY     glipiZIDE 10 MG tablet  Commonly known as: GLUCOTROL  TAKE ONE-HALF TABLET BY MOUTH DAILY     glucose monitoring kit  1 kit by Does not apply route daily     hydrALAZINE 100 MG tablet  Commonly known as: APRESOLINE  Take 1 tablet by mouth 2 times daily     isosorbide mononitrate 30 MG extended release tablet  Commonly known as: IMDUR  Take 1 tablet by mouth daily     Lantus SoloStar 100 UNIT/ML injection pen  Generic drug:

## 2025-08-02 NOTE — PLAN OF CARE
Problem: Chronic Conditions and Co-morbidities  Goal: Patient's chronic conditions and co-morbidity symptoms are monitored and maintained or improved  8/1/2025 2121 by Sarah Caba RN  Outcome: Progressing  8/1/2025 0824 by Kacey Morgan RN  Outcome: Progressing     Problem: Discharge Planning  Goal: Discharge to home or other facility with appropriate resources  8/1/2025 2121 by Sarah Caba RN  Outcome: Progressing  8/1/2025 0824 by Kacey Morgan RN  Outcome: Progressing     Problem: Pain  Goal: Verbalizes/displays adequate comfort level or baseline comfort level  8/1/2025 2121 by Sarah Caba RN  Outcome: Progressing  8/1/2025 0824 by Kacey Morgan RN  Outcome: Progressing     Problem: Risk for Elopement  Goal: Patient will not exit the unit/facility without proper excort  Outcome: Progressing

## 2025-08-03 VITALS — TEMPERATURE: 98.2 F | BODY MASS INDEX: 26.67 KG/M2 | WEIGHT: 176 LBS | HEIGHT: 68 IN | HEART RATE: 82 BPM

## 2025-08-03 LAB
EKG ATRIAL RATE: 61 BPM
EKG DIAGNOSIS: NORMAL
EKG P AXIS: 62 DEGREES
EKG P-R INTERVAL: 176 MS
EKG Q-T INTERVAL: 468 MS
EKG QRS DURATION: 114 MS
EKG QTC CALCULATION (BAZETT): 471 MS
EKG R AXIS: -63 DEGREES
EKG T AXIS: 54 DEGREES
EKG VENTRICULAR RATE: 61 BPM

## 2025-08-03 PROCEDURE — 6370000000 HC RX 637 (ALT 250 FOR IP): Performed by: EMERGENCY MEDICINE

## 2025-08-03 RX ORDER — POLYETHYLENE GLYCOL 3350 17 G/17G
17 POWDER, FOR SOLUTION ORAL DAILY PRN
Qty: 5 EACH | Refills: 0 | Status: SHIPPED | OUTPATIENT
Start: 2025-08-03 | End: 2025-08-08

## 2025-08-03 RX ORDER — BISACODYL 10 MG
10 SUPPOSITORY, RECTAL RECTAL DAILY
Qty: 10 SUPPOSITORY | Refills: 0 | Status: SHIPPED | OUTPATIENT
Start: 2025-08-03 | End: 2025-08-13

## 2025-08-03 RX ORDER — BISACODYL 10 MG
10 SUPPOSITORY, RECTAL RECTAL
Status: COMPLETED | OUTPATIENT
Start: 2025-08-03 | End: 2025-08-03

## 2025-08-03 RX ORDER — POLYETHYLENE GLYCOL 3350 17 G/17G
17 POWDER, FOR SOLUTION ORAL ONCE
Status: COMPLETED | OUTPATIENT
Start: 2025-08-03 | End: 2025-08-03

## 2025-08-03 RX ADMIN — POLYETHYLENE GLYCOL 3350 17 G: 17 POWDER, FOR SOLUTION ORAL at 00:50

## 2025-08-03 RX ADMIN — BISACODYL 10 MG: 10 SUPPOSITORY RECTAL at 00:50

## 2025-08-03 ASSESSMENT — PAIN DESCRIPTION - LOCATION: LOCATION: ABDOMEN

## 2025-08-03 ASSESSMENT — PAIN - FUNCTIONAL ASSESSMENT: PAIN_FUNCTIONAL_ASSESSMENT: 0-10

## 2025-08-03 ASSESSMENT — PAIN DESCRIPTION - DESCRIPTORS: DESCRIPTORS: CRAMPING

## 2025-08-03 ASSESSMENT — PAIN SCALES - GENERAL: PAINLEVEL_OUTOF10: 5

## 2025-08-03 ASSESSMENT — PAIN DESCRIPTION - FREQUENCY: FREQUENCY: INTERMITTENT

## 2025-08-04 LAB
HBV SURFACE AB SERPL IA-ACNC: 55.5 MIU/ML
HBV SURFACE AG SER QL: NONREACTIVE
HEP BS AB COMMENT: ABNORMAL

## 2025-08-10 ENCOUNTER — HOSPITAL ENCOUNTER (EMERGENCY)
Age: 80
Discharge: HOME | End: 2025-08-10
Attending: EMERGENCY MEDICINE
Payer: MEDICARE

## 2025-08-10 ENCOUNTER — APPOINTMENT (OUTPATIENT)
Age: 80
End: 2025-08-10
Payer: MEDICARE

## 2025-08-10 VITALS
WEIGHT: 183.5 LBS | DIASTOLIC BLOOD PRESSURE: 95 MMHG | OXYGEN SATURATION: 99 % | HEART RATE: 62 BPM | BODY MASS INDEX: 27.81 KG/M2 | TEMPERATURE: 97.5 F | SYSTOLIC BLOOD PRESSURE: 197 MMHG | HEIGHT: 68 IN

## 2025-08-10 DIAGNOSIS — R12 HEARTBURN: ICD-10-CM

## 2025-08-10 DIAGNOSIS — R06.00 DYSPNEA, UNSPECIFIED TYPE: Primary | ICD-10-CM

## 2025-08-10 LAB
ANION GAP SERPL CALC-SCNC: 14 MMOL/L (ref 3–18)
BASOPHILS # BLD: 0.05 K/UL (ref 0–0.1)
BASOPHILS NFR BLD: 0.7 % (ref 0–2)
BNP SERPL-MCNC: 1389 PG/ML (ref 36–1800)
BUN SERPL-MCNC: 47 MG/DL (ref 6–23)
BUN/CREAT SERPL: 10
CA-I BLD-MCNC: 9.4 MG/DL (ref 8.5–10.1)
CHLORIDE SERPL-SCNC: 102 MMOL/L (ref 98–107)
CO2 SERPL-SCNC: 26 MMOL/L (ref 21–32)
CREAT SERPL-MCNC: 4.95 MG/DL (ref 0.6–1.3)
DIFFERENTIAL METHOD BLD: ABNORMAL
EKG ATRIAL RATE: 74 BPM
EKG DIAGNOSIS: NORMAL
EKG P AXIS: 52 DEGREES
EKG P-R INTERVAL: 318 MS
EKG Q-T INTERVAL: 430 MS
EKG QRS DURATION: 114 MS
EKG QTC CALCULATION (BAZETT): 477 MS
EKG R AXIS: -22 DEGREES
EKG T AXIS: 37 DEGREES
EKG VENTRICULAR RATE: 74 BPM
EOSINOPHIL # BLD: 0.11 K/UL (ref 0–0.4)
EOSINOPHIL NFR BLD: 1.6 % (ref 0–5)
ERYTHROCYTE [DISTWIDTH] IN BLOOD BY AUTOMATED COUNT: 14.6 % (ref 11.6–14.5)
GLUCOSE SERPL-MCNC: 137 MG/DL (ref 74–108)
HCT VFR BLD AUTO: 33.7 % (ref 36–48)
HGB BLD-MCNC: 11 G/DL (ref 13–16)
IMM GRANULOCYTES # BLD AUTO: 0.03 K/UL (ref 0–0.04)
IMM GRANULOCYTES NFR BLD AUTO: 0.4 % (ref 0–0.5)
LYMPHOCYTES # BLD: 1.63 K/UL (ref 0.9–3.6)
LYMPHOCYTES NFR BLD: 23.1 % (ref 21–52)
MCH RBC QN AUTO: 28 PG (ref 24–34)
MCHC RBC AUTO-ENTMCNC: 32.6 G/DL (ref 31–37)
MCV RBC AUTO: 85.8 FL (ref 78–100)
MONOCYTES # BLD: 0.8 K/UL (ref 0.05–1.2)
MONOCYTES NFR BLD: 11.3 % (ref 3–10)
NEUTS SEG # BLD: 4.43 K/UL (ref 1.8–8)
NEUTS SEG NFR BLD: 62.9 % (ref 40–73)
NRBC # BLD: 0 K/UL (ref 0–0.01)
NRBC BLD-RTO: 0 PER 100 WBC
PLATELET # BLD AUTO: 265 K/UL (ref 135–420)
PMV BLD AUTO: 9.9 FL (ref 9.2–11.8)
POTASSIUM SERPL-SCNC: 3.6 MMOL/L (ref 3.5–5.5)
RBC # BLD AUTO: 3.93 M/UL (ref 4.35–5.65)
SODIUM SERPL-SCNC: 142 MMOL/L (ref 136–145)
TROPONIN T SERPL HS-MCNC: 146 NG/L (ref 0–22)
WBC # BLD AUTO: 7.1 K/UL (ref 4.6–13.2)

## 2025-08-10 PROCEDURE — 93005 ELECTROCARDIOGRAM TRACING: CPT | Performed by: EMERGENCY MEDICINE

## 2025-08-10 PROCEDURE — 93010 ELECTROCARDIOGRAM REPORT: CPT | Performed by: SPECIALIST

## 2025-08-10 PROCEDURE — 84484 ASSAY OF TROPONIN QUANT: CPT

## 2025-08-10 PROCEDURE — 83880 ASSAY OF NATRIURETIC PEPTIDE: CPT

## 2025-08-10 PROCEDURE — 80048 BASIC METABOLIC PNL TOTAL CA: CPT

## 2025-08-10 PROCEDURE — 36415 COLL VENOUS BLD VENIPUNCTURE: CPT

## 2025-08-10 PROCEDURE — 99285 EMERGENCY DEPT VISIT HI MDM: CPT

## 2025-08-10 PROCEDURE — 71045 X-RAY EXAM CHEST 1 VIEW: CPT

## 2025-08-10 PROCEDURE — 85025 COMPLETE CBC W/AUTO DIFF WBC: CPT

## 2025-08-20 ENCOUNTER — HOSPITAL ENCOUNTER (EMERGENCY)
Age: 80
Discharge: HOME | End: 2025-08-20
Attending: FAMILY MEDICINE
Payer: MEDICARE

## 2025-08-20 ENCOUNTER — APPOINTMENT (OUTPATIENT)
Age: 80
End: 2025-08-20
Payer: MEDICARE

## 2025-08-20 VITALS
TEMPERATURE: 97.7 F | OXYGEN SATURATION: 98 % | HEART RATE: 86 BPM | SYSTOLIC BLOOD PRESSURE: 171 MMHG | RESPIRATION RATE: 17 BRPM | WEIGHT: 185 LBS | BODY MASS INDEX: 28.04 KG/M2 | DIASTOLIC BLOOD PRESSURE: 82 MMHG | HEIGHT: 68 IN

## 2025-08-20 DIAGNOSIS — R06.00 DYSPNEA, UNSPECIFIED TYPE: Primary | ICD-10-CM

## 2025-08-20 DIAGNOSIS — R79.89 ELEVATED TROPONIN: ICD-10-CM

## 2025-08-20 DIAGNOSIS — U07.1 COVID-19: ICD-10-CM

## 2025-08-20 DIAGNOSIS — I10 ESSENTIAL HYPERTENSION: ICD-10-CM

## 2025-08-20 DIAGNOSIS — N18.6 END STAGE RENAL DISEASE (HCC): ICD-10-CM

## 2025-08-20 LAB
ALBUMIN SERPL-MCNC: 3.3 G/DL (ref 3.4–5)
ALBUMIN/GLOB SERPL: 1
ALP SERPL-CCNC: 79 U/L (ref 45–117)
ALT SERPL-CCNC: 21 U/L (ref 10–50)
ANION GAP SERPL CALC-SCNC: 17 MMOL/L (ref 3–18)
AST SERPL W P-5'-P-CCNC: 23 U/L (ref 10–38)
BASOPHILS # BLD: 0.06 K/UL (ref 0–0.1)
BASOPHILS NFR BLD: 0.7 % (ref 0–2)
BILIRUB SERPL-MCNC: 0.3 MG/DL (ref 0.2–1)
BNP SERPL-MCNC: 1280 PG/ML (ref 36–1800)
BUN SERPL-MCNC: 52 MG/DL (ref 6–23)
BUN/CREAT SERPL: 10
CA-I BLD-MCNC: 9.2 MG/DL (ref 8.5–10.1)
CHLORIDE SERPL-SCNC: 99 MMOL/L (ref 98–107)
CO2 SERPL-SCNC: 24 MMOL/L (ref 21–32)
CREAT SERPL-MCNC: 5.14 MG/DL (ref 0.6–1.3)
DIFFERENTIAL METHOD BLD: ABNORMAL
EOSINOPHIL # BLD: 0.23 K/UL (ref 0–0.4)
EOSINOPHIL NFR BLD: 2.7 % (ref 0–5)
ERYTHROCYTE [DISTWIDTH] IN BLOOD BY AUTOMATED COUNT: 15.2 % (ref 11.6–14.5)
GLOBULIN SER CALC-MCNC: 3.4 G/DL
GLUCOSE SERPL-MCNC: 166 MG/DL (ref 74–108)
HCT VFR BLD AUTO: 32.9 % (ref 36–48)
HGB BLD-MCNC: 10.8 G/DL (ref 13–16)
IMM GRANULOCYTES # BLD AUTO: 0.03 K/UL (ref 0–0.04)
IMM GRANULOCYTES NFR BLD AUTO: 0.4 % (ref 0–0.5)
LYMPHOCYTES # BLD: 1.93 K/UL (ref 0.9–3.6)
LYMPHOCYTES NFR BLD: 22.6 % (ref 21–52)
MCH RBC QN AUTO: 28.1 PG (ref 24–34)
MCHC RBC AUTO-ENTMCNC: 32.8 G/DL (ref 31–37)
MCV RBC AUTO: 85.7 FL (ref 78–100)
MONOCYTES # BLD: 0.94 K/UL (ref 0.05–1.2)
MONOCYTES NFR BLD: 11 % (ref 3–10)
NEUTS SEG # BLD: 5.35 K/UL (ref 1.8–8)
NEUTS SEG NFR BLD: 62.6 % (ref 40–73)
NRBC # BLD: 0 K/UL (ref 0–0.01)
NRBC BLD-RTO: 0 PER 100 WBC
PLATELET # BLD AUTO: 238 K/UL (ref 135–420)
PMV BLD AUTO: 9.4 FL (ref 9.2–11.8)
POTASSIUM SERPL-SCNC: 3.5 MMOL/L (ref 3.5–5.5)
PROT SERPL-MCNC: 6.7 G/DL (ref 6.4–8.2)
RBC # BLD AUTO: 3.84 M/UL (ref 4.35–5.65)
SODIUM SERPL-SCNC: 140 MMOL/L (ref 136–145)
TROPONIN T SERPL HS-MCNC: 161 NG/L (ref 0–22)
TROPONIN T SERPL HS-MCNC: 171 NG/L (ref 0–22)
WBC # BLD AUTO: 8.5 K/UL (ref 4.6–13.2)

## 2025-08-20 PROCEDURE — 85025 COMPLETE CBC W/AUTO DIFF WBC: CPT

## 2025-08-20 PROCEDURE — 93005 ELECTROCARDIOGRAM TRACING: CPT | Performed by: FAMILY MEDICINE

## 2025-08-20 PROCEDURE — 71045 X-RAY EXAM CHEST 1 VIEW: CPT

## 2025-08-20 PROCEDURE — 80053 COMPREHEN METABOLIC PANEL: CPT

## 2025-08-20 PROCEDURE — 6370000000 HC RX 637 (ALT 250 FOR IP): Performed by: FAMILY MEDICINE

## 2025-08-20 PROCEDURE — 36415 COLL VENOUS BLD VENIPUNCTURE: CPT

## 2025-08-20 PROCEDURE — 83880 ASSAY OF NATRIURETIC PEPTIDE: CPT

## 2025-08-20 PROCEDURE — 99285 EMERGENCY DEPT VISIT HI MDM: CPT

## 2025-08-20 PROCEDURE — 84484 ASSAY OF TROPONIN QUANT: CPT

## 2025-08-20 RX ORDER — HYDRALAZINE HYDROCHLORIDE 25 MG/1
100 TABLET, FILM COATED ORAL
Status: COMPLETED | OUTPATIENT
Start: 2025-08-20 | End: 2025-08-20

## 2025-08-20 RX ADMIN — HYDRALAZINE HYDROCHLORIDE 100 MG: 25 TABLET ORAL at 01:23

## 2025-08-20 ASSESSMENT — PAIN SCALES - GENERAL: PAINLEVEL_OUTOF10: 0

## 2025-08-21 LAB
EKG ATRIAL RATE: 62 BPM
EKG DIAGNOSIS: NORMAL
EKG P AXIS: 32 DEGREES
EKG P-R INTERVAL: 312 MS
EKG Q-T INTERVAL: 438 MS
EKG QRS DURATION: 116 MS
EKG QTC CALCULATION (BAZETT): 444 MS
EKG R AXIS: -23 DEGREES
EKG T AXIS: 33 DEGREES
EKG VENTRICULAR RATE: 62 BPM

## 2025-08-22 ENCOUNTER — HOSPITAL ENCOUNTER (EMERGENCY)
Age: 80
Discharge: ANOTHER HOSPITAL | End: 2025-08-22
Attending: FAMILY MEDICINE
Payer: MEDICARE

## 2025-08-22 ENCOUNTER — APPOINTMENT (OUTPATIENT)
Age: 80
End: 2025-08-22
Payer: MEDICARE

## 2025-08-22 VITALS
TEMPERATURE: 99.2 F | BODY MASS INDEX: 28.04 KG/M2 | HEIGHT: 68 IN | HEART RATE: 78 BPM | RESPIRATION RATE: 17 BRPM | OXYGEN SATURATION: 97 % | SYSTOLIC BLOOD PRESSURE: 177 MMHG | WEIGHT: 185 LBS | DIASTOLIC BLOOD PRESSURE: 59 MMHG

## 2025-08-22 DIAGNOSIS — Z98.890 HISTORY OF UROSTOMY: ICD-10-CM

## 2025-08-22 DIAGNOSIS — R51.9 ACUTE NONINTRACTABLE HEADACHE, UNSPECIFIED HEADACHE TYPE: ICD-10-CM

## 2025-08-22 DIAGNOSIS — N18.6 END STAGE RENAL DISEASE (HCC): ICD-10-CM

## 2025-08-22 DIAGNOSIS — I67.1 CEREBRAL ANEURYSM: Primary | ICD-10-CM

## 2025-08-22 DIAGNOSIS — E23.6 PITUITARY MASS: ICD-10-CM

## 2025-08-22 LAB
ALBUMIN SERPL-MCNC: 3.1 G/DL (ref 3.4–5)
ALBUMIN/GLOB SERPL: 0.7
ALP SERPL-CCNC: 66 U/L (ref 45–117)
ALT SERPL-CCNC: 27 U/L (ref 10–50)
AMPHET UR QL SCN: NEGATIVE
ANION GAP SERPL CALC-SCNC: 14 MMOL/L (ref 3–18)
AST SERPL W P-5'-P-CCNC: 75 U/L (ref 10–38)
BARBITURATES UR QL SCN: NEGATIVE
BASOPHILS # BLD: 0.03 K/UL (ref 0–0.1)
BASOPHILS NFR BLD: 0.3 % (ref 0–2)
BENZODIAZ UR QL: NEGATIVE
BILIRUB SERPL-MCNC: 0.7 MG/DL (ref 0.2–1)
BNP SERPL-MCNC: 2478 PG/ML (ref 36–1800)
BUN SERPL-MCNC: 25 MG/DL (ref 6–23)
BUN/CREAT SERPL: 7
CA-I BLD-MCNC: 9.3 MG/DL (ref 8.5–10.1)
CANNABINOIDS UR QL SCN: NEGATIVE
CHLORIDE SERPL-SCNC: 93 MMOL/L (ref 98–107)
CO2 SERPL-SCNC: 26 MMOL/L (ref 21–32)
COCAINE UR QL SCN: NEGATIVE
CREAT SERPL-MCNC: 3.59 MG/DL (ref 0.6–1.3)
DIFFERENTIAL METHOD BLD: ABNORMAL
EOSINOPHIL # BLD: 0.01 K/UL (ref 0–0.4)
EOSINOPHIL NFR BLD: 0.1 % (ref 0–5)
ERYTHROCYTE [DISTWIDTH] IN BLOOD BY AUTOMATED COUNT: 15.1 % (ref 11.6–14.5)
ETHANOL SERPL-MCNC: <11 MG/DL (ref 0–0.08)
FENTANYL: NEGATIVE
GLOBULIN SER CALC-MCNC: 4.5 G/DL
GLUCOSE BLD STRIP.AUTO-MCNC: 131 MG/DL (ref 70–110)
GLUCOSE SERPL-MCNC: 146 MG/DL (ref 74–108)
HCT VFR BLD AUTO: 35.3 % (ref 36–48)
HGB BLD-MCNC: 11.9 G/DL (ref 13–16)
IMM GRANULOCYTES # BLD AUTO: 0.04 K/UL (ref 0–0.04)
IMM GRANULOCYTES NFR BLD AUTO: 0.4 % (ref 0–0.5)
INR PPP: 1 (ref 0.9–1.2)
LACTATE SERPL-SCNC: 1.4 MMOL/L (ref 0.4–2)
LYMPHOCYTES # BLD: 0.88 K/UL (ref 0.9–3.6)
LYMPHOCYTES NFR BLD: 9.2 % (ref 21–52)
Lab: NORMAL
MAGNESIUM SERPL-MCNC: 1.8 MG/DL (ref 1.6–2.6)
MCH RBC QN AUTO: 28.2 PG (ref 24–34)
MCHC RBC AUTO-ENTMCNC: 33.7 G/DL (ref 31–37)
MCV RBC AUTO: 83.6 FL (ref 78–100)
METHADONE UR QL: NEGATIVE
MONOCYTES # BLD: 1.3 K/UL (ref 0.05–1.2)
MONOCYTES NFR BLD: 13.6 % (ref 3–10)
NEUTS SEG # BLD: 7.3 K/UL (ref 1.8–8)
NEUTS SEG NFR BLD: 76.4 % (ref 40–73)
NRBC # BLD: 0 K/UL (ref 0–0.01)
NRBC BLD-RTO: 0 PER 100 WBC
OPIATES UR QL: NEGATIVE
OXYCODONE UR QL SCN: NEGATIVE
PERFORMED BY:: ABNORMAL
PLATELET # BLD AUTO: 226 K/UL (ref 135–420)
PMV BLD AUTO: 10.6 FL (ref 9.2–11.8)
POTASSIUM SERPL-SCNC: 4.7 MMOL/L (ref 3.5–5.5)
PROT SERPL-MCNC: 7.7 G/DL (ref 6.4–8.2)
PROTHROMBIN TIME: 13 SEC (ref 12–15.1)
RBC # BLD AUTO: 4.22 M/UL (ref 4.35–5.65)
SODIUM SERPL-SCNC: 132 MMOL/L (ref 136–145)
TROPONIN T SERPL HS-MCNC: 193 NG/L (ref 0–22)
TROPONIN T SERPL HS-MCNC: 193 NG/L (ref 0–22)
WBC # BLD AUTO: 9.6 K/UL (ref 4.6–13.2)

## 2025-08-22 PROCEDURE — 80053 COMPREHEN METABOLIC PANEL: CPT

## 2025-08-22 PROCEDURE — 70498 CT ANGIOGRAPHY NECK: CPT

## 2025-08-22 PROCEDURE — 82077 ASSAY SPEC XCP UR&BREATH IA: CPT

## 2025-08-22 PROCEDURE — 96365 THER/PROPH/DIAG IV INF INIT: CPT

## 2025-08-22 PROCEDURE — 83735 ASSAY OF MAGNESIUM: CPT

## 2025-08-22 PROCEDURE — 93005 ELECTROCARDIOGRAM TRACING: CPT | Performed by: FAMILY MEDICINE

## 2025-08-22 PROCEDURE — 85610 PROTHROMBIN TIME: CPT

## 2025-08-22 PROCEDURE — 70450 CT HEAD/BRAIN W/O DYE: CPT

## 2025-08-22 PROCEDURE — 71045 X-RAY EXAM CHEST 1 VIEW: CPT

## 2025-08-22 PROCEDURE — 6360000002 HC RX W HCPCS: Performed by: FAMILY MEDICINE

## 2025-08-22 PROCEDURE — 82962 GLUCOSE BLOOD TEST: CPT

## 2025-08-22 PROCEDURE — 6360000004 HC RX CONTRAST MEDICATION: Performed by: FAMILY MEDICINE

## 2025-08-22 PROCEDURE — 6370000000 HC RX 637 (ALT 250 FOR IP): Performed by: FAMILY MEDICINE

## 2025-08-22 PROCEDURE — 85025 COMPLETE CBC W/AUTO DIFF WBC: CPT

## 2025-08-22 PROCEDURE — 2580000003 HC RX 258: Performed by: FAMILY MEDICINE

## 2025-08-22 PROCEDURE — 99285 EMERGENCY DEPT VISIT HI MDM: CPT

## 2025-08-22 PROCEDURE — 84484 ASSAY OF TROPONIN QUANT: CPT

## 2025-08-22 PROCEDURE — 96376 TX/PRO/DX INJ SAME DRUG ADON: CPT

## 2025-08-22 PROCEDURE — 96375 TX/PRO/DX INJ NEW DRUG ADDON: CPT

## 2025-08-22 PROCEDURE — 80307 DRUG TEST PRSMV CHEM ANLYZR: CPT

## 2025-08-22 PROCEDURE — 83880 ASSAY OF NATRIURETIC PEPTIDE: CPT

## 2025-08-22 PROCEDURE — 83605 ASSAY OF LACTIC ACID: CPT

## 2025-08-22 RX ORDER — 0.9 % SODIUM CHLORIDE 0.9 %
250 INTRAVENOUS SOLUTION INTRAVENOUS ONCE
Status: COMPLETED | OUTPATIENT
Start: 2025-08-22 | End: 2025-08-22

## 2025-08-22 RX ORDER — HYDROCODONE BITARTRATE AND ACETAMINOPHEN 5; 325 MG/1; MG/1
1 TABLET ORAL
Refills: 0 | Status: COMPLETED | OUTPATIENT
Start: 2025-08-22 | End: 2025-08-22

## 2025-08-22 RX ORDER — IOPAMIDOL 755 MG/ML
100 INJECTION, SOLUTION INTRAVASCULAR
Status: COMPLETED | OUTPATIENT
Start: 2025-08-22 | End: 2025-08-22

## 2025-08-22 RX ORDER — METOPROLOL TARTRATE 1 MG/ML
5 INJECTION, SOLUTION INTRAVENOUS
Status: COMPLETED | OUTPATIENT
Start: 2025-08-22 | End: 2025-08-22

## 2025-08-22 RX ORDER — HYDRALAZINE HYDROCHLORIDE 20 MG/ML
10 INJECTION INTRAMUSCULAR; INTRAVENOUS
Status: COMPLETED | OUTPATIENT
Start: 2025-08-22 | End: 2025-08-22

## 2025-08-22 RX ADMIN — SODIUM CHLORIDE 250 ML: 0.9 INJECTION, SOLUTION INTRAVENOUS at 18:34

## 2025-08-22 RX ADMIN — METOPROLOL TARTRATE 5 MG: 5 INJECTION INTRAVENOUS at 23:03

## 2025-08-22 RX ADMIN — HYDRALAZINE HYDROCHLORIDE 10 MG: 20 INJECTION INTRAMUSCULAR; INTRAVENOUS at 23:16

## 2025-08-22 RX ADMIN — IOPAMIDOL 100 ML: 755 INJECTION, SOLUTION INTRAVENOUS at 15:31

## 2025-08-22 RX ADMIN — HYDROCODONE BITARTRATE AND ACETAMINOPHEN 1 TABLET: 5; 325 TABLET ORAL at 18:51

## 2025-08-22 RX ADMIN — HYDRALAZINE HYDROCHLORIDE 10 MG: 20 INJECTION INTRAMUSCULAR; INTRAVENOUS at 22:29

## 2025-08-22 ASSESSMENT — PAIN - FUNCTIONAL ASSESSMENT
PAIN_FUNCTIONAL_ASSESSMENT: 0-10

## 2025-08-22 ASSESSMENT — PAIN SCALES - GENERAL
PAINLEVEL_OUTOF10: 10
PAINLEVEL_OUTOF10: 7
PAINLEVEL_OUTOF10: 10

## 2025-08-22 ASSESSMENT — PAIN DESCRIPTION - LOCATION
LOCATION: HEAD

## 2025-08-23 ENCOUNTER — APPOINTMENT (OUTPATIENT)
Facility: HOSPITAL | Age: 80
End: 2025-08-23
Attending: FAMILY MEDICINE
Payer: MEDICARE

## 2025-08-23 ENCOUNTER — HOSPITAL ENCOUNTER (INPATIENT)
Facility: HOSPITAL | Age: 80
LOS: 6 days | Discharge: NURSING, REHAB OR LONG-TERM CARE | End: 2025-08-29
Attending: FAMILY MEDICINE | Admitting: FAMILY MEDICINE
Payer: MEDICARE

## 2025-08-23 DIAGNOSIS — I67.1 CEREBRAL ANEURYSM, NONRUPTURED: Primary | ICD-10-CM

## 2025-08-23 DIAGNOSIS — I10 ESSENTIAL HYPERTENSION: ICD-10-CM

## 2025-08-23 PROBLEM — R51.9 ACUTE INTRACTABLE HEADACHE: Status: ACTIVE | Noted: 2025-08-23

## 2025-08-23 LAB
EKG DIAGNOSIS: NORMAL
EKG Q-T INTERVAL: 418 MS
EKG QRS DURATION: 114 MS
EKG QTC CALCULATION (BAZETT): 522 MS
EKG R AXIS: -30 DEGREES
EKG T AXIS: 49 DEGREES
EKG VENTRICULAR RATE: 94 BPM
FLUAV RNA SPEC QL NAA+PROBE: NOT DETECTED
FLUBV RNA SPEC QL NAA+PROBE: NOT DETECTED
GLUCOSE BLD STRIP.AUTO-MCNC: 105 MG/DL (ref 65–117)
GLUCOSE BLD STRIP.AUTO-MCNC: 135 MG/DL (ref 65–117)
GLUCOSE BLD STRIP.AUTO-MCNC: 160 MG/DL (ref 65–117)
GLUCOSE BLD STRIP.AUTO-MCNC: 162 MG/DL (ref 65–117)
SARS-COV-2 RNA RESP QL NAA+PROBE: NOT DETECTED
SERVICE CMNT-IMP: ABNORMAL
SERVICE CMNT-IMP: NORMAL
SOURCE: NORMAL

## 2025-08-23 PROCEDURE — 5A1D70Z PERFORMANCE OF URINARY FILTRATION, INTERMITTENT, LESS THAN 6 HOURS PER DAY: ICD-10-PCS | Performed by: INTERNAL MEDICINE

## 2025-08-23 PROCEDURE — 6370000000 HC RX 637 (ALT 250 FOR IP): Performed by: FAMILY MEDICINE

## 2025-08-23 PROCEDURE — A9579 GAD-BASE MR CONTRAST NOS,1ML: HCPCS | Performed by: FAMILY MEDICINE

## 2025-08-23 PROCEDURE — 94760 N-INVAS EAR/PLS OXIMETRY 1: CPT

## 2025-08-23 PROCEDURE — 87636 SARSCOV2 & INF A&B AMP PRB: CPT

## 2025-08-23 PROCEDURE — 2060000000 HC ICU INTERMEDIATE R&B

## 2025-08-23 PROCEDURE — 70544 MR ANGIOGRAPHY HEAD W/O DYE: CPT

## 2025-08-23 PROCEDURE — 82962 GLUCOSE BLOOD TEST: CPT

## 2025-08-23 PROCEDURE — 2500000003 HC RX 250 WO HCPCS: Performed by: FAMILY MEDICINE

## 2025-08-23 PROCEDURE — 6360000004 HC RX CONTRAST MEDICATION: Performed by: FAMILY MEDICINE

## 2025-08-23 PROCEDURE — 70553 MRI BRAIN STEM W/O & W/DYE: CPT

## 2025-08-23 RX ORDER — ACETAMINOPHEN 325 MG/1
650 TABLET ORAL EVERY 4 HOURS PRN
Status: DISCONTINUED | OUTPATIENT
Start: 2025-08-23 | End: 2025-08-30 | Stop reason: HOSPADM

## 2025-08-23 RX ORDER — FLUTICASONE PROPIONATE 50 MCG
1 SPRAY, SUSPENSION (ML) NASAL DAILY PRN
Status: DISCONTINUED | OUTPATIENT
Start: 2025-08-23 | End: 2025-08-30 | Stop reason: HOSPADM

## 2025-08-23 RX ORDER — ONDANSETRON 4 MG/1
4 TABLET, ORALLY DISINTEGRATING ORAL EVERY 8 HOURS PRN
Status: DISCONTINUED | OUTPATIENT
Start: 2025-08-23 | End: 2025-08-30 | Stop reason: HOSPADM

## 2025-08-23 RX ORDER — ALLOPURINOL 100 MG/1
200 TABLET ORAL DAILY
Status: DISCONTINUED | OUTPATIENT
Start: 2025-08-23 | End: 2025-08-30 | Stop reason: HOSPADM

## 2025-08-23 RX ORDER — VITAMIN B COMPLEX
1000 TABLET ORAL DAILY
Status: DISCONTINUED | OUTPATIENT
Start: 2025-08-23 | End: 2025-08-30 | Stop reason: HOSPADM

## 2025-08-23 RX ORDER — ACETAMINOPHEN 650 MG/1
650 SUPPOSITORY RECTAL EVERY 4 HOURS PRN
Status: DISCONTINUED | OUTPATIENT
Start: 2025-08-23 | End: 2025-08-30 | Stop reason: HOSPADM

## 2025-08-23 RX ORDER — INSULIN LISPRO 100 [IU]/ML
0-8 INJECTION, SOLUTION INTRAVENOUS; SUBCUTANEOUS
Status: DISCONTINUED | OUTPATIENT
Start: 2025-08-23 | End: 2025-08-30 | Stop reason: HOSPADM

## 2025-08-23 RX ORDER — SODIUM BICARBONATE 650 MG/1
650 TABLET ORAL 3 TIMES DAILY
Status: DISCONTINUED | OUTPATIENT
Start: 2025-08-23 | End: 2025-08-30 | Stop reason: HOSPADM

## 2025-08-23 RX ORDER — SODIUM CHLORIDE 0.9 % (FLUSH) 0.9 %
5-40 SYRINGE (ML) INJECTION PRN
Status: DISCONTINUED | OUTPATIENT
Start: 2025-08-23 | End: 2025-08-30 | Stop reason: HOSPADM

## 2025-08-23 RX ORDER — POLYETHYLENE GLYCOL 3350 17 G/17G
17 POWDER, FOR SOLUTION ORAL DAILY PRN
Status: DISCONTINUED | OUTPATIENT
Start: 2025-08-23 | End: 2025-08-30 | Stop reason: HOSPADM

## 2025-08-23 RX ORDER — DEXTROSE MONOHYDRATE 100 MG/ML
INJECTION, SOLUTION INTRAVENOUS CONTINUOUS PRN
Status: DISCONTINUED | OUTPATIENT
Start: 2025-08-23 | End: 2025-08-30 | Stop reason: HOSPADM

## 2025-08-23 RX ORDER — ATORVASTATIN CALCIUM 20 MG/1
40 TABLET, FILM COATED ORAL DAILY
Status: DISCONTINUED | OUTPATIENT
Start: 2025-08-23 | End: 2025-08-30 | Stop reason: HOSPADM

## 2025-08-23 RX ORDER — PANTOPRAZOLE SODIUM 40 MG/1
40 TABLET, DELAYED RELEASE ORAL
Status: DISCONTINUED | OUTPATIENT
Start: 2025-08-23 | End: 2025-08-30 | Stop reason: HOSPADM

## 2025-08-23 RX ORDER — ALBUTEROL SULFATE 90 UG/1
2 INHALANT RESPIRATORY (INHALATION) EVERY 4 HOURS PRN
Status: DISCONTINUED | OUTPATIENT
Start: 2025-08-23 | End: 2025-08-30 | Stop reason: HOSPADM

## 2025-08-23 RX ORDER — HYDRALAZINE HYDROCHLORIDE 50 MG/1
100 TABLET, FILM COATED ORAL 2 TIMES DAILY
Status: DISCONTINUED | OUTPATIENT
Start: 2025-08-23 | End: 2025-08-27

## 2025-08-23 RX ORDER — INSULIN GLARGINE 100 [IU]/ML
8 INJECTION, SOLUTION SUBCUTANEOUS DAILY
Status: DISCONTINUED | OUTPATIENT
Start: 2025-08-23 | End: 2025-08-30 | Stop reason: HOSPADM

## 2025-08-23 RX ORDER — SODIUM CHLORIDE 0.9 % (FLUSH) 0.9 %
5-40 SYRINGE (ML) INJECTION EVERY 12 HOURS SCHEDULED
Status: DISCONTINUED | OUTPATIENT
Start: 2025-08-23 | End: 2025-08-30 | Stop reason: HOSPADM

## 2025-08-23 RX ORDER — CETIRIZINE HYDROCHLORIDE 10 MG/1
10 TABLET ORAL DAILY
Status: DISCONTINUED | OUTPATIENT
Start: 2025-08-23 | End: 2025-08-30 | Stop reason: HOSPADM

## 2025-08-23 RX ORDER — SODIUM CHLORIDE 9 MG/ML
INJECTION, SOLUTION INTRAVENOUS PRN
Status: DISCONTINUED | OUTPATIENT
Start: 2025-08-23 | End: 2025-08-30 | Stop reason: HOSPADM

## 2025-08-23 RX ORDER — ISOSORBIDE MONONITRATE 30 MG/1
30 TABLET, EXTENDED RELEASE ORAL DAILY
Status: DISCONTINUED | OUTPATIENT
Start: 2025-08-23 | End: 2025-08-28

## 2025-08-23 RX ORDER — ONDANSETRON 2 MG/ML
4 INJECTION INTRAMUSCULAR; INTRAVENOUS EVERY 6 HOURS PRN
Status: DISCONTINUED | OUTPATIENT
Start: 2025-08-23 | End: 2025-08-30 | Stop reason: HOSPADM

## 2025-08-23 RX ORDER — GADOTERIDOL 279.3 MG/ML
15 INJECTION INTRAVENOUS
Status: COMPLETED | OUTPATIENT
Start: 2025-08-23 | End: 2025-08-23

## 2025-08-23 RX ADMIN — ISOSORBIDE MONONITRATE 30 MG: 30 TABLET, EXTENDED RELEASE ORAL at 08:25

## 2025-08-23 RX ADMIN — CETIRIZINE HYDROCHLORIDE 10 MG: 10 TABLET, FILM COATED ORAL at 08:25

## 2025-08-23 RX ADMIN — SODIUM CHLORIDE, PRESERVATIVE FREE 10 ML: 5 INJECTION INTRAVENOUS at 21:10

## 2025-08-23 RX ADMIN — Medication 1000 UNITS: at 08:24

## 2025-08-23 RX ADMIN — HYDRALAZINE HYDROCHLORIDE 100 MG: 50 TABLET ORAL at 21:08

## 2025-08-23 RX ADMIN — HYDRALAZINE HYDROCHLORIDE 100 MG: 50 TABLET ORAL at 08:24

## 2025-08-23 RX ADMIN — ATORVASTATIN CALCIUM 40 MG: 20 TABLET, FILM COATED ORAL at 08:24

## 2025-08-23 RX ADMIN — SODIUM BICARBONATE 650 MG: 650 TABLET ORAL at 21:08

## 2025-08-23 RX ADMIN — SODIUM CHLORIDE, PRESERVATIVE FREE 10 ML: 5 INJECTION INTRAVENOUS at 08:28

## 2025-08-23 RX ADMIN — SODIUM BICARBONATE 650 MG: 650 TABLET ORAL at 08:31

## 2025-08-23 RX ADMIN — GADOTERIDOL 15 ML: 279.3 INJECTION, SOLUTION INTRAVENOUS at 15:24

## 2025-08-23 RX ADMIN — SODIUM BICARBONATE 650 MG: 650 TABLET ORAL at 13:38

## 2025-08-23 RX ADMIN — ALLOPURINOL 200 MG: 100 TABLET ORAL at 08:25

## 2025-08-23 RX ADMIN — INSULIN GLARGINE 8 UNITS: 100 INJECTION, SOLUTION SUBCUTANEOUS at 08:31

## 2025-08-23 ASSESSMENT — PAIN SCALES - GENERAL: PAINLEVEL_OUTOF10: 0

## 2025-08-24 LAB
ANION GAP SERPL CALC-SCNC: 17 MMOL/L (ref 2–14)
BUN SERPL-MCNC: 62 MG/DL (ref 8–23)
BUN/CREAT SERPL: 11 (ref 12–20)
CALCIUM SERPL-MCNC: 9.2 MG/DL (ref 8.8–10.2)
CHLORIDE SERPL-SCNC: 97 MMOL/L (ref 98–107)
CHOLEST SERPL-MCNC: 119 MG/DL (ref 0–200)
CO2 SERPL-SCNC: 21 MMOL/L (ref 20–29)
CREAT SERPL-MCNC: 5.72 MG/DL (ref 0.7–1.2)
ERYTHROCYTE [DISTWIDTH] IN BLOOD BY AUTOMATED COUNT: 14.8 % (ref 11.5–14.5)
EST. AVERAGE GLUCOSE BLD GHB EST-MCNC: 156 MG/DL
GLUCOSE BLD STRIP.AUTO-MCNC: 182 MG/DL (ref 65–117)
GLUCOSE BLD STRIP.AUTO-MCNC: 192 MG/DL (ref 65–117)
GLUCOSE BLD STRIP.AUTO-MCNC: 226 MG/DL (ref 65–117)
GLUCOSE BLD STRIP.AUTO-MCNC: 86 MG/DL (ref 65–117)
GLUCOSE SERPL-MCNC: 122 MG/DL (ref 65–100)
HBA1C MFR BLD: 7.1 % (ref 4–5.6)
HCT VFR BLD AUTO: 34.7 % (ref 36.6–50.3)
HDLC SERPL-MCNC: 41 MG/DL (ref 40–60)
HDLC SERPL: 2.9 (ref 0–5)
HGB BLD-MCNC: 11.3 G/DL (ref 12.1–17)
LDLC SERPL CALC-MCNC: 63 MG/DL
MAGNESIUM SERPL-MCNC: 2 MG/DL (ref 1.6–2.4)
MCH RBC QN AUTO: 27.6 PG (ref 26–34)
MCHC RBC AUTO-ENTMCNC: 32.6 G/DL (ref 30–36.5)
MCV RBC AUTO: 84.8 FL (ref 80–99)
NRBC # BLD: 0 K/UL (ref 0–0.01)
NRBC BLD-RTO: 0 PER 100 WBC
PHOSPHATE SERPL-MCNC: 6 MG/DL (ref 2.5–4.5)
PLATELET # BLD AUTO: 185 K/UL (ref 150–400)
PMV BLD AUTO: 10.5 FL (ref 8.9–12.9)
POTASSIUM SERPL-SCNC: 4 MMOL/L (ref 3.5–5.1)
RBC # BLD AUTO: 4.09 M/UL (ref 4.1–5.7)
SERVICE CMNT-IMP: ABNORMAL
SERVICE CMNT-IMP: NORMAL
SODIUM SERPL-SCNC: 135 MMOL/L (ref 136–145)
TRIGL SERPL-MCNC: 79 MG/DL (ref 0–150)
VLDLC SERPL CALC-MCNC: 16 MG/DL
WBC # BLD AUTO: 7.9 K/UL (ref 4.1–11.1)

## 2025-08-24 PROCEDURE — 97530 THERAPEUTIC ACTIVITIES: CPT

## 2025-08-24 PROCEDURE — 2500000003 HC RX 250 WO HCPCS: Performed by: FAMILY MEDICINE

## 2025-08-24 PROCEDURE — 6370000000 HC RX 637 (ALT 250 FOR IP): Performed by: FAMILY MEDICINE

## 2025-08-24 PROCEDURE — 83036 HEMOGLOBIN GLYCOSYLATED A1C: CPT

## 2025-08-24 PROCEDURE — 94760 N-INVAS EAR/PLS OXIMETRY 1: CPT

## 2025-08-24 PROCEDURE — 82962 GLUCOSE BLOOD TEST: CPT

## 2025-08-24 PROCEDURE — 97165 OT EVAL LOW COMPLEX 30 MIN: CPT

## 2025-08-24 PROCEDURE — 85027 COMPLETE CBC AUTOMATED: CPT

## 2025-08-24 PROCEDURE — 2060000000 HC ICU INTERMEDIATE R&B

## 2025-08-24 PROCEDURE — 83735 ASSAY OF MAGNESIUM: CPT

## 2025-08-24 PROCEDURE — 80048 BASIC METABOLIC PNL TOTAL CA: CPT

## 2025-08-24 PROCEDURE — 80061 LIPID PANEL: CPT

## 2025-08-24 PROCEDURE — 97161 PT EVAL LOW COMPLEX 20 MIN: CPT

## 2025-08-24 PROCEDURE — 84100 ASSAY OF PHOSPHORUS: CPT

## 2025-08-24 RX ADMIN — INSULIN LISPRO 2 UNITS: 100 INJECTION, SOLUTION INTRAVENOUS; SUBCUTANEOUS at 17:02

## 2025-08-24 RX ADMIN — INSULIN LISPRO 2 UNITS: 100 INJECTION, SOLUTION INTRAVENOUS; SUBCUTANEOUS at 11:29

## 2025-08-24 RX ADMIN — ALLOPURINOL 200 MG: 100 TABLET ORAL at 08:06

## 2025-08-24 RX ADMIN — SODIUM CHLORIDE, PRESERVATIVE FREE 10 ML: 5 INJECTION INTRAVENOUS at 20:32

## 2025-08-24 RX ADMIN — SODIUM BICARBONATE 650 MG: 650 TABLET ORAL at 08:06

## 2025-08-24 RX ADMIN — ATORVASTATIN CALCIUM 40 MG: 20 TABLET, FILM COATED ORAL at 08:06

## 2025-08-24 RX ADMIN — INSULIN LISPRO 2 UNITS: 100 INJECTION, SOLUTION INTRAVENOUS; SUBCUTANEOUS at 20:37

## 2025-08-24 RX ADMIN — HYDRALAZINE HYDROCHLORIDE 100 MG: 50 TABLET ORAL at 08:06

## 2025-08-24 RX ADMIN — ISOSORBIDE MONONITRATE 30 MG: 30 TABLET, EXTENDED RELEASE ORAL at 08:06

## 2025-08-24 RX ADMIN — Medication 1000 UNITS: at 08:06

## 2025-08-24 RX ADMIN — HYDRALAZINE HYDROCHLORIDE 100 MG: 50 TABLET ORAL at 20:30

## 2025-08-24 RX ADMIN — SODIUM CHLORIDE, PRESERVATIVE FREE 10 ML: 5 INJECTION INTRAVENOUS at 08:07

## 2025-08-24 RX ADMIN — SODIUM BICARBONATE 650 MG: 650 TABLET ORAL at 20:32

## 2025-08-24 RX ADMIN — CETIRIZINE HYDROCHLORIDE 10 MG: 10 TABLET, FILM COATED ORAL at 08:06

## 2025-08-24 RX ADMIN — PANTOPRAZOLE SODIUM 40 MG: 40 TABLET, DELAYED RELEASE ORAL at 07:46

## 2025-08-24 RX ADMIN — INSULIN GLARGINE 8 UNITS: 100 INJECTION, SOLUTION SUBCUTANEOUS at 08:57

## 2025-08-24 ASSESSMENT — PAIN SCALES - GENERAL
PAINLEVEL_OUTOF10: 0
PAINLEVEL_OUTOF10: 0

## 2025-08-25 LAB
ANION GAP SERPL CALC-SCNC: 19 MMOL/L (ref 2–14)
BUN SERPL-MCNC: 76 MG/DL (ref 8–23)
BUN/CREAT SERPL: 11 (ref 12–20)
CALCIUM SERPL-MCNC: 8.9 MG/DL (ref 8.8–10.2)
CHLORIDE SERPL-SCNC: 98 MMOL/L (ref 98–107)
CO2 SERPL-SCNC: 21 MMOL/L (ref 20–29)
CREAT SERPL-MCNC: 6.9 MG/DL (ref 0.7–1.2)
GLUCOSE BLD STRIP.AUTO-MCNC: 125 MG/DL (ref 65–117)
GLUCOSE BLD STRIP.AUTO-MCNC: 152 MG/DL (ref 65–117)
GLUCOSE BLD STRIP.AUTO-MCNC: 244 MG/DL (ref 65–117)
GLUCOSE BLD STRIP.AUTO-MCNC: 346 MG/DL (ref 65–117)
GLUCOSE SERPL-MCNC: 188 MG/DL (ref 65–100)
MAGNESIUM SERPL-MCNC: 2.1 MG/DL (ref 1.6–2.4)
PHOSPHATE SERPL-MCNC: 6.5 MG/DL (ref 2.5–4.5)
POTASSIUM SERPL-SCNC: 4 MMOL/L (ref 3.5–5.1)
SERVICE CMNT-IMP: ABNORMAL
SODIUM SERPL-SCNC: 138 MMOL/L (ref 136–145)

## 2025-08-25 PROCEDURE — 97535 SELF CARE MNGMENT TRAINING: CPT

## 2025-08-25 PROCEDURE — 97530 THERAPEUTIC ACTIVITIES: CPT

## 2025-08-25 PROCEDURE — 2500000003 HC RX 250 WO HCPCS: Performed by: FAMILY MEDICINE

## 2025-08-25 PROCEDURE — 6370000000 HC RX 637 (ALT 250 FOR IP): Performed by: FAMILY MEDICINE

## 2025-08-25 PROCEDURE — 83735 ASSAY OF MAGNESIUM: CPT

## 2025-08-25 PROCEDURE — 84100 ASSAY OF PHOSPHORUS: CPT

## 2025-08-25 PROCEDURE — 80048 BASIC METABOLIC PNL TOTAL CA: CPT

## 2025-08-25 PROCEDURE — 90935 HEMODIALYSIS ONE EVALUATION: CPT

## 2025-08-25 PROCEDURE — 2060000000 HC ICU INTERMEDIATE R&B

## 2025-08-25 PROCEDURE — 82962 GLUCOSE BLOOD TEST: CPT

## 2025-08-25 PROCEDURE — 92610 EVALUATE SWALLOWING FUNCTION: CPT

## 2025-08-25 RX ADMIN — SODIUM CHLORIDE, PRESERVATIVE FREE 10 ML: 5 INJECTION INTRAVENOUS at 20:44

## 2025-08-25 RX ADMIN — ATORVASTATIN CALCIUM 40 MG: 20 TABLET, FILM COATED ORAL at 09:48

## 2025-08-25 RX ADMIN — ISOSORBIDE MONONITRATE 30 MG: 30 TABLET, EXTENDED RELEASE ORAL at 09:48

## 2025-08-25 RX ADMIN — SODIUM CHLORIDE, PRESERVATIVE FREE 10 ML: 5 INJECTION INTRAVENOUS at 09:49

## 2025-08-25 RX ADMIN — SODIUM BICARBONATE 650 MG: 650 TABLET ORAL at 09:48

## 2025-08-25 RX ADMIN — Medication 1000 UNITS: at 09:48

## 2025-08-25 RX ADMIN — INSULIN LISPRO 2 UNITS: 100 INJECTION, SOLUTION INTRAVENOUS; SUBCUTANEOUS at 20:43

## 2025-08-25 RX ADMIN — ALLOPURINOL 200 MG: 100 TABLET ORAL at 09:49

## 2025-08-25 RX ADMIN — HYDRALAZINE HYDROCHLORIDE 100 MG: 50 TABLET ORAL at 09:49

## 2025-08-25 RX ADMIN — SODIUM BICARBONATE 650 MG: 650 TABLET ORAL at 12:26

## 2025-08-25 RX ADMIN — SODIUM BICARBONATE 650 MG: 650 TABLET ORAL at 20:44

## 2025-08-25 RX ADMIN — CETIRIZINE HYDROCHLORIDE 10 MG: 10 TABLET, FILM COATED ORAL at 09:48

## 2025-08-25 RX ADMIN — HYDRALAZINE HYDROCHLORIDE 100 MG: 50 TABLET ORAL at 20:44

## 2025-08-25 RX ADMIN — INSULIN LISPRO 6 UNITS: 100 INJECTION, SOLUTION INTRAVENOUS; SUBCUTANEOUS at 12:25

## 2025-08-25 RX ADMIN — INSULIN GLARGINE 8 UNITS: 100 INJECTION, SOLUTION SUBCUTANEOUS at 09:47

## 2025-08-25 ASSESSMENT — PAIN DESCRIPTION - LOCATION: LOCATION: HEAD

## 2025-08-25 ASSESSMENT — PAIN SCALES - GENERAL
PAINLEVEL_OUTOF10: 4
PAINLEVEL_OUTOF10: 0

## 2025-08-26 LAB
ANION GAP SERPL CALC-SCNC: 17 MMOL/L (ref 2–14)
BUN SERPL-MCNC: 46 MG/DL (ref 8–23)
BUN/CREAT SERPL: 9 (ref 12–20)
CALCIUM SERPL-MCNC: 9.3 MG/DL (ref 8.8–10.2)
CHLORIDE SERPL-SCNC: 98 MMOL/L (ref 98–107)
CO2 SERPL-SCNC: 23 MMOL/L (ref 20–29)
CREAT SERPL-MCNC: 5.3 MG/DL (ref 0.7–1.2)
GLUCOSE BLD STRIP.AUTO-MCNC: 109 MG/DL (ref 65–117)
GLUCOSE BLD STRIP.AUTO-MCNC: 122 MG/DL (ref 65–117)
GLUCOSE BLD STRIP.AUTO-MCNC: 147 MG/DL (ref 65–117)
GLUCOSE BLD STRIP.AUTO-MCNC: 354 MG/DL (ref 65–117)
GLUCOSE SERPL-MCNC: 164 MG/DL (ref 65–100)
MAGNESIUM SERPL-MCNC: 2.1 MG/DL (ref 1.6–2.4)
PHOSPHATE SERPL-MCNC: 5.2 MG/DL (ref 2.5–4.5)
POTASSIUM SERPL-SCNC: 4.1 MMOL/L (ref 3.5–5.1)
SERVICE CMNT-IMP: ABNORMAL
SERVICE CMNT-IMP: NORMAL
SODIUM SERPL-SCNC: 138 MMOL/L (ref 136–145)

## 2025-08-26 PROCEDURE — 80048 BASIC METABOLIC PNL TOTAL CA: CPT

## 2025-08-26 PROCEDURE — 84100 ASSAY OF PHOSPHORUS: CPT

## 2025-08-26 PROCEDURE — 2060000000 HC ICU INTERMEDIATE R&B

## 2025-08-26 PROCEDURE — 97116 GAIT TRAINING THERAPY: CPT

## 2025-08-26 PROCEDURE — 97530 THERAPEUTIC ACTIVITIES: CPT

## 2025-08-26 PROCEDURE — 82962 GLUCOSE BLOOD TEST: CPT

## 2025-08-26 PROCEDURE — 97535 SELF CARE MNGMENT TRAINING: CPT

## 2025-08-26 PROCEDURE — 83735 ASSAY OF MAGNESIUM: CPT

## 2025-08-26 PROCEDURE — 2500000003 HC RX 250 WO HCPCS: Performed by: FAMILY MEDICINE

## 2025-08-26 PROCEDURE — 6370000000 HC RX 637 (ALT 250 FOR IP): Performed by: FAMILY MEDICINE

## 2025-08-26 PROCEDURE — 97110 THERAPEUTIC EXERCISES: CPT

## 2025-08-26 RX ADMIN — INSULIN GLARGINE 8 UNITS: 100 INJECTION, SOLUTION SUBCUTANEOUS at 09:11

## 2025-08-26 RX ADMIN — SODIUM BICARBONATE 650 MG: 650 TABLET ORAL at 13:38

## 2025-08-26 RX ADMIN — SODIUM CHLORIDE, PRESERVATIVE FREE 10 ML: 5 INJECTION INTRAVENOUS at 21:33

## 2025-08-26 RX ADMIN — INSULIN LISPRO 8 UNITS: 100 INJECTION, SOLUTION INTRAVENOUS; SUBCUTANEOUS at 13:38

## 2025-08-26 RX ADMIN — HYDRALAZINE HYDROCHLORIDE 100 MG: 50 TABLET ORAL at 09:10

## 2025-08-26 RX ADMIN — ALLOPURINOL 200 MG: 100 TABLET ORAL at 09:10

## 2025-08-26 RX ADMIN — HYDRALAZINE HYDROCHLORIDE 100 MG: 50 TABLET ORAL at 21:33

## 2025-08-26 RX ADMIN — Medication 1000 UNITS: at 09:10

## 2025-08-26 RX ADMIN — ISOSORBIDE MONONITRATE 30 MG: 30 TABLET, EXTENDED RELEASE ORAL at 09:10

## 2025-08-26 RX ADMIN — PANTOPRAZOLE SODIUM 40 MG: 40 TABLET, DELAYED RELEASE ORAL at 06:20

## 2025-08-26 RX ADMIN — SODIUM BICARBONATE 650 MG: 650 TABLET ORAL at 21:33

## 2025-08-26 RX ADMIN — SODIUM CHLORIDE, PRESERVATIVE FREE 10 ML: 5 INJECTION INTRAVENOUS at 09:11

## 2025-08-26 RX ADMIN — ATORVASTATIN CALCIUM 40 MG: 20 TABLET, FILM COATED ORAL at 09:10

## 2025-08-26 RX ADMIN — CETIRIZINE HYDROCHLORIDE 10 MG: 10 TABLET, FILM COATED ORAL at 09:10

## 2025-08-26 RX ADMIN — SODIUM BICARBONATE 650 MG: 650 TABLET ORAL at 09:11

## 2025-08-26 ASSESSMENT — PAIN SCALES - GENERAL
PAINLEVEL_OUTOF10: 0

## 2025-08-27 LAB
ALBUMIN SERPL-MCNC: 2.9 G/DL (ref 3.5–5.2)
ALBUMIN/GLOB SERPL: 0.9 (ref 1.1–2.2)
ALP SERPL-CCNC: 65 U/L (ref 40–129)
ALT SERPL-CCNC: 20 U/L (ref 10–50)
ANION GAP SERPL CALC-SCNC: 17 MMOL/L (ref 2–14)
AST SERPL-CCNC: 23 U/L (ref 10–50)
BASOPHILS # BLD: 0.07 K/UL (ref 0–0.1)
BASOPHILS NFR BLD: 1 % (ref 0–1)
BILIRUB SERPL-MCNC: 0.3 MG/DL (ref 0–1.2)
BUN SERPL-MCNC: 64 MG/DL (ref 8–23)
BUN/CREAT SERPL: 10 (ref 12–20)
CALCIUM SERPL-MCNC: 9.3 MG/DL (ref 8.8–10.2)
CHLORIDE SERPL-SCNC: 97 MMOL/L (ref 98–107)
CO2 SERPL-SCNC: 24 MMOL/L (ref 20–29)
COMMENT:: NORMAL
CREAT SERPL-MCNC: 6.51 MG/DL (ref 0.7–1.2)
DIFFERENTIAL METHOD BLD: ABNORMAL
EKG ATRIAL RATE: 69 BPM
EKG DIAGNOSIS: NORMAL
EKG P AXIS: 39 DEGREES
EKG P-R INTERVAL: 208 MS
EKG Q-T INTERVAL: 446 MS
EKG QRS DURATION: 106 MS
EKG QTC CALCULATION (BAZETT): 477 MS
EKG R AXIS: -21 DEGREES
EKG T AXIS: 33 DEGREES
EKG VENTRICULAR RATE: 69 BPM
EOSINOPHIL # BLD: 0.27 K/UL (ref 0–0.4)
EOSINOPHIL NFR BLD: 4 % (ref 0–7)
ERYTHROCYTE [DISTWIDTH] IN BLOOD BY AUTOMATED COUNT: 14.6 % (ref 11.5–14.5)
GLOBULIN SER CALC-MCNC: 3.3 G/DL (ref 2–4)
GLUCOSE BLD STRIP.AUTO-MCNC: 124 MG/DL (ref 65–117)
GLUCOSE BLD STRIP.AUTO-MCNC: 137 MG/DL (ref 65–117)
GLUCOSE BLD STRIP.AUTO-MCNC: 199 MG/DL (ref 65–117)
GLUCOSE BLD STRIP.AUTO-MCNC: 203 MG/DL (ref 65–117)
GLUCOSE BLD STRIP.AUTO-MCNC: 261 MG/DL (ref 65–117)
GLUCOSE SERPL-MCNC: 263 MG/DL (ref 65–100)
HCT VFR BLD AUTO: 33.9 % (ref 36.6–50.3)
HGB BLD-MCNC: 11.4 G/DL (ref 12.1–17)
IMM GRANULOCYTES # BLD AUTO: 0 K/UL
IMM GRANULOCYTES NFR BLD AUTO: 0 %
LYMPHOCYTES # BLD: 1.54 K/UL (ref 0.8–3.5)
LYMPHOCYTES NFR BLD: 23 % (ref 12–49)
MCH RBC QN AUTO: 28.2 PG (ref 26–34)
MCHC RBC AUTO-ENTMCNC: 33.6 G/DL (ref 30–36.5)
MCV RBC AUTO: 83.9 FL (ref 80–99)
MONOCYTES # BLD: 0.54 K/UL (ref 0–1)
MONOCYTES NFR BLD: 8 % (ref 5–13)
NEUTS SEG # BLD: 4.28 K/UL (ref 1.8–8)
NEUTS SEG NFR BLD: 64 % (ref 32–75)
NRBC # BLD: 0 K/UL (ref 0–0.01)
NRBC BLD-RTO: 0 PER 100 WBC
PHOSPHATE SERPL-MCNC: 5.5 MG/DL (ref 2.5–4.5)
PLATELET # BLD AUTO: 251 K/UL (ref 150–400)
PMV BLD AUTO: 9.2 FL (ref 8.9–12.9)
POTASSIUM SERPL-SCNC: 4.3 MMOL/L (ref 3.5–5.1)
PROT SERPL-MCNC: 6.1 G/DL (ref 6.4–8.3)
RBC # BLD AUTO: 4.04 M/UL (ref 4.1–5.7)
RBC MORPH BLD: ABNORMAL
SERVICE CMNT-IMP: ABNORMAL
SODIUM SERPL-SCNC: 138 MMOL/L (ref 136–145)
SPECIMEN HOLD: NORMAL
TROPONIN T SERPL HS-MCNC: 161 NG/L (ref 0–22)
TROPONIN T SERPL HS-MCNC: 165 NG/L (ref 0–22)
TROPONIN T SERPL HS-MCNC: 174 NG/L (ref 0–22)
WBC # BLD AUTO: 6.7 K/UL (ref 4.1–11.1)

## 2025-08-27 PROCEDURE — 6370000000 HC RX 637 (ALT 250 FOR IP)

## 2025-08-27 PROCEDURE — 85025 COMPLETE CBC W/AUTO DIFF WBC: CPT

## 2025-08-27 PROCEDURE — 84484 ASSAY OF TROPONIN QUANT: CPT

## 2025-08-27 PROCEDURE — 90935 HEMODIALYSIS ONE EVALUATION: CPT

## 2025-08-27 PROCEDURE — 2060000000 HC ICU INTERMEDIATE R&B

## 2025-08-27 PROCEDURE — 93005 ELECTROCARDIOGRAM TRACING: CPT | Performed by: INTERNAL MEDICINE

## 2025-08-27 PROCEDURE — 2500000003 HC RX 250 WO HCPCS: Performed by: FAMILY MEDICINE

## 2025-08-27 PROCEDURE — 6370000000 HC RX 637 (ALT 250 FOR IP): Performed by: FAMILY MEDICINE

## 2025-08-27 PROCEDURE — 80053 COMPREHEN METABOLIC PANEL: CPT

## 2025-08-27 PROCEDURE — 97530 THERAPEUTIC ACTIVITIES: CPT

## 2025-08-27 PROCEDURE — 82962 GLUCOSE BLOOD TEST: CPT

## 2025-08-27 PROCEDURE — 6370000000 HC RX 637 (ALT 250 FOR IP): Performed by: INTERNAL MEDICINE

## 2025-08-27 PROCEDURE — 84100 ASSAY OF PHOSPHORUS: CPT

## 2025-08-27 RX ORDER — HYDRALAZINE HYDROCHLORIDE 50 MG/1
100 TABLET, FILM COATED ORAL EVERY 8 HOURS SCHEDULED
Status: DISCONTINUED | OUTPATIENT
Start: 2025-08-27 | End: 2025-08-30 | Stop reason: HOSPADM

## 2025-08-27 RX ORDER — HYDRALAZINE HYDROCHLORIDE 50 MG/1
100 TABLET, FILM COATED ORAL EVERY 8 HOURS SCHEDULED
Status: DISCONTINUED | OUTPATIENT
Start: 2025-08-27 | End: 2025-08-27

## 2025-08-27 RX ORDER — NITROGLYCERIN 0.4 MG/1
0.4 TABLET SUBLINGUAL PRN
Status: DISCONTINUED | OUTPATIENT
Start: 2025-08-27 | End: 2025-08-30 | Stop reason: HOSPADM

## 2025-08-27 RX ORDER — NITROGLYCERIN 0.4 MG/1
TABLET SUBLINGUAL
Status: COMPLETED
Start: 2025-08-27 | End: 2025-08-27

## 2025-08-27 RX ADMIN — HYDRALAZINE HYDROCHLORIDE 100 MG: 50 TABLET ORAL at 22:30

## 2025-08-27 RX ADMIN — SODIUM BICARBONATE 650 MG: 650 TABLET ORAL at 22:30

## 2025-08-27 RX ADMIN — Medication 1000 UNITS: at 08:46

## 2025-08-27 RX ADMIN — ISOSORBIDE MONONITRATE 30 MG: 30 TABLET, EXTENDED RELEASE ORAL at 08:45

## 2025-08-27 RX ADMIN — CETIRIZINE HYDROCHLORIDE 10 MG: 10 TABLET, FILM COATED ORAL at 08:46

## 2025-08-27 RX ADMIN — INSULIN LISPRO 4 UNITS: 100 INJECTION, SOLUTION INTRAVENOUS; SUBCUTANEOUS at 12:31

## 2025-08-27 RX ADMIN — NITROGLYCERIN 0.4 MG: 0.4 TABLET SUBLINGUAL at 16:04

## 2025-08-27 RX ADMIN — ALLOPURINOL 200 MG: 100 TABLET ORAL at 08:46

## 2025-08-27 RX ADMIN — SODIUM BICARBONATE 650 MG: 650 TABLET ORAL at 12:31

## 2025-08-27 RX ADMIN — INSULIN LISPRO 2 UNITS: 100 INJECTION, SOLUTION INTRAVENOUS; SUBCUTANEOUS at 22:29

## 2025-08-27 RX ADMIN — ATORVASTATIN CALCIUM 40 MG: 20 TABLET, FILM COATED ORAL at 08:46

## 2025-08-27 RX ADMIN — PANTOPRAZOLE SODIUM 40 MG: 40 TABLET, DELAYED RELEASE ORAL at 06:45

## 2025-08-27 RX ADMIN — INSULIN GLARGINE 8 UNITS: 100 INJECTION, SOLUTION SUBCUTANEOUS at 08:46

## 2025-08-27 RX ADMIN — SODIUM CHLORIDE, PRESERVATIVE FREE 10 ML: 5 INJECTION INTRAVENOUS at 08:47

## 2025-08-27 RX ADMIN — HYDRALAZINE HYDROCHLORIDE 100 MG: 50 TABLET ORAL at 08:51

## 2025-08-27 RX ADMIN — NITROGLYCERIN 0.4 MG: 0.4 TABLET, ORALLY DISINTEGRATING SUBLINGUAL at 16:04

## 2025-08-27 RX ADMIN — SODIUM BICARBONATE 650 MG: 650 TABLET ORAL at 08:46

## 2025-08-27 ASSESSMENT — PAIN SCALES - GENERAL
PAINLEVEL_OUTOF10: 8
PAINLEVEL_OUTOF10: 0

## 2025-08-27 ASSESSMENT — PAIN DESCRIPTION - ORIENTATION: ORIENTATION: LEFT

## 2025-08-27 ASSESSMENT — PAIN DESCRIPTION - PAIN TYPE: TYPE: ACUTE PAIN

## 2025-08-27 ASSESSMENT — PAIN DESCRIPTION - LOCATION: LOCATION: CHEST

## 2025-08-28 LAB
ANION GAP SERPL CALC-SCNC: 15 MMOL/L (ref 2–14)
BUN SERPL-MCNC: 44 MG/DL (ref 8–23)
BUN/CREAT SERPL: 9 (ref 12–20)
CALCIUM SERPL-MCNC: 9.8 MG/DL (ref 8.8–10.2)
CHLORIDE SERPL-SCNC: 98 MMOL/L (ref 98–107)
CO2 SERPL-SCNC: 26 MMOL/L (ref 20–29)
CREAT SERPL-MCNC: 5.08 MG/DL (ref 0.7–1.2)
EKG ATRIAL RATE: 68 BPM
EKG DIAGNOSIS: NORMAL
EKG P AXIS: 52 DEGREES
EKG P-R INTERVAL: 206 MS
EKG Q-T INTERVAL: 450 MS
EKG QRS DURATION: 120 MS
EKG QTC CALCULATION (BAZETT): 478 MS
EKG R AXIS: -21 DEGREES
EKG T AXIS: 35 DEGREES
EKG VENTRICULAR RATE: 68 BPM
ERYTHROCYTE [DISTWIDTH] IN BLOOD BY AUTOMATED COUNT: 14.6 % (ref 11.5–14.5)
GLUCOSE BLD STRIP.AUTO-MCNC: 126 MG/DL (ref 65–117)
GLUCOSE BLD STRIP.AUTO-MCNC: 183 MG/DL (ref 65–117)
GLUCOSE BLD STRIP.AUTO-MCNC: 207 MG/DL (ref 65–117)
GLUCOSE BLD STRIP.AUTO-MCNC: 313 MG/DL (ref 65–117)
GLUCOSE SERPL-MCNC: 107 MG/DL (ref 65–100)
HCT VFR BLD AUTO: 38 % (ref 36.6–50.3)
HGB BLD-MCNC: 12.2 G/DL (ref 12.1–17)
MAGNESIUM SERPL-MCNC: 2 MG/DL (ref 1.6–2.4)
MCH RBC QN AUTO: 27.4 PG (ref 26–34)
MCHC RBC AUTO-ENTMCNC: 32.1 G/DL (ref 30–36.5)
MCV RBC AUTO: 85.4 FL (ref 80–99)
NRBC # BLD: 0 K/UL (ref 0–0.01)
NRBC BLD-RTO: 0 PER 100 WBC
PHOSPHATE SERPL-MCNC: 5.1 MG/DL (ref 2.5–4.5)
PLATELET # BLD AUTO: 312 K/UL (ref 150–400)
PMV BLD AUTO: 9.5 FL (ref 8.9–12.9)
POTASSIUM SERPL-SCNC: 4.3 MMOL/L (ref 3.5–5.1)
RBC # BLD AUTO: 4.45 M/UL (ref 4.1–5.7)
SERVICE CMNT-IMP: ABNORMAL
SODIUM SERPL-SCNC: 139 MMOL/L (ref 136–145)
TROPONIN T SERPL HS-MCNC: 178 NG/L (ref 0–22)
WBC # BLD AUTO: 8.9 K/UL (ref 4.1–11.1)

## 2025-08-28 PROCEDURE — 2500000003 HC RX 250 WO HCPCS: Performed by: FAMILY MEDICINE

## 2025-08-28 PROCEDURE — 6370000000 HC RX 637 (ALT 250 FOR IP): Performed by: FAMILY MEDICINE

## 2025-08-28 PROCEDURE — 84484 ASSAY OF TROPONIN QUANT: CPT

## 2025-08-28 PROCEDURE — 83735 ASSAY OF MAGNESIUM: CPT

## 2025-08-28 PROCEDURE — 97530 THERAPEUTIC ACTIVITIES: CPT

## 2025-08-28 PROCEDURE — 97535 SELF CARE MNGMENT TRAINING: CPT

## 2025-08-28 PROCEDURE — 80048 BASIC METABOLIC PNL TOTAL CA: CPT

## 2025-08-28 PROCEDURE — 94760 N-INVAS EAR/PLS OXIMETRY 1: CPT

## 2025-08-28 PROCEDURE — 84100 ASSAY OF PHOSPHORUS: CPT

## 2025-08-28 PROCEDURE — 6370000000 HC RX 637 (ALT 250 FOR IP): Performed by: INTERNAL MEDICINE

## 2025-08-28 PROCEDURE — 85027 COMPLETE CBC AUTOMATED: CPT

## 2025-08-28 PROCEDURE — 2060000000 HC ICU INTERMEDIATE R&B

## 2025-08-28 PROCEDURE — 82962 GLUCOSE BLOOD TEST: CPT

## 2025-08-28 RX ORDER — HYDRALAZINE HYDROCHLORIDE 20 MG/ML
10 INJECTION INTRAMUSCULAR; INTRAVENOUS EVERY 6 HOURS PRN
Status: DISCONTINUED | OUTPATIENT
Start: 2025-08-28 | End: 2025-08-30 | Stop reason: HOSPADM

## 2025-08-28 RX ORDER — ISOSORBIDE MONONITRATE 60 MG/1
60 TABLET, EXTENDED RELEASE ORAL DAILY
Status: DISCONTINUED | OUTPATIENT
Start: 2025-08-29 | End: 2025-08-30 | Stop reason: HOSPADM

## 2025-08-28 RX ADMIN — INSULIN LISPRO 6 UNITS: 100 INJECTION, SOLUTION INTRAVENOUS; SUBCUTANEOUS at 11:32

## 2025-08-28 RX ADMIN — INSULIN LISPRO 2 UNITS: 100 INJECTION, SOLUTION INTRAVENOUS; SUBCUTANEOUS at 17:15

## 2025-08-28 RX ADMIN — ALLOPURINOL 200 MG: 100 TABLET ORAL at 08:59

## 2025-08-28 RX ADMIN — ISOSORBIDE MONONITRATE 30 MG: 30 TABLET, EXTENDED RELEASE ORAL at 08:59

## 2025-08-28 RX ADMIN — Medication 1000 UNITS: at 08:59

## 2025-08-28 RX ADMIN — ATORVASTATIN CALCIUM 40 MG: 20 TABLET, FILM COATED ORAL at 08:59

## 2025-08-28 RX ADMIN — HYDRALAZINE HYDROCHLORIDE 100 MG: 50 TABLET ORAL at 22:42

## 2025-08-28 RX ADMIN — INSULIN GLARGINE 8 UNITS: 100 INJECTION, SOLUTION SUBCUTANEOUS at 08:59

## 2025-08-28 RX ADMIN — HYDRALAZINE HYDROCHLORIDE 100 MG: 50 TABLET ORAL at 15:50

## 2025-08-28 RX ADMIN — SODIUM CHLORIDE, PRESERVATIVE FREE 10 ML: 5 INJECTION INTRAVENOUS at 23:06

## 2025-08-28 RX ADMIN — SODIUM BICARBONATE 650 MG: 650 TABLET ORAL at 15:50

## 2025-08-28 RX ADMIN — SODIUM BICARBONATE 650 MG: 650 TABLET ORAL at 08:59

## 2025-08-28 RX ADMIN — SODIUM BICARBONATE 650 MG: 650 TABLET ORAL at 22:43

## 2025-08-28 RX ADMIN — CETIRIZINE HYDROCHLORIDE 10 MG: 10 TABLET, FILM COATED ORAL at 08:59

## 2025-08-28 RX ADMIN — HYDRALAZINE HYDROCHLORIDE 100 MG: 50 TABLET ORAL at 06:15

## 2025-08-28 RX ADMIN — PANTOPRAZOLE SODIUM 40 MG: 40 TABLET, DELAYED RELEASE ORAL at 06:15

## 2025-08-28 RX ADMIN — SODIUM CHLORIDE, PRESERVATIVE FREE 10 ML: 5 INJECTION INTRAVENOUS at 09:00

## 2025-08-28 ASSESSMENT — PAIN SCALES - GENERAL
PAINLEVEL_OUTOF10: 0

## 2025-08-29 VITALS
WEIGHT: 170.86 LBS | BODY MASS INDEX: 25.89 KG/M2 | TEMPERATURE: 97.7 F | OXYGEN SATURATION: 93 % | HEIGHT: 68 IN | HEART RATE: 65 BPM | DIASTOLIC BLOOD PRESSURE: 79 MMHG | RESPIRATION RATE: 18 BRPM | SYSTOLIC BLOOD PRESSURE: 135 MMHG

## 2025-08-29 LAB
ANION GAP SERPL CALC-SCNC: 16 MMOL/L (ref 2–14)
BUN SERPL-MCNC: 57 MG/DL (ref 8–23)
BUN/CREAT SERPL: 10 (ref 12–20)
CALCIUM SERPL-MCNC: 10.1 MG/DL (ref 8.8–10.2)
CHLORIDE SERPL-SCNC: 98 MMOL/L (ref 98–107)
CO2 SERPL-SCNC: 24 MMOL/L (ref 20–29)
CREAT SERPL-MCNC: 5.6 MG/DL (ref 0.7–1.2)
ERYTHROCYTE [DISTWIDTH] IN BLOOD BY AUTOMATED COUNT: 14.6 % (ref 11.5–14.5)
GLUCOSE BLD STRIP.AUTO-MCNC: 110 MG/DL (ref 65–117)
GLUCOSE BLD STRIP.AUTO-MCNC: 151 MG/DL (ref 65–117)
GLUCOSE BLD STRIP.AUTO-MCNC: 266 MG/DL (ref 65–117)
GLUCOSE SERPL-MCNC: 110 MG/DL (ref 65–100)
HCT VFR BLD AUTO: 36.4 % (ref 36.6–50.3)
HGB BLD-MCNC: 11.8 G/DL (ref 12.1–17)
MAGNESIUM SERPL-MCNC: 1.9 MG/DL (ref 1.6–2.4)
MCH RBC QN AUTO: 27.4 PG (ref 26–34)
MCHC RBC AUTO-ENTMCNC: 32.4 G/DL (ref 30–36.5)
MCV RBC AUTO: 84.7 FL (ref 80–99)
NRBC # BLD: 0 K/UL (ref 0–0.01)
NRBC BLD-RTO: 0 PER 100 WBC
PHOSPHATE SERPL-MCNC: 5.5 MG/DL (ref 2.5–4.5)
PLATELET # BLD AUTO: 351 K/UL (ref 150–400)
PMV BLD AUTO: 9.3 FL (ref 8.9–12.9)
POTASSIUM SERPL-SCNC: 4.7 MMOL/L (ref 3.5–5.1)
RBC # BLD AUTO: 4.3 M/UL (ref 4.1–5.7)
SERVICE CMNT-IMP: ABNORMAL
SERVICE CMNT-IMP: ABNORMAL
SERVICE CMNT-IMP: NORMAL
SODIUM SERPL-SCNC: 138 MMOL/L (ref 136–145)
WBC # BLD AUTO: 10 K/UL (ref 4.1–11.1)

## 2025-08-29 PROCEDURE — 94760 N-INVAS EAR/PLS OXIMETRY 1: CPT

## 2025-08-29 PROCEDURE — 6370000000 HC RX 637 (ALT 250 FOR IP): Performed by: STUDENT IN AN ORGANIZED HEALTH CARE EDUCATION/TRAINING PROGRAM

## 2025-08-29 PROCEDURE — 2500000003 HC RX 250 WO HCPCS: Performed by: FAMILY MEDICINE

## 2025-08-29 PROCEDURE — 85027 COMPLETE CBC AUTOMATED: CPT

## 2025-08-29 PROCEDURE — 80048 BASIC METABOLIC PNL TOTAL CA: CPT

## 2025-08-29 PROCEDURE — 83735 ASSAY OF MAGNESIUM: CPT

## 2025-08-29 PROCEDURE — 6370000000 HC RX 637 (ALT 250 FOR IP): Performed by: FAMILY MEDICINE

## 2025-08-29 PROCEDURE — 82962 GLUCOSE BLOOD TEST: CPT

## 2025-08-29 PROCEDURE — 84100 ASSAY OF PHOSPHORUS: CPT

## 2025-08-29 PROCEDURE — 90935 HEMODIALYSIS ONE EVALUATION: CPT

## 2025-08-29 PROCEDURE — 6370000000 HC RX 637 (ALT 250 FOR IP): Performed by: INTERNAL MEDICINE

## 2025-08-29 PROCEDURE — 6360000002 HC RX W HCPCS: Performed by: NURSE PRACTITIONER

## 2025-08-29 PROCEDURE — 2060000000 HC ICU INTERMEDIATE R&B

## 2025-08-29 RX ORDER — HYDRALAZINE HYDROCHLORIDE 100 MG/1
100 TABLET, FILM COATED ORAL 3 TIMES DAILY
Qty: 180 TABLET | Refills: 0 | Status: SHIPPED
Start: 2025-08-29

## 2025-08-29 RX ORDER — POLYETHYLENE GLYCOL 3350 17 G/17G
17 POWDER, FOR SOLUTION ORAL DAILY PRN
Status: SHIPPED | COMMUNITY
Start: 2025-08-29 | End: 2025-09-28

## 2025-08-29 RX ORDER — ISOSORBIDE MONONITRATE 60 MG/1
60 TABLET, EXTENDED RELEASE ORAL DAILY
Qty: 30 TABLET | Refills: 0 | Status: SHIPPED
Start: 2025-08-29

## 2025-08-29 RX ADMIN — ALLOPURINOL 200 MG: 100 TABLET ORAL at 13:42

## 2025-08-29 RX ADMIN — ATORVASTATIN CALCIUM 40 MG: 20 TABLET, FILM COATED ORAL at 10:13

## 2025-08-29 RX ADMIN — HYDRALAZINE HYDROCHLORIDE 100 MG: 50 TABLET ORAL at 16:02

## 2025-08-29 RX ADMIN — SODIUM BICARBONATE 650 MG: 650 TABLET ORAL at 13:43

## 2025-08-29 RX ADMIN — SODIUM BICARBONATE 650 MG: 650 TABLET ORAL at 10:23

## 2025-08-29 RX ADMIN — INSULIN LISPRO 4 UNITS: 100 INJECTION, SOLUTION INTRAVENOUS; SUBCUTANEOUS at 18:30

## 2025-08-29 RX ADMIN — HYDRALAZINE HYDROCHLORIDE 10 MG: 20 INJECTION, SOLUTION INTRAMUSCULAR; INTRAVENOUS at 07:17

## 2025-08-29 RX ADMIN — CETIRIZINE HYDROCHLORIDE 10 MG: 10 TABLET, FILM COATED ORAL at 10:12

## 2025-08-29 RX ADMIN — PANTOPRAZOLE SODIUM 40 MG: 40 TABLET, DELAYED RELEASE ORAL at 08:12

## 2025-08-29 RX ADMIN — SODIUM CHLORIDE, PRESERVATIVE FREE 10 ML: 5 INJECTION INTRAVENOUS at 10:23

## 2025-08-29 RX ADMIN — INSULIN GLARGINE 8 UNITS: 100 INJECTION, SOLUTION SUBCUTANEOUS at 10:13

## 2025-08-29 RX ADMIN — ISOSORBIDE MONONITRATE 60 MG: 60 TABLET, EXTENDED RELEASE ORAL at 13:43

## 2025-08-29 RX ADMIN — Medication 1000 UNITS: at 10:13

## 2025-08-29 ASSESSMENT — PAIN SCALES - GENERAL
PAINLEVEL_OUTOF10: 0

## 2025-09-05 ENCOUNTER — TELEPHONE (OUTPATIENT)
Age: 80
End: 2025-09-05

## (undated) DEVICE — STERILE POLYISOPRENE POWDER-FREE SURGICAL GLOVES: Brand: PROTEXIS

## (undated) DEVICE — INTENDED FOR TISSUE SEPARATION, AND OTHER PROCEDURES THAT REQUIRE A SHARP SURGICAL BLADE TO PUNCTURE OR CUT.: Brand: BARD-PARKER ®  SAFETY SCALPED

## (undated) DEVICE — CLIP INT L POLYMER LOK LIG HEM O LOK

## (undated) DEVICE — GAUZE,SPONGE,4"X4",12PLY,STERILE,LF,2'S: Brand: MEDLINE

## (undated) DEVICE — FLUFF AND POLYMER UNDERPAD,EXTRA HEAVY: Brand: WINGS

## (undated) DEVICE — MEDI-VAC SUCTION HIGH CAPACITY: Brand: CARDINAL HEALTH

## (undated) DEVICE — STRIP,CLOSURE,WOUND,MEDI-STRIP,1/2X4: Brand: MEDLINE

## (undated) DEVICE — SYR 50ML SLIP TIP NSAF LF STRL --

## (undated) DEVICE — INTENDED FOR TISSUE SEPARATION, AND OTHER PROCEDURES THAT REQUIRE A SHARP SURGICAL BLADE TO PUNCTURE OR CUT.: Brand: BARD-PARKER ® CARBON RIB-BACK BLADES

## (undated) DEVICE — SUTURE VCRL SZ 3-0 L27IN ABSRB UD L26MM SH 1/2 CIR J416H

## (undated) DEVICE — SYR 10ML CTRL LR LCK NSAF LF --

## (undated) DEVICE — TAPE,CLOTH/SILK,CURAD,3"X10YD,LF,40/CS: Brand: CURAD

## (undated) DEVICE — TRAY PREP DRY W/ PREM GLV 2 APPL 6 SPNG 2 UNDPD 1 OVERWRAP

## (undated) DEVICE — CATHETER THOR 36FR DIA10.7MM POLYVI CHL TRCR TIP STR SFT

## (undated) DEVICE — COLUMN DRAPE

## (undated) DEVICE — REM POLYHESIVE ADULT PATIENT RETURN ELECTRODE: Brand: VALLEYLAB

## (undated) DEVICE — CATHETER 5FR JL3.5 CORDIS 100CM

## (undated) DEVICE — 3M™ TEGADERM™ TRANSPARENT FILM DRESSING FRAME STYLE, 1626W, 4 IN X 4-3/4 IN (10 CM X 12 CM), 50/CT 4CT/CASE: Brand: 3M™ TEGADERM™

## (undated) DEVICE — GARMENT,MEDLINE,DVT,INT,CALF,MED, GEN2: Brand: MEDLINE

## (undated) DEVICE — KIT,ANTI FOG,W/SPONGE & FLUID,SOFT PACK: Brand: MEDLINE

## (undated) DEVICE — WASTEBAG DRIP/ADAPTER: Brand: MEDLINE INDUSTRIES, INC.

## (undated) DEVICE — CATHETER SUCT TR FL TIP 14FR W/ O CTRL

## (undated) DEVICE — 48" PROBE COVER W/GEL, ULTRASOUND, STERILE: Brand: SITE-RITE

## (undated) DEVICE — SYR 20ML LL STRL LF --

## (undated) DEVICE — Device: Brand: SPOT EX ENDOSCOPIC TATTOO

## (undated) DEVICE — NEEDLE HYPO 25GA L1.5IN BVL ORIENTED ECLIPSE

## (undated) DEVICE — 3M™ STERI-DRAPE™ SMALL DRAPE WITH ADHESIVE APERTURE 1092 25/BX,4/CS&#X20;: Brand: STERI-DRAPE™

## (undated) DEVICE — SET SUCT IRR TIP DISP STRYKEFLOW2

## (undated) DEVICE — MEDI-VAC NON-CONDUCTIVE SUCTION TUBING: Brand: CARDINAL HEALTH

## (undated) DEVICE — CLIP INT XL YEL POLYMER HEM-O-LOK WECK

## (undated) DEVICE — DRESSING,GAUZE,XEROFORM,CURAD,1"X8",ST: Brand: CURAD

## (undated) DEVICE — SOLUTION IRRIG 1000ML H2O STRL BLT

## (undated) DEVICE — DECANTER BAG 9": Brand: MEDLINE INDUSTRIES, INC.

## (undated) DEVICE — LAPAROSCOPIC TROCAR SLEEVE/SINGLE USE: Brand: KII® OPTICAL ACCESS SYSTEM

## (undated) DEVICE — PACK PROCEDURE SURG MAJ W/ BASIN LF

## (undated) DEVICE — RELOAD STPL L75MM OPN H3.8MM CLS 1.5MM WIRE DIA0.2MM REG

## (undated) DEVICE — INTENDED FOR TISSUE SEPARATION, AND OTHER PROCEDURES THAT REQUIRE A SHARP SURGICAL BLADE TO PUNCTURE OR CUT.: Brand: BARD-PARKER SAFETY BLADES SIZE 15, STERILE

## (undated) DEVICE — PREP SKN CHLRAPRP APL 26ML STR --

## (undated) DEVICE — COVER MPLR TIP CRV SCIS ACC DA VINCI

## (undated) DEVICE — DRAPE XR C ARM 41X74IN LF --

## (undated) DEVICE — TRAY SURG CUST CRD CATH 3 PRT

## (undated) DEVICE — 3M™ TEGADERM™ I.V. ADVANCED SECUREMENT DRESSING, 1685, 3-1/2 IN X 4-1/2 IN (8.5 CM X 11.5 CM), 50/CT 4CT/CASE: Brand: 3M™ TEGADERM™

## (undated) DEVICE — SYRINGE MED 10ML PUR GAM COMPATIBLE POLYCARB FIX M LUER CONN

## (undated) DEVICE — GUIDEWIRE VASC L260CM DIA0.035IN TIP L3MM STD EXCHG PTFE J

## (undated) DEVICE — COVER,LIGHT HANDLE,FLX,1/PK: Brand: MEDLINE INDUSTRIES, INC.

## (undated) DEVICE — BLANKET WRM AD W50XL85.8IN PACU FULL BODY FORC AIR

## (undated) DEVICE — SHEET, T, LAPAROTOMY, STERILE: Brand: MEDLINE

## (undated) DEVICE — KIT CLN UP BON SECOURS MARYV

## (undated) DEVICE — SUTURE PDS II SZ 1 L36IN ABSRB VLT CTXB L48MM 1/2 CIR BLNT ZB371

## (undated) DEVICE — SCISSORS ENDOSCP DIA5MM CRV MPLR CAUT W/ RATCH HNDL

## (undated) DEVICE — MARYLAND JAW LAPAROSCOPIC SEALER/DIVIDER COATED: Brand: LIGASURE

## (undated) DEVICE — SYR LR LCK 1ML GRAD NSAF 30ML --

## (undated) DEVICE — SYRINGE MED 10ML RED POLYCARB BRL FIX M LUER CONN FLAT GRP

## (undated) DEVICE — CATHETER DIAG AD 5FR L100CM COR NYL JUDKINS R 5 DILATED

## (undated) DEVICE — GAUZE,SPONGE,4"X4",16PLY,STRL,LF,10/TRAY: Brand: MEDLINE

## (undated) DEVICE — CLIP INT M L POLYMER LOK LIG HEM O LOK

## (undated) DEVICE — SYR 10ML LUER LOK 1/5ML GRAD --

## (undated) DEVICE — GOWN ISOL IMPERV UNIV, DISP, OPEN BACK, BLUE --

## (undated) DEVICE — TRAP SPEC COLL POLYP POLYSTYR --

## (undated) DEVICE — Device

## (undated) DEVICE — CATHETER DIAG 5FR L100CM LUMN ID0.047IN JL4 CRV 0 SIDE H

## (undated) DEVICE — ENDOSCOPY PUMP TUBING/ CAP SET: Brand: ERBE

## (undated) DEVICE — SUTURE VCRL SZ 3-0 L27IN ABSRB VLT L26MM SH 1/2 CIR J316H

## (undated) DEVICE — SC 3W MP RA OFF PB - PG: Brand: NAMIC

## (undated) DEVICE — ELECTRO LUBE IS A SINGLE PATIENT USE DEVICE THAT IS INTENDED TO BE USED ON ELECTROSURGICAL ELECTRODES TO REDUCE STICKING.: Brand: KEY SURGICAL ELECTRO LUBE

## (undated) DEVICE — SUTURE VCRL SZ 0 L27IN ABSRB UD L36MM CT-1 1/2 CIR J260H

## (undated) DEVICE — SOLUTION IV 1000ML 0.9% SOD CHL

## (undated) DEVICE — SYRINGE MED 25GA 3ML L5/8IN SUBQ PLAS W/ DETACH NDL SFTY

## (undated) DEVICE — 40580 - THE PINK PAD - ADVANCED TRENDELENBURG POSITIONING KIT: Brand: 40580 - THE PINK PAD - ADVANCED TRENDELENBURG POSITIONING KIT

## (undated) DEVICE — SUTURE MCRYL SZ 4-0 L18IN ABSRB UD L19MM PS-2 3/8 CIR PRIM Y496G

## (undated) DEVICE — NDL INJ SCLERO 25G 240CM -- INTERJECT M00518360 BX/5

## (undated) DEVICE — TRAY,URINE METER,100% SILICONE,16FR10ML: Brand: MEDLINE

## (undated) DEVICE — THREE-QUARTER SHEET: Brand: CONVERTORS

## (undated) DEVICE — CLIP HEMO ENDOSCP 235CM BX/20 -- RESOLUTION 360

## (undated) DEVICE — SUTURE VCRL SZ 0 L18IN ABSRB UD POLYGLACTIN 910 BRAID TIE J912G

## (undated) DEVICE — GLIDESHEATH SLENDER STAINLESS STEEL KIT: Brand: GLIDESHEATH SLENDER

## (undated) DEVICE — SNARE ENDOSCP M L240CM LOOP W27MM SHTH DIA2.4MM OVL FLX

## (undated) DEVICE — MASIMOSET LNCS NEO  NEONATAL/ADULT PULSE OXIMETER ADHESIVE SENSOR: Brand: MASIMOSET® LNCS® NEO  NEONATAL/ADULT PULSE OXIMETER ADHESIVE SENSOR

## (undated) DEVICE — FCPS ENDOSCP 5MMX33CM -- ENDOPATH

## (undated) DEVICE — FLEX ADVANTAGE 3000CC: Brand: FLEX ADVANTAGE

## (undated) DEVICE — CANNULA ORIG TL CLR W FOAM CUSHIONS AND 14FT SUPL TB 3 CHN

## (undated) DEVICE — CANNULA NSL AD TBNG L14FT STD PVC O2 CRV CONN NONFLARED NSL

## (undated) DEVICE — LAPAROSCOPIC ACCESS SYSTEM: Brand: ALEXIS LAPAROSCOPIC SYSTEM WITH KII FIOS FIRST ENTRY

## (undated) DEVICE — TROCAR: Brand: KII® OPTICAL ACCESS SYSTEM

## (undated) DEVICE — TROCAR: Brand: KII® SLEEVE

## (undated) DEVICE — BAND COMPR L24CM REG CLR PLAS HEMSTAT EXT HK AND LOOP RETEN

## (undated) DEVICE — CATH URETH FOL 2W MED 26FRX30 -- CONVERT TO ITEM 363085

## (undated) DEVICE — BOWL MED M 16OZ PLAS CAP GRAD

## (undated) DEVICE — 3M™ STERI-DRAPE™ INSTRUMENT POUCH 1018L: Brand: STERI-DRAPE™

## (undated) DEVICE — DRAPE TOWEL: Brand: CONVERTORS

## (undated) DEVICE — SUTURE PDS II SZ 0 L27IN ABSRB VLT L36MM CT-1 1/2 CIR Z340H

## (undated) DEVICE — ARM DRAPE

## (undated) DEVICE — SYRINGE ANGIO 10 CC POLYCARB DK GRN MEDALLION DISP

## (undated) DEVICE — STAPLER INT L60MM REG TISS BLU B FRM 8 FIRING 2 ROW AUTO

## (undated) DEVICE — STAPLER INT L75MM CUT LN L73MM STPL LN L77MM BLU B FRM 8